# Patient Record
Sex: FEMALE | Race: WHITE | NOT HISPANIC OR LATINO | Employment: OTHER | URBAN - METROPOLITAN AREA
[De-identification: names, ages, dates, MRNs, and addresses within clinical notes are randomized per-mention and may not be internally consistent; named-entity substitution may affect disease eponyms.]

---

## 2017-01-20 ENCOUNTER — OFFICE VISIT (OUTPATIENT)
Dept: BEHAVIORAL HEALTH | Facility: PHYSICIAN GROUP | Age: 57
End: 2017-01-20
Payer: COMMERCIAL

## 2017-01-20 DIAGNOSIS — F33.1 MAJOR DEPRESSIVE DISORDER, RECURRENT EPISODE, MODERATE (HCC): ICD-10-CM

## 2017-01-20 PROCEDURE — 90834 PSYTX W PT 45 MINUTES: CPT | Performed by: MARRIAGE & FAMILY THERAPIST

## 2017-01-21 NOTE — BH THERAPY
[facility]  Therapy Progress Note    Patient Name: Debby Desai  Patient MRN: [id number]  Today's Date: 1/20/2017     Type of session:Individual psychotherapy  Length of session: 45  Persons in attendance:Patient    Subjective/New Info: stood up to her mother and is proud of herself; went shopping w/ her sisters; went out to dinner w/ friends and they plan to make it a monthly thing; some chest pains but not as often as prior    Objective/Observations:   Participation: Active verbal participation   Grooming: Neat   Cognition: Alert   Eye contact: Good   Mood: Anxious   Affect: Anxious   Thought process: Logical   Speech: Rate within normal limits   Other:     Diagnoses:   1. Major depressive disorder, recurrent episode, moderate (CMS-HCC)         Current risk:   SUICIDE: Not applicable   Homicide: Not applicable   Self-harm: Not applicable   Relapse: Not applicable   Other:    Safety Plan reviewed? Not Indicated   If evidence of imminent risk is present, intervention/plan:     Therapeutic Intervention(s): Conflict clarification, Interpersonal effectiveness skills, Parenting/familial roles addressed, Relaxation exercise, Self-care skills and Supportive psychotherapy    Treatment Goal(s)/Objective(s) addressed: disc relaxation and did short relaxation exercise; will bring relaxation handout next session; explained guided imagery and will do next time; pt has [facility], [facility] in [place] and will work on imagery when anxiety begins using that, while slowing breathing     Progress toward Treatment Goals: Moderate improvement    Plan:  - Next appointment scheduled:  2/3/2017    KATT Hernandez  1/20/2017

## 2017-02-03 ENCOUNTER — OFFICE VISIT (OUTPATIENT)
Dept: BEHAVIORAL HEALTH | Facility: PHYSICIAN GROUP | Age: 57
End: 2017-02-03
Payer: COMMERCIAL

## 2017-02-03 DIAGNOSIS — F33.1 MAJOR DEPRESSIVE DISORDER, RECURRENT EPISODE, MODERATE (HCC): ICD-10-CM

## 2017-02-03 DIAGNOSIS — F41.8 OTHER SPECIFIED ANXIETY DISORDERS: ICD-10-CM

## 2017-02-03 PROCEDURE — 90834 PSYTX W PT 45 MINUTES: CPT | Performed by: MARRIAGE & FAMILY THERAPIST

## 2017-02-04 NOTE — BH THERAPY
" [facility]  Therapy Progress Note    Patient Name: Debby Desai  Patient MRN: [id number]  Today's Date: 2/3/2017     Type of session:Individual psychotherapy  Length of session: 45  Persons in attendance:Patient    Subjective/New Info: mom  last wk unexpectedly and she had to go back to work before she was ready; feeling overwhelmed and isn't sleeping; asked for time off and couldn't get it    Objective/Observations:   Participation: Active verbal participation   Grooming: Casual   Cognition: Alert   Eye contact: Good   Mood: Depressed and Anxious   Affect: Sad, Anxious and Tearful   Thought process: Logical   Speech: Rate within normal limits   Other:     Diagnoses:   1. Major depressive disorder, recurrent episode, moderate (CMS-HCC)    2. Other specified anxiety disorders         Current risk:   SUICIDE: Low denies   Homicide: Not applicable   Self-harm: Not applicable   Relapse: Not applicable   Other:    Safety Plan reviewed? Not Indicated   If evidence of imminent risk is present, intervention/plan:     Therapeutic Intervention(s): Distress tolerance skills, Parenting/familial roles addressed, Psychoeducation RE: grief and Supportive psychotherapy    Treatment Goal(s)/Objective(s) addressed: pt has never grieved fa's death and now has mom's; we had discussed being off work and now we discussed program; talked w/ Dr. Correa and she will bring her in next wk and have her talk w/ Ju beltre beginning program, also get her off work     Progress toward Treatment Goals: Moderate decline    Plan:  - \"Homework\" recommendation: refer to KATT Koo  2/3/2017                                     "

## 2017-02-06 ENCOUNTER — OFFICE VISIT (OUTPATIENT)
Dept: BEHAVIORAL HEALTH | Facility: PHYSICIAN GROUP | Age: 57
End: 2017-02-06
Payer: COMMERCIAL

## 2017-02-06 VITALS
WEIGHT: 181 LBS | SYSTOLIC BLOOD PRESSURE: 120 MMHG | DIASTOLIC BLOOD PRESSURE: 75 MMHG | BODY MASS INDEX: 33.31 KG/M2 | HEIGHT: 62 IN | HEART RATE: 88 BPM

## 2017-02-06 DIAGNOSIS — F33.1 MAJOR DEPRESSIVE DISORDER, RECURRENT EPISODE, MODERATE (HCC): ICD-10-CM

## 2017-02-06 DIAGNOSIS — Z63.4 BEREAVEMENT: Primary | ICD-10-CM

## 2017-02-06 DIAGNOSIS — F41.9 ANXIETY DISORDER, UNSPECIFIED TYPE: ICD-10-CM

## 2017-02-06 PROCEDURE — 99215 OFFICE O/P EST HI 40 MIN: CPT | Performed by: PSYCHIATRY & NEUROLOGY

## 2017-02-06 RX ORDER — ZOLPIDEM TARTRATE 5 MG/1
5 TABLET ORAL NIGHTLY PRN
Qty: 30 TAB | Refills: 0 | Status: SHIPPED
Start: 2017-02-06 | End: 2017-03-07 | Stop reason: SDUPTHER

## 2017-02-06 RX ORDER — CLONAZEPAM 1 MG/1
1 TABLET ORAL 2 TIMES DAILY
Qty: 60 TAB | Refills: 0 | Status: SHIPPED
Start: 2017-02-25 | End: 2017-04-12 | Stop reason: SDUPTHER

## 2017-02-06 RX ORDER — DULOXETIN HYDROCHLORIDE 60 MG/1
120 CAPSULE, DELAYED RELEASE ORAL DAILY
Qty: 60 CAP | Refills: 0 | Status: SHIPPED | OUTPATIENT
Start: 2017-02-06 | End: 2017-03-07 | Stop reason: SDUPTHER

## 2017-02-06 SDOH — SOCIAL STABILITY - SOCIAL INSECURITY: DISSAPEARANCE AND DEATH OF FAMILY MEMBER: Z63.4

## 2017-02-06 NOTE — PROGRESS NOTES
PSYCHIATRY FOLLOW-UP NOTE      Chief Complaint   Patient presents with   • Follow-Up     Anxiety, depression   • Other     Bereavement - mother  2 weeks ago         History Of Present Illness:  Debby Desai is a 56 y.o. old female with major depressive disorder, anxiety disorder, hypothyroidism, HTN, migraines comes in today for follow up, was last seen 6 weeks ago. Last visit, her mother unexpectedly  on 17. Her mother had been sick for a while but her passing away was unexpected. Since her death she has been really struggling with her anxiety, depression and sleep. She is the oldest of all the siblings and has been trying to take care of all of them. She took 2 or 3 days off work and had to come back to work as her medication request was not approved. She has been feeling really stressed at work and feels that she has not been able to work like she used to. She has been taking more time to complete her work which is really distressing her as well. She has not been able to sleep properly since her mother , has been struggling with both falling and staying asleep. She has been in touch with her siblings and extended family and is trying to take care of them. Her family is also being it being an eye on her and is making sure that she is eating healthy. She has been having some unresolved feelings about her mother death and she has not been able to process her feelings yet. Continues to be compliant with Cymbalta and Klonopin and denies any side effects. Continues to have her headaches as well and some on and off chest pain which is likely because of her anxiety. Denies having thoughts of wanting to hurt herself or others.    Social History:  Lives alone in [place]. Single currently, never , no kids. Employed, has been working for [employer] for 19+ years.    Substance Use:  Alcohol - Denies   Nicotine - Denies  Illicit drugs - Denies     Past Medication Trials:  Zoloft, Paxil, Celexa,  "Effexor, Wellbutrin, Elavil (effective)     Medications:  Current Outpatient Prescriptions   Medication Sig Dispense Refill   • duloxetine (CYMBALTA) 60 MG Cap DR Particles delayed-release capsule Take 2 Caps by mouth every day. 60 Cap 1   • clonazepam (KLONOPIN) 1 MG Tab Take 1 Tab by mouth 2 times a day. 60 Tab 1   • Tetrahydrozoline HCl (VISINE OP) by Ophthalmic route.     • topiramate (TOPAMAX) 50 MG tablet Take 2 Tabs by mouth every day. 60 Tab 6   • lisinopril-hydrochlorothiazide (PRINZIDE, ZESTORETIC) 20-12.5 MG per tablet TAKE ONE (1) TABLET BY MOUTH DAILY  30 Tab 11   • levothyroxine (SYNTHROID) 50 MCG Tab TAKE ONE (1) TABLET BY MOUTH DAILY  30 Tab 11   • asa/apap/caffeine (EXCEDRIN) 250-250-65 MG Tab Take 2 Tabs by mouth every 6 hours as needed for Headache.     • ibuprofen (MOTRIN) 200 MG Tab Take 600 mg by mouth every 6 hours as needed for Mild Pain.       No current facility-administered medications for this visit.       Review Of Systems:    Constitutional - Positive for fatigue  Respiratory - Negative for shortness of breath, cough  CVS - Positive for chest pain. Negative for palpitations.  GI - Negative for nausea, vomiting, abdominal pain, diarrhea, constipation  Musculoskeletal - Negative for back pain  Neurological - Positive for headaches  Psychiatric - Positive for anxiety, depression, poor sleep    Physical Examination:  Vital signs: /75 mmHg  Pulse 88  Ht 1.575 m (5' 2\")  Wt 82.101 kg (181 lb)  BMI 33.10 kg/m2  LMP 02/26/2012    Musculoskeletal: Normal gait. No abnormal movements.     Mental Status Evaluation:   General: Middle aged white female, teary eyed at times, dressed in casual attire, good grooming and hygiene, in no apparent distress, calm and cooperative, fair eye contact, no psychomotor agitation or retardation  Orientation: Alert and oriented to person, place and time  Recent and remote memory: Grossly intact  Attention span and concentration: Grossly intact  Speech: " "Spontaneous, normal rate, rhythm and tone  Thought Process: Linear, logical and goal directed  Thought Content: Denies suicidal or homicidal ideations, intent or plan  Perception: Denies auditory or visual hallucinations. No delusions noted  Associations: Intact  Language: Appropriate  Fund of knowledge and vocabulary: Grossly adequate  Mood: \"fine\"  Affect: Dysphoric at times, mood congruent  Insight: Good  Judgment: Good      Impression:  1. Bereavement (mother  2017)  2. Major depressive disorder, recurrent, moderate  3. Unspecified anxiety disorder    Medical Records/Labs/Diagnostic Tests Reviewed:  [place] [facility] records - appropriate refills, no abuse suspected    Plan:  1. Start Ambien 5 mg at bedtime as needed for sleep. #30 tabs with no refill faxed to the pharmacy. Side effects including sedation, related activities etc.  2. Continue Cymbalta 120 mg daily for depression and anxiety.  3. Continue Klonopin 1 mg twice daily for anxiety/sleep. # 60 tablets with one refill faxed to the pharmacy.   4. Continue individual psychotherapy with ERNIE Hernandez  5. Vitamin D level ordered on last visit, still pending.   6. Discussed FMLA paperwork. Patient will fax over the appropriate forms to our office. Will complete once forms once received.     Total face-to-face time: 50 minutes    More than 50% of face-to-face time was spent in counseling and coordinating care. Discussed feelings of grief surrounding the death of her mother and FMLA paperwork.     Return to clinic in 4 weeks or sooner if symptoms worsen    The proposed treatment plan was discussed with the patient who was provided the opportunity to ask questions and make suggestions regarding alternative treatment. Patient verbalized understanding and expressed agreement with the plan.     Norma Correa M.D.  2017    This note was created using voice recognition software (Dragon). The accuracy of the dictation is limited by the abilities " of the software. I have reviewed the note prior to signing, however some errors in grammar and context are still possible. If you have any questions related to this note please do not hesitate to contact our office.

## 2017-02-17 ENCOUNTER — OFFICE VISIT (OUTPATIENT)
Dept: BEHAVIORAL HEALTH | Facility: PHYSICIAN GROUP | Age: 57
End: 2017-02-17
Payer: COMMERCIAL

## 2017-02-17 DIAGNOSIS — F33.1 MAJOR DEPRESSIVE DISORDER, RECURRENT EPISODE, MODERATE (HCC): ICD-10-CM

## 2017-02-17 PROCEDURE — 90834 PSYTX W PT 45 MINUTES: CPT | Performed by: MARRIAGE & FAMILY THERAPIST

## 2017-02-17 NOTE — BH THERAPY
" [facility]  Therapy Progress Note    Patient Name: Debby Desai  Patient MRN: [id number]  Today's Date: 2/17/2017     Type of session:Individual psychotherapy  Length of session: 45  Persons in attendance:Patient    Subjective/New Info: has been off work and has one more week; is sleeping a lot whenever she gets tired; writing letters to her uncle abt his \"creepy\" treatment of her sister and mother; letter to father and mother; this works for her better than journaling; is reading and going out w/ friends    Objective/Observations:   Participation: Active verbal participation   Grooming: Casual and Neat   Cognition: Alert   Eye contact: Good   Mood: Depressed   Affect: Sad   Thought process: Logical   Speech: Rate within normal limits   Other:     Diagnoses:   1. Major depressive disorder, recurrent episode, moderate (CMS-HCC)         Current risk:   SUICIDE: Not applicable   Homicide: Not applicable   Self-harm: Not applicable   Relapse: Not applicable   Other:    Safety Plan reviewed? Not Indicated   If evidence of imminent risk is present, intervention/plan:     Therapeutic Intervention(s): Distress tolerance skills, Positive behavior reinforced, Self-care skills and Supportive psychotherapy    Treatment Goal(s)/Objective(s) addressed: disc relaxation and gave handout; reinforced positive behaviors; looking for grief group; moving through grieving     Progress toward Treatment Goals: Mild improvement    Plan:  - Next appointment scheduled:  3/7/2017    KATT Hernandez  2/17/2017                                     "

## 2017-03-07 ENCOUNTER — OFFICE VISIT (OUTPATIENT)
Dept: BEHAVIORAL HEALTH | Facility: PHYSICIAN GROUP | Age: 57
End: 2017-03-07
Payer: COMMERCIAL

## 2017-03-07 VITALS
SYSTOLIC BLOOD PRESSURE: 137 MMHG | HEART RATE: 62 BPM | WEIGHT: 184 LBS | BODY MASS INDEX: 33.86 KG/M2 | DIASTOLIC BLOOD PRESSURE: 85 MMHG | HEIGHT: 62 IN

## 2017-03-07 DIAGNOSIS — Z63.4 BEREAVEMENT: ICD-10-CM

## 2017-03-07 DIAGNOSIS — F33.41 MDD (MAJOR DEPRESSIVE DISORDER), RECURRENT, IN PARTIAL REMISSION (HCC): ICD-10-CM

## 2017-03-07 DIAGNOSIS — F41.9 ANXIETY DISORDER, UNSPECIFIED TYPE: ICD-10-CM

## 2017-03-07 PROCEDURE — 99213 OFFICE O/P EST LOW 20 MIN: CPT | Performed by: PSYCHIATRY & NEUROLOGY

## 2017-03-07 RX ORDER — ZOLPIDEM TARTRATE 5 MG/1
5 TABLET ORAL NIGHTLY PRN
Qty: 30 TAB | Refills: 1 | Status: SHIPPED | OUTPATIENT
Start: 2017-03-07 | End: 2017-05-08

## 2017-03-07 RX ORDER — DULOXETIN HYDROCHLORIDE 60 MG/1
120 CAPSULE, DELAYED RELEASE ORAL DAILY
Qty: 60 CAP | Refills: 1 | Status: SHIPPED | OUTPATIENT
Start: 2017-03-07 | End: 2017-05-08 | Stop reason: SDUPTHER

## 2017-03-07 SDOH — SOCIAL STABILITY - SOCIAL INSECURITY: DISSAPEARANCE AND DEATH OF FAMILY MEMBER: Z63.4

## 2017-03-07 NOTE — PROGRESS NOTES
PSYCHIATRY FOLLOW-UP NOTE      Chief Complaint   Patient presents with   • Follow-Up     anxiety, depression         History Of Present Illness:  Debby Desai is a 56 y.o. old female with major depressive disorder, anxiety disorder, hypothyroidism, HTN, migraines comes in today for follow up, was last seen 4 weeks ago. She reports doing better since her last visit here. She was able to take FMLA for 2 weeks and she was able to spend some time by herself. She feels that taking some time off work really helped her and she is feeling more relaxed. She also started Ambien for sleep and has noticed an improvement in her sleep duration and quality. Denies any side effects from Ambien. She has been spending time with her family and has been talking about her mom. She feels that she is doing better in terms of losing her mother. She has a lot of family and she has been trying to reach out to all of them. Denies anhedonia. Appetite good. She went back to work last week and things have been okay. She feels it she is handling the stress at work a lot better. Continues to be compliant with Cymbalta and Klonopin and endorses benefit. She is not having frequent chest pains like she was having before. Headaches have improved as well. Denies any new stressors.    Social History:  Lives alone in [place]. Single currently, never , no kids. Employed, has been working for [employer] for 19+ years.    Substance Use:  Alcohol - Denies   Nicotine - Denies  Illicit drugs - Denies     Past Medication Trials:  Zoloft, Paxil, Celexa, Effexor, Wellbutrin, Elavil (effective)     Medications:  Current Outpatient Prescriptions   Medication Sig Dispense Refill   • zolpidem (AMBIEN) 5 MG Tab Take 1 Tab by mouth at bedtime as needed for Sleep. 30 Tab 0   • duloxetine (CYMBALTA) 60 MG Cap DR Particles delayed-release capsule Take 2 Caps by mouth every day. 60 Cap 0   • clonazepam (KLONOPIN) 1 MG Tab Take 1 Tab by mouth 2 times a day.  "60 Tab 0   • Tetrahydrozoline HCl (VISINE OP) by Ophthalmic route.     • topiramate (TOPAMAX) 50 MG tablet Take 2 Tabs by mouth every day. 60 Tab 6   • lisinopril-hydrochlorothiazide (PRINZIDE, ZESTORETIC) 20-12.5 MG per tablet TAKE ONE (1) TABLET BY MOUTH DAILY  30 Tab 11   • levothyroxine (SYNTHROID) 50 MCG Tab TAKE ONE (1) TABLET BY MOUTH DAILY  30 Tab 11   • asa/apap/caffeine (EXCEDRIN) 250-250-65 MG Tab Take 2 Tabs by mouth every 6 hours as needed for Headache.     • ibuprofen (MOTRIN) 200 MG Tab Take 600 mg by mouth every 6 hours as needed for Mild Pain.       No current facility-administered medications for this visit.       Review Of Systems:    Constitutional - Positive for fatigue  Respiratory - Negative for shortness of breath, cough  CVS - Positive for infrequent chest pain. Negative for palpitations.  GI - Negative for nausea, vomiting, abdominal pain, diarrhea, constipation  Musculoskeletal - Negative for back pain  Neurological - Positive for headaches  Psychiatric - Positive for anxiety, depression (improved)    Physical Examination:  Vital signs: /85 mmHg  Pulse 62  Ht 1.575 m (5' 2\")  Wt 83.462 kg (184 lb)  BMI 33.65 kg/m2  LMP 02/26/2012    Musculoskeletal: Normal gait. No abnormal movements.     Mental Status Evaluation:   General: Middle aged white female, dressed in casual attire, good grooming and hygiene, in no apparent distress, calm and cooperative, fair eye contact, no psychomotor agitation or retardation  Orientation: Alert and oriented to person, place and time  Recent and remote memory: Grossly intact  Attention span and concentration: Grossly intact  Speech: Spontaneous, normal rate, rhythm and tone  Thought Process: Linear, logical and goal directed  Thought Content: Denies suicidal or homicidal ideations, intent or plan  Perception: Denies auditory or visual hallucinations. No delusions noted  Associations: Intact  Language: Appropriate  Fund of knowledge and vocabulary: " "Grossly adequate  Mood: \"I think am better\"  Affect: Euthymic, mood congruent  Insight: Good  Judgment: Good      Impression:  1. Bereavement (mother  2017)  2. Major depressive disorder, recurrent, in partial remission   3. Unspecified anxiety disorder    Medical Records/Labs/Diagnostic Tests Reviewed:  [place] - appropriate refills, no abuse suspected    Plan:  1. Continue Cymbalta 120 mg daily for depression and anxiety.  2. Continue Klonopin 1 mg twice daily for anxiety/sleep. Not refilled today.  3. Continue Ambien 5 mg at bedtime as needed for sleep. #30 tabs with one refill faxed to the pharmacy  4. Continue individual psychotherapy with ERNIE Hernandez    Return to clinic in 2 months or sooner if symptoms worsen    The proposed treatment plan was discussed with the patient who was provided the opportunity to ask questions and make suggestions regarding alternative treatment. Patient verbalized understanding and expressed agreement with the plan.     Norma Correa M.D.  2017    This note was created using voice recognition software (Dragon). The accuracy of the dictation is limited by the abilities of the software. I have reviewed the note prior to signing, however some errors in grammar and context are still possible. If you have any questions related to this note please do not hesitate to contact our office.       "

## 2017-03-22 ENCOUNTER — OFFICE VISIT (OUTPATIENT)
Dept: URGENT CARE | Facility: CLINIC | Age: 57
End: 2017-03-22
Payer: COMMERCIAL

## 2017-03-22 VITALS
HEART RATE: 66 BPM | WEIGHT: 179 LBS | OXYGEN SATURATION: 96 % | BODY MASS INDEX: 28.09 KG/M2 | DIASTOLIC BLOOD PRESSURE: 78 MMHG | SYSTOLIC BLOOD PRESSURE: 124 MMHG | HEIGHT: 67 IN | RESPIRATION RATE: 16 BRPM | TEMPERATURE: 98.2 F

## 2017-03-22 DIAGNOSIS — M54.2 NECK PAIN, BILATERAL: ICD-10-CM

## 2017-03-22 DIAGNOSIS — R51.9 HEADACHE DISORDER: ICD-10-CM

## 2017-03-22 PROCEDURE — 99214 OFFICE O/P EST MOD 30 MIN: CPT | Performed by: NURSE PRACTITIONER

## 2017-03-22 PROCEDURE — 99999 PR NO CHARGE: CPT | Performed by: NURSE PRACTITIONER

## 2017-03-22 RX ORDER — PREDNISONE 20 MG/1
TABLET ORAL
Qty: 10 TAB | Refills: 0 | Status: SHIPPED | OUTPATIENT
Start: 2017-03-22 | End: 2017-07-11

## 2017-03-22 RX ORDER — CYCLOBENZAPRINE HCL 10 MG
10 TABLET ORAL
Qty: 20 TAB | Refills: 0 | Status: SHIPPED | OUTPATIENT
Start: 2017-03-22 | End: 2017-07-11

## 2017-03-22 RX ORDER — KETOROLAC TROMETHAMINE 30 MG/ML
60 INJECTION, SOLUTION INTRAMUSCULAR; INTRAVENOUS ONCE
Status: COMPLETED | OUTPATIENT
Start: 2017-03-22 | End: 2017-03-22

## 2017-03-22 RX ORDER — TRAMADOL HYDROCHLORIDE 50 MG/1
50 TABLET ORAL EVERY 4 HOURS PRN
COMMUNITY
End: 2017-07-11

## 2017-03-22 RX ADMIN — KETOROLAC TROMETHAMINE 60 MG: 30 INJECTION, SOLUTION INTRAMUSCULAR; INTRAVENOUS at 18:39

## 2017-03-22 ASSESSMENT — ENCOUNTER SYMPTOMS
TINGLING: 0
NECK PAIN: 1
VISUAL CHANGE: 0
HEADACHES: 1
CHILLS: 0
FEVER: 0
NUMBNESS: 0

## 2017-03-22 NOTE — MR AVS SNAPSHOT
"        Debby Desai   3/22/2017 5:30 PM   Office Visit   MRN: [id number]    Department:  [facility]   Dept Phone:  [phone]    Description:  Female : 1960   Provider:  JAVED Pierson           Reason for Visit     Neck Pain x 1 week. pt states her pain goes from shoulders up her neck and over her head to forehead      Allergies as of 3/22/2017     Allergen Noted Reactions    Bactrim Ds 2011   Rash, Swelling    Pt states \"My hand swells up and rash all over body\".    Sulfamethoxazole-Trimethoprim 2016   Rash, Swelling    Pt states \"My hand swells up and rash all over body\".      You were diagnosed with     Neck pain, bilateral   [994079]       Headache disorder   [537324]         Vital Signs     Blood Pressure Pulse Temperature Respirations Height Weight    124/78 mmHg 66 36.8 °C (98.2 °F) 16 1.702 m (5' 7\") 81.194 kg (179 lb)    Body Mass Index Oxygen Saturation Last Menstrual Period Smoking Status          28.03 kg/m2 96% 2012 Never Smoker         Basic Information     Date Of Birth Sex Race Ethnicity Preferred Language    1960 Female White Non- English      Your appointments     Mar 31, 2017  4:00 PM   Individual Therapy with KATT Hernandez   [facility] [address] [place] [address])    [address] [place] [id number]   [phone]            2017 10:00 AM   Individual Therapy with KATT Hernandez   [facility] [address] [place] [address])    [address] [place] [address]   [phone]            May 05, 2017  9:00 AM   Individual Therapy with KATT Hernandez   [facility] [address] [place] [address])    [address] [place] [address]   [phone]            May 08, 2017  4:00 PM   Follow Up Med Management with Norma Correa M.D.   [facility] [address] [place] [address])    [address] [place] [address]   [phone]              Problem List          "    ICD-10-CM Priority Class Noted - Resolved    HTN (hypertension), benign (Chronic) I10   3/19/2012 - Present    Acquired hypothyroidism E03.9   3/19/2012 - Present    Back pain (Chronic) M54.9   3/19/2012 - Present    Neck pain M54.2   3/19/2012 - Present    Dyslipidemia E78.5   12/2/2014 - Present    Osteoarthrosis involving lower leg M17.9   4/21/2015 - Present    Atypical chest pain R07.89   7/18/2016 - Present    Anxiety disorder F41.9   10/24/2016 - Present    Bereavement Z63.4   2/6/2017 - Present    MDD (major depressive disorder), recurrent, in partial remission (CMS-[facility]) F33.41   3/7/2017 - Present      Health Maintenance        Date Due Completion Dates    COLONOSCOPY 4/5/2008 ---    PAP SMEAR 3/30/2015 3/30/2012    MAMMOGRAM 1/16/2016 1/16/2015, 12/2/2009, 12/2/2009, 10/30/2008, 10/30/2008, 8/24/2007, 8/24/2007, 2/3/2006, 8/10/2005, 1/11/2005, 12/22/2004    IMM DTaP/Tdap/Td Vaccine (2 - Td) 4/25/2023 4/25/2013            Current Immunizations     Influenza TIV (IM) 10/22/2013, 2/21/2013, 11/19/2011    Influenza Vaccine Quad Inj (Pf) 10/21/2016 12:46 PM, 10/21/2014    Influenza Vaccine Quad Inj (Preserved) 11/17/2015    Pneumococcal polysaccharide vaccine (PPSV-23) 3/19/2008    Tdap Vaccine 4/25/2013      Below and/or attached are the medications your provider expects you to take. Review all of your home medications and newly ordered medications with your provider and/or pharmacist. Follow medication instructions as directed by your provider and/or pharmacist. Please keep your medication list with you and share with your provider. Update the information when medications are discontinued, doses are changed, or new medications (including over-the-counter products) are added; and carry medication information at all times in the event of emergency situations     Allergies:  BACTRIM DS - Rash,Swelling     SULFAMETHOXAZOLE-TRIMETHOPRIM - Rash,Swelling               Medications  Valid as of: March 22, 2017 -   6:13 PM    Generic Name Brand Name Tablet Size Instructions for use    Aspirin-Acetaminophen-Caffeine (Tab) EXCEDRIN 250-250-65 MG Take 2 Tabs by mouth every 6 hours as needed for Headache.        ClonazePAM (Tab) KLONOPIN 1 MG Take 1 Tab by mouth 2 times a day.        Cyclobenzaprine HCl (Tab) FLEXERIL 10 MG Take 1 Tab by mouth at bedtime as needed.        DULoxetine HCl (Cap DR Particles) CYMBALTA 60 MG Take 2 Caps by mouth every day.        Ibuprofen (Tab) MOTRIN 200 MG Take 600 mg by mouth every 6 hours as needed for Mild Pain.        Levothyroxine Sodium (Tab) SYNTHROID 50 MCG TAKE ONE (1) TABLET BY MOUTH DAILY         Lisinopril-Hydrochlorothiazide (Tab) PRINZIDE, ZESTORETIC 20-12.5 MG TAKE ONE (1) TABLET BY MOUTH DAILY         PredniSONE (Tab) DELTASONE 20 MG Take 2 tabs daily for 5 days        Tetrahydrozoline HCl   by Ophthalmic route.        Topiramate (Tab) TOPAMAX 50 MG Take 2 Tabs by mouth every day.        TraMADol HCl (Tab) ULTRAM 50 MG Take 50 mg by mouth every four hours as needed.        Zolpidem Tartrate (Tab) AMBIEN 5 MG Take 1 Tab by mouth at bedtime as needed for Sleep.        .                 Medicines prescribed today were sent to:     [id number] - [place], [place] - [address] [facility]    [address] [place] [address]    Phone: [phone] Fax: [phone]    Open 24 Hours?: No      Medication refill instructions:       If your prescription bottle indicates you have medication refills left, it is not necessary to call your provider’s office. Please contact your pharmacy and they will refill your medication.    If your prescription bottle indicates you do not have any refills left, you may request refills at any time through one of the following ways: The online [employer] system (except Urgent Care), by calling your provider’s office, or by asking your pharmacy to contact your provider’s office with a refill request. Medication refills are processed only during regular business hours and may not be  available until the next business day. Your provider may request additional information or to have a follow-up visit with you prior to refilling your medication.   *Please Note: Medication refills are assigned a new Rx number when refilled electronically. Your pharmacy may indicate that no refills were authorized even though a new prescription for the same medication is available at the pharmacy. Please request the medicine by name with the pharmacy before contacting your provider for a refill.           [facility] Access Code: Activation code not generated  Current [employer] Status: Active

## 2017-03-23 NOTE — PROGRESS NOTES
"Subjective:      Debby Desai is a 56 y.o. female who presents with Neck Pain            Neck Pain   This is a new problem. The current episode started in the past 7 days. The problem occurs constantly. The problem has been gradually worsening. The pain is associated with an unknown factor. The pain is present in the left side, right side and midline. Quality: throbbing and tight. radiates to front of head. The pain is at a severity of 8/10. The pain is severe. The symptoms are aggravated by bending. Associated symptoms include headaches. Pertinent negatives include no fever, numbness, tingling or visual change. She has tried NSAIDs and ice for the symptoms. The treatment provided no relief.   Allergies, medications and history reviewed by me today      Review of Systems   Constitutional: Negative for fever and chills.   Musculoskeletal: Positive for neck pain.   Neurological: Positive for headaches. Negative for tingling and numbness.          Objective:     /78 mmHg  Pulse 66  Temp(Src) 36.8 °C (98.2 °F)  Resp 16  Ht 1.702 m (5' 7\")  Wt 81.194 kg (179 lb)  BMI 28.03 kg/m2  SpO2 96%  LMP 02/26/2012     Physical Exam   Constitutional: She is oriented to person, place, and time. She appears well-developed and well-nourished. No distress.   Neck: Normal range of motion and full passive range of motion without pain. Neck supple. Muscular tenderness present.       Cardiovascular: Normal rate, regular rhythm and normal heart sounds.    No murmur heard.  Pulmonary/Chest: Effort normal and breath sounds normal.   Musculoskeletal: Normal range of motion.   Neurological: She is alert and oriented to person, place, and time.   Skin: Skin is warm and dry.   Nursing note and vitals reviewed.              Assessment/Plan:     1. Neck pain, bilateral  ketorolac (TORADOL) injection 60 mg    predniSONE (DELTASONE) 20 MG Tab    cyclobenzaprine (FLEXERIL) 10 MG Tab   2. Headache disorder       Differential " diagnosis, natural history, supportive care, and indications for immediate follow-up discussed at length.   Gentle stretching.  Continue ice/heat.

## 2017-03-31 ENCOUNTER — OFFICE VISIT (OUTPATIENT)
Dept: BEHAVIORAL HEALTH | Facility: PHYSICIAN GROUP | Age: 57
End: 2017-03-31
Payer: COMMERCIAL

## 2017-03-31 DIAGNOSIS — F33.41 RECURRENT MAJOR DEPRESSION IN PARTIAL REMISSION (HCC): ICD-10-CM

## 2017-03-31 PROCEDURE — 90834 PSYTX W PT 45 MINUTES: CPT | Performed by: MARRIAGE & FAMILY THERAPIST

## 2017-03-31 NOTE — BH THERAPY
[facility]  Therapy Progress Note    Patient Name: Debby Desai  Patient MRN: [id number]  Today's Date: 3/31/2017     Type of session:Individual psychotherapy  Length of session: 45  Persons in attendance:Patient    Subjective/New Info: doing better; pacing herself at work and is caught up from her two weeks off; getting positive feedback but not from her own dept and would like that; happy w/ the work she's doing on work groups    Objective/Observations:   Participation: Active verbal participation   Grooming: Neat   Cognition: Alert   Eye contact: Good   Mood: Euthymic   Affect: Happy   Thought process: Logical   Speech: Rate within normal limits   Other:     Diagnoses:   1. Recurrent major depression in partial remission (CMS-HCC)         Current risk:   SUICIDE: Not applicable   Homicide: Not applicable   Self-harm: Not applicable   Relapse: Not applicable   Other:    Safety Plan reviewed? Not Indicated   If evidence of imminent risk is present, intervention/plan:     Therapeutic Intervention(s): Conflict resolution skills, Limit-setting, Self-care skills and Supportive psychotherapy    Treatment Goal(s)/Objective(s) addressed: family stuff going well; pt overall happy at this point     Progress toward Treatment Goals: Significant improvement    Plan:  - Next appointment scheduled:  4/14/2017    KATT Hernandez  3/31/2017

## 2017-04-14 ENCOUNTER — OFFICE VISIT (OUTPATIENT)
Dept: BEHAVIORAL HEALTH | Facility: PHYSICIAN GROUP | Age: 57
End: 2017-04-14
Payer: COMMERCIAL

## 2017-04-14 DIAGNOSIS — F33.41 RECURRENT MAJOR DEPRESSION IN PARTIAL REMISSION (HCC): ICD-10-CM

## 2017-04-14 PROCEDURE — 90834 PSYTX W PT 45 MINUTES: CPT | Performed by: MARRIAGE & FAMILY THERAPIST

## 2017-04-14 NOTE — BH THERAPY
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 Recurrent major depression in partial remission (CMS-HCC)         Current risk:   SUICIDE: Low   Homicide: Not applicable   Self-harm: Not applicable   Relapse: Not applicable   Other:    Safety Plan reviewed? Not Indicated   If evidence of imminent risk is present, intervention/plan:     Therapeutic Intervention(s): Conflict clarification, Conflict resolution skills, Parenting/familial roles addressed, Stressors assessed and Supportive psychotherapy    Treatment Goal(s)/Objective(s) addressed: disc pt's role in her family as a child and now and the need for her to put herself first; she's aware of this but finds it hard as she's been the caretaker; this has created resentments toward Deborah (for being lazy) and toward her mother for not being emotionally present     Progress toward Treatment Goals: No change    Plan:as above  - Next appointment scheduled:  5/5/2017    Dana Howard" KATT  4/14/2017

## 2017-05-05 ENCOUNTER — OFFICE VISIT (OUTPATIENT)
Dept: BEHAVIORAL HEALTH | Facility: PHYSICIAN GROUP | Age: 57
End: 2017-05-05
Payer: COMMERCIAL

## 2017-05-05 DIAGNOSIS — F33.41 MAJOR DEPRESSIVE DISORDER, RECURRENT EPISODE, IN PARTIAL REMISSION (HCC): ICD-10-CM

## 2017-05-05 PROCEDURE — 90834 PSYTX W PT 45 MINUTES: CPT | Performed by: MARRIAGE & FAMILY THERAPIST

## 2017-05-05 NOTE — BH THERAPY
" [facility]  Therapy Progress Note    Patient Name: Debby Desai  Patient MRN: [id number]  Today's Date: 5/5/2017     Type of session:Individual psychotherapy  Length of session: 45  Persons in attendance:Patient    Subjective/New Info: has been \"blah\" this week and unsure why; has been having chest pains, tho doing deep breathing and slowing down; office is moving in 3 weeks and she's anxious abt it, not knowing how her desk will work considering her neck pain, also she can't plug in a radio and she needs music to control anxiety; talked more abt family, she heard from Phuc, great nephew who's in rehab in [place]    Objective/Observations:   Participation: Active verbal participation   Grooming: Neat   Cognition: Alert   Eye contact: Good   Mood: Depressed   Affect: Flexible   Thought process: Logical   Speech: Rate within normal limits   Other:     Diagnoses:   1. Major depressive disorder, recurrent episode, in partial remission (CMS-HCC)         Current risk:   SUICIDE: Low   Homicide: Not applicable   Self-harm: Not applicable   Relapse: Not applicable   Other:    Safety Plan reviewed? Not Indicated   If evidence of imminent risk is present, intervention/plan:     Therapeutic Intervention(s): Conflict clarification, Interpersonal effectiveness skills, Pain addressed, Problem-solving and Self-care skills    Treatment Goal(s)/Objective(s) addressed: reminded her of need to take care of herself; she wonders how she became so nurturing since she never got that from her parents; anniv of father's death was last week, says she misses him more than mom; 20 year dinner is coming up and she's taking grand niece and looking forward to it     Progress toward Treatment Goals: No change    Plan:  - Next appointment scheduled:  5/8/2017    KATT Hernandez  5/5/2017                                     "

## 2017-05-08 ENCOUNTER — OFFICE VISIT (OUTPATIENT)
Dept: BEHAVIORAL HEALTH | Facility: PHYSICIAN GROUP | Age: 57
End: 2017-05-08
Payer: COMMERCIAL

## 2017-05-08 VITALS
BODY MASS INDEX: 28.56 KG/M2 | SYSTOLIC BLOOD PRESSURE: 110 MMHG | HEART RATE: 81 BPM | WEIGHT: 182 LBS | HEIGHT: 67 IN | DIASTOLIC BLOOD PRESSURE: 80 MMHG

## 2017-05-08 DIAGNOSIS — F33.41 MAJOR DEPRESSIVE DISORDER, RECURRENT EPISODE, IN PARTIAL REMISSION (HCC): ICD-10-CM

## 2017-05-08 DIAGNOSIS — Z63.4 BEREAVEMENT: ICD-10-CM

## 2017-05-08 DIAGNOSIS — F41.9 ANXIETY DISORDER, UNSPECIFIED TYPE: ICD-10-CM

## 2017-05-08 PROCEDURE — 99214 OFFICE O/P EST MOD 30 MIN: CPT | Performed by: PSYCHIATRY & NEUROLOGY

## 2017-05-08 RX ORDER — CLONAZEPAM 1 MG/1
1 TABLET ORAL 2 TIMES DAILY
Qty: 60 TAB | Refills: 2 | Status: SHIPPED
Start: 2017-05-08 | End: 2018-02-01

## 2017-05-08 RX ORDER — DULOXETIN HYDROCHLORIDE 60 MG/1
120 CAPSULE, DELAYED RELEASE ORAL DAILY
Qty: 60 CAP | Refills: 2 | Status: SHIPPED | OUTPATIENT
Start: 2017-05-08 | End: 2017-09-29 | Stop reason: SDUPTHER

## 2017-05-08 SDOH — SOCIAL STABILITY - SOCIAL INSECURITY: DISSAPEARANCE AND DEATH OF FAMILY MEMBER: Z63.4

## 2017-05-08 NOTE — PROGRESS NOTES
PSYCHIATRY FOLLOW-UP NOTE      Chief Complaint   Patient presents with   • Follow-Up     depression, anxiety          History Of Present Illness:  Debby Desai is a 56 y.o. old female with major depressive disorder, anxiety disorder, hypothyroidism, HTN, migraines, chronic neck pain comes in today for follow up, was last seen 2 months ago.  She reports doing fine since her last visit here. Has been having some situational anxiety due to some psychosocial stresses. 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 Denies any other stressors. She has been connecting a lot with her extended family since her mother passed away earlier this year. She is happy that she gets to spend a lot of time with them. She has been sleeping better and has been off Ambien for about a month or so. She is sleeping about 5 hours and would like to see her sleep without Ambien at this time. Continues to be compliant with both Cymbalta and Klonopin and denies any side effects. Endorses some infrequent chest pains which are likely due to her anxiety. She has been having a lot of neck pain as well for which he recently took some Prednisone and Flexeril. Denies anhedonia. Denies crying spells or feeling angry.    Social History:  Lives alone in [place]. Single currently, never , no kids. Employed, has been working for [employer] for 19+ years.    Substance Use:  Alcohol - Denies   Nicotine - Denies  Illicit drugs - Denies     Past Medication Trials:  Zoloft, Paxil, Celexa, Effexor, Wellbutrin, Elavil (effective)     Medications:  Current Outpatient Prescriptions   Medication Sig Dispense Refill   • clonazepam (KLONOPIN) 1 MG Tab TAKE ONE (1) TABLET BY MOUTH TWICE DAILY  60 Tab 0   • tramadol (ULTRAM) 50 MG Tab Take 50 mg by mouth every four hours as needed.     • predniSONE (DELTASONE) 20 MG Tab Take 2 tabs daily for 5 days 10 Tab 0   • cyclobenzaprine (FLEXERIL) 10 MG Tab Take 1  "Tab by mouth at bedtime as needed. 20 Tab 0   • duloxetine (CYMBALTA) 60 MG Cap DR Particles delayed-release capsule Take 2 Caps by mouth every day. 60 Cap 1   • Tetrahydrozoline HCl (VISINE OP) by Ophthalmic route.     • topiramate (TOPAMAX) 50 MG tablet Take 2 Tabs by mouth every day. 60 Tab 6   • lisinopril-hydrochlorothiazide (PRINZIDE, ZESTORETIC) 20-12.5 MG per tablet TAKE ONE (1) TABLET BY MOUTH DAILY  30 Tab 11   • levothyroxine (SYNTHROID) 50 MCG Tab TAKE ONE (1) TABLET BY MOUTH DAILY  30 Tab 11   • asa/apap/caffeine (EXCEDRIN) 250-250-65 MG Tab Take 2 Tabs by mouth every 6 hours as needed for Headache.     • ibuprofen (MOTRIN) 200 MG Tab Take 600 mg by mouth every 6 hours as needed for Mild Pain.       No current facility-administered medications for this visit.       Review Of Systems:    Constitutional - Positive for fatigue  Respiratory - Negative for shortness of breath, cough  CVS - Positive for infrequent chest pain (situational secondary to anxiety). Negative for palpitations.  GI - Negative for nausea, vomiting, abdominal pain, diarrhea, constipation  Musculoskeletal - Positive for neck pain  Neurological - Positive for headaches  Psychiatric - Positive for anxiety, depression (improved)    Physical Examination:  Vital signs: /80 mmHg  Pulse 81  Ht 1.702 m (5' 7.01\")  Wt 82.555 kg (182 lb)  BMI 28.50 kg/m2  LMP 02/26/2012    Musculoskeletal: Normal gait. No abnormal movements.     Mental Status Evaluation:   General: Middle aged white female, dressed in casual attire, good grooming and hygiene, in no apparent distress, calm and cooperative, fair eye contact, no psychomotor agitation or retardation  Orientation: Alert and oriented to person, place and time  Recent and remote memory: Grossly intact  Attention span and concentration: Grossly intact  Speech: Spontaneous, normal rate, rhythm and tone  Thought Process: Linear, logical and goal directed  Thought Content: Denies suicidal or " "homicidal ideations, intent or plan  Perception: Denies auditory or visual hallucinations. No delusions noted  Associations: Intact  Language: Appropriate  Fund of knowledge and vocabulary: Grossly adequate  Mood: \"fine\"  Affect: Euthymic, mood congruent  Insight: Good  Judgment: Good      Impression:  1. Bereavement (mother  2017)  2. Major depressive disorder, recurrent, in partial remission   3. Unspecified anxiety disorder    Medical Records/Labs/Diagnostic Tests Reviewed:  [place]  records - appropriate refills, no abuse suspected    Plan:  1. Continue Cymbalta 120 mg daily for depression and anxiety.  2. Continue Klonopin 1 mg twice daily for anxiety/sleep. #60 tabs with 2 refills faxed to pharmacy.  3. Discontinue Ambien as sleep has improved  4. Continue individual psychotherapy with ERNIE Hernandez    Return to clinic in 3 months or sooner if symptoms worsen    The proposed treatment plan was discussed with the patient who was provided the opportunity to ask questions and make suggestions regarding alternative treatment. Patient verbalized understanding and expressed agreement with the plan.     Norma Correa M.D.  2017    This note was created using voice recognition software (Dragon). The accuracy of the dictation is limited by the abilities of the software. I have reviewed the note prior to signing, however some errors in grammar and context are still possible. If you have any questions related to this note please do not hesitate to contact our office.       "

## 2017-05-26 ENCOUNTER — OFFICE VISIT (OUTPATIENT)
Dept: URGENT CARE | Facility: CLINIC | Age: 57
End: 2017-05-26
Payer: COMMERCIAL

## 2017-05-26 ENCOUNTER — APPOINTMENT (OUTPATIENT)
Dept: RADIOLOGY | Facility: IMAGING CENTER | Age: 57
End: 2017-05-26
Attending: NURSE PRACTITIONER
Payer: COMMERCIAL

## 2017-05-26 VITALS
OXYGEN SATURATION: 97 % | HEART RATE: 82 BPM | RESPIRATION RATE: 16 BRPM | TEMPERATURE: 98.5 F | DIASTOLIC BLOOD PRESSURE: 78 MMHG | WEIGHT: 182 LBS | SYSTOLIC BLOOD PRESSURE: 126 MMHG | BODY MASS INDEX: 28.5 KG/M2

## 2017-05-26 DIAGNOSIS — S83.8X1A SPRAIN OF OTHER LIGAMENT OF RIGHT KNEE, INITIAL ENCOUNTER: ICD-10-CM

## 2017-05-26 PROCEDURE — 99213 OFFICE O/P EST LOW 20 MIN: CPT | Performed by: NURSE PRACTITIONER

## 2017-05-26 PROCEDURE — A6448 LT COMPRES BAND <3"/YD: HCPCS | Performed by: NURSE PRACTITIONER

## 2017-05-26 PROCEDURE — 73562 X-RAY EXAM OF KNEE 3: CPT | Mod: TC,RT | Performed by: NURSE PRACTITIONER

## 2017-05-26 PROCEDURE — E0114 CRUTCH UNDERARM PAIR NO WOOD: HCPCS | Mod: NU | Performed by: NURSE PRACTITIONER

## 2017-05-26 RX ORDER — HYDROCODONE BITARTRATE AND ACETAMINOPHEN 5; 325 MG/1; MG/1
1-2 TABLET ORAL EVERY 6 HOURS PRN
Qty: 15 TAB | Refills: 0 | Status: SHIPPED | OUTPATIENT
Start: 2017-05-26 | End: 2017-09-29

## 2017-05-26 ASSESSMENT — ENCOUNTER SYMPTOMS
CHILLS: 0
FEVER: 0

## 2017-05-26 NOTE — PROGRESS NOTES
Subjective:      Debby Desai is a 57 y.o. female who presents with Knee Pain    Past Medical History   Diagnosis Date   • Hypertension    • Unspecified disorder of thyroid    • Psychiatric problem      depression   • HTN (hypertension), benign 3/19/2012   • Hypothyroid 3/19/2012   • Major depression (CMS-HCC) 3/19/2012   • Other specified symptom associated with female genital organs      post menopausal bleeding   • Arthritis    • Dental disorder 5/24/12     partial upper denture   • Pain      thoracic spine   • Pain      recently fell and has bruise left forehead   • Orbital floor (blow-out) closed fracture (CMS-HCC)      left     Social History     Social History   • Marital Status: Single     Spouse Name: N/A   • Number of Children: N/A   • Years of Education: N/A     Occupational History   • Not on file.     Social History Main Topics   • Smoking status: Never Smoker    • Smokeless tobacco: Never Used   • Alcohol Use: No   • Drug Use: No   • Sexual Activity: No     Other Topics Concern   • Not on file     Social History Narrative     Family History   Problem Relation Age of Onset   • Anesthesia Sister      slow to come out (as a child)   • Diabetes     • Heart Disease     • Cancer     • Hypertension     • Stroke     • Lung Disease     • Hypertension Father    • Diabetes Father    • Heart Disease Father    • Alcohol abuse Father       Allergies: Bactrim ds and Sulfamethoxazole-trimethoprim      This patient is a 57-year-old female who presents today with complaint of pain and weakness in her right knee. States 2 weeks ago she tripped over her nephew and twisted her knee and almost fell. She tripped again later in the week and twisted her knee again. Since then, she has been having pain and swelling of the right knee with generalized pain in the area and painful walking and weightbearing. She denies other injuries. No prior history of knee injury. States that pain is severe, 8/10 and she is unable to sleep  at night secondary to pain.         Knee Pain  This is a new problem. The current episode started 1 to 4 weeks ago. The problem occurs constantly. The problem has been unchanged. Pertinent negatives include no chills or fever. Nothing aggravates the symptoms. She has tried NSAIDs and rest for the symptoms. The treatment provided no relief.       Review of Systems   Constitutional: Negative for fever and chills.   Musculoskeletal:        Right knee pain and swelling        All other systems reviewed and are negative      Objective:     /78 mmHg  Pulse 82  Temp(Src) 36.9 °C (98.5 °F)  Resp 16  Wt 82.555 kg (182 lb)  SpO2 97%  LMP 02/26/2012     Physical Exam   Constitutional: She is oriented to person, place, and time. She appears well-developed and well-nourished. No distress.   Musculoskeletal:        Right knee: She exhibits decreased range of motion and swelling. Tenderness found. Medial joint line and lateral joint line tenderness noted.        Legs:  Neurological: She is alert and oriented to person, place, and time.   Skin: Skin is warm and dry. She is not diaphoretic.   Psychiatric: She has a normal mood and affect. Her behavior is normal.   Vitals reviewed.    XR knee:       HISTORY/REASON FOR EXAM:  Pain/Deformity Following Trauma  Right knee pain after injury    TECHNIQUE/EXAM DESCRIPTION AND NUMBER OF VIEWS:  4 views of the right knee, 5/26/2017 2:31 PM.    COMPARISON: None    FINDINGS:    The alignment of the knee is within normal limits.  There is probable small joint effusion.  There is no evidence of displaced fracture or dislocation.  There is no focal swelling.    There is osteophyte formation with posterior patellar spurring.           Impression        Small joint effusion without evidence of fracture.               Assessment/Plan:   Right knee sprain   -patient is unable to have MRI due to a metal stimulator in her back   -Referral to ortho given   -Norco PRN at night only for pain;  clearly stated no driving or ETOH with this medication. Checked patient's . She has an RX for klonopin and states she takes this only occasionally for anxiety. I advised patient not to take this with norco and she verbalized understanding and agreement.  There are no diagnoses linked to this encounter.

## 2017-06-23 ENCOUNTER — OFFICE VISIT (OUTPATIENT)
Dept: BEHAVIORAL HEALTH | Facility: PHYSICIAN GROUP | Age: 57
End: 2017-06-23
Payer: COMMERCIAL

## 2017-06-23 DIAGNOSIS — F33.41 MAJOR DEPRESSIVE DISORDER, RECURRENT EPISODE, IN PARTIAL REMISSION (HCC): ICD-10-CM

## 2017-06-23 DIAGNOSIS — F41.9 ANXIETY DISORDER, UNSPECIFIED TYPE: ICD-10-CM

## 2017-06-23 PROCEDURE — 90834 PSYTX W PT 45 MINUTES: CPT | Performed by: MARRIAGE & FAMILY THERAPIST

## 2017-06-23 NOTE — BH THERAPY
" [facility]  Therapy Progress Note    Patient Name: Debby Desai  Patient MRN: [id number]  Today's Date: 6/23/2017     Type of session:Individual psychotherapy  Length of session: 45 minutes  Persons in attendance:Patient    Subjective/New Info: fell and hurt her knee, will need surgery which she can't have till July; is in a lot of pain and doesn't have a lot of pain meds so she's not taking enough; feels like \"I can't get a break\"; told her bro that she wishes someone would take care of her; w/ the pain she's been having increased chest pains again    Objective/Observations:   Participation: Active verbal participation   Grooming: Neat   Cognition: Alert   Eye contact: Good   Mood: Depressed   Affect: Sad   Thought process: Logical   Speech: Rate within normal limits   Other:     Diagnoses:   1. Major depressive disorder, recurrent episode, in partial remission (CMS-HCC)    2. Anxiety disorder, unspecified type         Current risk:   SUICIDE: Low   Homicide: Not applicable   Self-harm: Low   Relapse: Not applicable   Other:    Safety Plan reviewed? Not Indicated   If evidence of imminent risk is present, intervention/plan:     Therapeutic Intervention(s): Distress tolerance skills, Pain addressed, Stressors assessed and Supportive psychotherapy    Treatment Goal(s)/Objective(s) addressed: pt will call doctor's office and speak to the need for more meds before surgery     Progress toward Treatment Goals: Moderate decline    Plan:  - Next appointment scheduled:  6/30/2017    KATT Hernandez  6/23/2017                                     "

## 2017-06-27 ENCOUNTER — HOSPITAL ENCOUNTER (EMERGENCY)
Facility: MEDICAL CENTER | Age: 57
End: 2017-06-27
Attending: EMERGENCY MEDICINE
Payer: COMMERCIAL

## 2017-06-27 VITALS
DIASTOLIC BLOOD PRESSURE: 67 MMHG | TEMPERATURE: 98.1 F | RESPIRATION RATE: 17 BRPM | HEART RATE: 64 BPM | SYSTOLIC BLOOD PRESSURE: 111 MMHG

## 2017-06-27 DIAGNOSIS — F43.21 SITUATIONAL DEPRESSION: ICD-10-CM

## 2017-06-27 PROCEDURE — 99284 EMERGENCY DEPT VISIT MOD MDM: CPT

## 2017-06-27 PROCEDURE — 90791 PSYCH DIAGNOSTIC EVALUATION: CPT

## 2017-06-27 RX ORDER — HYDROXYZINE 50 MG/1
50 TABLET, FILM COATED ORAL NIGHTLY PRN
Qty: 6 TAB | Refills: 0 | Status: SHIPPED | OUTPATIENT
Start: 2017-06-27 | End: 2017-07-11

## 2017-06-27 RX ORDER — HYDROCODONE BITARTRATE AND ACETAMINOPHEN 5; 325 MG/1; MG/1
1 TABLET ORAL EVERY 12 HOURS PRN
Qty: 4 TAB | Refills: 0 | Status: SHIPPED | OUTPATIENT
Start: 2017-06-27 | End: 2017-06-29

## 2017-06-27 NOTE — ED AVS SNAPSHOT
Augur Access Code: Activation code not generated  Current Augur Status: Active    [employer]  A secure, online tool to manage your health information     [employer]’s Augur® is a secure, online tool that connects you to your personalized health information from the privacy of your home -- day or night - making it very easy for you to manage your healthcare. Once the activation process is completed, you can even access your medical information using the Augur gertrude, which is available for free in the Apple Gertrude store or Google Play store.     Augur provides the following levels of access (as shown below):   My Chart Features   [facility] Primary Care Doctor [facility]  Specialists [facility]  Urgent  Care Non-[facility]  Primary Care  Doctor   Email your healthcare team securely and privately 24/7 X X X X   Manage appointments: schedule your next appointment; view details of past/upcoming appointments X      Request prescription refills. X      View recent personal medical records, including lab and immunizations X X X X   View health record, including health history, allergies, medications X X X X   Read reports about your outpatient visits, procedures, consult and ER notes X X X X   See your discharge summary, which is a recap of your hospital and/or ER visit that includes your diagnosis, lab results, and care plan. X X       How to register for Augur:  1. Go to  [email or web address].  2. Click on the Sign Up Now box, which takes you to the New Member Sign Up page. You will need to provide the following information:  a. Enter your Augur Access Code exactly as it appears at the top of this page. (You will not need to use this code after you’ve completed the sign-up process. If you do not sign up before the expiration date, you must request a new code.)   b. Enter your date of birth.   c. Enter your home email address.   d. Click Submit, and follow the next screen’s instructions.  3. Create a Augur ID. This will  be your [facility] login ID and cannot be changed, so think of one that is secure and easy to remember.  4. Create a [facility] password. You can change your password at any time.  5. Enter your Password Reset Question and Answer. This can be used at a later time if you forget your password.   6. Enter your e-mail address. This allows you to receive e-mail notifications when new information is available in [facility].  7. Click Sign Up. You can now view your health information.    For assistance activating your [facility] account, call [phone]

## 2017-06-27 NOTE — ED NOTES
Chief Complaint   Patient presents with   • Depressed   • Suicidal Ideation     While at work patient mentioned she has felt depressed and had thoughts of self harm. Was sent over for further evaluation. Patient states she does not have a plan, but does suffer from severe depression and anxiety. Patient currently with [facility] for evaluation. VSS.

## 2017-06-27 NOTE — ED AVS SNAPSHOT
Home Care Instructions                                                                                                                Debby Desai   MRN: [id number]    Department:  [facility], Emergency Dept   Date of Visit:  6/27/2017            [facility], Emergency Dept    [address]    [facility] [address]    Phone:  [phone]      You were seen by     Michael Dominguez M.D.      Your Diagnosis Was     Situational depression     F43.21       Follow-up Information     1. Follow up with Loy Miles M.D..    Specialty:  Internal Medicine    Contact information    [address]  [address]  [facility] [address] [phone]        Medication Information     Review all of your home medications and newly ordered medications with your primary doctor and/or pharmacist as soon as possible. Follow medication instructions as directed by your doctor and/or pharmacist.     Please keep your complete medication list with you and share with your physician. Update the information when medications are discontinued, doses are changed, or new medications (including over-the-counter products) are added; and carry medication information at all times in the event of emergency situations.               Medication List      START taking these medications        Instructions    Morning Afternoon Evening Bedtime    hydrOXYzine 50 MG Tabs   Commonly known as:  ATARAX        Take 1 Tab by mouth at bedtime as needed (sleeping difficulty).   Dose:  50 mg                          CHANGE how you take these medications        Instructions    Morning Afternoon Evening Bedtime    * hydrocodone-acetaminophen 5-325 MG Tabs per tablet   What changed:  Another medication with the same name was added. Make sure you understand how and when to take each.   Commonly known as:  NORCO        Take 1-2 Tabs by mouth every 6 hours as needed.   Dose:  1-2 Tab                        * hydrocodone-acetaminophen  5-325 MG Tabs per tablet   What changed:  You were already taking a medication with the same name, and this prescription was added. Make sure you understand how and when to take each.   Commonly known as:  NORCO        Take 1 Tab by mouth every 12 hours as needed for up to 2 days.   Dose:  1 Tab                        * Notice:  This list has 2 medication(s) that are the same as other medications prescribed for you. Read the directions carefully, and ask your doctor or other care provider to review them with you.      ASK your doctor about these medications        Instructions    Morning Afternoon Evening Bedtime    asa/apap/caffeine 250-250-65 MG Tabs   Commonly known as:  EXCEDRIN        Take 2 Tabs by mouth every 6 hours as needed for Headache.   Dose:  2 Tab                        clonazepam 1 MG Tabs   Commonly known as:  KLONOPIN        Take 1 Tab by mouth 2 times a day.   Dose:  1 mg                        cyclobenzaprine 10 MG Tabs   Commonly known as:  FLEXERIL        Take 1 Tab by mouth at bedtime as needed.   Dose:  10 mg                        duloxetine 60 MG Cpep delayed-release capsule   Commonly known as:  CYMBALTA        Take 2 Caps by mouth every day.   Dose:  120 mg                        ibuprofen 200 MG Tabs   Commonly known as:  MOTRIN        Take 600 mg by mouth every 6 hours as needed for Mild Pain.   Dose:  600 mg                        levothyroxine 50 MCG Tabs   Commonly known as:  SYNTHROID        TAKE ONE (1) TABLET BY MOUTH DAILY                        lisinopril-hydrochlorothiazide 20-12.5 MG per tablet   Commonly known as:  PRINZIDE, ZESTORETIC        TAKE ONE (1) TABLET BY MOUTH DAILY                        predniSONE 20 MG Tabs   Commonly known as:  DELTASONE        Take 2 tabs daily for 5 days                        topiramate 50 MG tablet   Commonly known as:  TOPAMAX        Take 2 Tabs by mouth every day.   Dose:  100 mg                        tramadol 50 MG Tabs   Commonly known  as:  ULTRAM        Take 50 mg by mouth every four hours as needed.   Dose:  50 mg                        VISINE OP        by Ophthalmic route.                             Where to Get Your Medications      These medications were sent to [id number] - [place] - [address]  [address] [place] [address]     Phone:  [phone]    - hydrOXYzine 50 MG Tabs      You can get these medications from any pharmacy     Bring a paper prescription for each of these medications    - hydrocodone-acetaminophen 5-325 MG Tabs per tablet              Discharge Instructions       Depression, Adult  Depression refers to feeling sad, low, down in the dumps, blue, gloomy, or empty. In general, there are two kinds of depression:  1. Normal sadness or normal grief. This kind of depression is one that we all feel from time to time after upsetting life experiences, such as the loss of a job or the ending of a relationship. This kind of depression is considered normal, is short lived, and resolves within a few days to 2 weeks. Depression experienced after the loss of a loved one (bereavement) often lasts longer than 2 weeks but normally gets better with time.  2. Clinical depression. This kind of depression lasts longer than normal sadness or normal grief or interferes with your ability to function at home, at work, and in school. It also interferes with your personal relationships. It affects almost every aspect of your life. Clinical depression is an illness.  Symptoms of depression can also be caused by conditions other than those mentioned above, such as:  · Physical illness. Some physical illnesses, including underactive thyroid gland (hypothyroidism), severe anemia, specific types of cancer, diabetes, uncontrolled seizures, heart and lung problems, strokes, and chronic pain are commonly associated with symptoms of depression.  · Side effects of some prescription medicine. In some people, certain types of medicine can cause  symptoms of depression.  · Substance abuse. Abuse of alcohol and illicit drugs can cause symptoms of depression.  SYMPTOMS  Symptoms of normal sadness and normal grief include the following:  · Feeling sad or crying for short periods of time.  · Not caring about anything (apathy).  · Difficulty sleeping or sleeping too much.  · No longer able to enjoy the things you used to enjoy.  · Desire to be by oneself all the time (social isolation).  · Lack of energy or motivation.  · Difficulty concentrating or remembering.  · Change in appetite or weight.  · Restlessness or agitation.  Symptoms of clinical depression include the same symptoms of normal sadness or normal grief and also the following symptoms:  · Feeling sad or crying all the time.  · Feelings of guilt or worthlessness.  · Feelings of hopelessness or helplessness.  · Thoughts of suicide or the desire to harm yourself (suicidal ideation).  · Loss of touch with reality (psychotic symptoms). Seeing or hearing things that are not real (hallucinations) or having false beliefs about your life or the people around you (delusions and paranoia).  DIAGNOSIS   The diagnosis of clinical depression is usually based on how bad the symptoms are and how long they have lasted. Your health care provider will also ask you questions about your medical history and substance use to find out if physical illness, use of prescription medicine, or substance abuse is causing your depression. Your health care provider may also order blood tests.  TREATMENT   Often, normal sadness and normal grief do not require treatment. However, sometimes antidepressant medicine is given for bereavement to ease the depressive symptoms until they resolve.  The treatment for clinical depression depends on how bad the symptoms are but often includes antidepressant medicine, counseling with a mental health professional, or both. Your health care provider will help to determine what treatment is best for  you.  Depression caused by physical illness usually goes away with appropriate medical treatment of the illness. If prescription medicine is causing depression, talk with your health care provider about stopping the medicine, decreasing the dose, or changing to another medicine.  Depression caused by the abuse of alcohol or illicit drugs goes away when you stop using these substances. Some adults need professional help in order to stop drinking or using drugs.  SEEK IMMEDIATE MEDICAL CARE IF:  · You have thoughts about hurting yourself or others.  · You lose touch with reality (have psychotic symptoms).  · You are taking medicine for depression and have a serious side effect.  FOR MORE INFORMATION  · National [place] on Mental Illness: www.naz.org   · National [place] of Mental Health: www.nimh.nih.gov      This information is not intended to replace advice given to you by your health care provider. Make sure you discuss any questions you have with your health care provider.     Document Released: 12/15/2001 Document Revised: 01/08/2016 Document Reviewed: 03/18/2013  [employer] Interactive Patient Education ©2016 [employer]            Patient Information     Patient Information    Following emergency treatment: all patient requiring follow-up care must return either to a private physician or a clinic if your condition worsens before you are able to obtain further medical attention, please return to the emergency room.     Billing Information    At [facility], we work to make the billing process streamlined for our patients.  Our Representatives are here to answer any questions you may have regarding your hospital bill.  If you have insurance coverage and have supplied your insurance information to us, we will submit a claim to your insurer on your behalf.  Should you have any questions regarding your bill, we can be reached online or by phone as follows:  Online: You are able pay your bills online or live  chat with our representatives about any billing questions you may have. We are here to help Monday - Friday from 8:00am to 7:30pm and 9:00am - 12:00pm on Saturdays.  Please visit [email or web address]  for more information.   Phone:  [phone] or [phone]    Please note that your emergency physician, surgeon, pathologist, radiologist, anesthesiologist, and other specialists are not employed by [facility] and will therefore bill separately for their services.  Please contact them directly for any questions concerning their bills at the numbers below:     Emergency Physician Services:  [phone]  [facility]:  [phone]  [facility]:  [phone]  [facility]:  [phone]    1. Your final bill may vary from the amount quoted upon discharge if all procedures are not complete at that time, or if your doctor has additional procedures of which we are not aware. You will receive an additional bill if you return to the Emergency Department at [facility] for suture removal regardless of the facility of which the sutures were placed.     2. Please arrange for settlement of this account at the emergency registration.    3. All self-pay accounts are due in full at the time of treatment.  If you are unable to meet this obligation then payment is expected within 4-5 days.     4. If you have had radiology studies (CT, X-ray, Ultrasound, MRI), you have received a preliminary result during your emergency department visit. Please contact the radiology department [phone] to receive a copy of your final result. Please discuss the Final result with your primary physician or with the follow up physician provided.     Crisis Hotline:  [facility] Crisis Hotline:  9-706-VTAAUYR or 1-564.241.8532  [place] Crisis Hotline:    1-100.560.4674 or 699-727-2417         ED Discharge Follow Up Questions    1. In order to provide you with very good  care, we would like to follow up with a phone call in the next few days.  May we have your permission to contact you?     YES /  NO    2. What is the best phone number to call you? (       )_____-__________    3. What is the best time to call you?      Morning  /  Afternoon  /  Evening                   Patient Signature:  ____________________________________________________________    Date:  ____________________________________________________________      Your appointments     Jun 30, 2017 11:00 AM   Individual Therapy with KATT Hernandez   BEHAVIORAL HEALTH [address] [place] [address])    [address] [place] [address]   [phone]            Aug 08, 2017  4:00 PM   Follow Up Med Management with Norma Correa M.D.   BEHAVIORAL HEALTH [address] [place] [address])    [address] [place] [address]  [address] [place] [address]   [phone]            Sep 12, 2017  8:00 AM   Individual Therapy with KATT Hernandez   BEHAVIORAL HEALTH [facility]    [address]  [address]  [facility] [address]   [phone]

## 2017-06-27 NOTE — DISCHARGE PLANNING
[facility]  Crisis/Safety Plan    Name:  Debby Desai  MRN:  [id number]  Date:  2017    Warning signs that a crisis may be developing for me or I may be at risk:  1) chest pain  2) increase anxiety  3) thoughts of self harm    Coping strategies I can use on my own (relaxation, physical activity, etc):  1) Deep breathing  2) walk away from situation  3) read a book    Ways I can make my environment safe:  1) keep scripts to 30 days  2) otherwise enviorment wsafe  3)    Things I want to tell myself when I feel a crisis developin) Take deep breath  2) I am worthwhile  3) this will pass in a short time    People I can contact for support or distraction (and their phone numbers):  1) Francesco  2) Niece  3) sister Deborah    If I’m not able to reach my support people, or the above strategies don’t help, I can contact the following professionals, agencies, or hotlines:  1) Crisis Call Center ():  4-332-389-2271 -OR- [phone]  2) Crisis Text Line ():  Text START to 757401  3)   4)     Enzo Peace R.N.
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
## 2017-06-27 NOTE — ED AVS SNAPSHOT
6/27/2017    Debby Desai  [address]  [facility] [address]    Dear Debby:    [facility] wants to ensure your discharge home is safe and you or your loved ones have had all of your questions answered regarding your care after you leave the hospital.    Below is a list of resources and contact information should you have any questions regarding your hospital stay, follow-up instructions, or active medical symptoms.    Questions or Concerns Regarding… Contact   Medical Questions Related to Your Discharge  (7 days a week, 8am-5pm) Contact a Nurse Care Coordinator   [phone]   Medical Questions Not Related to Your Discharge  (24 hours a day / 7 days a week)  Contact the Nurse Health Line   [phone]    Medications or Discharge Instructions Refer to your discharge packet   or contact your [facility] Primary Care Provider   [phone]   Follow-up Appointment(s) Schedule your appointment via oNoise   or contact Scheduling [phone]   Billing Review your statement via oNoise  or contact Billing [phone]   Medical Records Review your records via oNoise   or contact Medical Records [phone]     You may receive a telephone call within two days of discharge. This call is to make certain you understand your discharge instructions and have the opportunity to have any questions answered. You can also easily access your medical information, test results and upcoming appointments via the oNoise free online health management tool. You can learn more and sign up at [employer]oNoise. For assistance setting up your oNoise account, please call [phone].    Once again, we want to ensure your discharge home is safe and that you have a clear understanding of any next steps in your care. If you have any questions or concerns, please do not hesitate to contact us, we are here for you. Thank you for choosing [facility] for your healthcare needs.    Sincerely,    Your [facility] Healthcare Team

## 2017-06-27 NOTE — LETTER
Emergency Services     June 27, 2017    Patient: Debby Desai   YOB: 1960   Date of Visit: 6/27/2017       To Whom It May Concern:    Debby Desai was seen and treated in our emergency department on   6/27/2017. She may return to work on 6/28/2017.    Sincerely,     JACINTA DREW M.D.  [facility], EMERGENCY DEPT  Dept: [phone]

## 2017-06-28 NOTE — ED PROVIDER NOTES
ED Provider Note    CHIEF COMPLAINT  Chief Complaint   Patient presents with   • Depressed   • Suicidal Ideation       HPI  Debby Desai is a 57 y.o. female who presents for psychiatric evaluation, she has a long history of depression, she has felt some hopelessness over the past week or so related to pain she is experiencing in her joints especially her right knee with a pending surgery in a couple weeks. The patient says that she currently has no suicidal thoughts but over the past week or so was contemplating overdosing on medication but tells me that she has multiple reasons why she would not kill herself. She and her increasing depression to some coworkers to insist that she come to the hospital for evaluation. She has no other physical complaints.     REVIEW OF SYSTEMS  Negative for fever, abdominal pain, chest pain, vomiting. All other systems are negative.     PAST MEDICAL HISTORY  Past Medical History   Diagnosis Date   • Hypertension    • Unspecified disorder of thyroid    • Psychiatric problem      depression   • HTN (hypertension), benign 3/19/2012   • Hypothyroid 3/19/2012   • Major depression (CMS-HCC) 3/19/2012   • Other specified symptom associated with female genital organs      post menopausal bleeding   • Arthritis    • Dental disorder 5/24/12     partial upper denture   • Pain      thoracic spine   • Pain      recently fell and has bruise left forehead   • Orbital floor (blow-out) closed fracture (CMS-HCC)      left       FAMILY HISTORY  Family History   Problem Relation Age of Onset   • Anesthesia Sister      slow to come out (as a child)   • Diabetes     • Heart Disease     • Cancer     • Hypertension     • Stroke     • Lung Disease     • Hypertension Father    • Diabetes Father    • Heart Disease Father    • Alcohol abuse Father        SOCIAL HISTORY  Social History   Substance Use Topics   • Smoking status: Never Smoker    • Smokeless tobacco: Never Used   • Alcohol Use: No       SURGICAL  "HISTORY  Past Surgical History   Procedure Laterality Date   • Breast biopsy  1985/2005     left x 2   • Lymph node excision  2007     left cervicle   • Other  2001     thoracic discectomy  right   • Arthroscopy, knee  1999     left   • Other  1980     right rib resection   • Block epidural steroid injection  2008     x 3-4   • Spinal cord stimulator  7/11/2011     Performed by ALLISON GUERRA at SURGERY [facility]   • Biopsy general  5/1/12     endometrial   • Spinal cord stimulator  5/30/2012     Performed by ALLISON GUERRA at SURGERY [facility]   • Pump revision  12/10/2012     Performed by Allison Guerra M.D. at [facility]   • Knee arthroscopy  4/21/2015     Performed by Rufus Saba M.D. at SURGERY [facility]   • Medial meniscectomy  4/21/2015     Performed by Rufus Saba M.D. at SURGERY [facility]       CURRENT MEDICATIONS  I personally reviewed the medication list in the charting documentation.     ALLERGIES  Allergies   Allergen Reactions   • Bactrim Ds Rash and Swelling     Pt states \"My hand swells up and rash all over body\".   • Sulfamethoxazole-Trimethoprim Rash and Swelling     Pt states \"My hand swells up and rash all over body\".       MEDICAL RECORD  I have reviewed patient's medical record and pertinent results are listed above.      PHYSICAL EXAM  VITAL SIGNS: /70 mmHg  Pulse 71  Temp(Src) 36.7 °C (98.1 °F)  Resp 16  LMP 02/26/2012  SpO2 97%  Constitutional: Well appearing patient in no acute distress.  Not toxic, nor ill in appearance.  HENT: Mucus membranes moist.    Eyes: No scleral icterus. Normal conjunctiva   Neck: Supple, comfortable, nonpainful range of motion.   Cardiovascular: Regular heart rate and rhythm.   Thorax & Lungs: Chest is nontender.  Lungs are clear to auscultation with good air movement bilaterally.  No wheeze, rhonchi, nor rales.   Abdomen: Soft, with no tenderness, rebound nor guarding.  No mass or pulsatile mass " appreciated.  Skin: Warm, dry. No rash appreciated  Extremities/Musculoskeletal: No sign of trauma. No asymmetric calf tenderness, erythema or edema. Normal range of motion   Neurologic: Alert & oriented. No focal deficits observed.   Psychiatric: Normal happy affect    COURSE & MEDICAL DECISION MAKING  I have reviewed any medical record information, laboratory studies and radiographic results as noted above.    Encounter Summary: This is a 57 y.o. female with chronic depression, has had some thoughts of hopelessness over the past week but has no suicidal thoughts at this time. Evaluated by the psychiatric evaluator and deemed stable for discharge, he does recommend that I prescribe her a few hydroxyzine for help with sleep, additionally she has a prescription for pain medicine that will run out one or 2 days prior to her evaluation by orthopedic surgeon next week so I will write her prescription for for Norco tablets after I confirmed appropriate prescribing habits on the narcotic database. The patient is accompanied by her brother, and was agreeable with the plan at this time the patient is stable and appropriate for discharge.      DISPOSITION: Discharge home in stable condition      FINAL IMPRESSION  1. Situational depression           This dictation was created using voice recognition software. The accuracy of the dictation is limited to the abilities of the software. I expect there may be some errors of grammar and possibly content. The nursing notes were reviewed and certain aspects of this information were incorporated into this note.    Electronically signed by: Mcihael Dominguez, 6/27/2017 5:26 PM

## 2017-06-28 NOTE — DISCHARGE INSTRUCTIONS
Depression, Adult  Depression refers to feeling sad, low, down in the dumps, blue, gloomy, or empty. In general, there are two kinds of depression:  1. Normal sadness or normal grief. This kind of depression is one that we all feel from time to time after upsetting life experiences, such as the loss of a job or the ending of a relationship. This kind of depression is considered normal, is short lived, and resolves within a few days to 2 weeks. Depression experienced after the loss of a loved one (bereavement) often lasts longer than 2 weeks but normally gets better with time.  2. Clinical depression. This kind of depression lasts longer than normal sadness or normal grief or interferes with your ability to function at home, at work, and in school. It also interferes with your personal relationships. It affects almost every aspect of your life. Clinical depression is an illness.  Symptoms of depression can also be caused by conditions other than those mentioned above, such as:  · Physical illness. Some physical illnesses, including underactive thyroid gland (hypothyroidism), severe anemia, specific types of cancer, diabetes, uncontrolled seizures, heart and lung problems, strokes, and chronic pain are commonly associated with symptoms of depression.  · Side effects of some prescription medicine. In some people, certain types of medicine can cause symptoms of depression.  · Substance abuse. Abuse of alcohol and illicit drugs can cause symptoms of depression.  SYMPTOMS  Symptoms of normal sadness and normal grief include the following:  · Feeling sad or crying for short periods of time.  · Not caring about anything (apathy).  · Difficulty sleeping or sleeping too much.  · No longer able to enjoy the things you used to enjoy.  · Desire to be by oneself all the time (social isolation).  · Lack of energy or motivation.  · Difficulty concentrating or remembering.  · Change in appetite or weight.  · Restlessness or  agitation.  Symptoms of clinical depression include the same symptoms of normal sadness or normal grief and also the following symptoms:  · Feeling sad or crying all the time.  · Feelings of guilt or worthlessness.  · Feelings of hopelessness or helplessness.  · Thoughts of suicide or the desire to harm yourself (suicidal ideation).  · Loss of touch with reality (psychotic symptoms). Seeing or hearing things that are not real (hallucinations) or having false beliefs about your life or the people around you (delusions and paranoia).  DIAGNOSIS   The diagnosis of clinical depression is usually based on how bad the symptoms are and how long they have lasted. Your health care provider will also ask you questions about your medical history and substance use to find out if physical illness, use of prescription medicine, or substance abuse is causing your depression. Your health care provider may also order blood tests.  TREATMENT   Often, normal sadness and normal grief do not require treatment. However, sometimes antidepressant medicine is given for bereavement to ease the depressive symptoms until they resolve.  The treatment for clinical depression depends on how bad the symptoms are but often includes antidepressant medicine, counseling with a mental health professional, or both. Your health care provider will help to determine what treatment is best for you.  Depression caused by physical illness usually goes away with appropriate medical treatment of the illness. If prescription medicine is causing depression, talk with your health care provider about stopping the medicine, decreasing the dose, or changing to another medicine.  Depression caused by the abuse of alcohol or illicit drugs goes away when you stop using these substances. Some adults need professional help in order to stop drinking or using drugs.  SEEK IMMEDIATE MEDICAL CARE IF:  · You have thoughts about hurting yourself or others.  · You lose touch  with reality (have psychotic symptoms).  · You are taking medicine for depression and have a serious side effect.  FOR MORE INFORMATION  · National [place] on Mental Illness: www.naz.org   · National [place] of Mental Health: www.nimh.nih.gov      This information is not intended to replace advice given to you by your health care provider. Make sure you discuss any questions you have with your health care provider.     Document Released: 12/15/2001 Document Revised: 01/08/2016 Document Reviewed: 03/18/2013  Elsevier Interactive Patient Education ©2016 Elsevier Inc.

## 2017-06-28 NOTE — ED NOTES
Discharged home with s/o. Pt is able to contract for safety. Has follow up with therapist. Pt given prescription x 2 with indication. Understands to return to ed for thoughts of si.

## 2017-06-30 ENCOUNTER — APPOINTMENT (OUTPATIENT)
Dept: BEHAVIORAL HEALTH | Facility: PHYSICIAN GROUP | Age: 57
End: 2017-06-30
Payer: COMMERCIAL

## 2017-07-07 ENCOUNTER — HOSPITAL ENCOUNTER (OUTPATIENT)
Facility: MEDICAL CENTER | Age: 57
End: 2017-07-07
Attending: ORTHOPAEDIC SURGERY | Admitting: ORTHOPAEDIC SURGERY
Payer: COMMERCIAL

## 2017-07-11 DIAGNOSIS — Z01.812 PRE-OPERATIVE LABORATORY EXAMINATION: ICD-10-CM

## 2017-07-11 DIAGNOSIS — Z01.810 PRE-OPERATIVE CARDIOVASCULAR EXAMINATION: ICD-10-CM

## 2017-07-11 LAB
ANION GAP SERPL CALC-SCNC: 8 MMOL/L (ref 0–11.9)
BUN SERPL-MCNC: 12 MG/DL (ref 8–22)
CALCIUM SERPL-MCNC: 9.4 MG/DL (ref 8.5–10.5)
CHLORIDE SERPL-SCNC: 108 MMOL/L (ref 96–112)
CO2 SERPL-SCNC: 27 MMOL/L (ref 20–33)
CREAT SERPL-MCNC: 0.7 MG/DL (ref 0.5–1.4)
EKG IMPRESSION: NORMAL
ERYTHROCYTE [DISTWIDTH] IN BLOOD BY AUTOMATED COUNT: 46.6 FL (ref 35.9–50)
GFR SERPL CREATININE-BSD FRML MDRD: >60 ML/MIN/1.73 M 2
GLUCOSE SERPL-MCNC: 78 MG/DL (ref 65–99)
HCT VFR BLD AUTO: 34.6 % (ref 37–47)
HGB BLD-MCNC: 11.3 G/DL (ref 12–16)
MCH RBC QN AUTO: 30.8 PG (ref 27–33)
MCHC RBC AUTO-ENTMCNC: 32.7 G/DL (ref 33.6–35)
MCV RBC AUTO: 94.3 FL (ref 81.4–97.8)
PLATELET # BLD AUTO: 320 K/UL (ref 164–446)
PMV BLD AUTO: 11.8 FL (ref 9–12.9)
POTASSIUM SERPL-SCNC: 3.3 MMOL/L (ref 3.6–5.5)
RBC # BLD AUTO: 3.67 M/UL (ref 4.2–5.4)
SODIUM SERPL-SCNC: 143 MMOL/L (ref 135–145)
WBC # BLD AUTO: 6.3 K/UL (ref 4.8–10.8)

## 2017-07-11 PROCEDURE — 93005 ELECTROCARDIOGRAM TRACING: CPT

## 2017-07-11 PROCEDURE — 36415 COLL VENOUS BLD VENIPUNCTURE: CPT

## 2017-07-11 PROCEDURE — 80048 BASIC METABOLIC PNL TOTAL CA: CPT

## 2017-07-11 PROCEDURE — 85027 COMPLETE CBC AUTOMATED: CPT

## 2017-07-11 PROCEDURE — 93010 ELECTROCARDIOGRAM REPORT: CPT | Performed by: INTERNAL MEDICINE

## 2017-07-12 ENCOUNTER — HOSPITAL ENCOUNTER (OUTPATIENT)
Facility: MEDICAL CENTER | Age: 57
End: 2017-07-12
Attending: ORTHOPAEDIC SURGERY | Admitting: ORTHOPAEDIC SURGERY
Payer: COMMERCIAL

## 2017-07-12 VITALS
TEMPERATURE: 97 F | BODY MASS INDEX: 29.48 KG/M2 | HEIGHT: 66 IN | WEIGHT: 183.42 LBS | OXYGEN SATURATION: 93 % | HEART RATE: 72 BPM | RESPIRATION RATE: 16 BRPM

## 2017-07-12 DIAGNOSIS — E03.9 ACQUIRED HYPOTHYROIDISM: ICD-10-CM

## 2017-07-12 DIAGNOSIS — I10 HTN (HYPERTENSION), BENIGN: Chronic | ICD-10-CM

## 2017-07-12 PROBLEM — M17.11 OSTEOARTHRITIS OF RIGHT KNEE: Status: ACTIVE | Noted: 2017-07-12

## 2017-07-12 PROCEDURE — 700101 HCHG RX REV CODE 250

## 2017-07-12 PROCEDURE — A9270 NON-COVERED ITEM OR SERVICE: HCPCS

## 2017-07-12 PROCEDURE — 160046 HCHG PACU - 1ST 60 MINS PHASE II: Performed by: ORTHOPAEDIC SURGERY

## 2017-07-12 PROCEDURE — 160035 HCHG PACU - 1ST 60 MINS PHASE I: Performed by: ORTHOPAEDIC SURGERY

## 2017-07-12 PROCEDURE — 501838 HCHG SUTURE GENERAL: Performed by: ORTHOPAEDIC SURGERY

## 2017-07-12 PROCEDURE — 501654 HCHG TUBING, ARTHREX PATIENT REDEUCE: Performed by: ORTHOPAEDIC SURGERY

## 2017-07-12 PROCEDURE — 501655 HCHG TUBING, ARTHREX PUMP REDEUCE: Performed by: ORTHOPAEDIC SURGERY

## 2017-07-12 PROCEDURE — 160025 RECOVERY II MINUTES (STATS): Performed by: ORTHOPAEDIC SURGERY

## 2017-07-12 PROCEDURE — 700102 HCHG RX REV CODE 250 W/ 637 OVERRIDE(OP)

## 2017-07-12 PROCEDURE — 160009 HCHG ANES TIME/MIN: Performed by: ORTHOPAEDIC SURGERY

## 2017-07-12 PROCEDURE — 502580 HCHG PACK, KNEE ARTHROSCOPY: Performed by: ORTHOPAEDIC SURGERY

## 2017-07-12 PROCEDURE — 700111 HCHG RX REV CODE 636 W/ 250 OVERRIDE (IP)

## 2017-07-12 PROCEDURE — 160002 HCHG RECOVERY MINUTES (STAT): Performed by: ORTHOPAEDIC SURGERY

## 2017-07-12 PROCEDURE — 700105 HCHG RX REV CODE 258: Performed by: ORTHOPAEDIC SURGERY

## 2017-07-12 PROCEDURE — 302135 SEQUENTIAL COMPRESSION MACHINE: Performed by: ORTHOPAEDIC SURGERY

## 2017-07-12 PROCEDURE — 160029 HCHG SURGERY MINUTES - 1ST 30 MINS LEVEL 4: Performed by: ORTHOPAEDIC SURGERY

## 2017-07-12 PROCEDURE — 160048 HCHG OR STATISTICAL LEVEL 1-5: Performed by: ORTHOPAEDIC SURGERY

## 2017-07-12 PROCEDURE — 160041 HCHG SURGERY MINUTES - EA ADDL 1 MIN LEVEL 4: Performed by: ORTHOPAEDIC SURGERY

## 2017-07-12 PROCEDURE — 500028 HCHG ARTHROWAND TURBOVAC 3.5/90 SUCT.: Performed by: ORTHOPAEDIC SURGERY

## 2017-07-12 RX ORDER — LEVOTHYROXINE SODIUM 0.05 MG/1
TABLET ORAL
Qty: 90 TAB | Refills: 0 | Status: SHIPPED | OUTPATIENT
Start: 2017-07-12 | End: 2017-07-14 | Stop reason: SDUPTHER

## 2017-07-12 RX ORDER — OXYCODONE HCL 5 MG/5 ML
SOLUTION, ORAL ORAL
Status: COMPLETED
Start: 2017-07-12 | End: 2017-07-12

## 2017-07-12 RX ORDER — SODIUM CHLORIDE, SODIUM LACTATE, POTASSIUM CHLORIDE, CALCIUM CHLORIDE 600; 310; 30; 20 MG/100ML; MG/100ML; MG/100ML; MG/100ML
1000 INJECTION, SOLUTION INTRAVENOUS
Status: DISCONTINUED | OUTPATIENT
Start: 2017-07-12 | End: 2017-07-12 | Stop reason: HOSPADM

## 2017-07-12 RX ORDER — MIDAZOLAM HYDROCHLORIDE 1 MG/ML
INJECTION INTRAMUSCULAR; INTRAVENOUS
Status: DISPENSED
Start: 2017-07-12 | End: 2017-07-12

## 2017-07-12 RX ORDER — ROPIVACAINE HYDROCHLORIDE 5 MG/ML
INJECTION, SOLUTION EPIDURAL; INFILTRATION; PERINEURAL
Status: DISCONTINUED | OUTPATIENT
Start: 2017-07-12 | End: 2017-07-12 | Stop reason: HOSPADM

## 2017-07-12 RX ORDER — LIDOCAINE HYDROCHLORIDE 10 MG/ML
INJECTION, SOLUTION INFILTRATION; PERINEURAL
Status: COMPLETED
Start: 2017-07-12 | End: 2017-07-12

## 2017-07-12 RX ORDER — KETOROLAC TROMETHAMINE 30 MG/ML
INJECTION, SOLUTION INTRAMUSCULAR; INTRAVENOUS
Status: COMPLETED
Start: 2017-07-12 | End: 2017-07-12

## 2017-07-12 RX ORDER — LISINOPRIL AND HYDROCHLOROTHIAZIDE 20; 12.5 MG/1; MG/1
1 TABLET ORAL DAILY
Qty: 90 TAB | Refills: 0 | Status: SHIPPED | OUTPATIENT
Start: 2017-07-12 | End: 2017-07-14 | Stop reason: SDUPTHER

## 2017-07-12 RX ADMIN — KETOROLAC TROMETHAMINE 15 MG: 30 INJECTION, SOLUTION INTRAMUSCULAR at 08:00

## 2017-07-12 RX ADMIN — OXYCODONE HYDROCHLORIDE 10 MG: 5 SOLUTION ORAL at 08:00

## 2017-07-12 RX ADMIN — SODIUM CHLORIDE, POTASSIUM CHLORIDE, SODIUM LACTATE AND CALCIUM CHLORIDE 1000 ML: 600; 310; 30; 20 INJECTION, SOLUTION INTRAVENOUS at 05:55

## 2017-07-12 RX ADMIN — LIDOCAINE HYDROCHLORIDE 0.1 ML: 10 INJECTION, SOLUTION INFILTRATION; PERINEURAL at 05:55

## 2017-07-12 ASSESSMENT — PAIN SCALES - GENERAL
PAINLEVEL_OUTOF10: 5

## 2017-07-12 NOTE — IP AVS SNAPSHOT
" Home Care Instructions                                                                                                                Name:Debby Desai  Medical Record Number:[id number]  CSN: [id number]    YOB: 1960   Age: 57 y.o.  Sex: female  HT:1.676 m (5' 5.98\") WT: 83.2 kg (183 lb 6.8 oz)          Admit Date: 7/12/2017     Discharge Date:   Today's Date: 7/12/2017  Attending Doctor:  FRANTZ Nguyen*                  Allergies:  Bactrim ds and Sulfamethoxazole-trimethoprim                Discharge Instructions         ACTIVITY: Rest and take it easy for the first 24 hours.  A responsible adult is recommended to remain with you during that time.  It is normal to feel sleepy.  We encourage you to not do anything that requires balance, judgment or coordination.    MILD FLU-LIKE SYMPTOMS ARE NORMAL. YOU MAY EXPERIENCE GENERALIZED MUSCLE ACHES, THROAT IRRITATION, HEADACHE AND/OR SOME NAUSEA.    FOR 24 HOURS DO NOT:  Drive, operate machinery or run household appliances.  Drink beer or alcoholic beverages.   Make important decisions or sign legal documents.    SPECIAL INSTRUCTIONS: Follow Dr Baltazar's printed instructions    DIET: To avoid nausea, slowly advance diet as tolerated, avoiding spicy or greasy foods for the first day.  Add more substantial food to your diet according to your physician's instructions.  INCREASE FLUIDS AND FIBER TO AVOID CONSTIPATION.    FOLLOW-UP APPOINTMENT:  A follow-up appointment should be arranged with your doctor; call to schedule.    You should CALL YOUR PHYSICIAN if you develop:  Fever greater than 101 degrees F.  Pain not relieved by medication, or persistent nausea or vomiting.  Excessive bleeding (blood soaking through dressing) or unexpected drainage from the wound.  Extreme redness or swelling around the incision site, drainage of pus or foul smelling drainage.  Inability to urinate or empty your bladder within 8 hours.  Problems with breathing or chest " pain.    You should call 911 if you develop problems with breathing or chest pain.  If you are unable to contact your doctor or surgical center, you should go to the nearest emergency room or urgent care center.  Physician's telephone #: [phone]    If any questions arise, call your doctor.  If your doctor is not available, please feel free to call the Surgical Center at [phone].  The Center is open Monday through Friday from 7AM to 7PM.  You can also call the HEALTH HOTLINE open 24 hours/day, 7 days/week and speak to a nurse at [phone], or toll free at [phone].    A registered nurse may call you a few days after your surgery to see how you are doing after your procedure.    MEDICATIONS: Resume taking daily medication.  Take prescribed pain medication with food.  If no medication is prescribed, you may take non-aspirin pain medication if needed.  PAIN MEDICATION CAN BE VERY CONSTIPATING.  Take a stool softener or laxative such as senokot, pericolace, or milk of magnesia if needed.    Prescription given for Norco, Zofran.  Last pain medication given at 8:00 a.m.    If your physician has prescribed pain medication that includes Acetaminophen (Tylenol), do not take additional Acetaminophen (Tylenol) while taking the prescribed medication.    Depression / Suicide Risk    As you are discharged from this [facility] facility, it is important to learn how to keep safe from harming yourself.    Recognize the warning signs:  · Abrupt changes in personality, positive or negative- including increase in energy   · Giving away possessions  · Change in eating patterns- significant weight changes-  positive or negative  · Change in sleeping patterns- unable to sleep or sleeping all the time   · Unwillingness or inability to communicate  · Depression  · Unusual sadness, discouragement and loneliness  · Talk of wanting to die  · Neglect of personal appearance   · Rebelliousness- reckless behavior  · Withdrawal  from people/activities they love  · Confusion- inability to concentrate     If you or a loved one observes any of these behaviors or has concerns about self-harm, here's what you can do:  · Talk about it- your feelings and reasons for harming yourself  · Remove any means that you might use to hurt yourself (examples: pills, rope, extension cords, firearm)  · Get professional help from the community (Mental Health, Substance Abuse, psychological counseling)  · Do not be alone:Call your Safe Contact- someone whom you trust who will be there for you.  · Call your local CRISIS HOTLINE 814-1119 or 622-176-5106  · Call your local Children's Mobile Crisis Response Team [place] (067) 193-2102 or www.Glow  · Call the toll free National Suicide Prevention Hotlines   · National Suicide Prevention Lifeline 967-088-UHNO (3558)  · [employer] Line Network 800-SUICIDE (731-5251)       Medication List      CONTINUE taking these medications        Instructions    Morning Afternoon Evening Bedtime    asa/apap/caffeine 250-250-65 MG Tabs   Commonly known as:  EXCEDRIN        Take 2 Tabs by mouth every 6 hours as needed for Headache.   Dose:  2 Tab                        clonazepam 1 MG Tabs   Commonly known as:  KLONOPIN        Take 1 Tab by mouth 2 times a day.   Dose:  1 mg                        duloxetine 60 MG Cpep delayed-release capsule   Commonly known as:  CYMBALTA        Take 2 Caps by mouth every day.   Dose:  120 mg                        hydrocodone-acetaminophen 5-325 MG Tabs per tablet   Commonly known as:  NORCO        Take 1-2 Tabs by mouth every 6 hours as needed.   Dose:  1-2 Tab                        ibuprofen 200 MG Tabs   Commonly known as:  MOTRIN        Take 600 mg by mouth every 6 hours as needed for Mild Pain.   Dose:  600 mg                        levothyroxine 50 MCG Tabs   Commonly known as:  SYNTHROID        TAKE ONE (1) TABLET BY MOUTH DAILY                         lisinopril-hydrochlorothiazide 20-12.5 MG per tablet   Commonly known as:  PRINZIDE, ZESTORETIC        TAKE ONE (1) TABLET BY MOUTH DAILY                        topiramate 50 MG tablet   Commonly known as:  TOPAMAX        Take 2 Tabs by mouth every day.   Dose:  100 mg                                Medication Information     Above and/or attached are the medications your physician expects you to take upon discharge. Review all of your home medications and newly ordered medications with your doctor and/or pharmacist. Follow medication instructions as directed by your doctor and/or pharmacist. Please keep your medication list with you and share with your physician. Update the information when medications are discontinued, doses are changed, or new medications (including over-the-counter products) are added; and carry medication information at all times in the event of emergency situations.        Resources     Quit Smoking / Tobacco Use:    I understand the use of any tobacco products increases my chance of suffering from future heart disease or stroke and could cause other illnesses which may shorten my life. Quitting the use of tobacco products is the single most important thing I can do to improve my health. For further information on smoking / tobacco cessation call a Toll Free Quit Line at 1-605.648.6994 (*National Cancer [place]) or 1-903.224.8222 (American Lung Association) or you can access the web based program at www.lungusa.org.    [place] Tobacco Users Help Line:  (164) 222-8041       Toll Free: 1-669.376.9236  Quit Tobacco Program [facility] (089)228-1531    Crisis Hotline:    [place] Crisis Hotline:  7-504-ZHQTPPT or 1-350.217.3713    [place] Crisis Hotline:    1-965.642.7384 or 643-515-8148    Discharge Survey:   Thank you for choosing [facility]. We hope we did everything we could to make your hospital stay a pleasant one. You may be receiving a survey and we would  appreciate your time and participation in answering the questions. Your input is very valuable to us in our efforts to improve our service to our patients and their families.            Signatures     My signature on this form indicates that:    1. I acknowledge receipt and understanding of these Home Care Instruction.  2. My questions regarding this information have been answered to my satisfaction.  3. I have formulated a plan with my discharge nurse to obtain my prescribed medications for home.    __________________________________      __________________________________                   Patient Signature                                 Guardian/Responsible Adult Signature      __________________________________                 __________       ________                       Nurse Signature                                               Date                 Time

## 2017-07-12 NOTE — OR NURSING
0744: To PACU from OR via gurney, sedate but rouses, c/o some pain, will defer analgesia until pt less sedate, respirations spontaneous and non-labored. Icepack applied over c/d/i R knee surgical dressings.  0755: more awake, plan analgesia, O2 d/dinesh   0810: Meets criteria to transfer to Stage 2

## 2017-07-12 NOTE — OR NURSING
0837: D/Aleks to care of family post uneventful stay in PACU 2. Pt verifies she has own walker at home for use post-op.

## 2017-07-12 NOTE — IP AVS SNAPSHOT
7/12/2017    Debby Desai  [address]  [place] [address]    Dear Debby:    [facility] wants to ensure your discharge home is safe and you or your loved ones have had all of your questions answered regarding your care after you leave the hospital.    Below is a list of resources and contact information should you have any questions regarding your hospital stay, follow-up instructions, or active medical symptoms.    Questions or Concerns Regarding… Contact   Medical Questions Related to Your Discharge  (7 days a week, 8am-5pm) Contact a Nurse Care Coordinator   [phone]   Medical Questions Not Related to Your Discharge  (24 hours a day / 7 days a week)  Contact the Nurse Health Line   [phone]    Medications or Discharge Instructions Refer to your discharge packet   or contact your [facility] Primary Care Provider   [phone]   Follow-up Appointment(s) Schedule your appointment via [employer]   or contact Scheduling [phone]   Billing Review your statement via [employer]  or contact Billing [phone]   Medical Records Review your records via [employer]   or contact Medical Records [phone]     You may receive a telephone call within two days of discharge. This call is to make certain you understand your discharge instructions and have the opportunity to have any questions answered. You can also easily access your medical information, test results and upcoming appointments via the [employer] free online health management tool. You can learn more and sign up at [employer]. For assistance setting up your [employer] account, please call [phone].    Once again, we want to ensure your discharge home is safe and that you have a clear understanding of any next steps in your care. If you have any questions or concerns, please do not hesitate to contact us, we are here for you. Thank you for choosing [facility] for your healthcare needs.    Sincerely,    Your [facility] Healthcare Team

## 2017-07-12 NOTE — DISCHARGE INSTRUCTIONS
ACTIVITY: Rest and take it easy for the first 24 hours.  A responsible adult is recommended to remain with you during that time.  It is normal to feel sleepy.  We encourage you to not do anything that requires balance, judgment or coordination.    MILD FLU-LIKE SYMPTOMS ARE NORMAL. YOU MAY EXPERIENCE GENERALIZED MUSCLE ACHES, THROAT IRRITATION, HEADACHE AND/OR SOME NAUSEA.    FOR 24 HOURS DO NOT:  Drive, operate machinery or run household appliances.  Drink beer or alcoholic beverages.   Make important decisions or sign legal documents.    SPECIAL INSTRUCTIONS: Follow Dr Baltazar's printed instructions    DIET: To avoid nausea, slowly advance diet as tolerated, avoiding spicy or greasy foods for the first day.  Add more substantial food to your diet according to your physician's instructions.  INCREASE FLUIDS AND FIBER TO AVOID CONSTIPATION.    FOLLOW-UP APPOINTMENT:  A follow-up appointment should be arranged with your doctor; call to schedule.    You should CALL YOUR PHYSICIAN if you develop:  Fever greater than 101 degrees F.  Pain not relieved by medication, or persistent nausea or vomiting.  Excessive bleeding (blood soaking through dressing) or unexpected drainage from the wound.  Extreme redness or swelling around the incision site, drainage of pus or foul smelling drainage.  Inability to urinate or empty your bladder within 8 hours.  Problems with breathing or chest pain.    You should call 911 if you develop problems with breathing or chest pain.  If you are unable to contact your doctor or surgical center, you should go to the nearest emergency room or urgent care center.  Physician's telephone #: [phone]    If any questions arise, call your doctor.  If your doctor is not available, please feel free to call the Surgical Center at [phone].  The Center is open Monday through Friday from 7AM to 7PM.  You can also call the HEALTH HOTLINE open 24 hours/day, 7 days/week and speak to a nurse at [phone], or toll free at [phone].    A registered nurse may call you a few days after your surgery to see how you are doing after your procedure.    MEDICATIONS: Resume taking daily medication.  Take prescribed pain medication with food.  If no medication is prescribed, you may take non-aspirin pain medication if needed.  PAIN MEDICATION CAN BE VERY CONSTIPATING.  Take a stool softener or laxative such as senokot, pericolace, or milk of magnesia if needed.    Prescription given for Norco, Zofran.  Last pain medication given at 8:00 a.m.    If your physician has prescribed pain medication that includes Acetaminophen (Tylenol), do not take additional Acetaminophen (Tylenol) while taking the prescribed medication.    Depression / Suicide Risk    As you are discharged from this [facility] facility, it is important to learn how to keep safe from harming yourself.    Recognize the warning signs:  · Abrupt changes in personality, positive or negative- including increase in energy   · Giving away possessions  · Change in eating patterns- significant weight changes-  positive or negative  · Change in sleeping patterns- unable to sleep or sleeping all the time   · Unwillingness or inability to communicate  · Depression  · Unusual sadness, discouragement and loneliness  · Talk of wanting to die  · Neglect of personal appearance   · Rebelliousness- reckless behavior  · Withdrawal from people/activities they love  · Confusion- inability to concentrate     If you or a loved one observes any of these behaviors or has concerns about self-harm, here's what you can do:  · Talk about it- your feelings and reasons for harming yourself  · Remove any means that you might use to hurt yourself (examples: pills, rope, extension cords, firearm)  · Get professional help from the community (Mental Health, Substance Abuse, psychological counseling)  · Do not be alone:Call your Safe Contact- someone whom you trust who will be there  for you.  · Call your local CRISIS HOTLINE 669-5108 or 009-533-5334  · Call your local Children's Mobile Crisis Response Team [place] (463) 923-5300 or www.[employer]  · Call the toll free National Suicide Prevention Hotlines   · National Suicide Prevention Lifeline 315-157-WJVM (0364)  · National [employer] Line Network 800-SUICIDE (606-0098)

## 2017-07-14 DIAGNOSIS — E03.9 ACQUIRED HYPOTHYROIDISM: ICD-10-CM

## 2017-07-14 DIAGNOSIS — I10 HTN (HYPERTENSION), BENIGN: Chronic | ICD-10-CM

## 2017-07-14 RX ORDER — LEVOTHYROXINE SODIUM 0.05 MG/1
TABLET ORAL
Qty: 90 TAB | Refills: 0 | Status: SHIPPED | OUTPATIENT
Start: 2017-07-14 | End: 2017-09-29 | Stop reason: SDUPTHER

## 2017-07-14 RX ORDER — LISINOPRIL AND HYDROCHLOROTHIAZIDE 20; 12.5 MG/1; MG/1
1 TABLET ORAL DAILY
Qty: 90 TAB | Refills: 0 | Status: SHIPPED | OUTPATIENT
Start: 2017-07-14 | End: 2018-04-26 | Stop reason: SDUPTHER

## 2017-09-14 ENCOUNTER — EH NON-PROVIDER (OUTPATIENT)
Dept: OCCUPATIONAL MEDICINE | Facility: CLINIC | Age: 57
End: 2017-09-14

## 2017-09-14 DIAGNOSIS — Z23 FLU VACCINE NEED: ICD-10-CM

## 2017-09-14 PROCEDURE — 90686 IIV4 VACC NO PRSV 0.5 ML IM: CPT | Performed by: PREVENTIVE MEDICINE

## 2017-09-18 ENCOUNTER — HOSPITAL ENCOUNTER (OUTPATIENT)
Dept: RADIOLOGY | Facility: MEDICAL CENTER | Age: 57
End: 2017-09-18
Attending: PHYSICIAN ASSISTANT
Payer: COMMERCIAL

## 2017-09-18 DIAGNOSIS — S83.241A ACUTE MEDIAL MENISCUS TEAR OF RIGHT KNEE, INITIAL ENCOUNTER: ICD-10-CM

## 2017-09-18 DIAGNOSIS — M25.561 ACUTE PAIN OF RIGHT KNEE: ICD-10-CM

## 2017-09-18 DIAGNOSIS — M17.11 OSTEOARTHRITIS OF RIGHT KNEE, UNSPECIFIED OSTEOARTHRITIS TYPE: ICD-10-CM

## 2017-09-18 PROCEDURE — 27370 DX-INJECTION PROCEDURE-KNEE: CPT | Mod: RT

## 2017-09-18 PROCEDURE — 73701 CT LOWER EXTREMITY W/DYE: CPT | Mod: RT

## 2017-09-18 PROCEDURE — 700117 HCHG RX CONTRAST REV CODE 255: Performed by: PHYSICIAN ASSISTANT

## 2017-09-18 RX ADMIN — IOHEXOL 50 ML: 300 INJECTION, SOLUTION INTRAVENOUS at 10:29

## 2017-09-29 ENCOUNTER — OFFICE VISIT (OUTPATIENT)
Dept: MEDICAL GROUP | Facility: MEDICAL CENTER | Age: 57
End: 2017-09-29
Payer: COMMERCIAL

## 2017-09-29 VITALS
WEIGHT: 184 LBS | DIASTOLIC BLOOD PRESSURE: 82 MMHG | SYSTOLIC BLOOD PRESSURE: 126 MMHG | HEART RATE: 76 BPM | RESPIRATION RATE: 16 BRPM | BODY MASS INDEX: 29.71 KG/M2 | OXYGEN SATURATION: 96 % | TEMPERATURE: 97.5 F

## 2017-09-29 DIAGNOSIS — Z12.31 VISIT FOR SCREENING MAMMOGRAM: ICD-10-CM

## 2017-09-29 DIAGNOSIS — S83.8X1A SPRAIN OF OTHER LIGAMENT OF RIGHT KNEE, INITIAL ENCOUNTER: ICD-10-CM

## 2017-09-29 DIAGNOSIS — R73.02 IGT (IMPAIRED GLUCOSE TOLERANCE): ICD-10-CM

## 2017-09-29 DIAGNOSIS — E87.6 HYPOKALEMIA: ICD-10-CM

## 2017-09-29 DIAGNOSIS — Z12.11 SCREEN FOR COLON CANCER: ICD-10-CM

## 2017-09-29 DIAGNOSIS — E03.9 ACQUIRED HYPOTHYROIDISM: ICD-10-CM

## 2017-09-29 DIAGNOSIS — I10 HTN (HYPERTENSION), BENIGN: Chronic | ICD-10-CM

## 2017-09-29 DIAGNOSIS — E78.5 DYSLIPIDEMIA: ICD-10-CM

## 2017-09-29 PROCEDURE — 99214 OFFICE O/P EST MOD 30 MIN: CPT | Performed by: INTERNAL MEDICINE

## 2017-09-29 RX ORDER — HYDROCODONE BITARTRATE AND ACETAMINOPHEN 5; 325 MG/1; MG/1
1-2 TABLET ORAL EVERY 6 HOURS PRN
Qty: 15 TAB | Refills: 0
Start: 2017-09-29 | End: 2018-02-01

## 2017-09-29 RX ORDER — TOPIRAMATE 50 MG/1
100 TABLET, FILM COATED ORAL DAILY
Qty: 180 TAB | Refills: 3 | Status: SHIPPED | OUTPATIENT
Start: 2017-09-29 | End: 2018-07-09

## 2017-09-29 RX ORDER — DULOXETIN HYDROCHLORIDE 60 MG/1
120 CAPSULE, DELAYED RELEASE ORAL 2 TIMES DAILY
Qty: 180 CAP | Refills: 3 | Status: SHIPPED | OUTPATIENT
Start: 2017-09-29 | End: 2017-10-02 | Stop reason: SDUPTHER

## 2017-09-29 RX ORDER — LEVOTHYROXINE SODIUM 0.05 MG/1
TABLET ORAL
Qty: 90 TAB | Refills: 3 | Status: SHIPPED | OUTPATIENT
Start: 2017-09-29 | End: 2018-10-24 | Stop reason: SDUPTHER

## 2017-10-02 ENCOUNTER — TELEPHONE (OUTPATIENT)
Dept: MEDICAL GROUP | Facility: MEDICAL CENTER | Age: 57
End: 2017-10-02

## 2017-10-02 RX ORDER — DULOXETIN HYDROCHLORIDE 60 MG/1
120 CAPSULE, DELAYED RELEASE ORAL DAILY
Qty: 180 CAP | Refills: 3 | Status: SHIPPED | OUTPATIENT
Start: 2017-10-02 | End: 2018-07-09

## 2017-10-02 NOTE — TELEPHONE ENCOUNTER
VOICEMAIL  1. Caller Name: Patricia                      Call Back Number: [phone]    2. Message: Pharmacy called and wanted to clarify cymbalta rx. Last rx was for 2 cap QD. The one sent Friday was for 2 cap BID. If this is correct, they need the qty updated. Please advise.    3. Patient approves office to leave a detailed voicemail/MyChart message: N\A

## 2017-10-02 NOTE — PROGRESS NOTES
CC: follow up multiple issues   HPI:   Debby presents today with the following.    1. HTN (hypertension), benign  Compliant with medication.  Good control home readings.  NO chest pain , SOB, Edema.    2. Hypokalemia  Last labs K+ 3.3  Denies cramps.    3. Dyslipidemia  Not on statin due for recheck cholesterol.     4. IGT (impaired glucose tolerance)  No hIstory DM trying to watch diet.    5. Acquired hypothyroidism  Compliant with meds no hair skin changes.         Patient Active Problem List    Diagnosis Date Noted   • IGT (impaired glucose tolerance) 09/29/2017   • Osteoarthritis of right knee 07/12/2017   • Major depressive disorder, recurrent episode, in partial remission (CMS-[facility]) 05/05/2017   • Bereavement 02/06/2017   • Anxiety disorder 10/24/2016   • Atypical chest pain 07/18/2016   • Osteoarthrosis involving lower leg 04/21/2015   • Dyslipidemia 12/02/2014   • HTN (hypertension), benign 03/19/2012   • Acquired hypothyroidism 03/19/2012   • Back pain 03/19/2012   • Neck pain 03/19/2012       Current Outpatient Prescriptions   Medication Sig Dispense Refill   • hydrocodone-acetaminophen (NORCO) 5-325 MG Tab per tablet Take 1-2 Tabs by mouth every 6 hours as needed. 15 Tab 0   • topiramate (TOPAMAX) 50 MG tablet Take 2 Tabs by mouth every day. 180 Tab 3   • levothyroxine (SYNTHROID) 50 MCG Tab TAKE ONE (1) TABLET BY MOUTH DAILY 90 Tab 3   • duloxetine (CYMBALTA) 60 MG Cap DR Particles delayed-release capsule Take 2 Caps by mouth 2 times a day. 180 Cap 3   • lisinopril-hydrochlorothiazide (PRINZIDE, ZESTORETIC) 20-12.5 MG per tablet Take 1 Tab by mouth every day. 90 Tab 0   • asa/apap/caffeine (EXCEDRIN) 250-250-65 MG Tab Take 2 Tabs by mouth every 6 hours as needed for Headache.     • clonazepam (KLONOPIN) 1 MG Tab Take 1 Tab by mouth 2 times a day. (Patient not taking: Reported on 9/29/2017) 60 Tab 2     No current facility-administered medications for this visit.          Allergies as of 09/29/2017 -  Reviewed 09/29/2017   Allergen Reaction Noted   • Bactrim ds Rash and Swelling 05/14/2011   • Sulfamethoxazole-trimethoprim Rash and Swelling 05/23/2016        ROS: As per HPI.    /82   Pulse 76   Temp 36.4 °C (97.5 °F)   Resp 16   Wt 83.5 kg (184 lb)   LMP 02/26/2012   SpO2 96%   BMI 29.71 kg/m²     Physical Exam:  Gen:         Alert and oriented, No apparent distress.  Neck:        No Lymphadenopathy or Bruits.  Lungs:     Clear to auscultation bilaterally  CV:          Regular rate and rhythm. No murmurs, rubs or gallops.               Ext:          No clubbing, cyanosis, edema.      Assessment and Plan.   57 y.o. female with the following issues.    1. HTN (hypertension), benign  Well controlled no change meds.     2. Hypokalemia  Recheck labs    3. Dyslipidemia  Recheck labs.  - COMP METABOLIC PANEL; Future  - LIPID PROFILE; Future    4. IGT (impaired glucose tolerance)  Discussed diet exercise.  Recheck labs.   - HEMOGLOBIN A1C; Future    5. Acquired hypothyroidism  Recheck con current dose.   - TSH; Future  - levothyroxine (SYNTHROID) 50 MCG Tab; TAKE ONE (1) TABLET BY MOUTH DAILY  Dispense: 90 Tab; Refill: 3    6. Screen for colon cancer    - REFERRAL TO GASTROENTEROLOGY    7. Visit for screening mammogram    - MA-SCREEN MAMMO W/CAD-BILAT; Future

## 2018-02-01 DIAGNOSIS — Z01.812 PRE-OPERATIVE LABORATORY EXAMINATION: ICD-10-CM

## 2018-02-01 DIAGNOSIS — Z01.810 PRE-OPERATIVE CARDIOVASCULAR EXAMINATION: ICD-10-CM

## 2018-02-01 LAB
ANION GAP SERPL CALC-SCNC: 6 MMOL/L (ref 0–11.9)
BUN SERPL-MCNC: 14 MG/DL (ref 8–22)
CALCIUM SERPL-MCNC: 8.8 MG/DL (ref 8.5–10.5)
CHLORIDE SERPL-SCNC: 108 MMOL/L (ref 96–112)
CO2 SERPL-SCNC: 27 MMOL/L (ref 20–33)
CREAT SERPL-MCNC: 0.77 MG/DL (ref 0.5–1.4)
EKG IMPRESSION: NORMAL
ERYTHROCYTE [DISTWIDTH] IN BLOOD BY AUTOMATED COUNT: 51 FL (ref 35.9–50)
GLUCOSE SERPL-MCNC: 84 MG/DL (ref 65–99)
HCT VFR BLD AUTO: 36.5 % (ref 37–47)
HGB BLD-MCNC: 11.7 G/DL (ref 12–16)
MCH RBC QN AUTO: 31.5 PG (ref 27–33)
MCHC RBC AUTO-ENTMCNC: 32.1 G/DL (ref 33.6–35)
MCV RBC AUTO: 98.1 FL (ref 81.4–97.8)
PLATELET # BLD AUTO: 362 K/UL (ref 164–446)
PMV BLD AUTO: 11.3 FL (ref 9–12.9)
POTASSIUM SERPL-SCNC: 3.4 MMOL/L (ref 3.6–5.5)
RBC # BLD AUTO: 3.72 M/UL (ref 4.2–5.4)
SODIUM SERPL-SCNC: 141 MMOL/L (ref 135–145)
WBC # BLD AUTO: 6 K/UL (ref 4.8–10.8)

## 2018-02-01 PROCEDURE — 85027 COMPLETE CBC AUTOMATED: CPT

## 2018-02-01 PROCEDURE — 93005 ELECTROCARDIOGRAM TRACING: CPT

## 2018-02-01 PROCEDURE — 93010 ELECTROCARDIOGRAM REPORT: CPT | Performed by: INTERNAL MEDICINE

## 2018-02-01 PROCEDURE — 36415 COLL VENOUS BLD VENIPUNCTURE: CPT

## 2018-02-01 PROCEDURE — 80048 BASIC METABOLIC PNL TOTAL CA: CPT

## 2018-02-01 RX ORDER — METHOCARBAMOL 500 MG/1
500 TABLET, FILM COATED ORAL 2 TIMES DAILY
Status: ON HOLD | COMMUNITY
End: 2019-09-16

## 2018-02-01 RX ORDER — HYDROCODONE BITARTRATE AND ACETAMINOPHEN 5; 325 MG/1; MG/1
1 TABLET ORAL 2 TIMES DAILY
Status: ON HOLD | COMMUNITY
End: 2019-09-16

## 2018-02-01 NOTE — OR NURSING
Pre admit apt: Pt. Instructed to continue regularly prescribed medications through day before surgery.  Instructed to take the following medications, the day of surgery, with a sip of water per anesthesia protocol:norco, robaxin, topamax

## 2018-02-09 ENCOUNTER — HOSPITAL ENCOUNTER (OUTPATIENT)
Facility: MEDICAL CENTER | Age: 58
End: 2018-02-09
Attending: ORTHOPAEDIC SURGERY | Admitting: ORTHOPAEDIC SURGERY
Payer: COMMERCIAL

## 2018-02-09 VITALS
WEIGHT: 176.59 LBS | DIASTOLIC BLOOD PRESSURE: 67 MMHG | SYSTOLIC BLOOD PRESSURE: 119 MMHG | RESPIRATION RATE: 16 BRPM | TEMPERATURE: 97 F | HEART RATE: 72 BPM | OXYGEN SATURATION: 93 % | HEIGHT: 67 IN | BODY MASS INDEX: 27.72 KG/M2

## 2018-02-09 PROCEDURE — 700105 HCHG RX REV CODE 258: Performed by: ORTHOPAEDIC SURGERY

## 2018-02-09 PROCEDURE — 160025 RECOVERY II MINUTES (STATS): Performed by: ORTHOPAEDIC SURGERY

## 2018-02-09 PROCEDURE — 160041 HCHG SURGERY MINUTES - EA ADDL 1 MIN LEVEL 4: Performed by: ORTHOPAEDIC SURGERY

## 2018-02-09 PROCEDURE — 700101 HCHG RX REV CODE 250

## 2018-02-09 PROCEDURE — 501838 HCHG SUTURE GENERAL: Performed by: ORTHOPAEDIC SURGERY

## 2018-02-09 PROCEDURE — 700111 HCHG RX REV CODE 636 W/ 250 OVERRIDE (IP)

## 2018-02-09 PROCEDURE — 160036 HCHG PACU - EA ADDL 30 MINS PHASE I: Performed by: ORTHOPAEDIC SURGERY

## 2018-02-09 PROCEDURE — 160046 HCHG PACU - 1ST 60 MINS PHASE II: Performed by: ORTHOPAEDIC SURGERY

## 2018-02-09 PROCEDURE — 160048 HCHG OR STATISTICAL LEVEL 1-5: Performed by: ORTHOPAEDIC SURGERY

## 2018-02-09 PROCEDURE — 160002 HCHG RECOVERY MINUTES (STAT): Performed by: ORTHOPAEDIC SURGERY

## 2018-02-09 PROCEDURE — A9270 NON-COVERED ITEM OR SERVICE: HCPCS

## 2018-02-09 PROCEDURE — 160035 HCHG PACU - 1ST 60 MINS PHASE I: Performed by: ORTHOPAEDIC SURGERY

## 2018-02-09 PROCEDURE — 160009 HCHG ANES TIME/MIN: Performed by: ORTHOPAEDIC SURGERY

## 2018-02-09 PROCEDURE — A6222 GAUZE <=16 IN NO W/SAL W/O B: HCPCS | Performed by: ORTHOPAEDIC SURGERY

## 2018-02-09 PROCEDURE — 160029 HCHG SURGERY MINUTES - 1ST 30 MINS LEVEL 4: Performed by: ORTHOPAEDIC SURGERY

## 2018-02-09 PROCEDURE — 502580 HCHG PACK, KNEE ARTHROSCOPY: Performed by: ORTHOPAEDIC SURGERY

## 2018-02-09 PROCEDURE — 700102 HCHG RX REV CODE 250 W/ 637 OVERRIDE(OP)

## 2018-02-09 RX ORDER — OXYCODONE HCL 5 MG/5 ML
SOLUTION, ORAL ORAL
Status: COMPLETED
Start: 2018-02-09 | End: 2018-02-09

## 2018-02-09 RX ORDER — ROPIVACAINE HYDROCHLORIDE 5 MG/ML
INJECTION, SOLUTION EPIDURAL; INFILTRATION; PERINEURAL
Status: DISCONTINUED | OUTPATIENT
Start: 2018-02-09 | End: 2018-02-09 | Stop reason: HOSPADM

## 2018-02-09 RX ORDER — SODIUM CHLORIDE, SODIUM LACTATE, POTASSIUM CHLORIDE, CALCIUM CHLORIDE 600; 310; 30; 20 MG/100ML; MG/100ML; MG/100ML; MG/100ML
INJECTION, SOLUTION INTRAVENOUS CONTINUOUS
Status: DISCONTINUED | OUTPATIENT
Start: 2018-02-09 | End: 2018-02-09 | Stop reason: HOSPADM

## 2018-02-09 RX ORDER — LIDOCAINE HYDROCHLORIDE 10 MG/ML
INJECTION, SOLUTION INFILTRATION; PERINEURAL
Status: COMPLETED
Start: 2018-02-09 | End: 2018-02-09

## 2018-02-09 RX ORDER — MEPERIDINE HYDROCHLORIDE 25 MG/ML
INJECTION INTRAMUSCULAR; INTRAVENOUS; SUBCUTANEOUS
Status: COMPLETED
Start: 2018-02-09 | End: 2018-02-09

## 2018-02-09 RX ORDER — HYDROMORPHONE HYDROCHLORIDE 2 MG/ML
INJECTION, SOLUTION INTRAMUSCULAR; INTRAVENOUS; SUBCUTANEOUS
Status: COMPLETED
Start: 2018-02-09 | End: 2018-02-09

## 2018-02-09 RX ADMIN — FENTANYL CITRATE 25 MCG: 50 INJECTION, SOLUTION INTRAMUSCULAR; INTRAVENOUS at 16:15

## 2018-02-09 RX ADMIN — FENTANYL CITRATE 25 MCG: 50 INJECTION, SOLUTION INTRAMUSCULAR; INTRAVENOUS at 16:30

## 2018-02-09 RX ADMIN — FENTANYL CITRATE 25 MCG: 50 INJECTION, SOLUTION INTRAMUSCULAR; INTRAVENOUS at 16:25

## 2018-02-09 RX ADMIN — FENTANYL CITRATE 25 MCG: 50 INJECTION, SOLUTION INTRAMUSCULAR; INTRAVENOUS at 16:45

## 2018-02-09 RX ADMIN — HYDROMORPHONE HYDROCHLORIDE 0.2 MG: 2 INJECTION, SOLUTION INTRAMUSCULAR; INTRAVENOUS; SUBCUTANEOUS at 17:15

## 2018-02-09 RX ADMIN — OXYCODONE HYDROCHLORIDE 10 MG: 5 SOLUTION ORAL at 16:15

## 2018-02-09 RX ADMIN — LIDOCAINE HYDROCHLORIDE 0.2 ML: 10 INJECTION, SOLUTION INFILTRATION; PERINEURAL at 13:29

## 2018-02-09 RX ADMIN — FENTANYL CITRATE 25 MCG: 50 INJECTION, SOLUTION INTRAMUSCULAR; INTRAVENOUS at 17:01

## 2018-02-09 RX ADMIN — FENTANYL CITRATE 25 MCG: 50 INJECTION, SOLUTION INTRAMUSCULAR; INTRAVENOUS at 16:20

## 2018-02-09 RX ADMIN — HYDROMORPHONE HYDROCHLORIDE 0.2 MG: 2 INJECTION, SOLUTION INTRAMUSCULAR; INTRAVENOUS; SUBCUTANEOUS at 17:08

## 2018-02-09 RX ADMIN — FENTANYL CITRATE 25 MCG: 50 INJECTION, SOLUTION INTRAMUSCULAR; INTRAVENOUS at 16:55

## 2018-02-09 RX ADMIN — SODIUM CHLORIDE, POTASSIUM CHLORIDE, SODIUM LACTATE AND CALCIUM CHLORIDE 1000 ML: 600; 310; 30; 20 INJECTION, SOLUTION INTRAVENOUS at 13:30

## 2018-02-09 RX ADMIN — MEPERIDINE HYDROCHLORIDE 25 MG: 25 INJECTION INTRAMUSCULAR; INTRAVENOUS; SUBCUTANEOUS at 16:34

## 2018-02-09 RX ADMIN — FENTANYL CITRATE 25 MCG: 50 INJECTION, SOLUTION INTRAMUSCULAR; INTRAVENOUS at 16:50

## 2018-02-09 RX ADMIN — HYDROMORPHONE HYDROCHLORIDE 0.2 MG: 2 INJECTION, SOLUTION INTRAMUSCULAR; INTRAVENOUS; SUBCUTANEOUS at 17:35

## 2018-02-09 ASSESSMENT — PAIN SCALES - GENERAL
PAINLEVEL_OUTOF10: 6
PAINLEVEL_OUTOF10: 8
PAINLEVEL_OUTOF10: ASSUMED PAIN PRESENT
PAINLEVEL_OUTOF10: 7
PAINLEVEL_OUTOF10: 6
PAINLEVEL_OUTOF10: 7
PAINLEVEL_OUTOF10: 6
PAINLEVEL_OUTOF10: 6
PAINLEVEL_OUTOF10: 9
PAINLEVEL_OUTOF10: 6

## 2018-02-10 NOTE — OP REPORT
DATE OF SERVICE:  02/09/2018    SURGEON:  Harsh Baltazar MD    ASSISTANT:  Hillary Owens PA-C    ANESTHESIA:  General anesthesia.    PREOPERATIVE DIAGNOSES:  1.  Right knee possible recurrent meniscus tear.  2.  Right knee osteoarthritis.  3.  Right knee synovitis.    POSTOPERATIVE DIAGNOSES:  1.  Right knee medial meniscus tear.  2.  Right knee lateral meniscus tear.  3.  Right knee osteoarthritis.  4.  Right knee synovitis.    PROCEDURES PERFORMED:  1.  Right knee arthroscopy.  2.  Right knee partial medial meniscectomy.  3.  Right knee partial lateral meniscectomy.  4.  Right knee medial tibial plateau and lateral femoral condyle   chondroplasty.  5.  Right knee limited synovectomy.    IMPLANTS:  None.    INDICATIONS:  Treat right knee injury, improve pain, improve function.    HISTORY OF PRESENT ILLNESS:  The patient is a very pleasant 57-year-old female   who underwent a right knee arthroscopy and meniscus debridement by myself in   July 2017.  Following surgery, she really noted no significant improvement in   her symptoms.  She was treated with conservative management including rest,   activity modification, anti-inflammatory medicines and multiple corticosteroid   injections.  Given her persistent pain and mechanical symptoms, we did elect   to proceed with a repeat knee arthroscopy.  The patient has been n.p.o. since   midnight.  She is medically cleared for surgery by the anesthesia team.    INFORMED CONSENT:  The patient was informed of the risks, benefits,   alternatives of planned operation.  The risks include but not limited to   bleeding, infection, neurovascular damage, recurrent meniscus tear,   osteoarthritis/cartilage damage, pain, stiffness, DVT, PE, MI, stroke, and   death.  Advanced directives were reviewed.  After answer questions, the   patient elected to proceed with planned operation and informed consent form   was signed.    DESCRIPTION OF PROCEDURE:  The patient was identified  in the preoperative   holding area.  The correct procedural side and site were identified and   marked.  The patient was then brought to the operating room and transferred to   the operating room table where all bony prominences were well padded.  She   underwent general anesthesia by the anesthesia team.    Examination of the right knee demonstrated well-healed arthroscopy incisions.    There was an effusion about 20 mL.  Range of motion is 0-135.  Negative   Lachman, negative posterior drawer.  No instability to varus and valgus   stress.    The right knee was then cleaned with several alcohol soaked gauzes and the   joint injected with 30 mL of 1% lidocaine with epinephrine via lateral   parapatellar injection site.  The right lower extremity was then prepped and   draped in normal standard sterile fashion.    A procedural pause was then performed by the operating room team.  The   procedure, patient's identity, operative side, surgical site were all   verified.  The patient was given IV antibiotics prior to incision.    I then began with a diagnostic arthroscopy via standard anteromedial and   anterolateral portals.  There was some diffuse grade III change on the   undersurface of the patella as well as within the trochlear groove.  No   obvious loose bodies or soft tissue debris within the medial and lateral   gutters.  Examination of the notch demonstrated some small amount of synovitis   of the infrapatellar fat pad.  The ACL and PCL appeared to be intact.    Examination of the medial compartment did demonstrate some small amount of   free edge tearing of the body and posterior horn of the medial meniscus.    There was a small horizontal cleavage component as well.  There was some   intermittent grade II change over the medial femoral condyle, but evidence of   a small area of full-thickness grade IV cartilage damage over the posterior   aspect of the tibial plateau with some surrounding unstable cartilage    fragments.  Examination of the lateral compartment demonstrated some small   amount of free edge tearing of the body and posterior horn of the lateral   meniscus as well as small horizontal cleavage component.  There was some   diffuse grade II changes over the lateral femoral condyle and lateral tibial   plateau as well as some areas of what appeared to be a grade III changes over   the lateral aspect of the femoral condyle.    I first turned my attention to the infrapatellar fat pad.  A limited   synovectomy was performed in this region to remove the inflammatory and   potentially painful tissue as well as to improve visualization for the   remainder of the case.  I then turned my attention to the medial compartment.    I did perform a partial medial meniscectomy using the biters and the   oscillating suction shaver device.  I debrided and removed some of the   unstable and torn fragments of the medial meniscus.  The remaining rim width   following debridement was about 3-4 mm.  I also performed a chondroplasty of   the medial tibial plateau.  I once again noted a small area of grade IV   full-thickness cartilage loss.  I then turned my attention laterally.  Given   some of the tearing of the lateral meniscus, I proceeded with a partial   lateral meniscectomy.  Using the arthroscopic biters and the oscillating   suction shaver device, I debrided about another 2-3 mm of the inner most   aspect of the body and posterior horn.  The remaining rim width in this area   was about 3-4 mm.  I then performed a chondroplasty of the lateral femoral   condyle.  I then scanned the entire knee joint again including modified   Gillquist maneuvers to confirm that there were no remaining loose bodies or   soft tissue debris.  Meticulous hemostasis obtained.    All wounds were then copiously irrigated.  Meticulous hemostasis obtained.    The portal incisions were closed with 3-0 Prolene suture followed by   Steri-Strips.  Xeroform  was placed over all incisions.  I then injected the   joint with 30 mL of 0.5% ropivacaine via the same lateral parapatellar   injection site.  A sterile compressive dressing was then applied followed by a   LENORA hose stocking.    Needle and sponge counts were correct at the end of the procedure as reported   by the circulating nurse.  The patient tolerated the procedure well with no   obvious intraoperative complications.  The patient was then transferred off   the operating room table onto the regular hospital bed.  She was extubated by   the anesthesia team.    ESTIMATED BLOOD LOSS:  5 mL.    COMPLICATIONS:  None.    TOURNIQUET TIME:  None.    SPECIMENS:  None.    WOUND TYPE:  Type 1, clean.    POSTOPERATIVE PLAN:  The patient will be transferred back to the postoperative   unit.  I expect she will be discharged from the hospital later this afternoon   once mobilizing safely and tolerating oral medications.  She will remain   touchdown weightbearing on the right lower extremity with the use of crutches.    We will begin physical therapy in about 5-7 days.  She will take aspirin for   pharmacological DVT prophylaxis.  We will see how the patient does.  It is a   little bit concerning that she has some worsening arthritis since the last   arthroscopy.       ____________________________________     MD KIRK Henderson / GATITO    DD:  02/09/2018 15:48:29  DT:  02/09/2018 16:34:14    D#:  [id number]  Job#:  [id number]

## 2018-02-10 NOTE — OR NURSING
1759: Patient to stage II from PACU.  Dressings to right knee clean, dry, and intact, right pedal pulse 1+, toes pink and warm.  Patient assisted with changing by RN and is now sitting in recliner chair.  No reported nausea, reports tolerable pain 6/10.  8155: Discharge instructions and education provided to patient and sister.  Discharge medications discussed, patient has no questions about discharge or discharge medication at this time.  IV removed, tip intact.

## 2018-02-10 NOTE — DISCHARGE INSTRUCTIONS
ACTIVITY: Rest and take it easy for the first 24 hours.  A responsible adult is recommended to remain with you during that time.  It is normal to feel sleepy.  We encourage you to not do anything that requires balance, judgment or coordination.    MILD FLU-LIKE SYMPTOMS ARE NORMAL. YOU MAY EXPERIENCE GENERALIZED MUSCLE ACHES, THROAT IRRITATION, HEADACHE AND/OR SOME NAUSEA.    FOR 24 HOURS DO NOT:  Drive, operate machinery or run household appliances.  Drink beer or alcoholic beverages.   Make important decisions or sign legal documents.    SPECIAL INSTRUCTIONS: *Discharge and care instructions per Dr Baltazar**    DIET: To avoid nausea, slowly advance diet as tolerated, avoiding spicy or greasy foods for the first day.  Add more substantial food to your diet according to your physician's instructions.  Babies can be fed formula or breast milk as soon as they are hungry.  INCREASE FLUIDS AND FIBER TO AVOID CONSTIPATION.    SURGICAL DRESSING/BATHING: *Keep clean, dry and intact**    FOLLOW-UP APPOINTMENT:  A follow-up appointment should be arranged with your doctor; call to schedule.    You should CALL YOUR PHYSICIAN if you develop:  Fever greater than 101 degrees F.  Pain not relieved by medication, or persistent nausea or vomiting.  Excessive bleeding (blood soaking through dressing) or unexpected drainage from the wound.  Extreme redness or swelling around the incision site, drainage of pus or foul smelling drainage.  Inability to urinate or empty your bladder within 8 hours.  Problems with breathing or chest pain.    You should call 911 if you develop problems with breathing or chest pain.  If you are unable to contact your doctor or surgical center, you should go to the nearest emergency room or urgent care center.  Physician's telephone #: *[phone]  **    If any questions arise, call your doctor.  If your doctor is not available, please feel free to call the Surgical Center at {Surgical Dept  1. Meets criteria for severe protein calorie malnutrition  2. Probable metabolic encephalopathy Numbers:06165}.  The Center is open Monday through Friday from 7AM to 7PM.  You can also call the HEALTH HOTLINE open 24 hours/day, 7 days/week and speak to a nurse at [phone], or toll free at [phone].    A registered nurse may call you a few days after your surgery to see how you are doing after your procedure.    MEDICATIONS: Resume taking daily medication.  Take prescribed pain medication with food.  If no medication is prescribed, you may take non-aspirin pain medication if needed.  PAIN MEDICATION CAN BE VERY CONSTIPATING.  Take a stool softener or laxative such as senokot, pericolace, or milk of magnesia if needed.    Prescription given and filled at home.  Last pain medication given at *Oxycodone 4:15**.    If your physician has prescribed pain medication that includes Acetaminophen (Tylenol), do not take additional Acetaminophen (Tylenol) while taking the prescribed medication.    Depression / Suicide Risk    As you are discharged from this [facility] facility, it is important to learn how to keep safe from harming yourself.    Recognize the warning signs:  · Abrupt changes in personality, positive or negative- including increase in energy   · Giving away possessions  · Change in eating patterns- significant weight changes-  positive or negative  · Change in sleeping patterns- unable to sleep or sleeping all the time   · Unwillingness or inability to communicate  · Depression  · Unusual sadness, discouragement and loneliness  · Talk of wanting to die  · Neglect of personal appearance   · Rebelliousness- reckless behavior  · Withdrawal from people/activities they love  · Confusion- inability to concentrate     If you or a loved one observes any of these behaviors or has concerns about self-harm, here's what you can do:  · Talk about it- your feelings and reasons for harming yourself  · Remove any means that you might use to hurt yourself (examples: pills, rope, extension cords,  firearm)  · Get professional help from the community (Mental Health, Substance Abuse, psychological counseling)  · Do not be alone:Call your Safe Contact- someone whom you trust who will be there for you.  · Call your local CRISIS HOTLINE 946-0101 or 144-533-6598  · Call your local Children's Mobile Crisis Response Team [place] (729) 747-9038 or www.[employer]  · Call the toll free National Suicide Prevention Hotlines   · National Suicide Prevention Lifeline 293-024-NPLO (9089)  · National Hope Line Network 800-SUICIDE (079-0460)

## 2018-02-10 NOTE — OR NURSING
1548 Pt sedated to PACU with LMA in place. Lungs clear, respirations and unlabored. Good spontaneous respiratory effort. Ice placed to knee. 1600 Pt reoriented to surroundings. BISHOP's c/o pain, rx given as ordered. 1620 Pain persists, pt encouraged to perform ankle circles. 1645 No changes with pain, remains 8-9/10. 1715 Pain slowly improving, pt sitting up on gurney drinking ginger ale. 1720 Pain tolerable at 6/10. 1735 Dr Rodriges at beside to assess and sign patient out. 1745 NO assessment changes. Pt sitting up on gurney conversing with staff. Pt meets criteria for discharge and transport to stage 2. 1759 Pt transported to stage 2.

## 2018-04-18 ENCOUNTER — PHYSICAL THERAPY (OUTPATIENT)
Dept: PHYSICAL THERAPY | Facility: MEDICAL CENTER | Age: 58
End: 2018-04-18
Attending: ORTHOPAEDIC SURGERY
Payer: COMMERCIAL

## 2018-04-18 DIAGNOSIS — S83.241A OTHER TEAR OF MEDIAL MENISCUS, CURRENT INJURY, RIGHT KNEE, INITIAL ENCOUNTER: ICD-10-CM

## 2018-04-18 DIAGNOSIS — M25.561 RIGHT KNEE PAIN, UNSPECIFIED CHRONICITY: ICD-10-CM

## 2018-04-18 PROCEDURE — 97162 PT EVAL MOD COMPLEX 30 MIN: CPT

## 2018-04-18 PROCEDURE — 97110 THERAPEUTIC EXERCISES: CPT

## 2018-04-18 PROCEDURE — 97014 ELECTRIC STIMULATION THERAPY: CPT

## 2018-04-18 ASSESSMENT — ACTIVITIES OF DAILY LIVING (ADL): POOR_BALANCE: 1

## 2018-04-18 NOTE — OP THERAPY EVALUATION
Outpatient Physical Therapy  INITIAL EVALUATION    [facility] Out[facility] Physical Therapy  [address]  [place] [address]  Phone:  [phone]  Fax:  [phone]    Date of Evaluation: 04/18/2018    Patient: Debby Desai  YOB: 1960  MRN: [id number]     Referring Provider: Harsh Baltazar M.D.  [address] [place] [address]   Referring Diagnosis Other tear of medial meniscus, current injury, right knee, initial encounter [S83.241A];Pain in right knee [M25.561]     Time Calculation  Start time: 1254  Stop time: 1406 Time Calculation (min): 72 minutes         Chief Complaint: Knee Problem and Knee Injury    Visit Diagnoses     ICD-10-CM   1. Other tear of medial meniscus, current injury, right knee, initial encounter S83.241A   2. Right knee pain, unspecified chronicity M25.561         Subjective   Debby is a 59 yo female with a year long history of severe right knee pain following tripping and falling May 2016 and 2 surgeries to correct right knee meniscal tears.  The most recent surgery was Feb 9, 2018 where she reports having continued pain and swelling.  Her previous surgery occurred in July and she despite following post op instructions and PT, she had continued problems.  These continued problems prompted her surgery in February.  She attended PT at another facility, but has not attended PT in a month as she could not get PT to work with her work schedule due to the time and distance from her work site.  Debby is changing her location for PT in the hopes she can be more regular with PT and reduce her pain.  Debby's pain is constant, 7/10, she continues to have daily swelling, and will have an intermittent locking feeling, but reports no numbness or tingling.  Her pain is aggravated with walking any distance, being on her feet for any chores or adls, moving from sitting to/from standing, getting in and out of bed, driving and kneeling.  She does take  the elevator at work due to her pain, but must walk a few steps into the building first.  She reports that her step climbing is not consecutive and feels unstable.  She also has a few steps into her home and would like to be more confident and not have pain as well as just climb and descend stairs normally.  Debby reports she has been unable to perform any housework since last May and relies on her family to help with this.  Her pain is eased with ice and elevation.  She regularly takes pain medication and recently was started on gabapentin from her pain management doctor and it is unknown if that will help with pain reduction.  She wakes often to pain at night.  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  She reports HTN controlled by medication, anxiety, depression as well as a history of T/S pain and has a spinal cord stimulator, but it is no longer working.    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[facility])     had work up and feet r/t anxiety   • Anxiety    • Arthritis     OA knees   • Dental disorder 5/24/12    partial upper denture   • High cholesterol    • HTN (hypertension), benign 3/19/2012   • Hypothyroid 3/19/2012   • Major depression 3/19/2012   • Orbital floor (blow-out) closed fracture (CMS-HCC)     left   • Other specified symptom associated with female genital organs     post menopausal bleeding   • Pain     thoracic spine, Right knee, myofacial pain, and Right shoulder   • Pain     recently fell and has bruise left forehead   • Pain 02/2018    chronic back pain, right shoulder   • Psychiatric problem     depression, anxiety   • Unspecified disorder of thyroid    • Urinary incontinence     Occasional     Past Surgical History:   Procedure Laterality Date   • KNEE ARTHROSCOPY Right 2/9/2018    Procedure: KNEE ARTHROSCOPY;  Surgeon: Harsh Baltazar M.D.;  Location: SURGERY [facility];  Service: Orthopedics   • MEDIAL MENISCECTOMY Right 2/9/2018    Procedure: MEDIAL AND LATERAL MENISCECTOMY- PARTIAL;  Surgeon: Harsh Baltazar M.D.;  Location: [facility];  Service: Orthopedics   • KNEE ARTHROSCOPY Right 7/12/2017    Procedure: KNEE ARTHROSCOPY- CESPEDES;  Surgeon: Harsh Baltazar M.D.;  Location: [facility];  Service:    • MEDIAL MENISCECTOMY Right 7/12/2017    Procedure: PARTIAL MEDIAL AND LATERAL MENISCECTOMY;  Surgeon: Harsh Baltazar M.D.;  Location: [facility];  Service:    • SYNOVECTOMY Right 7/12/2017    Procedure: SYNOVECTOMY;  Surgeon: Harsh Baltazar M.D.;  Location: [facility];  Service:    • KNEE ARTHROSCOPY  4/21/2015    Performed by Rufus Saba M.D. at SURGERY [facility]   • MEDIAL MENISCECTOMY  4/21/2015    Performed by Rufus Saba M.D. at SURGERY [facility]   • PUMP REVISION  12/10/2012    Performed by Allison Guerra M.D. at [facility]   • SPINAL CORD STIMULATOR  5/30/2012    Performed by ALLISON GUERRA at [facility]   • BIOPSY GENERAL  5/1/12    endometrial   • SPINAL CORD STIMULATOR  7/11/2011    Performed by ALLISON GUERRA at [facility]   • BLOCK EPIDURAL STEROID INJECTION  2008    x 3-4   • LYMPH NODE EXCISION Left 2007    cervicle   • OTHER Right 2001    thoracic discectomy     • ARTHROSCOPY, KNEE Left 1999   • RIB RESECTION Right 1980   • BREAST BIOPSY Left 1985/2005    x 2     Social History   Substance Use Topics   • Smoking status: Never Smoker   • Smokeless tobacco: Never Used   • Alcohol use 0.0 oz/week      Comment: 1 glass wine per month     Family and Occupational History     Social History   • Marital status: Single     Spouse name: N/A   • Number of children: N/A   • Years of education: N/A       Objective     Observations     Additional Observation Details  Some swelling at right knee    Neurological Testing      Sensation     Knee   Left Knee   Intact: light touch    Right Knee   Intact: light touch     Palpation     Right   Tenderness of the distal biceps femoris, distal semimembranosus, distal semitendinosus and medial gastrocnemius.     Tenderness     Right Knee   Tenderness in the fibular head, inferior patella, MCL (proximal), medial joint line and medial patella.     Active Range of Motion   Left Hip   Normal active range of motion  Left Knee   Flexion: 141 degrees   Extension: 0 degrees   Extensor lag: WFL    Right Knee   Flexion: 110 degrees with pain  Extension: 7 degrees with pain  Extensor la degrees with pain  Left Ankle/Foot   Normal active range of motion    Right Ankle/Foot   Normal active range of motion    Additional Active Range of Motion Details  Right knee limits right hip ROM testing    Passive Range of Motion   Left Knee   Normal passive range of motion    Right Knee   Flexion: 112 degrees with pain  Extension: 5 degrees with pain    Patellar Mobility     Right Knee Hypomobile in the medial, lateral, superior and inferior patellar tendon(s).     Strength:      Left Knee   Normal strength    Right Knee   Flexion: 3+ (pain)  Extension: 3+ (pain)  Quadriceps contraction: fair    Left Ankle/Foot   Normal strength    Right Ankle/Foot   Normal strength    Swelling     Left Knee Girth Measurement (cm)   Joint line: 44.5 cm  5 cm above joint line: 47 cm  5 cm below joint line: 39.5 cm    Right Knee Girth Measurement (cm)   Joint line: 44.2 cm  5 cm above joint line: 47.2 cm  5 cm below joint line: 42.2 cm  Ambulation     Quality of Movement During Gait     Additional Quality of Movement During Gait Details  Pt with decreased stance and swing phases of gait with right leg.        Therapeutic Exercises (CPT 44268):     1. Education and formulation in HEP    2. Quad sets    3. SLR    4. Standing knee ext at wall with ball    5. Alt tap fwd and retro with sls at counter    6. Terminal knee ext with  control with L1 band    Therapeutic Treatments and Modalities:     1. E Stim Unattended (CPT 02668), 15 min, Russian stim 10:10 for quad work with premod for pain control    Time-based treatments/modalities:  Therapeutic exercise minutes (CPT 62656): 25 minutes       Assessment, Response and Plan:   Impairments: abnormal ADL function, abnormal gait, abnormal muscle tone, abnormal or restricted ROM, activity intolerance, difficulty performing job, impaired balance, impaired physical strength, limited ADL's, pain with function and swelling    Assessment details:  Pt presents with limitations in AROM, PROM, passive joint mobility and strength as well as limitations in balance and gait symmetry and sequencing after right knee injury last May resulting in 2 meniscal surgeries.  Prognosis: good    Goals:   Short Term Goals:   1.  Pt to perform HEP 5-6 days per week without increased pain.  2.  Pt able to have 0 degrees extension and 120 flex.  3.  Strength 4-/5 right leg.  4.  Pt able to walk on level surfaces with good control and symmetry.  Short term goal time span:  2-4 weeks      Long Term Goals:    1.  Pt with 0-130 ROM  2.  Strength 4/5 in right leg.  3.  Pt to be able to walk on all surfaces with good control and symmetry.  4.  Pt able to climb and descend stairs with consecutive stepping and rail if needed for balance.  5.  Womac score 35 or less.  6.  Pt able to sleep without waking to pain in her right knee at night.  7.  Pt able to perform job duties without right knee interfering.  8.  Pain rating of 3/10 or less.  Long term goal time span:  6-8 weeks    Plan:   Therapy options:  Physical therapy treatment to continue  Planned therapy interventions:  E Stim Unattended (CPT 48408), Hot or Cold Pack Therapy (CPT 62822), Gait Training (CPT 31896), Manual Therapy (CPT 32879), Neuromuscular Re-education (CPT 78370) and Therapeutic Exercise (CPT 33112)  Frequency:  2x week  Duration in weeks:  8  Discussed with:   Patient  Plan details:  Pt to be seen in PT 2x per week for 8 weeks to progress strength, ROM, balance, gait symmetry and sequencing.      Functional Limitation G-Codes and Severity Modifiers  WOMAC Grand Total: 56.25      Referring provider co-signature:  I have reviewed this plan of care and my co-signature certifies the need for services.  Certification Dates:   From 4/18/18     To 7/18/18    Physician Signature: ________________________________ Date: ______________

## 2018-04-24 ENCOUNTER — PHYSICAL THERAPY (OUTPATIENT)
Dept: PHYSICAL THERAPY | Facility: MEDICAL CENTER | Age: 58
End: 2018-04-24
Attending: ORTHOPAEDIC SURGERY
Payer: COMMERCIAL

## 2018-04-24 DIAGNOSIS — S83.241A OTHER TEAR OF MEDIAL MENISCUS, CURRENT INJURY, RIGHT KNEE, INITIAL ENCOUNTER: ICD-10-CM

## 2018-04-24 DIAGNOSIS — M25.561 RIGHT KNEE PAIN, UNSPECIFIED CHRONICITY: ICD-10-CM

## 2018-04-24 PROCEDURE — 97014 ELECTRIC STIMULATION THERAPY: CPT

## 2018-04-24 PROCEDURE — 97140 MANUAL THERAPY 1/> REGIONS: CPT

## 2018-04-24 PROCEDURE — 97110 THERAPEUTIC EXERCISES: CPT

## 2018-04-24 NOTE — OP THERAPY DAILY TREATMENT
Outpatient Physical Therapy  DAILY TREATMENT     [facility] Out[facility] Therapy  [address]  [place] [address]  Phone:  [phone]  Fax:  [phone]    Date: 04/24/2018    Patient: Debby Desai  YOB: 1960  MRN: [id number]     Time Calculation  Start time: 1048  Stop time: 1137 Time Calculation (min): 49 minutes     Chief Complaint: Knee Problem and Knee Injury    Visit #: 2    SUBJECTIVE:  I saw my doctor, he said there was no structural problems, MD stated he'd like me to continue with PT, but that he did not want to follow up with her in the future for her knee.    OBJECTIVE:  Current objective measures: extensor lag 5 degrees with TKE          Therapeutic Exercises (CPT 21860):     1. Nu step , 4 min 30 sec, S10 A10 R3    2. TG leg press L7, 10    3. TG heel raises L7, 10    4. Calf stretch, 10    5. Side step over step, 10, 4 inch step    6. Eccentric lowering , 10, 4 inch step    7. SLR, 5/2    Therapeutic Treatments and Modalities:     1. Manual Therapy (CPT 90184), 8 min, patella mobs, knee mobs for ext with MR, Lr    2. E Stim Unattended (CPT 45046), 15 min, russian stim 10:10 with pre-mod right knee    Time-based treatments/modalities:  Manual therapy minutes (CPT 21504): 8 minutes  Therapeutic exercise minutes (CPT 90332): 20 minutes       Pain rating before treatment: 5  Pain rating after treatment: 5    ASSESSMENT:   Response to treatment: flex 127  Ext 0    PLAN/RECOMMENDATIONS:   Plan for treatment: therapy treatment to continue next visit.  Planned interventions for next visit: therapeutic exercise (CPT 68158).

## 2018-04-26 ENCOUNTER — PHYSICAL THERAPY (OUTPATIENT)
Dept: PHYSICAL THERAPY | Facility: MEDICAL CENTER | Age: 58
End: 2018-04-26
Attending: ORTHOPAEDIC SURGERY
Payer: COMMERCIAL

## 2018-04-26 DIAGNOSIS — S83.241A OTHER TEAR OF MEDIAL MENISCUS, CURRENT INJURY, RIGHT KNEE, INITIAL ENCOUNTER: ICD-10-CM

## 2018-04-26 DIAGNOSIS — I10 HTN (HYPERTENSION), BENIGN: Chronic | ICD-10-CM

## 2018-04-26 DIAGNOSIS — M25.561 RIGHT KNEE PAIN, UNSPECIFIED CHRONICITY: ICD-10-CM

## 2018-04-26 PROCEDURE — 97110 THERAPEUTIC EXERCISES: CPT

## 2018-04-26 PROCEDURE — 97014 ELECTRIC STIMULATION THERAPY: CPT

## 2018-04-26 RX ORDER — LISINOPRIL AND HYDROCHLOROTHIAZIDE 20; 12.5 MG/1; MG/1
1 TABLET ORAL DAILY
Qty: 90 TAB | Refills: 1 | Status: SHIPPED | OUTPATIENT
Start: 2018-04-26 | End: 2018-10-24 | Stop reason: SDUPTHER

## 2018-04-26 NOTE — OP THERAPY DAILY TREATMENT
Outpatient Physical Therapy  DAILY TREATMENT     [facility] Out[facility] Therapy  [address]  [place] [address]  Phone:  [phone]  Fax:  [phone]    Date: 04/26/2018    Patient: Debby Desai  YOB: 1960  MRN: [id number]     Time Calculation  Start time: 1246  Stop time: 1334 Time Calculation (min): 48 minutes     Chief Complaint: Knee Injury and Knee Problem    Visit #: 3    SUBJECTIVE:  A little more sore today.  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      OBJECTIVE:  Current objective measures: knee pain with eccentric loading with step down off 4 inch step with UE help          Therapeutic Exercises (CPT 52353):     1. Nu step , 4 min 30 sec, S10 A10 R3    2. TG leg press L7, 10/2    3. TG heel raises L7, 10/2    4. Calf stretch, 10, on wedge, then 10 sec hold    5. Side step over step, 10, 4 inch step    6. Eccentric lowering , 10, 4 inch step    7. SLR, 5/2    8. Stool scooting , 15 ft/2    9. Step ups , 10, 4 inch step    Therapeutic Treatments and Modalities:     1. Manual Therapy (CPT 74265), 5 min, patella mobs, knee mobs for ext with MR, LR    2. E Stim Unattended (CPT 34704), 15 min, russian stim 10:10 with pre-mod right knee    Time-based treatments/modalities:  Manual therapy minutes (CPT 68409): 5 minutes  Therapeutic exercise minutes (CPT 96027): 25 minutes       Pain rating before treatment: 6  Pain rating after treatment: 6    ASSESSMENT:   Response to treatment:building endurance and strength slowly. Overall gait symmetry improving.  Pt with many movement patterns that are habit after 1 year of pain.    PLAN/RECOMMENDATIONS:   Plan for treatment: therapy treatment to continue next visit.  Planned interventions for next visit: therapeutic exercise (CPT 37944).

## 2018-04-29 ENCOUNTER — HOSPITAL ENCOUNTER (EMERGENCY)
Facility: MEDICAL CENTER | Age: 58
End: 2018-04-29
Attending: EMERGENCY MEDICINE
Payer: COMMERCIAL

## 2018-04-29 ENCOUNTER — OFFICE VISIT (OUTPATIENT)
Dept: URGENT CARE | Facility: CLINIC | Age: 58
End: 2018-04-29
Payer: COMMERCIAL

## 2018-04-29 VITALS
WEIGHT: 180 LBS | HEART RATE: 90 BPM | SYSTOLIC BLOOD PRESSURE: 136 MMHG | HEIGHT: 67 IN | BODY MASS INDEX: 28.25 KG/M2 | TEMPERATURE: 97.4 F | DIASTOLIC BLOOD PRESSURE: 80 MMHG | OXYGEN SATURATION: 94 % | RESPIRATION RATE: 16 BRPM

## 2018-04-29 VITALS
BODY MASS INDEX: 29.8 KG/M2 | SYSTOLIC BLOOD PRESSURE: 152 MMHG | OXYGEN SATURATION: 93 % | HEART RATE: 69 BPM | TEMPERATURE: 98.5 F | RESPIRATION RATE: 14 BRPM | DIASTOLIC BLOOD PRESSURE: 85 MMHG | WEIGHT: 185.41 LBS | HEIGHT: 66 IN

## 2018-04-29 DIAGNOSIS — R51.9 ACUTE INTRACTABLE HEADACHE, UNSPECIFIED HEADACHE TYPE: ICD-10-CM

## 2018-04-29 DIAGNOSIS — R51.9 ACUTE NONINTRACTABLE HEADACHE, UNSPECIFIED HEADACHE TYPE: ICD-10-CM

## 2018-04-29 DIAGNOSIS — H53.9 VISION CHANGES: ICD-10-CM

## 2018-04-29 PROCEDURE — 700111 HCHG RX REV CODE 636 W/ 250 OVERRIDE (IP): Performed by: EMERGENCY MEDICINE

## 2018-04-29 PROCEDURE — 99213 OFFICE O/P EST LOW 20 MIN: CPT | Performed by: NURSE PRACTITIONER

## 2018-04-29 PROCEDURE — 96372 THER/PROPH/DIAG INJ SC/IM: CPT

## 2018-04-29 PROCEDURE — 99283 EMERGENCY DEPT VISIT LOW MDM: CPT

## 2018-04-29 RX ORDER — KETOROLAC TROMETHAMINE 30 MG/ML
30 INJECTION, SOLUTION INTRAMUSCULAR; INTRAVENOUS ONCE
Status: COMPLETED | OUTPATIENT
Start: 2018-04-29 | End: 2018-04-29

## 2018-04-29 RX ORDER — ACETAMINOPHEN 325 MG/1
650 TABLET ORAL EVERY 4 HOURS PRN
COMMUNITY
End: 2018-07-09

## 2018-04-29 RX ORDER — DIPHENHYDRAMINE HYDROCHLORIDE 50 MG/ML
25 INJECTION INTRAMUSCULAR; INTRAVENOUS ONCE
Status: COMPLETED | OUTPATIENT
Start: 2018-04-29 | End: 2018-04-29

## 2018-04-29 RX ORDER — GABAPENTIN 300 MG/1
CAPSULE ORAL
COMMUNITY
Start: 2018-04-12 | End: 2018-09-27 | Stop reason: CLARIF

## 2018-04-29 RX ORDER — HALOPERIDOL 5 MG/ML
5 INJECTION INTRAMUSCULAR ONCE
Status: COMPLETED | OUTPATIENT
Start: 2018-04-29 | End: 2018-04-29

## 2018-04-29 RX ADMIN — KETOROLAC TROMETHAMINE 30 MG: 30 INJECTION, SOLUTION INTRAMUSCULAR; INTRAVENOUS at 15:36

## 2018-04-29 RX ADMIN — HALOPERIDOL LACTATE 5 MG: 5 INJECTION, SOLUTION INTRAMUSCULAR at 15:36

## 2018-04-29 RX ADMIN — DIPHENHYDRAMINE HYDROCHLORIDE 25 MG: 50 INJECTION, SOLUTION INTRAMUSCULAR; INTRAVENOUS at 15:35

## 2018-04-29 ASSESSMENT — VISUAL ACUITY
OS_CC: 20/40
OD_CC: 20/30

## 2018-04-29 ASSESSMENT — PAIN SCALES - GENERAL: PAINLEVEL_OUTOF10: 9

## 2018-04-29 NOTE — PROGRESS NOTES
"Chief Complaint   Patient presents with   • Head Ache     x 1 day with nausea and causing her vision to go blurry at times     HISTORY OF PRESENT ILLNESS: Patient is a 58 y.o. female with a history of hypertension, hypothyroid and hypercholesterolemia presents to urgent care today with complaints of a headache. States she has had a headache two days ago. The headache has been constant, described as pressure and dull, \"feels like a band\", and noted as the \"worst headache of my life\".  Admits to associated nausea, dizziness, posterior neck pain, and changes in her vision. She has experienced intermittent double vision and blurriness. She denies unilateral weakness, numbness, tingling, syncope, chest pain, or shortness of breath. She has had headaches before but this is significantly different. Denies any recent injury or trauma. She has taken Zofran, excedrin and hydrocodone without improvement.    Patient Active Problem List    Diagnosis Date Noted   • IGT (impaired glucose tolerance) 09/29/2017   • Osteoarthritis of right knee 07/12/2017   • Major depressive disorder, recurrent episode, in partial remission (HCC) 05/05/2017   • Bereavement 02/06/2017   • Anxiety disorder 10/24/2016   • Atypical chest pain 07/18/2016   • Osteoarthrosis involving lower leg 04/21/2015   • Dyslipidemia 12/02/2014   • HTN (hypertension), benign 03/19/2012   • Acquired hypothyroidism 03/19/2012   • Back pain 03/19/2012   • Neck pain 03/19/2012       Allergies:Bactrim ds and Sulfamethoxazole-trimethoprim    Current Outpatient Prescriptions Ordered in [facility]   Medication Sig Dispense Refill   • acetaminophen (TYLENOL) 325 MG Tab Take 650 mg by mouth every four hours as needed.     • HYDROcodone-acetaminophen (NORCO) 5-325 MG Tab per tablet Take 1 Tab by mouth 2 Times a Day.     • gabapentin (NEURONTIN) 300 MG Cap      • lisinopril-hydrochlorothiazide (PRINZIDE, ZESTORETIC) 20-12.5 MG per tablet Take 1 Tab by mouth every day. 90 Tab 1   • " methocarbamol (ROBAXIN) 500 MG Tab Take 1,000 mg by mouth every day.     • duloxetine (CYMBALTA) 60 MG Cap DR Particles delayed-release capsule Take 2 Caps by mouth every day. 180 Cap 3   • topiramate (TOPAMAX) 50 MG tablet Take 2 Tabs by mouth every day. 180 Tab 3   • levothyroxine (SYNTHROID) 50 MCG Tab TAKE ONE (1) TABLET BY MOUTH DAILY 90 Tab 3   • asa/apap/caffeine (EXCEDRIN) 250-250-65 MG Tab Take 2 Tabs by mouth every 6 hours as needed for Headache.       No current Epic-ordered facility-administered medications on file.        Past Medical History:   Diagnosis Date   • Anemia     in past   • Anginal syndrome (HCC)     had work up and feet r/t anxiety   • Anxiety    • Arthritis     OA knees   • Dental disorder 10    partial upper denture   • High cholesterol    • HTN (hypertension), benign 3/19/2012   • Hypothyroid 3/19/2012   • Major depression 3/19/2012   • Orbital floor (blow-out) closed fracture (HCC)     left   • Other specified symptom associated with female genital organs     post menopausal bleeding   • Pain     thoracic spine, Right knee, myofacial pain, and Right shoulder   • Pain     recently fell and has bruise left forehead   • Pain 2018    chronic back pain, right shoulder   • Psychiatric problem     depression, anxiety   • Unspecified disorder of thyroid    • Urinary incontinence     Occasional       Social History   Substance Use Topics   • Smoking status: Never Smoker   • Smokeless tobacco: Never Used   • Alcohol use 0.0 oz/week      Comment: 1 glass wine per month       Family Status   Relation Status   • Father    • Mother  at age 77 years    Multi organ failure   • Sister    •       Family History   Problem Relation Age of Onset   • Hypertension Father    • Diabetes Father    • Heart Disease Father    • Alcohol abuse Father    • Anesthesia Sister      slow to come out (as a child)   • Diabetes     • Heart Disease     • Cancer     • Hypertension     • Stroke     •  "Lung Disease         ROS:  Review of Systems   Constitutional: Negative for fever, chills, weight loss, malaise, and fatigue.   HENT: Negative for ear pain, nosebleeds, congestion, sore throat and neck pain.    Eyes: Positive for vision changes including double vision and blurriness.   Neuro: Positive for headache. Negative for sensory changes, weakness, seizure, LOC.   Cardiovascular: Negative for chest pain, palpitations, orthopnea and leg swelling.   Respiratory: Negative for cough, sputum production, shortness of breath and wheezing.   Gastrointestinal: Positive for nausea. Negative for abdominal pain, vomiting or diarrhea.   Genitourinary: Negative for dysuria, urgency and frequency.  Musculoskeletal: Positive for neck pain. Negative for falls, back pain, joint pain, myalgias.   Skin: Negative for rash, diaphoresis.     Exam:  Blood pressure 136/80, pulse 90, temperature 36.3 °C (97.4 °F), resp. rate 16, height 1.689 m (5' 6.5\"), weight 81.6 kg (180 lb), last menstrual period 02/26/2012, SpO2 94 %.  General: well-nourished, well-developed female in NAD  Head: normocephalic, atraumatic  Eyes: PERRLA, no conjunctival injection, acuity grossly intact, lids normal.  Ears: normal shape and symmetry, no tenderness, no discharge. External canals are without any significant edema or erythema. Tympanic membranes are without any inflammation, no effusion. Gross auditory acuity is intact.  Nose: symmetrical without tenderness, no discharge.  Mouth/Throat: reasonable hygiene, no erythema, exudates or tonsillar enlargement.  Neck: no masses, range of motion within normal limits, no tracheal deviation. No obvious thyroid enlargement.   Lymph: no cervical adenopathy. No supraclavicular adenopathy.   Neuro: alert and oriented. Cranial nerves 1-12 grossly intact. No sensory deficit.   Cardiovascular: regular rate and rhythm. No edema.  Pulmonary: no distress. Chest is symmetrical with respiration, no wheezes, crackles, or " rhonchi.   Musculoskeletal: no clubbing, appropriate muscle tone, gait is stable.  Skin: warm, dry, intact, no clubbing, no cyanosis, no rashes.   Psych: appropriate mood, affect, judgement.         Assessment/Plan:  1. Acute intractable headache, unspecified headache type     2. Vision changes           At this time, I feel the patient requires a higher level of care including closer monitoring, stat lab work and/or imaging for further evaluation. This has been discussed with the patient and she states agreement and understanding. The patient would like to go to [facility] ED, report has been given and the ER will be anticipating patient's arrival. I offered the patient ambulance ride, she is politely declining at this time. The patient is stable to leave [facility] at this time and will go directly to ED without delay. She is in no acute distress upon departure, no neurological changes.        Please note that this dictation was created using voice recognition software. I have made every reasonable attempt to correct obvious errors, but I expect that there are errors of grammar and possibly content that I did not discover before finalizing the note.      JOSHUA Vargas.

## 2018-04-29 NOTE — ED TRIAGE NOTES
Chief Complaint   Patient presents with   • Headache     36 hrs, OTC meds ineffective, norco ineffective   • Nausea     Hx chronic h/a but this is different

## 2018-04-29 NOTE — ED NOTES
Patient given d/c instructions, verbalized understanding. AAOx4, VSS, states headache pain decreased, steady on feet. Dc'd home.

## 2018-04-29 NOTE — ED PROVIDER NOTES
ED Provider Note    CHIEF COMPLAINT  Chief Complaint   Patient presents with   • Headache     36 hrs, OTC meds ineffective, norco ineffective   • Nausea       HPI  Debby Desai is a 58 y.o. female who presents with a headache. Patient states she's had this headache for 3 days. She states it started in the occipital region and has now migrated around to the temporal region. She describes it as sharp. She states she does have some photo and phonophobia. She states she's also had blurred vision at times. She does have associated nausea when the pain is at its maximum. She states that her pain medication has not been effective. She says she does get daily headaches after she suffered from a closed head injury. She does not have any paresthesias or functional loss of her extremities. She does not have any extension into the neck. She has not had any recent fevers.    REVIEW OF SYSTEMS  See HPI for further details. All other systems are negative.     PAST MEDICAL HISTORY  Past Medical History:   Diagnosis Date   • Anemia     in past   • Anginal syndrome (HCC)     had work up and feet r/t anxiety   • Anxiety    • Arthritis     OA knees   • Dental disorder 5/24/12    partial upper denture   • High cholesterol    • HTN (hypertension), benign 3/19/2012   • Hypothyroid 3/19/2012   • Major depression 3/19/2012   • Orbital floor (blow-out) closed fracture (HCC)     left   • Other specified symptom associated with female genital organs     post menopausal bleeding   • Pain     thoracic spine, Right knee, myofacial pain, and Right shoulder   • Pain     recently fell and has bruise left forehead   • Pain 02/2018    chronic back pain, right shoulder   • Psychiatric problem     depression, anxiety   • Unspecified disorder of thyroid    • Urinary incontinence     Occasional       SOCIAL HISTORY  Social History     Social History   • Marital status: Single     Spouse name: N/A   • Number of children: N/A   • Years of education: N/A  "    Social History Main Topics   • Smoking status: Never Smoker   • Smokeless tobacco: Never Used   • Alcohol use 0.0 oz/week      Comment: 1 glass wine per month   • Drug use: No   • Sexual activity: No     Other Topics Concern   • Not on file     Social History Narrative   • No narrative on file           PHYSICAL EXAM  VITAL SIGNS: /83   Pulse 92   Temp 36.9 °C (98.5 °F)   Resp 16   Ht 1.676 m (5' 6\")   Wt 84.1 kg (185 lb 6.5 oz)   LMP 02/26/2012   SpO2 94%   BMI 29.93 kg/m²   Constitutional: Mild acute distress, Non-toxic appearance.   HENT: Reproducible occipital and temporal tenderness, tympanic membranes are intact and nonerythematous bilaterally, Oropharynx moist without exudates or erythema, Nose normal.   Eyes: PERRLA, EOMI, Conjunctiva normal.  Neck: Supple without meningismus  Lymphatic: No lymphadenopathy noted.   Cardiovascular: Normal heart rate, Normal rhythm, No murmurs, No rubs, No gallops.   Thorax & Lungs: Normal breath sounds, No respiratory distress, No wheezing, No chest tenderness.   Abdomen: Bowel sounds normal, Soft, No tenderness, no rebound, no guarding, no distention, No masses, No pulsatile masses.   Skin: Warm, Dry, No erythema, No rash.   Back: No tenderness, No CVA tenderness.   Extremities: Atraumatic with symmetric distal pulses, No edema, No tenderness, No cyanosis, No clubbing.   Neurologic: Alert & oriented x 3, cranial nerves II through XII are intact, Normal motor function, Normal sensory function, No focal deficits noted.   Psychiatric: Affect normal, Judgment normal, Mood normal.     COURSE & MEDICAL DECISION MAKING  Pertinent Labs & Imaging studies reviewed. (See chart for details)  This a 58-year-old female who presents to the emergency department with a headache. She does not have any meningeal findings and she is a history of recurrent headaches. I suspect this is more of a tension headache as I can reproduce the pain. The patient will receive an " intramuscular injection of Haldol, Toradol, and Benadryl. She'll be discharged with clear instructions to continue her current medication and return if she has continued headaches tomorrow. Otherwise she'll follow-up with her primary care doctor in 48-72 hours.    FINAL IMPRESSION  1. Headache      Electronically signed by: Johan Owens, 4/29/2018 2:52 PM

## 2018-05-01 ENCOUNTER — PHYSICAL THERAPY (OUTPATIENT)
Dept: PHYSICAL THERAPY | Facility: MEDICAL CENTER | Age: 58
End: 2018-05-01
Attending: ORTHOPAEDIC SURGERY
Payer: COMMERCIAL

## 2018-05-01 DIAGNOSIS — S83.241A OTHER TEAR OF MEDIAL MENISCUS, CURRENT INJURY, RIGHT KNEE, INITIAL ENCOUNTER: ICD-10-CM

## 2018-05-01 DIAGNOSIS — M25.561 RIGHT KNEE PAIN, UNSPECIFIED CHRONICITY: ICD-10-CM

## 2018-05-01 PROCEDURE — 97014 ELECTRIC STIMULATION THERAPY: CPT

## 2018-05-01 PROCEDURE — 97110 THERAPEUTIC EXERCISES: CPT

## 2018-05-01 NOTE — OP THERAPY DAILY TREATMENT
Outpatient Physical Therapy  DAILY TREATMENT     [facility] Outpatient Physical Therapy  [address]  [place] [address]  Phone:  [phone]  Fax:  [phone]    Date: 05/01/2018    Patient: Debby Desai  YOB: 1960  MRN: [id number]     Time Calculation  Start time: 1117  Stop time: 1203 Time Calculation (min): 46 minutes     Chief Complaint: Knee Problem and Knee Injury    Visit #: 4    SUBJECTIVE:  I had a rough weekend, I had a terrible headache for 37 hours and went to ER.  ER thought tension headache and gave benedril. No imaging.  BP was high, but not cause of headache.  I can tell my knee is better.  More time at 5/10, less than previous.  I really had a hard time with stepping off the [employer] curb.    OBJECTIVE:  Current objective measures: 0-136  Ext lag 4 degrees.          Therapeutic Exercises (CPT 02185):     1. Nu step , 6 min, S10 A10 R3    2. TG leg press L8, 10/2    3. TG heel raises L8, 10/2    4. Calf stretch    5. Side step over step, 10, 2 inch step    6. Eccentric lowering , 10, 2 inch step    7. Bridge on ball, 10/2    8. Stool scooting , 30 ft    9. Step ups , 10, 2 inch step    10. Knee flex on ball, 10/2    Therapeutic Treatments and Modalities:     1. E Stim Unattended (CPT 06897), 15 min, russian stim 10:10 with pre-mod right knee    Time-based treatments/modalities:  Therapeutic exercise minutes (CPT 85808): 28 minutes       Pain rating before treatment: 5  Pain rating after treatment: 5    ASSESSMENT:   Response to treatment: better gait symmetry with heel toe progression.    PLAN/RECOMMENDATIONS:   Plan for treatment: therapy treatment to continue next visit.  Planned interventions for next visit: E-stim unattended (CPT 17423), gait training (CPT 04518), hot/cold pack therapy (CPT 75325), manual therapy (CPT 93448), neuromuscular re-education (CPT 21655) and therapeutic exercise (CPT 86883).

## 2018-05-03 ENCOUNTER — PHYSICAL THERAPY (OUTPATIENT)
Dept: PHYSICAL THERAPY | Facility: MEDICAL CENTER | Age: 58
End: 2018-05-03
Attending: ORTHOPAEDIC SURGERY
Payer: COMMERCIAL

## 2018-05-03 DIAGNOSIS — S83.241A OTHER TEAR OF MEDIAL MENISCUS, CURRENT INJURY, RIGHT KNEE, INITIAL ENCOUNTER: ICD-10-CM

## 2018-05-03 DIAGNOSIS — M25.561 RIGHT KNEE PAIN, UNSPECIFIED CHRONICITY: ICD-10-CM

## 2018-05-03 PROCEDURE — 97110 THERAPEUTIC EXERCISES: CPT

## 2018-05-03 PROCEDURE — 97014 ELECTRIC STIMULATION THERAPY: CPT

## 2018-05-03 NOTE — OP THERAPY DAILY TREATMENT
Outpatient Physical Therapy  DAILY TREATMENT     [facility] Outpatient Physical Therapy  [address]  [place] [address]  Phone:  [phone]  Fax:  [phone]    Date: 05/03/2018    Patient: Debby Desai  YOB: 1960  MRN: [id number]     Time Calculation  Start time: 1320  Stop time: 1404 Time Calculation (min): 44 minutes     Chief Complaint: Knee Problem and Knee Injury    Visit #: 5    SUBJECTIVE:  I was sore after walking quite a distance in [facility].      OBJECTIVE:  Current objective measures: able to heel toe walk   With stool scooting, knee flex >120 without pain          Therapeutic Exercises (CPT 35420):     1. Nu step , 7 min, S10 A10 R3    2. TG leg press L9, 10/2    3. TG heel raises L9, 10/2    4. Calf stretch    5. Side step over step, 10, 2 inch step    6. Eccentric lowering , 10, 2 inch step    7. Step ups, 10, 2 inch step, 4 inch step    8. Stool scooting , 60 ft    Therapeutic Treatments and Modalities:     1. E Stim Unattended (CPT 30725), 15 min, russian stim 10:10 with pre-mod right knee    Time-based treatments/modalities:  Therapeutic exercise minutes (CPT 02171): 29 minutes       Pain rating before treatment: 5  Pain rating after treatment: 5    ASSESSMENT:   Response to treatment: improving control with step downs and step ups.    PLAN/RECOMMENDATIONS:   Plan for treatment: therapy treatment to continue next visit.  Planned interventions for next visit: E-stim unattended (CPT 47225), hot/cold pack therapy (CPT 41356), manual therapy (CPT 10990), neuromuscular re-education (CPT 84989) and therapeutic exercise (CPT 33817).

## 2018-05-08 ENCOUNTER — PHYSICAL THERAPY (OUTPATIENT)
Dept: PHYSICAL THERAPY | Facility: MEDICAL CENTER | Age: 58
End: 2018-05-08
Attending: ORTHOPAEDIC SURGERY
Payer: COMMERCIAL

## 2018-05-08 DIAGNOSIS — S83.241A OTHER TEAR OF MEDIAL MENISCUS, CURRENT INJURY, RIGHT KNEE, INITIAL ENCOUNTER: ICD-10-CM

## 2018-05-08 DIAGNOSIS — M25.561 RIGHT KNEE PAIN, UNSPECIFIED CHRONICITY: ICD-10-CM

## 2018-05-08 PROCEDURE — 97014 ELECTRIC STIMULATION THERAPY: CPT

## 2018-05-08 PROCEDURE — 97110 THERAPEUTIC EXERCISES: CPT

## 2018-05-08 NOTE — OP THERAPY DAILY TREATMENT
Outpatient Physical Therapy  DAILY TREATMENT     [facility] Outpatient Physical Therapy  [address]  [place] [address]  Phone:  [phone]  Fax:  [phone]    Date: 05/08/2018    Patient: Debby Desai  YOB: 1960  MRN: [id number]     Time Calculation  Start time: 1143  Stop time: 1234 Time Calculation (min): 51 minutes     Chief Complaint: Knee Problem and Knee Injury    Visit #: 6    SUBJECTIVE:  Only had to take 1 pain pill in last 2 days.  Pain 4/10.  Able to walk from parking garage at [facility] to ER and back without pain interfering.    OBJECTIVE:  Current objective measures: able to sit to stand from mat (height of bed) without UE assist x5  No pain          Therapeutic Exercises (CPT 63423):     1. Nu step , 5 min, S10 A10 R4    2. TG leg press L9, 10/2    3. TG heel raises L9, 10/2    4. Calf stretch    5. Side step over step, 10, 4 inch step    6. Eccentric lowering , 10, 4 inch step    7. Step ups, 10, 4 inch step, 6  inch step    8. Stool scooting , 90 ft    9. Sit to stand from mat (height of bed), 5, without UE    10. Clam, 5 reps marcus    11. Sidestep with resistance L and R, 7 ft x3    12. Hip ext, 10 marcus    13. Hip abd, 10 marcus    Therapeutic Treatments and Modalities:     1. E Stim Unattended (CPT 72159), 15 min, russian stim 10:10 with pre-mod right knee    Time-based treatments/modalities:  Therapeutic exercise minutes (CPT 27353): 33 minutes       Pain rating before treatment: 4  Pain rating after treatment: 4.5    ASSESSMENT:   Response to treatment: knee flexion 135    PLAN/RECOMMENDATIONS:   Plan for treatment: therapy treatment to continue next visit.  Planned interventions for next visit: E-stim unattended (CPT 51004), hot/cold pack therapy (CPT 75332), manual therapy (CPT 12509), neuromuscular re-education (CPT 78538) and therapeutic exercise (CPT 80910).

## 2018-05-10 ENCOUNTER — APPOINTMENT (OUTPATIENT)
Dept: PHYSICAL THERAPY | Facility: MEDICAL CENTER | Age: 58
End: 2018-05-10
Attending: ORTHOPAEDIC SURGERY
Payer: COMMERCIAL

## 2018-05-15 ENCOUNTER — PHYSICAL THERAPY (OUTPATIENT)
Dept: PHYSICAL THERAPY | Facility: MEDICAL CENTER | Age: 58
End: 2018-05-15
Attending: ORTHOPAEDIC SURGERY
Payer: COMMERCIAL

## 2018-05-15 DIAGNOSIS — M25.561 RIGHT KNEE PAIN, UNSPECIFIED CHRONICITY: ICD-10-CM

## 2018-05-15 DIAGNOSIS — S83.241A OTHER TEAR OF MEDIAL MENISCUS, CURRENT INJURY, RIGHT KNEE, INITIAL ENCOUNTER: ICD-10-CM

## 2018-05-15 PROCEDURE — 97110 THERAPEUTIC EXERCISES: CPT

## 2018-05-15 PROCEDURE — 97014 ELECTRIC STIMULATION THERAPY: CPT

## 2018-05-15 NOTE — OP THERAPY DAILY TREATMENT
Outpatient Physical Therapy  DAILY TREATMENT     [facility] Outpatient Physical Therapy  [address]  [place] [address]  Phone:  [phone]  Fax:  [phone]    Date: 05/15/2018    Patient: Debby Desai  YOB: 1960  MRN: [id number]     Time Calculation  Start time: 1152  Stop time: 1241 Time Calculation (min): 49 minutes     Chief Complaint: Knee Problem and Knee Injury    Visit #: 7    SUBJECTIVE:  By the end of my work week, my knee does hurt.  I do rest my knee more over the weekend, by monday I can do more again.    OBJECTIVE:  Current objective measures:  Knee flex 135  Ext 0  Ext lag 5          Therapeutic Exercises (CPT 56534):     1. Nu step , 7 min, S10 A10 R4    2. TG leg press L10, 5/3    3. TG heel raises L10, 5/3    4. Calf stretch    5. Side step over step, 10/2, 6 inch step    6. Eccentric lowering , 10/2, 4 inch step    7. Step ups, 10, 4 inch step, 6  inch step    8. Stool scooting , 90 ft x2    9. Bridges on ball, 10/2    10. Retro step up, 10, 2 in step    11. Star matrix stepping, 10    Therapeutic Treatments and Modalities:     1. E Stim Unattended (CPT 20861), 15 min, russian stim 10:10 with pre-mod right knee    Time-based treatments/modalities:  Therapeutic exercise minutes (CPT 28807): 33 minutes       Pain rating before treatment: 4  Pain rating after treatment: 5    ASSESSMENT:   Response to treatment: some increase in pain.  ROM increasing. Strength and control better.    PLAN/RECOMMENDATIONS:   Plan for treatment: therapy treatment to continue next visit.  Planned interventions for next visit: E-stim unattended (CPT 68885), hot/cold pack therapy (CPT 80722), neuromuscular re-education (CPT 12577) and therapeutic exercise (CPT 62190).

## 2018-05-17 ENCOUNTER — PHYSICAL THERAPY (OUTPATIENT)
Dept: PHYSICAL THERAPY | Facility: MEDICAL CENTER | Age: 58
End: 2018-05-17
Attending: ORTHOPAEDIC SURGERY
Payer: COMMERCIAL

## 2018-05-17 DIAGNOSIS — M25.561 RIGHT KNEE PAIN, UNSPECIFIED CHRONICITY: ICD-10-CM

## 2018-05-17 DIAGNOSIS — S83.241A OTHER TEAR OF MEDIAL MENISCUS, CURRENT INJURY, RIGHT KNEE, INITIAL ENCOUNTER: ICD-10-CM

## 2018-05-17 PROCEDURE — 97110 THERAPEUTIC EXERCISES: CPT

## 2018-05-17 PROCEDURE — 97140 MANUAL THERAPY 1/> REGIONS: CPT

## 2018-05-17 PROCEDURE — 97014 ELECTRIC STIMULATION THERAPY: CPT

## 2018-05-17 NOTE — OP THERAPY DAILY TREATMENT
Outpatient Physical Therapy  DAILY TREATMENT     [facility] Outpatient Physical Therapy  [address]  [place] [address]  Phone:  [phone]  Fax:  [phone]    Date: 05/17/2018    Patient: Debby Desai  YOB: 1960  MRN: [id number]     Time Calculation  Start time: 1143  Stop time: 1233 Time Calculation (min): 50 minutes     Chief Complaint: Knee Problem and Knee Injury    Visit #: 8    SUBJECTIVE:  Frustrated with my knee, its been sore since last Tuesday night.  I have pain 6/10, the night pain gets to me.      OBJECTIVE:  Current objective measures: 1-117          Therapeutic Exercises (CPT 33336):     1. Nu step , 5 min, S10 A10 R2    2. Rocker board, in II    3. Balance ex in II bars    4. Calf stretch    5. Side step over step, 10, 2 inch step    6. Eccentric lowering , 10, 2 inch step    7. Step ups, 10, 2 inch step    Therapeutic Treatments and Modalities:     1. E Stim Unattended (CPT 38085), 15 min, IFC and ice right knee    2. Manual Therapy (CPT 98645), 10 min, patella mobs, gr II in knee AP and PA    Time-based treatments/modalities:  Manual therapy minutes (CPT 69747): 10 minutes  Therapeutic exercise minutes (CPT 57831): 20 minutes       Pain rating before treatment: 6  Pain rating after treatment: 6    ASSESSMENT:   Response to treatment: some     PLAN/RECOMMENDATIONS:   Plan for treatment: therapy treatment to continue next visit.  Planned interventions for next visit: E-stim unattended (CPT 80856), hot/cold pack therapy (CPT 00723), manual therapy (CPT 55722), neuromuscular re-education (CPT 15830) and therapeutic exercise (CPT 07285).

## 2018-05-22 ENCOUNTER — PHYSICAL THERAPY (OUTPATIENT)
Dept: PHYSICAL THERAPY | Facility: MEDICAL CENTER | Age: 58
End: 2018-05-22
Attending: ORTHOPAEDIC SURGERY
Payer: COMMERCIAL

## 2018-05-22 DIAGNOSIS — M25.561 RIGHT KNEE PAIN, UNSPECIFIED CHRONICITY: ICD-10-CM

## 2018-05-22 DIAGNOSIS — S83.241A OTHER TEAR OF MEDIAL MENISCUS, CURRENT INJURY, RIGHT KNEE, INITIAL ENCOUNTER: ICD-10-CM

## 2018-05-22 PROCEDURE — 97014 ELECTRIC STIMULATION THERAPY: CPT

## 2018-05-22 PROCEDURE — 97110 THERAPEUTIC EXERCISES: CPT

## 2018-05-22 NOTE — OP THERAPY DAILY TREATMENT
Outpatient Physical Therapy  DAILY TREATMENT     [facility] Outpatient Physical Therapy  [address]  [place] [address]  Phone:  [phone]  Fax:  [phone]    Date: 05/22/2018    Patient: Debby Desai  YOB: 1960  MRN: [id number]     Time Calculation  Start time: 1149  Stop time: 1238 Time Calculation (min): 49 minutes     Chief Complaint: Knee Problem and Knee Injury    Visit #: 9    SUBJECTIVE:  My knee does feel a little better than last time.  I try to rest my knee over the weekend and then I can make it through the work week.    OBJECTIVE:  Current objective measures: ROM 0-130          Therapeutic Exercises (CPT 07979):     1. Nu step , 5 min, S10 A10 R4    2. TG L8 leg press, 10/3    3. TG L8 heel raises, 10/3    4. Calf stretch, 30 sec/2    5. Side step over step, 10, 4 inch step    6. Eccentric lowering , 10, 2 inch step    7. Step ups, 10, 2 inch step    8. Bridge on ball, 10/3    9. On ball with heel slides, 10/3    10. Star stepping, 10 each, front, side, back    11. Stool scooting, 80 ft    Therapeutic Treatments and Modalities:     1. E Stim Unattended (CPT 35781), 15 min, IFC and ice right knee    Time-based treatments/modalities:  Therapeutic exercise minutes (CPT 03681): 29 minutes       Pain rating before treatment: 4-5  Pain rating after treatment: 4-5    ASSESSMENT:   Response to treatment: better control with eccentric lowering off 4 inch step    PLAN/RECOMMENDATIONS:   Plan for treatment: therapy treatment to continue next visit.  Planned interventions for next visit: E-stim unattended (CPT 92884), hot/cold pack therapy (CPT 80846), manual therapy (CPT 51923), neuromuscular re-education (CPT 30906) and therapeutic exercise (CPT 15040).

## 2018-05-24 ENCOUNTER — PHYSICAL THERAPY (OUTPATIENT)
Dept: PHYSICAL THERAPY | Facility: MEDICAL CENTER | Age: 58
End: 2018-05-24
Attending: ORTHOPAEDIC SURGERY
Payer: COMMERCIAL

## 2018-05-24 DIAGNOSIS — S83.241A OTHER TEAR OF MEDIAL MENISCUS, CURRENT INJURY, RIGHT KNEE, INITIAL ENCOUNTER: ICD-10-CM

## 2018-05-24 DIAGNOSIS — M25.561 RIGHT KNEE PAIN, UNSPECIFIED CHRONICITY: ICD-10-CM

## 2018-05-24 PROCEDURE — 97014 ELECTRIC STIMULATION THERAPY: CPT

## 2018-05-24 PROCEDURE — 97110 THERAPEUTIC EXERCISES: CPT

## 2018-05-24 NOTE — OP THERAPY DAILY TREATMENT
Outpatient Physical Therapy  DAILY TREATMENT     [facility] Outpatient Physical Therapy  [address]  [place] [address]  Phone:  [phone]  Fax:  [phone]    Date: 05/24/2018    Patient: Debby Desai  YOB: 1960  MRN: [id number]     Time Calculation  Start time: 1147  Stop time: 1233 Time Calculation (min): 46 minutes     Chief Complaint: Knee Problem and Knee Injury    Visit #: 10    SUBJECTIVE:  Pain is about 3.5/10.  I am walking better and more quickly.  I am doing the stairs into my work, I still don't go down stairs as easily.  I try to lead with my left.  I am unable to consecutive step yet down.  Sleep is still difficult, but able to stretch out pain pill time frame where I don't take medication til midnight on some nights.  I have not had to prop knee up or ice regularly at work, just 1 episode of ice today.  I also have not had to call into work or leave early from work for my knee in the last 6 weeks.    OBJECTIVE:  Current objective measures: 0-128  Girth at patella 45 cm  Below 41.5cm  Above 47 cm  Ext lag 5 degrees  Strength 4- to 4/5  WOMAC Grand Total: 46.88       Therapeutic Exercises (CPT 30410):     1. Nu step , 5 min, S10 A10 R4    2. TG L8 leg press, 10/3    3. Stool scooting, 80 ft    4. Calf stretch, 30 sec/2    5. Side step over step, 10, 6 inch step    6. Eccentric lowering , 10, 8, 6 inch step    7. Step ups, 10, 8,6 inch step    8. Bridge on ball, 10/3    9. On ball with heel slides, 10/3    Therapeutic Treatments and Modalities:     1. E Stim Unattended (CPT 70489), 15 min, IFC and ice right knee    Time-based treatments/modalities:  Therapeutic exercise minutes (CPT 15558): 30 minutes       Pain rating before treatment: 3.5  Pain rating after treatment: 3.5    ASSESSMENT:   Response to treatment: improving with ROM and strength.  Better able to control eccentric lowering     PLAN/RECOMMENDATIONS:   Plan for treatment: therapy  treatment to continue next visit.  Planned interventions for next visit: continue with current treatment.

## 2018-05-24 NOTE — OP THERAPY PROGRESS SUMMARY
Outpatient Physical Therapy  PROGRESS SUMMARY NOTE      [facility]  [address]  [place] [address]  Phone:  [phone]  Fax:  [phone]    Date of Visit: 05/24/2018    Patient: Debby Desai  YOB: 1960  MRN: [id number]     Referring Provider: Harsh Baltazar M.D.  [address] [place] [address]   Referring Diagnosis Other tear of medial meniscus, current injury, right knee, initial encounter [S83.241A];Pain in right knee [M25.561]     Visit Diagnoses     ICD-10-CM   1. Other tear of medial meniscus, current injury, right knee, initial encounter S83.241A   2. Right knee pain, unspecified chronicity M25.561       Rehab Potential: good      Cert Period: 5/24/18 to 6/24/18  Progress Report Period: 4/18- 5/24/18    Functional Limitation G-Codes and Severity Modifiers  WOMAC Grand Total: 46.88   Current status:     Goal status:       WOMAC score at initial evaluation 4/18/19--56.25  Progress Summary Details     Debby reports her pain continues to improve, today it is 3.5/10.  She reports she is walking better and able to more quickly.  She is pleased as her coworkers have noticed her ability to keep up with them and not limping as much.  She is still having difficulty with stairs at her work, she will do them daily, but she reports they are not easy and she is unable to consecutive step down.   reports she is still having trouble with sleep, but she is able to stretch out pain pill time frame where she does not take medication til midnight on some nights.  She reports she has not had to prop knee up or ice regularly at work, just 1 episode of ice this week.  Also she has not had to call into work or leave early from work for her knee in the last 6 weeks.    OBJECTIVE:  Current objective measures: 0-128  Girth at patella 45 cm  Below 41.5cm  Above 47 cm  Ext lag 5 degrees  Strength 4- to 4/5  Gait with near symmetrical stride on  level ground      Goals:   Short Term Goals:   1.  Pt to perform HEP 5-6 days per week without increased pain.(goal met)  2.  Pt able to have 0 degrees extension and 120 flex.(goal met)  3.  Strength 4-/5 right leg.(goal met)  4.  Pt able to walk on level surfaces with good control and symmetry.(goal met)  Short term goal time span:  2-4 weeks      Long Term Goals:    1.  Pt with 0-130 ROM (goal in progress)  2.  Strength 4/5 in right leg. (goal in progress)  3.  Pt to be able to walk on all surfaces with good control and symmetry. (goal in progress)  4.  Pt able to climb and descend stairs with consecutive stepping and rail if needed for balance.(goal in progress)  5.  Womac score 35 or less. (goal in progress, score 56.25 at evaluation and now 46.88)  6.  Pt able to sleep without waking to pain in her right knee at night. (goal in progress)  7.  Pt able to perform job duties without right knee interfering.(goal in progress)  8.  Pain rating of 3/10 or less.(goal in progress)  Long term goal time span:  6-8 weeks    Recommend pt to continue with PT 2 x per week for 3 weeks to continue to address strength, ROM and gait and overall function.    Referring provider co-signature:  I have reviewed this plan of care and my co-signature certifies the need for services.    Physician Signature: ________________________________ Date: ______________

## 2018-05-29 ENCOUNTER — PHYSICAL THERAPY (OUTPATIENT)
Dept: PHYSICAL THERAPY | Facility: MEDICAL CENTER | Age: 58
End: 2018-05-29
Attending: ORTHOPAEDIC SURGERY
Payer: COMMERCIAL

## 2018-05-29 DIAGNOSIS — S83.241A OTHER TEAR OF MEDIAL MENISCUS, CURRENT INJURY, RIGHT KNEE, INITIAL ENCOUNTER: ICD-10-CM

## 2018-05-29 DIAGNOSIS — M25.561 RIGHT KNEE PAIN, UNSPECIFIED CHRONICITY: ICD-10-CM

## 2018-05-29 PROCEDURE — 97014 ELECTRIC STIMULATION THERAPY: CPT

## 2018-05-29 PROCEDURE — 97110 THERAPEUTIC EXERCISES: CPT

## 2018-05-29 NOTE — OP THERAPY DAILY TREATMENT
Outpatient Physical Therapy  DAILY TREATMENT     [facility] Outpatient Physical Therapy  [address]  [place] [address]  Phone:  [phone]  Fax:  [phone]    Date: 05/29/2018    Patient: Debby Desai  YOB: 1960  MRN: [id number]     Time Calculation  Start time: 1145  Stop time: 1232 Time Calculation (min): 47 minutes     Chief Complaint: Knee Injury and Knee Problem    Visit #: 11    SUBJECTIVE:  I am ok.  Pain at 5/10.  Stairs into work getting better.    OBJECTIVE:  Current objective measures:  Knee flex 142  Ext lag 5  Ext 0 to -1          Therapeutic Exercises (CPT 57332):     1. Upright bike, 4 min    2. TG L8 leg press, 10/4    3. Stool scooting, 80 ft    4. Calf stretch, 30 sec/2    5. Side step over step, 10, 6 inch step    6. Eccentric lowering , 10, 8, 6 inch step    7. Step ups, 10, 8,6 inch step    8. Bridge on ball, 10/3    9. On ball with heel slides, 10/3    10. Mini lunges, 10, II bars    11. Star tapping, 10    12. Rocker board AP and lat, 10    Therapeutic Treatments and Modalities:     1. E Stim Unattended (CPT 32185), 15 min, IFC and ice right knee    Time-based treatments/modalities:  Therapeutic exercise minutes (CPT 03042): 30 minutes       Pain rating before treatment: 5  Pain rating after treatment: 5    ASSESSMENT:   Response to treatment: improving ROM and quality of gait/ movement.    PLAN/RECOMMENDATIONS:   Plan for treatment: therapy treatment to continue next visit.  Planned interventions for next visit: continue with current treatment.

## 2018-05-31 ENCOUNTER — PHYSICAL THERAPY (OUTPATIENT)
Dept: PHYSICAL THERAPY | Facility: MEDICAL CENTER | Age: 58
End: 2018-05-31
Attending: ORTHOPAEDIC SURGERY
Payer: COMMERCIAL

## 2018-05-31 DIAGNOSIS — M25.561 RIGHT KNEE PAIN, UNSPECIFIED CHRONICITY: ICD-10-CM

## 2018-05-31 DIAGNOSIS — S83.241A OTHER TEAR OF MEDIAL MENISCUS, CURRENT INJURY, RIGHT KNEE, INITIAL ENCOUNTER: ICD-10-CM

## 2018-05-31 PROCEDURE — 97110 THERAPEUTIC EXERCISES: CPT

## 2018-05-31 PROCEDURE — 97014 ELECTRIC STIMULATION THERAPY: CPT

## 2018-05-31 NOTE — OP THERAPY DAILY TREATMENT
Outpatient Physical Therapy  DAILY TREATMENT     [facility] Outpatient Physical Therapy  [address]  [place] [address]  Phone:  [phone]  Fax:  [phone]    Date: 05/31/2018    Patient: Debby Desai  YOB: 1960  MRN: [id number]     Time Calculation  Start time: 1145  Stop time: 1232 Time Calculation (min): 47 minutes     Chief Complaint: Knee Problem and Knee Injury    Visit #: 12    SUBJECTIVE:  I am pretty good.  My knee is sore, but I can tell its better.  I did a flight of stairs at my work with some pain with some consecutive stepping (7 steps).    OBJECTIVE:  Current objective measures: -1/0- 144          Therapeutic Exercises (CPT 05495):     1. Nu step, 5 min, S10 A12 R5    2. TG L8 leg press, 15/3    3. Stool scooting, 50 ft x2    4. Calf stretch, 5 reps, 30 sec stretch    5. Side step over step, 10, 6 inch step    6. Eccentric lowering , 10, 8, 6 inch step    7. Step ups, 10, 8,6 inch step    8. Bridge on ball, 10/3    9. On ball with heel slides, 10/3    10. Mini lunges, 10, II bars    11. Star tapping, 10, all sites marcus    12. Rocker board AP and lat, 10    Therapeutic Treatments and Modalities:     1. E Stim Unattended (CPT 85026), 15 min, IFC and ice right knee    Time-based treatments/modalities:  Therapeutic exercise minutes (CPT 68538): 30 minutes       Pain rating before treatment: 4  Pain rating after treatment: 4    ASSESSMENT:   Response to treatment: improving in strength and endurance.    PLAN/RECOMMENDATIONS:   Plan for treatment: therapy treatment to continue next visit.  Planned interventions for next visit: continue with current treatment.

## 2018-06-05 ENCOUNTER — APPOINTMENT (OUTPATIENT)
Dept: PHYSICAL THERAPY | Facility: MEDICAL CENTER | Age: 58
End: 2018-06-05
Payer: COMMERCIAL

## 2018-06-07 ENCOUNTER — PHYSICAL THERAPY (OUTPATIENT)
Dept: PHYSICAL THERAPY | Facility: MEDICAL CENTER | Age: 58
End: 2018-06-07
Attending: ORTHOPAEDIC SURGERY
Payer: COMMERCIAL

## 2018-06-07 DIAGNOSIS — S83.241A OTHER TEAR OF MEDIAL MENISCUS, CURRENT INJURY, RIGHT KNEE, INITIAL ENCOUNTER: ICD-10-CM

## 2018-06-07 DIAGNOSIS — M25.561 RIGHT KNEE PAIN, UNSPECIFIED CHRONICITY: ICD-10-CM

## 2018-06-07 PROCEDURE — 97140 MANUAL THERAPY 1/> REGIONS: CPT

## 2018-06-07 PROCEDURE — 97110 THERAPEUTIC EXERCISES: CPT

## 2018-06-07 PROCEDURE — 97014 ELECTRIC STIMULATION THERAPY: CPT

## 2018-06-07 NOTE — OP THERAPY DAILY TREATMENT
Outpatient Physical Therapy  DAILY TREATMENT     [facility] Outpatient Physical Therapy  [address]  [place] [address]  Phone:  [phone]  Fax:  [phone]    Date: 06/07/2018    Patient: Debby Desai  YOB: 1960  MRN: [id number]     Time Calculation  Start time: 1244  Stop time: 1335 Time Calculation (min): 51 minutes     Chief Complaint: Knee Problem and Knee Injury    Visit #: 13    SUBJECTIVE:  I stepped funny out of my house last saturday and my knee hurts on the inside portion.  I did increase the jolanta pentin with my doctors permission.  I think I short stepped a stair.  Pain is now a 6/10 and I have been icing it and taking tylenol and motrin.    OBJECTIVE:  Current objective measures: 0-136 some swelling at knee jt region, no increased heat.          Therapeutic Exercises (CPT 33840):     1. Nu step, 5 min, S10 A12 R4    2. TG L7 leg press, 10/3, small ROM    3. Stool scooting, 50 ft     4. Calf stretch, 5 reps, 30 sec stretch    5. Side step over step, 10, 2, 4 inch step    6. Eccentric lowering , held today due to increased pain    7. Step ups, 10, 2, 4  inch step    Therapeutic Treatments and Modalities:     1. E Stim Unattended (CPT 80349), 15 min, IFC and ice right knee    2. Manual Therapy (CPT 43352), 10 min, patella mobs, gr I and II AP mobs at right knee    Time-based treatments/modalities:  Manual therapy minutes (CPT 93884): 10 minutes  Therapeutic exercise minutes (CPT 21842): 20 minutes       Pain rating before treatment: 6  Pain rating after treatment: 6.5    ASSESSMENT:   Response to treatment: slight increase in pain, pt able to walk with near symmetrical stride, some shift toward left side to reduce stress to right knee.  Pt to continue to ice and perform light exercise and ROM with HEP.    PLAN/RECOMMENDATIONS:   Plan for treatment: therapy treatment to continue next visit.  Planned interventions for next visit: continue with current  treatment.

## 2018-06-12 ENCOUNTER — PHYSICAL THERAPY (OUTPATIENT)
Dept: PHYSICAL THERAPY | Facility: MEDICAL CENTER | Age: 58
End: 2018-06-12
Attending: ORTHOPAEDIC SURGERY
Payer: COMMERCIAL

## 2018-06-12 DIAGNOSIS — M25.561 RIGHT KNEE PAIN, UNSPECIFIED CHRONICITY: ICD-10-CM

## 2018-06-12 DIAGNOSIS — S83.241A OTHER TEAR OF MEDIAL MENISCUS, CURRENT INJURY, RIGHT KNEE, INITIAL ENCOUNTER: ICD-10-CM

## 2018-06-12 PROCEDURE — 97014 ELECTRIC STIMULATION THERAPY: CPT

## 2018-06-12 PROCEDURE — 97140 MANUAL THERAPY 1/> REGIONS: CPT

## 2018-06-12 PROCEDURE — 97110 THERAPEUTIC EXERCISES: CPT

## 2018-06-12 NOTE — OP THERAPY DAILY TREATMENT
Outpatient Physical Therapy  DAILY TREATMENT     [facility] Outpatient Physical Therapy  [address]  [place] [address]  Phone:  [phone]  Fax:  [phone]    Date: 06/12/2018    Patient: Debby Desai  YOB: 1960  MRN: [id number]     Time Calculation  Start time: 1243  Stop time: 1329 Time Calculation (min): 46 minutes     Chief Complaint: Knee Injury and Knee Problem    Visit #: 14    SUBJECTIVE:  My pain is 5.5, it is about the same.  I am planning to order a TENS unit to help with pain.      OBJECTIVE:  Current objective measures: able to bend and straighten knee, but pain medial knee throughout the ROM          Therapeutic Exercises (CPT 11543):     1. Nu step, 5 min, S10 A12 R4    2. TG L7 leg press, 12/2, small ROM    3. Calf stretch on slant board, 5 reps, 30 sec stretch    4. Hamstring stretching supine and sitting    Therapeutic Treatments and Modalities:     1. E Stim Unattended (CPT 72173), 15 min, IFC and ice right knee    2. Manual Therapy (CPT 70535), 20 min, patella mobs, gr I and II AP mobs at right knee, soft tissue work on hamstring, with each bout of STM at hamstring work, gait and pain both decreased.    Time-based treatments/modalities:  Manual therapy minutes (CPT 82926): 20 minutes  Therapeutic exercise minutes (CPT 42510): 10 minutes       Pain rating before treatment: 5.5  Pain rating after treatment: 4    ASSESSMENT:   Response to treatment: some relief of pain in right knee after STM at hamstring, likely some pulling on meniscus causing pain/pinching medial knee    PLAN/RECOMMENDATIONS:   Plan for treatment: therapy treatment to continue next visit.  Planned interventions for next visit: continue with current treatment.

## 2018-06-14 ENCOUNTER — PHYSICAL THERAPY (OUTPATIENT)
Dept: PHYSICAL THERAPY | Facility: MEDICAL CENTER | Age: 58
End: 2018-06-14
Attending: ORTHOPAEDIC SURGERY
Payer: COMMERCIAL

## 2018-06-14 DIAGNOSIS — S83.241A OTHER TEAR OF MEDIAL MENISCUS, CURRENT INJURY, RIGHT KNEE, INITIAL ENCOUNTER: ICD-10-CM

## 2018-06-14 DIAGNOSIS — M25.561 RIGHT KNEE PAIN, UNSPECIFIED CHRONICITY: ICD-10-CM

## 2018-06-14 PROCEDURE — 97014 ELECTRIC STIMULATION THERAPY: CPT

## 2018-06-14 PROCEDURE — 97110 THERAPEUTIC EXERCISES: CPT

## 2018-06-14 PROCEDURE — 97140 MANUAL THERAPY 1/> REGIONS: CPT

## 2018-06-14 NOTE — OP THERAPY DAILY TREATMENT
"  Outpatient Physical Therapy  DAILY TREATMENT     [facility] Outpatient Physical Therapy  [address]  [place] [address]  Phone:  [phone]  Fax:  [phone]    Date: 06/14/2018    Patient: Debby Desai  YOB: 1960  MRN: [id number]     Time Calculation  Start time: 1245  Stop time: 1327 Time Calculation (min): 42 minutes     Chief Complaint: Knee Problem and Knee Injury    Visit #: 15    SUBJECTIVE:  Yesterday I was walking and my knee caught and I felt pain.  The pain was better after Tuesday PT, but then it caught and I felt the pain.  But it was definitely better than tuesday prior to PT.    OBJECTIVE:  Current objective measures: improved heel toe gait from Tuesday.          Therapeutic Exercises (CPT 48760):     1. Nu step, 5 min, S10 A12 R3    2. TG L7 leg press, 10/3, small ROM    3. Calf stretch on slant board, 5 reps, 30 sec stretch    4. Hamstring stretching supine and sitting    5. Step ups , 10 , 4 \" step    6. Side step up/down, 10, 4 \" step    Therapeutic Treatments and Modalities:     1. E Stim Unattended (CPT 71526), 15 min, IFC and ice right knee    2. Manual Therapy (CPT 68014), 10 min, patella mobs, gr I and II AP mobs at right knee, soft tissue work on hamstring, with each bout of STM at hamstring work, gait and pain both decreased.    Time-based treatments/modalities:  Manual therapy minutes (CPT 18800): 10 minutes  Therapeutic exercise minutes (CPT 03841): 16 minutes       Pain rating before treatment: 4.5  Pain rating after treatment: 4.5-5    ASSESSMENT:   Response to treatment: ROM 0-135    PLAN/RECOMMENDATIONS:   Plan for treatment: therapy treatment to continue next visit.  Planned interventions for next visit: continue with current treatment.      "

## 2018-06-18 ENCOUNTER — PHYSICAL THERAPY (OUTPATIENT)
Dept: PHYSICAL THERAPY | Facility: MEDICAL CENTER | Age: 58
End: 2018-06-18
Attending: ORTHOPAEDIC SURGERY
Payer: COMMERCIAL

## 2018-06-18 DIAGNOSIS — S83.241A OTHER TEAR OF MEDIAL MENISCUS, CURRENT INJURY, RIGHT KNEE, INITIAL ENCOUNTER: ICD-10-CM

## 2018-06-18 DIAGNOSIS — M25.561 RIGHT KNEE PAIN, UNSPECIFIED CHRONICITY: ICD-10-CM

## 2018-06-18 PROCEDURE — 97014 ELECTRIC STIMULATION THERAPY: CPT

## 2018-06-18 PROCEDURE — 97110 THERAPEUTIC EXERCISES: CPT

## 2018-06-18 PROCEDURE — 97140 MANUAL THERAPY 1/> REGIONS: CPT

## 2018-06-18 NOTE — OP THERAPY DAILY TREATMENT
"  Outpatient Physical Therapy  DAILY TREATMENT     [facility] Outpatient Physical Therapy  [address]  [place] [address]  Phone:  [phone]  Fax:  [phone]    Date: 06/18/2018    Patient: Debby Desai  YOB: 1960  MRN: [id number]     Time Calculation  Start time: 1115  Stop time: 1201 Time Calculation (min): 46 minutes     Chief Complaint: Knee Problem and Knee Injury    Visit #: 16    SUBJECTIVE:  My pain is a little less.  Overall it feels better.  Pain is 4.5/10    OBJECTIVE:  Current objective measures: some difficulty with gait due to knee pain, less heel contact and toe off.          Therapeutic Exercises (CPT 03524):     1. Nu step, 5 min, S10 A12 R3    2. TG L7 leg press, 10/3, small ROM    3. Calf stretch on slant board, 5 reps, 30 sec stretch    4. Hamstring stretching supine and sitting    5. Step ups , 10 , 4 \" step    6. Side step up/down, 10, 4 \" step    7. Stool scooting, 70 ft    Therapeutic Treatments and Modalities:     1. E Stim Unattended (CPT 94745), 15 min, IFC and ice right knee    2. Manual Therapy (CPT 34546), 10 min, patella mobs, gr I and II AP mobs at right knee, soft tissue work on hamstring, quad STM    Time-based treatments/modalities:  Manual therapy minutes (CPT 65718): 10 minutes  Therapeutic exercise minutes (CPT 31810): 20 minutes       Pain rating before treatment: 4.5  Pain rating after treatment: 4.5    ASSESSMENT:   Response to treatment: knee flex sitting  130  Knee ext lag 4  Knee ext 0-1  Knee flex supine 134  Recommended pt to follow up with MD if pain persists.  Overall her gait is improving from recent step wrong injury, but she still has some gait deficits that persist.    PLAN/RECOMMENDATIONS:   Plan for treatment: therapy treatment to continue next visit.  Planned interventions for next visit: continue with current treatment.      "

## 2018-06-27 ENCOUNTER — PHYSICAL THERAPY (OUTPATIENT)
Dept: PHYSICAL THERAPY | Facility: MEDICAL CENTER | Age: 58
End: 2018-06-27
Attending: ORTHOPAEDIC SURGERY
Payer: COMMERCIAL

## 2018-06-27 DIAGNOSIS — M25.561 RIGHT KNEE PAIN, UNSPECIFIED CHRONICITY: ICD-10-CM

## 2018-06-27 DIAGNOSIS — S83.241A OTHER TEAR OF MEDIAL MENISCUS, CURRENT INJURY, RIGHT KNEE, INITIAL ENCOUNTER: ICD-10-CM

## 2018-06-27 PROCEDURE — 97014 ELECTRIC STIMULATION THERAPY: CPT

## 2018-06-27 PROCEDURE — 97110 THERAPEUTIC EXERCISES: CPT

## 2018-06-27 PROCEDURE — 97140 MANUAL THERAPY 1/> REGIONS: CPT

## 2018-06-27 NOTE — OP THERAPY DISCHARGE SUMMARY
"  Outpatient Physical Therapy  DISCHARGE SUMMARY NOTE      [facility] Outpatient Physical Therapy  [address]  [place] [address]  Phone:  [phone]  Fax:  [phone]    Date of Visit: 06/27/2018    Patient: Debby Desai  YOB: 1960  MRN: [id number]     Referring Provider: Harsh Baltazar M.D.  [address] [place] [address]   Referring Diagnosis Other tear of medial meniscus, current injury, right knee, initial encounter [S83.241A];Pain in right knee [M25.561]         Functional Limitation G-Codes and Severity Modifiers  WOMAC Grand Total: 57.29   Goal:     Discharge:         Your patient is being discharged from Physical Therapy with the following comments:   · Goals partially met    Comments:  Although Debby has more pain in her right knee than 2-3 weeks ago when she reported tweaking her knee on the stairs at work.  She did not see the MD at that time.  She reported increased pain and PT modified exercises to reduce strain on knee to see if pain would reduce.  She does have less pain than when she originally started PT, but her pain can range from 3-7/10 and it is day to day without any significant challenges she can understand.  She takes 1-2 pain pills daily.  She reports she has gone back to some stair exercise at work, but cannot consecutive step on the stairs at this time.  She reports her knee can still bother her during her work day, but that on many days it will not hurt as bad.  She reports missing on 1 1/2 days of work in the last 6 weeks due to her knee and she thinks that is better than before.        OBJECTIVE:  Current objective measures: flex 133  Ext 0-1  Girth at knee jt 44 cm  2 \" above knee 46.5  2\" below knee 41.2 cm  Knee ext lag 6 degrees  Strength is 4-/5 in right leg  WOMAC Grand Total: 57.29      Short Term Goals:   1.  Pt to perform HEP 5-6 days per week without increased pain. (goal met)  2.  Pt able to have 0 degrees " extension and 120 flex.(goal met)  3.  Strength 4-/5 right leg.(goal met)  4.  Pt able to walk on level surfaces with good control and symmetry.(goal in progress, on some days her gait symmetry is good and other days she will shorten her stride and limp some)  Short term goal time span:  2-4 weeks      Long Term Goals:    1.  Pt with 0-130 ROM (goal met)  2.  Strength 4/5 in right leg. (goal met)  3.  Pt to be able to walk on all surfaces with good control and symmetry. (goal in progress)  4.  Pt able to climb and descend stairs with consecutive stepping and rail if needed for balance.(goal not met)  5.  Womac score 35 or less. (goal not met)  6.  Pt able to sleep without waking to pain in her right knee at night.(goal not met)  7.  Pt able to perform job duties without right knee interfering.(goal in progress)  8.  Pain rating of 3/10 or less.(goal not met, pts pain will vary from 3-7/10)  Long term goal time span:  6-8 weeks    Limitations Remaining:  Pt was making great progress in PT, with pain at 3-4/10 most days.  She was able to walk the stairs at work some of the time and going on 1-2 errands after work did not flare up her pain.  She reports a few weeks ago of tweaking her knee funny and since then has had more pain.  The pain is more severe 6-7/10 much of the time, but she still has days of 3-4/10.  Pt does have swelling in her right knee at time.      Recommendations:  Pt was recommended at the time of tweaking her knee to see the MD and that continues to be the recommendation at this time.  Pt is being discharged from PT with a HEP to continue to address ROM and strength without increasing pain.      Kelli Mi, PT, MSPT    Date: 6/27/2018

## 2018-06-27 NOTE — LETTER
June 27, 2018    Harsh Baltazar M.D. at [facility]      Re:  Debby Desai          Dear Dr. Harsh Baltazar,    It was a pleasure seeing your patient, Debby Desai, on 6/27/2018 for her final therapy visit.     Please find enclosed a copy of the patient's discharge summary from outpatient physical therapy services.          On behalf of the staff at [facility], we would like to thank you for your referrals.  We appreciate your confidence in our clinic and will continue to work hard to provide the best outcomes for your patients.    We sincerely enjoy treating your patients and hope that you have been happy with their care.  Thank you again, and please call with any questions, concerns or ways that we can best meet your needs.        Sincerely,          Kelli Mi, PT, MSPT    No Recipients              Outpatient Physical Therapy  DISCHARGE SUMMARY NOTE      [facility] Outpatient Physical Therapy  [address]  [place] [address]  Phone:  [phone]  Fax:  [phone]    Date of Visit: 06/27/2018    Patient: Debby Desai  YOB: 1960  MRN: [id number]     Referring Provider: Harsh Baltazar M.D.  [address] [place] [address]   Referring Diagnosis Other tear of medial meniscus, current injury, right knee, initial encounter [S83.241A];Pain in right knee [M25.561]         Functional Limitation G-Codes and Severity Modifiers  WOMAC Grand Total: 57.29   Goal:     Discharge:         Your patient is being discharged from Physical Therapy with the following comments:   · Goals partially met    Comments:  Although Debby has more pain in her right knee than 2-3 weeks ago when she reported tweaking her knee on the stairs at work.  She did not see the MD at that time.  She reported increased pain and PT modified exercises to reduce strain on knee to see if pain would reduce.  She does have less pain than when she  "originally started PT, but her pain can range from 3-7/10 and it is day to day without any significant challenges she can understand.  She takes 1-2 pain pills daily.  She reports she has gone back to some stair exercise at work, but cannot consecutive step on the stairs at this time.  She reports her knee can still bother her during her work day, but that on many days it will not hurt as bad.  She reports missing on 1 1/2 days of work in the last 6 weeks due to her knee and she thinks that is better than before.        OBJECTIVE:  Current objective measures: flex 133  Ext 0-1  Girth at knee jt 44 cm  2 \" above knee 46.5  2\" below knee 41.2 cm  Knee ext lag 6 degrees  Strength is 4-/5 in right leg  WOMAC Grand Total: 57.29        Limitations Remaining:  Pt was making great progress in PT, with pain at 3-4/10 most days.  She was able to walk the stairs at work some of the time and going on 1-2 errands after work did not flare up her pain.  She reports a few weeks ago of tweaking her knee funny and since then has had more pain.  The pain is more severe 6-7/10 much of the time, but she still has days of 3-4/10.  Pt does have swelling in her right knee at time.      Recommendations:  Pt was recommended at the time of tweaking her knee to see the MD and that continues to be the recommendation at this time.  Pt is being discharged from PT with a HEP to continue to address ROM and strength without increasing pain.      Kelli Mi, PT, MSPT    Date: 6/27/2018  "

## 2018-06-27 NOTE — OP THERAPY DAILY TREATMENT
"  Outpatient Physical Therapy  DAILY TREATMENT     [facility] Outpatient Physical Therapy  [address]  [place] [address]  Phone:  [phone]  Fax:  [phone]    Date: 06/27/2018    Patient: Debby Desai  YOB: 1960  MRN: [id number]     Time Calculation  Start time: 1022  Stop time: 1110 Time Calculation (min): 48 minutes     Chief Complaint: Knee Injury and Knee Problem    Visit #: 17    SUBJECTIVE:  Yesterday I was really not painful, 3-4/10 and no limp.  Yesterday I only needed to take 1 pain pill when I often need 2.  Today almost a 7/10 and I am limping without a reason.  The pain wakes me at night (every night).  Have been able to go back to 10 stairs a day.  Unable to consecutive climb/descend stairs at this time, but improved from a few weeks ago.  Knee can still distract and take away from a work day at times.  Only missed 1 1/2 days of work due to knee pain in last 6 weeks.    OBJECTIVE:  Current objective measures: flex 133  Ext 0-1  Girth at knee jt 44 cm  2 \" above knee 46.5  2\" below knee 41.2 cm  Knee ext lag 6 degrees  Strength 4-/5 right leg  WOMAC Grand Total: 57.29       Therapeutic Exercises (CPT 81445):     1. Nu step, 6 min, S10 A12 R3    2. TG L7 leg press, 10/3, small ROM    3. Calf stretch on slant board, 5 reps, 30 sec stretch    4. Step ups, 10,  4 inch step    5. Side step up/down, 10 , 4 inch step    Therapeutic Treatments and Modalities:     1. E Stim Unattended (CPT 45455), 15 min, IFC and ice right knee    2. Manual Therapy (CPT 95377), 10 min, patella mobs, gr I and II AP mobs at right knee, soft tissue work on hamstring, quad STM    Time-based treatments/modalities:  Manual therapy minutes (CPT 10045): 10 minutes  Therapeutic exercise minutes (CPT 39979): 15 minutes       Pain rating before treatment: 6  Pain rating after treatment: 6    ASSESSMENT:   Response to treatment: pt continues to have intermittent moderate to severe pain " with light exercise and MT, but pain does seem to be more intermittent at this stage vs few weeks ago after she tweaked her knee on the stairs.      PLAN/RECOMMENDATIONS:   Plan for treatment: pt to be discharged from PT as pt's pain and limitations although becoming more intermittent in right knee are starting to plateau with progress.  Recommend pt to continue with HEP and icing and follow up with MD if needed.    Planned interventions for next visit: discharge pt with HEP and pt to follow up with MD if pain persists..

## 2018-07-09 ENCOUNTER — OFFICE VISIT (OUTPATIENT)
Dept: MEDICAL GROUP | Facility: MEDICAL CENTER | Age: 58
End: 2018-07-09
Payer: COMMERCIAL

## 2018-07-09 VITALS
SYSTOLIC BLOOD PRESSURE: 118 MMHG | RESPIRATION RATE: 16 BRPM | TEMPERATURE: 97.6 F | HEIGHT: 66 IN | BODY MASS INDEX: 30.05 KG/M2 | OXYGEN SATURATION: 97 % | DIASTOLIC BLOOD PRESSURE: 72 MMHG | HEART RATE: 80 BPM | WEIGHT: 187 LBS

## 2018-07-09 DIAGNOSIS — E78.5 DYSLIPIDEMIA: ICD-10-CM

## 2018-07-09 DIAGNOSIS — M25.561 CHRONIC PAIN OF RIGHT KNEE: ICD-10-CM

## 2018-07-09 DIAGNOSIS — Z12.39 SCREENING FOR BREAST CANCER: ICD-10-CM

## 2018-07-09 DIAGNOSIS — I10 HTN (HYPERTENSION), BENIGN: Chronic | ICD-10-CM

## 2018-07-09 DIAGNOSIS — E66.9 OBESITY (BMI 30-39.9): ICD-10-CM

## 2018-07-09 DIAGNOSIS — G89.29 CHRONIC PAIN OF RIGHT KNEE: ICD-10-CM

## 2018-07-09 DIAGNOSIS — E03.9 ACQUIRED HYPOTHYROIDISM: ICD-10-CM

## 2018-07-09 DIAGNOSIS — Z12.11 SCREEN FOR COLON CANCER: ICD-10-CM

## 2018-07-09 DIAGNOSIS — R73.02 IGT (IMPAIRED GLUCOSE TOLERANCE): ICD-10-CM

## 2018-07-09 PROCEDURE — 99214 OFFICE O/P EST MOD 30 MIN: CPT | Performed by: NURSE PRACTITIONER

## 2018-07-09 ASSESSMENT — PATIENT HEALTH QUESTIONNAIRE - PHQ9: CLINICAL INTERPRETATION OF PHQ2 SCORE: 0

## 2018-07-09 NOTE — PROGRESS NOTES
Subjective:      Debby Desai is a 58 y.o. female who presents with Knee Pain        CC: Patient of Dr. Miles here same day visit to discuss chronic bilateral knee pain as well as needing lab work and health maintenance.    HPI Debby Desai      1. Chronic pain of right knee  Patient has had problems with her knees for many years from review of her chart notes. Most recently she was seen in orthopedics in February and had knee surgery but she states despite this she has persistent pain in the right knee and to a lesser degree pain of the left knee. She is going to pain management for this as well as her back pain but does not feel it is adequately controlled despite twice a day Norco, gabapentin and Robaxin. She would like to get back to her orthopedic office to see if there is anything more that can be done. She states she was told at one time she might need a full knee replacement.    2. HTN (hypertension), benign  Patient continues on lisinopril with HCTZ and blood pressure appears well controlled but she is due for yearly blood work.    3. Dyslipidemia  Patient not currently on medication and cholesterol levels have only been mildly elevated.    4. IGT (impaired glucose tolerance)  Last hemoglobin A1c was at 5.9, 2 years ago and she is due for lab work. She denies diabetic symptoms.    5. Screening for breast cancer  Patient due for yearly mammogram.    6. Screen for colon cancer  Patient still has not made her appointment for colonoscopy.    7. Acquired hypothyroidism  Patient due for TSH due to being on levothyroxin 50 µg daily. Last TSH from 2 years ago was 1.5.    8. Obesity (BMI 30-39.9)  Weight has been fairly steady with a BMI of 30.  Current Outpatient Prescriptions   Medication Sig Dispense Refill   • gabapentin (NEURONTIN) 300 MG Cap      • lisinopril-hydrochlorothiazide (PRINZIDE, ZESTORETIC) 20-12.5 MG per tablet Take 1 Tab by mouth every day. 90 Tab 1   • HYDROcodone-acetaminophen (NORCO)  "5-325 MG Tab per tablet Take 1 Tab by mouth 2 Times a Day.     • methocarbamol (ROBAXIN) 500 MG Tab Take 1,000 mg by mouth every day.     • levothyroxine (SYNTHROID) 50 MCG Tab TAKE ONE (1) TABLET BY MOUTH DAILY 90 Tab 3     No current facility-administered medications for this visit.      Social History   Substance Use Topics   • Smoking status: Never Smoker   • Smokeless tobacco: Never Used   • Alcohol use 0.0 oz/week      Comment: 1 glass wine per month     Family History   Problem Relation Age of Onset   • Hypertension Father    • Diabetes Father    • Heart Disease Father    • Alcohol abuse Father    • Anesthesia Sister      slow to come out (as a child)   • Diabetes     • Heart Disease     • Cancer     • Hypertension     • Stroke     • Lung Disease       Past Medical History:   Diagnosis Date   • Anemia     in past   • Anginal syndrome (HCC)     had work up and feet r/t anxiety   • Anxiety    • Arthritis     OA knees   • Dental disorder 5/24/12    partial upper denture   • High cholesterol    • HTN (hypertension), benign 3/19/2012   • Hypothyroid 3/19/2012   • Major depression 3/19/2012   • Orbital floor (blow-out) closed fracture (HCC)     left   • Other specified symptom associated with female genital organs     post menopausal bleeding   • Pain     thoracic spine, Right knee, myofacial pain, and Right shoulder   • Pain     recently fell and has bruise left forehead   • Pain 02/2018    chronic back pain, right shoulder   • Psychiatric problem     depression, anxiety   • Unspecified disorder of thyroid    • Urinary incontinence     Occasional       Review of Systems   Musculoskeletal: Positive for joint pain.   All other systems reviewed and are negative.         Objective:     /72   Pulse 80   Temp 36.4 °C (97.6 °F)   Resp 16   Ht 1.676 m (5' 6\")   Wt 84.8 kg (187 lb)   LMP 02/26/2012   SpO2 97%   BMI 30.18 kg/m²      Physical Exam   Constitutional: She is oriented to person, place, and time. " She appears well-developed and well-nourished. No distress.   HENT:   Head: Normocephalic and atraumatic.   Right Ear: External ear normal.   Left Ear: External ear normal.   Nose: Nose normal.   Mouth/Throat: Abnormal dentition.   Eyes: Right eye exhibits no discharge. Left eye exhibits no discharge.   Neck: Normal range of motion. Neck supple. No thyromegaly present.   Cardiovascular: Normal rate, regular rhythm and normal heart sounds.  Exam reveals no gallop and no friction rub.    No murmur heard.  Pulmonary/Chest: Effort normal and breath sounds normal. She has no wheezes. She has no rales.   Musculoskeletal: She exhibits no edema.        Right knee: She exhibits decreased range of motion. Tenderness found.   No erythema or joint laxity noted.   Neurological: She is alert and oriented to person, place, and time. She displays normal reflexes.   Skin: Skin is warm and dry. No rash noted. She is not diaphoretic.   Psychiatric: She has a normal mood and affect. Her behavior is normal. Judgment and thought content normal.   Nursing note and vitals reviewed.              Assessment/Plan:     1. Chronic pain of right knee  Patient states she needs an appointment to go back to her orthopedic office to discuss options for her right knee for which she has been seen in the past but is continuing to bother her. She is going to pain management but does not feel it is adequately controlling her pain.  - REFERRAL TO ORTHOPEDICS    2. HTN (hypertension), benign  Blood pressure currently well controlled and no change in medicine needed but she is overdue for lab work.  - COMP METABOLIC PANEL; Future    3. Dyslipidemia  Patient due for yearly blood work.  - LIPID PROFILE; Future    4. IGT (impaired glucose tolerance)  Patient advised she needs at least yearly testing for diabetes.  - HEMOGLOBIN A1C; Future    5. Screening for breast cancer    - MA-MAMMO SCREENING BILAT W/GRACY W/CAD; Future    6. Screen for colon cancer    -  REFERRAL TO GI FOR COLONOSCOPY    7. Acquired hypothyroidism  Patient currently on levothyroxine 50 µg.  - TSH; Future    8. Obesity (BMI 30-39.9)    - Patient identified as having weight management issue.  Appropriate orders and counseling given.

## 2018-09-17 ENCOUNTER — IMMUNIZATION (OUTPATIENT)
Dept: SOCIAL WORK | Facility: CLINIC | Age: 58
End: 2018-09-17

## 2018-09-17 DIAGNOSIS — Z23 NEED FOR VACCINATION: ICD-10-CM

## 2018-09-17 PROCEDURE — 90686 IIV4 VACC NO PRSV 0.5 ML IM: CPT | Performed by: REGISTERED NURSE

## 2018-09-27 ENCOUNTER — APPOINTMENT (OUTPATIENT)
Dept: RADIOLOGY | Facility: MEDICAL CENTER | Age: 58
End: 2018-09-27
Attending: INTERNAL MEDICINE
Payer: COMMERCIAL

## 2018-09-27 ENCOUNTER — HOSPITAL ENCOUNTER (OUTPATIENT)
Facility: MEDICAL CENTER | Age: 58
End: 2018-09-28
Attending: EMERGENCY MEDICINE | Admitting: INTERNAL MEDICINE
Payer: COMMERCIAL

## 2018-09-27 ENCOUNTER — APPOINTMENT (OUTPATIENT)
Dept: RADIOLOGY | Facility: MEDICAL CENTER | Age: 58
End: 2018-09-27
Attending: EMERGENCY MEDICINE
Payer: COMMERCIAL

## 2018-09-27 DIAGNOSIS — R07.9 CHEST PAIN, UNSPECIFIED TYPE: ICD-10-CM

## 2018-09-27 PROBLEM — K59.00 CONSTIPATED: Status: ACTIVE | Noted: 2018-09-27

## 2018-09-27 PROBLEM — F11.20 OPIOID DEPENDENCE IN CONTROLLED ENVIRONMENT (HCC): Status: ACTIVE | Noted: 2018-09-27

## 2018-09-27 LAB
ALBUMIN SERPL BCP-MCNC: 4.1 G/DL (ref 3.2–4.9)
ALBUMIN/GLOB SERPL: 1.5 G/DL
ALP SERPL-CCNC: 85 U/L (ref 30–99)
ALT SERPL-CCNC: 17 U/L (ref 2–50)
ANION GAP SERPL CALC-SCNC: 7 MMOL/L (ref 0–11.9)
APTT PPP: 29.7 SEC (ref 24.7–36)
AST SERPL-CCNC: 20 U/L (ref 12–45)
BASOPHILS # BLD AUTO: 0.3 % (ref 0–1.8)
BASOPHILS # BLD: 0.02 K/UL (ref 0–0.12)
BILIRUB SERPL-MCNC: 0.5 MG/DL (ref 0.1–1.5)
BNP SERPL-MCNC: 68 PG/ML (ref 0–100)
BUN SERPL-MCNC: 17 MG/DL (ref 8–22)
CALCIUM SERPL-MCNC: 8.8 MG/DL (ref 8.4–10.2)
CHLORIDE SERPL-SCNC: 104 MMOL/L (ref 96–112)
CO2 SERPL-SCNC: 26 MMOL/L (ref 20–33)
CREAT SERPL-MCNC: 0.79 MG/DL (ref 0.5–1.4)
D DIMER PPP IA.FEU-MCNC: 0.41 UG/ML (FEU) (ref 0–0.5)
EKG IMPRESSION: NORMAL
EOSINOPHIL # BLD AUTO: 0.31 K/UL (ref 0–0.51)
EOSINOPHIL NFR BLD: 4.6 % (ref 0–6.9)
ERYTHROCYTE [DISTWIDTH] IN BLOOD BY AUTOMATED COUNT: 46.5 FL (ref 35.9–50)
GLOBULIN SER CALC-MCNC: 2.8 G/DL (ref 1.9–3.5)
GLUCOSE SERPL-MCNC: 92 MG/DL (ref 65–99)
HCT VFR BLD AUTO: 33.9 % (ref 37–47)
HGB BLD-MCNC: 11 G/DL (ref 12–16)
IMM GRANULOCYTES # BLD AUTO: 0.03 K/UL (ref 0–0.11)
IMM GRANULOCYTES NFR BLD AUTO: 0.4 % (ref 0–0.9)
INR PPP: 0.94 (ref 0.87–1.13)
LIPASE SERPL-CCNC: 25 U/L (ref 7–58)
LYMPHOCYTES # BLD AUTO: 2.59 K/UL (ref 1–4.8)
LYMPHOCYTES NFR BLD: 38.5 % (ref 22–41)
MCH RBC QN AUTO: 30 PG (ref 27–33)
MCHC RBC AUTO-ENTMCNC: 32.4 G/DL (ref 33.6–35)
MCV RBC AUTO: 92.4 FL (ref 81.4–97.8)
MONOCYTES # BLD AUTO: 0.52 K/UL (ref 0–0.85)
MONOCYTES NFR BLD AUTO: 7.7 % (ref 0–13.4)
NEUTROPHILS # BLD AUTO: 3.26 K/UL (ref 2–7.15)
NEUTROPHILS NFR BLD: 48.5 % (ref 44–72)
NRBC # BLD AUTO: 0 K/UL
NRBC BLD-RTO: 0 /100 WBC
PLATELET # BLD AUTO: 425 K/UL (ref 164–446)
PMV BLD AUTO: 10.4 FL (ref 9–12.9)
POTASSIUM SERPL-SCNC: 3.9 MMOL/L (ref 3.6–5.5)
PROT SERPL-MCNC: 6.9 G/DL (ref 6–8.2)
PROTHROMBIN TIME: 12.5 SEC (ref 12–14.6)
RBC # BLD AUTO: 3.67 M/UL (ref 4.2–5.4)
SODIUM SERPL-SCNC: 137 MMOL/L (ref 135–145)
TROPONIN I SERPL-MCNC: <0.02 NG/ML (ref 0–0.04)
WBC # BLD AUTO: 6.7 K/UL (ref 4.8–10.8)

## 2018-09-27 PROCEDURE — G0378 HOSPITAL OBSERVATION PER HR: HCPCS

## 2018-09-27 PROCEDURE — 700111 HCHG RX REV CODE 636 W/ 250 OVERRIDE (IP): Performed by: EMERGENCY MEDICINE

## 2018-09-27 PROCEDURE — A9270 NON-COVERED ITEM OR SERVICE: HCPCS | Performed by: INTERNAL MEDICINE

## 2018-09-27 PROCEDURE — 93005 ELECTROCARDIOGRAM TRACING: CPT | Performed by: EMERGENCY MEDICINE

## 2018-09-27 PROCEDURE — 96375 TX/PRO/DX INJ NEW DRUG ADDON: CPT

## 2018-09-27 PROCEDURE — 74018 RADEX ABDOMEN 1 VIEW: CPT

## 2018-09-27 PROCEDURE — 36415 COLL VENOUS BLD VENIPUNCTURE: CPT

## 2018-09-27 PROCEDURE — 700102 HCHG RX REV CODE 250 W/ 637 OVERRIDE(OP): Performed by: INTERNAL MEDICINE

## 2018-09-27 PROCEDURE — 83880 ASSAY OF NATRIURETIC PEPTIDE: CPT

## 2018-09-27 PROCEDURE — 84484 ASSAY OF TROPONIN QUANT: CPT | Mod: 91

## 2018-09-27 PROCEDURE — 85025 COMPLETE CBC W/AUTO DIFF WBC: CPT

## 2018-09-27 PROCEDURE — 83690 ASSAY OF LIPASE: CPT

## 2018-09-27 PROCEDURE — 80053 COMPREHEN METABOLIC PANEL: CPT

## 2018-09-27 PROCEDURE — 99285 EMERGENCY DEPT VISIT HI MDM: CPT

## 2018-09-27 PROCEDURE — A9270 NON-COVERED ITEM OR SERVICE: HCPCS | Performed by: EMERGENCY MEDICINE

## 2018-09-27 PROCEDURE — 700111 HCHG RX REV CODE 636 W/ 250 OVERRIDE (IP): Performed by: INTERNAL MEDICINE

## 2018-09-27 PROCEDURE — 96374 THER/PROPH/DIAG INJ IV PUSH: CPT

## 2018-09-27 PROCEDURE — 85610 PROTHROMBIN TIME: CPT

## 2018-09-27 PROCEDURE — 83036 HEMOGLOBIN GLYCOSYLATED A1C: CPT

## 2018-09-27 PROCEDURE — 99220 PR INITIAL OBSERVATION CARE,LEVL III: CPT | Performed by: INTERNAL MEDICINE

## 2018-09-27 PROCEDURE — 700102 HCHG RX REV CODE 250 W/ 637 OVERRIDE(OP): Performed by: EMERGENCY MEDICINE

## 2018-09-27 PROCEDURE — 85730 THROMBOPLASTIN TIME PARTIAL: CPT

## 2018-09-27 PROCEDURE — 96372 THER/PROPH/DIAG INJ SC/IM: CPT

## 2018-09-27 PROCEDURE — 85379 FIBRIN DEGRADATION QUANT: CPT

## 2018-09-27 PROCEDURE — 93005 ELECTROCARDIOGRAM TRACING: CPT

## 2018-09-27 PROCEDURE — 71045 X-RAY EXAM CHEST 1 VIEW: CPT

## 2018-09-27 RX ORDER — GABAPENTIN 400 MG/1
400 CAPSULE ORAL 3 TIMES DAILY
COMMUNITY
End: 2020-01-08

## 2018-09-27 RX ORDER — AMOXICILLIN 250 MG
2 CAPSULE ORAL 2 TIMES DAILY
Status: DISCONTINUED | OUTPATIENT
Start: 2018-09-27 | End: 2018-09-28 | Stop reason: HOSPADM

## 2018-09-27 RX ORDER — HYDROCODONE BITARTRATE AND ACETAMINOPHEN 5; 325 MG/1; MG/1
1 TABLET ORAL 2 TIMES DAILY
Status: DISCONTINUED | OUTPATIENT
Start: 2018-09-27 | End: 2018-09-28 | Stop reason: HOSPADM

## 2018-09-27 RX ORDER — POLYETHYLENE GLYCOL 3350 17 G/17G
1 POWDER, FOR SOLUTION ORAL EVERY 8 HOURS
Status: DISCONTINUED | OUTPATIENT
Start: 2018-09-27 | End: 2018-09-28 | Stop reason: HOSPADM

## 2018-09-27 RX ORDER — ONDANSETRON 2 MG/ML
4 INJECTION INTRAMUSCULAR; INTRAVENOUS EVERY 4 HOURS PRN
Status: DISCONTINUED | OUTPATIENT
Start: 2018-09-27 | End: 2018-09-28 | Stop reason: HOSPADM

## 2018-09-27 RX ORDER — LISINOPRIL AND HYDROCHLOROTHIAZIDE 20; 12.5 MG/1; MG/1
1 TABLET ORAL DAILY
Status: DISCONTINUED | OUTPATIENT
Start: 2018-09-27 | End: 2018-09-27

## 2018-09-27 RX ORDER — METOCLOPRAMIDE HYDROCHLORIDE 5 MG/ML
10 INJECTION INTRAMUSCULAR; INTRAVENOUS EVERY 6 HOURS
Status: DISCONTINUED | OUTPATIENT
Start: 2018-09-27 | End: 2018-09-28

## 2018-09-27 RX ORDER — METHOCARBAMOL 500 MG/1
500 TABLET, FILM COATED ORAL 2 TIMES DAILY
Status: DISCONTINUED | OUTPATIENT
Start: 2018-09-27 | End: 2018-09-28 | Stop reason: HOSPADM

## 2018-09-27 RX ORDER — HYDROCHLOROTHIAZIDE 12.5 MG/1
12.5 CAPSULE, GELATIN COATED ORAL
Status: DISCONTINUED | OUTPATIENT
Start: 2018-09-28 | End: 2018-09-28 | Stop reason: HOSPADM

## 2018-09-27 RX ORDER — ACETAMINOPHEN 325 MG/1
650 TABLET ORAL ONCE
Status: COMPLETED | OUTPATIENT
Start: 2018-09-27 | End: 2018-09-27

## 2018-09-27 RX ORDER — BISACODYL 10 MG
10 SUPPOSITORY, RECTAL RECTAL
Status: DISCONTINUED | OUTPATIENT
Start: 2018-09-27 | End: 2018-09-28 | Stop reason: HOSPADM

## 2018-09-27 RX ORDER — LISINOPRIL 20 MG/1
20 TABLET ORAL
Status: DISCONTINUED | OUTPATIENT
Start: 2018-09-28 | End: 2018-09-28 | Stop reason: HOSPADM

## 2018-09-27 RX ORDER — PROMETHAZINE HYDROCHLORIDE 25 MG/1
12.5-25 SUPPOSITORY RECTAL EVERY 4 HOURS PRN
Status: DISCONTINUED | OUTPATIENT
Start: 2018-09-27 | End: 2018-09-28 | Stop reason: HOSPADM

## 2018-09-27 RX ORDER — KETOROLAC TROMETHAMINE 30 MG/ML
15 INJECTION, SOLUTION INTRAMUSCULAR; INTRAVENOUS ONCE
Status: COMPLETED | OUTPATIENT
Start: 2018-09-27 | End: 2018-09-27

## 2018-09-27 RX ORDER — LEVOTHYROXINE SODIUM 0.05 MG/1
50 TABLET ORAL
Status: DISCONTINUED | OUTPATIENT
Start: 2018-09-28 | End: 2018-09-28 | Stop reason: HOSPADM

## 2018-09-27 RX ORDER — ONDANSETRON 4 MG/1
4 TABLET, ORALLY DISINTEGRATING ORAL EVERY 4 HOURS PRN
Status: DISCONTINUED | OUTPATIENT
Start: 2018-09-27 | End: 2018-09-28 | Stop reason: HOSPADM

## 2018-09-27 RX ORDER — PROMETHAZINE HYDROCHLORIDE 25 MG/1
12.5-25 TABLET ORAL EVERY 4 HOURS PRN
Status: DISCONTINUED | OUTPATIENT
Start: 2018-09-27 | End: 2018-09-28 | Stop reason: HOSPADM

## 2018-09-27 RX ORDER — POLYETHYLENE GLYCOL 3350 17 G/17G
1 POWDER, FOR SOLUTION ORAL
Status: DISCONTINUED | OUTPATIENT
Start: 2018-09-27 | End: 2018-09-27

## 2018-09-27 RX ORDER — GABAPENTIN 400 MG/1
400 CAPSULE ORAL 3 TIMES DAILY
Status: DISCONTINUED | OUTPATIENT
Start: 2018-09-27 | End: 2018-09-28 | Stop reason: HOSPADM

## 2018-09-27 RX ORDER — ACETAMINOPHEN 325 MG/1
650 TABLET ORAL EVERY 6 HOURS PRN
Status: DISCONTINUED | OUTPATIENT
Start: 2018-09-27 | End: 2018-09-28 | Stop reason: HOSPADM

## 2018-09-27 RX ORDER — ASPIRIN 325 MG
325 TABLET ORAL ONCE
Status: COMPLETED | OUTPATIENT
Start: 2018-09-27 | End: 2018-09-27

## 2018-09-27 RX ADMIN — ENOXAPARIN SODIUM 40 MG: 100 INJECTION SUBCUTANEOUS at 20:39

## 2018-09-27 RX ADMIN — POLYETHYLENE GLYCOL 3350 1 PACKET: 17 POWDER, FOR SOLUTION ORAL at 20:40

## 2018-09-27 RX ADMIN — ASPIRIN 325 MG ORAL TABLET 325 MG: 325 PILL ORAL at 14:33

## 2018-09-27 RX ADMIN — HYDROCODONE BITARTRATE AND ACETAMINOPHEN 1 TABLET: 5; 325 TABLET ORAL at 20:40

## 2018-09-27 RX ADMIN — ACETAMINOPHEN 650 MG: 325 TABLET, FILM COATED ORAL at 13:45

## 2018-09-27 RX ADMIN — KETOROLAC TROMETHAMINE 15 MG: 30 INJECTION, SOLUTION INTRAMUSCULAR at 14:33

## 2018-09-27 RX ADMIN — METOCLOPRAMIDE 10 MG: 5 INJECTION, SOLUTION INTRAMUSCULAR; INTRAVENOUS at 20:40

## 2018-09-27 RX ADMIN — GABAPENTIN 400 MG: 400 CAPSULE ORAL at 20:40

## 2018-09-27 RX ADMIN — METHOCARBAMOL 500 MG: 500 TABLET ORAL at 20:40

## 2018-09-27 RX ADMIN — Medication 2 TABLET: at 20:40

## 2018-09-27 ASSESSMENT — ENCOUNTER SYMPTOMS
BACK PAIN: 1
NAUSEA: 0
HEADACHES: 1
CONSTIPATION: 0
SINUS PAIN: 0
COUGH: 0
DEPRESSION: 1
EYE REDNESS: 0
ABDOMINAL PAIN: 0
VOMITING: 0
EYE DISCHARGE: 0
DIAPHORESIS: 0
SORE THROAT: 0
NERVOUS/ANXIOUS: 0
CHILLS: 1
FEVER: 0
SPUTUM PRODUCTION: 0
PALPITATIONS: 0
SHORTNESS OF BREATH: 0
WEIGHT LOSS: 0
DIZZINESS: 1
DIARRHEA: 0

## 2018-09-27 ASSESSMENT — PATIENT HEALTH QUESTIONNAIRE - PHQ9
3. TROUBLE FALLING OR STAYING ASLEEP OR SLEEPING TOO MUCH: SEVERAL DAYS
5. POOR APPETITE OR OVEREATING: NOT AT ALL
9. THOUGHTS THAT YOU WOULD BE BETTER OFF DEAD, OR OF HURTING YOURSELF: NOT AT ALL
2. FEELING DOWN, DEPRESSED, IRRITABLE, OR HOPELESS: NEARLY EVERY DAY
4. FEELING TIRED OR HAVING LITTLE ENERGY: SEVERAL DAYS
1. LITTLE INTEREST OR PLEASURE IN DOING THINGS: NOT AT ALL
8. MOVING OR SPEAKING SO SLOWLY THAT OTHER PEOPLE COULD HAVE NOTICED. OR THE OPPOSITE, BEING SO FIGETY OR RESTLESS THAT YOU HAVE BEEN MOVING AROUND A LOT MORE THAN USUAL: NOT AT ALL
7. TROUBLE CONCENTRATING ON THINGS, SUCH AS READING THE NEWSPAPER OR WATCHING TELEVISION: SEVERAL DAYS
SUM OF ALL RESPONSES TO PHQ QUESTIONS 1-9: 7
SUM OF ALL RESPONSES TO PHQ9 QUESTIONS 1 AND 2: 3
6. FEELING BAD ABOUT YOURSELF - OR THAT YOU ARE A FAILURE OR HAVE LET YOURSELF OR YOUR FAMILY DOWN: SEVERAL DAYS

## 2018-09-27 ASSESSMENT — LIFESTYLE VARIABLES
TOTAL SCORE: 0
EVER FELT BAD OR GUILTY ABOUT YOUR DRINKING: NO
TOTAL SCORE: 0
CONSUMPTION TOTAL: INCOMPLETE
TOTAL SCORE: 0
TOTAL SCORE: 0
ALCOHOL_USE: YES
HAVE PEOPLE ANNOYED YOU BY CRITICIZING YOUR DRINKING: NO
AVERAGE NUMBER OF DAYS PER WEEK YOU HAVE A DRINK CONTAINING ALCOHOL: 0
TOTAL SCORE: 0
EVER FELT BAD OR GUILTY ABOUT YOUR DRINKING: NO
CONSUMPTION TOTAL: NEGATIVE
EVER HAD A DRINK FIRST THING IN THE MORNING TO STEADY YOUR NERVES TO GET RID OF A HANGOVER: NO
EVER_SMOKED: NEVER
HAVE YOU EVER FELT YOU SHOULD CUT DOWN ON YOUR DRINKING: NO
TOTAL SCORE: 0
HAVE PEOPLE ANNOYED YOU BY CRITICIZING YOUR DRINKING: NO
ON A TYPICAL DAY WHEN YOU DRINK ALCOHOL HOW MANY DRINKS DO YOU HAVE: 1
ALCOHOL_USE: YES
HOW MANY TIMES IN THE PAST YEAR HAVE YOU HAD 5 OR MORE DRINKS IN A DAY: 0
EVER HAD A DRINK FIRST THING IN THE MORNING TO STEADY YOUR NERVES TO GET RID OF A HANGOVER: NO
HAVE YOU EVER FELT YOU SHOULD CUT DOWN ON YOUR DRINKING: NO

## 2018-09-27 ASSESSMENT — COGNITIVE AND FUNCTIONAL STATUS - GENERAL
MOBILITY SCORE: 21
CLIMB 3 TO 5 STEPS WITH RAILING: A LITTLE
DAILY ACTIVITIY SCORE: 24
WALKING IN HOSPITAL ROOM: A LITTLE
SUGGESTED CMS G CODE MODIFIER MOBILITY: CJ
SUGGESTED CMS G CODE MODIFIER DAILY ACTIVITY: CH
MOVING TO AND FROM BED TO CHAIR: A LITTLE

## 2018-09-27 ASSESSMENT — PAIN SCALES - GENERAL
PAINLEVEL_OUTOF10: 2
PAINLEVEL_OUTOF10: 5
PAINLEVEL_OUTOF10: 5
PAINLEVEL_OUTOF10: 8
PAINLEVEL_OUTOF10: 2

## 2018-09-27 ASSESSMENT — COPD QUESTIONNAIRES
IN THE PAST 12 MONTHS DO YOU DO LESS THAN YOU USED TO BECAUSE OF YOUR BREATHING PROBLEMS: DISAGREE/UNSURE
DURING THE PAST 4 WEEKS HOW MUCH DID YOU FEEL SHORT OF BREATH: NONE/LITTLE OF THE TIME
HAVE YOU SMOKED AT LEAST 100 CIGARETTES IN YOUR ENTIRE LIFE: NO/DON'T KNOW
COPD SCREENING SCORE: 2
DO YOU EVER COUGH UP ANY MUCUS OR PHLEGM?: NO/ONLY WITH OCCASIONAL COLDS OR INFECTIONS

## 2018-09-27 ASSESSMENT — PAIN DESCRIPTION - DESCRIPTORS: DESCRIPTORS: SHARP

## 2018-09-27 NOTE — ED PROVIDER NOTES
ED Provider Note  Addendum:    I did reevaluate the patient after Dr. Castellano sign her out to me.  She does continue to have chest pressure therefore a d-dimer was obtained.  D-dimer is negative and she is not hypoxic or tachypneic I do not believe she is experiencing a pulmonary embolism with a low pretest probability.  The patient was admitted for further evaluation and management of chest pain.  Dr. Santos graciously accepts the admission.

## 2018-09-27 NOTE — ED NOTES
D-dimer results back, chart up for MD for re evaluation. poc update given to pt. No further questions at this time. Further orders and dispo pending

## 2018-09-27 NOTE — ED TRIAGE NOTES
"Chief Complaint   Patient presents with   • Chest Pain     started this morning, described as \"sharp\"     /95   Pulse 65   Temp 36.6 °C (97.9 °F)   Resp 16   Ht 1.676 m (5' 6\")   Wt 92.2 kg (203 lb 4.2 oz)   LMP 02/26/2012   SpO2 95%   BMI 32.81 kg/m²     "

## 2018-09-27 NOTE — ED NOTES
Pt reports CP started around 0700 this morning, states pain has been constant, denies any abd pain pain or N/V, denies feeling short of breath.

## 2018-09-27 NOTE — ED PROVIDER NOTES
"ED Provider Note    CHIEF COMPLAINT  Chief Complaint   Patient presents with   • Chest Pain     started this morning, described as \"sharp\"       HPI  Debby Desai is a 58 y.o. female who presents to the Emergency Department with chest pain.  It is located in her anterior chest and feels like tightness, sharp, constant, that started when she was working and has an intensity of 8 with some radiation to the neck.   There is associate shortness of breath, no weakness, no nausea, no abdominal pain, no back pain, no jaw pain, no diaphoresis, no.C     CAD Risk factor review:  no CAD, slighly elevated dislipidemia, positive family history-father in 50, no diabetes, nosmoking, no obesity, positive hypertension, no cocaine or methamphetamines.    Pulmonary Embolist risk factor review:  February wtih recent surgery, no history of DVT or PE, no hemoptysis, no unilateral leg swelling, no malignancy, no BCP or hormone therapy.    REVIEW OF SYSTEMS   As above all other systems are negative.    PAST MEDICAL HISTORY   has a past medical history of Anemia; Anginal syndrome (HCC); Anxiety; Arthritis; Dental disorder (5/24/12); High cholesterol; HTN (hypertension), benign (3/19/2012); Hypothyroid (3/19/2012); Major depression (3/19/2012); Orbital floor (blow-out) closed fracture (HCC); Other specified symptom associated with female genital organs; Pain; Pain; Pain (02/2018); Psychiatric problem; Unspecified disorder of thyroid; and Urinary incontinence. She also has no past medical history of Breast cancer (HCC).    FAMILY HISTORY  Family History   Problem Relation Age of Onset   • Hypertension Father    • Diabetes Father    • Heart Disease Father    • Alcohol abuse Father    • Anesthesia Sister         slow to come out (as a child)   • Diabetes Unknown    • Heart Disease Unknown    • Cancer Unknown    • Hypertension Unknown    • Stroke Unknown    • Lung Disease Unknown         SOCIAL HISTORY  Social History     Social History Main " "Topics   • Smoking status: Never Smoker   • Smokeless tobacco: Never Used   • Alcohol use 0.0 oz/week      Comment: 1 glass wine per month   • Drug use: No   • Sexual activity: No       SURGICAL HISTORY   has a past surgical history that includes breast biopsy (Left, 1985/2005); lymph node excision (Left, 2007); other (Right, 2001); arthroscopy, knee (Left, 1999); rib resection (Right, 1980); block epidural steroid injection (2008); spinal cord stimulator (7/11/2011); biopsy general (5/1/12); spinal cord stimulator (5/30/2012); pump revision (12/10/2012); knee arthroscopy (4/21/2015); medial meniscectomy (4/21/2015); knee arthroscopy (Right, 7/12/2017); medial meniscectomy (Right, 7/12/2017); synovectomy (Right, 7/12/2017); knee arthroscopy (Right, 2/9/2018); and medial meniscectomy (Right, 2/9/2018).    CURRENT MEDICATIONS  Reviewed.  See Encounter Summary.  Include   Current Facility-Administered Medications:   •  acetaminophen (TYLENOL) tablet 650 mg, 650 mg, Oral, Once, Patricia Castellano M.D.    Current Outpatient Prescriptions:   •  gabapentin (NEURONTIN) 400 MG Cap, Take 400 mg by mouth 3 times a day., Disp: , Rfl:   •  Naproxen-Esomeprazole (VIMOVO) 375-20 MG Tablet Delayed Response, Take 1 Tab by mouth 2 Times a Day., Disp: , Rfl:   •  asa/apap/caffeine (EXCEDRIN) 250-250-65 MG Tab, Take 2 Tabs by mouth 1 time daily as needed for Headache., Disp: , Rfl:   •  lisinopril-hydrochlorothiazide (PRINZIDE, ZESTORETIC) 20-12.5 MG per tablet, Take 1 Tab by mouth every day., Disp: 90 Tab, Rfl: 1  •  HYDROcodone-acetaminophen (NORCO) 5-325 MG Tab per tablet, Take 1 Tab by mouth 2 Times a Day., Disp: , Rfl:   •  methocarbamol (ROBAXIN) 500 MG Tab, Take 500 mg by mouth 2 Times a Day., Disp: , Rfl:   •  levothyroxine (SYNTHROID) 50 MCG Tab, TAKE ONE (1) TABLET BY MOUTH DAILY, Disp: 90 Tab, Rfl: 3    ALLERGIES  Allergies   Allergen Reactions   • Bactrim Ds Rash and Swelling     Pt states \"My hand swells up and rash all " "over body\".   • Sulfamethoxazole-Trimethoprim Rash and Swelling     Pt states \"My hand swells up and rash all over body\".       PHYSICAL EXAM  VITAL SIGNS: /95   Pulse 65   Temp 36.6 °C (97.9 °F)   Resp 16   Ht 1.676 m (5' 6\")   Wt 92.2 kg (203 lb 4.2 oz)   LMP 02/26/2012   SpO2 95%   BMI 32.81 kg/m²   Constitutional:  Alert , able to answer questions  HENT: Nose is normal in appearance, external ears are normal,  moist mucous membranes  Eyes: Anicteric,  pupils are equal round and reactive, there is no conjunctival drainage or pallor   Neck: The trachea is midline, there is no obvious mass or meningeal signs  Cardiovascular: Good perfusion,  regular rate and rhythm without murmurs gallops or rubs  Thorax & Lungs: Respiratory rate and effort are normal. There is normal chest excursion with respiration.  No wheezes rhonchi or rales noted.  Abdomen: Abdomen is normal in appearance, no gross peritoneal signs  normal bowel sounds, no pain with cough  :   No CVA tenderness to palpation  Musculoskeletal: No deformities noted in all 4 extremities.   Skin: Visualized skin is warm without rash.  Neurologic:  Cranial nerves II through XII are intact there is no focal abnormality noted.  Psychiatric: Normal mood and mentation    RADIOLOGY/PROCEDURES  Imaging Studies:    DX-CHEST-LIMITED (1 VIEW)   Final Result         No acute cardiopulmonary abnormalities are identified.            Pertinent Labs   Results for orders placed or performed during the hospital encounter of 09/27/18   Troponin   Result Value Ref Range    Troponin I <0.02 0.00 - 0.04 ng/mL   Btype Natriuretic Peptide   Result Value Ref Range    B Natriuretic Peptide 68 0 - 100 pg/mL   CBC with Differential   Result Value Ref Range    WBC 6.7 4.8 - 10.8 K/uL    RBC 3.67 (L) 4.20 - 5.40 M/uL    Hemoglobin 11.0 (L) 12.0 - 16.0 g/dL    Hematocrit 33.9 (L) 37.0 - 47.0 %    MCV 92.4 81.4 - 97.8 fL    MCH 30.0 27.0 - 33.0 pg    MCHC 32.4 (L) 33.6 - 35.0 " g/dL    RDW 46.5 35.9 - 50.0 fL    Platelet Count 425 164 - 446 K/uL    MPV 10.4 9.0 - 12.9 fL    Neutrophils-Polys 48.50 44.00 - 72.00 %    Lymphocytes 38.50 22.00 - 41.00 %    Monocytes 7.70 0.00 - 13.40 %    Eosinophils 4.60 0.00 - 6.90 %    Basophils 0.30 0.00 - 1.80 %    Immature Granulocytes 0.40 0.00 - 0.90 %    Nucleated RBC 0.00 /100 WBC    Neutrophils (Absolute) 3.26 2.00 - 7.15 K/uL    Lymphs (Absolute) 2.59 1.00 - 4.80 K/uL    Monos (Absolute) 0.52 0.00 - 0.85 K/uL    Eos (Absolute) 0.31 0.00 - 0.51 K/uL    Baso (Absolute) 0.02 0.00 - 0.12 K/uL    Immature Granulocytes (abs) 0.03 0.00 - 0.11 K/uL    NRBC (Absolute) 0.00 K/uL   Complete Metabolic Panel (CMP)   Result Value Ref Range    Sodium 137 135 - 145 mmol/L    Potassium 3.9 3.6 - 5.5 mmol/L    Chloride 104 96 - 112 mmol/L    Co2 26 20 - 33 mmol/L    Anion Gap 7.0 0.0 - 11.9    Glucose 92 65 - 99 mg/dL    Bun 17 8 - 22 mg/dL    Creatinine 0.79 0.50 - 1.40 mg/dL    Calcium 8.8 8.4 - 10.2 mg/dL    AST(SGOT) 20 12 - 45 U/L    ALT(SGPT) 17 2 - 50 U/L    Alkaline Phosphatase 85 30 - 99 U/L    Total Bilirubin 0.5 0.1 - 1.5 mg/dL    Albumin 4.1 3.2 - 4.9 g/dL    Total Protein 6.9 6.0 - 8.2 g/dL    Globulin 2.8 1.9 - 3.5 g/dL    A-G Ratio 1.5 g/dL   Prothrombin Time   Result Value Ref Range    PT 12.5 12.0 - 14.6 sec    INR 0.94 0.87 - 1.13   APTT   Result Value Ref Range    APTT 29.7 24.7 - 36.0 sec   Lipase   Result Value Ref Range    Lipase 25 7 - 58 U/L   ESTIMATED GFR   Result Value Ref Range    GFR If African American >60 >60 mL/min/1.73 m 2    GFR If Non African American >60 >60 mL/min/1.73 m 2   EKG   Result Value Ref Range    Report       [facility] Emergency Dept.    Test Date:  2018  Pt Name:    CONNOR PAULA                  Department: EDSM  MRN:        [id number]                      Room:  Gender:     Female                       Technician: [id number]  :        1960                   Requested By:ER TRIAGE  PROTOCOL  Order #:    [id number]                    Reading MD:    Measurements  Intervals                                Axis  Rate:       71                           P:          43  OR:         161                          QRS:        2  QRSD:       101                          T:          19  QT:         394  QTc:        429    Interpretive Statements  Sinus rhythm  Abnormal R-wave progression, early transition  Compared to ECG 02/01/2018 15:32:50  Left ventricular hypertrophy no longer present           I interpreted this EKG myself.  This is a 12-lead study.  The rhythm is sinus with a rate of 71.  There are no ST segment nor T wave abnormalities.  Interpretation: No ST segment elevation myocardial infarction. Abnormal R wave progression.    COURSE & MEDICAL DECISION MAKING  Nursing notes and vital signs were reviewed. (See chart for details)  The patients nursing records were reviewed, history was obtained from the patient and ;   Nuc Med [id number]. Rest EKG shows normal sinus rhythm.  2. Stress EKG shows no significant ST segment changes nor arrhythmias.  3. Un-quantitated chest pain experienced during this test.  4. Lexiscan 0.4 mg was given.     Donny Diaz MD,[facility]    Transthoracic  Echo Report      Echocardiography Laboratory    CONCLUSIONS  Normal transthoracic echocardiogram.   No prior study is available for comparison.     CONNOR PAULA  Exam Date:         05/25/2016     The patient presents with chest pain, and the differential diagnosis includes but is not limited to  acute coronary syndrome, anxiety disorder, aortic dissection, esophageal rupture or spasm, gastroesophageal reflux disease, musculoskeletal chest pain, acute coronary syndrome there is no sign of ST elevation MI at this time    Initial orders in the Emergency Department included  CBC, CMP, troponin BNP, PCXR, and initial treatment in the Emergency Department included Tylenol    ED testing reveals negative first troponin.  Her EKG does  not show a STEMI.  I discussed with her as she has had recent stress testing having a second troponin to do a rapid rule out in the ER versus admission for observation and she prefers to have a second troponin.  This has been ordered for 2-hour timeframe my partner Dr. Stephen and will follow up on the results and determine final disposition    FINAL IMPRESSION  1. Chest Pain  2.        DISPOSITION  Pending    Electronically signed by: Patricia Castellano, 9/27/2018 1:21 PM

## 2018-09-28 ENCOUNTER — PATIENT OUTREACH (OUTPATIENT)
Dept: HEALTH INFORMATION MANAGEMENT | Facility: OTHER | Age: 58
End: 2018-09-28

## 2018-09-28 ENCOUNTER — APPOINTMENT (OUTPATIENT)
Dept: RADIOLOGY | Facility: MEDICAL CENTER | Age: 58
End: 2018-09-28
Attending: INTERNAL MEDICINE
Payer: COMMERCIAL

## 2018-09-28 VITALS
DIASTOLIC BLOOD PRESSURE: 52 MMHG | RESPIRATION RATE: 12 BRPM | HEIGHT: 66 IN | SYSTOLIC BLOOD PRESSURE: 127 MMHG | WEIGHT: 203.26 LBS | OXYGEN SATURATION: 94 % | BODY MASS INDEX: 32.67 KG/M2 | HEART RATE: 55 BPM | TEMPERATURE: 97.8 F

## 2018-09-28 PROBLEM — K59.00 CONSTIPATED: Status: RESOLVED | Noted: 2018-09-27 | Resolved: 2018-09-28

## 2018-09-28 PROBLEM — R09.89 BIBASILAR CRACKLES: Status: RESOLVED | Noted: 2018-09-28 | Resolved: 2018-09-28

## 2018-09-28 PROBLEM — R09.89 BIBASILAR CRACKLES: Status: ACTIVE | Noted: 2018-09-28

## 2018-09-28 LAB
CHOLEST SERPL-MCNC: 211 MG/DL (ref 100–199)
EST. AVERAGE GLUCOSE BLD GHB EST-MCNC: 128 MG/DL
HBA1C MFR BLD: 6.1 % (ref 0–5.6)
HDLC SERPL-MCNC: 52 MG/DL
LDLC SERPL CALC-MCNC: 119 MG/DL
TRIGL SERPL-MCNC: 198 MG/DL (ref 0–149)

## 2018-09-28 PROCEDURE — 96376 TX/PRO/DX INJ SAME DRUG ADON: CPT

## 2018-09-28 PROCEDURE — A9270 NON-COVERED ITEM OR SERVICE: HCPCS | Performed by: INTERNAL MEDICINE

## 2018-09-28 PROCEDURE — 700111 HCHG RX REV CODE 636 W/ 250 OVERRIDE (IP): Performed by: INTERNAL MEDICINE

## 2018-09-28 PROCEDURE — G0378 HOSPITAL OBSERVATION PER HR: HCPCS

## 2018-09-28 PROCEDURE — A9502 TC99M TETROFOSMIN: HCPCS

## 2018-09-28 PROCEDURE — 80061 LIPID PANEL: CPT

## 2018-09-28 PROCEDURE — 700111 HCHG RX REV CODE 636 W/ 250 OVERRIDE (IP)

## 2018-09-28 PROCEDURE — 700102 HCHG RX REV CODE 250 W/ 637 OVERRIDE(OP): Performed by: INTERNAL MEDICINE

## 2018-09-28 PROCEDURE — 99217 PR OBSERVATION CARE DISCHARGE: CPT | Performed by: HOSPITALIST

## 2018-09-28 RX ORDER — FUROSEMIDE 10 MG/ML
40 INJECTION INTRAMUSCULAR; INTRAVENOUS ONCE
Status: DISCONTINUED | OUTPATIENT
Start: 2018-09-28 | End: 2018-09-28

## 2018-09-28 RX ORDER — POLYETHYLENE GLYCOL 3350 17 G/17G
17 POWDER, FOR SOLUTION ORAL DAILY
Qty: 3 EACH | Refills: 0 | Status: SHIPPED | OUTPATIENT
Start: 2018-09-28 | End: 2018-10-01

## 2018-09-28 RX ORDER — REGADENOSON 0.08 MG/ML
INJECTION, SOLUTION INTRAVENOUS
Status: COMPLETED
Start: 2018-09-28 | End: 2018-09-28

## 2018-09-28 RX ORDER — METOCLOPRAMIDE 10 MG/1
10 TABLET ORAL
Status: DISCONTINUED | OUTPATIENT
Start: 2018-09-28 | End: 2018-09-28 | Stop reason: HOSPADM

## 2018-09-28 RX ORDER — METOCLOPRAMIDE 10 MG/1
10 TABLET ORAL
Qty: 40 TAB | Refills: 0 | Status: SHIPPED | OUTPATIENT
Start: 2018-09-28 | End: 2018-10-08

## 2018-09-28 RX ADMIN — REGADENOSON 0.4 MG: 0.08 INJECTION, SOLUTION INTRAVENOUS at 08:59

## 2018-09-28 RX ADMIN — HYDROCODONE BITARTRATE AND ACETAMINOPHEN 1 TABLET: 5; 325 TABLET ORAL at 05:43

## 2018-09-28 RX ADMIN — HYDROCHLOROTHIAZIDE 12.5 MG: 12.5 CAPSULE ORAL at 05:44

## 2018-09-28 RX ADMIN — GABAPENTIN 400 MG: 400 CAPSULE ORAL at 05:44

## 2018-09-28 RX ADMIN — METOCLOPRAMIDE 10 MG: 5 INJECTION, SOLUTION INTRAMUSCULAR; INTRAVENOUS at 00:20

## 2018-09-28 RX ADMIN — METOCLOPRAMIDE 10 MG: 5 INJECTION, SOLUTION INTRAMUSCULAR; INTRAVENOUS at 05:44

## 2018-09-28 RX ADMIN — LISINOPRIL 20 MG: 20 TABLET ORAL at 05:43

## 2018-09-28 RX ADMIN — LEVOTHYROXINE SODIUM 50 MCG: 50 TABLET ORAL at 05:44

## 2018-09-28 RX ADMIN — Medication 2 TABLET: at 05:43

## 2018-09-28 RX ADMIN — METHOCARBAMOL 500 MG: 500 TABLET ORAL at 05:44

## 2018-09-28 NOTE — PROGRESS NOTES
Patient presents to NM suite for cardiac stress test with MPI. Nursing goals identified: knowledge deficit, potential for anxiety r/t stress test, potential for compromised cardiac output. Care plan includes educating patient, reassurance and access to ACLS cart/team. Labs and ECG reviewed. No caffeine and NPO confirmed. Resting images attained and patient prepped for pharmacological stress study. Patient admits to 2/10 chest pain mid-sternal, non-radiating. Less intensity than earlier this morning. Lexiscan given while patient ambulated on TM x 2 mins. Patient reported these symptoms: SOB, increased chest pain 4/10. Caffeinated beverage provided. Symptoms resolved. Patient tolerated procedure well.

## 2018-09-28 NOTE — PROGRESS NOTES
"Checked on patient, she was turned to her side I was working on getting her discharge in order.  She came out of her room and aggressively addressed me.  Stating, \"I am going home and you haven't even come in to talk with me, and a doctor hasn't seen me yet\".  I assured her that her stress test was normal and told her I would page the MD to come talk to her.  Patient was upset, and rude.  MD paged to talk with patient at this time.    "

## 2018-09-28 NOTE — PROGRESS NOTES
Pt A&Ox4. Up to bathroom with stand by assist. C/o sharp upper mid sternal pain rating anywhere from 4-6/10, worse upon taking a deep breath. Analgesia given. No nausea or SOB. Admission profile completed. Updated pt on plan of care. Pt's family member at bedside.

## 2018-09-28 NOTE — ASSESSMENT & PLAN NOTE
Likely secondary to chronic opiate use  Will initiate 3 times daily MiraLAX, Reglan  This is likely responsible for the loop of bowel seen between her stomach and her left hemidiaphragm and may actually be the cause of her chest pain  However given her cardiac risk factors and age we will proceed to rule out cardiac disease.

## 2018-09-28 NOTE — ASSESSMENT & PLAN NOTE
Patient is on chronic antidepressant therapy but she is still somewhat tearful regarding the recent loss of her niece

## 2018-09-28 NOTE — ASSESSMENT & PLAN NOTE
Patient's chest pain is very atypical and she has tenderness on palpation  She has loop of bowel noted under her left hemidiaphragm  She had cardiac evaluation approximately 2-1/2 years ago  However she does have risk factors so we will proceed with serial troponins and stress test

## 2018-09-28 NOTE — H&P
Hospital Medicine History & Physical Note    Date of Service  9/27/2018    Primary Care Physician  Loy Miles M.D.    Consultants  none    Code Status  Full    Chief Complaint  Chest Pain, Malaise    History of Presenting Illness  58 y.o. female with past medical history of hypertension who presented 9/27/2018 complaining of feeling of malaise and having chest pain onset 7 AM today.  She describes it as a left parasternal pressure and aching sensation but also describes it as tight, it does get worse when she takes a deep breath but she does not say that it is stabbing .  It does not make her short of breath she has not been nauseated or diaphoretic, it does radiate toward her left jaw, she feels cold, disoriented and somewhat foggy, she had some transient dizziness.  She has had no cough or congestion no sputum, no upper respiratory complaint.  She has chronic daily headaches for the last 2 years and these are unchanged.  She had similar chest pain 2 years ago but it was more intermittent in nature, at that time her stress test and echocardiogram were unremarkable.  The chest pain occurred at rest and it does not have an exertional component, the patient initially did not want to stay in the emergency room or be admitted for testing however due to her continued sense of malaise she decided to stay.    Review of Systems  Review of Systems   Constitutional: Positive for chills (Describes cold sensation). Negative for diaphoresis, fever, malaise/fatigue and weight loss.   HENT: Negative for congestion, sinus pain and sore throat.    Eyes: Negative for discharge and redness.   Respiratory: Negative for cough, sputum production and shortness of breath.    Cardiovascular: Positive for chest pain. Negative for palpitations.   Gastrointestinal: Negative for abdominal pain, constipation, diarrhea, nausea and vomiting.   Genitourinary: Negative for dysuria.   Musculoskeletal: Positive for back pain and joint pain  (Chronic knee pain).   Neurological: Positive for dizziness and headaches (Chronic).        Feels mentally fuzzy   Psychiatric/Behavioral: Positive for depression (Overall controlled she is having issues grieving over her recent death of her niece). The patient is not nervous/anxious.        Past Medical History   has a past medical history of Anemia; Anginal syndrome (HCC); Anxiety; Arthritis; Dental disorder (5/24/12); High cholesterol; HTN (hypertension), benign (3/19/2012); Hypothyroid (3/19/2012); Major depression (3/19/2012); Orbital floor (blow-out) closed fracture (HCC); Other specified symptom associated with female genital organs; Pain; Pain; Pain (02/2018); Psychiatric problem; Unspecified disorder of thyroid; and Urinary incontinence. She also has no past medical history of Breast cancer (HCC).  Chronic knee pain    Surgical History   has a past surgical history that includes breast biopsy (Left, 1985/2005); lymph node excision (Left, 2007); other (Right, 2001); arthroscopy, knee (Left, 1999); rib resection (Right, 1980); block epidural steroid injection (2008); spinal cord stimulator (7/11/2011); biopsy general (5/1/12); spinal cord stimulator (5/30/2012); pump revision (12/10/2012); knee arthroscopy (4/21/2015); medial meniscectomy (4/21/2015); knee arthroscopy (Right, 7/12/2017); medial meniscectomy (Right, 7/12/2017); synovectomy (Right, 7/12/2017); knee arthroscopy (Right, 2/9/2018); and medial meniscectomy (Right, 2/9/2018).  Thoracic discectomy, 2 breast biopsies    Family History  family history includes Alcohol abuse in her father; Anesthesia in her sister; Cancer in her unknown relative; Diabetes in her father and unknown relative; Heart Disease in her father and unknown relative; Hypertension in her father and unknown relative; Lung Disease in her unknown relative; Stroke in her unknown relative.  Breast cancer, brain cancer, chronic migraines, depression    Social History   reports that she  "has never smoked. She has never used smokeless tobacco. She reports that she drinks alcohol. She reports that she does not use drugs.      Allergies  Allergies   Allergen Reactions   • Bactrim Ds Rash and Swelling     Pt states \"My hand swells up and rash all over body\".   • Sulfamethoxazole-Trimethoprim Rash and Swelling     Pt states \"My hand swells up and rash all over body\".       Medications  Prior to Admission Medications   Prescriptions Last Dose Informant Patient Reported? Taking?   HYDROcodone-acetaminophen (NORCO) 5-325 MG Tab per tablet 9/27/2018 at 0600 Patient Yes No   Sig: Take 1 Tab by mouth 2 Times a Day.   Naproxen-Esomeprazole (VIMOVO) 375-20 MG Tablet Delayed Response 9/27/2018 at 0600 Patient Yes Yes   Sig: Take 1 Tab by mouth 2 Times a Day.   asa/apap/caffeine (EXCEDRIN) 250-250-65 MG Tab 9/25/2018 at PRN Patient Yes Yes   Sig: Take 2 Tabs by mouth 1 time daily as needed for Headache.   gabapentin (NEURONTIN) 400 MG Cap 9/27/2018 at 0600 Patient Yes Yes   Sig: Take 400 mg by mouth 3 times a day.   levothyroxine (SYNTHROID) 50 MCG Tab 9/27/2018 at 0600 Patient No No   Sig: TAKE ONE (1) TABLET BY MOUTH DAILY   lisinopril-hydrochlorothiazide (PRINZIDE, ZESTORETIC) 20-12.5 MG per tablet 9/27/2018 at 0600 Patient No No   Sig: Take 1 Tab by mouth every day.   methocarbamol (ROBAXIN) 500 MG Tab 9/27/2018 at 0600 Patient Yes No   Sig: Take 500 mg by mouth 2 Times a Day.      Facility-Administered Medications: None       Physical Exam  Temp:  [36.6 °C (97.9 °F)-36.9 °C (98.5 °F)] 36.9 °C (98.5 °F)  Pulse:  [52-65] 56  Resp:  [11-16] 16  BP: (139-158)/(74-95) 148/80    Physical Exam   Constitutional: She is oriented to person, place, and time. She appears well-developed and well-nourished. No distress.   Anxious, obese   HENT:   Head: Normocephalic and atraumatic.   Right Ear: External ear normal.   Left Ear: External ear normal.   Nose: Nose normal.   Mouth/Throat: Oropharynx is clear and moist. No " oropharyngeal exudate.   Eyes: Pupils are equal, round, and reactive to light. Conjunctivae and EOM are normal. Right eye exhibits no discharge. Left eye exhibits no discharge. No scleral icterus.   Neck: Neck supple.   Cardiovascular: Normal rate and regular rhythm.    Murmur (AT BASE) heard.  Pulmonary/Chest: Effort normal and breath sounds normal. She exhibits tenderness (Left parasternal).   Abdominal: Soft. She exhibits no distension (Fullness without overt distention). There is tenderness (Epigastric).   Decreased bowel sounds   Musculoskeletal: She exhibits no edema or tenderness.   Neurological: She is alert and oriented to person, place, and time.   Skin: Skin is warm and dry. She is not diaphoretic.   Psychiatric:   Anxious, borderline tearful when discussing niece   Nursing note and vitals reviewed.      Laboratory:  Recent Labs      09/27/18   1245   WBC  6.7   RBC  3.67*   HEMOGLOBIN  11.0*   HEMATOCRIT  33.9*   MCV  92.4   MCH  30.0   MCHC  32.4*   RDW  46.5   PLATELETCT  425   MPV  10.4     Recent Labs      09/27/18   1245   SODIUM  137   POTASSIUM  3.9   CHLORIDE  104   CO2  26   GLUCOSE  92   BUN  17   CREATININE  0.79   CALCIUM  8.8     Recent Labs      09/27/18   1245   ALTSGPT  17   ASTSGOT  20   ALKPHOSPHAT  85   TBILIRUBIN  0.5   LIPASE  25   GLUCOSE  92     Recent Labs      09/27/18   1245   APTT  29.7   INR  0.94     Recent Labs      09/27/18   1245   BNPBTYPENAT  68         Recent Labs      09/27/18   1245  09/27/18   1439  09/27/18   1838   TROPONINI  <0.02  <0.02  <0.02       Urinalysis:    No results found     Imaging:  [id number] [id number]   Final Result      1. Nonobstructive bowel gas pattern. Moderate amount of stool throughout the colon.   2. Spinal cord stimulator leads as described.      DX-CHEST-LIMITED (1 VIEW)   Final Result         No acute cardiopulmonary abnormalities are identified.      NM-CARDIAC STRESS TEST    (Results Pending)         Assessment/Plan:  I anticipate this  patient will require at least two midnights for appropriate medical management, necessitating inpatient admission.    Constipated   Assessment & Plan    Likely secondary to chronic opiate use  Will initiate 3 times daily MiraLAX, Reglan  This is likely responsible for the loop of bowel seen between her stomach and her left hemidiaphragm and may actually be the cause of her chest pain  However given her cardiac risk factors and age we will proceed to rule out cardiac disease.        Opioid dependence in controlled environment (HCC)   Assessment & Plan    She is on chronic Norco for knee pain        Major depressive disorder, recurrent episode, in partial remission ([facility])- (present on admission)   Assessment & Plan    Patient is on chronic antidepressant therapy but she is still somewhat tearful regarding the recent loss of her niece        Atypical chest pain- (present on admission)   Assessment & Plan    Patient's chest pain is very atypical and she has tenderness on palpation  She has loop of bowel noted under her left hemidiaphragm  She had cardiac evaluation approximately 2-1/2 years ago  However she does have risk factors so we will proceed with serial troponins and stress test        Back pain- (present on admission)   Assessment & Plan    Chronic, in remission, has spinal stimulator        Acquired hypothyroidism- (present on admission)   Assessment & Plan    Continue levothyroxine            VTE prophylaxis: Lovenox

## 2018-09-28 NOTE — DISCHARGE INSTRUCTIONS
Discharge Instructions    Discharged to home by car with self. Discharged via walking, hospital escort: Yes.  Special equipment needed: Not Applicable    Be sure to schedule a follow-up appointment with your primary care doctor or any specialists as instructed.     Discharge Plan:   Diet Plan: Discussed  Activity Level: Discussed  Confirmed Follow up Appointment: Patient to Call and Schedule Appointment  Confirmed Symptoms Management: Discussed  Medication Reconciliation Updated: Yes  Influenza Vaccine Indication: Not indicated: Previously immunized this influenza season and > 8 years of age    I understand that a diet low in cholesterol, fat, and sodium is recommended for good health. Unless I have been given specific instructions below for another diet, I accept this instruction as my diet prescription.   Other diet: Low fat low sodium     Special Instructions: None    · Is patient discharged on Warfarin / Coumadin?   No     Depression / Suicide Risk    As you are discharged from this [facility] facility, it is important to learn how to keep safe from harming yourself.    Recognize the warning signs:  · Abrupt changes in personality, positive or negative- including increase in energy   · Giving away possessions  · Change in eating patterns- significant weight changes-  positive or negative  · Change in sleeping patterns- unable to sleep or sleeping all the time   · Unwillingness or inability to communicate  · Depression  · Unusual sadness, discouragement and loneliness  · Talk of wanting to die  · Neglect of personal appearance   · Rebelliousness- reckless behavior  · Withdrawal from people/activities they love  · Confusion- inability to concentrate     If you or a loved one observes any of these behaviors or has concerns about self-harm, here's what you can do:  · Talk about it- your feelings and reasons for harming yourself  · Remove any means that you might use to hurt yourself (examples: pills, rope,  extension cords, firearm)  · Get professional help from the community (Mental Health, Substance Abuse, psychological counseling)  · Do not be alone:Call your Safe Contact- someone whom you trust who will be there for you.  · Call your local CRISIS HOTLINE 114-1656 or 172-020-4346  · Call your local Children's Mobile Crisis Response Team [place] (501) 947-1665 or www.[employer]  · Call the toll free National Suicide Prevention Hotlines   · National Suicide Prevention Lifeline 846-081-XQMY (9748)  · [employer] Hope Line Network 800-SUICIDE (726-5573)    Chest Pain, Nonspecific  It is often hard to give a specific diagnosis for the cause of chest pain. There is always a chance that your pain could be related to something serious, like a heart attack or a blood clot in the lungs. You need to follow up with your caregiver for further evaluation. More lab tests or other studies such as X-rays, electrocardiography, stress testing, or cardiac imaging may be needed to find the cause of your pain.  Most of the time, nonspecific chest pain improves within 2 to 3 days with rest and mild pain medicine. For the next few days, avoid physical exertion or activities that bring on pain. Do not smoke. Avoid drinking alcohol. Call your caregiver for routine follow-up as advised.   SEEK IMMEDIATE MEDICAL CARE IF:  · You develop increased chest pain or pain that radiates to the arm, neck, jaw, back, or abdomen.   · You develop shortness of breath, increased coughing, or you start coughing up blood.   · You have severe back or abdominal pain, nausea, or vomiting.   · You develop severe weakness, fainting, fever, or chills.   Document Released: 12/18/2006 Document Revised: 03/11/2013 Document Reviewed: 06/06/2008  [employer]® Patient Information ©2013 [employer].

## 2018-09-29 NOTE — DISCHARGE SUMMARY
"Discharge Summary    CHIEF COMPLAINT ON ADMISSION  Chief Complaint   Patient presents with   • Chest Pain     started this morning, described as \"sharp\"       Reason for Admission  Chest Pains     Admission Date  9/27/2018    CODE STATUS  Prior    HPI & HOSPITAL COURSE  58 y.o. female with hx of hypertension who presented 9/27/2018 complaining of feeling of malaise and having chest painThe chest pain occurred at rest and it does not have an exertional component, the patient initially did not want to stay in the emergency room or be admitted for testing however due to her continued sense of malaise she decided to stay.she was admitted for ACS rull out. VSS remained stable, labs were checked, no white count, hgb stable at 11.7, CMP was checked, electrolytes were replaced if needed, Lipids were obtained, troponins were checked and neg x 4, BNP was normal. She had a cardiac stress test which was negative   No evidence of significant jeopardized viable myocardium or prior myocardial    infarction.   Normal left ventricular size, ejection fraction, and wall motion.   ECG INTERPRETATION   Negative stress ECG for ischemia.    At this time patient denied any further chest pain, she was medical stable for dc and fu outpatient with PCP. Patient understood and agreed with the above plan       Therefore, she is discharged in good and stable condition to home with close outpatient follow-up.    The patient recovered much more quickly than anticipated on admission.    Discharge Date  9/28/2018    FOLLOW UP ITEMS POST DISCHARGE  pcp    DISCHARGE DIAGNOSES  Active Problems:    Acquired hypothyroidism POA: Yes    Back pain (Chronic) POA: Yes      Overview: [facility] spinal stimulator    Major depressive disorder, recurrent episode, in partial remission (HCC) POA: Yes    Opioid dependence in controlled environment (HCC) POA: Unknown  Resolved Problems:    Atypical chest pain POA: Yes    Constipated POA: Unknown    Bibasilar " "crackles POA: Unknown      FOLLOW UP  Future Appointments  Date Time Provider Department Center   10/10/2018 8:20 AM Loy Miles M.D. [facility] Miles M.D.  [address]  [place] [id number]  [phone]    In 1 week  Return to ED , If symptoms worsen      MEDICATIONS ON DISCHARGE     Medication List      START taking these medications      Instructions   metoclopramide 10 MG Tabs  Commonly known as:  REGLAN   Take 1 Tab by mouth 4 Times a Day,Before Meals and at Bedtime for 10 days.  Dose:  10 mg     polyethylene glycol/lytes Pack  Commonly known as:  MIRALAX   Take 1 Packet by mouth every day for 3 days.  Dose:  17 g        CONTINUE taking these medications      Instructions   asa/apap/caffeine 250-250-65 MG Tabs  Commonly known as:  EXCEDRIN   Take 2 Tabs by mouth 1 time daily as needed for Headache.  Dose:  2 Tab     gabapentin 400 MG Caps  Commonly known as:  NEURONTIN   Take 400 mg by mouth 3 times a day.  Dose:  400 mg     HYDROcodone-acetaminophen 5-325 MG Tabs per tablet  Commonly known as:  NORCO   Take 1 Tab by mouth 2 Times a Day.  Dose:  1 Tab     levothyroxine 50 MCG Tabs  Commonly known as:  SYNTHROID   TAKE ONE (1) TABLET BY MOUTH DAILY     lisinopril-hydrochlorothiazide 20-12.5 MG per tablet  Commonly known as:  PRINZIDE, ZESTORETIC   Take 1 Tab by mouth every day.  Dose:  1 Tab     methocarbamol 500 MG Tabs  Commonly known as:  ROBAXIN   Take 500 mg by mouth 2 Times a Day.  Dose:  500 mg     VIMOVO 375-20 MG Tbec  Generic drug:  Naproxen-Esomeprazole   Take 1 Tab by mouth 2 Times a Day.  Dose:  1 Tab            Allergies  Allergies   Allergen Reactions   • Bactrim Ds Rash and Swelling     Pt states \"My hand swells up and rash all over body\".   • Sulfamethoxazole-Trimethoprim Rash and Swelling     Pt states \"My hand swells up and rash all over body\".       DIET  No orders of the defined types were placed in this encounter.      ACTIVITY  As tolerated.  Weight " bearing as tolerated    CONSULTATIONS  none    PROCEDURES  none    LABORATORY  Lab Results   Component Value Date    SODIUM 137 09/27/2018    POTASSIUM 3.9 09/27/2018    CHLORIDE 104 09/27/2018    CO2 26 09/27/2018    GLUCOSE 92 09/27/2018    BUN 17 09/27/2018    CREATININE 0.79 09/27/2018        Lab Results   Component Value Date    WBC 6.7 09/27/2018    HEMOGLOBIN 11.0 (L) 09/27/2018    HEMATOCRIT 33.9 (L) 09/27/2018    PLATELETCT 425 09/27/2018        Total time of the discharge process exceeds 45 minutes.

## 2018-10-06 ENCOUNTER — HOSPITAL ENCOUNTER (OUTPATIENT)
Dept: LAB | Facility: MEDICAL CENTER | Age: 58
End: 2018-10-06
Attending: NURSE PRACTITIONER
Payer: COMMERCIAL

## 2018-10-06 DIAGNOSIS — E78.5 DYSLIPIDEMIA: ICD-10-CM

## 2018-10-06 DIAGNOSIS — E03.9 ACQUIRED HYPOTHYROIDISM: ICD-10-CM

## 2018-10-06 DIAGNOSIS — R73.02 IGT (IMPAIRED GLUCOSE TOLERANCE): ICD-10-CM

## 2018-10-06 DIAGNOSIS — I10 HTN (HYPERTENSION), BENIGN: Chronic | ICD-10-CM

## 2018-10-06 LAB
ALBUMIN SERPL BCP-MCNC: 3.8 G/DL (ref 3.2–4.9)
ALBUMIN/GLOB SERPL: 1.3 G/DL
ALP SERPL-CCNC: 94 U/L (ref 30–99)
ALT SERPL-CCNC: 9 U/L (ref 2–50)
ANION GAP SERPL CALC-SCNC: 9 MMOL/L (ref 0–11.9)
AST SERPL-CCNC: 14 U/L (ref 12–45)
BILIRUB SERPL-MCNC: 0.3 MG/DL (ref 0.1–1.5)
BUN SERPL-MCNC: 23 MG/DL (ref 8–22)
CALCIUM SERPL-MCNC: 9.6 MG/DL (ref 8.5–10.5)
CHLORIDE SERPL-SCNC: 104 MMOL/L (ref 96–112)
CHOLEST SERPL-MCNC: 210 MG/DL (ref 100–199)
CO2 SERPL-SCNC: 26 MMOL/L (ref 20–33)
CREAT SERPL-MCNC: 0.73 MG/DL (ref 0.5–1.4)
EST. AVERAGE GLUCOSE BLD GHB EST-MCNC: 137 MG/DL
FASTING STATUS PATIENT QL REPORTED: NORMAL
GLOBULIN SER CALC-MCNC: 3 G/DL (ref 1.9–3.5)
GLUCOSE SERPL-MCNC: 110 MG/DL (ref 65–99)
HBA1C MFR BLD: 6.4 % (ref 0–5.6)
HDLC SERPL-MCNC: 63 MG/DL
LDLC SERPL CALC-MCNC: 119 MG/DL
POTASSIUM SERPL-SCNC: 3.9 MMOL/L (ref 3.6–5.5)
PROT SERPL-MCNC: 6.8 G/DL (ref 6–8.2)
SODIUM SERPL-SCNC: 139 MMOL/L (ref 135–145)
TRIGL SERPL-MCNC: 138 MG/DL (ref 0–149)
TSH SERPL DL<=0.005 MIU/L-ACNC: 1.12 UIU/ML (ref 0.38–5.33)

## 2018-10-06 PROCEDURE — 80061 LIPID PANEL: CPT

## 2018-10-06 PROCEDURE — 80053 COMPREHEN METABOLIC PANEL: CPT

## 2018-10-06 PROCEDURE — 83036 HEMOGLOBIN GLYCOSYLATED A1C: CPT

## 2018-10-06 PROCEDURE — 36415 COLL VENOUS BLD VENIPUNCTURE: CPT

## 2018-10-06 PROCEDURE — 84443 ASSAY THYROID STIM HORMONE: CPT

## 2018-10-10 ENCOUNTER — OFFICE VISIT (OUTPATIENT)
Dept: MEDICAL GROUP | Facility: MEDICAL CENTER | Age: 58
End: 2018-10-10
Payer: COMMERCIAL

## 2018-10-10 VITALS
RESPIRATION RATE: 16 BRPM | HEART RATE: 81 BPM | WEIGHT: 202 LBS | OXYGEN SATURATION: 95 % | BODY MASS INDEX: 32.47 KG/M2 | SYSTOLIC BLOOD PRESSURE: 124 MMHG | DIASTOLIC BLOOD PRESSURE: 78 MMHG | TEMPERATURE: 97.6 F | HEIGHT: 66 IN

## 2018-10-10 DIAGNOSIS — E03.9 ACQUIRED HYPOTHYROIDISM: ICD-10-CM

## 2018-10-10 DIAGNOSIS — F41.9 ANXIETY: ICD-10-CM

## 2018-10-10 DIAGNOSIS — I10 HTN (HYPERTENSION), BENIGN: Chronic | ICD-10-CM

## 2018-10-10 DIAGNOSIS — D64.9 ANEMIA, UNSPECIFIED TYPE: ICD-10-CM

## 2018-10-10 DIAGNOSIS — E78.5 DYSLIPIDEMIA: ICD-10-CM

## 2018-10-10 DIAGNOSIS — R73.02 IGT (IMPAIRED GLUCOSE TOLERANCE): ICD-10-CM

## 2018-10-10 PROBLEM — Z63.4 BEREAVEMENT: Status: RESOLVED | Noted: 2017-02-06 | Resolved: 2018-10-10

## 2018-10-10 PROCEDURE — 99215 OFFICE O/P EST HI 40 MIN: CPT | Performed by: INTERNAL MEDICINE

## 2018-10-10 RX ORDER — ATORVASTATIN CALCIUM 20 MG/1
20 TABLET, FILM COATED ORAL DAILY
Qty: 90 TAB | Refills: 3 | Status: SHIPPED | OUTPATIENT
Start: 2018-10-10 | End: 2019-12-05 | Stop reason: SDUPTHER

## 2018-10-10 RX ORDER — HYDROXYZINE HYDROCHLORIDE 25 MG/1
25 TABLET, FILM COATED ORAL 3 TIMES DAILY PRN
Qty: 30 TAB | Refills: 0 | Status: SHIPPED | OUTPATIENT
Start: 2018-10-10 | End: 2019-09-04

## 2018-10-10 RX ORDER — METFORMIN HYDROCHLORIDE 500 MG/1
500 TABLET, EXTENDED RELEASE ORAL 2 TIMES DAILY
Qty: 180 TAB | Refills: 3 | Status: SHIPPED
Start: 2018-10-10 | End: 2020-01-08

## 2018-10-10 NOTE — PROGRESS NOTES
CC: Follow-up hospitalization anxiety as well as a outpatient blood work.    HPI:   Debby presents today with the following.    1. Anxiety  Resents after being hospitalized for chest pain.  Full cardiac  workup including stress test did not reveal any cardiac sources.  She has had previous events of this in times of stress and currently the working diagnosis.  She does have some mild persistent chest pains but nothing with activity.  She was placed on benzodiazepines but is now on narcotics and cannot be on both together.  She is failed other medications cannot recall what psychiatry put her on.    2. IGT (impaired glucose tolerance)  Blood sugars persistently elevated they have gone up to 6.4.  Her weight is significantly elevated since she was last seen a year ago.    3. Dyslipidemia  Cholesterol not in a terrible range but she is collecting risk factors.  She does have a family history of heart disease and LDL is 120.    4. HTN (hypertension), benign  Well-controlled on current regimen.  Denying any side effects to medication    5. Acquired hypothyroidism  Thyroid adequately replaced with a TSH of 1.5 denying any chest pain or palpitations no hair or skin changes.    6. Anemia, unspecified type  Chronic anemia she reports she has had since she is a child has not significantly changed MCV is normal      Patient Active Problem List    Diagnosis Date Noted   • Opioid dependence in controlled environment ([facility] located [facility]) 09/27/2018   • Obesity (BMI 30-39.9) 07/09/2018   • IGT (impaired glucose tolerance) 09/29/2017   • Osteoarthritis of right knee 07/12/2017   • Major depressive disorder, recurrent episode, in partial remission ([facility]) 05/05/2017   • Anxiety disorder 10/24/2016   • Osteoarthrosis involving lower leg 04/21/2015   • Dyslipidemia 12/02/2014   • HTN (hypertension), benign 03/19/2012   • Acquired hypothyroidism 03/19/2012   • Back pain 03/19/2012   • Neck pain 03/19/2012       Current Outpatient Prescriptions  "  Medication Sig Dispense Refill   • metFORMIN ER (GLUCOPHAGE XR) 500 MG TABLET SR 24 HR Take 1 Tab by mouth 2 times a day. 180 Tab 3   • atorvastatin (LIPITOR) 20 MG Tab Take 1 Tab by mouth every day. 90 Tab 3   • hydrOXYzine HCl (ATARAX) 25 MG Tab Take 1 Tab by mouth 3 times a day as needed for Anxiety. 30 Tab 0   • gabapentin (NEURONTIN) 400 MG Cap Take 400 mg by mouth 3 times a day.     • lisinopril-hydrochlorothiazide (PRINZIDE, ZESTORETIC) 20-12.5 MG per tablet Take 1 Tab by mouth every day. 90 Tab 1   • HYDROcodone-acetaminophen (NORCO) 5-325 MG Tab per tablet Take 1 Tab by mouth 2 Times a Day.     • methocarbamol (ROBAXIN) 500 MG Tab Take 500 mg by mouth 2 Times a Day.     • levothyroxine (SYNTHROID) 50 MCG Tab TAKE ONE (1) TABLET BY MOUTH DAILY 90 Tab 3   • asa/apap/caffeine (EXCEDRIN) 250-250-65 MG Tab Take 2 Tabs by mouth 1 time daily as needed for Headache.       No current facility-administered medications for this visit.          Allergies as of 10/10/2018 - Reviewed 10/10/2018   Allergen Reaction Noted   • Bactrim ds Rash and Swelling 05/14/2011   • Sulfamethoxazole-trimethoprim Rash and Swelling 05/23/2016        ROS: Denies changes to bowel or bladder, SOB, LE edema.    /78   Pulse 81   Temp 36.4 °C (97.6 °F)   Resp 16   Ht 1.67 m (5' 5.75\")   Wt 91.6 kg (202 lb)   LMP 02/26/2012   SpO2 95%   BMI 32.85 kg/m²     Physical Exam:  Gen:         Alert and oriented, No apparent distress.  Neck:        No Lymphadenopathy or Bruits.  Lungs:     Clear to auscultation bilaterally  CV:          Regular rate and rhythm. No murmurs, rubs or gallops.               Ext:          No clubbing, cyanosis, edema.      Assessment and Plan.   58 y.o. female with the following issues.    1. Anxiety  Chest pain thought likely related to anxiety have given hydroxyzine.  She will get the names of the other medication she is tried.  She does have components of possible fibromyalgia may consider Cymbalta if she " is not tried in the past  - hydrOXYzine HCl (ATARAX) 25 MG Tab; Take 1 Tab by mouth 3 times a day as needed for Anxiety.  Dispense: 30 Tab; Refill: 0    2. IGT (impaired glucose tolerance)  Given her A1c going up to 6.4 starting on metformin.  Recheck blood in 3 months  - metFORMIN ER (GLUCOPHAGE XR) 500 MG TABLET SR 24 HR; Take 1 Tab by mouth 2 times a day.  Dispense: 180 Tab; Refill: 3  - HEMOGLOBIN A1C; Future    3. Dyslipidemia  Given multiple risk factors starting on atorvastatin caution about side effects will see back in 3 months.  - atorvastatin (LIPITOR) 20 MG Tab; Take 1 Tab by mouth every day.  Dispense: 90 Tab; Refill: 3  - COMP METABOLIC PANEL; Future  - LIPID PROFILE; Future    4. HTN (hypertension), benign  Currently well controlled, Discuss diet, exercise and salt restriction.  No change to medication therapy.    5. Acquired hypothyroidism  Currently adequately replaced, recheck 6 months to one year.    6. Anemia, unspecified type  Expanded blood workup.  - CBC WITH DIFFERENTIAL; Future  - FOLATE; Future  - VITAMIN B12; Future  - FERRITIN; Future  - IRON/TOTAL IRON BIND; Future    Over 40 minutes was spent with the patient of which over 50% of the time was spent with face-to-face patient education and care coordination.

## 2018-10-12 ENCOUNTER — APPOINTMENT (OUTPATIENT)
Dept: SOCIAL WORK | Facility: CLINIC | Age: 58
End: 2018-10-12

## 2018-10-24 DIAGNOSIS — I10 HTN (HYPERTENSION), BENIGN: Chronic | ICD-10-CM

## 2018-10-24 DIAGNOSIS — E03.9 ACQUIRED HYPOTHYROIDISM: ICD-10-CM

## 2018-10-24 RX ORDER — LEVOTHYROXINE SODIUM 0.05 MG/1
TABLET ORAL
Qty: 90 TAB | Refills: 3 | Status: SHIPPED | OUTPATIENT
Start: 2018-10-24 | End: 2019-12-06 | Stop reason: SDUPTHER

## 2018-10-24 RX ORDER — LISINOPRIL AND HYDROCHLOROTHIAZIDE 20; 12.5 MG/1; MG/1
1 TABLET ORAL DAILY
Qty: 90 TAB | Refills: 1 | Status: SHIPPED | OUTPATIENT
Start: 2018-10-24 | End: 2019-04-25 | Stop reason: SDUPTHER

## 2018-11-15 ENCOUNTER — OFFICE VISIT (OUTPATIENT)
Dept: URGENT CARE | Facility: MEDICAL CENTER | Age: 58
End: 2018-11-15
Payer: COMMERCIAL

## 2018-11-15 VITALS
TEMPERATURE: 96.8 F | HEART RATE: 66 BPM | OXYGEN SATURATION: 96 % | BODY MASS INDEX: 32.47 KG/M2 | HEIGHT: 66 IN | DIASTOLIC BLOOD PRESSURE: 78 MMHG | WEIGHT: 202 LBS | RESPIRATION RATE: 16 BRPM | SYSTOLIC BLOOD PRESSURE: 120 MMHG

## 2018-11-15 DIAGNOSIS — H66.002 ACUTE SUPPURATIVE OTITIS MEDIA OF LEFT EAR WITHOUT SPONTANEOUS RUPTURE OF TYMPANIC MEMBRANE, RECURRENCE NOT SPECIFIED: ICD-10-CM

## 2018-11-15 PROCEDURE — 99214 OFFICE O/P EST MOD 30 MIN: CPT | Performed by: PHYSICIAN ASSISTANT

## 2018-11-15 RX ORDER — AMOXICILLIN 500 MG/1
1000 CAPSULE ORAL 2 TIMES DAILY
Qty: 28 CAP | Refills: 0 | Status: SHIPPED | OUTPATIENT
Start: 2018-11-15 | End: 2018-11-21

## 2018-11-15 ASSESSMENT — ENCOUNTER SYMPTOMS
MUSCULOSKELETAL NEGATIVE: 1
SHORTNESS OF BREATH: 0
ABDOMINAL PAIN: 0
DIARRHEA: 0
CHILLS: 0
FEVER: 0
DIZZINESS: 0
COUGH: 0
SORE THROAT: 0
SPUTUM PRODUCTION: 0
NAUSEA: 0
VOMITING: 0

## 2018-11-16 NOTE — PROGRESS NOTES
"Subjective:      Debby Desai is a 58 y.o. female who presents with Otalgia            Otalgia    There is pain in both ears. This is a new problem. The current episode started in the past 7 days (4 days). The problem occurs constantly. The problem has been unchanged. There has been no fever. The pain is at a severity of 2/10. The pain is mild. Associated symptoms include hearing loss. Pertinent negatives include no abdominal pain, coughing, diarrhea, ear discharge, rash, sore throat or vomiting. She has tried acetaminophen for the symptoms. The treatment provided mild relief. There is no history of a chronic ear infection, hearing loss or a tympanostomy tube.       Review of Systems   Constitutional: Negative for chills and fever.   HENT: Positive for ear pain and hearing loss. Negative for congestion, ear discharge and sore throat.    Respiratory: Negative for cough, sputum production and shortness of breath.    Cardiovascular: Negative for chest pain.   Gastrointestinal: Negative for abdominal pain, diarrhea, nausea and vomiting.   Genitourinary: Negative.    Musculoskeletal: Negative.    Skin: Negative for rash.   Neurological: Negative for dizziness.          Objective:     /78 (BP Location: Left arm, Patient Position: Sitting, BP Cuff Size: Large adult)   Pulse 66   Temp 36 °C (96.8 °F) (Temporal)   Resp 16   Ht 1.67 m (5' 5.75\")   Wt 91.6 kg (202 lb)   LMP 02/26/2012   SpO2 96%   BMI 32.85 kg/m²      Physical Exam   Constitutional: She is oriented to person, place, and time. She appears well-developed and well-nourished. No distress.   HENT:   Head: Normocephalic and atraumatic.   Right Ear: Hearing, tympanic membrane, external ear and ear canal normal.   Left Ear: Hearing, external ear and ear canal normal. Tympanic membrane is erythematous and bulging.   Nose: Nose normal. Right sinus exhibits no maxillary sinus tenderness and no frontal sinus tenderness. Left sinus exhibits no maxillary " sinus tenderness and no frontal sinus tenderness.   Mouth/Throat: Oropharynx is clear and moist. No oropharyngeal exudate, posterior oropharyngeal edema or posterior oropharyngeal erythema.   Eyes: Pupils are equal, round, and reactive to light. Conjunctivae are normal. Right eye exhibits no discharge. Left eye exhibits no discharge.   Neck: Normal range of motion.   Cardiovascular: Normal rate, regular rhythm and normal heart sounds.    No murmur heard.  Pulmonary/Chest: Effort normal and breath sounds normal. No respiratory distress. She has no wheezes.   Musculoskeletal: Normal range of motion.   Lymphadenopathy:     She has no cervical adenopathy.   Neurological: She is alert and oriented to person, place, and time.   Skin: Skin is warm and dry. She is not diaphoretic.   Psychiatric: She has a normal mood and affect. Her behavior is normal.   Nursing note and vitals reviewed.              PMH:  has a past medical history of Anemia; Anginal syndrome (HCC); Anxiety; Arthritis; Dental disorder (5/24/12); High cholesterol; HTN (hypertension), benign (3/19/2012); Hypothyroid (3/19/2012); Major depression (3/19/2012); Orbital floor (blow-out) closed fracture (HCC); Other specified symptom associated with female genital organs; Pain; Pain; Pain (02/2018); Psychiatric problem; Unspecified disorder of thyroid; and Urinary incontinence. She also has no past medical history of Breast cancer ([facility]).  MEDS:   Current Outpatient Prescriptions:   •  amoxicillin (AMOXIL) 500 MG Cap, Take 2 Caps by mouth 2 times a day for 7 days., Disp: 28 Cap, Rfl: 0  •  lisinopril-hydrochlorothiazide (PRINZIDE, ZESTORETIC) 20-12.5 MG per tablet, Take 1 Tab by mouth every day., Disp: 90 Tab, Rfl: 1  •  levothyroxine (SYNTHROID) 50 MCG Tab, TAKE ONE (1) TABLET BY MOUTH DAILY, Disp: 90 Tab, Rfl: 3  •  metFORMIN ER (GLUCOPHAGE XR) 500 MG TABLET SR 24 HR, Take 1 Tab by mouth 2 times a day., Disp: 180 Tab, Rfl: 3  •  atorvastatin (LIPITOR) 20 MG Tab,  "Take 1 Tab by mouth every day., Disp: 90 Tab, Rfl: 3  •  hydrOXYzine HCl (ATARAX) 25 MG Tab, Take 1 Tab by mouth 3 times a day as needed for Anxiety., Disp: 30 Tab, Rfl: 0  •  gabapentin (NEURONTIN) 400 MG Cap, Take 400 mg by mouth 3 times a day., Disp: , Rfl:   •  asa/apap/caffeine (EXCEDRIN) 250-250-65 MG Tab, Take 2 Tabs by mouth 1 time daily as needed for Headache., Disp: , Rfl:   •  HYDROcodone-acetaminophen (NORCO) 5-325 MG Tab per tablet, Take 1 Tab by mouth 2 Times a Day., Disp: , Rfl:   •  methocarbamol (ROBAXIN) 500 MG Tab, Take 500 mg by mouth 2 Times a Day., Disp: , Rfl:   ALLERGIES:   Allergies   Allergen Reactions   • Bactrim Ds Rash and Swelling     Pt states \"My hand swells up and rash all over body\".   • Sulfamethoxazole-Trimethoprim Rash and Swelling     Pt states \"My hand swells up and rash all over body\".     SURGHX:   Past Surgical History:   Procedure Laterality Date   • KNEE ARTHROSCOPY Right 2/9/2018    Procedure: KNEE ARTHROSCOPY;  Surgeon: Harsh Baltazar M.D.;  Location: [facility];  Service: Orthopedics   • MEDIAL MENISCECTOMY Right 2/9/2018    Procedure: MEDIAL AND LATERAL MENISCECTOMY- PARTIAL;  Surgeon: Harsh Baltazar M.D.;  Location: [facility];  Service: Orthopedics   • KNEE ARTHROSCOPY Right 7/12/2017    Procedure: KNEE ARTHROSCOPY- CESPEDES;  Surgeon: Harsh Baltazar M.D.;  Location: [facility];  Service:    • MEDIAL MENISCECTOMY Right 7/12/2017    Procedure: PARTIAL MEDIAL AND LATERAL MENISCECTOMY;  Surgeon: Harsh Baltazar M.D.;  Location: [facility];  Service:    • SYNOVECTOMY Right 7/12/2017    Procedure: SYNOVECTOMY;  Surgeon: Harsh Baltazar M.D.;  Location: [facility];  Service:    • KNEE ARTHROSCOPY  4/21/2015    Performed by Rufus Saba M.D. at [facility]   • MEDIAL MENISCECTOMY  4/21/2015    [facility]   • " PUMP REVISION  12/10/2012    Performed by Allison Guerra M.D. at SUR[facility]ERY [facility]   • SPINAL CORD STIMULATOR  5/30/2012    Performed by ALLISON GUERRA at SURGERY [facility]   • BIOPSY GENERAL  5/1/12    endometrial   • SPINAL CORD STIMULATOR  7/11/2011    Performed by ALLISON GUERRA at SURGERY [facility]   • BLOCK EPIDURAL STEROID INJECTION  2008    x 3-4   • LYMPH NODE EXCISION Left 2007    cervicle   • OTHER Right 2001    thoracic discectomy     • ARTHROSCOPY, KNEE Left 1999   • RIB RESECTION Right 1980   • BREAST BIOPSY Left 1985/2005    x 2     SOCHX:  reports that she has never smoked. She has never used smokeless tobacco. She reports that she drinks alcohol. She reports that she does not use drugs.  FH: family history includes Alcohol abuse in her father; Anesthesia in her sister; Cancer in her unknown relative; Diabetes in her father and unknown relative; Heart Disease in her father and unknown relative; Hypertension in her father and unknown relative; Lung Disease in her unknown relative; Stroke in her unknown relative.    Assessment/Plan:     1. Acute suppurative otitis media of left ear without spontaneous rupture of tympanic membrane, recurrence not specified  - amoxicillin (AMOXIL) 500 MG Cap; Take 2 Caps by mouth 2 times a day for 7 days.  Dispense: 28 Cap; Refill: 0   - Complete full course of antibiotics as prescribed     Call or return to office if symptoms persist or worsen. The patient demonstrated a good understanding and agreed with the treatment plan.

## 2018-11-21 ENCOUNTER — OFFICE VISIT (OUTPATIENT)
Dept: URGENT CARE | Facility: MEDICAL CENTER | Age: 58
End: 2018-11-21
Payer: COMMERCIAL

## 2018-11-21 VITALS
OXYGEN SATURATION: 96 % | DIASTOLIC BLOOD PRESSURE: 78 MMHG | SYSTOLIC BLOOD PRESSURE: 124 MMHG | WEIGHT: 202 LBS | BODY MASS INDEX: 32.47 KG/M2 | HEART RATE: 78 BPM | TEMPERATURE: 97.2 F | HEIGHT: 66 IN

## 2018-11-21 DIAGNOSIS — H66.002 ACUTE SUPPURATIVE OTITIS MEDIA OF LEFT EAR WITHOUT SPONTANEOUS RUPTURE OF TYMPANIC MEMBRANE, RECURRENCE NOT SPECIFIED: Primary | ICD-10-CM

## 2018-11-21 PROCEDURE — 99214 OFFICE O/P EST MOD 30 MIN: CPT | Performed by: PHYSICIAN ASSISTANT

## 2018-11-21 RX ORDER — OFLOXACIN 3 MG/ML
5 SOLUTION AURICULAR (OTIC) DAILY
Qty: 10 ML | Refills: 0 | Status: SHIPPED | OUTPATIENT
Start: 2018-11-21 | End: 2018-11-28

## 2018-11-21 RX ORDER — AMOXICILLIN AND CLAVULANATE POTASSIUM 875; 125 MG/1; MG/1
1 TABLET, FILM COATED ORAL 2 TIMES DAILY
Qty: 14 TAB | Refills: 0 | Status: SHIPPED | OUTPATIENT
Start: 2018-11-21 | End: 2018-11-28

## 2018-11-21 ASSESSMENT — ENCOUNTER SYMPTOMS
CONSTIPATION: 0
ABDOMINAL PAIN: 0
FEVER: 0
SHORTNESS OF BREATH: 0
COUGH: 0
DIARRHEA: 0
SINUS PAIN: 1
CHILLS: 0
VOMITING: 0
SORE THROAT: 0
NAUSEA: 0

## 2018-11-22 NOTE — PROGRESS NOTES
Subjective:   Debby Desai is a 58 y.o. female who presents for Otalgia (Left ear ache/sinus pressure/headaches-prev seen not feeling better )        Patient presents to clinic with 9-day history of otalgia.  She was seen in the urgent care and treated for a left-sided ear infection on November 15.  She was treated with a course of amoxicillin.  She is currently on day 6 with no improvement.  She is having decreased hearing on the left side.  She does have a history of recurrent ear infections.  She has had increased sinus pressure on the left side.  She is worried she does have a history of a maxillary fracture causing a displacement of some of the orbital soft tissue.  She has noticed some redness and tenderness to the left maxillary sinuses.  No change in vision or blurred vision.  No fever, chills.  No nausea, vomiting or diarrhea.  She is currently taking ibuprofen for pain.      Review of Systems   Constitutional: Negative for chills, fever and malaise/fatigue.   HENT: Positive for congestion, ear pain and sinus pain. Negative for ear discharge and sore throat.    Respiratory: Negative for cough and shortness of breath.    Gastrointestinal: Negative for abdominal pain, constipation, diarrhea, nausea and vomiting.   All other systems reviewed and are negative.      PMH:  has a past medical history of Anemia; Anginal syndrome (HCC); Anxiety; Arthritis; Dental disorder (5/24/12); High cholesterol; HTN (hypertension), benign (3/19/2012); Hypothyroid (3/19/2012); Major depression (3/19/2012); Orbital floor (blow-out) closed fracture (HCC); Other specified symptom associated with female genital organs; Pain; Pain; Pain (02/2018); Psychiatric problem; Unspecified disorder of thyroid; and Urinary incontinence. She also has no past medical history of Breast cancer (HCC).  MEDS:   Current Outpatient Prescriptions:   •  Naproxen-Esomeprazole (VIMOVO PO), Take  by mouth., Disp: , Rfl:   •  amoxicillin-clavulanate  "(AUGMENTIN) 875-125 MG Tab, Take 1 Tab by mouth 2 times a day for 7 days., Disp: 14 Tab, Rfl: 0  •  ofloxacin otic sol (FLOXIN OTIC) 0.3 % Solution, Place 5 Drops in ear every day for 7 days., Disp: 10 mL, Rfl: 0  •  lisinopril-hydrochlorothiazide (PRINZIDE, ZESTORETIC) 20-12.5 MG per tablet, Take 1 Tab by mouth every day., Disp: 90 Tab, Rfl: 1  •  levothyroxine (SYNTHROID) 50 MCG Tab, TAKE ONE (1) TABLET BY MOUTH DAILY, Disp: 90 Tab, Rfl: 3  •  metFORMIN ER (GLUCOPHAGE XR) 500 MG TABLET SR 24 HR, Take 1 Tab by mouth 2 times a day., Disp: 180 Tab, Rfl: 3  •  atorvastatin (LIPITOR) 20 MG Tab, Take 1 Tab by mouth every day., Disp: 90 Tab, Rfl: 3  •  hydrOXYzine HCl (ATARAX) 25 MG Tab, Take 1 Tab by mouth 3 times a day as needed for Anxiety., Disp: 30 Tab, Rfl: 0  •  gabapentin (NEURONTIN) 400 MG Cap, Take 400 mg by mouth 3 times a day., Disp: , Rfl:   •  asa/apap/caffeine (EXCEDRIN) 250-250-65 MG Tab, Take 2 Tabs by mouth 1 time daily as needed for Headache., Disp: , Rfl:   •  HYDROcodone-acetaminophen (NORCO) 5-325 MG Tab per tablet, Take 1 Tab by mouth 2 Times a Day., Disp: , Rfl:   •  methocarbamol (ROBAXIN) 500 MG Tab, Take 500 mg by mouth 2 Times a Day., Disp: , Rfl:   ALLERGIES:   Allergies   Allergen Reactions   • Bactrim Ds Rash and Swelling     Pt states \"My hand swells up and rash all over body\".   • Sulfamethoxazole-Trimethoprim Rash and Swelling     Pt states \"My hand swells up and rash all over body\".     SURGHX:   Past Surgical History:   Procedure Laterality Date   • KNEE ARTHROSCOPY Right 2/9/2018    Procedure: KNEE ARTHROSCOPY;  Surgeon: Harsh Baltazar M.D.;  Location: SURGERY [facility];  Service: Orthopedics   • MEDIAL MENISCECTOMY Right 2/9/2018    Procedure: MEDIAL AND LATERAL MENISCECTOMY- PARTIAL;  Surgeon: Harsh Baltazar M.D.;  Location: SURGERY [facility];  Service: Orthopedics   • KNEE ARTHROSCOPY Right 7/12/2017    Procedure: KNEE ARTHROSCOPY- CESPEDES;  Surgeon: " "Harsh Baltazar M.D.;  Location: [facility];  Service:    • MEDIAL MENISCECTOMY Right 7/12/2017    Procedure: PARTIAL MEDIAL AND LATERAL MENISCECTOMY;  Surgeon: Harsh Baltazar M.D.;  Location: [facility];  Service:    • SYNOVECTOMY Right 7/12/2017    Procedure: SYNOVECTOMY;  Surgeon: Harsh Baltazar M.D.;  Location: [facility];  Service:    • KNEE ARTHROSCOPY  4/21/2015    Performed by Rufus Saba M.D. at SURGERY [facility]   • MEDIAL MENISCECTOMY  4/21/2015    Performed by Rufus Saba M.D. at SURGERY [facility]   • PUMP REVISION  12/10/2012    Performed by Allison Guerra M.D. at [facility]   • SPINAL CORD STIMULATOR  5/30/2012    Performed by ALLISON GUERRA at [facility]   • BIOPSY GENERAL  5/1/12    endometrial   • SPINAL CORD STIMULATOR  7/11/2011    Performed by ALLISON GUERRA at [facility]   • BLOCK EPIDURAL STEROID INJECTION  2008    x 3-4   • LYMPH NODE EXCISION Left 2007    cervicle   • OTHER Right 2001    thoracic discectomy     • ARTHROSCOPY, KNEE Left 1999   • RIB RESECTION Right 1980   • BREAST BIOPSY Left 1985/2005    x 2     SOCHX:  reports that she has never smoked. She has never used smokeless tobacco. She reports that she drinks alcohol. She reports that she does not use drugs.  Family History   Problem Relation Age of Onset   • Hypertension Father    • Diabetes Father    • Heart Disease Father    • Alcohol abuse Father    • Anesthesia Sister         slow to come out (as a child)   • Diabetes Unknown    • Heart Disease Unknown    • Cancer Unknown    • Hypertension Unknown    • Stroke Unknown    • Lung Disease Unknown         Objective:   /78   Pulse 78   Temp 36.2 °C (97.2 °F)   Ht 1.67 m (5' 5.75\")   Wt 91.6 kg (202 lb)   LMP 02/26/2012   SpO2 96%   BMI 32.85 kg/m²     Physical Exam   Constitutional: She is oriented to person, place, and time. She appears " well-developed and well-nourished. No distress.   HENT:   Head: Normocephalic and atraumatic.   Right Ear: Hearing, tympanic membrane and ear canal normal.   Left Ear: There is swelling and tenderness. Tympanic membrane is injected, erythematous and bulging. A middle ear effusion is present.   Nose: Mucosal edema and rhinorrhea present. Right sinus exhibits no maxillary sinus tenderness and no frontal sinus tenderness. Left sinus exhibits maxillary sinus tenderness. Left sinus exhibits no frontal sinus tenderness.   Mouth/Throat: Posterior oropharyngeal edema and posterior oropharyngeal erythema present. No oropharyngeal exudate.   Eyes: Pupils are equal, round, and reactive to light. Conjunctivae are normal.   Neck: Normal range of motion. Neck supple.   Cardiovascular: Normal rate and regular rhythm.    Pulmonary/Chest: Effort normal and breath sounds normal.   Lymphadenopathy:     She has cervical adenopathy.   Neurological: She is alert and oriented to person, place, and time.   Skin: Skin is warm and dry.   Psychiatric: She has a normal mood and affect. Her behavior is normal.   Vitals reviewed.        Assessment/Plan:     1. Acute suppurative otitis media of left ear without spontaneous rupture of tympanic membrane, recurrence not specified  amoxicillin-clavulanate (AUGMENTIN) 875-125 MG Tab    ofloxacin otic sol (FLOXIN OTIC) 0.3 % Solution    REFERRAL TO ENT     Patient directed to take full course of abx. Supportive care reviewed including: Start Flonase, OTC decongestant, probiotic/yogurt and increase fluids.  Sinusitis educational handout given to patient.  Patient requesting a referral for ENT, referral was placed.  If sx worsen or persist patient directed to contact me or return to clinic for reevaluation.    Follow-up with primary care provider within 7-10 days. Red flags and emergency room precautions discussed.  Patient appears understanding of information.

## 2018-11-29 ENCOUNTER — OFFICE VISIT (OUTPATIENT)
Dept: MEDICAL GROUP | Facility: MEDICAL CENTER | Age: 58
End: 2018-11-29
Payer: COMMERCIAL

## 2018-11-29 VITALS
BODY MASS INDEX: 32.17 KG/M2 | HEART RATE: 83 BPM | HEIGHT: 66 IN | RESPIRATION RATE: 16 BRPM | TEMPERATURE: 97 F | OXYGEN SATURATION: 97 % | WEIGHT: 200.2 LBS | SYSTOLIC BLOOD PRESSURE: 116 MMHG | DIASTOLIC BLOOD PRESSURE: 78 MMHG

## 2018-11-29 DIAGNOSIS — H83.02 LABYRINTHITIS OF LEFT EAR: ICD-10-CM

## 2018-11-29 PROCEDURE — 99214 OFFICE O/P EST MOD 30 MIN: CPT | Performed by: INTERNAL MEDICINE

## 2018-11-29 RX ORDER — METHYLPREDNISOLONE 4 MG/1
TABLET ORAL
Qty: 21 TAB | Refills: 0 | Status: SHIPPED | OUTPATIENT
Start: 2018-11-29 | End: 2019-01-10

## 2018-11-29 NOTE — PROGRESS NOTES
CC: Ear pain, hearing loss and ringing.    HPI:   Debby presents today with the following.    1.  Ear pain, hearing loss  Presents complaining of 3 weeks of what started with upper respiratory symptoms turning into left ear pain.  She was seen on 2 occasions in urgent care and serially placed on amoxicillin and then Augmentin neither of which were helpful.  She has developed ringing and hearing loss in the ear and is complaining of spinning sensation consistent with vertigo.  No further fevers or chills.  She reports she cannot use the phone on that ear.      Patient Active Problem List    Diagnosis Date Noted   • Opioid dependence in controlled environment ([facility]) 09/27/2018   • Obesity (BMI 30-39.9) 07/09/2018   • IGT (impaired glucose tolerance) 09/29/2017   • Osteoarthritis of right knee 07/12/2017   • Major depressive disorder, recurrent episode, in partial remission ([facility]) 05/05/2017   • Anxiety disorder 10/24/2016   • Osteoarthrosis involving lower leg 04/21/2015   • Dyslipidemia 12/02/2014   • HTN (hypertension), benign 03/19/2012   • Acquired hypothyroidism 03/19/2012   • Back pain 03/19/2012   • Neck pain 03/19/2012       Current Outpatient Prescriptions   Medication Sig Dispense Refill   • MethylPREDNISolone (MEDROL DOSEPAK) 4 MG Tablet Therapy Pack Per package insert. 21 Tab 0   • Naproxen-Esomeprazole (VIMOVO PO) Take  by mouth.     • lisinopril-hydrochlorothiazide (PRINZIDE, ZESTORETIC) 20-12.5 MG per tablet Take 1 Tab by mouth every day. 90 Tab 1   • levothyroxine (SYNTHROID) 50 MCG Tab TAKE ONE (1) TABLET BY MOUTH DAILY 90 Tab 3   • metFORMIN ER (GLUCOPHAGE XR) 500 MG TABLET SR 24 HR Take 1 Tab by mouth 2 times a day. 180 Tab 3   • atorvastatin (LIPITOR) 20 MG Tab Take 1 Tab by mouth every day. 90 Tab 3   • hydrOXYzine HCl (ATARAX) 25 MG Tab Take 1 Tab by mouth 3 times a day as needed for Anxiety. 30 Tab 0   • gabapentin (NEURONTIN) 400 MG Cap Take 400 mg by mouth 3 times a day.     • asa/apap/caffeine  "(EXCEDRIN) 250-250-65 MG Tab Take 2 Tabs by mouth 1 time daily as needed for Headache.     • HYDROcodone-acetaminophen (NORCO) 5-325 MG Tab per tablet Take 1 Tab by mouth 2 Times a Day.     • methocarbamol (ROBAXIN) 500 MG Tab Take 500 mg by mouth 2 Times a Day.       No current facility-administered medications for this visit.          Allergies as of 11/29/2018 - Reviewed 11/29/2018   Allergen Reaction Noted   • Bactrim ds Rash and Swelling 05/14/2011   • Sulfamethoxazole-trimethoprim Rash and Swelling 05/23/2016        ROS: Denies Chest pain, SOB, LE edema.    /78 (BP Location: Right arm, Patient Position: Sitting)   Pulse 83   Temp 36.1 °C (97 °F)   Resp 16   Ht 1.67 m (5' 5.75\")   Wt 90.8 kg (200 lb 3.2 oz)   LMP 02/26/2012   SpO2 97%   BMI 32.56 kg/m²     Physical Exam:  Gen:         Alert and oriented, No apparent distress.  TM            normal on the right left is bulging no obvious effusion.  Neck:        No Lymphadenopathy or Bruits.  Lungs:     Clear to auscultation bilaterally  CV:          Regular rate and rhythm. No murmurs, rubs or gallops.               Ext:          No clubbing, cyanosis, edema.      Assessment and Plan.   58 y.o. female with the following issues.    1. Labyrinthitis of left ear  Symptoms consistent with infection however believe more likely to be viral in etiology given failure of 2 appropriate antibiotics.  Given the hearing loss have placed on a Medrol Dosepak she already has a referral to ear nose and throat and will call to get scheduled.      "

## 2018-11-29 NOTE — LETTER
November 29, 2018        Debby Desai      Please excuse work 11/27/18-11/30/18 may return as able.                         Loy Miles MD

## 2019-01-05 ENCOUNTER — HOSPITAL ENCOUNTER (OUTPATIENT)
Dept: LAB | Facility: MEDICAL CENTER | Age: 59
End: 2019-01-05
Attending: INTERNAL MEDICINE
Payer: COMMERCIAL

## 2019-01-05 DIAGNOSIS — R73.02 IGT (IMPAIRED GLUCOSE TOLERANCE): ICD-10-CM

## 2019-01-05 DIAGNOSIS — E78.5 DYSLIPIDEMIA: ICD-10-CM

## 2019-01-05 DIAGNOSIS — D64.9 ANEMIA, UNSPECIFIED TYPE: ICD-10-CM

## 2019-01-05 LAB
ALBUMIN SERPL BCP-MCNC: 4 G/DL (ref 3.2–4.9)
ALBUMIN/GLOB SERPL: 1.4 G/DL
ALP SERPL-CCNC: 125 U/L (ref 30–99)
ALT SERPL-CCNC: 11 U/L (ref 2–50)
ANION GAP SERPL CALC-SCNC: 9 MMOL/L (ref 0–11.9)
AST SERPL-CCNC: 14 U/L (ref 12–45)
BASOPHILS # BLD AUTO: 0.5 % (ref 0–1.8)
BASOPHILS # BLD: 0.04 K/UL (ref 0–0.12)
BILIRUB SERPL-MCNC: 0.3 MG/DL (ref 0.1–1.5)
BUN SERPL-MCNC: 16 MG/DL (ref 8–22)
CALCIUM SERPL-MCNC: 9.4 MG/DL (ref 8.5–10.5)
CHLORIDE SERPL-SCNC: 108 MMOL/L (ref 96–112)
CHOLEST SERPL-MCNC: 119 MG/DL (ref 100–199)
CO2 SERPL-SCNC: 24 MMOL/L (ref 20–33)
CREAT SERPL-MCNC: 0.63 MG/DL (ref 0.5–1.4)
EOSINOPHIL # BLD AUTO: 0.35 K/UL (ref 0–0.51)
EOSINOPHIL NFR BLD: 4.7 % (ref 0–6.9)
ERYTHROCYTE [DISTWIDTH] IN BLOOD BY AUTOMATED COUNT: 47.2 FL (ref 35.9–50)
EST. AVERAGE GLUCOSE BLD GHB EST-MCNC: 128 MG/DL
FASTING STATUS PATIENT QL REPORTED: NORMAL
GLOBULIN SER CALC-MCNC: 2.8 G/DL (ref 1.9–3.5)
GLUCOSE SERPL-MCNC: 98 MG/DL (ref 65–99)
HBA1C MFR BLD: 6.1 % (ref 0–5.6)
HCT VFR BLD AUTO: 35.5 % (ref 37–47)
HDLC SERPL-MCNC: 56 MG/DL
HGB BLD-MCNC: 11 G/DL (ref 12–16)
IMM GRANULOCYTES # BLD AUTO: 0.03 K/UL (ref 0–0.11)
IMM GRANULOCYTES NFR BLD AUTO: 0.4 % (ref 0–0.9)
IRON SATN MFR SERPL: 11 % (ref 15–55)
IRON SERPL-MCNC: 49 UG/DL (ref 40–170)
LDLC SERPL CALC-MCNC: 42 MG/DL
LYMPHOCYTES # BLD AUTO: 2.03 K/UL (ref 1–4.8)
LYMPHOCYTES NFR BLD: 27.1 % (ref 22–41)
MCH RBC QN AUTO: 28.5 PG (ref 27–33)
MCHC RBC AUTO-ENTMCNC: 31 G/DL (ref 33.6–35)
MCV RBC AUTO: 92 FL (ref 81.4–97.8)
MONOCYTES # BLD AUTO: 0.43 K/UL (ref 0–0.85)
MONOCYTES NFR BLD AUTO: 5.7 % (ref 0–13.4)
NEUTROPHILS # BLD AUTO: 4.6 K/UL (ref 2–7.15)
NEUTROPHILS NFR BLD: 61.6 % (ref 44–72)
NRBC # BLD AUTO: 0 K/UL
NRBC BLD-RTO: 0 /100 WBC
PLATELET # BLD AUTO: 419 K/UL (ref 164–446)
PMV BLD AUTO: 11.7 FL (ref 9–12.9)
POTASSIUM SERPL-SCNC: 4.3 MMOL/L (ref 3.6–5.5)
PROT SERPL-MCNC: 6.8 G/DL (ref 6–8.2)
RBC # BLD AUTO: 3.86 M/UL (ref 4.2–5.4)
SODIUM SERPL-SCNC: 141 MMOL/L (ref 135–145)
TIBC SERPL-MCNC: 463 UG/DL (ref 250–450)
TRIGL SERPL-MCNC: 105 MG/DL (ref 0–149)
WBC # BLD AUTO: 7.5 K/UL (ref 4.8–10.8)

## 2019-01-05 PROCEDURE — 83550 IRON BINDING TEST: CPT

## 2019-01-05 PROCEDURE — 83036 HEMOGLOBIN GLYCOSYLATED A1C: CPT

## 2019-01-05 PROCEDURE — 82607 VITAMIN B-12: CPT

## 2019-01-05 PROCEDURE — 36415 COLL VENOUS BLD VENIPUNCTURE: CPT

## 2019-01-05 PROCEDURE — 82728 ASSAY OF FERRITIN: CPT

## 2019-01-05 PROCEDURE — 82746 ASSAY OF FOLIC ACID SERUM: CPT

## 2019-01-05 PROCEDURE — 83540 ASSAY OF IRON: CPT

## 2019-01-05 PROCEDURE — 80061 LIPID PANEL: CPT

## 2019-01-05 PROCEDURE — 85025 COMPLETE CBC W/AUTO DIFF WBC: CPT

## 2019-01-05 PROCEDURE — 80053 COMPREHEN METABOLIC PANEL: CPT

## 2019-01-06 LAB
FERRITIN SERPL-MCNC: 6.9 NG/ML (ref 10–291)
FOLATE SERPL-MCNC: 7.3 NG/ML
VIT B12 SERPL-MCNC: 332 PG/ML (ref 211–911)

## 2019-01-10 ENCOUNTER — OFFICE VISIT (OUTPATIENT)
Dept: MEDICAL GROUP | Facility: MEDICAL CENTER | Age: 59
End: 2019-01-10
Payer: COMMERCIAL

## 2019-01-10 VITALS
HEART RATE: 64 BPM | HEIGHT: 66 IN | DIASTOLIC BLOOD PRESSURE: 74 MMHG | BODY MASS INDEX: 32.95 KG/M2 | OXYGEN SATURATION: 95 % | WEIGHT: 205 LBS | TEMPERATURE: 97.8 F | SYSTOLIC BLOOD PRESSURE: 138 MMHG

## 2019-01-10 DIAGNOSIS — R73.02 IGT (IMPAIRED GLUCOSE TOLERANCE): ICD-10-CM

## 2019-01-10 DIAGNOSIS — H92.02 LEFT EAR PAIN: ICD-10-CM

## 2019-01-10 DIAGNOSIS — D50.9 IRON DEFICIENCY ANEMIA, UNSPECIFIED IRON DEFICIENCY ANEMIA TYPE: ICD-10-CM

## 2019-01-10 PROCEDURE — 99214 OFFICE O/P EST MOD 30 MIN: CPT | Performed by: INTERNAL MEDICINE

## 2019-01-10 RX ORDER — ACETAMINOPHEN 500 MG
500-1000 TABLET ORAL 2 TIMES DAILY PRN
COMMUNITY
End: 2022-08-18

## 2019-01-10 RX ORDER — LIDOCAINE 50 MG/G
PATCH TOPICAL
COMMUNITY
Start: 2019-01-09 | End: 2019-09-04

## 2019-01-10 RX ORDER — FERROUS SULFATE 325(65) MG
325 TABLET ORAL 2 TIMES DAILY
Qty: 60 TAB | Refills: 3 | Status: SHIPPED
Start: 2019-01-10 | End: 2020-01-08

## 2019-01-10 NOTE — PROGRESS NOTES
CC: Follow-up blood sugars, anemia complaining of persistent ear pain.      HPI:   Debby presents today with the following.    1. IGT (impaired glucose tolerance)  Presents after having repeat blood work showing slight improvement in A1c going from 6.4-6.1 after starting metformin which she is tolerating well without diarrhea.  Weight has gone up slightly in this timeframe.    2. Iron deficiency anemia, unspecified iron deficiency anemia type  She was found to be slightly iron deficient with a low ferritin.  She denies any obvious sources of blood loss she is working to get into the gastroenterologist.  Denies any dizziness or other symptomology associated    3. Left ear pain  Persistent left ear pain she is getting recurrent sinus congestion consistent with upper respiratory infection.  She was referred to ENT several months ago never got in.      Patient Active Problem List    Diagnosis Date Noted   • Opioid dependence in controlled environment ([facility]) 09/27/2018   • Obesity (BMI 30-39.9) 07/09/2018   • IGT (impaired glucose tolerance) 09/29/2017   • Osteoarthritis of right knee 07/12/2017   • Major depressive disorder, recurrent episode, in partial remission ([facility]) 05/05/2017   • Anxiety disorder 10/24/2016   • Osteoarthrosis involving lower leg 04/21/2015   • Dyslipidemia 12/02/2014   • HTN (hypertension), benign 03/19/2012   • Acquired hypothyroidism 03/19/2012   • Back pain 03/19/2012   • Neck pain 03/19/2012       Current Outpatient Prescriptions   Medication Sig Dispense Refill   • lidocaine (LIDODERM) 5 % Patch      • acetaminophen (TYLENOL) 500 MG Tab Take 500-1,000 mg by mouth every 6 hours as needed.     • ferrous sulfate 325 (65 Fe) MG tablet Take 1 Tab by mouth 2 Times a Day. 60 Tab 3   • Naproxen-Esomeprazole (VIMOVO PO) Take  by mouth.     • lisinopril-hydrochlorothiazide (PRINZIDE, ZESTORETIC) 20-12.5 MG per tablet Take 1 Tab by mouth every day. 90 Tab 1   • levothyroxine (SYNTHROID) 50 MCG Tab TAKE  "ONE (1) TABLET BY MOUTH DAILY 90 Tab 3   • metFORMIN ER (GLUCOPHAGE XR) 500 MG TABLET SR 24 HR Take 1 Tab by mouth 2 times a day. 180 Tab 3   • atorvastatin (LIPITOR) 20 MG Tab Take 1 Tab by mouth every day. 90 Tab 3   • hydrOXYzine HCl (ATARAX) 25 MG Tab Take 1 Tab by mouth 3 times a day as needed for Anxiety. 30 Tab 0   • gabapentin (NEURONTIN) 400 MG Cap Take 400 mg by mouth 3 times a day.     • HYDROcodone-acetaminophen (NORCO) 5-325 MG Tab per tablet Take 1 Tab by mouth 2 Times a Day.     • methocarbamol (ROBAXIN) 500 MG Tab Take 500 mg by mouth 2 Times a Day.     • asa/apap/caffeine (EXCEDRIN) 250-250-65 MG Tab Take 2 Tabs by mouth 1 time daily as needed for Headache.       No current facility-administered medications for this visit.          Allergies as of 01/10/2019 - Reviewed 01/10/2019   Allergen Reaction Noted   • Bactrim ds Rash and Swelling 05/14/2011   • Sulfamethoxazole-trimethoprim Rash and Swelling 05/23/2016        ROS: Denies Chest pain, SOB, LE edema.  /74 (BP Location: Right arm, Patient Position: Sitting)   Pulse 64   Temp 36.6 °C (97.8 °F)   Ht 1.67 m (5' 5.75\")   Wt 93 kg (205 lb)   LMP 02/26/2012   SpO2 95%   BMI 33.34 kg/m²     Physical Exam:  Gen:         Alert and oriented, No apparent distress.  Heent:       TMs clear bilaterally, oropharynx without erythema or exudates  Neck:        No Lymphadenopathy or Bruits.  Lungs:     Clear to auscultation bilaterally  CV:          Regular rate and rhythm. No murmurs, rubs or gallops.               Ext:          No clubbing, cyanosis, edema.      Assessment and Plan.   58 y.o. female with the following issues.    1. IGT (impaired glucose tolerance)  Blood sugars improve recheck in 6 months continue metformin    2. Iron deficiency anemia, unspecified iron deficiency anemia type  Starting on iron cautioned about side effects she will get into gastroenterology.    3. Left ear pain  Feel likely ear pain persistent with new upper " respiratory infection per her request placed referral to ENT if not resolving.  - REFERRAL TO ENT

## 2019-03-28 ENCOUNTER — APPOINTMENT (OUTPATIENT)
Dept: RADIOLOGY | Facility: MEDICAL CENTER | Age: 59
End: 2019-03-28
Attending: EMERGENCY MEDICINE
Payer: COMMERCIAL

## 2019-03-28 ENCOUNTER — HOSPITAL ENCOUNTER (EMERGENCY)
Facility: MEDICAL CENTER | Age: 59
End: 2019-03-28
Attending: EMERGENCY MEDICINE
Payer: COMMERCIAL

## 2019-03-28 VITALS
HEART RATE: 80 BPM | SYSTOLIC BLOOD PRESSURE: 141 MMHG | TEMPERATURE: 98.3 F | WEIGHT: 205.25 LBS | BODY MASS INDEX: 32.99 KG/M2 | HEIGHT: 66 IN | RESPIRATION RATE: 18 BRPM | OXYGEN SATURATION: 95 % | DIASTOLIC BLOOD PRESSURE: 78 MMHG

## 2019-03-28 DIAGNOSIS — M25.561 ACUTE PAIN OF RIGHT KNEE: ICD-10-CM

## 2019-03-28 PROCEDURE — 99284 EMERGENCY DEPT VISIT MOD MDM: CPT

## 2019-03-28 PROCEDURE — A9270 NON-COVERED ITEM OR SERVICE: HCPCS | Performed by: EMERGENCY MEDICINE

## 2019-03-28 PROCEDURE — 700102 HCHG RX REV CODE 250 W/ 637 OVERRIDE(OP): Performed by: EMERGENCY MEDICINE

## 2019-03-28 PROCEDURE — 73562 X-RAY EXAM OF KNEE 3: CPT | Mod: RT

## 2019-03-28 RX ORDER — NAPROXEN 500 MG/1
500 TABLET ORAL 2 TIMES DAILY WITH MEALS
Qty: 20 TAB | Refills: 0 | Status: SHIPPED | OUTPATIENT
Start: 2019-03-28 | End: 2019-09-04

## 2019-03-28 RX ORDER — OXYCODONE AND ACETAMINOPHEN 10; 325 MG/1; MG/1
1 TABLET ORAL ONCE
Status: COMPLETED | OUTPATIENT
Start: 2019-03-28 | End: 2019-03-28

## 2019-03-28 RX ADMIN — OXYCODONE HYDROCHLORIDE AND ACETAMINOPHEN 1 TABLET: 10; 325 TABLET ORAL at 18:15

## 2019-03-29 NOTE — ED NOTES
Discharge information provided. Pt verbalized understanding of discharge instructions to follow up with PCP and to return to ER if condition worsens. Pt expressed the awareness of not driving or operating heavy machinery, has ride home with Family. Pt ambulated out of ER in a steady gait, no additional questions or concerns. Educated on new medication and RICE

## 2019-03-29 NOTE — ED PROVIDER NOTES
ED Provider Note    CHIEF COMPLAINT  Chief Complaint   Patient presents with   • Knee Pain       HPI  Debby Desai is a 58 y.o. female who presents for acutely exacerbated chronic right knee pain, she has had arthroscopic twice on this knee, she states that she has had pain for the past few years but really worse over the past couple of days.  She takes chronic Norco and states is not helping.  No acute injuries, no weakness or numbness, she did not call her orthopedic surgeon.  She reports his pain is associated with swelling.  No other complaints    REVIEW OF SYSTEMS  Negative for fever, weakness, numbness    PAST MEDICAL HISTORY   has a past medical history of Anemia; Anginal syndrome (HCC); Anxiety; Arthritis; Dental disorder (5/24/12); High cholesterol; HTN (hypertension), benign (3/19/2012); Hypothyroid (3/19/2012); Major depression (3/19/2012); Orbital floor (blow-out) closed fracture (HCC); Other specified symptom associated with female genital organs; Pain; Pain; Pain (02/2018); Psychiatric problem; Unspecified disorder of thyroid; and Urinary incontinence.    SOCIAL HISTORY  Social History     Social History Main Topics   • Smoking status: Never Smoker   • Smokeless tobacco: Never Used   • Alcohol use 0.0 oz/week      Comment: 1 glass wine per month   • Drug use: No   • Sexual activity: No       SURGICAL HISTORY   has a past surgical history that includes breast biopsy (Left, 1985/2005); lymph node excision (Left, 2007); other (Right, 2001); arthroscopy, knee (Left, 1999); rib resection (Right, 1980); block epidural steroid injection (2008); spinal cord stimulator (7/11/2011); biopsy general (5/1/12); spinal cord stimulator (5/30/2012); pump revision (12/10/2012); knee arthroscopy (4/21/2015); medial meniscectomy (4/21/2015); knee arthroscopy (Right, 7/12/2017); medial meniscectomy (Right, 7/12/2017); synovectomy (Right, 7/12/2017); knee arthroscopy (Right, 2/9/2018); and medial meniscectomy (Right,  "2/9/2018).    CURRENT MEDICATIONS  I personally reviewed the medication list in the charting documentation.     ALLERGIES  Allergies   Allergen Reactions   • Bactrim Ds Rash and Swelling     Pt states \"My hand swells up and rash all over body\".   • Sulfamethoxazole-Trimethoprim Rash and Swelling     Pt states \"My hand swells up and rash all over body\".       PHYSICAL EXAM  VITAL SIGNS: /78   Pulse 80   Temp 36.8 °C (98.3 °F) (Temporal)   Resp 18   Ht 1.676 m (5' 6\")   Wt 93.1 kg (205 lb 4 oz)   LMP 02/26/2012   SpO2 95%   BMI 33.13 kg/m²   Constitutional: Alert in no apparent distress.  HENT: No signs of trauma.   Eyes: Conjunctiva normal, Non-icteric.   Chest: Normal nonlabored respirations  Skin: No erythema, No rash.   Musculoskeletal: Unremarkable inspection of the right knee, no appreciable edema or effusion.  No deformity.  No point tenderness, neurovascularly intact distally.  Neurologic: Alert, No focal deficits noted.   Psychiatric: Affect normal, Judgment normal.    DIAGNOSTIC STUDIES / PROCEDURES    RADIOLOGY  DX-KNEE 3 VIEWS RIGHT   Final Result      No evidence of acute fracture or dislocation.      Degenerative changes as above described.                  COURSE & MEDICAL DECISION MAKING  Pertinent Labs & Imaging studies reviewed. (See chart for details)    Encounter Summary: This is a 58 y.o. female with acute on chronic right knee pain, history of multiple arthroscopic is for meniscal tears, no trauma but worsening pain that is not improved with her chronic Norco.  We will treat her with a Percocet and obtain an x-ray.  She will have to follow-up with orthopedic surgeon I expressed the importance of this.  In the absence of any acute abnormalities on the x-ray she will follow-up with the orthopedic surgeon I will go over strict return instructions.      DISPOSITION: Discharge Home      FINAL IMPRESSION  1. Acute pain of right knee        This dictation was created using voice " recognition software. The accuracy of the dictation is limited to the abilities of the software. I expect there may be some errors of grammar and possibly content. The nursing notes were reviewed and certain aspects of this information were incorporated into this note.    Electronically signed by: Michael Dominguez, 3/28/2019 6:04 PM

## 2019-03-29 NOTE — ED TRIAGE NOTES
Patient c/o non traumatic R knee pain with swelling for 1 week. Pain is worse when she bares weight and isnt helped by norco

## 2019-04-25 DIAGNOSIS — I10 HTN (HYPERTENSION), BENIGN: Chronic | ICD-10-CM

## 2019-04-25 RX ORDER — LISINOPRIL AND HYDROCHLOROTHIAZIDE 20; 12.5 MG/1; MG/1
1 TABLET ORAL DAILY
Qty: 90 TAB | Refills: 1 | Status: SHIPPED | OUTPATIENT
Start: 2019-04-25 | End: 2019-12-06 | Stop reason: SDUPTHER

## 2019-05-13 ENCOUNTER — PHYSICAL THERAPY (OUTPATIENT)
Dept: PHYSICAL THERAPY | Facility: MEDICAL CENTER | Age: 59
End: 2019-05-13
Attending: ORTHOPAEDIC SURGERY
Payer: COMMERCIAL

## 2019-05-13 DIAGNOSIS — M17.11 UNILATERAL PRIMARY OSTEOARTHRITIS, RIGHT KNEE: ICD-10-CM

## 2019-05-13 DIAGNOSIS — M25.561 RIGHT KNEE PAIN, UNSPECIFIED CHRONICITY: ICD-10-CM

## 2019-05-13 PROCEDURE — 97110 THERAPEUTIC EXERCISES: CPT

## 2019-05-13 PROCEDURE — 97162 PT EVAL MOD COMPLEX 30 MIN: CPT

## 2019-05-13 ASSESSMENT — ENCOUNTER SYMPTOMS
PAIN TIMING: CONSTANT
QUALITY: SHARP
PAIN SCALE: 6
QUALITY: TIGHT

## 2019-05-13 NOTE — OP THERAPY EVALUATION
Outpatient Physical Therapy  INITIAL EVALUATION    [facility] Outpatient Physical Therapy  [address]  [place] [address]  Phone:  [phone]  Fax:  [phone]    Date of Evaluation: 2019    Patient: Debby Desai  YOB: 1960  MRN: [id number]     Referring Provider: Rufus Saba M.D.  [address] [place] [address]   Referring Diagnosis Unilateral primary osteoarthritis, right knee [M17.11];Pain in right knee [M25.561]     Time Calculation    [phone] 60     Physical Therapy Occurrence Codes    Date of onset of impairment:  19   Date physical therapy care plan established or reviewed:  19   Date physical therapy treatment started:  19          Chief Complaint: Difficulty Walking and Knee Problem    Visit Diagnoses     ICD-10-CM   1. Unilateral primary osteoarthritis, right knee M17.11   2. Right knee pain, unspecified chronicity M25.561         Subjective   History of Present Illness:     History of chief complaint:  Debby presents today for evaluation for R knee pain. Debby is a 57 yo female with a year long history of severe right knee pain following tripping and falling May 2016 and 2 surgeries to correct right knee meniscal tears.  The most recent surgery was 2018 where she reports having continued pain and swelling.  2 months ago her knee swelled up no mechanism of injury. Went to ER and no fracture. Saw Jayant and  per patient report from x rays in 2018 to 2019 she reports that she was told the osteoarthritis was worse. She is looking to avoid surgery if she can.    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   Grocery shopping (45mins)    Relieving factors:  Wears brace (just to protect from  buckling)    Activity Tolerance:     Current activity tolerance / Recreational activities:  Sit and desk job     Does some dilan    Used to garden but has not in a while.     Social family events and some crafts.         Social Support:     Lives in:  One-story house        Past Medical History:   Diagnosis Date   • Anemia     in past   • Anginal syndrome (HCC)     had work up and feet r/t anxiety   • Anxiety    • Arthritis     OA knees   • Dental disorder 5/24/12    partial upper denture   • High cholesterol    • HTN (hypertension), benign 3/19/2012   • Hypothyroid 3/19/2012   • Major depression 3/19/2012   • Orbital floor (blow-out) closed fracture (HCC)     left   • Other specified symptom associated with female genital organs     post menopausal bleeding   • Pain     thoracic spine, Right knee, myofacial pain, and Right shoulder   • Pain     recently fell and has bruise left forehead   • Pain 02/2018    chronic back pain, right shoulder   • Psychiatric problem     depression, anxiety   • Unspecified disorder of thyroid    • Urinary incontinence     Occasional     Past Surgical History:   Procedure Laterality Date   • KNEE ARTHROSCOPY Right 2/9/2018    Procedure: KNEE ARTHROSCOPY;  Surgeon: Harsh Baltazar M.D.;  Location: [facility];  Service: Orthopedics   • MEDIAL MENISCECTOMY Right 2/9/2018    Procedure: MEDIAL AND LATERAL MENISCECTOMY- PARTIAL;  Surgeon: Harsh Baltazar M.D.;  Location: [facility];  Service: Orthopedics   • KNEE ARTHROSCOPY Right 7/12/2017    Procedure: KNEE ARTHROSCOPY- CESPEDES;  Surgeon: Harsh Baltazar M.D.;  Location: [facility];  Service:    • MEDIAL MENISCECTOMY Right 7/12/2017    Procedure: PARTIAL MEDIAL AND LATERAL MENISCECTOMY;  Surgeon: Harsh Baltazar M.D.;  Location: [facility];  Service:    • SYNOVECTOMY Right 7/12/2017    Procedure: SYNOVECTOMY;  Surgeon: Harsh Baltazar M.D.;  Location:  SURGERY [facility];  Service:    • KNEE ARTHROSCOPY  4/21/2015    Performed by Rufus Saba M.D. at SURGERY [facility]   • MEDIAL MENISCECTOMY  4/21/2015    Performed by Rufus Saba M.D. at SURGERY [facility]   • PUMP REVISION  12/10/2012    Performed by Allison Guerra M.D. at SURGERY [facility]   • SPINAL CORD STIMULATOR  5/30/2012    Performed by ALLISON GUERRA at [facility]   • BIOPSY GENERAL  5/1/12    endometrial   • SPINAL CORD STIMULATOR  7/11/2011    Performed by ALLISON GUERRA at SURGERY [facility]   • BLOCK EPIDURAL STEROID INJECTION  2008    x 3-4   • LYMPH NODE EXCISION Left 2007    cervicle   • OTHER Right 2001    thoracic discectomy     • ARTHROSCOPY, KNEE Left 1999   • RIB RESECTION Right 1980   • BREAST BIOPSY Left 1985/2005    x 2       Precautions:       Objective   Observation and functional movement:  Walks with mild distress, uses hand to rise from a chair.      R leg doesn't like to lower during sit to stand.                         Range of motion and strength:       MMT 5/5 poor functional sit to stand. Valgus angle of the ankle.   Cannot sit without collape  Stoop to  with shift weight to L and have sever valgus collapse and brace R knee on L leg  Cannot single leg bridge on R leg.     Tandem fair 5-7 sec  Single poor 1-3 sec     L hip moves slight better than R. R poor IR         Sensation and reflexes:     Sensation is intact.      Reflexes are normal and symmetrical.    Palpation and joint mobility:     Tender at joint line peripatellar more medial and superior    Special tests:      Valgus stress test: neg  ACL: neg but slightly lax  Meniscus grind in standing neg     Balance:     No balance deficits noted.    Some disuse, self limiting.     Gait:      Slightly antalgic. Poor shift to R leg, longer stance on L with hip slightly to R     Coordination and tone:     Coordination is intact.    Cognition and visual perception:      No cognition deficits noted.    Additional objective details:      Through Jeans    Left Knee Girth Measurement (cm)   Joint line: 47 cm  5 cm above joint line: 50 cm  5 cm below joint line:  44 cm     Right Knee Girth Measurement (cm)   Joint line: 47 cm  5 cm above joint line: 51 cm  5 cm below joint line: 46 cm        Therapeutic Exercises (CPT 18487):     1. Develop HEP       Therapeutic Exercise Summary: Access Code: [email or web address]   URL: [email or web address]   Date: 05/13/2019   Prepared by: Nicho Lucero     Exercises  · Beginner Bridge - 10 reps - 1 sets - 2-3 hold - 1x daily - 5x weekly  · Hooklying Isometric Hip Flexion - 5 reps - 5 hold - 1x daily - 5x weekly  · Sidelying Clamshell in Neutral - 10 reps - 1 sets - 1x daily - 5x weekly  · Seated Quad Set - 10 reps - 1 sets - 2x daily - 5x weekly  · Seated Isometric Knee Extension - 10 reps - 1 sets - 1x daily - 5x weekly  · Wall Quarter Squat - 3 sets - 10-15 hold - 1x daily - 5x weekly  · Standing Hip Extension with Counter Support - 10 reps - 2 sets - 1x daily - 5x weekly  · Standing Hip Abduction with Counter Support - 10 reps - 2 sets - 1x daily - 5x weekly        Time-based treatments/modalities:  Therapeutic exercise minutes (CPT 43770): 15 minutes       Assessment, Response and Plan:   Impairments: abnormal or restricted ROM, difficulty performing job, hypersensitivity, pain with function and weight-bearing intolerance    Assessment details:  Debby is a 59 year old with chronic R knee pain. She has She has some swelling/edema on the R and weaker on the R with poor Hip range compared to L. She has some valgus collapse and some poor patterns via hip hinge and trunk bracing. She would benefit from physical therapy services for R LE to help with mechanics and proper muscular control and to help with return to workout/recreation activities without pain.  Barriers to therapy:  Comorbidities  Other barriers to therapy:  Surgical history and repeated  "issues.   Goals:   Short Term Goals:   1. Establish HEP   2. Decrease R knee pain by 25% via subjective report/objective pain scale  3. Able to participate exercise or walking 2 x week without increase in symptoms   4. Patient to show improved standing 10+ mins without increase in symptoms   5. Patient to show ability to walk 20+ min without increase in symptoms   6. Patient to show eccentric control to sit to standard chair without hands   7. Patient to show eccentric control down standard 4-6\" step   8. Establish WOMAC score  Short term goal time span:  2-4 weeks      Long Term Goals:    1. Ind in HEP  2. Decrease pain symptoms by 50% via subjective report/Objective pain scale  3. Able to participate in exercise walking 3+ week without increase in symptoms   4. Mod I in squat and DL patterns with good hip hinge   5. Reduction of WOMAC score by 5 points  Long term goal time span:  6-8 weeks    Plan:   Therapy options:  Physical therapy treatment to continue  Planned therapy interventions:  E Stim Unattended (CPT 69585), Manual Therapy (CPT 48110), Neuromuscular Re-education (CPT 31844) and Therapeutic Exercise (CPT 85534)  Frequency:  2x week  Duration in weeks:  6  Duration in visits:  12  Plan details:  12 visits per script      Functional Limitations and Severity Modifiers      Current:     Goal:       Referring provider co-signature:  I have reviewed this plan of care and my co-signature certifies the need for services.  Certification Dates:   From 5/13/19    To 6/28/19    Physician Signature: ________________________________ Date: ______________     "

## 2019-05-16 ENCOUNTER — PHYSICAL THERAPY (OUTPATIENT)
Dept: PHYSICAL THERAPY | Facility: MEDICAL CENTER | Age: 59
End: 2019-05-16
Attending: ORTHOPAEDIC SURGERY
Payer: COMMERCIAL

## 2019-05-16 DIAGNOSIS — M17.11 UNILATERAL PRIMARY OSTEOARTHRITIS, RIGHT KNEE: ICD-10-CM

## 2019-05-16 DIAGNOSIS — M25.561 RIGHT KNEE PAIN, UNSPECIFIED CHRONICITY: ICD-10-CM

## 2019-05-16 DIAGNOSIS — S83.241A OTHER TEAR OF MEDIAL MENISCUS, CURRENT INJURY, RIGHT KNEE, INITIAL ENCOUNTER: ICD-10-CM

## 2019-05-16 PROCEDURE — 97140 MANUAL THERAPY 1/> REGIONS: CPT

## 2019-05-16 PROCEDURE — 97014 ELECTRIC STIMULATION THERAPY: CPT

## 2019-05-16 PROCEDURE — 97110 THERAPEUTIC EXERCISES: CPT

## 2019-05-16 NOTE — OP THERAPY DAILY TREATMENT
Outpatient Physical Therapy  DAILY TREATMENT     [facility] Outpatient Physical Therapy  [address]  [place] [address]  Phone:  [phone]  Fax:  [phone]    Date: 05/16/2019    Patient: Debby Desai  YOB: 1960  MRN: [id number]     Time Calculation  Start time: 1100  Stop time: 1145 Time Calculation (min): 45 minutes     Chief Complaint: Knee Problem    Visit #: 2    SUBJECTIVE:  Patient seen for follow up on R knee pain. Focus a lot on squat and knee mechanics today to build for coming visits.     OBJECTIVE:  Current objective measures:           Therapeutic Exercises (CPT 43864):     1. Nu step , Level 5 x 5 mins    2. Spiritism floss and door way eccentric lowers , x 10    3. Inline ramp and mod Korean squat, orange band for cues to fight valgus motion.     Therapeutic Treatments and Modalities:     1. Manual Therapy (CPT 64061), R knee, IASTM to R knee, Joint mobs flexion and ext.     2. E Stim Unattended (CPT 07959), R knee, ICE and IFC to R knee    Time-based treatments/modalities:  Manual therapy minutes (CPT 53316): 10 minutes  Therapeutic exercise minutes (CPT 05224): 15 minutes    ASSESSMENT:   Response to treatment: Continue to work mechanics and hip control as tolerated.     PLAN/RECOMMENDATIONS:   Plan for treatment: therapy treatment to continue next visit.  Planned interventions for next visit: continue with current treatment.

## 2019-05-20 ENCOUNTER — APPOINTMENT (OUTPATIENT)
Dept: PHYSICAL THERAPY | Facility: MEDICAL CENTER | Age: 59
End: 2019-05-20
Attending: ORTHOPAEDIC SURGERY
Payer: COMMERCIAL

## 2019-05-23 ENCOUNTER — PHYSICAL THERAPY (OUTPATIENT)
Dept: PHYSICAL THERAPY | Facility: MEDICAL CENTER | Age: 59
End: 2019-05-23
Attending: ORTHOPAEDIC SURGERY
Payer: COMMERCIAL

## 2019-05-23 DIAGNOSIS — S83.241A OTHER TEAR OF MEDIAL MENISCUS, CURRENT INJURY, RIGHT KNEE, INITIAL ENCOUNTER: ICD-10-CM

## 2019-05-23 DIAGNOSIS — M17.11 UNILATERAL PRIMARY OSTEOARTHRITIS, RIGHT KNEE: ICD-10-CM

## 2019-05-23 DIAGNOSIS — M25.561 RIGHT KNEE PAIN, UNSPECIFIED CHRONICITY: ICD-10-CM

## 2019-05-23 PROCEDURE — 97110 THERAPEUTIC EXERCISES: CPT

## 2019-05-23 PROCEDURE — 97140 MANUAL THERAPY 1/> REGIONS: CPT

## 2019-05-23 PROCEDURE — 97014 ELECTRIC STIMULATION THERAPY: CPT

## 2019-05-23 NOTE — OP THERAPY DAILY TREATMENT
Outpatient Physical Therapy  DAILY TREATMENT     [facility] Outpatient Physical Therapy  [address]  [place] [address]  Phone:  [phone]  Fax:  [phone]    Date: 05/23/2019    Patient: Debby Desai  YOB: 1960  MRN: [id number]     Time Calculation  Start time: 0730  Stop time: 0815 Time Calculation (min): 45 minutes     Chief Complaint: Difficulty Walking and Knee Problem    Visit #: 3    SUBJECTIVE:  Patient seen for follow up on R knee pain. Focus a lot on squat and knee mechanics today to build for coming visits.     OBJECTIVE:  Current objective measures:           Therapeutic Exercises (CPT 20635):     1. Nu step , Level 5 x 5 mins    2. Orthodoxy floss and door way eccentric lowers , x 10    3. Banded walks/sidestep+ psoas march, red band    Therapeutic Treatments and Modalities:     1. Manual Therapy (CPT 18418), R knee, IASTM to R knee, Joint mobs flexion and ext.     2. E Stim Unattended (CPT 68735), R knee, ICE and IFC to R knee    Time-based treatments/modalities:  Manual therapy minutes (CPT 08781): 10 minutes  Therapeutic exercise minutes (CPT 56825): 20 minutes    ASSESSMENT:   Response to treatment: Continue to work mechanics and hip control as tolerated. Her knees are in a severe valgus formation she has trouble controlling femurs via hip torque. Work on low and mid LE stimulus. Lateral steps, balance beam and sports cord.     PLAN/RECOMMENDATIONS:   Plan for treatment: therapy treatment to continue next visit.  Planned interventions for next visit: continue with current treatment.

## 2019-05-28 ENCOUNTER — PHYSICAL THERAPY (OUTPATIENT)
Dept: PHYSICAL THERAPY | Facility: MEDICAL CENTER | Age: 59
End: 2019-05-28
Attending: ORTHOPAEDIC SURGERY
Payer: COMMERCIAL

## 2019-05-28 DIAGNOSIS — M17.11 UNILATERAL PRIMARY OSTEOARTHRITIS, RIGHT KNEE: ICD-10-CM

## 2019-05-28 DIAGNOSIS — M25.561 RIGHT KNEE PAIN, UNSPECIFIED CHRONICITY: ICD-10-CM

## 2019-05-28 DIAGNOSIS — S83.241A OTHER TEAR OF MEDIAL MENISCUS, CURRENT INJURY, RIGHT KNEE, INITIAL ENCOUNTER: ICD-10-CM

## 2019-05-28 PROCEDURE — 97014 ELECTRIC STIMULATION THERAPY: CPT

## 2019-05-28 PROCEDURE — 97110 THERAPEUTIC EXERCISES: CPT

## 2019-05-28 PROCEDURE — 97140 MANUAL THERAPY 1/> REGIONS: CPT

## 2019-05-28 NOTE — OP THERAPY DAILY TREATMENT
"  Outpatient Physical Therapy  DAILY TREATMENT     [facility] Outpatient Physical Therapy  [address]  [place] [address]  Phone:  [phone]  Fax:  [phone]    Date: 05/28/2019    Patient: Debby Desai  YOB: 1960  MRN: [id number]     Time Calculation  Start time: 0730  Stop time: 0815 Time Calculation (min): 45 minutes     Chief Complaint: Difficulty Walking and Knee Problem    Visit #: 4    SUBJECTIVE:  Patient seen for follow up on R knee pain. Focus a lot on squat and knee mechanics today to build for coming visits.     OBJECTIVE:  Current objective measures:           Therapeutic Exercises (CPT 41360):     1. Nu step, level 5 x 5 mins    2. Lateral steps, 6\" box    3. Sport Cord    Therapeutic Treatments and Modalities:     1. Manual Therapy (CPT 74563), R knee, IASTM to peripatellar region    Time-based treatments/modalities:  Manual therapy minutes (CPT 90138): 15 minutes  Therapeutic exercise minutes (CPT 66246): 15 minutes       ASSESSMENT:  Response to treatment:  Continue to work mechanics and hip control as tolerated. Her knees are in a severe valgus formation she has trouble controlling femurs via hip torque. Work on low and mid LE stimulus. Mod box lunge and some box push/pull.     PLAN/RECOMMENDATIONS:   Plan for treatment: therapy treatment to continue next visit.  Planned interventions for next visit: continue with current treatment.      "

## 2019-05-30 ENCOUNTER — PHYSICAL THERAPY (OUTPATIENT)
Dept: PHYSICAL THERAPY | Facility: MEDICAL CENTER | Age: 59
End: 2019-05-30
Attending: ORTHOPAEDIC SURGERY
Payer: COMMERCIAL

## 2019-05-30 DIAGNOSIS — M25.561 RIGHT KNEE PAIN, UNSPECIFIED CHRONICITY: ICD-10-CM

## 2019-05-30 DIAGNOSIS — S83.241A OTHER TEAR OF MEDIAL MENISCUS, CURRENT INJURY, RIGHT KNEE, INITIAL ENCOUNTER: ICD-10-CM

## 2019-05-30 DIAGNOSIS — M17.11 UNILATERAL PRIMARY OSTEOARTHRITIS, RIGHT KNEE: ICD-10-CM

## 2019-05-30 PROCEDURE — 97140 MANUAL THERAPY 1/> REGIONS: CPT

## 2019-05-30 PROCEDURE — 97014 ELECTRIC STIMULATION THERAPY: CPT

## 2019-05-30 PROCEDURE — 97110 THERAPEUTIC EXERCISES: CPT

## 2019-05-30 NOTE — OP THERAPY DAILY TREATMENT
"  Outpatient Physical Therapy  DAILY TREATMENT     [facility] Outpatient Physical Therapy  [address]  [place] [address]  Phone:  [phone]  Fax:  [phone]    Date: 05/30/2019    Patient: Debby Desai  YOB: 1960  MRN: [id number]     Time Calculation  Start time: 0730  Stop time: 0815 Time Calculation (min): 45 minutes     Chief Complaint: Difficulty Walking and Knee Problem    Visit #: 5    SUBJECTIVE:  Patient seen for follow up on R knee pain. Focus a lot on squat and knee mechanics today to build for coming visits.     OBJECTIVE:  Current objective measures:           Therapeutic Exercises (CPT 94968):     1. Upright bike, Level 1 x 3 min, Level 3 x 2 min    2. Mod box lunge , box, wall 4\" step +3\" pad     3. Box push, Slow working on knee flexion.     Therapeutic Treatments and Modalities:     1. Manual Therapy (CPT 02279), R knee, IASTM to peripatellar region    Time-based treatments/modalities:  Manual therapy minutes (CPT 27298): 15 minutes  Therapeutic exercise minutes (CPT 29771): 15 minutes       ASSESSMENT:  Response to treatment:  Continue to work mechanics and hip control as tolerated. Her knees are in a severe valgus formation she has trouble controlling femurs via hip torque. She needs better endurance and tolerance for strength, single leg work     PLAN/RECOMMENDATIONS:   Plan for treatment: therapy treatment to continue next visit.  Planned interventions for next visit: continue with current treatment.      "

## 2019-06-11 ENCOUNTER — APPOINTMENT (OUTPATIENT)
Dept: PHYSICAL THERAPY | Facility: MEDICAL CENTER | Age: 59
End: 2019-06-11
Attending: ORTHOPAEDIC SURGERY
Payer: COMMERCIAL

## 2019-06-13 ENCOUNTER — PHYSICAL THERAPY (OUTPATIENT)
Dept: PHYSICAL THERAPY | Facility: MEDICAL CENTER | Age: 59
End: 2019-06-13
Attending: ORTHOPAEDIC SURGERY
Payer: COMMERCIAL

## 2019-06-13 DIAGNOSIS — M17.11 UNILATERAL PRIMARY OSTEOARTHRITIS, RIGHT KNEE: ICD-10-CM

## 2019-06-13 DIAGNOSIS — M25.561 RIGHT KNEE PAIN, UNSPECIFIED CHRONICITY: ICD-10-CM

## 2019-06-13 PROCEDURE — 97140 MANUAL THERAPY 1/> REGIONS: CPT

## 2019-06-13 PROCEDURE — 97014 ELECTRIC STIMULATION THERAPY: CPT

## 2019-06-13 PROCEDURE — 97110 THERAPEUTIC EXERCISES: CPT

## 2019-06-13 NOTE — OP THERAPY DAILY TREATMENT
Outpatient Physical Therapy  DAILY TREATMENT     [facility] Outpatient Physical Therapy  [address]  [place] [address]  Phone:  [phone]  Fax:  [phone]    Date: 06/13/2019    Patient: Debby Desai  YOB: 1960  MRN: [id number]     Time Calculation  Start time: 0730  Stop time: 0815 Time Calculation (min): 45 minutes     Chief Complaint: Difficulty Walking and Knee Problem    Visit #: 6    SUBJECTIVE:  Patient seen for follow up on R knee pain. Focus a lot on squat and knee mechanics today to build for coming visits. She is disappointed that her knee continues to bother.     OBJECTIVE:  Current objective measures:           Therapeutic Exercises (CPT 95828):     1. Upright bike, Level 1 x 3 min, Level 3 x 2 min    2. Mod single leg sit to stand from high table, with ball under foot    3. Banded feedback for knee adduction.     Therapeutic Treatments and Modalities:     1. Manual Therapy (CPT 25711), R knee, IASTM to peripatellar region    2. E Stim Unattended (CPT 92247), R knee, ICE and ifc to R knee    Time-based treatments/modalities:  Manual therapy minutes (CPT 34257): 15 minutes  Therapeutic exercise minutes (CPT 93652): 15 minutes       ASSESSMENT:  Response to treatment:  She seems to have some patella tendon loading issues as well. Keep working mechanics     PLAN/RECOMMENDATIONS:   Plan for treatment: therapy treatment to continue next visit.  Planned interventions for next visit: continue with current treatment.

## 2019-06-18 ENCOUNTER — PHYSICAL THERAPY (OUTPATIENT)
Dept: PHYSICAL THERAPY | Facility: MEDICAL CENTER | Age: 59
End: 2019-06-18
Attending: ORTHOPAEDIC SURGERY
Payer: COMMERCIAL

## 2019-06-18 DIAGNOSIS — M17.11 UNILATERAL PRIMARY OSTEOARTHRITIS, RIGHT KNEE: ICD-10-CM

## 2019-06-18 DIAGNOSIS — S83.241A OTHER TEAR OF MEDIAL MENISCUS, CURRENT INJURY, RIGHT KNEE, INITIAL ENCOUNTER: ICD-10-CM

## 2019-06-18 DIAGNOSIS — M25.561 RIGHT KNEE PAIN, UNSPECIFIED CHRONICITY: ICD-10-CM

## 2019-06-18 PROCEDURE — 97140 MANUAL THERAPY 1/> REGIONS: CPT

## 2019-06-18 PROCEDURE — 97110 THERAPEUTIC EXERCISES: CPT

## 2019-06-18 NOTE — OP THERAPY DAILY TREATMENT
Outpatient Physical Therapy  DAILY TREATMENT     [facility] Outpatient Physical Therapy  [address]  [place] [address]  Phone:  [phone]  Fax:  [phone]    Date: 06/18/2019    Patient: Debby Desai  YOB: 1960  MRN: [id number]     Time Calculation  Start time: 1000  Stop time: 1030 Time Calculation (min): 30 minutes     Chief Complaint: Knee Problem    Visit #: 7    SUBJECTIVE:  Patient seen for follow up on R knee pain. Focus a lot on squat and knee mechanics today to build for coming visits. She is disappointed that her knee continues to bother.     OBJECTIVE:  Current objective measures:           Therapeutic Exercises (CPT 69868):     1. Upright bike, Level 1 x 3 min, Level 3 x 2 min    2. Tandem progressions, Needs flat ground min support    3. 30# ball push, forward backwards     Therapeutic Treatments and Modalities:     1. Manual Therapy (CPT 55773), R knee, IASTM to peripatellar region    Time-based treatments/modalities:  Manual therapy minutes (CPT 64207): 10 minutes  Therapeutic exercise minutes (CPT 79234): 20 minutes       ASSESSMENT:  Response to treatment:  She seems to have some patella tendon loading issues as well. Keep working mechanics She will receive an injection to see if this helps. We will have a few more visit then assess if we will continue therapy or dc to HEP.     PLAN/RECOMMENDATIONS:   Plan for treatment: therapy treatment to continue next visit.  Planned interventions for next visit: continue with current treatment.

## 2019-06-25 ENCOUNTER — PHYSICAL THERAPY (OUTPATIENT)
Dept: PHYSICAL THERAPY | Facility: MEDICAL CENTER | Age: 59
End: 2019-06-25
Attending: ORTHOPAEDIC SURGERY
Payer: COMMERCIAL

## 2019-06-25 DIAGNOSIS — M25.561 RIGHT KNEE PAIN, UNSPECIFIED CHRONICITY: ICD-10-CM

## 2019-06-25 DIAGNOSIS — M17.11 UNILATERAL PRIMARY OSTEOARTHRITIS, RIGHT KNEE: ICD-10-CM

## 2019-06-25 DIAGNOSIS — S83.241A OTHER TEAR OF MEDIAL MENISCUS, CURRENT INJURY, RIGHT KNEE, INITIAL ENCOUNTER: ICD-10-CM

## 2019-06-25 PROCEDURE — 97014 ELECTRIC STIMULATION THERAPY: CPT

## 2019-06-25 PROCEDURE — 97110 THERAPEUTIC EXERCISES: CPT

## 2019-06-25 PROCEDURE — 97140 MANUAL THERAPY 1/> REGIONS: CPT

## 2019-06-25 NOTE — OP THERAPY DAILY TREATMENT
Outpatient Physical Therapy  DAILY TREATMENT     [facility] Outpatient Physical Therapy  [address]  [place] [address]  Phone:  [phone]  Fax:  [phone]    Date: 06/25/2019    Patient: Debby Desai  YOB: 1960  MRN: [id number]     Time Calculation  Start time: 1100  Stop time: 1145 Time Calculation (min): 45 minutes     Chief Complaint: Difficulty Walking and Knee Problem    Visit #: 8    SUBJECTIVE:  Patient seen for follow up on R knee pain. She had her injections done last week with a lot of increased pain post injection with difficulty weightbearing. Per her physicians orders she will finish this week then resume home exercises     OBJECTIVE:  Current objective measures:           Therapeutic Exercises (CPT 80366):     1. Upright bike, Level 1 x 3 min, Level 3 x 2 min    2. Tandem progressions, Needs flat ground min support    3. Banded march progression, red band     Therapeutic Treatments and Modalities:     1. Manual Therapy (CPT 46235), R knee, IASTM to peripatellar region    2. E Stim Unattended (CPT 49730), R knee , IFC and ice to R knee.     Time-based treatments/modalities:  Manual therapy minutes (CPT 03599): 15 minutes  Therapeutic exercise minutes (CPT 66195): 15 minutes       ASSESSMENT:  Response to treatment:  Will see her for 1 more visit then dc to HEP.   PLAN/RECOMMENDATIONS:   Plan for treatment: therapy treatment to continue next visit.  Planned interventions for next visit: continue with current treatment.

## 2019-06-27 ENCOUNTER — PHYSICAL THERAPY (OUTPATIENT)
Dept: PHYSICAL THERAPY | Facility: MEDICAL CENTER | Age: 59
End: 2019-06-27
Attending: ORTHOPAEDIC SURGERY
Payer: COMMERCIAL

## 2019-06-27 DIAGNOSIS — M25.561 RIGHT KNEE PAIN, UNSPECIFIED CHRONICITY: ICD-10-CM

## 2019-06-27 DIAGNOSIS — M17.11 UNILATERAL PRIMARY OSTEOARTHRITIS, RIGHT KNEE: ICD-10-CM

## 2019-06-27 PROCEDURE — 97110 THERAPEUTIC EXERCISES: CPT

## 2019-06-27 PROCEDURE — 97140 MANUAL THERAPY 1/> REGIONS: CPT

## 2019-06-27 NOTE — OP THERAPY DAILY TREATMENT
Outpatient Physical Therapy  DAILY TREATMENT     [facility] Outpatient Physical Therapy  [address]  [place] [address]  Phone:  [phone]  Fax:  [phone]    Date: 06/27/2019    Patient: Debby Desai  YOB: 1960  MRN: [id number]     Time Calculation  Start time: 1100  Stop time: 1130 Time Calculation (min): 30 minutes     Chief Complaint: Knee Problem and Difficulty Walking    Visit #: 9    SUBJECTIVE:  Patient seen for follow up on R knee pain. Little better with injections since last visit. This is better but cannot quantify it via level or percentage. It just hurts different.     OBJECTIVE:  Current objective measures:           Therapeutic Exercises (CPT 67805):     1. Upright bike, Level 1 x 3 min, Level 3 x 2 min    2. Update HEP , See below       Therapeutic Exercise Summary: Sit to stand  Heel-toe raise  Tandem stance   Counter support stork  Counter supported monster walk    Therapeutic Treatments and Modalities:     1. Manual Therapy (CPT 87471), R knee, IASTM to peripatellar region    Time-based treatments/modalities:  Manual therapy minutes (CPT 93586): 15 minutes  Therapeutic exercise minutes (CPT 25909): 15 minutes       ASSESSMENT:  Response to treatment:  She has a good template of things to work on. Will dc from PT at this time  PLAN/RECOMMENDATIONS:   Plan for treatment: DC to HEP. Formal report to follow

## 2019-06-27 NOTE — OP THERAPY DISCHARGE SUMMARY
Outpatient Physical Therapy  DISCHARGE SUMMARY NOTE      [facility] Outpatient Physical Therapy  [address]  [place] [address]  Phone:  [phone]  Fax:  [phone]    Date of Visit: 06/27/2019    Patient: Debby Desai  YOB: 1960  MRN: [id number]     Referring Provider: Rufus Saba M.D.  [address] [place] [address]   Referring Diagnosis Unilateral primary osteoarthritis, right knee [M17.11];Pain in right knee [M25.561]     Physical Therapy Occurrence Codes    Date of onset of impairment:  4/22/19   Date physical therapy care plan established or reviewed:  5/13/19   Date physical therapy treatment started:  5/13/19          Functional Limitations and Severity Modifiers      Goal:     Discharge:         Your patient is being discharged from Physical Therapy with the following comments:   · Goals met  · Goals not met    Comments:  Patient was seen consistently for 12 therapy visits. She has worked hard but has trouble with mechanics and good hip recruitment for better knee position. She has some improvement via injections. She has a good template of things to work on right now and will be discharged from therapy at this time.      Limitations Remaining:  Pain. Difficulty walking    Recommendations:  HEP as able, stay active and follow up with primary care as needed.     Nicho Lucero, PT, DPT    Date: 6/27/2019

## 2019-09-04 DIAGNOSIS — Z01.810 PRE-OPERATIVE CARDIOVASCULAR EXAMINATION: ICD-10-CM

## 2019-09-04 DIAGNOSIS — Z01.812 PRE-OPERATIVE LABORATORY EXAMINATION: ICD-10-CM

## 2019-09-04 LAB
ANION GAP SERPL CALC-SCNC: 10 MMOL/L (ref 0–11.9)
APPEARANCE UR: CLEAR
BASOPHILS # BLD AUTO: 0.4 % (ref 0–1.8)
BASOPHILS # BLD: 0.03 K/UL (ref 0–0.12)
BILIRUB UR QL STRIP.AUTO: NEGATIVE
BUN SERPL-MCNC: 16 MG/DL (ref 8–22)
CALCIUM SERPL-MCNC: 9.6 MG/DL (ref 8.5–10.5)
CHLORIDE SERPL-SCNC: 98 MMOL/L (ref 96–112)
CO2 SERPL-SCNC: 27 MMOL/L (ref 20–33)
COLOR UR: YELLOW
CREAT SERPL-MCNC: 0.69 MG/DL (ref 0.5–1.4)
EKG IMPRESSION: NORMAL
EOSINOPHIL # BLD AUTO: 0.14 K/UL (ref 0–0.51)
EOSINOPHIL NFR BLD: 2 % (ref 0–6.9)
ERYTHROCYTE [DISTWIDTH] IN BLOOD BY AUTOMATED COUNT: 44.2 FL (ref 35.9–50)
EST. AVERAGE GLUCOSE BLD GHB EST-MCNC: 128 MG/DL
GLUCOSE SERPL-MCNC: 106 MG/DL (ref 65–99)
GLUCOSE UR STRIP.AUTO-MCNC: NEGATIVE MG/DL
HBA1C MFR BLD: 6.1 % (ref 0–5.6)
HCT VFR BLD AUTO: 39.8 % (ref 37–47)
HGB BLD-MCNC: 13.1 G/DL (ref 12–16)
IMM GRANULOCYTES # BLD AUTO: 0.02 K/UL (ref 0–0.11)
IMM GRANULOCYTES NFR BLD AUTO: 0.3 % (ref 0–0.9)
KETONES UR STRIP.AUTO-MCNC: ABNORMAL MG/DL
LEUKOCYTE ESTERASE UR QL STRIP.AUTO: NEGATIVE
LYMPHOCYTES # BLD AUTO: 2.32 K/UL (ref 1–4.8)
LYMPHOCYTES NFR BLD: 33.2 % (ref 22–41)
MCH RBC QN AUTO: 31.1 PG (ref 27–33)
MCHC RBC AUTO-ENTMCNC: 32.9 G/DL (ref 33.6–35)
MCV RBC AUTO: 94.5 FL (ref 81.4–97.8)
MICRO URNS: ABNORMAL
MONOCYTES # BLD AUTO: 0.57 K/UL (ref 0–0.85)
MONOCYTES NFR BLD AUTO: 8.2 % (ref 0–13.4)
NEUTROPHILS # BLD AUTO: 3.91 K/UL (ref 2–7.15)
NEUTROPHILS NFR BLD: 55.9 % (ref 44–72)
NITRITE UR QL STRIP.AUTO: NEGATIVE
NRBC # BLD AUTO: 0 K/UL
NRBC BLD-RTO: 0 /100 WBC
PH UR STRIP.AUTO: 6 [PH] (ref 5–8)
PLATELET # BLD AUTO: 321 K/UL (ref 164–446)
PMV BLD AUTO: 11.3 FL (ref 9–12.9)
POTASSIUM SERPL-SCNC: 3.5 MMOL/L (ref 3.6–5.5)
PROT UR QL STRIP: NEGATIVE MG/DL
RBC # BLD AUTO: 4.21 M/UL (ref 4.2–5.4)
RBC UR QL AUTO: NEGATIVE
SCCMEC + MECA PNL NOSE NAA+PROBE: NEGATIVE
SCCMEC + MECA PNL NOSE NAA+PROBE: POSITIVE
SODIUM SERPL-SCNC: 135 MMOL/L (ref 135–145)
SP GR UR STRIP.AUTO: 1.01
UROBILINOGEN UR STRIP.AUTO-MCNC: 0.2 MG/DL
WBC # BLD AUTO: 7 K/UL (ref 4.8–10.8)

## 2019-09-04 PROCEDURE — 83036 HEMOGLOBIN GLYCOSYLATED A1C: CPT

## 2019-09-04 PROCEDURE — 93005 ELECTROCARDIOGRAM TRACING: CPT | Performed by: ORTHOPAEDIC SURGERY

## 2019-09-04 PROCEDURE — 80048 BASIC METABOLIC PNL TOTAL CA: CPT

## 2019-09-04 PROCEDURE — 87640 STAPH A DNA AMP PROBE: CPT

## 2019-09-04 PROCEDURE — 81003 URINALYSIS AUTO W/O SCOPE: CPT

## 2019-09-04 PROCEDURE — 93010 ELECTROCARDIOGRAM REPORT: CPT | Performed by: INTERNAL MEDICINE

## 2019-09-04 PROCEDURE — 85025 COMPLETE CBC W/AUTO DIFF WBC: CPT

## 2019-09-04 PROCEDURE — 87641 MR-STAPH DNA AMP PROBE: CPT

## 2019-09-04 PROCEDURE — 36415 COLL VENOUS BLD VENIPUNCTURE: CPT

## 2019-09-04 NOTE — DISCHARGE PLANNING
DISCHARGE PLANNING NOTE - TOTAL JOINT     Procedure: Procedure(s):  ARTHROPLASTY, KNEE, TOTAL  Procedure Date: 9/16/2019  Insurance:  Payor: [facility] / Plan: [facility] [employer]  Equipment currently available at home? None  Steps into the home? 2-3  Steps within the home? 0  Toilet height? Standard  Type of shower? tub-shower  Who will be with you during your recovery? sister  Is Outpatient Physical Therapy set up after surgery? Yes  Did you take the Total Joint Class and where? No, provided [facility] Patient Education Booklet     Plan: Reviewed Equipment Resource list and provided a copy to the patient. Recommended a raised toilet seat and tub transfer bench or 3:1 commode frame. Debby will need a FWW and signed the choice form for [employer]; for scanned into media tab. There is an order for a walker on the pre-op orders in Media tab. Provided Home Safety Checklist. Anticipate discharge home.

## 2019-09-05 NOTE — OR NURSING
+Staph Aureus results from 9-4-19 sent & called into Lorena CRAWFORD @ Dr. Saba's office @ 9216 on 9-5-19.

## 2019-09-16 ENCOUNTER — APPOINTMENT (OUTPATIENT)
Dept: RADIOLOGY | Facility: MEDICAL CENTER | Age: 59
End: 2019-09-16
Attending: PHYSICIAN ASSISTANT
Payer: COMMERCIAL

## 2019-09-16 ENCOUNTER — HOSPITAL ENCOUNTER (OUTPATIENT)
Facility: MEDICAL CENTER | Age: 59
End: 2019-09-17
Attending: ORTHOPAEDIC SURGERY | Admitting: ORTHOPAEDIC SURGERY
Payer: COMMERCIAL

## 2019-09-16 ENCOUNTER — ANESTHESIA (OUTPATIENT)
Dept: SURGERY | Facility: MEDICAL CENTER | Age: 59
End: 2019-09-16
Payer: COMMERCIAL

## 2019-09-16 ENCOUNTER — ANESTHESIA EVENT (OUTPATIENT)
Dept: SURGERY | Facility: MEDICAL CENTER | Age: 59
End: 2019-09-16
Payer: COMMERCIAL

## 2019-09-16 DIAGNOSIS — G89.18 ACUTE POST-OPERATIVE PAIN: ICD-10-CM

## 2019-09-16 DIAGNOSIS — Z96.651 STATUS POST RIGHT KNEE REPLACEMENT: ICD-10-CM

## 2019-09-16 LAB — GLUCOSE BLD-MCNC: 99 MG/DL (ref 65–99)

## 2019-09-16 PROCEDURE — 160022 HCHG BLOCK: Performed by: ORTHOPAEDIC SURGERY

## 2019-09-16 PROCEDURE — 700101 HCHG RX REV CODE 250: Performed by: ANESTHESIOLOGY

## 2019-09-16 PROCEDURE — 700101 HCHG RX REV CODE 250: Performed by: ORTHOPAEDIC SURGERY

## 2019-09-16 PROCEDURE — 700102 HCHG RX REV CODE 250 W/ 637 OVERRIDE(OP): Performed by: ANESTHESIOLOGY

## 2019-09-16 PROCEDURE — 73560 X-RAY EXAM OF KNEE 1 OR 2: CPT | Mod: RT

## 2019-09-16 PROCEDURE — 160002 HCHG RECOVERY MINUTES (STAT): Performed by: ORTHOPAEDIC SURGERY

## 2019-09-16 PROCEDURE — 700102 HCHG RX REV CODE 250 W/ 637 OVERRIDE(OP): Performed by: PHYSICIAN ASSISTANT

## 2019-09-16 PROCEDURE — 700111 HCHG RX REV CODE 636 W/ 250 OVERRIDE (IP): Performed by: ANESTHESIOLOGY

## 2019-09-16 PROCEDURE — 502579 HCHG PACK, TOTAL KNEE: Performed by: ORTHOPAEDIC SURGERY

## 2019-09-16 PROCEDURE — 160036 HCHG PACU - EA ADDL 30 MINS PHASE I: Performed by: ORTHOPAEDIC SURGERY

## 2019-09-16 PROCEDURE — 96372 THER/PROPH/DIAG INJ SC/IM: CPT

## 2019-09-16 PROCEDURE — 700101 HCHG RX REV CODE 250: Performed by: PHYSICIAN ASSISTANT

## 2019-09-16 PROCEDURE — 90686 IIV4 VACC NO PRSV 0.5 ML IM: CPT | Performed by: ORTHOPAEDIC SURGERY

## 2019-09-16 PROCEDURE — 502000 HCHG MISC OR IMPLANTS RC 0278: Performed by: ORTHOPAEDIC SURGERY

## 2019-09-16 PROCEDURE — 90471 IMMUNIZATION ADMIN: CPT

## 2019-09-16 PROCEDURE — 82962 GLUCOSE BLOOD TEST: CPT

## 2019-09-16 PROCEDURE — 160009 HCHG ANES TIME/MIN: Performed by: ORTHOPAEDIC SURGERY

## 2019-09-16 PROCEDURE — A9270 NON-COVERED ITEM OR SERVICE: HCPCS | Performed by: PHYSICIAN ASSISTANT

## 2019-09-16 PROCEDURE — 700111 HCHG RX REV CODE 636 W/ 250 OVERRIDE (IP): Performed by: ORTHOPAEDIC SURGERY

## 2019-09-16 PROCEDURE — 96365 THER/PROPH/DIAG IV INF INIT: CPT

## 2019-09-16 PROCEDURE — 96376 TX/PRO/DX INJ SAME DRUG ADON: CPT

## 2019-09-16 PROCEDURE — 302135 SEQUENTIAL COMPRESSION MACHINE: Performed by: ORTHOPAEDIC SURGERY

## 2019-09-16 PROCEDURE — 160041 HCHG SURGERY MINUTES - EA ADDL 1 MIN LEVEL 4: Performed by: ORTHOPAEDIC SURGERY

## 2019-09-16 PROCEDURE — 700105 HCHG RX REV CODE 258: Performed by: ORTHOPAEDIC SURGERY

## 2019-09-16 PROCEDURE — 160048 HCHG OR STATISTICAL LEVEL 1-5: Performed by: ORTHOPAEDIC SURGERY

## 2019-09-16 PROCEDURE — 700105 HCHG RX REV CODE 258: Performed by: PHYSICIAN ASSISTANT

## 2019-09-16 PROCEDURE — L8699 PROSTHETIC IMPLANT NOS: HCPCS | Performed by: ORTHOPAEDIC SURGERY

## 2019-09-16 PROCEDURE — 700112 HCHG RX REV CODE 229: Performed by: PHYSICIAN ASSISTANT

## 2019-09-16 PROCEDURE — 96375 TX/PRO/DX INJ NEW DRUG ADDON: CPT

## 2019-09-16 PROCEDURE — 700111 HCHG RX REV CODE 636 W/ 250 OVERRIDE (IP)

## 2019-09-16 PROCEDURE — 160035 HCHG PACU - 1ST 60 MINS PHASE I: Performed by: ORTHOPAEDIC SURGERY

## 2019-09-16 PROCEDURE — A9270 NON-COVERED ITEM OR SERVICE: HCPCS | Performed by: ANESTHESIOLOGY

## 2019-09-16 PROCEDURE — G0378 HOSPITAL OBSERVATION PER HR: HCPCS

## 2019-09-16 PROCEDURE — 700111 HCHG RX REV CODE 636 W/ 250 OVERRIDE (IP): Performed by: PHYSICIAN ASSISTANT

## 2019-09-16 PROCEDURE — 501838 HCHG SUTURE GENERAL: Performed by: ORTHOPAEDIC SURGERY

## 2019-09-16 PROCEDURE — 160029 HCHG SURGERY MINUTES - 1ST 30 MINS LEVEL 4: Performed by: ORTHOPAEDIC SURGERY

## 2019-09-16 DEVICE — IMPLANTABLE DEVICE: Type: IMPLANTABLE DEVICE | Site: KNEE | Status: FUNCTIONAL

## 2019-09-16 DEVICE — PATELLA GNS II RESURF  32MM: Type: IMPLANTABLE DEVICE | Site: KNEE | Status: FUNCTIONAL

## 2019-09-16 DEVICE — IMPLANT LGN CR HIGH FLEX XLPE SZ 3-4 11MM: Type: IMPLANTABLE DEVICE | Site: KNEE | Status: FUNCTIONAL

## 2019-09-16 DEVICE — BONE CEMENT SIMPLEX FULL DOSE - (10EA/PK): Type: IMPLANTABLE DEVICE | Site: KNEE | Status: FUNCTIONAL

## 2019-09-16 DEVICE — IMPLANT GII CM TIB SIZE 3 RIGHT (1EA): Type: IMPLANTABLE DEVICE | Site: KNEE | Status: FUNCTIONAL

## 2019-09-16 RX ORDER — ONDANSETRON 2 MG/ML
4 INJECTION INTRAMUSCULAR; INTRAVENOUS
Status: DISCONTINUED | OUTPATIENT
Start: 2019-09-16 | End: 2019-09-16 | Stop reason: HOSPADM

## 2019-09-16 RX ORDER — LISINOPRIL 20 MG/1
20 TABLET ORAL
Status: DISCONTINUED | OUTPATIENT
Start: 2019-09-16 | End: 2019-09-17 | Stop reason: HOSPADM

## 2019-09-16 RX ORDER — HYDROMORPHONE HYDROCHLORIDE 1 MG/ML
0.2 INJECTION, SOLUTION INTRAMUSCULAR; INTRAVENOUS; SUBCUTANEOUS
Status: DISCONTINUED | OUTPATIENT
Start: 2019-09-16 | End: 2019-09-16 | Stop reason: HOSPADM

## 2019-09-16 RX ORDER — DEXAMETHASONE SODIUM PHOSPHATE 4 MG/ML
6 INJECTION, SOLUTION INTRA-ARTICULAR; INTRALESIONAL; INTRAMUSCULAR; INTRAVENOUS; SOFT TISSUE ONCE
Status: COMPLETED | OUTPATIENT
Start: 2019-09-17 | End: 2019-09-17

## 2019-09-16 RX ORDER — HALOPERIDOL 5 MG/ML
1 INJECTION INTRAMUSCULAR EVERY 6 HOURS PRN
Status: DISCONTINUED | OUTPATIENT
Start: 2019-09-16 | End: 2019-09-17 | Stop reason: HOSPADM

## 2019-09-16 RX ORDER — DIPHENHYDRAMINE HCL 25 MG
25 TABLET ORAL EVERY 6 HOURS PRN
Status: DISCONTINUED | OUTPATIENT
Start: 2019-09-16 | End: 2019-09-17 | Stop reason: HOSPADM

## 2019-09-16 RX ORDER — OXYCODONE HYDROCHLORIDE 5 MG/1
5 TABLET ORAL EVERY 4 HOURS PRN
Status: DISCONTINUED | OUTPATIENT
Start: 2019-09-16 | End: 2019-09-17 | Stop reason: HOSPADM

## 2019-09-16 RX ORDER — HYDROMORPHONE HYDROCHLORIDE 1 MG/ML
0.5 INJECTION, SOLUTION INTRAMUSCULAR; INTRAVENOUS; SUBCUTANEOUS
Status: DISCONTINUED | OUTPATIENT
Start: 2019-09-16 | End: 2019-09-17 | Stop reason: HOSPADM

## 2019-09-16 RX ORDER — SODIUM CHLORIDE, SODIUM LACTATE, POTASSIUM CHLORIDE, CALCIUM CHLORIDE 600; 310; 30; 20 MG/100ML; MG/100ML; MG/100ML; MG/100ML
INJECTION, SOLUTION INTRAVENOUS CONTINUOUS
Status: DISCONTINUED | OUTPATIENT
Start: 2019-09-16 | End: 2019-09-16 | Stop reason: HOSPADM

## 2019-09-16 RX ORDER — OXYCODONE HYDROCHLORIDE 10 MG/1
10 TABLET ORAL EVERY 4 HOURS PRN
Status: DISCONTINUED | OUTPATIENT
Start: 2019-09-16 | End: 2019-09-17 | Stop reason: HOSPADM

## 2019-09-16 RX ORDER — HYDROCHLOROTHIAZIDE 12.5 MG/1
12.5 TABLET ORAL
Status: DISCONTINUED | OUTPATIENT
Start: 2019-09-16 | End: 2019-09-17 | Stop reason: HOSPADM

## 2019-09-16 RX ORDER — OXYCODONE HCL 5 MG/5 ML
10 SOLUTION, ORAL ORAL
Status: COMPLETED | OUTPATIENT
Start: 2019-09-16 | End: 2019-09-16

## 2019-09-16 RX ORDER — GABAPENTIN 400 MG/1
400 CAPSULE ORAL 3 TIMES DAILY
Status: DISCONTINUED | OUTPATIENT
Start: 2019-09-16 | End: 2019-09-17 | Stop reason: HOSPADM

## 2019-09-16 RX ORDER — POLYETHYLENE GLYCOL 3350 17 G/17G
1 POWDER, FOR SOLUTION ORAL 2 TIMES DAILY PRN
Status: DISCONTINUED | OUTPATIENT
Start: 2019-09-16 | End: 2019-09-17 | Stop reason: HOSPADM

## 2019-09-16 RX ORDER — DOCUSATE SODIUM 100 MG/1
100 CAPSULE, LIQUID FILLED ORAL 2 TIMES DAILY
Status: DISCONTINUED | OUTPATIENT
Start: 2019-09-16 | End: 2019-09-17 | Stop reason: HOSPADM

## 2019-09-16 RX ORDER — SODIUM CHLORIDE 9 MG/ML
INJECTION, SOLUTION INTRAVENOUS CONTINUOUS
Status: DISPENSED | OUTPATIENT
Start: 2019-09-16 | End: 2019-09-16

## 2019-09-16 RX ORDER — DIAZEPAM 5 MG/1
5 TABLET ORAL EVERY 6 HOURS PRN
Status: DISCONTINUED | OUTPATIENT
Start: 2019-09-16 | End: 2019-09-17 | Stop reason: HOSPADM

## 2019-09-16 RX ORDER — LABETALOL HYDROCHLORIDE 5 MG/ML
5 INJECTION, SOLUTION INTRAVENOUS
Status: DISCONTINUED | OUTPATIENT
Start: 2019-09-16 | End: 2019-09-16 | Stop reason: HOSPADM

## 2019-09-16 RX ORDER — CEFAZOLIN SODIUM 2 G/100ML
2 INJECTION, SOLUTION INTRAVENOUS ONCE
Status: DISCONTINUED | OUTPATIENT
Start: 2019-09-16 | End: 2019-09-16 | Stop reason: HOSPADM

## 2019-09-16 RX ORDER — LIDOCAINE HYDROCHLORIDE 10 MG/ML
INJECTION, SOLUTION EPIDURAL; INFILTRATION; INTRACAUDAL; PERINEURAL
Status: COMPLETED
Start: 2019-09-16 | End: 2019-09-16

## 2019-09-16 RX ORDER — HYDROMORPHONE HYDROCHLORIDE 1 MG/ML
0.4 INJECTION, SOLUTION INTRAMUSCULAR; INTRAVENOUS; SUBCUTANEOUS
Status: DISCONTINUED | OUTPATIENT
Start: 2019-09-16 | End: 2019-09-16 | Stop reason: HOSPADM

## 2019-09-16 RX ORDER — PROMETHAZINE HYDROCHLORIDE 25 MG/1
25 SUPPOSITORY RECTAL EVERY 6 HOURS PRN
Status: DISCONTINUED | OUTPATIENT
Start: 2019-09-16 | End: 2019-09-17 | Stop reason: HOSPADM

## 2019-09-16 RX ORDER — LEVOTHYROXINE SODIUM 0.05 MG/1
50 TABLET ORAL
Status: DISCONTINUED | OUTPATIENT
Start: 2019-09-17 | End: 2019-09-17 | Stop reason: HOSPADM

## 2019-09-16 RX ORDER — MEPERIDINE HYDROCHLORIDE 25 MG/ML
12.5 INJECTION INTRAMUSCULAR; INTRAVENOUS; SUBCUTANEOUS
Status: DISCONTINUED | OUTPATIENT
Start: 2019-09-16 | End: 2019-09-16 | Stop reason: HOSPADM

## 2019-09-16 RX ORDER — ONDANSETRON 4 MG/1
4 TABLET, ORALLY DISINTEGRATING ORAL EVERY 4 HOURS PRN
Status: DISCONTINUED | OUTPATIENT
Start: 2019-09-16 | End: 2019-09-17 | Stop reason: HOSPADM

## 2019-09-16 RX ORDER — DEXAMETHASONE SODIUM PHOSPHATE 4 MG/ML
INJECTION, SOLUTION INTRA-ARTICULAR; INTRALESIONAL; INTRAMUSCULAR; INTRAVENOUS; SOFT TISSUE PRN
Status: DISCONTINUED | OUTPATIENT
Start: 2019-09-16 | End: 2019-09-16 | Stop reason: SURG

## 2019-09-16 RX ORDER — KETOROLAC TROMETHAMINE 30 MG/ML
15 INJECTION, SOLUTION INTRAMUSCULAR; INTRAVENOUS EVERY 6 HOURS
Status: DISCONTINUED | OUTPATIENT
Start: 2019-09-16 | End: 2019-09-17 | Stop reason: HOSPADM

## 2019-09-16 RX ORDER — AMOXICILLIN 250 MG
1 CAPSULE ORAL
Status: DISCONTINUED | OUTPATIENT
Start: 2019-09-16 | End: 2019-09-17 | Stop reason: HOSPADM

## 2019-09-16 RX ORDER — KETOROLAC TROMETHAMINE 30 MG/ML
INJECTION, SOLUTION INTRAMUSCULAR; INTRAVENOUS
Status: DISCONTINUED | OUTPATIENT
Start: 2019-09-16 | End: 2019-09-16 | Stop reason: HOSPADM

## 2019-09-16 RX ORDER — DEXAMETHASONE SODIUM PHOSPHATE 10 MG/ML
INJECTION, SOLUTION INTRAMUSCULAR; INTRAVENOUS
Status: COMPLETED | OUTPATIENT
Start: 2019-09-16 | End: 2019-09-16

## 2019-09-16 RX ORDER — METFORMIN HYDROCHLORIDE 500 MG/1
500 TABLET, EXTENDED RELEASE ORAL 2 TIMES DAILY WITH MEALS
Status: DISCONTINUED | OUTPATIENT
Start: 2019-09-16 | End: 2019-09-17 | Stop reason: HOSPADM

## 2019-09-16 RX ORDER — ENEMA 19; 7 G/133ML; G/133ML
1 ENEMA RECTAL
Status: DISCONTINUED | OUTPATIENT
Start: 2019-09-16 | End: 2019-09-17 | Stop reason: HOSPADM

## 2019-09-16 RX ORDER — TRAMADOL HYDROCHLORIDE 50 MG/1
50 TABLET ORAL EVERY 4 HOURS PRN
Status: DISCONTINUED | OUTPATIENT
Start: 2019-09-16 | End: 2019-09-17 | Stop reason: HOSPADM

## 2019-09-16 RX ORDER — ONDANSETRON 2 MG/ML
INJECTION INTRAMUSCULAR; INTRAVENOUS PRN
Status: DISCONTINUED | OUTPATIENT
Start: 2019-09-16 | End: 2019-09-16 | Stop reason: SURG

## 2019-09-16 RX ORDER — ATORVASTATIN CALCIUM 20 MG/1
20 TABLET, FILM COATED ORAL DAILY
Status: DISCONTINUED | OUTPATIENT
Start: 2019-09-17 | End: 2019-09-17 | Stop reason: HOSPADM

## 2019-09-16 RX ORDER — HYDROMORPHONE HYDROCHLORIDE 1 MG/ML
0.1 INJECTION, SOLUTION INTRAMUSCULAR; INTRAVENOUS; SUBCUTANEOUS
Status: DISCONTINUED | OUTPATIENT
Start: 2019-09-16 | End: 2019-09-16 | Stop reason: HOSPADM

## 2019-09-16 RX ORDER — CEFAZOLIN SODIUM 2 G/100ML
2 INJECTION, SOLUTION INTRAVENOUS EVERY 8 HOURS
Status: COMPLETED | OUTPATIENT
Start: 2019-09-16 | End: 2019-09-16

## 2019-09-16 RX ORDER — SODIUM CHLORIDE, SODIUM LACTATE, POTASSIUM CHLORIDE, CALCIUM CHLORIDE 600; 310; 30; 20 MG/100ML; MG/100ML; MG/100ML; MG/100ML
INJECTION, SOLUTION INTRAVENOUS CONTINUOUS
Status: ACTIVE | OUTPATIENT
Start: 2019-09-16 | End: 2019-09-16

## 2019-09-16 RX ORDER — ROPIVACAINE HYDROCHLORIDE 5 MG/ML
INJECTION, SOLUTION EPIDURAL; INFILTRATION; PERINEURAL
Status: COMPLETED | OUTPATIENT
Start: 2019-09-16 | End: 2019-09-16

## 2019-09-16 RX ORDER — ONDANSETRON 2 MG/ML
4 INJECTION INTRAMUSCULAR; INTRAVENOUS EVERY 4 HOURS PRN
Status: DISCONTINUED | OUTPATIENT
Start: 2019-09-16 | End: 2019-09-17 | Stop reason: HOSPADM

## 2019-09-16 RX ORDER — SCOLOPAMINE TRANSDERMAL SYSTEM 1 MG/1
1 PATCH, EXTENDED RELEASE TRANSDERMAL
Status: DISCONTINUED | OUTPATIENT
Start: 2019-09-16 | End: 2019-09-17 | Stop reason: HOSPADM

## 2019-09-16 RX ORDER — DEXAMETHASONE SODIUM PHOSPHATE 4 MG/ML
4 INJECTION, SOLUTION INTRA-ARTICULAR; INTRALESIONAL; INTRAMUSCULAR; INTRAVENOUS; SOFT TISSUE
Status: DISCONTINUED | OUTPATIENT
Start: 2019-09-16 | End: 2019-09-17 | Stop reason: HOSPADM

## 2019-09-16 RX ORDER — FERROUS SULFATE 325(65) MG
325 TABLET ORAL 2 TIMES DAILY
Status: DISCONTINUED | OUTPATIENT
Start: 2019-09-16 | End: 2019-09-17 | Stop reason: HOSPADM

## 2019-09-16 RX ORDER — PROCHLORPERAZINE MALEATE 10 MG
10 TABLET ORAL EVERY 6 HOURS PRN
Status: DISCONTINUED | OUTPATIENT
Start: 2019-09-16 | End: 2019-09-17 | Stop reason: HOSPADM

## 2019-09-16 RX ORDER — BISACODYL 10 MG
10 SUPPOSITORY, RECTAL RECTAL
Status: DISCONTINUED | OUTPATIENT
Start: 2019-09-16 | End: 2019-09-17 | Stop reason: HOSPADM

## 2019-09-16 RX ORDER — LIDOCAINE HYDROCHLORIDE 20 MG/ML
INJECTION, SOLUTION EPIDURAL; INFILTRATION; INTRACAUDAL; PERINEURAL PRN
Status: DISCONTINUED | OUTPATIENT
Start: 2019-09-16 | End: 2019-09-16 | Stop reason: SURG

## 2019-09-16 RX ORDER — DIPHENHYDRAMINE HYDROCHLORIDE 50 MG/ML
25 INJECTION INTRAMUSCULAR; INTRAVENOUS EVERY 6 HOURS PRN
Status: DISCONTINUED | OUTPATIENT
Start: 2019-09-16 | End: 2019-09-17 | Stop reason: HOSPADM

## 2019-09-16 RX ORDER — OXYCODONE HCL 5 MG/5 ML
5 SOLUTION, ORAL ORAL
Status: COMPLETED | OUTPATIENT
Start: 2019-09-16 | End: 2019-09-16

## 2019-09-16 RX ORDER — CHLORPROMAZINE HYDROCHLORIDE 25 MG/ML
25 INJECTION INTRAMUSCULAR EVERY 6 HOURS PRN
Status: DISCONTINUED | OUTPATIENT
Start: 2019-09-16 | End: 2019-09-17 | Stop reason: HOSPADM

## 2019-09-16 RX ORDER — CHLORPROMAZINE HYDROCHLORIDE 10 MG/1
25 TABLET, FILM COATED ORAL EVERY 6 HOURS PRN
Status: DISCONTINUED | OUTPATIENT
Start: 2019-09-16 | End: 2019-09-17 | Stop reason: HOSPADM

## 2019-09-16 RX ORDER — MIDAZOLAM HYDROCHLORIDE 1 MG/ML
INJECTION INTRAMUSCULAR; INTRAVENOUS PRN
Status: DISCONTINUED | OUTPATIENT
Start: 2019-09-16 | End: 2019-09-16 | Stop reason: SURG

## 2019-09-16 RX ORDER — ACETAMINOPHEN 500 MG
1000 TABLET ORAL EVERY 6 HOURS
Status: DISCONTINUED | OUTPATIENT
Start: 2019-09-16 | End: 2019-09-17 | Stop reason: HOSPADM

## 2019-09-16 RX ORDER — ZOLPIDEM TARTRATE 5 MG/1
5 TABLET ORAL NIGHTLY PRN
Status: DISCONTINUED | OUTPATIENT
Start: 2019-09-17 | End: 2019-09-17 | Stop reason: HOSPADM

## 2019-09-16 RX ORDER — DIPHENHYDRAMINE HYDROCHLORIDE 50 MG/ML
12.5 INJECTION INTRAMUSCULAR; INTRAVENOUS
Status: DISCONTINUED | OUTPATIENT
Start: 2019-09-16 | End: 2019-09-16 | Stop reason: HOSPADM

## 2019-09-16 RX ORDER — AMOXICILLIN 250 MG
1 CAPSULE ORAL NIGHTLY
Status: DISCONTINUED | OUTPATIENT
Start: 2019-09-16 | End: 2019-09-17 | Stop reason: HOSPADM

## 2019-09-16 RX ORDER — ROPIVACAINE HYDROCHLORIDE 5 MG/ML
INJECTION, SOLUTION EPIDURAL; INFILTRATION; PERINEURAL PRN
Status: DISCONTINUED | OUTPATIENT
Start: 2019-09-16 | End: 2019-09-16 | Stop reason: SURG

## 2019-09-16 RX ORDER — BUPIVACAINE HYDROCHLORIDE AND EPINEPHRINE 2.5; 5 MG/ML; UG/ML
INJECTION, SOLUTION EPIDURAL; INFILTRATION; INTRACAUDAL; PERINEURAL
Status: DISCONTINUED | OUTPATIENT
Start: 2019-09-16 | End: 2019-09-16 | Stop reason: HOSPADM

## 2019-09-16 RX ORDER — CEFAZOLIN SODIUM 1 G/3ML
INJECTION, POWDER, FOR SOLUTION INTRAMUSCULAR; INTRAVENOUS PRN
Status: DISCONTINUED | OUTPATIENT
Start: 2019-09-16 | End: 2019-09-16 | Stop reason: SURG

## 2019-09-16 RX ORDER — LISINOPRIL AND HYDROCHLOROTHIAZIDE 20; 12.5 MG/1; MG/1
1 TABLET ORAL DAILY
Status: DISCONTINUED | OUTPATIENT
Start: 2019-09-16 | End: 2019-09-16

## 2019-09-16 RX ADMIN — FENTANYL CITRATE 50 MCG: 50 INJECTION, SOLUTION INTRAMUSCULAR; INTRAVENOUS at 11:38

## 2019-09-16 RX ADMIN — FENTANYL CITRATE 50 MCG: 50 INJECTION, SOLUTION INTRAMUSCULAR; INTRAVENOUS at 09:39

## 2019-09-16 RX ADMIN — DOCUSATE SODIUM 100 MG: 100 CAPSULE, LIQUID FILLED ORAL at 17:35

## 2019-09-16 RX ADMIN — ONDANSETRON 4 MG: 2 INJECTION INTRAMUSCULAR; INTRAVENOUS at 10:33

## 2019-09-16 RX ADMIN — FENTANYL CITRATE 50 MCG: 50 INJECTION, SOLUTION INTRAMUSCULAR; INTRAVENOUS at 09:26

## 2019-09-16 RX ADMIN — ROPIVACAINE HYDROCHLORIDE 15 ML: 5 INJECTION, SOLUTION EPIDURAL; INFILTRATION; PERINEURAL at 09:20

## 2019-09-16 RX ADMIN — GABAPENTIN 400 MG: 400 CAPSULE ORAL at 17:35

## 2019-09-16 RX ADMIN — TRANEXAMIC ACID 1000 MG: 100 INJECTION, SOLUTION INTRAVENOUS at 11:59

## 2019-09-16 RX ADMIN — Medication 0.5 ML: at 08:11

## 2019-09-16 RX ADMIN — CEFAZOLIN SODIUM 2 G: 2 INJECTION, SOLUTION INTRAVENOUS at 22:18

## 2019-09-16 RX ADMIN — KETOROLAC TROMETHAMINE 15 MG: 30 INJECTION, SOLUTION INTRAMUSCULAR at 17:35

## 2019-09-16 RX ADMIN — MIDAZOLAM 2 MG: 1 INJECTION INTRAMUSCULAR; INTRAVENOUS at 09:15

## 2019-09-16 RX ADMIN — FENTANYL CITRATE 50 MCG: 50 INJECTION, SOLUTION INTRAMUSCULAR; INTRAVENOUS at 11:14

## 2019-09-16 RX ADMIN — FERROUS SULFATE TAB 325 MG (65 MG ELEMENTAL FE) 325 MG: 325 (65 FE) TAB at 17:35

## 2019-09-16 RX ADMIN — HYDROMORPHONE HYDROCHLORIDE 0.4 MG: 1 INJECTION, SOLUTION INTRAMUSCULAR; INTRAVENOUS; SUBCUTANEOUS at 11:17

## 2019-09-16 RX ADMIN — FENTANYL CITRATE 25 MCG: 50 INJECTION, SOLUTION INTRAMUSCULAR; INTRAVENOUS at 14:03

## 2019-09-16 RX ADMIN — INFLUENZA A VIRUS A/BRISBANE/02/2018 IVR-190 (H1N1) ANTIGEN (FORMALDEHYDE INACTIVATED), INFLUENZA A VIRUS A/KANSAS/14/2017 X-327 (H3N2) ANTIGEN (FORMALDEHYDE INACTIVATED), INFLUENZA B VIRUS B/PHUKET/3073/2013 ANTIGEN (FORMALDEHYDE INACTIVATED), AND INFLUENZA B VIRUS B/MARYLAND/15/2016 BX-69A ANTIGEN (FORMALDEHYDE INACTIVATED) 0.5 ML: 15; 15; 15; 15 INJECTION, SUSPENSION INTRAMUSCULAR at 16:02

## 2019-09-16 RX ADMIN — LIDOCAINE HYDROCHLORIDE 0.5 ML: 10 INJECTION, SOLUTION EPIDURAL; INFILTRATION; INTRACAUDAL at 08:11

## 2019-09-16 RX ADMIN — SODIUM CHLORIDE, POTASSIUM CHLORIDE, SODIUM LACTATE AND CALCIUM CHLORIDE: 600; 310; 30; 20 INJECTION, SOLUTION INTRAVENOUS at 10:16

## 2019-09-16 RX ADMIN — HYDROMORPHONE HYDROCHLORIDE 0.2 MG: 1 INJECTION, SOLUTION INTRAMUSCULAR; INTRAVENOUS; SUBCUTANEOUS at 11:30

## 2019-09-16 RX ADMIN — HYDROMORPHONE HYDROCHLORIDE 0.4 MG: 1 INJECTION, SOLUTION INTRAMUSCULAR; INTRAVENOUS; SUBCUTANEOUS at 12:05

## 2019-09-16 RX ADMIN — OXYCODONE HYDROCHLORIDE 10 MG: 10 TABLET ORAL at 21:40

## 2019-09-16 RX ADMIN — ASPIRIN 81 MG: 81 TABLET, COATED ORAL at 22:19

## 2019-09-16 RX ADMIN — METFORMIN HYDROCHLORIDE 500 MG: 500 TABLET, EXTENDED RELEASE ORAL at 17:35

## 2019-09-16 RX ADMIN — OXYCODONE HYDROCHLORIDE 10 MG: 5 SOLUTION ORAL at 11:35

## 2019-09-16 RX ADMIN — HYDROMORPHONE HYDROCHLORIDE 0.4 MG: 1 INJECTION, SOLUTION INTRAMUSCULAR; INTRAVENOUS; SUBCUTANEOUS at 11:24

## 2019-09-16 RX ADMIN — FENTANYL CITRATE 50 MCG: 50 INJECTION, SOLUTION INTRAMUSCULAR; INTRAVENOUS at 10:17

## 2019-09-16 RX ADMIN — CEFAZOLIN 2 G: 330 INJECTION, POWDER, FOR SOLUTION INTRAMUSCULAR; INTRAVENOUS at 09:30

## 2019-09-16 RX ADMIN — FENTANYL CITRATE 50 MCG: 50 INJECTION, SOLUTION INTRAMUSCULAR; INTRAVENOUS at 10:03

## 2019-09-16 RX ADMIN — OXYCODONE HYDROCHLORIDE 10 MG: 10 TABLET ORAL at 16:13

## 2019-09-16 RX ADMIN — LIDOCAINE HYDROCHLORIDE 40 MG: 20 INJECTION, SOLUTION EPIDURAL; INFILTRATION; INTRACAUDAL at 09:26

## 2019-09-16 RX ADMIN — HYDROMORPHONE HYDROCHLORIDE 0.4 MG: 1 INJECTION, SOLUTION INTRAMUSCULAR; INTRAVENOUS; SUBCUTANEOUS at 11:55

## 2019-09-16 RX ADMIN — SODIUM CHLORIDE: 9 INJECTION, SOLUTION INTRAVENOUS at 16:02

## 2019-09-16 RX ADMIN — HYDROCHLOROTHIAZIDE 12.5 MG: 12.5 TABLET ORAL at 16:02

## 2019-09-16 RX ADMIN — ACETAMINOPHEN 1000 MG: 500 TABLET ORAL at 17:35

## 2019-09-16 RX ADMIN — ACETAMINOPHEN 1000 MG: 500 TABLET ORAL at 23:10

## 2019-09-16 RX ADMIN — ROPIVACAINE HYDROCHLORIDE 15 ML: 5 INJECTION, SOLUTION EPIDURAL; INFILTRATION; PERINEURAL at 09:22

## 2019-09-16 RX ADMIN — CEFAZOLIN SODIUM 2 G: 2 INJECTION, SOLUTION INTRAVENOUS at 16:01

## 2019-09-16 RX ADMIN — HYDROMORPHONE HYDROCHLORIDE 0.2 MG: 1 INJECTION, SOLUTION INTRAMUSCULAR; INTRAVENOUS; SUBCUTANEOUS at 12:22

## 2019-09-16 RX ADMIN — SODIUM CHLORIDE, POTASSIUM CHLORIDE, SODIUM LACTATE AND CALCIUM CHLORIDE: 600; 310; 30; 20 INJECTION, SOLUTION INTRAVENOUS at 08:11

## 2019-09-16 RX ADMIN — LISINOPRIL 20 MG: 20 TABLET ORAL at 16:02

## 2019-09-16 RX ADMIN — DEXAMETHASONE SODIUM PHOSPHATE 4 MG: 4 INJECTION, SOLUTION INTRA-ARTICULAR; INTRALESIONAL; INTRAMUSCULAR; INTRAVENOUS; SOFT TISSUE at 09:35

## 2019-09-16 RX ADMIN — DEXAMETHASONE SODIUM PHOSPHATE 2 MG: 10 INJECTION INTRAMUSCULAR; INTRAVENOUS at 09:20

## 2019-09-16 RX ADMIN — PROPOFOL 130 MG: 10 INJECTION, EMULSION INTRAVENOUS at 09:26

## 2019-09-16 RX ADMIN — KETOROLAC TROMETHAMINE 15 MG: 30 INJECTION, SOLUTION INTRAMUSCULAR at 23:10

## 2019-09-16 ASSESSMENT — LIFESTYLE VARIABLES
ALCOHOL_USE: NO
TOTAL SCORE: 0
HAVE YOU EVER FELT YOU SHOULD CUT DOWN ON YOUR DRINKING: NO
ON A TYPICAL DAY WHEN YOU DRINK ALCOHOL HOW MANY DRINKS DO YOU HAVE: 0
TOTAL SCORE: 0
TOTAL SCORE: 0
AVERAGE NUMBER OF DAYS PER WEEK YOU HAVE A DRINK CONTAINING ALCOHOL: 0
HOW MANY TIMES IN THE PAST YEAR HAVE YOU HAD 5 OR MORE DRINKS IN A DAY: 0
EVER HAD A DRINK FIRST THING IN THE MORNING TO STEADY YOUR NERVES TO GET RID OF A HANGOVER: NO
CONSUMPTION TOTAL: NEGATIVE
HAVE PEOPLE ANNOYED YOU BY CRITICIZING YOUR DRINKING: NO
EVER_SMOKED: NEVER
EVER FELT BAD OR GUILTY ABOUT YOUR DRINKING: NO

## 2019-09-16 ASSESSMENT — COGNITIVE AND FUNCTIONAL STATUS - GENERAL
WALKING IN HOSPITAL ROOM: A LITTLE
EATING MEALS: A LITTLE
DRESSING REGULAR UPPER BODY CLOTHING: A LITTLE
SUGGESTED CMS G CODE MODIFIER MOBILITY: CK
DAILY ACTIVITIY SCORE: 18
MOVING TO AND FROM BED TO CHAIR: A LITTLE
PERSONAL GROOMING: A LITTLE
MOVING FROM LYING ON BACK TO SITTING ON SIDE OF FLAT BED: A LITTLE
CLIMB 3 TO 5 STEPS WITH RAILING: A LITTLE
TOILETING: A LITTLE
STANDING UP FROM CHAIR USING ARMS: A LITTLE
DRESSING REGULAR LOWER BODY CLOTHING: A LITTLE
HELP NEEDED FOR BATHING: A LITTLE
TURNING FROM BACK TO SIDE WHILE IN FLAT BAD: A LITTLE
SUGGESTED CMS G CODE MODIFIER DAILY ACTIVITY: CK
MOBILITY SCORE: 18

## 2019-09-16 ASSESSMENT — PATIENT HEALTH QUESTIONNAIRE - PHQ9
4. FEELING TIRED OR HAVING LITTLE ENERGY: NEARLY EVERY DAY
3. TROUBLE FALLING OR STAYING ASLEEP OR SLEEPING TOO MUCH: NEARLY EVERY DAY
2. FEELING DOWN, DEPRESSED, IRRITABLE, OR HOPELESS: SEVERAL DAYS
5. POOR APPETITE OR OVEREATING: NOT AT ALL
6. FEELING BAD ABOUT YOURSELF - OR THAT YOU ARE A FAILURE OR HAVE LET YOURSELF OR YOUR FAMILY DOWN: SEVERAL DAYS
1. LITTLE INTEREST OR PLEASURE IN DOING THINGS: NOT AT ALL
8. MOVING OR SPEAKING SO SLOWLY THAT OTHER PEOPLE COULD HAVE NOTICED. OR THE OPPOSITE, BEING SO FIGETY OR RESTLESS THAT YOU HAVE BEEN MOVING AROUND A LOT MORE THAN USUAL: NOT AT ALL
SUM OF ALL RESPONSES TO PHQ QUESTIONS 1-9: 9
7. TROUBLE CONCENTRATING ON THINGS, SUCH AS READING THE NEWSPAPER OR WATCHING TELEVISION: SEVERAL DAYS
SUM OF ALL RESPONSES TO PHQ9 QUESTIONS 1 AND 2: 1
9. THOUGHTS THAT YOU WOULD BE BETTER OFF DEAD, OR OF HURTING YOURSELF: NOT AT ALL

## 2019-09-16 ASSESSMENT — PAIN SCALES - GENERAL: PAIN_LEVEL: 1

## 2019-09-16 NOTE — OR SURGEON
Immediate Post OP Note    PreOp Diagnosis: DJD R knee    PostOp Diagnosis: same    Procedure(s):  ARTHROPLASTY, Right KNEE, TOTAL - Wound Class: Clean    Surgeon(s):  Rufus Saba M.D.    Anesthesiologist/Type of Anesthesia:  Anesthesiologist: Sesar Rivera M.D./General    Surgical Staff:  Circulator: Leonor Saldaña RCHINO; Tierra Granger R.N.  Limb Villa: Dylan Vidal  First Assist: ZENA Borrego    Specimens removed if any:  * No specimens in log *    Estimated Blood Loss: 50    Findings: severe lateral, moderate PF    Complications: none        9/16/2019 10:58 AM Rufus Saba M.D.

## 2019-09-16 NOTE — ANESTHESIA POSTPROCEDURE EVALUATION
Patient: Debby Desai    Procedure Summary     Date:  09/16/19 Room / Location:  [place] [address] / SURGERY [facility]    Anesthesia Start:  0915 Anesthesia Stop:  1059    Procedure:  ARTHROPLASTY, KNEE, TOTAL (Right Knee) Diagnosis:  (OSTEOARTHRITIS KNEE RIGHT)    Surgeon:  Rufus Saba M.D. Responsible Provider:  Sesar Rivera M.D.    Anesthesia Type:  general ASA Status:  2          Final Anesthesia Type: general  Last vitals  BP   Blood Pressure: 104/52    Temp   36.9 °C (98.5 °F)    Pulse   Pulse: 83   Resp   13    SpO2   99 %      Anesthesia Post Evaluation    Patient location during evaluation: PACU  Patient participation: complete - patient participated  Level of consciousness: awake and alert  Pain score: 1    Airway patency: patent  Anesthetic complications: no  Cardiovascular status: hemodynamically stable  Respiratory status: acceptable  Hydration status: euvolemic    PONV: none           Nurse Pain Score: 1 (NPRS)

## 2019-09-16 NOTE — ANESTHESIA PROCEDURE NOTES
Airway  Date/Time: 9/16/2019 9:28 AM  Performed by: Sesar Rivera M.D.  Authorized by: Sesar Rivera M.D.     Location:  OR  Urgency:  Elective  Difficult Airway: No    Indications for Airway Management:  Anesthesia  Spontaneous Ventilation: absent    Sedation Level:  Deep  Preoxygenated: Yes    Mask Difficulty Assessment:  0 - not attempted  Final Airway Type:  Supraglottic airway  Final Supraglottic Airway:  Standard LMA  SGA Size:  4  Number of Attempts at Approach:  1

## 2019-09-16 NOTE — OR NURSING
Patient's brother (philippe) updated on patient condition and plan of care in recovery area.  I let philippe know that we are waiting on a room assignment and that I would call him back when we have a room assigned.

## 2019-09-16 NOTE — OR NURSING
Patient's brother Francesco updated with patient's room number.  Room ready.  Patient ready for transport.  Transport ordered.

## 2019-09-16 NOTE — PROGRESS NOTES
Pt arrived to the unit with the transporter via hospital bed.   Pt is AO x 4  On 10L of O2 via Oxy mask   on  Polar ice on     2 RN skin check done with Hillary CONWAY. Pt has surgical dressing to right knee. Clean, dry and intact. No other signs of skin breakdown noted.     Safety precautions in place. Call light within reach. Bed in low and locked position. Hourly rounding in place. Updated on plan of care. Questions answered. Pt  at the bedside.

## 2019-09-16 NOTE — OP REPORT
DATE OF SERVICE:  09/16/2019    PREOPERATIVE DIAGNOSIS:  Degenerative arthritis, right knee.    POSTOPERATIVE DIAGNOSIS:  Degenerative arthritis, right knee.    PROCEDURE PERFORMED:  Right total knee arthroplasty.    SURGEON:  Rufus Saba MD    ANESTHESIA:  General with adductor canal block.    ANESTHESIOLOGIST:  Dr. Rivera    ASSISTANT:  Berto Lujan PA-C    ESTIMATED BLOOD LOSS:  50 mL    COMPONENTS PLACED:  Cemented [employer] size 5 narrow cruciate   retaining femur, size 3 tibia with 11 mm insert, and a 32 mm patellar button.    FINDINGS:  Severe lateral and moderate patellofemoral disease.    COMPLICATIONS:  None.    SUMMARY:  The patient was brought to the operating room and anesthesia was   administered.  Antibiotics and tranexamic acid were given IV.  Right leg   prepped and draped in usual sterile manner.  Leg was exsanguinated, tourniquet   inflated, time-out was called.    We made an anterior incision centered over the medial patella.  Standard   medial parapatellar arthrotomy was made.  We just did a medial release to the   mid-coronal plane of tibia, did not over release medially in the face of the   valgus knee.  The patella was everted.  It measured 23 mm.  We cut it down to   a universal 13 mm.  The 32 button had good coverage.  We prepared it and   resected the excess bone.    The knee was flexed up.  Tourniquet released at this point.  The ACL menisci   excised.  The 5-degree jig was utilized distally, looked appropriate.  We took   just a standard amount, which only ended up being about 7-8 mm, measured to a   size 5.  We set rotation appropriately, made all the cuts for a size 5   cruciate retaining implant.  Peripheral osteophytes were removed.    Tibia was then exposed.  The extramedullary jig utilized, aligned off the   medial third tubercle, centered the ankle with just slight slope, and we took   the standard amount off the less involved lateral side.  Care was taken  to   protect the medial collateral and posterior cruciate.  Posterior osteophytes   were removed from the femur.  Posterior capsule release was done.  PCL was   intact.  The size 3 tibia had the best coverage.  We pinned it and trialed it.    We had good flexion stability with 11 mm.  PCL was functioning nicely, but   it was a little tight in extension, so we went back and took 2 more   millimeters off distally, recut the chamfers, and that corrected our balance   nicely.  We at this point were comfortable with our sizing, tracking, and   balancing, made the appropriate punches.  The real components were cemented   into place.  Cement debris was removed.  The wound was soaked with dilute   Betadine for a few minutes and then flushed with saline.  We injected the   posterior capsule with a solution of analgesic and anesthetic.  The final 11   was then locked securely into position.    The knee was placed in flexion and the tendon closed with running #2 Quill.    We injected the remainder of the local intraarticularly.  The skin was closed   with multiple layers of Vicryl and running 3-0 Monocryl.  Silver impregnated   dressing was placed followed by compression wrap and a PolarCare unit.  The   patient was stable during the procedure and went to recovery room in good   condition.       ____________________________________     MD KEITH CHINCHILLA / GATITO    DD:  09/16/2019 11:03:04  DT:  09/16/2019 11:14:22    D#:  [id number]  Job#:  [id number]

## 2019-09-16 NOTE — ANESTHESIA QCDR
2019 [facility] Clinical Data Registry (for Quality Improvement)     Postoperative nausea/vomiting risk protocol (Adult = 18 yrs and Pediatric 3-17 yrs)- (430 and 463)  General inhalation anesthetic (NOT TIVA) with PONV risk factors: Yes  Provision of anti-emetic therapy with at least 2 different classes of agents: Yes   Patient DID NOT receive anti-emetic therapy and reason is documented in Medical Record:  N/A    Multimodal Pain Management- (AQI59)  Patient undergoing Elective Surgery (i.e. Outpatient, or ASC, or Prescheduled Surgery prior to Hospital Admission): Yes  Use of Multimodal Pain Management, two or more drugs and/or interventions, NOT including systemic opioids: Yes   Exception: Documented allergy to multiple classes of analgesics:  N/A    PACU assessment of acute postoperative pain prior to Anesthesia Care End- Applies to Patients Age = 18- (ABG7)  Initial PACU pain score is which of the following: < 7/10  Patient unable to report pain score: N/A    Post-anesthetic transfer of care checklist/protocol to PACU/ICU- (426 and 427)  Upon conclusion of case, patient transferred to which of the following locations: PACU/Non-ICU  Use of transfer checklist/protocol: Yes  Exclusion: Service Performed in Patient Hospital Room (and thus did not require transfer): N/A    PACU Reintubation- (AQI31)  General anesthesia requiring endotracheal intubation (ETT) along with subsequent extubation in OR or PACU: No  Required reintubation in the PACU: N/A  Extubation was a planned trial documented in the medical record prior to removal of the original airway device: N/A    Unplanned admission to ICU related to anesthesia service up through end of PACU care- (MD51)  Unplanned admission to ICU (not initially anticipated at anesthesia start time): No

## 2019-09-16 NOTE — ANESTHESIA PREPROCEDURE EVALUATION
Relevant Problems   CARDIAC   (+) HTN (hypertension), benign      ENDO   (+) Acquired hypothyroidism       Physical Exam    Airway   Mallampati: III  TM distance: >3 FB  Neck ROM: full       Cardiovascular - normal exam  Rhythm: regular  Rate: normal  (-) murmur     Dental - normal exam           Pulmonary - normal exam  Breath sounds clear to auscultation     Abdominal    Neurological - normal exam                 Anesthesia Plan    ASA 2       Plan - general       Airway plan will be LMA        Induction: intravenous    Postoperative Plan: Postoperative administration of opioids is intended.    Pertinent diagnostic labs and testing reviewed    Informed Consent:    Anesthetic plan and risks discussed with patient.    Use of blood products discussed with: patient whom consented to blood products.

## 2019-09-16 NOTE — ANESTHESIA TIME REPORT
Anesthesia Start and Stop Event Times     Date Time Event    9/16/2019 0910 Ready for Procedure     0915 Anesthesia Start     1059 Anesthesia Stop        Responsible Staff  09/16/19    Name Role Begin End    Sesar Rivera M.D. Anesth 0915 1059        Preop Diagnosis (Free Text):  Pre-op Diagnosis     OSTEOARTHRITIS KNEE RIGHT        Preop Diagnosis (Codes):    Post op Diagnosis  Osteoarthritis of right knee      Premium Reason  Non-Premium    Comments:

## 2019-09-16 NOTE — ANESTHESIA PROCEDURE NOTES
Peripheral Block  Date/Time: 9/16/2019 9:20 AM  Performed by: Sesar Rivera M.D.  Authorized by: Sesar Rivera M.D.     Patient Location:  OR  Start Time:  9/16/2019 9:20 AM  End Time:  9/16/2019 9:25 AM  Reason for Block: at surgeon's request and post-op pain management    patient identified, IV checked, site marked, risks and benefits discussed, surgical consent, monitors and equipment checked, pre-op evaluation and timeout performed    Patient Position:  Supine  Prep: ChloraPrep    Monitoring:  Heart rate, continuous pulse ox and cardiac monitor  Block Region:  Lower Extremity  Lower Extremity - Block Type:  Selective FEMORAL nerve block at the Adductor Canal    Laterality:  Right  Procedures: ultrasound guided  Image captured, interpreted and electronically stored.  Local Infiltration:  Lidocaine  Strength:  1 %  Dose:  3 ml  Block Type:  Single-shot  Needle Length:  100mm  Needle Gauge:  21 G  Needle Localization:  Ultrasound guidance  Injection Assessment:  Negative aspiration for heme, no paresthesia on injection, incremental injection and local visualized surrounding nerve on ultrasound  Evidence of intravascular injection: No     US Guided Selective Femoral Nerve Block at Adductor Canal:   US probe placed at mid-thigh level on externally rotated leg and femur identified.  Probe directed medially until Sartorius Muscle (SM), Femoral Artery (FA) and Saphenous Nerve (SN) identified in Adductor Canal (AC).  Needle inserted anterolateral to probe in an in plane approach into a subsartorial perivascular perineural position.  After negative aspiration LA injected with ease and visualized spreading within the AC.    Lot [id number] Exp 04/23

## 2019-09-17 VITALS
TEMPERATURE: 97.9 F | BODY MASS INDEX: 32.01 KG/M2 | DIASTOLIC BLOOD PRESSURE: 63 MMHG | OXYGEN SATURATION: 97 % | HEART RATE: 58 BPM | SYSTOLIC BLOOD PRESSURE: 107 MMHG | HEIGHT: 67 IN | RESPIRATION RATE: 15 BRPM | WEIGHT: 203.93 LBS

## 2019-09-17 LAB
HCT VFR BLD AUTO: 33 % (ref 37–47)
HGB BLD-MCNC: 10.2 G/DL (ref 12–16)

## 2019-09-17 PROCEDURE — 85014 HEMATOCRIT: CPT

## 2019-09-17 PROCEDURE — G0378 HOSPITAL OBSERVATION PER HR: HCPCS

## 2019-09-17 PROCEDURE — 700112 HCHG RX REV CODE 229: Performed by: PHYSICIAN ASSISTANT

## 2019-09-17 PROCEDURE — 97161 PT EVAL LOW COMPLEX 20 MIN: CPT

## 2019-09-17 PROCEDURE — 700102 HCHG RX REV CODE 250 W/ 637 OVERRIDE(OP): Performed by: PHYSICIAN ASSISTANT

## 2019-09-17 PROCEDURE — 96375 TX/PRO/DX INJ NEW DRUG ADDON: CPT

## 2019-09-17 PROCEDURE — 700111 HCHG RX REV CODE 636 W/ 250 OVERRIDE (IP): Performed by: PHYSICIAN ASSISTANT

## 2019-09-17 PROCEDURE — 97165 OT EVAL LOW COMPLEX 30 MIN: CPT

## 2019-09-17 PROCEDURE — A9270 NON-COVERED ITEM OR SERVICE: HCPCS | Performed by: PHYSICIAN ASSISTANT

## 2019-09-17 PROCEDURE — 85018 HEMOGLOBIN: CPT

## 2019-09-17 PROCEDURE — 96376 TX/PRO/DX INJ SAME DRUG ADON: CPT

## 2019-09-17 PROCEDURE — 36415 COLL VENOUS BLD VENIPUNCTURE: CPT

## 2019-09-17 RX ORDER — ASPIRIN 81 MG/1
81 TABLET ORAL 2 TIMES DAILY
Qty: 90 TAB | COMMUNITY
Start: 2019-09-17 | End: 2020-01-08

## 2019-09-17 RX ORDER — OXYCODONE HYDROCHLORIDE 5 MG/1
5-10 TABLET ORAL EVERY 4 HOURS PRN
Qty: 40 TAB | Refills: 0 | Status: SHIPPED | OUTPATIENT
Start: 2019-09-17 | End: 2019-09-24

## 2019-09-17 RX ORDER — ACETAMINOPHEN 500 MG
1000 TABLET ORAL EVERY 8 HOURS
COMMUNITY
Start: 2019-09-17 | End: 2020-01-08

## 2019-09-17 RX ADMIN — ACETAMINOPHEN 1000 MG: 500 TABLET ORAL at 05:18

## 2019-09-17 RX ADMIN — FERROUS SULFATE TAB 325 MG (65 MG ELEMENTAL FE) 325 MG: 325 (65 FE) TAB at 05:18

## 2019-09-17 RX ADMIN — DOCUSATE SODIUM 100 MG: 100 CAPSULE, LIQUID FILLED ORAL at 05:18

## 2019-09-17 RX ADMIN — DEXAMETHASONE SODIUM PHOSPHATE 6 MG: 4 INJECTION, SOLUTION INTRA-ARTICULAR; INTRALESIONAL; INTRAMUSCULAR; INTRAVENOUS; SOFT TISSUE at 05:22

## 2019-09-17 RX ADMIN — ATORVASTATIN CALCIUM 20 MG: 20 TABLET, FILM COATED ORAL at 05:18

## 2019-09-17 RX ADMIN — OXYCODONE HYDROCHLORIDE 10 MG: 10 TABLET ORAL at 07:36

## 2019-09-17 RX ADMIN — METFORMIN HYDROCHLORIDE 500 MG: 500 TABLET, EXTENDED RELEASE ORAL at 07:36

## 2019-09-17 RX ADMIN — GABAPENTIN 400 MG: 400 CAPSULE ORAL at 05:18

## 2019-09-17 RX ADMIN — LEVOTHYROXINE SODIUM 50 MCG: 50 TABLET ORAL at 05:18

## 2019-09-17 RX ADMIN — KETOROLAC TROMETHAMINE 15 MG: 30 INJECTION, SOLUTION INTRAMUSCULAR at 05:18

## 2019-09-17 ASSESSMENT — COGNITIVE AND FUNCTIONAL STATUS - GENERAL
MOVING FROM LYING ON BACK TO SITTING ON SIDE OF FLAT BED: A LITTLE
STANDING UP FROM CHAIR USING ARMS: A LITTLE
CLIMB 3 TO 5 STEPS WITH RAILING: A LITTLE
DAILY ACTIVITIY SCORE: 23
WALKING IN HOSPITAL ROOM: A LITTLE
HELP NEEDED FOR BATHING: A LITTLE
SUGGESTED CMS G CODE MODIFIER DAILY ACTIVITY: CI
MOVING TO AND FROM BED TO CHAIR: A LITTLE
SUGGESTED CMS G CODE MODIFIER MOBILITY: CK
TURNING FROM BACK TO SIDE WHILE IN FLAT BAD: A LITTLE
MOBILITY SCORE: 18

## 2019-09-17 ASSESSMENT — GAIT ASSESSMENTS
ASSISTIVE DEVICE: FRONT WHEEL WALKER
GAIT LEVEL OF ASSIST: SUPERVISED
DEVIATION: ANTALGIC;BRADYKINETIC
DISTANCE (FEET): 250

## 2019-09-17 ASSESSMENT — COPD QUESTIONNAIRES
HAVE YOU SMOKED AT LEAST 100 CIGARETTES IN YOUR ENTIRE LIFE: NO/DON'T KNOW
DO YOU EVER COUGH UP ANY MUCUS OR PHLEGM?: NO/ONLY WITH OCCASIONAL COLDS OR INFECTIONS
COPD SCREENING SCORE: 2
DURING THE PAST 4 WEEKS HOW MUCH DID YOU FEEL SHORT OF BREATH: NONE/LITTLE OF THE TIME

## 2019-09-17 ASSESSMENT — ACTIVITIES OF DAILY LIVING (ADL): TOILETING: INDEPENDENT

## 2019-09-17 ASSESSMENT — LIFESTYLE VARIABLES: EVER_SMOKED: NEVER

## 2019-09-17 NOTE — CARE PLAN
Non-skid socks on and belongings within reach. Call light within reach. Hourly rounding in place.    BM yesterday.    SCDs to BLE. Aspirin for DVT prophylaxis per MAR. Mobilization at least three times a day with staff and PT/OT.     Pain adequately controlled per MAR PRN.     Patient received Ancef as an ATB for infection prevention.

## 2019-09-17 NOTE — PROGRESS NOTES
MDs have signed off on patient to discharge today. Patient will be leaving today with brother to go home. Discharge instructions given and patient has no questions or concerns at this time. Prescriptions given to patient at bedside. DME FWW delivered.

## 2019-09-17 NOTE — THERAPY
"Physical Therapy Evaluation completed.   Bed Mobility:  Supine to Sit: (NT, up in chair)  Transfers: Sit to Stand: Supervised  Gait: Level Of Assist: Supervised with Front-Wheel Walker       Plan of Care: Patient with no further skilled PT needs in the acute care setting and demonstrates safety with mobility in this environment at this time.   Discharge Recommendations: Equipment: Front-Wheel Walker. Recommend outpatient physical therapy services to address higher level deficits.    See \"Rehab Therapy-Acute\" Patient Summary Report for complete documentation.     Pt is a 58yo female s/p R TKA, WBAT. Educated and provided handout regarding LE therapeutic exercises. Pt with good return demo and understanding. Pt performed all mobility at SPV level and ambulated 250ft with FWW, no LOB. Pt negotiated 4 steps with step-to pattern. Pt reports no concerns regarding return home. Recommend outpatient PT when medically cleared. Patient will not be actively followed for physical therapy services at this time, however may be seen if requested by physician for 1 more visit within 30 days to address any discharge or equipment needs.  "

## 2019-09-17 NOTE — THERAPY
"Occupational Therapy Evaluation completed.   Functional Status:  Up in chair, spv w/sit>stand spv w/LB dressing, spv w/walking in room w/fww, no lob, no overt c/o pain or fatigue. Educated on fall prevention and home safety, remained up w/PT for next session   Plan of Care: Patient with no further skilled OT needs in the acute care setting at this time  Discharge Recommendations:  Equipment: Front-Wheel Walker. Post-acute therapy Anticipate that the patient will have no further occupational therapy needs after discharge from the hospital.       See \"Rehab Therapy-Acute\" Patient Summary Report for complete documentation.    "

## 2019-09-17 NOTE — DISCHARGE INSTRUCTIONS
Discharge Instructions    Discharged to home by car with relative. Discharged via wheelchair, hospital escort: Yes.  Special equipment needed: Not Applicable    Be sure to schedule a follow-up appointment with your primary care doctor or any specialists as instructed.     Per Dr Saba's team:  Patient is instructed to ambulate and weight bear as tolerated with the use of an assistive device, and to continue physical therapy exercises, and range of motion, given during this hospital stay.   Patient is to ice and elevate the surgical leg regularly, with pillows under the ankle, nothing is to be placed under the knee.   Patient was given detailed wound care instructions, and will leave the silver dressing on until first post-op visit, ok to remove ace wrap.   Ok to shower with waterproof dressing in place. No soaking of the incision; no baths, hot tubs, or swimming until cleared by doctor.   Aspirin twice daily for DVT prophylaxis. Take medications as prescribed by doctor.  Patient is to follow up with Dr. Saba's office in 1-2 weeks.  Call doctor’s office with any questions or concerns     Discharge Plan:   Influenza Vaccine Indication: Indicated: 9 to 64 years of age  Influenza Vaccine Given - only chart on this line when given: Influenza Vaccine Given (See MAR)    I understand that a diet low in cholesterol, fat, and sodium is recommended for good health. Unless I have been given specific instructions below for another diet, I accept this instruction as my diet prescription.   Other diet: Regular    Special Instructions: Discharge instructions for the Orthopedic Patient    Follow up with Primary Care Physician within 2 weeks of discharge to home, regarding:  Review of medications and diagnostic testing.  Surveillance for medical complications.  Workup and treatment of osteoporosis, if appropriate.     -Is this a Joint Replacement patient? Yes Total Joint Knee Replacement Discharge Instructions    Pain  - The goal  is to slowly wean off the prescription pain medicine.  - Ice can be used for pain control.  20 minutes at a time is recommended, and never directly against your skin or incision.  - Most patients are off the pain pills by 3 weeks; others may require a low level of pain medications for many months.  If your pain continues to be severe, follow up with your physician.  Infection    Knee joint infections; occur in fewer than 2% of patients. The most common causes of infection following total knee replacement surgery are from bacteria that enter the bloodstream during dental procedures, urinary tract infections, or skin infections. These bacteria can lodge around your knee replacement and cause an infection.  - Keep the incision as clean and dry as possible.  - Always wash your hands before touching your incision.  - Skin infections tend to develop around 7-10 days after surgery; most can be treated with oral antibiotics.  - Dental Care should be delayed for 3 months after surgery, your surgeon recommends taking a dose of antibiotics 1 hour prior to any dental procedure. After 2 years, most surgeons recommend antibiotics only before an extensive procedure.  Ask your surgeon what he recommends.  - Signs and symptoms of infection can include:  low grade fever, redness, pain, swelling and drainage from your incision.  Notify your surgeon immediately if you develop any of these symptoms.  Other instructions  - Bowel habits - constipation is extremely common and is caused by a combination of anesthesia, lack of mobility and pain medicine.  Use stool softeners or laxatives if necessary. It is important not to ignore this problem, as bowel obstructions can be a serious complication after joint replacement surgery.  - Mood/Energy Level - Many patients experience a lack of energy and endurance for up to 2-3 months after surgery.  Some may also feel down and can even become depressed.  This is likely due to the postoperative  anemia, change in activity level, lack of sleep, pain medicine and just the emotional reaction to the surgery itself that is a big disruption in a person’s life.  This usually passes.  If symptoms persist, follow up with your primary physician.  - Returning to work - Your surgeon will give you more specific instructions. Depending on the type of activities you perform, it may be 6 to 8 weeks before you return to work.   Generally, if you work a sedentary job requiring little standing or walking, most patients may return within 2-6 weeks.  Manual labor jobs involving walking, lifting and standing may take longer. Your surgeon’s office can provide a release to part-time or light duty work early on in your recovery and progress you to full duty as able.    - Driving - If your left knee was replaced and you have an automatic transmission, you may be able to begin driving in a week or so, provided you are no longer taking narcotic pain medication. If your right knee was replaced, avoid driving for 6 to 8 weeks. Remember that your reflexes may not be as sharp as before your surgery. Ask your surgeon for specific instructions.   - Avoiding falls - A fall during the first few weeks after surgery can damage your new knee and may result in a need for further surgery.   throw rugs and tack down loose carpeting.  Be aware of floor hazards such as pets, small objects or uneven surfaces.    - [employer] Metal Detectors - The sensitivity of metal detectors varies and it is likely that your prosthesis will cause an alarm.  Inform the  of your artificial joint.  Diet  - Resume your normal diet as tolerated.  - It is important to achieve a healthy nutritional status by eating a well balanced diet on a regular basis.  - Your physician may recommend that you take iron and vitamin supplements.   - Continue to drink plenty of fluids.  Shower/Bathing  - You may shower as soon as you get home from the hospital unless  otherwise instructed.  - Keep your incision out of water.  To keep the incision dry when showering, cover it with a plastic bag or plastic wrap.  - Pat incision dry if it gets wet.  Don’t rub.  - Do not submerge in a bath until staples are out and the incision is completely healed. (Approximately 6-8 weeks)  Dressing Change:  Procedure (if recommended by your physician)  - Wash hands.  - Open all new dressing change materials.  - Remove old dressing and discard.  - Inspect incision for redness, increase in clear drainage, yellow/green drainage, odor and surrounding skin hot to touch.  -  ABD (large gauze) pad or “island dressing” by one corner and lay over the incision.  Be careful not to touch the inside of the dressing that will lay over the incision.  - Secure in place as instructed (Ace wrap or tape).    Swelling/Bruising    - Swelling can last from 3-6 months.  - Elevate your leg higher than your heart while reclining.   The first week you are home you should elevate your leg an equal amount of time, as you are active.    - Anti-inflammatory pills can be taken once you have stopped the blood thinners.  - The swelling is usually worse after you go home since you are upright for longer periods of time.  - Bruising is common and can involve the entire leg including the thigh, calf and even foot. Bruising often does not appear until after you arrive home and it can be quite dramatic- purple, black, and green.  The bruising you can see is not usually concerning and will subside without any treatment.      Blood Clot Prevention  Blood clots in the legs and the less common, but frightening, clots that travel to the lungs are a real focus of our preventative. Most patients are at standard risk for them, but those patients who are at higher risk include people who have had previous clots, a family history of clotting, smoking, diabetes, obesity, advanced age, use of estrogen and a sedentary lifestyle.    - Signs  of blood clots in legs - Swelling in thigh, calf or ankle that does not go down with elevation.  Pain, heat and tenderness in calf, back of calf or groin area.  NOTE: blood clots can occur in either leg.  - You have been receiving anticoagulant therapy (blood thinners) in the hospital and you may be instructed to continue at home depending on your risk factors.  - Your risk for developing a clot continues for up to 2-3 months after surgery.  You should avoid prolonged sitting and dehydration during that time (long air trips and car trips).  If you do take a trip during this time, please get up and move around every 1- 1.5 hours.  - If you are prescribed blood-thinning medication for home, follow instructions as directed. (Handouts provided if applicable).      Activity  Once home, you should continue to stay active. The key is to remember not to overdo it! While you can expect some good days and some bad days, you should notice a gradual improvement and a gradual increase in your endurance over the next 6 to 12 months. Exercise is a critical component of home care, particularly during the first few weeks after surgery.     - Normal activities of daily living You should be able to resume most within 3 to 6 weeks following surgery. Some pain with activity and at night is common for several weeks after surgery  -  Physical Therapy Exercises - Continue to do the exercises prescribed for at least two months after surgery. Riding a stationary bicycle can help maintain muscle tone and keep your knee flexible. Try to achieve the maximum degree of bending and extension possible. (handout provided by Therapist).  - Sexual Activity -. Your surgeon can tell you when it safe to resume sexual activity.    - Sleeping Positions - You can safely sleep on your back, on either side, or on your stomach.   - Other Activities - Walk as much as you like, but remember that walking is no substitute for the exercises your doctor and physical  therapist will prescribe. Lower impact activities are preferred.  If you have specific questions, consult your Surgeon.    When to Call the Doctor   Call the physician if:   - Fever over 100.5? F  - Increased pain, drainage, redness, odor or heat around the incision area  - Shaking chills  - Increased knee pain with activity and rest  - Increased pain in calf, tenderness or redness above or below the knee  - Increased swelling of calf, ankle, foot  - Sudden increased shortness of breath, sudden onset of chest pain, localized chest pain with coughing  - Incision opening  Or, if there are any questions or concerns about medications or care.       -Is this patient being discharged with medication to prevent blood clots?  Yes, Aspirin Aspirin, ASA oral tablets  What is this medicine?  ASPIRIN (AS pir in) is a pain reliever. It is used to treat mild pain and fever. This medicine is also used as directed by a doctor to prevent and to treat heart attacks, to prevent strokes, and to treat arthritis or inflammation.  This medicine may be used for other purposes; ask your health care provider or pharmacist if you have questions.  COMMON BRAND NAME(S): Aspir-Low, Aspir-Adela, Aspirtab, [employer] Advanced Aspirin, Tabatha Aspirin, [employer] Aspirin Extra Strength, Tabatha Aspirin Plus, Tabatha Extra Strength, Tabatha Extra Strength Plus, Tabatha Genuine Aspirin, Tabatha Womens Aspirin, Bufferin, Bufferin Extra Strength, Bufferin Low Dose  What should I tell my health care provider before I take this medicine?  They need to know if you have any of these conditions:  -anemia  -asthma  -bleeding problems  -child with chickenpox, the flu, or other viral infection  -diabetes  -gout  -if you frequently drink alcohol containing drinks  -kidney disease  -liver disease  -low level of vitamin K  -lupus  -smoke tobacco  -stomach ulcers or other problems  -an unusual or allergic reaction to aspirin, tartrazine dye, other medicines, dyes, or  preservatives  -pregnant or trying to get pregnant  -breast-feeding  How should I use this medicine?  Take this medicine by mouth with a glass of water. Follow the directions on the package or prescription label. You can take this medicine with or without food. If it upsets your stomach, take it with food. Do not take your medicine more often than directed.  Talk to your pediatrician regarding the use of this medicine in children. While this drug may be prescribed for children as young as 12 years of age for selected conditions, precautions do apply. Children and teenagers should not use this medicine to treat chicken pox or flu symptoms unless directed by a doctor.  Patients over 65 years old may have a stronger reaction and need a smaller dose.  Overdosage: If you think you have taken too much of this medicine contact a poison control center or emergency room at once.  NOTE: This medicine is only for you. Do not share this medicine with others.  What if I miss a dose?  If you are taking this medicine on a regular schedule and miss a dose, take it as soon as you can. If it is almost time for your next dose, take only that dose. Do not take double or extra doses.  What may interact with this medicine?  Do not take this medicine with any of the following medications:  -cidofovir  -ketorolac  -probenecid  This medicine may also interact with the following medications:  -alcohol  -alendronate  -bismuth subsalicylate  -flavocoxid  -herbal supplements like feverfew, garlic, gertrudis, ginkgo biloba, horse chestnut  -medicines for diabetes or glaucoma like acetazolamide, methazolamide  -medicines for gout  -medicines that treat or prevent blood clots like enoxaparin, heparin, ticlopidine, warfarin  -other aspirin and aspirin-like medicines  -NSAIDs, medicines for pain and inflammation, like ibuprofen or naproxen  -pemetrexed  -sulfinpyrazone  -varicella live vaccine  This list may not describe all possible interactions. Give  your health care provider a list of all the medicines, herbs, non-prescription drugs, or dietary supplements you use. Also tell them if you smoke, drink alcohol, or use illegal drugs. Some items may interact with your medicine.  What should I watch for while using this medicine?  If you are treating yourself for pain, tell your doctor or health care professional if the pain lasts more than 10 days, if it gets worse, or if there is a new or different kind of pain. Tell your doctor if you see redness or swelling. Also, check with your doctor if you have a fever that lasts for more than 3 days. Only take this medicine to prevent heart attacks or blood clotting if prescribed by your doctor or health care professional.  Do not take aspirin or aspirin-like medicines with this medicine. Too much aspirin can be dangerous. Always read the labels carefully.  This medicine can irritate your stomach or cause bleeding problems. Do not smoke cigarettes or drink alcohol while taking this medicine. Do not lie down for 30 minutes after taking this medicine to prevent irritation to your throat.  If you are scheduled for any medical or dental procedure, tell your healthcare provider that you are taking this medicine. You may need to stop taking this medicine before the procedure.  This medicine may be used to treat migraines. If you take migraine medicines for 10 or more days a month, your migraines may get worse. Keep a diary of headache days and medicine use. Contact your healthcare professional if your migraine attacks occur more frequently.  What side effects may I notice from receiving this medicine?  Side effects that you should report to your doctor or health care professional as soon as possible:  -allergic reactions like skin rash, itching or hives, swelling of the face, lips, or tongue  -breathing problems  -changes in hearing, ringing in the ears  -confusion  -general ill feeling or flu-like symptoms  -pain on  swallowing  -redness, blistering, peeling or loosening of the skin, including inside the mouth or nose  -signs and symptoms of bleeding such as bloody or black, tarry stools; red or dark-brown urine; spitting up blood or brown material that looks like coffee grounds; red spots on the skin; unusual bruising or bleeding from the eye, gums, or nose  -trouble passing urine or change in the amount of urine  -unusually weak or tired  -yellowing of the eyes or skin  Side effects that usually do not require medical attention (report to your doctor or health care professional if they continue or are bothersome):  -diarrhea or constipation  -headache  -nausea, vomiting  -stomach gas, heartburn  This list may not describe all possible side effects. Call your doctor for medical advice about side effects. You may report side effects to FDA at 0-717-FDA-5092.  Where should I keep my medicine?  Keep out of the reach of children.  Store at room temperature between 15 and 30 degrees C (59 and 86 degrees F). Protect from heat and moisture. Do not use this medicine if it has a strong vinegar smell. Throw away any unused medicine after the expiration date.  NOTE: This sheet is a summary. It may not cover all possible information. If you have questions about this medicine, talk to your doctor, pharmacist, or health care provider.  © 2018 [employer]Gold Standard (2014-08-19 11:30:31)      · Is patient discharged on Warfarin / Coumadin?   No     Depression / Suicide Risk    As you are discharged from this [facility] facility, it is important to learn how to keep safe from harming yourself.    Recognize the warning signs:  · Abrupt changes in personality, positive or negative- including increase in energy   · Giving away possessions  · Change in eating patterns- significant weight changes-  positive or negative  · Change in sleeping patterns- unable to sleep or sleeping all the time   · Unwillingness or inability to  communicate  · Depression  · Unusual sadness, discouragement and loneliness  · Talk of wanting to die  · Neglect of personal appearance   · Rebelliousness- reckless behavior  · Withdrawal from people/activities they love  · Confusion- inability to concentrate     If you or a loved one observes any of these behaviors or has concerns about self-harm, here's what you can do:  · Talk about it- your feelings and reasons for harming yourself  · Remove any means that you might use to hurt yourself (examples: pills, rope, extension cords, firearm)  · Get professional help from the community (Mental Health, Substance Abuse, psychological counseling)  · Do not be alone:Call your Safe Contact- someone whom you trust who will be there for you.  · Call your local CRISIS HOTLINE 446-0654 or 023-852-2285  · Call your local Children's Mobile Crisis Response Team [place] (987) 605-7431 or www.[employer]  · Call the toll free National Suicide Prevention Hotlines   · National Suicide Prevention Lifeline 088-462-URCF (1207)  · National Hope Line Network 800-SUICIDE (196-8307)

## 2019-09-17 NOTE — CARE PLAN
Problem: Safety  Goal: Will remain free from falls  Outcome: PROGRESSING AS EXPECTED  Intervention: Implement fall precautions  Flowsheets (Taken 9/16/2019 1600)  Environmental Precautions: Treaded Slipper Socks on Patient;Personal Belongings, Wastebasket, Call Bell etc. in Easy Reach;Transferred to Stronger Side;Report Given to Other Health Care Providers Regarding Fall Risk;Bed in Low Position;Mobility Assessed & Appropriate Sign Placed  Note:   Safety precautions in place. Call light within reach. Bed is low and in locked position. Hourly rounding in place.       Problem: Pain Management  Goal: Pain level will decrease to patient's comfort goal  Outcome: PROGRESSING AS EXPECTED  Intervention: Follow pain managment plan developed in collaboration with patient and Interdisciplinary Team  Note:   Oxycodone given per MAR. Polar ice in use. Pt rates her pain level 7/10. Will mobilize the pt.

## 2019-09-17 NOTE — DISCHARGE SUMMARY
Debby Desai was admitted on 9/16/2019 for OA KNEE RIGHT MILD, KNEE PIAN RIGHT  Osteoarthritis of right knee  Osteoarthritis of right knee  Patient was diagnosed with severe degenerative arthritis in the right knee and underwent a right total knee arthroplasty by Dr. Rufus Saba on the date of admission.    Hospital course:     The patient has done well, with no complications.  Patient denies chest pain, calf pain or shortness of breath.   Pain is well-controlled at present.  Patient is ambulating well with the use of an assistive device, and progressing in physical therapy.   Patient is neurologically and vascularly intact with palpable pedal pulses bilaterally.      Discharge date: 9/17/19    Patient is being discharged to home.     Allergies:  Bactrim ds and Sulfamethoxazole-trimethoprim       Medication List      START taking these medications      Instructions   oxyCODONE immediate-release 5 MG Tabs  Commonly known as:  ROXICODONE   Take 1-2 Tabs by mouth every four hours as needed for Severe Pain for up to 7 days.  Dose:  5-10 mg        CONTINUE taking these medications      Instructions   acetaminophen 500 MG Tabs  Commonly known as:  TYLENOL   Take 1,000-2,000 mg by mouth 1 time daily as needed.  Dose:  1,000-2,000 mg     atorvastatin 20 MG Tabs  Commonly known as:  LIPITOR   Take 1 Tab by mouth every day.  Dose:  20 mg     ferrous sulfate 325 (65 Fe) MG tablet   Take 1 Tab by mouth 2 Times a Day.  Dose:  325 mg     gabapentin 400 MG Caps  Commonly known as:  NEURONTIN   Take 400 mg by mouth 3 times a day.  Dose:  400 mg     levothyroxine 50 MCG Tabs  Commonly known as:  SYNTHROID   TAKE ONE (1) TABLET BY MOUTH DAILY     lisinopril-hydrochlorothiazide 20-12.5 MG per tablet  Commonly known as:  PRINZIDE, ZESTORETIC   Take 1 Tab by mouth every day.  Dose:  1 Tab     metFORMIN  MG Tb24  Commonly known as:  GLUCOPHAGE XR   Take 1 Tab by mouth 2 times a day.  Dose:  500 mg            Discharge  Instructions:     Patient is instructed to ambulate and weight bear as tolerated with the use of an assistive device, and to continue physical therapy exercises given during this hospital stay.   Patient is to ice and elevate the surgical leg regularly, with pillows under the ankle, nothing is to be placed under the knee.   Patient was given detailed wound care instructions, and will leave the silver dressing on until first post-op visit.   Aspirin twice daily for DVT prophylaxis.  Patient is to follow up with Dr. Saba's office in 1-2 weeks.

## 2019-09-17 NOTE — PROGRESS NOTES
Patient up in chair with moderate pain, rating 7/10. NOC RN was going to give PRN pain med but pt's BP was too low so it was returned/wasted to med select. Per CNA BP has come up and this RN did give pain medication per MAR for upcoming therapies and current pain. No other needs for pt at this time, call light remains available to pt.

## 2019-09-17 NOTE — PROGRESS NOTES
"S: Feeling fine, pain controlled, has walked in room    O: Dressing dry, can do SLR, bends knee OK    Blood pressure (!) 96/63, pulse 61, temperature 36.6 °C (97.8 °F), temperature source Temporal, resp. rate 17, height 1.702 m (5' 7\"), weight 92.5 kg (203 lb 14.8 oz), last menstrual period 02/26/2012, SpO2 98 %, not currently breastfeeding.            Intake/Output Summary (Last 24 hours) at 9/17/2019 0633  Last data filed at 9/16/2019 1831  Gross per 24 hour   Intake 2450 ml   Output 50 ml   Net 2400 ml         A:  Patient Active Problem List    Diagnosis Date Noted   • Opioid dependence in controlled environment ([facility]) 09/27/2018   • Obesity (BMI 30-39.9) 07/09/2018   • IGT (impaired glucose tolerance) 09/29/2017   • Osteoarthritis of right knee 07/12/2017   • Major depressive disorder, recurrent episode, in partial remission ([facility]) 05/05/2017   • Anxiety disorder 10/24/2016   • Osteoarthrosis involving lower leg 04/21/2015   • Dyslipidemia 12/02/2014   • HTN (hypertension), benign 03/19/2012   • Acquired hypothyroidism 03/19/2012   • Back pain 03/19/2012   • Neck pain 03/19/2012       Stable after TKA    PLAN: home later today  "

## 2019-12-05 DIAGNOSIS — E78.5 DYSLIPIDEMIA: ICD-10-CM

## 2019-12-06 DIAGNOSIS — E03.9 ACQUIRED HYPOTHYROIDISM: ICD-10-CM

## 2019-12-06 DIAGNOSIS — I10 HTN (HYPERTENSION), BENIGN: Chronic | ICD-10-CM

## 2019-12-06 RX ORDER — ATORVASTATIN CALCIUM 20 MG/1
TABLET, FILM COATED ORAL
Qty: 90 TAB | Refills: 3 | Status: SHIPPED
Start: 2019-12-06 | End: 2020-09-22

## 2019-12-06 RX ORDER — LISINOPRIL AND HYDROCHLOROTHIAZIDE 20; 12.5 MG/1; MG/1
1 TABLET ORAL DAILY
Qty: 90 TAB | Refills: 1 | Status: SHIPPED | OUTPATIENT
Start: 2019-12-06 | End: 2020-07-03

## 2019-12-06 RX ORDER — LEVOTHYROXINE SODIUM 0.05 MG/1
TABLET ORAL
Qty: 90 TAB | Refills: 3 | Status: SHIPPED
Start: 2019-12-06 | End: 2020-09-22

## 2020-01-08 ENCOUNTER — OFFICE VISIT (OUTPATIENT)
Dept: MEDICAL GROUP | Facility: MEDICAL CENTER | Age: 60
End: 2020-01-08
Payer: COMMERCIAL

## 2020-01-08 VITALS
BODY MASS INDEX: 28.77 KG/M2 | HEART RATE: 85 BPM | HEIGHT: 66 IN | OXYGEN SATURATION: 95 % | TEMPERATURE: 97 F | WEIGHT: 179 LBS | SYSTOLIC BLOOD PRESSURE: 126 MMHG | DIASTOLIC BLOOD PRESSURE: 86 MMHG

## 2020-01-08 DIAGNOSIS — F33.41 MAJOR DEPRESSIVE DISORDER, RECURRENT EPISODE, IN PARTIAL REMISSION (HCC): ICD-10-CM

## 2020-01-08 DIAGNOSIS — I10 HTN (HYPERTENSION), BENIGN: Chronic | ICD-10-CM

## 2020-01-08 DIAGNOSIS — Z11.59 NEED FOR HEPATITIS C SCREENING TEST: ICD-10-CM

## 2020-01-08 DIAGNOSIS — R73.02 IGT (IMPAIRED GLUCOSE TOLERANCE): ICD-10-CM

## 2020-01-08 DIAGNOSIS — E78.5 DYSLIPIDEMIA: ICD-10-CM

## 2020-01-08 DIAGNOSIS — G89.29 CHRONIC KNEE PAIN, UNSPECIFIED LATERALITY: ICD-10-CM

## 2020-01-08 DIAGNOSIS — R11.0 NAUSEA: ICD-10-CM

## 2020-01-08 DIAGNOSIS — Z12.11 SCREEN FOR COLON CANCER: ICD-10-CM

## 2020-01-08 DIAGNOSIS — R10.13 EPIGASTRIC PAIN: ICD-10-CM

## 2020-01-08 DIAGNOSIS — E61.1 IRON DEFICIENCY: ICD-10-CM

## 2020-01-08 DIAGNOSIS — M25.569 CHRONIC KNEE PAIN, UNSPECIFIED LATERALITY: ICD-10-CM

## 2020-01-08 DIAGNOSIS — E03.9 ACQUIRED HYPOTHYROIDISM: ICD-10-CM

## 2020-01-08 DIAGNOSIS — Z12.31 VISIT FOR SCREENING MAMMOGRAM: ICD-10-CM

## 2020-01-08 PROCEDURE — 99215 OFFICE O/P EST HI 40 MIN: CPT | Performed by: INTERNAL MEDICINE

## 2020-01-08 RX ORDER — IBUPROFEN 200 MG
200 TABLET ORAL EVERY 6 HOURS PRN
COMMUNITY
End: 2020-02-25

## 2020-01-08 RX ORDER — TRAMADOL HYDROCHLORIDE 50 MG/1
50 TABLET ORAL EVERY 8 HOURS PRN
Qty: 90 TAB | Refills: 0 | Status: SHIPPED | OUTPATIENT
Start: 2020-01-08 | End: 2020-01-27 | Stop reason: SDUPTHER

## 2020-01-08 NOTE — PROGRESS NOTES
CC: Nausea, abdominal pain, knee pain, chronic medical conditions.  Complaining of depressive symptoms as well                                                                                                                             HPI:   Debby presents today with the following.    1. Nausea/Epigastric pain  Main complaint today is nausea and epigastric pain with associated weight loss.  She is lost approximately 25 pounds over the past few months after her knee surgery.  She reports persistent nausea and lack of appetite.'s mild constipation at baseline.  She denies any fevers or chills has vomited once or twice.  No significant pain although occasional epigastric discomfort.  She denies any fevers chills or night sweats.  She is trying to drink protein supplements she is taking ibuprofen daily.    2. HTN (hypertension), benign  Well-controlled on current regimen despite taking ibuprofen.    3. Chronic knee pain, unspecified laterality  Main complaint is knee pain status post surgery she is not progressing to the degree that she had expected.  Pain is still 8 out of 10 in intensity Dr. Maher recently stopped writing for her tramadol she would like refills until she can get the pain specialist.    4. IGT (impaired glucose tolerance)  Blood sugars been elevated in the past maintain on metformin again 30 pounds of weight loss.    5. Dyslipidemia  Maintain on statin without myalgia.    6. Major depressive disorder, recurrent episode, in partial remission (HCC)  Her mood is suffering as a consequence of her chronic incapacity.  She is interested in medications but does report her physical is weighing heavily on her mental.    7. Acquired hypothyroidism  19 on medication again 30 pounds weight loss    8. Iron deficiency  Last CBC was normal she is persistently on iron no sources of blood loss of black stools.          Patient Active Problem List    Diagnosis Date Noted   • Opioid dependence in controlled  "environment ([facility]) 09/27/2018   • Obesity (BMI 30-39.9) 07/09/2018   • IGT (impaired glucose tolerance) 09/29/2017   • Osteoarthritis of right knee 07/12/2017   • Major depressive disorder, recurrent episode, in partial remission ([facility]) 05/05/2017   • Anxiety disorder 10/24/2016   • Osteoarthrosis involving lower leg 04/21/2015   • Dyslipidemia 12/02/2014   • HTN (hypertension), benign 03/19/2012   • Acquired hypothyroidism 03/19/2012   • Back pain 03/19/2012   • Neck pain 03/19/2012       Current Outpatient Medications   Medication Sig Dispense Refill   • ibuprofen (MOTRIN) 200 MG Tab Take 200 mg by mouth every 6 hours as needed.     • tramadol (ULTRAM) 50 MG Tab Take 1 Tab by mouth every 8 hours as needed for up to 30 days. 90 Tab 0   • atorvastatin (LIPITOR) 20 MG Tab TAKE ONE TABLET BY MOUTH EVERY DAY 90 Tab 3   • levothyroxine (SYNTHROID) 50 MCG Tab TAKE ONE (1) TABLET BY MOUTH DAILY 90 Tab 3   • lisinopril-hydrochlorothiazide (PRINZIDE) 20-12.5 MG per tablet Take 1 Tab by mouth every day. 90 Tab 1   • acetaminophen (TYLENOL) 500 MG Tab Take 1,000-2,000 mg by mouth 1 time daily as needed.       No current facility-administered medications for this visit.          Allergies as of 01/08/2020 - Reviewed 01/08/2020   Allergen Reaction Noted   • Bactrim ds Rash and Swelling 05/14/2011   • Sulfamethoxazole-trimethoprim Rash and Swelling 05/23/2016        ROS: Denies Chest pain, SOB, LE edema.    /86 (BP Location: Right arm, Patient Position: Sitting)   Pulse 85   Temp 36.1 °C (97 °F)   Ht 1.67 m (5' 5.75\")   Wt 81.2 kg (179 lb)   LMP 02/26/2012   SpO2 95%   BMI 29.11 kg/m²     Physical Exam:  Gen:         Alert and oriented, No apparent distress.  Neck:        No Lymphadenopathy or Bruits.  Lungs:     Clear to auscultation bilaterally  CV:          Regular rate and rhythm. No murmurs, rubs or gallops.        ABD:      Soft mild epigastric tenderness.  No rebound or guarding., non distended. Normal active " bowel sounds.  No  Hepatosplenomegaly, No pulsatile masses.             Ext:          No clubbing, cyanosis, edema.      Assessment and Plan.   59 y.o. female with the following issues.    1. Epigastric pain/nausea/weight loss  Some concerned about the dramatic weight loss have written for a CT of the abdomen to evaluate.  Stopping both metformin and iron placing on over-the-counter Prilosec for the next 2 weeks to see if symptoms improve.    2. HTN (hypertension), benign  Currently well controlled, Discuss diet, exercise and salt restriction.  No change to medication therapy.    3. Chronic knee pain, unspecified laterality  Have placed referral to pain clinic stopping ibuprofen given the stomach effects potentially have given an 30-day prescription of Ultram.  She did sign a pain contract today to be taken over by pain specialist.  She will continue follow with orthopedics.  - REFERRAL TO PAIN CLINIC  - tramadol (ULTRAM) 50 MG Tab; Take 1 Tab by mouth every 8 hours as needed for up to 30 days.  Dispense: 90 Tab; Refill: 0    4. IGT (impaired glucose tolerance)  Likely improved we will get blood work stopping metformin for now  - HEMOGLOBIN A1C; Future    5. Dyslipidemia  Recheck cholesterol continue statin  - Comp Metabolic Panel; Future  - Lipid Profile; Future    6. Major depressive disorder, recurrent episode, in partial remission (HCC)  We will see back in 2 weeks if mood still suffering despite potential improvement will consider medications.    7. Acquired hypothyroidism  Recheck thyroid  - TSH; Future    8. Iron deficiency  Stopping iron rechecking lab  - CBC WITH DIFFERENTIAL; Future  - FERRITIN; Future    9. Need for hepatitis C screening test    - HEP C VIRUS ANTIBODY; Future    10. Visit for screening mammogram    - MA-SCREEN MAMMO W/CAD-BILAT; Future    11. Screen for colon cancer    - COLOGUARD COLON CANCER SCREENING  ITH; Future     40 minutes was spent with the patient of which over 50% of the time  was spent with face-to-face patient counseling and coordination.

## 2020-01-18 ENCOUNTER — HOSPITAL ENCOUNTER (OUTPATIENT)
Dept: LAB | Facility: MEDICAL CENTER | Age: 60
End: 2020-01-18
Attending: INTERNAL MEDICINE
Payer: COMMERCIAL

## 2020-01-18 DIAGNOSIS — E03.9 ACQUIRED HYPOTHYROIDISM: ICD-10-CM

## 2020-01-18 DIAGNOSIS — E78.5 DYSLIPIDEMIA: ICD-10-CM

## 2020-01-18 DIAGNOSIS — R73.02 IGT (IMPAIRED GLUCOSE TOLERANCE): ICD-10-CM

## 2020-01-18 DIAGNOSIS — Z11.59 NEED FOR HEPATITIS C SCREENING TEST: ICD-10-CM

## 2020-01-18 DIAGNOSIS — E61.1 IRON DEFICIENCY: ICD-10-CM

## 2020-01-18 LAB
ALBUMIN SERPL BCP-MCNC: 4.1 G/DL (ref 3.2–4.9)
ALBUMIN/GLOB SERPL: 1.5 G/DL
ALP SERPL-CCNC: 105 U/L (ref 30–99)
ALT SERPL-CCNC: 11 U/L (ref 2–50)
ANION GAP SERPL CALC-SCNC: 8 MMOL/L (ref 0–11.9)
AST SERPL-CCNC: 13 U/L (ref 12–45)
BASOPHILS # BLD AUTO: 0.5 % (ref 0–1.8)
BASOPHILS # BLD: 0.03 K/UL (ref 0–0.12)
BILIRUB SERPL-MCNC: 0.5 MG/DL (ref 0.1–1.5)
BUN SERPL-MCNC: 13 MG/DL (ref 8–22)
CALCIUM SERPL-MCNC: 9.5 MG/DL (ref 8.5–10.5)
CHLORIDE SERPL-SCNC: 103 MMOL/L (ref 96–112)
CHOLEST SERPL-MCNC: 136 MG/DL (ref 100–199)
CO2 SERPL-SCNC: 31 MMOL/L (ref 20–33)
CREAT SERPL-MCNC: 0.71 MG/DL (ref 0.5–1.4)
EOSINOPHIL # BLD AUTO: 0.1 K/UL (ref 0–0.51)
EOSINOPHIL NFR BLD: 1.7 % (ref 0–6.9)
ERYTHROCYTE [DISTWIDTH] IN BLOOD BY AUTOMATED COUNT: 48.8 FL (ref 35.9–50)
EST. AVERAGE GLUCOSE BLD GHB EST-MCNC: 128 MG/DL
FASTING STATUS PATIENT QL REPORTED: NORMAL
FERRITIN SERPL-MCNC: 48.4 NG/ML (ref 10–291)
GLOBULIN SER CALC-MCNC: 2.7 G/DL (ref 1.9–3.5)
GLUCOSE SERPL-MCNC: 103 MG/DL (ref 65–99)
HBA1C MFR BLD: 6.1 % (ref 0–5.6)
HCT VFR BLD AUTO: 40 % (ref 37–47)
HCV AB SER QL: NEGATIVE
HDLC SERPL-MCNC: 48 MG/DL
HGB BLD-MCNC: 12.6 G/DL (ref 12–16)
IMM GRANULOCYTES # BLD AUTO: 0.01 K/UL (ref 0–0.11)
IMM GRANULOCYTES NFR BLD AUTO: 0.2 % (ref 0–0.9)
LDLC SERPL CALC-MCNC: 70 MG/DL
LYMPHOCYTES # BLD AUTO: 2.48 K/UL (ref 1–4.8)
LYMPHOCYTES NFR BLD: 42.5 % (ref 22–41)
MCH RBC QN AUTO: 29.4 PG (ref 27–33)
MCHC RBC AUTO-ENTMCNC: 31.5 G/DL (ref 33.6–35)
MCV RBC AUTO: 93.2 FL (ref 81.4–97.8)
MONOCYTES # BLD AUTO: 0.44 K/UL (ref 0–0.85)
MONOCYTES NFR BLD AUTO: 7.5 % (ref 0–13.4)
NEUTROPHILS # BLD AUTO: 2.78 K/UL (ref 2–7.15)
NEUTROPHILS NFR BLD: 47.6 % (ref 44–72)
NRBC # BLD AUTO: 0 K/UL
NRBC BLD-RTO: 0 /100 WBC
PLATELET # BLD AUTO: 366 K/UL (ref 164–446)
PMV BLD AUTO: 12.1 FL (ref 9–12.9)
POTASSIUM SERPL-SCNC: 3.7 MMOL/L (ref 3.6–5.5)
PROT SERPL-MCNC: 6.8 G/DL (ref 6–8.2)
RBC # BLD AUTO: 4.29 M/UL (ref 4.2–5.4)
SODIUM SERPL-SCNC: 142 MMOL/L (ref 135–145)
TRIGL SERPL-MCNC: 90 MG/DL (ref 0–149)
TSH SERPL DL<=0.005 MIU/L-ACNC: 0.96 UIU/ML (ref 0.38–5.33)
WBC # BLD AUTO: 5.8 K/UL (ref 4.8–10.8)

## 2020-01-18 PROCEDURE — 84443 ASSAY THYROID STIM HORMONE: CPT

## 2020-01-18 PROCEDURE — 85025 COMPLETE CBC W/AUTO DIFF WBC: CPT

## 2020-01-18 PROCEDURE — 80061 LIPID PANEL: CPT

## 2020-01-18 PROCEDURE — 36415 COLL VENOUS BLD VENIPUNCTURE: CPT

## 2020-01-18 PROCEDURE — 82728 ASSAY OF FERRITIN: CPT

## 2020-01-18 PROCEDURE — 83036 HEMOGLOBIN GLYCOSYLATED A1C: CPT

## 2020-01-18 PROCEDURE — 80053 COMPREHEN METABOLIC PANEL: CPT

## 2020-01-18 PROCEDURE — 86803 HEPATITIS C AB TEST: CPT

## 2020-01-23 ENCOUNTER — HOSPITAL ENCOUNTER (OUTPATIENT)
Dept: RADIOLOGY | Facility: MEDICAL CENTER | Age: 60
End: 2020-01-23
Attending: INTERNAL MEDICINE
Payer: COMMERCIAL

## 2020-01-23 DIAGNOSIS — R10.13 EPIGASTRIC PAIN: ICD-10-CM

## 2020-01-23 PROCEDURE — 74177 CT ABD & PELVIS W/CONTRAST: CPT

## 2020-01-23 PROCEDURE — 700117 HCHG RX CONTRAST REV CODE 255: Performed by: INTERNAL MEDICINE

## 2020-01-23 RX ADMIN — IOHEXOL 25 ML: 240 INJECTION, SOLUTION INTRATHECAL; INTRAVASCULAR; INTRAVENOUS; ORAL at 08:07

## 2020-01-23 RX ADMIN — IOHEXOL 100 ML: 350 INJECTION, SOLUTION INTRAVENOUS at 08:08

## 2020-01-27 ENCOUNTER — OFFICE VISIT (OUTPATIENT)
Dept: MEDICAL GROUP | Facility: MEDICAL CENTER | Age: 60
End: 2020-01-27
Payer: COMMERCIAL

## 2020-01-27 VITALS
OXYGEN SATURATION: 94 % | SYSTOLIC BLOOD PRESSURE: 130 MMHG | RESPIRATION RATE: 12 BRPM | HEIGHT: 65 IN | WEIGHT: 181 LBS | DIASTOLIC BLOOD PRESSURE: 70 MMHG | BODY MASS INDEX: 30.16 KG/M2 | HEART RATE: 95 BPM

## 2020-01-27 DIAGNOSIS — R11.0 NAUSEA: ICD-10-CM

## 2020-01-27 DIAGNOSIS — F33.41 MAJOR DEPRESSIVE DISORDER, RECURRENT EPISODE, IN PARTIAL REMISSION (HCC): ICD-10-CM

## 2020-01-27 DIAGNOSIS — M25.569 CHRONIC KNEE PAIN, UNSPECIFIED LATERALITY: ICD-10-CM

## 2020-01-27 DIAGNOSIS — E66.9 OBESITY (BMI 30-39.9): ICD-10-CM

## 2020-01-27 DIAGNOSIS — G89.29 CHRONIC KNEE PAIN, UNSPECIFIED LATERALITY: ICD-10-CM

## 2020-01-27 PROCEDURE — 99214 OFFICE O/P EST MOD 30 MIN: CPT | Performed by: INTERNAL MEDICINE

## 2020-01-27 RX ORDER — ONDANSETRON 4 MG/1
4 TABLET, FILM COATED ORAL EVERY 4 HOURS PRN
Qty: 20 TAB | Refills: 6 | Status: SHIPPED | OUTPATIENT
Start: 2020-01-27 | End: 2021-07-19

## 2020-01-27 RX ORDER — DULOXETIN HYDROCHLORIDE 30 MG/1
30 CAPSULE, DELAYED RELEASE ORAL DAILY
Qty: 30 CAP | Refills: 1 | Status: SHIPPED
Start: 2020-01-27 | End: 2020-03-06

## 2020-01-27 RX ORDER — TRAMADOL HYDROCHLORIDE 50 MG/1
50 TABLET ORAL EVERY 8 HOURS PRN
Qty: 90 TAB | Refills: 0 | Status: SHIPPED | OUTPATIENT
Start: 2020-02-06 | End: 2020-03-07

## 2020-01-27 NOTE — PROGRESS NOTES
CC: Nausea, depression, knee pain.                                                                                                                                      HPI:   Debby presents today with the following.    1. Nausea  Presents after being seen 2 weeks ago for nausea.  Her metformin was stopped.  She also was taken off ibuprofen.  Blood work revealed no major abnormalities A1c was 6.1.  CT of the abdomen showed hiatal hernia but no other pathology.  She reports her nausea is 50% improved but still present    2. Major depressive disorder, recurrent episode, in partial remission (HCC)  Mood still problematic she has been on Cymbalta in the past helping with low back pain as well as mood she reports she tolerated well.    3. Obesity (BMI 30-39.9)  Weight has gone back up slightly which given that she was previously nauseated somewhat encouraging but would like to remove nausea and return to weight loss.    4. Chronic knee pain, unspecified laterality  She is seeing pain specialist in approximately 1 month she will be out of medications prior to this visit.      Patient Active Problem List    Diagnosis Date Noted   • Chronic knee pain 01/27/2020   • Opioid dependence in controlled environment ([facility]) 09/27/2018   • Obesity (BMI 30-39.9) 07/09/2018   • IGT (impaired glucose tolerance) 09/29/2017   • Osteoarthritis of right knee 07/12/2017   • Major depressive disorder, recurrent episode, in partial remission ([facility]) 05/05/2017   • Anxiety disorder 10/24/2016   • Osteoarthrosis involving lower leg 04/21/2015   • Dyslipidemia 12/02/2014   • HTN (hypertension), benign 03/19/2012   • Acquired hypothyroidism 03/19/2012   • Back pain 03/19/2012   • Neck pain 03/19/2012       Current Outpatient Medications   Medication Sig Dispense Refill   • ondansetron (ZOFRAN) 4 MG Tab tablet Take 1 Tab by mouth every four hours as needed for Nausea/Vomiting. 20 Tab 6   • DULoxetine (CYMBALTA) 30 MG Cap DR Particles Take 1 Cap  "by mouth every day. 30 Cap 1   • [START ON 2/6/2020] tramadol (ULTRAM) 50 MG Tab Take 1 Tab by mouth every 8 hours as needed for up to 30 days. 90 Tab 0   • ibuprofen (MOTRIN) 200 MG Tab Take 200 mg by mouth every 6 hours as needed.     • atorvastatin (LIPITOR) 20 MG Tab TAKE ONE TABLET BY MOUTH EVERY DAY 90 Tab 3   • levothyroxine (SYNTHROID) 50 MCG Tab TAKE ONE (1) TABLET BY MOUTH DAILY 90 Tab 3   • lisinopril-hydrochlorothiazide (PRINZIDE) 20-12.5 MG per tablet Take 1 Tab by mouth every day. 90 Tab 1   • acetaminophen (TYLENOL) 500 MG Tab Take 1,000-2,000 mg by mouth 1 time daily as needed.       No current facility-administered medications for this visit.          Allergies as of 01/27/2020 - Reviewed 01/08/2020   Allergen Reaction Noted   • Bactrim ds Rash and Swelling 05/14/2011   • Sulfamethoxazole-trimethoprim Rash and Swelling 05/23/2016        ROS: Denies Chest pain, SOB, LE edema.    /70   Pulse 95   Resp 12   Ht 1.651 m (5' 5\")   Wt 82.1 kg (181 lb)   LMP 02/26/2012   SpO2 94%   BMI 30.12 kg/m²     Physical Exam:  Gen:         Alert and oriented, No apparent distress.  Neck:        No Lymphadenopathy or Bruits.  Lungs:     Clear to auscultation bilaterally  CV:          Regular rate and rhythm. No murmurs, rubs or gallops.               Ext:          No clubbing, cyanosis, edema.      Assessment and Plan.   59 y.o. female with the following issues.    1. Nausea  Improved referring to gastroenterology have given nausea medications to have on hand.  Recheck A1c in 6 months.  - REFERRAL TO GASTROENTEROLOGY    2. Major depressive disorder, recurrent episode, in partial remission (HCC)  Tolerated Cymbalta well in the past starting on 30 mg will titrate up after 6 weeks to 60 if she would like to do so by phone call.      3. Obesity (BMI 30-39.9)  Discussion about weight loss  - Patient identified as having weight management issue.  Appropriate orders and counseling given.    4. Chronic knee pain, " unspecified laterality  Refilled tramadol.  Reviewing state website she does not have another prescriber other than me from the last refill.  She will get in the pain specialist.  - tramadol (ULTRAM) 50 MG Tab; Take 1 Tab by mouth every 8 hours as needed for up to 30 days.  Dispense: 90 Tab; Refill: 0

## 2020-02-25 ENCOUNTER — OFFICE VISIT (OUTPATIENT)
Dept: PHYSICAL MEDICINE AND REHAB | Facility: MEDICAL CENTER | Age: 60
End: 2020-02-25
Payer: COMMERCIAL

## 2020-02-25 VITALS
WEIGHT: 177.69 LBS | SYSTOLIC BLOOD PRESSURE: 130 MMHG | HEART RATE: 91 BPM | TEMPERATURE: 97.8 F | BODY MASS INDEX: 28.56 KG/M2 | HEIGHT: 66 IN | DIASTOLIC BLOOD PRESSURE: 70 MMHG | OXYGEN SATURATION: 95 %

## 2020-02-25 DIAGNOSIS — M79.604 PAIN IN RIGHT LEG: ICD-10-CM

## 2020-02-25 DIAGNOSIS — M54.50 LOW BACK PAIN WITH RADIATION: ICD-10-CM

## 2020-02-25 DIAGNOSIS — M25.561 RIGHT KNEE PAIN, UNSPECIFIED CHRONICITY: ICD-10-CM

## 2020-02-25 DIAGNOSIS — Z96.651 S/P TKR (TOTAL KNEE REPLACEMENT), RIGHT: ICD-10-CM

## 2020-02-25 DIAGNOSIS — F33.41 MAJOR DEPRESSIVE DISORDER, RECURRENT EPISODE, IN PARTIAL REMISSION (HCC): ICD-10-CM

## 2020-02-25 DIAGNOSIS — M54.50 LUMBOSACRAL PAIN: ICD-10-CM

## 2020-02-25 DIAGNOSIS — R10.31 RIGHT INGUINAL PAIN: ICD-10-CM

## 2020-02-25 PROCEDURE — 99205 OFFICE O/P NEW HI 60 MIN: CPT | Performed by: PHYSICAL MEDICINE & REHABILITATION

## 2020-02-25 RX ORDER — GABAPENTIN 300 MG/1
300 CAPSULE ORAL 3 TIMES DAILY
Qty: 90 CAP | Refills: 1 | Status: SHIPPED
Start: 2020-02-25 | End: 2020-03-26

## 2020-02-25 ASSESSMENT — PAIN SCALES - GENERAL: PAINLEVEL: 7=MODERATE-SEVERE PAIN

## 2020-02-25 ASSESSMENT — PATIENT HEALTH QUESTIONNAIRE - PHQ9
CLINICAL INTERPRETATION OF PHQ2 SCORE: 6
SUM OF ALL RESPONSES TO PHQ QUESTIONS 1-9: 19
5. POOR APPETITE OR OVEREATING: 2 - MORE THAN HALF THE DAYS

## 2020-02-25 NOTE — PROGRESS NOTES
"New patient note    Physiatry (physical medicine and  Rehabilitation), interventional spine and sports medicine    Date of Service: 2/25/2020    Chief complaint:   Chief Complaint   Patient presents with   • New Patient     Chronic knee pain Laterality       HISTORY    HPI: Debby Desai 59 y.o. female who presents today for evaluation of right leg pain.  She reports that she had surgery on 09/16/2019.  This was a right total knee arthroplasty for osteoarthritis with Dr. Saba.    From what she reports, \"she is just not getting better.\"  For the last several months, she has been having pain in the right lateral hip region and medial groin pain.  She has been going to PT, but has some continued pain.  She resumes physical therapy in 4 weeks.  She has been doing her home program.  At this time, she is using a single point cane.    Her pain seems like it is aching, deep, no numbness in the right leg.  Some of the pain goes into her right foot.  Recently, over the last few months, she has had low back pain.  This was not present prior to the surgery.  Pain is 6/10 on the VAS.    No changes with coughing or sneezing.  Standing and walking worsen her pain.    In 2001, she had discectomy.  She reports that she has had a spinal cord stimulator placed, but she has not been using this as much.    For pain, she takes tramadol up to three times a day.  She also takes duloxetine.    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  She gets up about once an hour.  She has a sit to stand desk, but it is difficult to stand much.    She had an injury to her knee that started 3 years ago.  She tripped and stumbled over a nephew.  She had two scopes within 6 months, but continued to have pain.    Medical records review:  I reviewed the note from the referring provider Loy Miles M.D. dated 01/08/2020: Visits included nausea/epigastric pain, hypertension, chronic knee pain, IGT, " dyslipidemia, MDD, hypothyroidism, iron deficiency    Previous treatments:    Physical Therapy: Yes    Medications the patient is tried: tramadol, tylenol (usually for a headache); in the past she took gabapentin 400mg po tid, she was taking vimovo and norco in the past; lyrica caused weight gain    Previous interventions: She had radiofrequency ablation in the past, prior to surgery    Previous surgeries to relieve the above pain:  None other than surgery 09/16/2019      ROS:   Gen: weight loss  Eyes: vision problems, glasses and contacts  CV: chest pain/anxiety  GI: nausea, ulcers  : urgency  Psych: depression  Endo: thyroid problems  Heme: anemia    Red Flags ROS:   Fever, Chills, Sweats: Denies  Involuntary Weight Loss: Denies  Bladder Incontinence: Denies  Bowel Incontinence: Denies  Saddle Anesthesia: Denies    All other systems reviewed and negative.       PMHx:   Past Medical History:   Diagnosis Date   • Anemia     in past   • Anginal syndrome (HCC)     had work up and feet r/t anxiety   • Anxiety    • Arthritis     OA knees   • Dental disorder 5/24/12    partial upper denture   • High cholesterol    • HTN (hypertension), benign 3/19/2012   • Hypothyroid 3/19/2012   • Major depression 3/19/2012   • Orbital floor (blow-out) closed fracture (HCC)     left   • Other specified symptom associated with female genital organs     post menopausal bleeding   • Pain     thoracic spine, Right knee, myofacial pain, and Right shoulder   • Pain     recently fell and has bruise left forehead   • Pain 02/2018    chronic back pain, right shoulder   • Psychiatric problem     depression, anxiety   • Unspecified disorder of thyroid    • Urinary bladder disorder     stress incont.   • Urinary incontinence     Occasional       PSHx:   Past Surgical History:   Procedure Laterality Date   • PB TOTAL KNEE ARTHROPLASTY Right 9/16/2019    Procedure: RIGHT ARTHROPLASTY, KNEE, TOTAL;  Surgeon: Rufus Saba M.D.;  Location: SURGERY  [facility];  Service: Orthopedics   • KNEE ARTHROSCOPY Right 2/9/2018    Procedure: KNEE ARTHROSCOPY;  Surgeon: Harsh Baltazar M.D.;  Location: [facility];  Service: Orthopedics   • MEDIAL MENISCECTOMY Right 2/9/2018    Procedure: MEDIAL AND LATERAL MENISCECTOMY- PARTIAL;  Surgeon: Harsh Baltazar M.D.;  Location: [facility];  Service: Orthopedics   • KNEE ARTHROSCOPY Right 7/12/2017    Procedure: KNEE ARTHROSCOPY- CESPEDES;  Surgeon: Harsh Baltazar M.D.;  Location: [facility];  Service:    • MEDIAL MENISCECTOMY Right 7/12/2017    Procedure: PARTIAL MEDIAL AND LATERAL MENISCECTOMY;  Surgeon: Harsh Baltazar M.D.;  Location: [facility];  Service:    • SYNOVECTOMY Right 7/12/2017    Procedure: SYNOVECTOMY;  Surgeon: Harsh Baltazar M.D.;  Location: [facility];  Service:    • KNEE ARTHROSCOPY  4/21/2015    Performed by Rufus Saba M.D. at SURGERY [facility]   • MEDIAL MENISCECTOMY  4/21/2015    Performed by Rufus Saba M.D. at [facility]   • PUMP REVISION  12/10/2012    Performed by Allison Guerra M.D. at [facility]   • SPINAL CORD STIMULATOR  5/30/2012    Performed by ALLISON GUERRA at [facility]   • BIOPSY GENERAL  5/1/12    endometrial   • SPINAL CORD STIMULATOR  7/11/2011    Performed by ALLISON GUERRA at [facility]   • BLOCK EPIDURAL STEROID INJECTION  2008    x 3-4   • LYMPH NODE EXCISION Left 2007    cervicle   • OTHER Right 2001    thoracic discectomy     • ARTHROSCOPY, KNEE Left 1999   • RIB RESECTION Right 1980   • LUMPECTOMY         Family history   Family History   Problem Relation Age of Onset   • Hypertension Father    • Diabetes Father    • Heart Disease Father    • Alcohol abuse Father    • Anesthesia Sister         slow to come out (as a child)   • Diabetes Other    • Heart Disease Other    • Cancer Other    • Hypertension  "Other    • Stroke Other    • Lung Disease Other          Medications:   Current Outpatient Medications   Medication   • gabapentin (NEURONTIN) 300 MG Cap   • ondansetron (ZOFRAN) 4 MG Tab tablet   • DULoxetine (CYMBALTA) 30 MG Cap DR Particles   • tramadol (ULTRAM) 50 MG Tab   • atorvastatin (LIPITOR) 20 MG Tab   • levothyroxine (SYNTHROID) 50 MCG Tab   • lisinopril-hydrochlorothiazide (PRINZIDE) 20-12.5 MG per tablet   • acetaminophen (TYLENOL) 500 MG Tab     No current facility-administered medications for this visit.        Allergies:   Allergies   Allergen Reactions   • Bactrim Ds Rash and Swelling     Pt states \"My hand swells up and rash all over body\".   • Sulfamethoxazole-Trimethoprim Rash and Swelling     Pt states \"My hand swells up and rash all over body\".       Social Hx:   Social History     Socioeconomic History   • Marital status: Single     Spouse name: Not on file   • Number of children: Not on file   • Years of education: Not on file   • Highest education level: Not on file   Occupational History   • Not on file   Social Needs   • Financial resource strain: Not on file   • Food insecurity     Worry: Not on file     Inability: Not on file   • Transportation needs     Medical: Not on file     Non-medical: Not on file   Tobacco Use   • Smoking status: Never Smoker   • Smokeless tobacco: Never Used   Substance and Sexual Activity   • Alcohol use: Not Currently     Alcohol/week: 0.0 oz     Comment: 1 glass wine per month   • Drug use: No   • Sexual activity: Never     Partners: Male   Lifestyle   • Physical activity     Days per week: Not on file     Minutes per session: Not on file   • Stress: Not on file   Relationships   • Social connections     Talks on phone: Not on file     Gets together: Not on file     Attends Buddhist service: Not on file     Active member of club or organization: Not on file     Attends meetings of clubs or organizations: Not on file     Relationship status: Not on file   • " "Intimate partner violence     Fear of current or ex partner: Not on file     Emotionally abused: Not on file     Physically abused: Not on file     Forced sexual activity: Not on file   Other Topics Concern   •  Service No   • Blood Transfusions No   • Caffeine Concern No   • Occupational Exposure No   • Hobby Hazards No   • Sleep Concern Yes   • Stress Concern Yes   • Weight Concern Yes   • Special Diet No   • Back Care No   • Exercise No   • Bike Helmet No   • Seat Belt Yes   • Self-Exams No   Social History Narrative   • Not on file         EXAMINATION     Physical Exam:   Vitals: /70 (BP Location: Right arm, Patient Position: Sitting, BP Cuff Size: Large adult long)   Pulse 91   Temp 36.6 °C (97.8 °F) (Temporal)   Ht 1.676 m (5' 6\")   Wt 80.6 kg (177 lb 11.1 oz)   SpO2 95%     Constitutional:   Body Habitus: Body mass index is 28.68 kg/m².  Cooperation: Fully cooperates with exam  Appearance: Well-groomed, well-nourished, not disheveled no acute distress    Eyes: No scleral icterus, no proptosis     ENT -no obvious auditory deficits, no facial droop     Skin -midline thoracic surgical scar, right knee anterior midline surgical scar, well-healed.  There is some note of livedo reticularis medial posterior right knee    Respiratory-  breathing comfortable on room air, no audible wheezing    Cardiovascular- capillary refills less than 2 seconds. No lower extremity edema is noted.     Gastrointestinal - no obvious abdominal masses, No tenderness to palpation in the abdomen    Psychiatric- alert and oriented ×3. Normal affect.     Gait - normal gait, no use of ambulatory device, antalgic.  Single point cane for longer distances.  There is knee flexion throughout the gait cycle on the right    Musculoskeletal -   Cervical spine   Inspection: No deformities of the skin over the cervical spine. No rashes or lesions.    full  A/P ROM in all directions, without  pain      Spurling’s sign: negative " bilaterally, causes some neck pain    No signs of muscular atrophy in bilateral upper extremities    Thoracic/Lumbar Spine/Sacral Spine/Hips   Inspection: No evidence of atrophy in bilateral lower extremities throughout, right knee edema    ROM: full  AROM with flexion, extension, lateral flexion side bending bilaterally     Palpation:   No tenderness to palpation in midline at T1-T12 levels. No tenderness to palpation in the left and right of the midline T1-L5  palpation over SI joint: negative bilaterally    palpation over buttock: negative bilaterally    palpation in hip or over the greater trochanters: negative bilaterally      Lumbar spine Special tests  Neuro tension  Straight leg test negative bilaterally      HIP  Range of motion in the hips is within normal limits in internal and external rotation    SI joint tests  Observation patient sits on one buttocks: Negative  CHERI test positive anterior right, negative left       Neuro       Key points for the international standards for neurological classification of spinal cord injury (ISNCSCI) to light touch.     Dermatome R L   C4 2 2   C5 2 2   C6 2 2   C7 2 2   C8 2 2   T1 2 2   T2 2 2   L2 2 2   L3 2 2   L4 2 2   L5 2 2   S1 2 2   S2 2 2           Motor Exam Upper Extremities   ? Myotome R L   Shoulder flexion C5 5 5   Elbow flexion C5 5 5   Wrist extension C6 5 5   Elbow extension C7 5 5   Finger flexion C8 5 5   Finger abduction T1 5 5         Motor Exam Lower Extremities    ? Myotome R L   Hip flexion L2 4 5   Knee extension L3 5 5   Ankle dorsiflexion L4 5 5   Toe extension L5 4 5   Ankle plantarflexion S1 5 5         Britton’s sign negative bilaterally   Babinski sign negative bilaterally   Clonus of the ankle negative bilaterally     Reflexes  ?  R L   Biceps  2+ 2+   Brachioradialis  2+ 2+   Patella  NT s/p TKR 2+   Achilles   2+ 2+       MEDICAL DECISION MAKING    Medical records review: see under HPI section.     DATA    Labs:   Lab Results    Component Value Date/Time    SODIUM 142 01/18/2020 07:41 AM    POTASSIUM 3.7 01/18/2020 07:41 AM    CHLORIDE 103 01/18/2020 07:41 AM    CO2 31 01/18/2020 07:41 AM    ANION 8.0 01/18/2020 07:41 AM    GLUCOSE 103 (H) 01/18/2020 07:41 AM    BUN 13 01/18/2020 07:41 AM    CREATININE 0.71 01/18/2020 07:41 AM    CALCIUM 9.5 01/18/2020 07:41 AM    ASTSGOT 13 01/18/2020 07:41 AM    ALTSGPT 11 01/18/2020 07:41 AM    TBILIRUBIN 0.5 01/18/2020 07:41 AM    ALBUMIN 4.1 01/18/2020 07:41 AM    TOTPROTEIN 6.8 01/18/2020 07:41 AM    GLOBULIN 2.7 01/18/2020 07:41 AM    AGRATIO 1.5 01/18/2020 07:41 AM       Lab Results   Component Value Date/Time    PROTHROMBTM 12.5 09/27/2018 12:45 PM    INR 0.94 09/27/2018 12:45 PM        Lab Results   Component Value Date/Time    WBC 5.8 01/18/2020 07:41 AM    RBC 4.29 01/18/2020 07:41 AM    HEMOGLOBIN 12.6 01/18/2020 07:41 AM    HEMATOCRIT 40.0 01/18/2020 07:41 AM    MCV 93.2 01/18/2020 07:41 AM    MCH 29.4 01/18/2020 07:41 AM    MCHC 31.5 (L) 01/18/2020 07:41 AM    MPV 12.1 01/18/2020 07:41 AM    NEUTSPOLYS 47.60 01/18/2020 07:41 AM    LYMPHOCYTES 42.50 (H) 01/18/2020 07:41 AM    MONOCYTES 7.50 01/18/2020 07:41 AM    EOSINOPHILS 1.70 01/18/2020 07:41 AM    BASOPHILS 0.50 01/18/2020 07:41 AM        Lab Results   Component Value Date/Time    HBA1C 6.1 (H) 01/18/2020 07:41 AM        Imaging: I personally reviewed following images, these are my reads  Xray right knee 09/16/2019  There is note of right knee arthroplasty    IMAGING radiology reads. I reviewed the following radiology reads                                                                                 Results for orders placed during the hospital encounter of 09/16/19   DX-KNEE 2- RIGHT    Impression Right knee arthroplasty.      Results for orders placed during the hospital encounter of 03/28/19   DX-KNEE 3 VIEWS RIGHT    Impression No evidence of acute fracture or dislocation.    Degenerative changes as above described.                               Diagnosis   Visit Diagnoses     ICD-10-CM   1. Lumbosacral pain M54.5   2. Major depressive disorder, recurrent episode, in partial remission (HCC) F33.41   3. Right knee pain, unspecified chronicity M25.561   4. S/P TKR (total knee replacement), right Z96.651   5. Low back pain with radiation M54.40   6. Pain in right leg M79.604   7. Right inguinal pain R10.31           ASSESSMENT:  Debby Desai 59 y.o. female seen for above.     Debby was seen today for new patient.    Diagnoses and all orders for this visit:    Lumbosacral pain  -     DX-LUMBAR SPINE-4+ VIEWS; Future  -     REFERRAL TO EMG - PHYSIATRY (PMR)  -     Pain Management Screen  -     gabapentin (NEURONTIN) 300 MG Cap; Take 1 Cap by mouth 3 times a day for 30 days.    Major depressive disorder, recurrent episode, in partial remission (HCC)  -     Patient has been identified as having a positive depression screening. Appropriate orders and counseling have been given.  -     REFERRAL TO BEHAVIORAL HEALTH    Right knee pain, unspecified chronicity  -     REFERRAL TO BEHAVIORAL HEALTH  -     CRP QUANTITIVE (NON-CARDIAC); Future  -     Sed Rate; Future    S/P TKR (total knee replacement), right  -     REFERRAL TO BEHAVIORAL HEALTH  -     Pain Management Screen  -     CRP QUANTITIVE (NON-CARDIAC); Future  -     Sed Rate; Future    Low back pain with radiation  -     REFERRAL TO EMG - PHYSIATRY (PMR)  -     gabapentin (NEURONTIN) 300 MG Cap; Take 1 Cap by mouth 3 times a day for 30 days.    Pain in right leg  -     REFERRAL TO EMG - PHYSIATRY (PMR)  -     DX-HIP-COMPLETE - UNILATERAL 2+ RIGHT; Future    Right inguinal pain  -     DX-HIP-COMPLETE - UNILATERAL 2+ RIGHT; Future      1. Discussed that she will resume physical therapy as planned for next week.  2. Xray of the lumbar spine and right hip ordered.  It is possible that she has been having more pain due to her antaglic gait.  3. Discussed referral to Behavioral health  4. UDS ordered  today.  Discussed that we might need to consider other medications, given possible risk of serotonin syndrome with tramadol and duloxetine.  Would like to increase duloxetine.    5. Labs ordered CRP and sed rate  6. EMG of the right lower extremity ordered for further diagnostic evaluation of right lower extremity symptoms, which involve the entire limb looking for peripheral nerve injury vs. Radiculopathy  7. Advised that she get a heating pad with an automatic shut off.    8. Trial gabapentin.  Titration instructions given.    Outside records requested:  The patient signed outside records request form for her outside records including outside images.  Request records from Dr. Raymond Ward       Follow-up: Return in about 1 week (around 3/3/2020). to review labs, xrays and consider taking over chronic opioid therapy    Thank you very much for asking me to participate in Debby Desai's care.  Please contact me with any questions or concerns.      Please note that this dictation was created using voice recognition software. I have made every reasonable attempt to correct obvious errors but there may be errors of grammar and content that I may have overlooked prior to finalization of this note.      Volodymyr Herring MD  Physical Medicine and Rehabilitation  Interventional Spine and Sports Physiatry  [facility]

## 2020-02-25 NOTE — PATIENT INSTRUCTIONS
Gabapentin:  Start taking 300mg at bedtime for 3-5days    Then, start taking 300mg at dinner and 300mg at bedtime for 3-5 days.    Then, increase to taking 300mg three times a day.    Please call the office with any questions.

## 2020-03-06 ENCOUNTER — OFFICE VISIT (OUTPATIENT)
Dept: MEDICAL GROUP | Facility: MEDICAL CENTER | Age: 60
End: 2020-03-06
Payer: COMMERCIAL

## 2020-03-06 VITALS
DIASTOLIC BLOOD PRESSURE: 68 MMHG | WEIGHT: 180 LBS | SYSTOLIC BLOOD PRESSURE: 120 MMHG | OXYGEN SATURATION: 95 % | HEART RATE: 63 BPM | BODY MASS INDEX: 28.93 KG/M2 | TEMPERATURE: 97.2 F | HEIGHT: 66 IN

## 2020-03-06 DIAGNOSIS — R73.02 IGT (IMPAIRED GLUCOSE TOLERANCE): ICD-10-CM

## 2020-03-06 DIAGNOSIS — G89.29 CHRONIC KNEE PAIN, UNSPECIFIED LATERALITY: ICD-10-CM

## 2020-03-06 DIAGNOSIS — M25.569 CHRONIC KNEE PAIN, UNSPECIFIED LATERALITY: ICD-10-CM

## 2020-03-06 PROCEDURE — 99214 OFFICE O/P EST MOD 30 MIN: CPT | Performed by: INTERNAL MEDICINE

## 2020-03-06 RX ORDER — DULOXETIN HYDROCHLORIDE 60 MG/1
60 CAPSULE, DELAYED RELEASE ORAL DAILY
Qty: 90 CAP | Refills: 3 | Status: SHIPPED | OUTPATIENT
Start: 2020-03-06 | End: 2021-02-02 | Stop reason: SDUPTHER

## 2020-03-06 RX ORDER — TRAMADOL HYDROCHLORIDE 50 MG/1
50 TABLET ORAL EVERY 8 HOURS PRN
Qty: 90 TAB | Refills: 0 | Status: SHIPPED | OUTPATIENT
Start: 2020-03-06 | End: 2020-03-26 | Stop reason: SDUPTHER

## 2020-03-06 RX ORDER — METFORMIN HYDROCHLORIDE 500 MG/1
500 TABLET, EXTENDED RELEASE ORAL 2 TIMES DAILY
Qty: 180 TAB | Refills: 3 | Status: SHIPPED | OUTPATIENT
Start: 2020-03-06 | End: 2021-05-23 | Stop reason: SDUPTHER

## 2020-03-06 ASSESSMENT — FIBROSIS 4 INDEX: FIB4 SCORE: 0.65

## 2020-03-06 NOTE — PROGRESS NOTES
CC: Follow-up chronic pain.                                                                                                                                      HPI:   Debby presents today with the following.    1. Chronic knee pain, unspecified laterality  Resents having established with pain specialist urine drug screen is pending once that is back they will take over on medication she is requesting 1 more refill until that time.  She has had regular prescribers in the past.        Patient Active Problem List    Diagnosis Date Noted   • Chronic knee pain 01/27/2020   • Opioid dependence in controlled environment ([facility]) 09/27/2018   • Obesity (BMI 30-39.9) 07/09/2018   • IGT (impaired glucose tolerance) 09/29/2017   • Osteoarthritis of right knee 07/12/2017   • Major depressive disorder, recurrent episode, in partial remission ([facility]) 05/05/2017   • Anxiety disorder 10/24/2016   • Osteoarthrosis involving lower leg 04/21/2015   • Dyslipidemia 12/02/2014   • HTN (hypertension), benign 03/19/2012   • Acquired hypothyroidism 03/19/2012   • Back pain 03/19/2012   • Neck pain 03/19/2012       Current Outpatient Medications   Medication Sig Dispense Refill   • tramadol (ULTRAM) 50 MG Tab Take 1 Tab by mouth every 8 hours as needed for up to 30 days. 90 Tab 0   • metFORMIN ER (GLUCOPHAGE XR) 500 MG TABLET SR 24 HR Take 1 Tab by mouth 2 times a day. 180 Tab 3   • DULoxetine (CYMBALTA) 60 MG Cap DR Particles delayed-release capsule Take 1 Cap by mouth every day. 90 Cap 3   • gabapentin (NEURONTIN) 300 MG Cap Take 1 Cap by mouth 3 times a day for 30 days. 90 Cap 1   • ondansetron (ZOFRAN) 4 MG Tab tablet Take 1 Tab by mouth every four hours as needed for Nausea/Vomiting. 20 Tab 6   • tramadol (ULTRAM) 50 MG Tab Take 1 Tab by mouth every 8 hours as needed for up to 30 days. 90 Tab 0   • atorvastatin (LIPITOR) 20 MG Tab TAKE ONE TABLET BY MOUTH EVERY DAY 90 Tab 3   • levothyroxine (SYNTHROID) 50 MCG Tab TAKE ONE (1) TABLET  "BY MOUTH DAILY 90 Tab 3   • lisinopril-hydrochlorothiazide (PRINZIDE) 20-12.5 MG per tablet Take 1 Tab by mouth every day. 90 Tab 1   • acetaminophen (TYLENOL) 500 MG Tab Take 1,000-2,000 mg by mouth 1 time daily as needed.       No current facility-administered medications for this visit.          Allergies as of 03/06/2020 - Reviewed 03/06/2020   Allergen Reaction Noted   • Bactrim ds Rash and Swelling 05/14/2011   • Sulfamethoxazole-trimethoprim Rash and Swelling 05/23/2016        ROS: Denies Chest pain, SOB, LE edema.    /68 (BP Location: Right arm, Patient Position: Sitting)   Pulse 63   Temp 36.2 °C (97.2 °F)   Ht 1.676 m (5' 6\")   Wt 81.6 kg (180 lb)   LMP 02/26/2012   SpO2 95%   BMI 29.05 kg/m²     Physical Exam:  Gen:         Alert and oriented, No apparent distress.  Neck:        No Lymphadenopathy or Bruits.  Lungs:     Clear to auscultation bilaterally  CV:          Regular rate and rhythm. No murmurs, rubs or gallops.               Ext:          No clubbing, cyanosis, edema.      Assessment and Plan.   59 y.o. female with the following issues.    1. Chronic knee pain, unspecified laterality  Reviewing state website she does not have multiple prescribers we will give her 1 more month to get into the pain specialist.  - tramadol (ULTRAM) 50 MG Tab; Take 1 Tab by mouth every 8 hours as needed for up to 30 days.  Dispense: 90 Tab; Refill: 0    2. IGT (impaired glucose tolerance)  We will go back on metformin if stomach is bothersome she will go off medication and follow-up in 3 months for A1c.  If she is tolerating metformin will see back in 6 months.  - metFORMIN ER (GLUCOPHAGE XR) 500 MG TABLET SR 24 HR; Take 1 Tab by mouth 2 times a day.  Dispense: 180 Tab; Refill: 3      "

## 2020-03-14 ENCOUNTER — HOSPITAL ENCOUNTER (OUTPATIENT)
Dept: RADIOLOGY | Facility: MEDICAL CENTER | Age: 60
End: 2020-03-14
Attending: PHYSICAL MEDICINE & REHABILITATION
Payer: COMMERCIAL

## 2020-03-14 ENCOUNTER — HOSPITAL ENCOUNTER (OUTPATIENT)
Dept: LAB | Facility: MEDICAL CENTER | Age: 60
End: 2020-03-14
Attending: PHYSICAL MEDICINE & REHABILITATION
Payer: COMMERCIAL

## 2020-03-14 DIAGNOSIS — Z96.651 S/P TKR (TOTAL KNEE REPLACEMENT), RIGHT: ICD-10-CM

## 2020-03-14 DIAGNOSIS — R10.31 RIGHT INGUINAL PAIN: ICD-10-CM

## 2020-03-14 DIAGNOSIS — M79.604 PAIN IN RIGHT LEG: ICD-10-CM

## 2020-03-14 DIAGNOSIS — M54.50 LUMBOSACRAL PAIN: ICD-10-CM

## 2020-03-14 DIAGNOSIS — M25.561 RIGHT KNEE PAIN, UNSPECIFIED CHRONICITY: ICD-10-CM

## 2020-03-14 LAB
CRP SERPL HS-MCNC: 0.19 MG/DL (ref 0–0.75)
ERYTHROCYTE [SEDIMENTATION RATE] IN BLOOD BY WESTERGREN METHOD: 20 MM/HOUR (ref 0–30)

## 2020-03-14 PROCEDURE — 36415 COLL VENOUS BLD VENIPUNCTURE: CPT

## 2020-03-14 PROCEDURE — 72110 X-RAY EXAM L-2 SPINE 4/>VWS: CPT

## 2020-03-14 PROCEDURE — 85652 RBC SED RATE AUTOMATED: CPT

## 2020-03-14 PROCEDURE — 86140 C-REACTIVE PROTEIN: CPT

## 2020-03-14 PROCEDURE — 73502 X-RAY EXAM HIP UNI 2-3 VIEWS: CPT | Mod: RT

## 2020-03-26 ENCOUNTER — OFFICE VISIT (OUTPATIENT)
Dept: PHYSICAL MEDICINE AND REHAB | Facility: MEDICAL CENTER | Age: 60
End: 2020-03-26
Payer: COMMERCIAL

## 2020-03-26 VITALS
TEMPERATURE: 97.3 F | BODY MASS INDEX: 28.98 KG/M2 | HEIGHT: 66 IN | SYSTOLIC BLOOD PRESSURE: 130 MMHG | OXYGEN SATURATION: 96 % | WEIGHT: 180.34 LBS | DIASTOLIC BLOOD PRESSURE: 70 MMHG | HEART RATE: 74 BPM

## 2020-03-26 DIAGNOSIS — M54.50 LOW BACK PAIN WITH RADIATION: ICD-10-CM

## 2020-03-26 DIAGNOSIS — F33.41 MAJOR DEPRESSIVE DISORDER, RECURRENT EPISODE, IN PARTIAL REMISSION (HCC): ICD-10-CM

## 2020-03-26 DIAGNOSIS — L59.0 ERYTHEMA AB IGNE: ICD-10-CM

## 2020-03-26 DIAGNOSIS — M54.50 LUMBOSACRAL PAIN: ICD-10-CM

## 2020-03-26 DIAGNOSIS — G89.29 CHRONIC KNEE PAIN, UNSPECIFIED LATERALITY: ICD-10-CM

## 2020-03-26 DIAGNOSIS — M54.16 LUMBAR RADICULOPATHY: ICD-10-CM

## 2020-03-26 DIAGNOSIS — R20.2 PARESTHESIA OF RIGHT LOWER EXTREMITY: ICD-10-CM

## 2020-03-26 DIAGNOSIS — M25.569 CHRONIC KNEE PAIN, UNSPECIFIED LATERALITY: ICD-10-CM

## 2020-03-26 PROCEDURE — 95909 NRV CNDJ TST 5-6 STUDIES: CPT | Performed by: PHYSICAL MEDICINE & REHABILITATION

## 2020-03-26 PROCEDURE — 99214 OFFICE O/P EST MOD 30 MIN: CPT | Mod: 25 | Performed by: PHYSICAL MEDICINE & REHABILITATION

## 2020-03-26 PROCEDURE — 95886 MUSC TEST DONE W/N TEST COMP: CPT | Performed by: PHYSICAL MEDICINE & REHABILITATION

## 2020-03-26 RX ORDER — TRAMADOL HYDROCHLORIDE 50 MG/1
50 TABLET ORAL EVERY 8 HOURS PRN
Qty: 90 TAB | Refills: 0 | Status: SHIPPED | OUTPATIENT
Start: 2020-05-05 | End: 2020-05-07 | Stop reason: SDUPTHER

## 2020-03-26 RX ORDER — GABAPENTIN 600 MG/1
600 TABLET ORAL 3 TIMES DAILY
Qty: 90 TAB | Refills: 1 | Status: SHIPPED | OUTPATIENT
Start: 2020-03-26 | End: 2020-04-25

## 2020-03-26 RX ORDER — TRAMADOL HYDROCHLORIDE 50 MG/1
50 TABLET ORAL EVERY 8 HOURS PRN
Qty: 90 TAB | Refills: 0 | Status: SHIPPED | OUTPATIENT
Start: 2020-04-05 | End: 2024-01-05 | Stop reason: SDUPTHER

## 2020-03-26 ASSESSMENT — PAIN SCALES - GENERAL: PAINLEVEL: 5=MODERATE PAIN

## 2020-03-26 ASSESSMENT — FIBROSIS 4 INDEX: FIB4 SCORE: 0.65

## 2020-03-26 NOTE — PROGRESS NOTES
Follow-up patient note    Physiatry (physical medicine and  Rehabilitation), interventional spine and sports medicine    Date of Service: 03/26/2020    Chief complaint:   Chief Complaint   Patient presents with   • Follow-Up     Electromyography and nerve conduction studies of the right lower extremity.        HISTORY    HPI: Debby Desai 59 y.o. female who presents today for evaluation of right leg pain.  She reports that she had surgery on 09/16/2019.  This was a right total knee arthroplasty for osteoarthritis with Dr. Saba.    She is here today for electrodiagnostic evaluation of the right lower extremity.  She has had pain in the right posterior/lateral gluteal region with pain that radiates into the leg.  No numbness.  She has had some low back pain.      The right knee pain continues to be painful.  Her ROM is limited and she reports that she has hit a plateau with therapy.  They are going to pause it.  Using a single point cane helps with ambulation.  Tramadol has helped with her knee pain, but pain continues to be 4-6/10 on the VAS.    She is taking duloxetine 60mg.  This has helped with her mood, but she is not sure if it has been helpful for her pain complaints.    In 2001, she had discectomy.  She reports that she has had a spinal cord stimulator placed, but she has not been using this as much.  She reports that the unit is not currently working.    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  She gets up about once an hour.  She has a sit to stand desk, but it is difficult to stand much.  Currently, she is working from home due to the COVID-19 pandemic.    Medical records review:  I reviewed the note from the referring provider Loy Miles M.D. dated 01/08/2020: Visits included nausea/epigastric pain, hypertension, chronic knee pain, IGT, dyslipidemia, MDD, hypothyroidism, iron deficiency    Previous treatments:    Physical Therapy:  Yes    Medications the patient is tried: tramadol, tylenol (usually for a headache); in the past she took gabapentin 400mg po tid, she was taking vimovo and norco in the past; lyrica caused weight gain    Previous interventions: She had radiofrequency ablation in the past, prior to surgery    Previous surgeries to relieve the above pain:  None other than surgery 09/16/2019      ROS: Unchanged from 02/25/2020  Gen: weight loss  Eyes: vision problems, glasses and contacts  CV: chest pain/anxiety  GI: nausea, ulcers  : urgency  Psych: depression  Endo: thyroid problems  Heme: anemia    Red Flags ROS:   Fever, Chills, Sweats: Denies  Involuntary Weight Loss: Denies  Bladder Incontinence: Denies  Bowel Incontinence: Denies  Saddle Anesthesia: Denies    All other systems reviewed and negative.       PMHx:   Past Medical History:   Diagnosis Date   • Anemia     in past   • Anginal syndrome (HCC)     had work up and feet r/t anxiety   • Anxiety    • Arthritis     OA knees   • Dental disorder 5/24/12    partial upper denture   • High cholesterol    • HTN (hypertension), benign 3/19/2012   • Hypothyroid 3/19/2012   • Major depression 3/19/2012   • Orbital floor (blow-out) closed fracture (HCC)     left   • Other specified symptom associated with female genital organs     post menopausal bleeding   • Pain     thoracic spine, Right knee, myofacial pain, and Right shoulder   • Pain     recently fell and has bruise left forehead   • Pain 02/2018    chronic back pain, right shoulder   • Psychiatric problem     depression, anxiety   • Unspecified disorder of thyroid    • Urinary bladder disorder     stress incont.   • Urinary incontinence     Occasional       PSHx:   Past Surgical History:   Procedure Laterality Date   • PB TOTAL KNEE ARTHROPLASTY Right 9/16/2019    Procedure: RIGHT ARTHROPLASTY, KNEE, TOTAL;  Surgeon: Rufus Saba M.D.;  Location: SURGERY [facility];  Service: Orthopedics   • KNEE ARTHROSCOPY Right  2/9/2018    Procedure: KNEE ARTHROSCOPY;  Surgeon: Harsh Baltazar M.D.;  Location: [facility];  Service: Orthopedics   • MEDIAL MENISCECTOMY Right 2/9/2018    Procedure: MEDIAL AND LATERAL MENISCECTOMY- PARTIAL;  Surgeon: Harsh Baltazar M.D.;  Location: [facility];  Service: Orthopedics   • KNEE ARTHROSCOPY Right 7/12/2017    Procedure: KNEE ARTHROSCOPY- CESPEDES;  Surgeon: Harsh Baltazar M.D.;  Location: [facility];  Service:    • MEDIAL MENISCECTOMY Right 7/12/2017    Procedure: PARTIAL MEDIAL AND LATERAL MENISCECTOMY;  Surgeon: Harsh Baltazar M.D.;  Location: [facility];  Service:    • SYNOVECTOMY Right 7/12/2017    Procedure: SYNOVECTOMY;  Surgeon: Harsh Baltazar M.D.;  Location: [facility];  Service:    • KNEE ARTHROSCOPY  4/21/2015    Performed by Rufus Saba M.D. at SURGERY [facility]   • MEDIAL MENISCECTOMY  4/21/2015    Performed by Rufus Saba M.D. at [facility]   • PUMP REVISION  12/10/2012    Performed by Allison Guerra M.D. at [facility]   • SPINAL CORD STIMULATOR  5/30/2012    Performed by ALLISON GUERRA at [facility]   • BIOPSY GENERAL  5/1/12    endometrial   • SPINAL CORD STIMULATOR  7/11/2011    Performed by ALLISON GUERRA at [facility]   • BLOCK EPIDURAL STEROID INJECTION  2008    x 3-4   • LYMPH NODE EXCISION Left 2007    cervicle   • OTHER Right 2001    thoracic discectomy     • ARTHROSCOPY, KNEE Left 1999   • RIB RESECTION Right 1980   • LUMPECTOMY         Family history   Family History   Problem Relation Age of Onset   • Hypertension Father    • Diabetes Father    • Heart Disease Father    • Alcohol abuse Father    • Anesthesia Sister         slow to come out (as a child)   • Diabetes Other    • Heart Disease Other    • Cancer Other    • Hypertension Other    • Stroke Other    • Lung Disease Other   "        Medications:   Current Outpatient Medications   Medication   • gabapentin (NEURONTIN) 600 MG tablet   • [START ON 4/5/2020] tramadol (ULTRAM) 50 MG Tab   • [START ON 5/5/2020] tramadol (ULTRAM) 50 MG Tab   • metFORMIN ER (GLUCOPHAGE XR) 500 MG TABLET SR 24 HR   • DULoxetine (CYMBALTA) 60 MG Cap DR Particles delayed-release capsule   • ondansetron (ZOFRAN) 4 MG Tab tablet   • atorvastatin (LIPITOR) 20 MG Tab   • levothyroxine (SYNTHROID) 50 MCG Tab   • lisinopril-hydrochlorothiazide (PRINZIDE) 20-12.5 MG per tablet   • acetaminophen (TYLENOL) 500 MG Tab     No current facility-administered medications for this visit.        Allergies:   Allergies   Allergen Reactions   • Bactrim Ds Rash and Swelling     Pt states \"My hand swells up and rash all over body\".   • Sulfamethoxazole-Trimethoprim Rash and Swelling     Pt states \"My hand swells up and rash all over body\".       Social Hx:   Social History     Socioeconomic History   • Marital status: Single     Spouse name: Not on file   • Number of children: Not on file   • Years of education: Not on file   • Highest education level: Not on file   Occupational History   • Not on file   Social Needs   • Financial resource strain: Not on file   • Food insecurity     Worry: Not on file     Inability: Not on file   • Transportation needs     Medical: Not on file     Non-medical: Not on file   Tobacco Use   • Smoking status: Never Smoker   • Smokeless tobacco: Never Used   Substance and Sexual Activity   • Alcohol use: Not Currently     Alcohol/week: 0.0 oz     Comment: 1 glass wine per month   • Drug use: No   • Sexual activity: Never     Partners: Male   Lifestyle   • Physical activity     Days per week: Not on file     Minutes per session: Not on file   • Stress: Not on file   Relationships   • Social connections     Talks on phone: Not on file     Gets together: Not on file     Attends Caodaism service: Not on file     Active member of club or organization: Not on " "file     Attends meetings of clubs or organizations: Not on file     Relationship status: Not on file   • Intimate partner violence     Fear of current or ex partner: Not on file     Emotionally abused: Not on file     Physically abused: Not on file     Forced sexual activity: Not on file   Other Topics Concern   •  Service No   • Blood Transfusions No   • Caffeine Concern No   • Occupational Exposure No   • Hobby Hazards No   • Sleep Concern Yes   • Stress Concern Yes   • Weight Concern Yes   • Special Diet No   • Back Care No   • Exercise No   • Bike Helmet No   • Seat Belt Yes   • Self-Exams No   Social History Narrative   • Not on file         EXAMINATION     Physical Exam:   Vitals: /70 (BP Location: Left arm, Patient Position: Sitting, BP Cuff Size: Small adult)   Pulse 74   Temp 36.3 °C (97.3 °F) (Temporal)   Ht 1.676 m (5' 6\")   Wt 81.8 kg (180 lb 5.4 oz)   SpO2 96%     Constitutional:   Body Habitus: Body mass index is 29.11 kg/m².  Cooperation: Fully cooperates with exam  Appearance: Well-groomed, well-nourished, not disheveled no acute distress    Eyes: No scleral icterus, no proptosis     ENT -no obvious auditory deficits, no facial droop     Skin -midline thoracic surgical scar, right knee anterior midline surgical scar, well-healed. 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    Respiratory-  breathing comfortable on room air, no audible wheezing    Cardiovascular- No lower extremity edema is noted.     Psychiatric- alert and oriented ×3. Normal affect.     Gait -  abnormal gait, use of SPC for longer distances, antalgic.  There is knee flexion throughout the gait cycle on the right    Neuro       Key points for the international standards for neurological classification of spinal cord injury (ISNCSCI) to light touch.     Dermatome R L   L2 2 2   L3 2 2   L4 2 2   L5 2 2   S1 2 2   S2 2 2       Motor Exam Lower Extremities    ? Myotome R L   Hip flexion L2 4 5   Knee " extension L3 5 5   Ankle dorsiflexion L4 5 5   Toe extension L5 4 5   Ankle plantarflexion S1 5 5       Clonus of the ankle negative bilaterally     Reflexes  ?  R L   Patella  NT s/p TKR 2+   Achilles   2+ 2+       MEDICAL DECISION MAKING    Medical records review: see under HPI section.     DATA    Labs:     03/14/2020 CRP 0.19  03/14/2020 Sed rate 20  02/25/2020: Tramadol and metabolites      Lab Results   Component Value Date/Time    SODIUM 142 01/18/2020 07:41 AM    POTASSIUM 3.7 01/18/2020 07:41 AM    CHLORIDE 103 01/18/2020 07:41 AM    CO2 31 01/18/2020 07:41 AM    ANION 8.0 01/18/2020 07:41 AM    GLUCOSE 103 (H) 01/18/2020 07:41 AM    BUN 13 01/18/2020 07:41 AM    CREATININE 0.71 01/18/2020 07:41 AM    CALCIUM 9.5 01/18/2020 07:41 AM    ASTSGOT 13 01/18/2020 07:41 AM    ALTSGPT 11 01/18/2020 07:41 AM    TBILIRUBIN 0.5 01/18/2020 07:41 AM    ALBUMIN 4.1 01/18/2020 07:41 AM    TOTPROTEIN 6.8 01/18/2020 07:41 AM    GLOBULIN 2.7 01/18/2020 07:41 AM    AGRATIO 1.5 01/18/2020 07:41 AM       Lab Results   Component Value Date/Time    PROTHROMBTM 12.5 09/27/2018 12:45 PM    INR 0.94 09/27/2018 12:45 PM        Lab Results   Component Value Date/Time    WBC 5.8 01/18/2020 07:41 AM    RBC 4.29 01/18/2020 07:41 AM    HEMOGLOBIN 12.6 01/18/2020 07:41 AM    HEMATOCRIT 40.0 01/18/2020 07:41 AM    MCV 93.2 01/18/2020 07:41 AM    MCH 29.4 01/18/2020 07:41 AM    MCHC 31.5 (L) 01/18/2020 07:41 AM    MPV 12.1 01/18/2020 07:41 AM    NEUTSPOLYS 47.60 01/18/2020 07:41 AM    LYMPHOCYTES 42.50 (H) 01/18/2020 07:41 AM    MONOCYTES 7.50 01/18/2020 07:41 AM    EOSINOPHILS 1.70 01/18/2020 07:41 AM    BASOPHILS 0.50 01/18/2020 07:41 AM        Lab Results   Component Value Date/Time    HBA1C 6.1 (H) 01/18/2020 07:41 AM        Imaging: I personally reviewed following images, these are my reads  Xray right hip 03/14/2020  There is mild osteoarthritis of the right hip.      Xray lumbar 03/14/2020  There is mild anterolisthesis at L5-S1.  No  abnormal motion on flexion and extension  There is DDD and facet arthropathy that is most noted at L5-S1 and less at L4-5    Xray right knee 09/16/2019  There is note of right knee arthroplasty    IMAGING radiology reads. I reviewed the following radiology reads    Xray lumbar 03/14/2020  IMPRESSION:     1.  No compression deformity or acute fracture is identified.     2.  Mild anterolisthesis at L5-S1.     3.  Degenerative disc disease and facet arthropathy.     4.  No focal instability noted on flexion extension views.                                                Xray right hip 03/14/2020  IMPRESSION:     1.  No radiographic evidence of acute traumatic injury.     2.  Findings are consistent with mild osteoarthritis.     3.  Probable constipation.                                   Results for orders placed during the hospital encounter of 09/16/19   DX-KNEE 2- RIGHT    Impression Right knee arthroplasty.      Results for orders placed during the hospital encounter of 03/28/19   DX-KNEE 3 VIEWS RIGHT    Impression No evidence of acute fracture or dislocation.    Degenerative changes as above described.                              Diagnosis   Visit Diagnoses     ICD-10-CM   1. Lumbar radiculopathy M54.16   2. Lumbosacral pain M54.5   3. Low back pain with radiation M54.40   4. Chronic knee pain, unspecified laterality M25.569    G89.29   5. 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 Major depressive disorder, recurrent episode, in partial remission ([facility]) F33.41           ASSESSMENT:  Debby Desai 59 y.o. female seen for above.     Debby was seen today for follow-up.    Diagnoses and all orders for this visit:    Lumbar radiculopathy  -     CT-LSPINE W/O PLUS RECONS; Future  -     gabapentin (NEURONTIN) 600 MG tablet; Take 1 Tab by mouth 3 times a day for 30 days.    Lumbosacral pain    Low back pain with radiation    Chronic knee pain, unspecified laterality  -     tramadol (ULTRAM) 50 MG Tab; Take 1 Tab by mouth every 8  hours as needed for up to 30 days.  -     tramadol (ULTRAM) 50 MG Tab; Take 1 Tab by mouth every 8 hours as needed for Severe Pain for up to 30 days.  -     Consent for Opiate Prescription  -     Controlled Substance Treatment Agreement  -     REFERRAL TO PSYCHOLOGY    Izabella mcmillan    Major depressive disorder, recurrent episode, in partial remission (HCC)  -     REFERRAL TO PSYCHOLOGY         1. Continue tramadol 50mg po tid for now.  We discussed that we would consider changing this.  Reviewed concerns about risk of serotonin syndrome with patient.  We will reassess this at follow-up.  She will notify if she has any concerns.  None thusfar.  2. Titrate gabapentin up to 600mg po tid.  Instructions given in AVS.  3. CT lumbar spine to further evaluate radiculopathy, her SCS is not MRI compatible.  4. Continue HEP for the knee to work at ROM.  She has discontinued use of the heating pad without shutoff.  5. See EMG report for the same date.  6. Discussed referral to Behavioral Health.  She denies suicidality and would like to work with Behavioral health.  Appointment is in September 2020.        Follow-up: Return in about 6 weeks (around 5/7/2020).     Thank you very much for asking me to participate in Debby Desai's care.  Please contact me with any questions or concerns.      Please note that this dictation was created using voice recognition software. I have made every reasonable attempt to correct obvious errors but there may be errors of grammar and content that I may have overlooked prior to finalization of this note.      Volodymyr Herring MD  Physical Medicine and Rehabilitation  Interventional Spine and Sports Physiatry  [facility]

## 2020-03-26 NOTE — PATIENT INSTRUCTIONS
Gabapentin:  Start taking 300mg in the morning, 300mg at midday and 600mg at bedtime for 3-5days    Then, start taking 300mg in the morning, 600mg at dinner and 600mg at bedtime for 3-5 days.    Then, increase to taking 600mg three times a day.    Please call the office with any questions.

## 2020-03-26 NOTE — PROCEDURES
"  [facility]  Sports and Spine, Physiatry, EMG  [facility]  [address]  [place] [address]    Test Date:  3/26/2020    Patient: Debby Desai : 1960 Physician: Volodymyr Herring MD   Sex: Female Height: 5' 6\" Ref Phys: Volodymyr Herring MD   MRN#: [id number] Weight: 81.8 kg Technician: N/A     Patient Complaints:  Right-sided low back and leg pain    Debby presents for electrodiagnostic evaluation of low back and right leg symptoms.  See E&M of the same date for further details.    PMH/PSH/Meds/SH reviewed.    FH is negative for known history of neuromuscular disease.    Exam:  See E&M of the same date       NCV & EMG Findings:  Evaluation of the right fibular motor nerve showed reduced amplitude (1.6 mV).  All remaining nerves (as indicated in the following tables) were within normal limits.  All H Reflex left vs. right side latency differences were within normal limits.      Needle evaluation of the right biceps femoris (short head) muscle showed slightly increased spontaneous activity and increased motor unit amplitude.  The right tensor fascia low and the right low lumbosacral paraspinal muscles showed slightly increased spontaneous activity.  All remaining muscles (as indicated in the following table) showed no evidence of electrical instability.      Impression/Recommendations:  Abnormal study  1. Today's electrodiagnostic findings are consistent with acute-subacute right lumbar radiculopathy primarily involving L5  2. Today's electrodiagnostic findings demonstrate a reduction in the amplitude of the right fibular motor nerve without evidence of demyelination.  Cannot absolutely rule out right fibular neuropathy, although this may be seen as a result of the presence of the right lumbar radiculopathy.  3. Today's electrodiagnostic findings point against:      A. Right tibial neuropathy      B. Generalized large-fiber peripheral polyneuropathy  4. Clinically, plan to order CT " of the lumbar spine and follow-up in clinic.    ___________________________  Volodymyr Herring MD  Physical Medicine and Rehabilitation  Interventional Spine and Sports Physiatry  [employer]          Nerve Conduction Studies  Anti Sensory Summary Table     Stim Site NR Peak (ms) Norm Peak (ms) P-T Amp (µV) Norm P-T Amp Site1 Site2 Delta-P (ms) Dist (cm) Senthil (m/s) Norm Senthil (m/s)   Right Sup Fibular Anti Sensory (Ant Lat Mall)   14 cm    3.6 <4.4 8.8 >5.0 14 cm Ant Lat Mall 3.6 14.0 39 >32   Site 2    3.7  7.3          Right Sural Anti Sensory (Lat Mall)   Calf    3.8 <4.0 29.4 >5.0 Calf Lat Mall 3.8 14.0 37 >35     Motor Summary Table     Stim Site NR Onset (ms) Norm Onset (ms) O-P Amp (mV) Norm O-P Amp Site1 Site2 Delta-0 (ms) Dist (cm) Senthil (m/s) Norm Senthil (m/s)   Right Fibular Motor (Ext Dig Brev)   Ankle    4.8 <6.1 1.6 >2.5 B Fib Ankle 6.7 31.0 46 >38   B Fib    11.5  1.4  Poplt B Fib 1.5 10.0 67 >40   Poplt    13.0  1.7          Right Tibial Motor (Abd Patiño Brev)   Ankle    4.1 <6.1 13.9 >3.0 Knee Ankle 10.0 44.0 44 >35   Knee    14.1  9.3            H Reflex Studies     NR H-Lat (ms) L-R H-Lat (ms) L-R Lat Norm   Left Tibial (Gastroc)      32.15 1.31 <2.0   Right Tibial (Gastroc)      30.84 1.31 <2.0     EMG+     Side Muscle Nerve Root Ins Act Fibs Psw Amp Dur Poly Recrt Int Pat Other Comment   Right Ext Dig Brev Dp Br Fibular L5, S1 Nml Nml Nml Nml Nml 0 Nml Nml None    Right AntTibialis Dp Br Fibular L4-5 Nml Nml Nml Nml Nml 0 Nml Nml None    Right Gastroc Tibial S1-2 Nml Nml Nml Nml Nml 0 Nml Nml None    Right VastusMed Femoral L2-4 Nml Nml Nml Nml Nml 0 Nml Nml None    Right RectFemoris Femoral L2-4 Nml Nml Nml Nml Nml 0 Nml Nml None    Right BicepsFemS Sciatic L5-S1 Nml 1+ 1+ Incr Nml 0 Nml Nml None    Right TensorFascLat SupGluteal L4-5, S1 Nml 1+ 1+ Nml Nml 0 Nml Nml None    Right Lumbo Parasp Up Rami L1-2 Nml Nml Nml      None    Right Lumbo Parasp Mid Rami L3-4 Nml Nml Nml      None    Right Lumbo  Parasp Low Rami L5-S1 Nml 1+ 1+      None            Waveforms:

## 2020-04-02 ENCOUNTER — HOSPITAL ENCOUNTER (OUTPATIENT)
Dept: RADIOLOGY | Facility: MEDICAL CENTER | Age: 60
End: 2020-04-02
Attending: PHYSICAL MEDICINE & REHABILITATION
Payer: COMMERCIAL

## 2020-04-02 DIAGNOSIS — M54.16 LUMBAR RADICULOPATHY: ICD-10-CM

## 2020-04-02 PROCEDURE — 72131 CT LUMBAR SPINE W/O DYE: CPT

## 2020-05-06 ENCOUNTER — TELEMEDICINE (OUTPATIENT)
Dept: PHYSICAL MEDICINE AND REHAB | Facility: MEDICAL CENTER | Age: 60
End: 2020-05-06
Payer: COMMERCIAL

## 2020-05-06 VITALS — HEIGHT: 66 IN | BODY MASS INDEX: 29.11 KG/M2

## 2020-05-06 DIAGNOSIS — M54.16 LUMBAR RADICULOPATHY: ICD-10-CM

## 2020-05-06 RX ORDER — PNV NO.95/FERROUS FUM/FOLIC AC 28MG-0.8MG
65 TABLET ORAL DAILY
COMMUNITY

## 2020-05-06 ASSESSMENT — PATIENT HEALTH QUESTIONNAIRE - PHQ9: CLINICAL INTERPRETATION OF PHQ2 SCORE: 0

## 2020-05-06 ASSESSMENT — PAIN SCALES - GENERAL: PAINLEVEL: 6=MODERATE PAIN

## 2020-05-06 NOTE — PROGRESS NOTES
"Telemedicine Visit: Established Patient     This encounter was conducted via Zoom .   Verbal consent was obtained. Patient's identity was verified.    Subjective:   CC: ***  Debby Desai is a 60 y.o. female presenting for evaluation and management of:    Ultram  Duloxetine  Gabapentin  Side effects  Psychology  Work    ROS Unchanged from 03/26/2020 ***  Denies any recent fevers or chills. No nausea or vomiting. No chest pains or shortness of breath.     Allergies   Allergen Reactions   • Bactrim Ds Rash and Swelling     Pt states \"My hand swells up and rash all over body\".   • Sulfamethoxazole-Trimethoprim Rash and Swelling     Pt states \"My hand swells up and rash all over body\".       Current medicines (including changes today)  Current Outpatient Medications   Medication Sig Dispense Refill   • Ferrous Sulfate (IRON) 325 (65 Fe) MG Tab Take 65 mg by mouth every day at 6 PM.     • tramadol (ULTRAM) 50 MG Tab Take 1 Tab by mouth every 8 hours as needed for Severe Pain for up to 30 days. 90 Tab 0   • metFORMIN ER (GLUCOPHAGE XR) 500 MG TABLET SR 24 HR Take 1 Tab by mouth 2 times a day. 180 Tab 3   • DULoxetine (CYMBALTA) 60 MG Cap DR Particles delayed-release capsule Take 1 Cap by mouth every day. 90 Cap 3   • ondansetron (ZOFRAN) 4 MG Tab tablet Take 1 Tab by mouth every four hours as needed for Nausea/Vomiting. 20 Tab 6   • atorvastatin (LIPITOR) 20 MG Tab TAKE ONE TABLET BY MOUTH EVERY DAY 90 Tab 3   • levothyroxine (SYNTHROID) 50 MCG Tab TAKE ONE (1) TABLET BY MOUTH DAILY 90 Tab 3   • lisinopril-hydrochlorothiazide (PRINZIDE) 20-12.5 MG per tablet Take 1 Tab by mouth every day. 90 Tab 1   • acetaminophen (TYLENOL) 500 MG Tab Take 1,000-2,000 mg by mouth 1 time daily as needed.       No current facility-administered medications for this visit.        Patient Active Problem List    Diagnosis Date Noted   • Chronic knee pain 01/27/2020   • Opioid dependence in controlled environment (HCC) 09/27/2018   • Obesity " (BMI 30-39.9) 07/09/2018   • IGT (impaired glucose tolerance) 09/29/2017   • Osteoarthritis of right knee 07/12/2017   • Major depressive disorder, recurrent episode, in partial remission (HCC) 05/05/2017   • Anxiety disorder 10/24/2016   • Osteoarthrosis involving lower leg 04/21/2015   • Dyslipidemia 12/02/2014   • HTN (hypertension), benign 03/19/2012   • Acquired hypothyroidism 03/19/2012   • Back pain 03/19/2012   • Neck pain 03/19/2012       Family History   Problem Relation Age of Onset   • Hypertension Father    • Diabetes Father    • Heart Disease Father    • Alcohol abuse Father    • Anesthesia Sister         slow to come out (as a child)   • Diabetes Other    • Heart Disease Other    • Cancer Other    • Hypertension Other    • Stroke Other    • Lung Disease Other        She  has a past medical history of Anemia, Anginal syndrome ([facility]), Anxiety, Arthritis, Dental disorder (5/24/12), High cholesterol, HTN (hypertension), benign (3/19/2012), Hypothyroid (3/19/2012), Major depression (3/19/2012), Orbital floor (blow-out) closed fracture ([facility]), Other specified symptom associated with female genital organs, Pain, Pain, Pain (02/2018), Psychiatric problem, Unspecified disorder of thyroid, Urinary bladder disorder, and Urinary incontinence. She also has no past medical history of Breast cancer ([facility]).  She  has a past surgical history that includes lymph node excision (Left, 2007); other (Right, 2001); arthroscopy, knee (Left, 1999); rib resection (Right, 1980); block epidural steroid injection (2008); spinal cord stimulator (7/11/2011); biopsy general (5/1/12); spinal cord stimulator (5/30/2012); pump revision (12/10/2012); knee arthroscopy (4/21/2015); medial meniscectomy (4/21/2015); knee arthroscopy (Right, 7/12/2017); medial meniscectomy (Right, 7/12/2017); synovectomy (Right, 7/12/2017); knee arthroscopy (Right, 2/9/2018); medial meniscectomy (Right, 2/9/2018); pr total knee arthroplasty (Right, 9/16/2019);  and lumpectomy.       Objective:   Vitals were not obtained due to the telehealth nature of the visit    Physical Exam:  Constitutional: Alert, no distress, well-groomed.  Skin: No rashes in visible areas.  Eye: Round. Conjunctiva clear, lids normal. No icterus.   ENMT: Lips pink without lesions, good dentition, moist mucous membranes. Phonation normal.  Neck: No masses, no thyromegaly. Moves freely without pain.  Respiratory: Unlabored respiratory effort, no cough or audible wheeze  Psych: Alert and oriented x3, normal affect and mood.     Data:  EMG 03/26/2020  Impression/Recommendations:  Abnormal study  1. Today's electrodiagnostic findings are consistent with acute-subacute right lumbar radiculopathy primarily involving L5  2. Today's electrodiagnostic findings demonstrate a reduction in the amplitude of the right fibular motor nerve without evidence of demyelination.  Cannot absolutely rule out right fibular neuropathy, although this may be seen as a result of the presence of the right lumbar radiculopathy.  3. Today's electrodiagnostic findings point against:      A. Right tibial neuropathy      B. Generalized large-fiber peripheral polyneuropathy      I have reviewed the following imaging studies.  These are my reports.  CT lumbar spine 04/02/2020  ***    I have reviewed the following imaging studies.  These are the radiologist's reports.  CT lumbar spine 04/02/2020  IMPRESSION:     1.  NO ACUTE FRACTURE OR DISLOCATION IS PRESENT IN THE LUMBAR SPINE.     2.  Mild anterolisthesis at L5-S1.     3.  There is degenerative disc disease facet arthropathy and ligamentum flavum hypertrophy.     4.  There is mild spinal stenosis at the L3-L4 and L4-L5 levels.        Assessment and Plan:   The following treatment plan was discussed:     There are no diagnoses linked to this encounter.      Follow-up: No follow-ups on file.

## 2020-05-07 ENCOUNTER — TELEMEDICINE (OUTPATIENT)
Dept: PHYSICAL MEDICINE AND REHAB | Facility: MEDICAL CENTER | Age: 60
End: 2020-05-07
Payer: COMMERCIAL

## 2020-05-07 VITALS — BODY MASS INDEX: 29.11 KG/M2 | HEIGHT: 66 IN

## 2020-05-07 DIAGNOSIS — M48.061 SPINAL STENOSIS OF LUMBAR REGION, UNSPECIFIED WHETHER NEUROGENIC CLAUDICATION PRESENT: ICD-10-CM

## 2020-05-07 DIAGNOSIS — M47.816 LUMBAR SPONDYLOSIS: ICD-10-CM

## 2020-05-07 DIAGNOSIS — M54.16 LUMBAR RADICULOPATHY: ICD-10-CM

## 2020-05-07 DIAGNOSIS — M25.569 CHRONIC KNEE PAIN, UNSPECIFIED LATERALITY: ICD-10-CM

## 2020-05-07 DIAGNOSIS — G89.29 CHRONIC KNEE PAIN, UNSPECIFIED LATERALITY: ICD-10-CM

## 2020-05-07 PROCEDURE — 99214 OFFICE O/P EST MOD 30 MIN: CPT | Mod: 95,CR | Performed by: PHYSICAL MEDICINE & REHABILITATION

## 2020-05-07 RX ORDER — TRAMADOL HYDROCHLORIDE 50 MG/1
50 TABLET ORAL EVERY 8 HOURS PRN
Qty: 90 TAB | Refills: 0 | Status: SHIPPED | OUTPATIENT
Start: 2020-06-04 | End: 2020-06-30 | Stop reason: SDUPTHER

## 2020-05-07 RX ORDER — GABAPENTIN 600 MG/1
600 TABLET ORAL 3 TIMES DAILY
Qty: 90 TAB | Refills: 1 | Status: SHIPPED | OUTPATIENT
Start: 2020-05-07 | End: 2020-06-06

## 2020-05-07 ASSESSMENT — PATIENT HEALTH QUESTIONNAIRE - PHQ9
CLINICAL INTERPRETATION OF PHQ2 SCORE: 3
SUM OF ALL RESPONSES TO PHQ QUESTIONS 1-9: 8
5. POOR APPETITE OR OVEREATING: 0 - NOT AT ALL

## 2020-05-07 ASSESSMENT — PAIN SCALES - GENERAL: PAINLEVEL: 7=MODERATE-SEVERE PAIN

## 2020-05-07 NOTE — PROGRESS NOTES
"Telemedicine Visit: Established Patient     This encounter was conducted via Zoom .   Verbal consent was obtained. Patient's identity was verified.    Subjective:   CC: Low back and right leg pain    Debby Desai is a 60 y.o. female presenting for evaluation and management of low back and right leg pain.      She reports that she has less pain with the use of the pain medications.  She only occasionally gets to the 8/10 on the VAS.  When she moves her leg, sometimes she gets severe pain when she is awake or when she turns over in bed.  Her right knee feels tight at times, but the pain is much better than it was.    She is taking gabapentin 600mg po tid.  No side effects.  She titrated gradually and had some mild fatigue, initially.  She is taking duloxetine daily and feels more hopeful and has more energy.  She does not feel like crying.    She is working at home and this has been challenging, but she is reaching out to family members.  She has not had to miss work at all in April.  She has been able to walk without her cane when she has gone to the grocery store.    She continues to take tramadol 2-3 times a day.  Her blood pressure has been stable, she is checking it regularly.    Since the last visit, she has been working with a therapist at [employer] and has met twice.  She is working on journaling and mindfulness activities.    ROS Unchanged from 03/26/2020   Denies any recent fevers or chills. No nausea or vomiting. No chest pains or shortness of breath.     Allergies   Allergen Reactions   • Bactrim Ds Rash and Swelling     Pt states \"My hand swells up and rash all over body\".   • Sulfamethoxazole-Trimethoprim Rash and Swelling     Pt states \"My hand swells up and rash all over body\".       Current medicines (including changes today)  Current Outpatient Medications   Medication Sig Dispense Refill   • Ferrous Sulfate (IRON) 325 (65 Fe) MG Tab Take 65 mg by mouth every day at 6 PM.     • tramadol " (ULTRAM) 50 MG Tab Take 1 Tab by mouth every 8 hours as needed for Severe Pain for up to 30 days. 90 Tab 0   • metFORMIN ER (GLUCOPHAGE XR) 500 MG TABLET SR 24 HR Take 1 Tab by mouth 2 times a day. 180 Tab 3   • DULoxetine (CYMBALTA) 60 MG Cap DR Particles delayed-release capsule Take 1 Cap by mouth every day. 90 Cap 3   • ondansetron (ZOFRAN) 4 MG Tab tablet Take 1 Tab by mouth every four hours as needed for Nausea/Vomiting. 20 Tab 6   • atorvastatin (LIPITOR) 20 MG Tab TAKE ONE TABLET BY MOUTH EVERY DAY 90 Tab 3   • levothyroxine (SYNTHROID) 50 MCG Tab TAKE ONE (1) TABLET BY MOUTH DAILY 90 Tab 3   • lisinopril-hydrochlorothiazide (PRINZIDE) 20-12.5 MG per tablet Take 1 Tab by mouth every day. 90 Tab 1   • acetaminophen (TYLENOL) 500 MG Tab Take 1,000-2,000 mg by mouth 1 time daily as needed.       No current facility-administered medications for this visit.        Patient Active Problem List    Diagnosis Date Noted   • Chronic knee pain 01/27/2020   • Opioid dependence in controlled environment (HCC) 09/27/2018   • Obesity (BMI 30-39.9) 07/09/2018   • IGT (impaired glucose tolerance) 09/29/2017   • Osteoarthritis of right knee 07/12/2017   • Major depressive disorder, recurrent episode, in partial remission ([facility]) 05/05/2017   • Anxiety disorder 10/24/2016   • Osteoarthrosis involving lower leg 04/21/2015   • Dyslipidemia 12/02/2014   • HTN (hypertension), benign 03/19/2012   • Acquired hypothyroidism 03/19/2012   • Back pain 03/19/2012   • Neck pain 03/19/2012       Family History   Problem Relation Age of Onset   • Hypertension Father    • Diabetes Father    • Heart Disease Father    • Alcohol abuse Father    • Anesthesia Sister         slow to come out (as a child)   • Diabetes Other    • Heart Disease Other    • Cancer Other    • Hypertension Other    • Stroke Other    • Lung Disease Other        She  has a past medical history of Anemia, Anginal syndrome ([facility]), Anxiety, Arthritis, Dental disorder (5/24/12),  "High cholesterol, HTN (hypertension), benign (3/19/2012), Hypothyroid (3/19/2012), Major depression (3/19/2012), Orbital floor (blow-out) closed fracture (HCC), Other specified symptom associated with female genital organs, Pain, Pain, Pain (02/2018), Psychiatric problem, Unspecified disorder of thyroid, Urinary bladder disorder, and Urinary incontinence. She also has no past medical history of Breast cancer (HCC).  She  has a past surgical history that includes lymph node excision (Left, 2007); other (Right, 2001); arthroscopy, knee (Left, 1999); rib resection (Right, 1980); block epidural steroid injection (2008); spinal cord stimulator (7/11/2011); biopsy general (5/1/12); spinal cord stimulator (5/30/2012); pump revision (12/10/2012); knee arthroscopy (4/21/2015); medial meniscectomy (4/21/2015); knee arthroscopy (Right, 7/12/2017); medial meniscectomy (Right, 7/12/2017); synovectomy (Right, 7/12/2017); knee arthroscopy (Right, 2/9/2018); medial meniscectomy (Right, 2/9/2018); pr total knee arthroplasty (Right, 9/16/2019); and lumpectomy.       Objective:   Vitals    BP: 120/72  Ht: 5'6\"  R 12    Physical Exam:  Constitutional: Alert, no distress, well-groomed.  Skin: No rashes in visible areas.  Eye: Round. Conjunctiva clear, lids normal. No icterus.   ENMT: Lips pink without lesions, good dentition, moist mucous membranes. Phonation normal.  Neck: No masses, no thyromegaly. Moves freely without pain.  Respiratory: Unlabored respiratory effort, no cough or audible wheeze  Psych: Alert and oriented x3, normal affect and mood.     Data:  EMG 03/26/2020  Impression/Recommendations:  Abnormal study  1. Today's electrodiagnostic findings are consistent with acute-subacute right lumbar radiculopathy primarily involving L5  2. Today's electrodiagnostic findings demonstrate a reduction in the amplitude of the right fibular motor nerve without evidence of demyelination.  Cannot absolutely rule out right fibular " neuropathy, although this may be seen as a result of the presence of the right lumbar radiculopathy.  3. Today's electrodiagnostic findings point against:      A. Right tibial neuropathy      B. Generalized large-fiber peripheral polyneuropathy      I have reviewed the following imaging studies.  These are my reports.    CT lumbar spine 04/02/2020    There is note of mild anterolisthesis at L5-S1 with mild central canal stenosis at L3-4 and L4-5.   There is ligamentum flavum hypertrophy and facet arthropathy.    I have reviewed the following imaging studies.  These are the radiologist's reports.  CT lumbar spine 04/02/2020  IMPRESSION:     1.  NO ACUTE FRACTURE OR DISLOCATION IS PRESENT IN THE LUMBAR SPINE.     2.  Mild anterolisthesis at L5-S1.     3.  There is degenerative disc disease facet arthropathy and ligamentum flavum hypertrophy.     4.  There is mild spinal stenosis at the L3-L4 and L4-L5 levels.        Assessment and Plan:   The following treatment plan was discussed:     1. Lumbar radiculopathy  - tramadol (ULTRAM) 50 MG Tab; Take 1 Tab by mouth every 8 hours as needed for Severe Pain for up to 30 days.  Dispense: 90 Tab; Refill: 0    2. Spinal stenosis of lumbar region, unspecified whether neurogenic claudication present    3. Lumbar spondylosis    4. Chronic knee pain, unspecified laterality  - tramadol (ULTRAM) 50 MG Tab; Take 1 Tab by mouth every 8 hours as needed for Severe Pain for up to 30 days.  Dispense: 90 Tab; Refill: 0      1. Discussed that she is doing better with the addition of increased gabapentin.  She is doing well with gabapentin 600mg po tid.  2. Continue working with psychology and duloxetine.  3. Plan to continue tramadol.  She will decrease as she is able.   reviewed.  Discussed possible drug side effects and interactions.  She will continue to monitor her blood pressure and for side effects.  4. Discussed possible physical therapy, but she would like to wait to assess this at  follow-up.  Discussed injections might be a possibility, but for now, she is very happy with the progress with use of medications.    Follow-up: Return in about 5 weeks (around 6/11/2020).

## 2020-06-11 ENCOUNTER — OFFICE VISIT (OUTPATIENT)
Dept: PHYSICAL MEDICINE AND REHAB | Facility: MEDICAL CENTER | Age: 60
End: 2020-06-11
Payer: COMMERCIAL

## 2020-06-11 VITALS
BODY MASS INDEX: 28.66 KG/M2 | SYSTOLIC BLOOD PRESSURE: 120 MMHG | OXYGEN SATURATION: 93 % | TEMPERATURE: 97.6 F | DIASTOLIC BLOOD PRESSURE: 60 MMHG | WEIGHT: 178.35 LBS | HEART RATE: 97 BPM | HEIGHT: 66 IN

## 2020-06-11 DIAGNOSIS — Z96.651 S/P TKR (TOTAL KNEE REPLACEMENT), RIGHT: ICD-10-CM

## 2020-06-11 DIAGNOSIS — G89.29 CHRONIC KNEE PAIN, UNSPECIFIED LATERALITY: ICD-10-CM

## 2020-06-11 DIAGNOSIS — M48.061 SPINAL STENOSIS OF LUMBAR REGION, UNSPECIFIED WHETHER NEUROGENIC CLAUDICATION PRESENT: ICD-10-CM

## 2020-06-11 DIAGNOSIS — M25.569 CHRONIC KNEE PAIN, UNSPECIFIED LATERALITY: ICD-10-CM

## 2020-06-11 DIAGNOSIS — M54.16 LUMBAR RADICULOPATHY: ICD-10-CM

## 2020-06-11 PROCEDURE — 99214 OFFICE O/P EST MOD 30 MIN: CPT | Performed by: PHYSICAL MEDICINE & REHABILITATION

## 2020-06-11 RX ORDER — GABAPENTIN 600 MG/1
600 TABLET ORAL 3 TIMES DAILY
Qty: 270 TAB | Refills: 1 | Status: SHIPPED | OUTPATIENT
Start: 2020-06-11 | End: 2020-08-26

## 2020-06-11 ASSESSMENT — PATIENT HEALTH QUESTIONNAIRE - PHQ9
SUM OF ALL RESPONSES TO PHQ QUESTIONS 1-9: 7
CLINICAL INTERPRETATION OF PHQ2 SCORE: 2
5. POOR APPETITE OR OVEREATING: 1 - SEVERAL DAYS

## 2020-06-11 ASSESSMENT — PAIN SCALES - GENERAL: PAINLEVEL: 4=SLIGHT-MODERATE PAIN

## 2020-06-11 ASSESSMENT — FIBROSIS 4 INDEX: FIB4 SCORE: 0.66

## 2020-06-11 NOTE — PROGRESS NOTES
Follow-up patient note    Physiatry (physical medicine and  Rehabilitation), interventional spine and sports medicine    Date of Service: 06/11/2020    Chief complaint:   Chief Complaint   Patient presents with   • Follow-Up     Right leg and hip pain       HISTORY    HPI: Debby Desai 60 y.o. female who presents today for evaluation of right leg pain.  She reports that she had surgery on 09/16/2019.  This was a right total knee arthroplasty for osteoarthritis with Dr. Saba.    Since her last visit, she reports that she has been doing well.  She has less pain than she has had and feels like she is walking better.  Pain is a 4/10 on the NRS.      She is feeling more hopeful about projects and making future plans.  Pain continues to be present in the low back and right leg and lateral foot.  She is feeling more confident without using a cane.  Her attitude is that she can start to work on reducing her pain medications.  She is no longer looking at the clock waiting until she can take the next pill.    ROM of the right knee is still limited with regard to extension.  She has had follow-up with Dr. Saba.    Sleep is still somewhat limited, with difficulty sleeping through the night.    She is taking duloxetine 60mg.  Her mood has improved.  She is taking gabapentin 600mg po tid.  The tramadol she is taking three times a day.    By history:     In 2001, she had discectomy.  She reports that she has had a spinal cord stimulator placed, but she has not been using this as much.  She reports that the unit is not currently working.    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  She gets up about once an hour.  She has a sit to stand desk, but it is difficult to stand much.  Currently, she is working from home due to the COVID-19 pandemic.    Medical records review:  I reviewed the note from the referring provider Loy Miles M.D. dated 01/08/2020: Visits  included nausea/epigastric pain, hypertension, chronic knee pain, IGT, dyslipidemia, MDD, hypothyroidism, iron deficiency    Previous treatments:    Physical Therapy: Yes    Medications the patient is tried: tramadol, tylenol (usually for a headache); in the past she took gabapentin 400mg po tid, she was taking vimovo and norco in the past; lyrica caused weight gain    Previous interventions: She had radiofrequency ablation in the past, prior to surgery    Previous surgeries to relieve the above pain:  None other than surgery 09/16/2019      ROS: Unchanged from 05/07/2020  Gen: weight loss  Eyes: vision problems, glasses and contacts  CV: chest pain/anxiety  GI: nausea, ulcers  : urgency  Psych: depression  Endo: thyroid problems  Heme: anemia    Red Flags ROS:   Fever, Chills, Sweats: Denies  Involuntary Weight Loss: Denies  Bladder Incontinence: Denies  Bowel Incontinence: Denies  Saddle Anesthesia: Denies    All other systems reviewed and negative.       PMHx:   Past Medical History:   Diagnosis Date   • Anemia     in past   • Anginal syndrome (HCC)     had work up and feet r/t anxiety   • Anxiety    • Arthritis     OA knees   • Dental disorder 5/24/12    partial upper denture   • High cholesterol    • HTN (hypertension), benign 3/19/2012   • Hypothyroid 3/19/2012   • Major depression 3/19/2012   • Orbital floor (blow-out) closed fracture (HCC)     left   • Other specified symptom associated with female genital organs     post menopausal bleeding   • Pain     thoracic spine, Right knee, myofacial pain, and Right shoulder   • Pain     recently fell and has bruise left forehead   • Pain 02/2018    chronic back pain, right shoulder   • Psychiatric problem     depression, anxiety   • Unspecified disorder of thyroid    • Urinary bladder disorder     stress incont.   • Urinary incontinence     Occasional       PSHx:   Past Surgical History:   Procedure Laterality Date   • PB TOTAL KNEE ARTHROPLASTY Right 9/16/2019     Procedure: RIGHT ARTHROPLASTY, KNEE, TOTAL;  Surgeon: Rufus Saba M.D.;  Location: [facility];  Service: Orthopedics   • KNEE ARTHROSCOPY Right 2/9/2018    Procedure: KNEE ARTHROSCOPY;  Surgeon: Harsh Baltazar M.D.;  Location: [facility];  Service: Orthopedics   • MEDIAL MENISCECTOMY Right 2/9/2018    Procedure: MEDIAL AND LATERAL MENISCECTOMY- PARTIAL;  Surgeon: Harsh Baltazar M.D.;  Location: [facility];  Service: Orthopedics   • KNEE ARTHROSCOPY Right 7/12/2017    Procedure: KNEE ARTHROSCOPY- CESPEDES;  Surgeon: Harsh Baltazar M.D.;  Location: [facility];  Service:    • MEDIAL MENISCECTOMY Right 7/12/2017    Procedure: PARTIAL MEDIAL AND LATERAL MENISCECTOMY;  Surgeon: Harsh Baltazar M.D.;  Location: [facility];  Service:    • SYNOVECTOMY Right 7/12/2017    Procedure: SYNOVECTOMY;  Surgeon: Harsh Baltazar M.D.;  Location: [facility];  Service:    • KNEE ARTHROSCOPY  4/21/2015    Performed by Rufus Saba M.D. at [facility]   • MEDIAL MENISCECTOMY  4/21/2015    Performed by Rufus Saba M.D. at [facility]   • PUMP REVISION  12/10/2012    Performed by Allison Guerra M.D. at [facility]   • SPINAL CORD STIMULATOR  5/30/2012    Performed by ALLISON GUERRA at [facility]   • BIOPSY GENERAL  5/1/12    endometrial   • SPINAL CORD STIMULATOR  7/11/2011    Performed by ALLISON GUERRA at [facility]   • BLOCK EPIDURAL STEROID INJECTION  2008    x 3-4   • LYMPH NODE EXCISION Left 2007    cervicle   • OTHER Right 2001    thoracic discectomy     • ARTHROSCOPY, KNEE Left 1999   • RIB RESECTION Right 1980   • LUMPECTOMY         Family history   Family History   Problem Relation Age of Onset   • Hypertension Father    • Diabetes Father    • Heart Disease Father    • Alcohol abuse Father    • Anesthesia Sister         slow to come out  "(as a child)   • Diabetes Other    • Heart Disease Other    • Cancer Other    • Hypertension Other    • Stroke Other    • Lung Disease Other          Medications:   Current Outpatient Medications   Medication   • gabapentin (NEURONTIN) 600 MG tablet   • tramadol (ULTRAM) 50 MG Tab   • Ferrous Sulfate (IRON) 325 (65 Fe) MG Tab   • metFORMIN ER (GLUCOPHAGE XR) 500 MG TABLET SR 24 HR   • DULoxetine (CYMBALTA) 60 MG Cap DR Particles delayed-release capsule   • ondansetron (ZOFRAN) 4 MG Tab tablet   • atorvastatin (LIPITOR) 20 MG Tab   • levothyroxine (SYNTHROID) 50 MCG Tab   • lisinopril-hydrochlorothiazide (PRINZIDE) 20-12.5 MG per tablet   • acetaminophen (TYLENOL) 500 MG Tab     No current facility-administered medications for this visit.        Allergies:   Allergies   Allergen Reactions   • Bactrim Ds Rash and Swelling     Pt states \"My hand swells up and rash all over body\".   • Sulfamethoxazole-Trimethoprim Rash and Swelling     Pt states \"My hand swells up and rash all over body\".       Social Hx:   Social History     Socioeconomic History   • Marital status: Single     Spouse name: Not on file   • Number of children: Not on file   • Years of education: Not on file   • Highest education level: Not on file   Occupational History   • Not on file   Social Needs   • Financial resource strain: Not on file   • Food insecurity     Worry: Not on file     Inability: Not on file   • Transportation needs     Medical: Not on file     Non-medical: Not on file   Tobacco Use   • Smoking status: Never Smoker   • Smokeless tobacco: Never Used   Substance and Sexual Activity   • Alcohol use: Not Currently     Alcohol/week: 0.0 oz     Comment: 1 glass wine per month   • Drug use: No   • Sexual activity: Never     Partners: Male   Lifestyle   • Physical activity     Days per week: Not on file     Minutes per session: Not on file   • Stress: Not on file   Relationships   • Social connections     Talks on phone: Not on file     Gets " "together: Not on file     Attends Holiness service: Not on file     Active member of club or organization: Not on file     Attends meetings of clubs or organizations: Not on file     Relationship status: Not on file   • Intimate partner violence     Fear of current or ex partner: Not on file     Emotionally abused: Not on file     Physically abused: Not on file     Forced sexual activity: Not on file   Other Topics Concern   •  Service No   • Blood Transfusions No   • Caffeine Concern No   • Occupational Exposure No   • Hobby Hazards No   • Sleep Concern Yes   • Stress Concern Yes   • Weight Concern Yes   • Special Diet No   • Back Care No   • Exercise No   • Bike Helmet No   • Seat Belt Yes   • Self-Exams No   Social History Narrative   • Not on file         EXAMINATION     Physical Exam:   Vitals: /60 (BP Location: Right arm, Patient Position: Sitting, BP Cuff Size: Large adult long)   Pulse 97   Temp 36.4 °C (97.6 °F) (Temporal)   Ht 1.676 m (5' 6\")   Wt 80.9 kg (178 lb 5.6 oz)   SpO2 93%     Constitutional:   Body Habitus: Body mass index is 28.79 kg/m².  Cooperation: Fully cooperates with exam  Appearance: Well-groomed, well-nourished, not disheveled no acute distress    Eyes: No scleral icterus, no proptosis     ENT -no obvious auditory deficits, wearing a face mask    Skin -no visible skin lesions    Respiratory-  breathing comfortable on room air, no audible wheezing    Cardiovascular- No lower extremity edema is noted.     Psychiatric- alert and oriented ×3. Normal affect.     Gait -  Minimally antalgic without use of a single point cane    Knee- Right knee lack about 8-10 of full extension, flexion is greater than 90 degrees    Neuro     Key points for the international standards for neurological classification of spinal cord injury (ISNCSCI) to light touch.     Dermatome R L   L2 2 2   L3 2 2   L4 2 2   L5 2 2   S1 2 2   S2 2 2       Motor Exam Lower Extremities    ? Myotome R L   Hip " flexion L2 4 5   Knee extension L3 5 5   Ankle dorsiflexion L4 5 5   Toe extension L5 4 5   Ankle plantarflexion S1 5 5       Clonus of the ankle negative bilaterally     Reflexes  ?  R L   Patella  NT s/p TKR 2+   Achilles   2+ 2+       MEDICAL DECISION MAKING    Medical records review: see under HPI section.     DATA    Labs:     03/14/2020 CRP 0.19  03/14/2020 Sed rate 20  02/25/2020: Tramadol and metabolites      Lab Results   Component Value Date/Time    SODIUM 142 01/18/2020 07:41 AM    POTASSIUM 3.7 01/18/2020 07:41 AM    CHLORIDE 103 01/18/2020 07:41 AM    CO2 31 01/18/2020 07:41 AM    ANION 8.0 01/18/2020 07:41 AM    GLUCOSE 103 (H) 01/18/2020 07:41 AM    BUN 13 01/18/2020 07:41 AM    CREATININE 0.71 01/18/2020 07:41 AM    CALCIUM 9.5 01/18/2020 07:41 AM    ASTSGOT 13 01/18/2020 07:41 AM    ALTSGPT 11 01/18/2020 07:41 AM    TBILIRUBIN 0.5 01/18/2020 07:41 AM    ALBUMIN 4.1 01/18/2020 07:41 AM    TOTPROTEIN 6.8 01/18/2020 07:41 AM    GLOBULIN 2.7 01/18/2020 07:41 AM    AGRATIO 1.5 01/18/2020 07:41 AM       Lab Results   Component Value Date/Time    PROTHROMBTM 12.5 09/27/2018 12:45 PM    INR 0.94 09/27/2018 12:45 PM        Lab Results   Component Value Date/Time    WBC 5.8 01/18/2020 07:41 AM    RBC 4.29 01/18/2020 07:41 AM    HEMOGLOBIN 12.6 01/18/2020 07:41 AM    HEMATOCRIT 40.0 01/18/2020 07:41 AM    MCV 93.2 01/18/2020 07:41 AM    MCH 29.4 01/18/2020 07:41 AM    MCHC 31.5 (L) 01/18/2020 07:41 AM    MPV 12.1 01/18/2020 07:41 AM    NEUTSPOLYS 47.60 01/18/2020 07:41 AM    LYMPHOCYTES 42.50 (H) 01/18/2020 07:41 AM    MONOCYTES 7.50 01/18/2020 07:41 AM    EOSINOPHILS 1.70 01/18/2020 07:41 AM    BASOPHILS 0.50 01/18/2020 07:41 AM        Lab Results   Component Value Date/Time    HBA1C 6.1 (H) 01/18/2020 07:41 AM        Imaging: I personally reviewed following images, these are my reads  Xray right hip 03/14/2020  There is mild osteoarthritis of the right hip.      Xray lumbar 03/14/2020  There is mild  anterolisthesis at L5-S1.  No abnormal motion on flexion and extension  There is DDD and facet arthropathy that is most noted at L5-S1 and less at L4-5    Xray right knee 09/16/2019  There is note of right knee arthroplasty    IMAGING radiology reads. I reviewed the following radiology reads    Xray lumbar 03/14/2020  IMPRESSION:     1.  No compression deformity or acute fracture is identified.     2.  Mild anterolisthesis at L5-S1.     3.  Degenerative disc disease and facet arthropathy.     4.  No focal instability noted on flexion extension views.                                                Xray right hip 03/14/2020  IMPRESSION:     1.  No radiographic evidence of acute traumatic injury.     2.  Findings are consistent with mild osteoarthritis.     3.  Probable constipation.                                   Results for orders placed during the hospital encounter of 09/16/19   DX-KNEE 2- RIGHT    Impression Right knee arthroplasty.      Results for orders placed during the hospital encounter of 03/28/19   DX-KNEE 3 VIEWS RIGHT    Impression No evidence of acute fracture or dislocation.    Degenerative changes as above described.                              Diagnosis   Visit Diagnoses     ICD-10-CM   1. Lumbar radiculopathy  M54.16   2. Spinal stenosis of lumbar region, unspecified whether neurogenic claudication present  M48.061   3. Chronic knee pain, unspecified laterality  M25.569    G89.29   4. S/P TKR (total knee replacement), right  Z96.651           ASSESSMENT:  Debby Desai 60 y.o. female seen for above.     Debby was seen today for follow-up.    Diagnoses and all orders for this visit:    Lumbar radiculopathy  -     gabapentin (NEURONTIN) 600 MG tablet; Take 1 Tab by mouth 3 times a day for 90 days.    Spinal stenosis of lumbar region, unspecified whether neurogenic claudication present  -     gabapentin (NEURONTIN) 600 MG tablet; Take 1 Tab by mouth 3 times a day for 90 days.    Chronic knee  pain, unspecified laterality    S/P TKR (total knee replacement), right       1. Continue tramadol 50mg po tid.  We are going to start by decreasing by 5 pills a month.  Discussed that she would like to check in next month before continuing with reduction.  Still, she is hopeful that she can start reducing tramadol.  2. Gabapentin 600mg po tid.  3. Continue duloxetine 60mg.  4. Continue activity as tolerated.  She is doing very well and symptoms are well-controlled.  We will continue to wean tramadol gradually.        Follow-up: Return in about 3 weeks (around 7/2/2020).     Thank you very much for asking me to participate in Debby Desai's care.  Please contact me with any questions or concerns.      Please note that this dictation was created using voice recognition software. I have made every reasonable attempt to correct obvious errors but there may be errors of grammar and content that I may have overlooked prior to finalization of this note.      Volodymyr Herring MD  Physical Medicine and Rehabilitation  Interventional Spine and Sports Physiatry  [facility]

## 2020-06-30 ENCOUNTER — OFFICE VISIT (OUTPATIENT)
Dept: PHYSICAL MEDICINE AND REHAB | Facility: MEDICAL CENTER | Age: 60
End: 2020-06-30
Payer: COMMERCIAL

## 2020-06-30 VITALS
OXYGEN SATURATION: 94 % | HEART RATE: 78 BPM | HEIGHT: 66 IN | DIASTOLIC BLOOD PRESSURE: 60 MMHG | WEIGHT: 178.84 LBS | BODY MASS INDEX: 28.74 KG/M2 | TEMPERATURE: 97.4 F | SYSTOLIC BLOOD PRESSURE: 124 MMHG

## 2020-06-30 DIAGNOSIS — M47.816 LUMBAR SPONDYLOSIS: ICD-10-CM

## 2020-06-30 DIAGNOSIS — M54.16 LUMBAR RADICULOPATHY: ICD-10-CM

## 2020-06-30 DIAGNOSIS — M48.061 SPINAL STENOSIS OF LUMBAR REGION, UNSPECIFIED WHETHER NEUROGENIC CLAUDICATION PRESENT: ICD-10-CM

## 2020-06-30 DIAGNOSIS — F33.41 MAJOR DEPRESSIVE DISORDER, RECURRENT EPISODE, IN PARTIAL REMISSION (HCC): ICD-10-CM

## 2020-06-30 DIAGNOSIS — G89.29 CHRONIC KNEE PAIN, UNSPECIFIED LATERALITY: ICD-10-CM

## 2020-06-30 DIAGNOSIS — M25.569 CHRONIC KNEE PAIN, UNSPECIFIED LATERALITY: ICD-10-CM

## 2020-06-30 DIAGNOSIS — Z96.651 S/P TKR (TOTAL KNEE REPLACEMENT), RIGHT: ICD-10-CM

## 2020-06-30 PROCEDURE — 99214 OFFICE O/P EST MOD 30 MIN: CPT | Performed by: PHYSICAL MEDICINE & REHABILITATION

## 2020-06-30 RX ORDER — TRAMADOL HYDROCHLORIDE 50 MG/1
50 TABLET ORAL EVERY 8 HOURS PRN
Qty: 85 TAB | Refills: 0 | Status: SHIPPED | OUTPATIENT
Start: 2020-07-03 | End: 2020-07-28 | Stop reason: SDUPTHER

## 2020-06-30 ASSESSMENT — PAIN SCALES - GENERAL: PAINLEVEL: 6=MODERATE PAIN

## 2020-06-30 ASSESSMENT — FIBROSIS 4 INDEX: FIB4 SCORE: 0.66

## 2020-06-30 ASSESSMENT — PATIENT HEALTH QUESTIONNAIRE - PHQ9
CLINICAL INTERPRETATION OF PHQ2 SCORE: 3
5. POOR APPETITE OR OVEREATING: 0 - NOT AT ALL
SUM OF ALL RESPONSES TO PHQ QUESTIONS 1-9: 9

## 2020-06-30 NOTE — PROGRESS NOTES
Follow-up patient note    Physiatry (physical medicine and  Rehabilitation), interventional spine and sports medicine    Date of Service: 06/30/2020    Chief complaint:   Chief Complaint   Patient presents with   • Follow-Up     Right leg pain       HISTORY    HPI: Debby Desai 60 y.o. female who presents today for follow-up evaluation of right leg pain.      Since the last visit, she reports that she has had some increased pain.  Still, she was able to reduce her tramadol by five pills.  Her pain is a 6/10 on the NRS.    Now, she is working with a new therapist, Linda at [facility].  There are some new ideas that they are working as it relates with her pain control.  They are going to explore guided imagery and she is excited by the new ideas.  She has been using relaxing music to help her to relax.  This is helping her to work on regular breathing.    She has been working on her walking.  She has at least 90 degrees of flexion and is working on extension exercises still.      She would like to try physical therapy now. She has some thigh pain to the knee and posterior calf feels crampy.  Her EMG demonstrates right lumbar radiculopathy involving L5.      Duloxetine dose is stable and Dr. Miles has refilled this for a year.  Gabapentin 600mg po tid. The tramadol she is taking three times a day.    By history:   She reports that she had surgery on 09/16/2019.  This was a right total knee arthroplasty for osteoarthritis with Dr. Saba.    In 2001, she had discectomy.  She reports that she has had a spinal cord stimulator placed, but she has not been using this as much.  She reports that the unit is not currently working.    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  She gets up about once an hour.  She has a sit to stand desk, but it is difficult to stand much.  Currently, she is working from home due to the COVID-19 pandemic.    Medical records  review:  I reviewed the note from the referring provider Loy Miles M.D. dated 01/08/2020: Visits included nausea/epigastric pain, hypertension, chronic knee pain, IGT, dyslipidemia, MDD, hypothyroidism, iron deficiency    Previous treatments:    Physical Therapy: Yes    Medications the patient is tried: tramadol, tylenol (usually for a headache); in the past she took gabapentin 400mg po tid, she was taking vimovo and norco in the past; lyrica caused weight gain    Previous interventions: She had radiofrequency ablation in the past, prior to surgery    Previous surgeries to relieve the above pain:  None other than surgery 09/16/2019      ROS: Unchanged from 06/11/2020  Gen: weight loss  Eyes: vision problems, glasses and contacts  CV: chest pain/anxiety  GI: nausea, ulcers  : urgency  Psych: depression  Endo: thyroid problems  Heme: anemia    Red Flags ROS:   Fever, Chills, Sweats: Denies  Involuntary Weight Loss: Denies  Bladder Incontinence: Denies  Bowel Incontinence: Denies  Saddle Anesthesia: Denies    All other systems reviewed and negative.       PMHx:   Past Medical History:   Diagnosis Date   • Anemia     in past   • Anginal syndrome (HCC)     had work up and feet r/t anxiety   • Anxiety    • Arthritis     OA knees   • Dental disorder 5/24/12    partial upper denture   • High cholesterol    • HTN (hypertension), benign 3/19/2012   • Hypothyroid 3/19/2012   • Major depression 3/19/2012   • Orbital floor (blow-out) closed fracture (HCC)     left   • Other specified symptom associated with female genital organs     post menopausal bleeding   • Pain     thoracic spine, Right knee, myofacial pain, and Right shoulder   • Pain     recently fell and has bruise left forehead   • Pain 02/2018    chronic back pain, right shoulder   • Psychiatric problem     depression, anxiety   • Unspecified disorder of thyroid    • Urinary bladder disorder     stress incont.   • Urinary incontinence     Occasional        PSHx:   Past Surgical History:   Procedure Laterality Date   • PB TOTAL KNEE ARTHROPLASTY Right 9/16/2019    Procedure: RIGHT ARTHROPLASTY, KNEE, TOTAL;  Surgeon: Rufus Saba M.D.;  Location: [facility];  Service: Orthopedics   • KNEE ARTHROSCOPY Right 2/9/2018    Procedure: KNEE ARTHROSCOPY;  Surgeon: Harsh Baltazar M.D.;  Location: [facility];  Service: Orthopedics   • MEDIAL MENISCECTOMY Right 2/9/2018    Procedure: MEDIAL AND LATERAL MENISCECTOMY- PARTIAL;  Surgeon: Harsh Baltazar M.D.;  Location: [facility];  Service: Orthopedics   • KNEE ARTHROSCOPY Right 7/12/2017    Procedure: KNEE ARTHROSCOPY- CESPEDES;  Surgeon: Harsh Baltazar M.D.;  Location: [facility];  Service:    • MEDIAL MENISCECTOMY Right 7/12/2017    Procedure: PARTIAL MEDIAL AND LATERAL MENISCECTOMY;  Surgeon: Harsh Baltazar M.D.;  Location: [facility];  Service:    • SYNOVECTOMY Right 7/12/2017    Procedure: SYNOVECTOMY;  Surgeon: Harsh Baltazar M.D.;  Location: [facility];  Service:    • KNEE ARTHROSCOPY  4/21/2015    Performed by Rufus Saba M.D. at [facility]   • MEDIAL MENISCECTOMY  4/21/2015    Performed by Rufus Saba M.D. at [facility]   • PUMP REVISION  12/10/2012    Performed by Allison Guerra M.D. at [facility]   • SPINAL CORD STIMULATOR  5/30/2012    Performed by ALLISON GUERRA at [facility]   • BIOPSY GENERAL  5/1/12    endometrial   • SPINAL CORD STIMULATOR  7/11/2011    Performed by ALLISON GUERRA at [facility]   • BLOCK EPIDURAL STEROID INJECTION  2008    x 3-4   • LYMPH NODE EXCISION Left 2007    cervicle   • OTHER Right 2001    thoracic discectomy     • ARTHROSCOPY, KNEE Left 1999   • RIB RESECTION Right 1980   • LUMPECTOMY         Family history   Family History   Problem Relation Age of Onset   • Hypertension Father    •  "Diabetes Father    • Heart Disease Father    • Alcohol abuse Father    • Anesthesia Sister         slow to come out (as a child)   • Diabetes Other    • Heart Disease Other    • Cancer Other    • Hypertension Other    • Stroke Other    • Lung Disease Other          Medications:   Current Outpatient Medications   Medication   • [START ON 7/3/2020] tramadol (ULTRAM) 50 MG Tab   • gabapentin (NEURONTIN) 600 MG tablet   • Ferrous Sulfate (IRON) 325 (65 Fe) MG Tab   • metFORMIN ER (GLUCOPHAGE XR) 500 MG TABLET SR 24 HR   • DULoxetine (CYMBALTA) 60 MG Cap DR Particles delayed-release capsule   • ondansetron (ZOFRAN) 4 MG Tab tablet   • atorvastatin (LIPITOR) 20 MG Tab   • levothyroxine (SYNTHROID) 50 MCG Tab   • lisinopril-hydrochlorothiazide (PRINZIDE) 20-12.5 MG per tablet   • acetaminophen (TYLENOL) 500 MG Tab     No current facility-administered medications for this visit.        Allergies:   Allergies   Allergen Reactions   • Bactrim Ds Rash and Swelling     Pt states \"My hand swells up and rash all over body\".   • Sulfamethoxazole-Trimethoprim Rash and Swelling     Pt states \"My hand swells up and rash all over body\".       Social Hx:   Social History     Socioeconomic History   • Marital status: Single     Spouse name: Not on file   • Number of children: Not on file   • Years of education: Not on file   • Highest education level: Not on file   Occupational History   • Not on file   Social Needs   • Financial resource strain: Not on file   • Food insecurity     Worry: Not on file     Inability: Not on file   • Transportation needs     Medical: Not on file     Non-medical: Not on file   Tobacco Use   • Smoking status: Never Smoker   • Smokeless tobacco: Never Used   Substance and Sexual Activity   • Alcohol use: Not Currently     Alcohol/week: 0.0 oz     Comment: 1 glass wine per month   • Drug use: No   • Sexual activity: Never     Partners: Male   Lifestyle   • Physical activity     Days per week: Not on file     " "Minutes per session: Not on file   • Stress: Not on file   Relationships   • Social connections     Talks on phone: Not on file     Gets together: Not on file     Attends [employer] witness service: Not on file     Active member of club or organization: Not on file     Attends meetings of clubs or organizations: Not on file     Relationship status: Not on file   • Intimate partner violence     Fear of current or ex partner: Not on file     Emotionally abused: Not on file     Physically abused: Not on file     Forced sexual activity: Not on file   Other Topics Concern   •  Service No   • Blood Transfusions No   • Caffeine Concern No   • Occupational Exposure No   • Hobby Hazards No   • Sleep Concern Yes   • Stress Concern Yes   • Weight Concern Yes   • Special Diet No   • Back Care No   • Exercise No   • Bike Helmet No   • Seat Belt Yes   • Self-Exams No   Social History Narrative   • Not on file         EXAMINATION     Physical Exam:   Vitals: /60 (BP Location: Right arm, Patient Position: Sitting, BP Cuff Size: Adult long)   Pulse 78   Temp 36.3 °C (97.4 °F) (Temporal)   Ht 1.676 m (5' 6\")   Wt 81.1 kg (178 lb 13.4 oz)   SpO2 94%     Constitutional:   Body Habitus: Body mass index is 28.87 kg/m².  Cooperation: Fully cooperates with exam  Appearance: Well-groomed, well-nourished, not disheveled no acute distress    Eyes: No scleral icterus, no proptosis     ENT -no obvious auditory deficits, wearing a face mask    Skin -no visible skin lesions    Respiratory-  breathing comfortable on room air, no audible wheezing    Cardiovascular- No lower extremity edema is noted.     Psychiatric- alert and oriented ×3. Normal affect.     Gait -  Minimally antalgic without use of a single point cane    Knee- Right knee lack about 8-10 of full extension, flexion is greater than 90 degrees    Seated SLR is negative bilaterally    Neuro     Key points for the international standards for neurological classification of " spinal cord injury (ISNCSCI) to light touch.     Dermatome R L   L2 2 2   L3 2 2   L4 2 2   L5 2 2   S1 2 2   S2 2 2       Motor Exam Lower Extremities    ? Myotome R L   Hip flexion L2 5 5   Knee extension L3 5 5   Ankle dorsiflexion L4 5 5   Toe extension L5 4+ 5   Ankle plantarflexion S1 5 5       Clonus of the ankle negative bilaterally     Reflexes  ?  R L   Patella  NT s/p TKR 2+   Achilles   2+ 2+       MEDICAL DECISION MAKING    Medical records review: see under HPI section.     DATA    Labs:     03/14/2020 CRP 0.19  03/14/2020 Sed rate 20  02/25/2020: Tramadol and metabolites      Lab Results   Component Value Date/Time    SODIUM 142 01/18/2020 07:41 AM    POTASSIUM 3.7 01/18/2020 07:41 AM    CHLORIDE 103 01/18/2020 07:41 AM    CO2 31 01/18/2020 07:41 AM    ANION 8.0 01/18/2020 07:41 AM    GLUCOSE 103 (H) 01/18/2020 07:41 AM    BUN 13 01/18/2020 07:41 AM    CREATININE 0.71 01/18/2020 07:41 AM    CALCIUM 9.5 01/18/2020 07:41 AM    ASTSGOT 13 01/18/2020 07:41 AM    ALTSGPT 11 01/18/2020 07:41 AM    TBILIRUBIN 0.5 01/18/2020 07:41 AM    ALBUMIN 4.1 01/18/2020 07:41 AM    TOTPROTEIN 6.8 01/18/2020 07:41 AM    GLOBULIN 2.7 01/18/2020 07:41 AM    AGRATIO 1.5 01/18/2020 07:41 AM       Lab Results   Component Value Date/Time    PROTHROMBTM 12.5 09/27/2018 12:45 PM    INR 0.94 09/27/2018 12:45 PM        Lab Results   Component Value Date/Time    WBC 5.8 01/18/2020 07:41 AM    RBC 4.29 01/18/2020 07:41 AM    HEMOGLOBIN 12.6 01/18/2020 07:41 AM    HEMATOCRIT 40.0 01/18/2020 07:41 AM    MCV 93.2 01/18/2020 07:41 AM    MCH 29.4 01/18/2020 07:41 AM    MCHC 31.5 (L) 01/18/2020 07:41 AM    MPV 12.1 01/18/2020 07:41 AM    NEUTSPOLYS 47.60 01/18/2020 07:41 AM    LYMPHOCYTES 42.50 (H) 01/18/2020 07:41 AM    MONOCYTES 7.50 01/18/2020 07:41 AM    EOSINOPHILS 1.70 01/18/2020 07:41 AM    BASOPHILS 0.50 01/18/2020 07:41 AM        Lab Results   Component Value Date/Time    HBA1C 6.1 (H) 01/18/2020 07:41 AM        Imaging: I  personally reviewed following images, these are my reads  Xray right hip 03/14/2020  There is mild osteoarthritis of the right hip.      Xray lumbar 03/14/2020  There is mild anterolisthesis at L5-S1.  No abnormal motion on flexion and extension  There is DDD and facet arthropathy that is most noted at L5-S1 and less at L4-5    Xray right knee 09/16/2019  There is note of right knee arthroplasty    IMAGING radiology reads. I reviewed the following radiology reads    Xray lumbar 03/14/2020  IMPRESSION:     1.  No compression deformity or acute fracture is identified.     2.  Mild anterolisthesis at L5-S1.     3.  Degenerative disc disease and facet arthropathy.     4.  No focal instability noted on flexion extension views.                                                Xray right hip 03/14/2020  IMPRESSION:     1.  No radiographic evidence of acute traumatic injury.     2.  Findings are consistent with mild osteoarthritis.     3.  Probable constipation.                                   Results for orders placed during the hospital encounter of 09/16/19   DX-KNEE 2- RIGHT    Impression Right knee arthroplasty.      Results for orders placed during the hospital encounter of 03/28/19   DX-KNEE 3 VIEWS RIGHT    Impression No evidence of acute fracture or dislocation.    Degenerative changes as above described.                              Diagnosis   Visit Diagnoses     ICD-10-CM   1. Lumbar radiculopathy  M54.16   2. Spinal stenosis of lumbar region, unspecified whether neurogenic claudication present  M48.061   3. Chronic knee pain, unspecified laterality  M25.569    G89.29   4. S/P TKR (total knee replacement), right  Z96.651   5. Lumbar spondylosis  M47.816   6. Major depressive disorder, recurrent episode, in partial remission ([facility])  F33.41           ASSESSMENT:  Debby Desai 60 y.o. female seen for above.     Debby was seen today for follow-up.    Diagnoses and all orders for this visit:    Lumbar  radiculopathy  -     tramadol (ULTRAM) 50 MG Tab; Take 1 Tab by mouth every 8 hours as needed for Severe Pain for up to 30 days.  -     Consent for Opiate Prescription  -     Controlled Substance Treatment Agreement  -     REFERRAL TO PHYSICAL THERAPY Reason for Therapy: Eval/Treat/Report    Spinal stenosis of lumbar region, unspecified whether neurogenic claudication present    Chronic knee pain, unspecified laterality  -     tramadol (ULTRAM) 50 MG Tab; Take 1 Tab by mouth every 8 hours as needed for Severe Pain for up to 30 days.  -     Consent for Opiate Prescription  -     Controlled Substance Treatment Agreement    S/P TKR (total knee replacement), right  -     REFERRAL TO PHYSICAL THERAPY Reason for Therapy: Eval/Treat/Report    Lumbar spondylosis    Major depressive disorder, recurrent episode, in partial remission (HCC)           1. Decreased tramadol.  Goal of reducing by 2-5 pills if she is able, #85 given for the month.  We will work on this gradually.  2. Continue gabapentin 600mg po tid.  3. Continue duloxetine 60mg and psychology visits.  4. Trial physical therapy for lumbar radiculopathy.  5. Continue activity as tolerated.         Follow-up: Return in about 4 weeks (around 7/28/2020).     Thank you very much for asking me to participate in Debby Desai's care.  Please contact me with any questions or concerns.      Please note that this dictation was created using voice recognition software. I have made every reasonable attempt to correct obvious errors but there may be errors of grammar and content that I may have overlooked prior to finalization of this note.      Volodymyr Herring MD  Physical Medicine and Rehabilitation  Interventional Spine and Sports Physiatry  [facility]

## 2020-07-02 DIAGNOSIS — I10 HTN (HYPERTENSION), BENIGN: Chronic | ICD-10-CM

## 2020-07-03 RX ORDER — LISINOPRIL AND HYDROCHLOROTHIAZIDE 20; 12.5 MG/1; MG/1
TABLET ORAL
Qty: 90 TAB | Refills: 1 | Status: SHIPPED
Start: 2020-07-03 | End: 2020-09-22

## 2020-07-28 ENCOUNTER — OFFICE VISIT (OUTPATIENT)
Dept: PHYSICAL MEDICINE AND REHAB | Facility: MEDICAL CENTER | Age: 60
End: 2020-07-28
Payer: COMMERCIAL

## 2020-07-28 VITALS
SYSTOLIC BLOOD PRESSURE: 126 MMHG | BODY MASS INDEX: 29.27 KG/M2 | DIASTOLIC BLOOD PRESSURE: 86 MMHG | HEART RATE: 70 BPM | OXYGEN SATURATION: 94 % | WEIGHT: 182.1 LBS | HEIGHT: 66 IN | TEMPERATURE: 96.6 F

## 2020-07-28 DIAGNOSIS — M48.061 SPINAL STENOSIS OF LUMBAR REGION, UNSPECIFIED WHETHER NEUROGENIC CLAUDICATION PRESENT: ICD-10-CM

## 2020-07-28 DIAGNOSIS — G89.29 CHRONIC KNEE PAIN, UNSPECIFIED LATERALITY: ICD-10-CM

## 2020-07-28 DIAGNOSIS — Z96.651 S/P TKR (TOTAL KNEE REPLACEMENT), RIGHT: ICD-10-CM

## 2020-07-28 DIAGNOSIS — M47.816 LUMBAR SPONDYLOSIS: ICD-10-CM

## 2020-07-28 DIAGNOSIS — M54.16 LUMBAR RADICULOPATHY: ICD-10-CM

## 2020-07-28 DIAGNOSIS — M43.17 SPONDYLOLISTHESIS AT L5-S1 LEVEL: ICD-10-CM

## 2020-07-28 DIAGNOSIS — M25.569 CHRONIC KNEE PAIN, UNSPECIFIED LATERALITY: ICD-10-CM

## 2020-07-28 PROCEDURE — 99214 OFFICE O/P EST MOD 30 MIN: CPT | Performed by: PHYSICAL MEDICINE & REHABILITATION

## 2020-07-28 RX ORDER — TRAMADOL HYDROCHLORIDE 50 MG/1
50 TABLET ORAL EVERY 6 HOURS PRN
Qty: 83 TAB | Refills: 0 | Status: SHIPPED | OUTPATIENT
Start: 2020-08-29 | End: 2020-09-22

## 2020-07-28 RX ORDER — TRAMADOL HYDROCHLORIDE 50 MG/1
50 TABLET ORAL EVERY 8 HOURS PRN
Qty: 83 TAB | Refills: 0 | Status: SHIPPED | OUTPATIENT
Start: 2020-07-30 | End: 2020-08-29

## 2020-07-28 ASSESSMENT — PAIN SCALES - GENERAL: PAINLEVEL: 5=MODERATE PAIN

## 2020-07-28 ASSESSMENT — FIBROSIS 4 INDEX: FIB4 SCORE: 0.66

## 2020-07-28 ASSESSMENT — PATIENT HEALTH QUESTIONNAIRE - PHQ9
CLINICAL INTERPRETATION OF PHQ2 SCORE: 4
SUM OF ALL RESPONSES TO PHQ QUESTIONS 1-9: 9
5. POOR APPETITE OR OVEREATING: 0 - NOT AT ALL

## 2020-07-28 NOTE — PROGRESS NOTES
Follow-up patient note    Physiatry (physical medicine and  Rehabilitation), interventional spine and sports medicine    Date of Service: 07/28/2020    Chief complaint:   Chief Complaint   Patient presents with   • Follow-Up     Right leg pain       HISTORY    HPI: Debby Desai 60 y.o. female who presents today for follow-up evaluation of right leg pain.      Debby has started going to PT at [facility].  She has been hopeful and she is encouraged that she is making progress with regard to her knee.  They are working on her low back, primarily.    Pain is 5/10 on the NRS.  She was not able to work last Monday, the pain made it difficult for her to concentrate.  This has not happened in a few months.    We had discussed reducing her pain medications and she was able to reduce by 2 pills.    Now, she is working with a new therapist, Linda at [facility].  They are continuing to work on using guided imagery and working on strategies to help with sleep    Her EMG demonstrates right lumbar radiculopathy involving L5 on 03/26/2020    Duloxetine dose is stable and Dr. Miles has refilled this for a year.  Gabapentin 600mg po tid. She is taking tramadol, #85 for the last month.    By history:   She reports that she had surgery on 09/16/2019.  This was a right total knee arthroplasty for osteoarthritis with Dr. Saba.    In 2001, she had discectomy.  She reports that she has had a spinal cord stimulator placed, but she has not been using this as much.  She reports that the unit is not currently working.    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  She gets up about once an hour.  She has a sit to stand desk, but it is difficult to stand much.  Currently, she is working from home due to the COVID-19 pandemic.    Medical records review:  I reviewed the note from the referring provider Loy Miles M.D. dated 01/08/2020: Visits included  nausea/epigastric pain, hypertension, chronic knee pain, IGT, dyslipidemia, MDD, hypothyroidism, iron deficiency    Previous treatments:    Physical Therapy: Yes    Medications the patient is tried: tramadol, tylenol (usually for a headache); in the past she took gabapentin 400mg po tid, she was taking vimovo and norco in the past; lyrica caused weight gain    Previous interventions: She had radiofrequency ablation in the past, prior to surgery    Previous surgeries to relieve the above pain:  None other than surgery 09/16/2019      ROS: Unchanged from 06/30/2020 except as noted above  Gen: weight loss  Eyes: vision problems, glasses and contacts  CV: chest pain/anxiety  GI: nausea, ulcers  : urgency  Psych: depression  Endo: thyroid problems  Heme: anemia    Red Flags ROS:   Fever, Chills, Sweats: Denies  Involuntary Weight Loss: Denies  Bladder Incontinence: Denies  Bowel Incontinence: Denies  Saddle Anesthesia: Denies    All other systems reviewed and negative.       PMHx:   Past Medical History:   Diagnosis Date   • Anemia     in past   • Anginal syndrome (HCC)     had work up and feet r/t anxiety   • Anxiety    • Arthritis     OA knees   • Dental disorder 5/24/12    partial upper denture   • High cholesterol    • HTN (hypertension), benign 3/19/2012   • Hypothyroid 3/19/2012   • Major depression 3/19/2012   • Orbital floor (blow-out) closed fracture (HCC)     left   • Other specified symptom associated with female genital organs     post menopausal bleeding   • Pain     thoracic spine, Right knee, myofacial pain, and Right shoulder   • Pain     recently fell and has bruise left forehead   • Pain 02/2018    chronic back pain, right shoulder   • Psychiatric problem     depression, anxiety   • Unspecified disorder of thyroid    • Urinary bladder disorder     stress incont.   • Urinary incontinence     Occasional       PSHx:   Past Surgical History:   Procedure Laterality Date   • PB TOTAL KNEE ARTHROPLASTY Right  9/16/2019    Procedure: RIGHT ARTHROPLASTY, KNEE, TOTAL;  Surgeon: Rufus Saba M.D.;  Location: [facility];  Service: Orthopedics   • KNEE ARTHROSCOPY Right 2/9/2018    Procedure: KNEE ARTHROSCOPY;  Surgeon: Harsh Baltazar M.D.;  Location: [facility];  Service: Orthopedics   • MEDIAL MENISCECTOMY Right 2/9/2018    Procedure: MEDIAL AND LATERAL MENISCECTOMY- PARTIAL;  Surgeon: Harsh Baltazar M.D.;  Location: [facility];  Service: Orthopedics   • KNEE ARTHROSCOPY Right 7/12/2017    Procedure: KNEE ARTHROSCOPY- CESPEDES;  Surgeon: Harsh Baltazar M.D.;  Location: [facility];  Service:    • MEDIAL MENISCECTOMY Right 7/12/2017    Procedure: PARTIAL MEDIAL AND LATERAL MENISCECTOMY;  Surgeon: Harsh Baltazar M.D.;  Location: [facility];  Service:    • SYNOVECTOMY Right 7/12/2017    Procedure: SYNOVECTOMY;  Surgeon: Harsh Baltazar M.D.;  Location: [facility];  Service:    • KNEE ARTHROSCOPY  4/21/2015    Performed by Rufus Saba M.D. at [facility]   • MEDIAL MENISCECTOMY  4/21/2015    Performed by Rufus Saba M.D. at [facility]   • PUMP REVISION  12/10/2012    Performed by Allison Guerra M.D. at [facility]   • SPINAL CORD STIMULATOR  5/30/2012    Performed by ALLISON GUERRA at [facility]   • BIOPSY GENERAL  5/1/12    endometrial   • SPINAL CORD STIMULATOR  7/11/2011    Performed by ALLISON GUERRA at [facility]   • BLOCK EPIDURAL STEROID INJECTION  2008    x 3-4   • LYMPH NODE EXCISION Left 2007    cervicle   • OTHER Right 2001    thoracic discectomy     • ARTHROSCOPY, KNEE Left 1999   • RIB RESECTION Right 1980   • LUMPECTOMY         Family history   Family History   Problem Relation Age of Onset   • Hypertension Father    • Diabetes Father    • Heart Disease Father    • Alcohol abuse Father    • Anesthesia Sister          "slow to come out (as a child)   • Diabetes Other    • Heart Disease Other    • Cancer Other    • Hypertension Other    • Stroke Other    • Lung Disease Other          Medications:   Current Outpatient Medications   Medication   • [START ON 7/30/2020] tramadol (ULTRAM) 50 MG Tab   • [START ON 8/29/2020] tramadol (ULTRAM) 50 MG Tab   • lisinopril-hydrochlorothiazide (PRINZIDE) 20-12.5 MG per tablet   • gabapentin (NEURONTIN) 600 MG tablet   • Ferrous Sulfate (IRON) 325 (65 Fe) MG Tab   • metFORMIN ER (GLUCOPHAGE XR) 500 MG TABLET SR 24 HR   • DULoxetine (CYMBALTA) 60 MG Cap DR Particles delayed-release capsule   • ondansetron (ZOFRAN) 4 MG Tab tablet   • atorvastatin (LIPITOR) 20 MG Tab   • levothyroxine (SYNTHROID) 50 MCG Tab   • acetaminophen (TYLENOL) 500 MG Tab     No current facility-administered medications for this visit.        Allergies:   Allergies   Allergen Reactions   • Bactrim Ds Rash and Swelling     Pt states \"My hand swells up and rash all over body\".   • Sulfamethoxazole-Trimethoprim Rash and Swelling     Pt states \"My hand swells up and rash all over body\".       Social Hx:   Social History     Socioeconomic History   • Marital status: Single     Spouse name: Not on file   • Number of children: Not on file   • Years of education: Not on file   • Highest education level: Not on file   Occupational History   • Not on file   Social Needs   • Financial resource strain: Not on file   • Food insecurity     Worry: Not on file     Inability: Not on file   • Transportation needs     Medical: Not on file     Non-medical: Not on file   Tobacco Use   • Smoking status: Never Smoker   • Smokeless tobacco: Never Used   Substance and Sexual Activity   • Alcohol use: Not Currently     Alcohol/week: 0.0 oz     Comment: 1 glass wine per month   • Drug use: No   • Sexual activity: Never     Partners: Male   Lifestyle   • Physical activity     Days per week: Not on file     Minutes per session: Not on file   • Stress: " "Not on file   Relationships   • Social connections     Talks on phone: Not on file     Gets together: Not on file     Attends [employer] service: Not on file     Active member of club or organization: Not on file     Attends meetings of clubs or organizations: Not on file     Relationship status: Not on file   • Intimate partner violence     Fear of current or ex partner: Not on file     Emotionally abused: Not on file     Physically abused: Not on file     Forced sexual activity: Not on file   Other Topics Concern   •  Service No   • Blood Transfusions No   • Caffeine Concern No   • Occupational Exposure No   • Hobby Hazards No   • Sleep Concern Yes   • Stress Concern Yes   • Weight Concern Yes   • Special Diet No   • Back Care No   • Exercise No   • Bike Helmet No   • Seat Belt Yes   • Self-Exams No   Social History Narrative   • Not on file         EXAMINATION     Physical Exam:   Vitals: /86 (BP Location: Right arm, Patient Position: Sitting, BP Cuff Size: Adult long)   Pulse 70   Temp 35.9 °C (96.6 °F) (Temporal)   Ht 1.676 m (5' 6\")   Wt 82.6 kg (182 lb 1.6 oz)   SpO2 94%     Constitutional:   Body Habitus: Body mass index is 29.39 kg/m².  Cooperation: Fully cooperates with exam  Appearance: Well-groomed, well-nourished, not disheveled no acute distress    Eyes: No scleral icterus, no proptosis     ENT -no obvious auditory deficits, wearing a face mask    Skin -no visible skin lesions    Respiratory-  breathing comfortable on room air, no audible wheezing    Cardiovascular- No lower extremity edema is noted.     Psychiatric- alert and oriented ×3. Normal affect.     Gait -  Minimally antalgic without assist device    Knee- Right knee lack about 8-10 of full extension, flexion is greater than 90 degrees     Seated SLR is negative bilaterally    Neuro     Key points for the international standards for neurological classification of spinal cord injury (ISNCSCI) to light touch.     Dermatome R L "   L2 2 2   L3 2 2   L4 2 2   L5 2 2   S1 2 2   S2 2 2       Motor Exam Lower Extremities    ? Myotome R L   Hip flexion L2 5 5   Knee extension L3 5 5   Ankle dorsiflexion L4 5 5   Toe extension L5 4+ 5   Ankle plantarflexion S1 5 5       Clonus of the ankle negative bilaterally     Reflexes  ?  R L   Patella  NT s/p TKR 2+   Achilles   2+ 2+       MEDICAL DECISION MAKING    Medical records review: see under HPI section.     DATA    Labs:     03/14/2020 CRP 0.19  03/14/2020 Sed rate 20  02/25/2020: Tramadol and metabolites      Lab Results   Component Value Date/Time    SODIUM 142 01/18/2020 07:41 AM    POTASSIUM 3.7 01/18/2020 07:41 AM    CHLORIDE 103 01/18/2020 07:41 AM    CO2 31 01/18/2020 07:41 AM    ANION 8.0 01/18/2020 07:41 AM    GLUCOSE 103 (H) 01/18/2020 07:41 AM    BUN 13 01/18/2020 07:41 AM    CREATININE 0.71 01/18/2020 07:41 AM    CALCIUM 9.5 01/18/2020 07:41 AM    ASTSGOT 13 01/18/2020 07:41 AM    ALTSGPT 11 01/18/2020 07:41 AM    TBILIRUBIN 0.5 01/18/2020 07:41 AM    ALBUMIN 4.1 01/18/2020 07:41 AM    TOTPROTEIN 6.8 01/18/2020 07:41 AM    GLOBULIN 2.7 01/18/2020 07:41 AM    AGRATIO 1.5 01/18/2020 07:41 AM       Lab Results   Component Value Date/Time    PROTHROMBTM 12.5 09/27/2018 12:45 PM    INR 0.94 09/27/2018 12:45 PM        Lab Results   Component Value Date/Time    WBC 5.8 01/18/2020 07:41 AM    RBC 4.29 01/18/2020 07:41 AM    HEMOGLOBIN 12.6 01/18/2020 07:41 AM    HEMATOCRIT 40.0 01/18/2020 07:41 AM    MCV 93.2 01/18/2020 07:41 AM    MCH 29.4 01/18/2020 07:41 AM    MCHC 31.5 (L) 01/18/2020 07:41 AM    MPV 12.1 01/18/2020 07:41 AM    NEUTSPOLYS 47.60 01/18/2020 07:41 AM    LYMPHOCYTES 42.50 (H) 01/18/2020 07:41 AM    MONOCYTES 7.50 01/18/2020 07:41 AM    EOSINOPHILS 1.70 01/18/2020 07:41 AM    BASOPHILS 0.50 01/18/2020 07:41 AM        Lab Results   Component Value Date/Time    HBA1C 6.1 (H) 01/18/2020 07:41 AM        Imaging: I personally reviewed following images, these are my reads  Xray right  hip 03/14/2020  There is mild osteoarthritis of the right hip.      Xray lumbar 03/14/2020  There is mild anterolisthesis at L5-S1.  No abnormal motion on flexion and extension  There is DDD and facet arthropathy that is most noted at L5-S1 and less at L4-5    Xray right knee 09/16/2019  There is note of right knee arthroplasty    IMAGING radiology reads. I reviewed the following radiology reads    Xray lumbar 03/14/2020  IMPRESSION:     1.  No compression deformity or acute fracture is identified.     2.  Mild anterolisthesis at L5-S1.     3.  Degenerative disc disease and facet arthropathy.     4.  No focal instability noted on flexion extension views.                                                Xray right hip 03/14/2020  IMPRESSION:     1.  No radiographic evidence of acute traumatic injury.     2.  Findings are consistent with mild osteoarthritis.     3.  Probable constipation.                                   Results for orders placed during the hospital encounter of 09/16/19   DX-KNEE 2- RIGHT    Impression Right knee arthroplasty.      Results for orders placed during the hospital encounter of 03/28/19   DX-KNEE 3 VIEWS RIGHT    Impression No evidence of acute fracture or dislocation.    Degenerative changes as above described.                              Diagnosis   Visit Diagnoses     ICD-10-CM   1. Lumbar radiculopathy  M54.16   2. Spondylolisthesis at L5-S1 level  M43.17   3. Spinal stenosis of lumbar region, unspecified whether neurogenic claudication present  M48.061   4. S/P TKR (total knee replacement), right  Z96.651   5. Chronic knee pain, unspecified laterality  M25.569    G89.29   6. Lumbar spondylosis  M47.816           ASSESSMENT:  Debby Desai 60 y.o. female seen for above.     Debby was seen today for follow-up.    Diagnoses and all orders for this visit:    Lumbar radiculopathy  -     tramadol (ULTRAM) 50 MG Tab; Take 1 Tab by mouth every 8 hours as needed for Severe Pain for up to  30 days.  -     tramadol (ULTRAM) 50 MG Tab; Take 1 Tab by mouth every 6 hours as needed for Severe Pain for up to 30 days.  -     Consent for Opiate Prescription  -     Controlled Substance Treatment Agreement    Spondylolisthesis at L5-S1 level    Spinal stenosis of lumbar region, unspecified whether neurogenic claudication present    S/P TKR (total knee replacement), right    Chronic knee pain, unspecified laterality  -     tramadol (ULTRAM) 50 MG Tab; Take 1 Tab by mouth every 8 hours as needed for Severe Pain for up to 30 days.  -     tramadol (ULTRAM) 50 MG Tab; Take 1 Tab by mouth every 6 hours as needed for Severe Pain for up to 30 days.  -     Consent for Opiate Prescription  -     Controlled Substance Treatment Agreement    Lumbar spondylosis         1. Discussed that she is continuing to decrease tramadol.  Plan to decrease to #83 for the month.  2. Continue to wean tramadol.  Goal of reducing by 2-5 pills if she is able, #85 given for the month.  We will work on this gradually.  3. Continue gabapentin 600mg po tid.  4. Continue duloxetine 60mg and psychology visits.  5. Continue trial physical therapy for lumbar radiculopathy.  6. Continue activity as tolerated.         Follow-up: Return in about 2 months (around 9/28/2020).     Thank you very much for asking me to participate in Debby Desai's care.  Please contact me with any questions or concerns.      Please note that this dictation was created using voice recognition software. I have made every reasonable attempt to correct obvious errors but there may be errors of grammar and content that I may have overlooked prior to finalization of this note.      Volodymyr Herring MD  Physical Medicine and Rehabilitation  Interventional Spine and Sports Physiatry  [facility]

## 2020-07-29 NOTE — PROGRESS NOTES
Follow-up patient note    Physiatry (physical medicine and  Rehabilitation), interventional spine and sports medicine    Date of Service: 07/28/2020    Chief complaint:   Chief Complaint   Patient presents with   • Follow-Up     Right leg pain       HISTORY    HPI: Debby Desai 60 y.o. female who presents today for follow-up evaluation of her pain complaints.     Debby has started going to PT at [facility].  She has been hopeful and she is encouraged that she is making progress with regard to her knee range of motion.  They are working on her low back, primarily.  She does have aching pain in the right leg. Her EMG demonstrates right lumbar radiculopathy involving L5 on 03/26/2020    Pain is 5/10 on the NRS.  She was not able to work last Monday, the pain made it difficult for her to concentrate.  This has not happened in a few months.  Still, she has been able to work otherwise and has been able to participate in family events without cancelling.    We had discussed reducing her pain medications and she was able to reduce by 2 pills over the month.    Now, she is working with a new therapist, Linda at [facility].  They are continuing to work on using guided imagery and working on strategies to help with sleep    Duloxetine dose is stable and Dr. Miles has refilled this for a year.  Gabapentin 600mg po tid. She is taking tramadol, #85 for the last month.      By history:   She reports that she had surgery on 09/16/2019.  This was a right total knee arthroplasty for osteoarthritis with Dr. Saba.    In 2001, she had discectomy.  She reports that she has had a spinal cord stimulator placed, but she has not been using this as much.  She reports that the unit is not currently working.    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  She gets up about once an hour.  She has a sit to stand desk, but it is difficult to stand much.   Currently, she is working from home due to the COVID-19 pandemic.    Medical records review:  I reviewed the note from the referring provider Loy Miles M.D. dated 01/08/2020: Visits included nausea/epigastric pain, hypertension, chronic knee pain, IGT, dyslipidemia, MDD, hypothyroidism, iron deficiency    Previous treatments:    Physical Therapy: Yes    Medications the patient is tried: tramadol, tylenol (usually for a headache); in the past she took gabapentin 400mg po tid, she was taking vimovo and norco in the past; lyrica caused weight gain    Previous interventions: She had radiofrequency ablation in the past, prior to surgery    Previous surgeries to relieve the above pain:  None other than surgery 09/16/2019      ROS: Unchanged from 06/30/2020 except as noted above  Gen: weight loss  Eyes: vision problems, glasses and contacts  CV: chest pain/anxiety  GI: nausea, ulcers  : urgency  Psych: depression  Endo: thyroid problems  Heme: anemia    Red Flags ROS:   Fever, Chills, Sweats: Denies  Involuntary Weight Loss: Denies  Bladder Incontinence: Denies  Bowel Incontinence: Denies  Saddle Anesthesia: Denies    All other systems reviewed and negative.       PMHx:   Past Medical History:   Diagnosis Date   • Anemia     in past   • Anginal syndrome (HCC)     had work up and feet r/t anxiety   • Anxiety    • Arthritis     OA knees   • Dental disorder 5/24/12    partial upper denture   • High cholesterol    • HTN (hypertension), benign 3/19/2012   • Hypothyroid 3/19/2012   • Major depression 3/19/2012   • Orbital floor (blow-out) closed fracture (HCC)     left   • Other specified symptom associated with female genital organs     post menopausal bleeding   • Pain     thoracic spine, Right knee, myofacial pain, and Right shoulder   • Pain     recently fell and has bruise left forehead   • Pain 02/2018    chronic back pain, right shoulder   • Psychiatric problem     depression, anxiety   • Unspecified disorder of  thyroid    • Urinary bladder disorder     stress incont.   • Urinary incontinence     Occasional       PSHx:   Past Surgical History:   Procedure Laterality Date   • PB TOTAL KNEE ARTHROPLASTY Right 9/16/2019    Procedure: RIGHT ARTHROPLASTY, KNEE, TOTAL;  Surgeon: Rufus Saba M.D.;  Location: [facility];  Service: Orthopedics   • KNEE ARTHROSCOPY Right 2/9/2018    Procedure: KNEE ARTHROSCOPY;  Surgeon: Harsh Baltazar M.D.;  Location: [facility];  Service: Orthopedics   • MEDIAL MENISCECTOMY Right 2/9/2018    Procedure: MEDIAL AND LATERAL MENISCECTOMY- PARTIAL;  Surgeon: Harsh Baltazar M.D.;  Location: [facility];  Service: Orthopedics   • KNEE ARTHROSCOPY Right 7/12/2017    Procedure: KNEE ARTHROSCOPY- CESPEDES;  Surgeon: Harsh Baltazar M.D.;  Location: [facility];  Service:    • MEDIAL MENISCECTOMY Right 7/12/2017    Procedure: PARTIAL MEDIAL AND LATERAL MENISCECTOMY;  Surgeon: Harsh Baltazar M.D.;  Location: [facility];  Service:    • SYNOVECTOMY Right 7/12/2017    Procedure: SYNOVECTOMY;  Surgeon: Harsh Baltazar M.D.;  Location: [facility];  Service:    • KNEE ARTHROSCOPY  4/21/2015    Performed by Rufus Saba M.D. at [facility]   • MEDIAL MENISCECTOMY  4/21/2015    Performed by Rufus Saba M.D. at [facility]   • PUMP REVISION  12/10/2012    Performed by Allison Guerra M.D. at [facility]   • SPINAL CORD STIMULATOR  5/30/2012    Performed by ALLISON GUERRA at [facility]   • BIOPSY GENERAL  5/1/12    endometrial   • SPINAL CORD STIMULATOR  7/11/2011    Performed by ALLISON GUERRA at [facility]   • BLOCK EPIDURAL STEROID INJECTION  2008    x 3-4   • LYMPH NODE EXCISION Left 2007    cervicle   • OTHER Right 2001    thoracic discectomy     • ARTHROSCOPY, KNEE Left 1999   • RIB RESECTION Right 1980   • LUMPECTOMY    "      Family history   Family History   Problem Relation Age of Onset   • Hypertension Father    • Diabetes Father    • Heart Disease Father    • Alcohol abuse Father    • Anesthesia Sister         slow to come out (as a child)   • Diabetes Other    • Heart Disease Other    • Cancer Other    • Hypertension Other    • Stroke Other    • Lung Disease Other          Medications:   Current Outpatient Medications   Medication   • [START ON 7/30/2020] tramadol (ULTRAM) 50 MG Tab   • [START ON 8/29/2020] tramadol (ULTRAM) 50 MG Tab   • lisinopril-hydrochlorothiazide (PRINZIDE) 20-12.5 MG per tablet   • gabapentin (NEURONTIN) 600 MG tablet   • Ferrous Sulfate (IRON) 325 (65 Fe) MG Tab   • metFORMIN ER (GLUCOPHAGE XR) 500 MG TABLET SR 24 HR   • DULoxetine (CYMBALTA) 60 MG Cap DR Particles delayed-release capsule   • ondansetron (ZOFRAN) 4 MG Tab tablet   • atorvastatin (LIPITOR) 20 MG Tab   • levothyroxine (SYNTHROID) 50 MCG Tab   • acetaminophen (TYLENOL) 500 MG Tab     No current facility-administered medications for this visit.        Allergies:   Allergies   Allergen Reactions   • Bactrim Ds Rash and Swelling     Pt states \"My hand swells up and rash all over body\".   • Sulfamethoxazole-Trimethoprim Rash and Swelling     Pt states \"My hand swells up and rash all over body\".       Social Hx:   Social History     Socioeconomic History   • Marital status: Single     Spouse name: Not on file   • Number of children: Not on file   • Years of education: Not on file   • Highest education level: Not on file   Occupational History   • Not on file   Social Needs   • Financial resource strain: Not on file   • Food insecurity     Worry: Not on file     Inability: Not on file   • Transportation needs     Medical: Not on file     Non-medical: Not on file   Tobacco Use   • Smoking status: Never Smoker   • Smokeless tobacco: Never Used   Substance and Sexual Activity   • Alcohol use: Not Currently     Alcohol/week: 0.0 oz     Comment: 1 " "glass wine per month   • Drug use: No   • Sexual activity: Never     Partners: Male   Lifestyle   • Physical activity     Days per week: Not on file     Minutes per session: Not on file   • Stress: Not on file   Relationships   • Social connections     Talks on phone: Not on file     Gets together: Not on file     Attends Bahai service: Not on file     Active member of club or organization: Not on file     Attends meetings of clubs or organizations: Not on file     Relationship status: Not on file   • Intimate partner violence     Fear of current or ex partner: Not on file     Emotionally abused: Not on file     Physically abused: Not on file     Forced sexual activity: Not on file   Other Topics Concern   •  Service No   • Blood Transfusions No   • Caffeine Concern No   • Occupational Exposure No   • Hobby Hazards No   • Sleep Concern Yes   • Stress Concern Yes   • Weight Concern Yes   • Special Diet No   • Back Care No   • Exercise No   • Bike Helmet No   • Seat Belt Yes   • Self-Exams No   Social History Narrative   • Not on file         EXAMINATION     Physical Exam:   Vitals: /86 (BP Location: Right arm, Patient Position: Sitting, BP Cuff Size: Adult long)   Pulse 70   Temp 35.9 °C (96.6 °F) (Temporal)   Ht 1.676 m (5' 6\")   Wt 82.6 kg (182 lb 1.6 oz)   SpO2 94%     Constitutional:   Body Habitus: Body mass index is 29.39 kg/m².  Cooperation: Fully cooperates with exam  Appearance: Well-groomed, well-nourished, not disheveled no acute distress    Eyes: No scleral icterus, no proptosis     ENT -no obvious auditory deficits, wearing a face mask    Skin -no visible skin lesions    Respiratory-  breathing comfortable on room air, no audible wheezing    Cardiovascular- No lower extremity edema is noted.     Psychiatric- alert and oriented ×3. Normal affect.     Gait -  Minimally antalgic without assist device    Knee- Right knee lack about 8-10 of full extension, flexion is greater than 90 " degrees     Seated SLR is negative bilaterally    Neuro     Key points for the international standards for neurological classification of spinal cord injury (ISNCSCI) to light touch.     Dermatome R L   L2 2 2   L3 2 2   L4 2 2   L5 2 2   S1 2 2   S2 2 2       Motor Exam Lower Extremities    ? Myotome R L   Hip flexion L2 5 5   Knee extension L3 5 5   Ankle dorsiflexion L4 5 5   Toe extension L5 4+ 5   Ankle plantarflexion S1 5 5       Clonus of the ankle negative bilaterally     Reflexes  ?  R L   Patella  NT s/p TKR 2+   Achilles   2+ 2+       MEDICAL DECISION MAKING    Medical records review: see under HPI section.     DATA    Labs:     03/14/2020 CRP 0.19  03/14/2020 Sed rate 20  02/25/2020: Tramadol and metabolites      Lab Results   Component Value Date/Time    SODIUM 142 01/18/2020 07:41 AM    POTASSIUM 3.7 01/18/2020 07:41 AM    CHLORIDE 103 01/18/2020 07:41 AM    CO2 31 01/18/2020 07:41 AM    ANION 8.0 01/18/2020 07:41 AM    GLUCOSE 103 (H) 01/18/2020 07:41 AM    BUN 13 01/18/2020 07:41 AM    CREATININE 0.71 01/18/2020 07:41 AM    CALCIUM 9.5 01/18/2020 07:41 AM    ASTSGOT 13 01/18/2020 07:41 AM    ALTSGPT 11 01/18/2020 07:41 AM    TBILIRUBIN 0.5 01/18/2020 07:41 AM    ALBUMIN 4.1 01/18/2020 07:41 AM    TOTPROTEIN 6.8 01/18/2020 07:41 AM    GLOBULIN 2.7 01/18/2020 07:41 AM    AGRATIO 1.5 01/18/2020 07:41 AM       Lab Results   Component Value Date/Time    PROTHROMBTM 12.5 09/27/2018 12:45 PM    INR 0.94 09/27/2018 12:45 PM        Lab Results   Component Value Date/Time    WBC 5.8 01/18/2020 07:41 AM    RBC 4.29 01/18/2020 07:41 AM    HEMOGLOBIN 12.6 01/18/2020 07:41 AM    HEMATOCRIT 40.0 01/18/2020 07:41 AM    MCV 93.2 01/18/2020 07:41 AM    MCH 29.4 01/18/2020 07:41 AM    MCHC 31.5 (L) 01/18/2020 07:41 AM    MPV 12.1 01/18/2020 07:41 AM    NEUTSPOLYS 47.60 01/18/2020 07:41 AM    LYMPHOCYTES 42.50 (H) 01/18/2020 07:41 AM    MONOCYTES 7.50 01/18/2020 07:41 AM    EOSINOPHILS 1.70 01/18/2020 07:41 AM     BASOPHILS 0.50 01/18/2020 07:41 AM        Lab Results   Component Value Date/Time    HBA1C 6.1 (H) 01/18/2020 07:41 AM        Imaging: I personally reviewed following images, these are my reads  Xray right hip 03/14/2020  There is mild osteoarthritis of the right hip.      Xray lumbar 03/14/2020  There is mild anterolisthesis at L5-S1.  No abnormal motion on flexion and extension  There is DDD and facet arthropathy that is most noted at L5-S1 and less at L4-5    Xray right knee 09/16/2019  There is note of right knee arthroplasty    IMAGING radiology reads. I reviewed the following radiology reads    Xray lumbar 03/14/2020  IMPRESSION:     1.  No compression deformity or acute fracture is identified.     2.  Mild anterolisthesis at L5-S1.     3.  Degenerative disc disease and facet arthropathy.     4.  No focal instability noted on flexion extension views.                                                Xray right hip 03/14/2020  IMPRESSION:     1.  No radiographic evidence of acute traumatic injury.     2.  Findings are consistent with mild osteoarthritis.     3.  Probable constipation.                                   Results for orders placed during the hospital encounter of 09/16/19   DX-KNEE 2- RIGHT    Impression Right knee arthroplasty.      Results for orders placed during the hospital encounter of 03/28/19   DX-KNEE 3 VIEWS RIGHT    Impression No evidence of acute fracture or dislocation.    Degenerative changes as above described.                              Diagnosis   Visit Diagnoses     ICD-10-CM   1. Lumbar radiculopathy  M54.16   2. Spondylolisthesis at L5-S1 level  M43.17   3. Spinal stenosis of lumbar region, unspecified whether neurogenic claudication present  M48.061   4. S/P TKR (total knee replacement), right  Z96.651   5. Chronic knee pain, unspecified laterality  M25.569    G89.29   6. Lumbar spondylosis  M47.816           ASSESSMENT:  Debby Desai 60 y.o. female seen for above.     Debby  was seen today for follow-up.    Diagnoses and all orders for this visit:    Lumbar radiculopathy  -     tramadol (ULTRAM) 50 MG Tab; Take 1 Tab by mouth every 8 hours as needed for Severe Pain for up to 30 days.  -     tramadol (ULTRAM) 50 MG Tab; Take 1 Tab by mouth every 6 hours as needed for Severe Pain for up to 30 days.  -     Consent for Opiate Prescription  -     Controlled Substance Treatment Agreement    Spondylolisthesis at L5-S1 level    Spinal stenosis of lumbar region, unspecified whether neurogenic claudication present    S/P TKR (total knee replacement), right    Chronic knee pain, unspecified laterality  -     tramadol (ULTRAM) 50 MG Tab; Take 1 Tab by mouth every 8 hours as needed for Severe Pain for up to 30 days.  -     tramadol (ULTRAM) 50 MG Tab; Take 1 Tab by mouth every 6 hours as needed for Severe Pain for up to 30 days.  -     Consent for Opiate Prescription  -     Controlled Substance Treatment Agreement    Lumbar spondylosis         1. Discussed that she is continuing to decrease tramadol.  Plan to decrease to #83 for the month.  2. Plan to continue with goal of gradually reducing tramadol as she tolerates  3. Continue gabapentin 600mg po tid.  4. Continue duloxetine 60mg and psychology visits.  5. Continue activity as tolerated and continue with physical therapy for radiculopathy and continue ROM for the right knee.        Follow-up: Return in about 2 months (around 9/28/2020).     Thank you very much for asking me to participate in Debby Desai's care.  Please contact me with any questions or concerns.      Please note that this dictation was created using voice recognition software. I have made every reasonable attempt to correct obvious errors but there may be errors of grammar and content that I may have overlooked prior to finalization of this note.      Volodymyr Herring MD  Physical Medicine and Rehabilitation  Interventional Spine and Sports Physiatry  [facility]

## 2020-09-22 ENCOUNTER — OFFICE VISIT (OUTPATIENT)
Dept: PHYSICAL MEDICINE AND REHAB | Facility: MEDICAL CENTER | Age: 60
End: 2020-09-22
Payer: COMMERCIAL

## 2020-09-22 ENCOUNTER — APPOINTMENT (OUTPATIENT)
Dept: RADIOLOGY | Facility: MEDICAL CENTER | Age: 60
End: 2020-09-22
Attending: EMERGENCY MEDICINE
Payer: COMMERCIAL

## 2020-09-22 ENCOUNTER — HOSPITAL ENCOUNTER (EMERGENCY)
Facility: MEDICAL CENTER | Age: 60
End: 2020-09-22
Attending: EMERGENCY MEDICINE
Payer: COMMERCIAL

## 2020-09-22 VITALS
HEART RATE: 58 BPM | RESPIRATION RATE: 16 BRPM | WEIGHT: 182.54 LBS | BODY MASS INDEX: 29.34 KG/M2 | SYSTOLIC BLOOD PRESSURE: 128 MMHG | OXYGEN SATURATION: 93 % | HEIGHT: 66 IN | DIASTOLIC BLOOD PRESSURE: 65 MMHG | TEMPERATURE: 97 F

## 2020-09-22 VITALS
DIASTOLIC BLOOD PRESSURE: 80 MMHG | TEMPERATURE: 97.6 F | BODY MASS INDEX: 29.35 KG/M2 | WEIGHT: 182.65 LBS | HEIGHT: 66 IN | OXYGEN SATURATION: 94 % | SYSTOLIC BLOOD PRESSURE: 138 MMHG | HEART RATE: 88 BPM
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
## 2020-09-22 DIAGNOSIS — M25.569 CHRONIC KNEE PAIN, UNSPECIFIED LATERALITY: ICD-10-CM

## 2020-09-22 DIAGNOSIS — S16.1XXA STRAIN OF NECK MUSCLE, INITIAL ENCOUNTER: ICD-10-CM

## 2020-09-22 DIAGNOSIS — G89.29 CHRONIC KNEE PAIN, UNSPECIFIED LATERALITY: ICD-10-CM

## 2020-09-22 DIAGNOSIS — Z96.651 S/P TKR (TOTAL KNEE REPLACEMENT), RIGHT: ICD-10-CM

## 2020-09-22 DIAGNOSIS — S20.229A CONTUSION OF BACK, UNSPECIFIED LATERALITY, INITIAL ENCOUNTER: ICD-10-CM

## 2020-09-22 DIAGNOSIS — S09.90XA CLOSED HEAD INJURY, INITIAL ENCOUNTER: ICD-10-CM

## 2020-09-22 DIAGNOSIS — M54.16 LUMBAR RADICULOPATHY: ICD-10-CM

## 2020-09-22 PROCEDURE — A9270 NON-COVERED ITEM OR SERVICE: HCPCS | Performed by: EMERGENCY MEDICINE

## 2020-09-22 PROCEDURE — 70450 CT HEAD/BRAIN W/O DYE: CPT

## 2020-09-22 PROCEDURE — 99214 OFFICE O/P EST MOD 30 MIN: CPT | Performed by: PHYSICAL MEDICINE & REHABILITATION

## 2020-09-22 PROCEDURE — 99283 EMERGENCY DEPT VISIT LOW MDM: CPT

## 2020-09-22 PROCEDURE — 71045 X-RAY EXAM CHEST 1 VIEW: CPT

## 2020-09-22 PROCEDURE — 72128 CT CHEST SPINE W/O DYE: CPT

## 2020-09-22 PROCEDURE — 700102 HCHG RX REV CODE 250 W/ 637 OVERRIDE(OP): Performed by: EMERGENCY MEDICINE

## 2020-09-22 PROCEDURE — 72125 CT NECK SPINE W/O DYE: CPT

## 2020-09-22 PROCEDURE — 72131 CT LUMBAR SPINE W/O DYE: CPT

## 2020-09-22 RX ORDER — GABAPENTIN 600 MG/1
600 TABLET ORAL 3 TIMES DAILY
Status: SHIPPED | COMMUNITY
End: 2020-10-07

## 2020-09-22 RX ORDER — TRAMADOL HYDROCHLORIDE 50 MG/1
50 TABLET ORAL 3 TIMES DAILY
Status: SHIPPED | COMMUNITY
End: 2020-09-24 | Stop reason: SDUPTHER

## 2020-09-22 RX ORDER — ACETAMINOPHEN 325 MG/1
650 TABLET ORAL ONCE
Status: COMPLETED | OUTPATIENT
Start: 2020-09-22 | End: 2020-09-22

## 2020-09-22 RX ORDER — ATORVASTATIN CALCIUM 20 MG/1
20 TABLET, FILM COATED ORAL DAILY
Status: SHIPPED | COMMUNITY
End: 2021-06-11

## 2020-09-22 RX ORDER — IBUPROFEN 200 MG
600 TABLET ORAL EVERY 6 HOURS PRN
Status: SHIPPED | COMMUNITY
End: 2022-12-19

## 2020-09-22 RX ORDER — LISINOPRIL AND HYDROCHLOROTHIAZIDE 20; 12.5 MG/1; MG/1
1 TABLET ORAL DAILY
Status: SHIPPED | COMMUNITY
End: 2021-06-11

## 2020-09-22 RX ORDER — METHOCARBAMOL 750 MG/1
750 TABLET, FILM COATED ORAL 3 TIMES DAILY
Qty: 9 TAB | Refills: 0 | Status: SHIPPED | OUTPATIENT
Start: 2020-09-22 | End: 2020-09-25

## 2020-09-22 RX ORDER — LEVOTHYROXINE SODIUM 0.05 MG/1
50 TABLET ORAL
Status: SHIPPED | COMMUNITY
End: 2021-02-09 | Stop reason: SDUPTHER

## 2020-09-22 RX ADMIN — ACETAMINOPHEN 650 MG: 325 TABLET, FILM COATED ORAL at 17:31

## 2020-09-22 ASSESSMENT — PATIENT HEALTH QUESTIONNAIRE - PHQ9
SUM OF ALL RESPONSES TO PHQ QUESTIONS 1-9: 9
CLINICAL INTERPRETATION OF PHQ2 SCORE: 2
5. POOR APPETITE OR OVEREATING: 1 - SEVERAL DAYS

## 2020-09-22 ASSESSMENT — FIBROSIS 4 INDEX
FIB4 SCORE: 0.66
FIB4 SCORE: 0.66

## 2020-09-22 ASSESSMENT — PAIN SCALES - GENERAL: PAINLEVEL: 6=MODERATE PAIN

## 2020-09-22 NOTE — PROGRESS NOTES
Follow-up patient note    Physiatry (physical medicine and  Rehabilitation), interventional spine and sports medicine    Date of Service: 09/22/2020    Chief complaint:   Chief Complaint   Patient presents with   • Follow-Up     Right hip       HISTORY    HPI: Debby Desai 60 y.o. female who presents today for follow-up evaluation of her pain complaints.     Debby reports that she tripped in the parking lot on her way into the office.  She thinks that she got caught in a crack in her left foot and fell backward on her back and hit the back of her head.  From what she reports, she has a headache.  This happened around 12:45PM.    Regarding her usual pain, her PT program at [facility] has been completed.  She is doing exercises at home.  Her right leg is improving and she is working on improving ROM of the right knee.  Still, she feels like she leans to the left.  EMG demonstrated right lumbar radiculopathy involving L5 on 03/26/2020.  Overall, her back continues to be aching pain.  Pain today is a 8-9/10 on the NRS in her right leg and knee.    We had discussed reducing her pain medications and she was able to reduce by 2 pills over the month at the last visit.  She is down to taking #81 per month.  Walking seems to result in more pain and she limps more.    Other medications include gabapentin 600mg po tid and duloxetine.     By history:   She reports that she had surgery on 09/16/2019.  This was a right total knee arthroplasty for osteoarthritis with Dr. Saba.    In 2001, she had discectomy.  She reports that she has had a spinal cord stimulator placed, but she has not been using this as much.  She reports that the unit is not currently working.    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  She gets up about once an hour.  She has a sit to stand desk, but it is difficult to stand much.  Currently, she is working from home due to the  Vaccine Information Statement(s) was given today. This has been reviewed, questions answered, and verbal consent given by Patient for injection(s) and administration of Tetanus/Diphtheria/Pertussis (Tdap).     COVID-19 pandemic.    Medical records review:  I reviewed the note from the referring provider Loy Miles M.D. dated 01/08/2020: Visits included nausea/epigastric pain, hypertension, chronic knee pain, IGT, dyslipidemia, MDD, hypothyroidism, iron deficiency    Previous treatments:    Physical Therapy: Yes    Medications the patient is tried: tramadol, tylenol (usually for a headache); in the past she took gabapentin 400mg po tid, she was taking vimovo and norco in the past; lyrica caused weight gain    Previous interventions: She had radiofrequency ablation in the past, prior to surgery    Previous surgeries to relieve the above pain:  None other than surgery 09/16/2019      ROS: Unchanged from 07/28/2020 except as noted above  Gen: weight loss  Eyes: vision problems, glasses and contacts  CV: chest pain/anxiety  GI: nausea, ulcers  : urgency  Psych: depression  Endo: thyroid problems  Heme: anemia    Red Flags ROS:   Fever, Chills, Sweats: Denies  Involuntary Weight Loss: Denies  Bladder Incontinence: Denies  Bowel Incontinence: Denies  Saddle Anesthesia: Denies    All other systems reviewed and negative.       PMHx:   Past Medical History:   Diagnosis Date   • Anemia     in past   • Anginal syndrome (HCC)     had work up and feet r/t anxiety   • Anxiety    • Arthritis     OA knees   • Dental disorder 5/24/12    partial upper denture   • High cholesterol    • HTN (hypertension), benign 3/19/2012   • Hypothyroid 3/19/2012   • Major depression 3/19/2012   • Orbital floor (blow-out) closed fracture (HCC)     left   • Other specified symptom associated with female genital organs     post menopausal bleeding   • Pain     thoracic spine, Right knee, myofacial pain, and Right shoulder   • Pain     recently fell and has bruise left forehead   • Pain 02/2018    chronic back pain, right shoulder   • Psychiatric problem     depression, anxiety   • Unspecified disorder of thyroid    • Urinary bladder disorder      stress incont.   • Urinary incontinence     Occasional       PSHx:   Past Surgical History:   Procedure Laterality Date   • PB TOTAL KNEE ARTHROPLASTY Right 9/16/2019    Procedure: RIGHT ARTHROPLASTY, KNEE, TOTAL;  Surgeon: Rufus Saba M.D.;  Location: [facility];  Service: Orthopedics   • KNEE ARTHROSCOPY Right 2/9/2018    Procedure: KNEE ARTHROSCOPY;  Surgeon: Harsh Baltazar M.D.;  Location: [facility];  Service: Orthopedics   • MEDIAL MENISCECTOMY Right 2/9/2018    Procedure: MEDIAL AND LATERAL MENISCECTOMY- PARTIAL;  Surgeon: Harsh Baltazar M.D.;  Location: [facility];  Service: Orthopedics   • KNEE ARTHROSCOPY Right 7/12/2017    Procedure: KNEE ARTHROSCOPY- CESPEDES;  Surgeon: Harsh Baltazar M.D.;  Location: [facility];  Service:    • MEDIAL MENISCECTOMY Right 7/12/2017    Procedure: PARTIAL MEDIAL AND LATERAL MENISCECTOMY;  Surgeon: Harsh Baltazar M.D.;  Location: [facility];  Service:    • SYNOVECTOMY Right 7/12/2017    Procedure: SYNOVECTOMY;  Surgeon: Harsh Baltazar M.D.;  Location: [facility];  Service:    • KNEE ARTHROSCOPY  4/21/2015    Performed by Rufus Saba M.D. at [facility]   • MEDIAL MENISCECTOMY  4/21/2015    Performed by Rufus Saba M.D. at [facility]   • PUMP REVISION  12/10/2012    Performed by Allison Guerra M.D. at [facility]   • SPINAL CORD STIMULATOR  5/30/2012    Performed by ALLISON GUERRA at [facility]   • BIOPSY GENERAL  5/1/12    endometrial   • SPINAL CORD STIMULATOR  7/11/2011    Performed by ALLISON GUERRA at [facility]   • BLOCK EPIDURAL STEROID INJECTION  2008    x 3-4   • LYMPH NODE EXCISION Left 2007    cervicle   • OTHER Right 2001    thoracic discectomy     • ARTHROSCOPY, KNEE Left 1999   • RIB RESECTION Right 1980   • LUMPECTOMY         Family history   Family History  "  Problem Relation Age of Onset   • Hypertension Father    • Diabetes Father    • Heart Disease Father    • Alcohol abuse Father    • Anesthesia Sister         slow to come out (as a child)   • Diabetes Other    • Heart Disease Other    • Cancer Other    • Hypertension Other    • Stroke Other    • Lung Disease Other          Medications:   Current Outpatient Medications   Medication   • [START ON 9/28/2020] tramadol (ULTRAM) 50 MG Tab   • Ferrous Sulfate (IRON) 325 (65 Fe) MG Tab   • metFORMIN ER (GLUCOPHAGE XR) 500 MG TABLET SR 24 HR   • DULoxetine (CYMBALTA) 60 MG Cap DR Particles delayed-release capsule   • ondansetron (ZOFRAN) 4 MG Tab tablet   • acetaminophen (TYLENOL) 500 MG Tab   • atorvastatin (LIPITOR) 20 MG Tab   • gabapentin (NEURONTIN) 600 MG tablet   • levothyroxine (SYNTHROID) 50 MCG Tab   • lisinopril-hydrochlorothiazide (PRINZIDE) 20-12.5 MG per tablet   • ibuprofen (MOTRIN) 200 MG Tab   • methocarbamol (ROBAXIN) 750 MG Tab     No current facility-administered medications for this visit.        Allergies:   Allergies   Allergen Reactions   • Bactrim Ds Rash and Swelling     Pt states \"My hand swells up and rash all over body\".   • Sulfamethoxazole-Trimethoprim Rash and Swelling     Pt states \"My hand swells up and rash all over body\".       Social Hx:   Social History     Socioeconomic History   • Marital status: Single     Spouse name: Not on file   • Number of children: Not on file   • Years of education: Not on file   • Highest education level: Not on file   Occupational History   • Not on file   Social Needs   • Financial resource strain: Not on file   • Food insecurity     Worry: Not on file     Inability: Not on file   • Transportation needs     Medical: Not on file     Non-medical: Not on file   Tobacco Use   • Smoking status: Never Smoker   • Smokeless tobacco: Never Used   Substance and Sexual Activity   • Alcohol use: Not Currently     Alcohol/week: 0.0 oz     Comment: 1 glass wine per " "month   • Drug use: No   • Sexual activity: Never     Partners: Male   Lifestyle   • Physical activity     Days per week: Not on file     Minutes per session: Not on file   • Stress: Not on file   Relationships   • Social connections     Talks on phone: Not on file     Gets together: Not on file     Attends Confucianism service: Not on file     Active member of club or organization: Not on file     Attends meetings of clubs or organizations: Not on file     Relationship status: Not on file   • Intimate partner violence     Fear of current or ex partner: Not on file     Emotionally abused: Not on file     Physically abused: Not on file     Forced sexual activity: Not on file   Other Topics Concern   •  Service No   • Blood Transfusions No   • Caffeine Concern No   • Occupational Exposure No   • Hobby Hazards No   • Sleep Concern Yes   • Stress Concern Yes   • Weight Concern Yes   • Special Diet No   • Back Care No   • Exercise No   • Bike Helmet No   • Seat Belt Yes   • Self-Exams No   Social History Narrative   • Not on file         EXAMINATION     Physical Exam:   Vitals: /80 (BP Location: Left arm, Patient Position: Sitting, BP Cuff Size: Small adult)   Pulse 88   Temp 36.4 °C (97.6 °F) (Temporal)   Ht 1.676 m (5' 6\")   Wt 82.9 kg (182 lb 10.4 oz)   SpO2 94%     Constitutional:   Body Habitus: Body mass index is 29.48 kg/m².  Cooperation: Fully cooperates with exam  Appearance: Well-groomed, well-nourished, not disheveled no acute distress    Eyes: No scleral icterus, no proptosis     ENT -no obvious auditory deficits, wearing a face mask, CNII-XII grossly intact.  Tenderness to palpation over the occiput    Skin -no visible skin lesions    Respiratory-  breathing comfortable on room air, no audible wheezing    Cardiovascular- No lower extremity edema is noted.     Psychiatric- alert and oriented ×3. Normal affect.     Gait -  Minimally antalgic without assist device    Neuro/Muscloskeletal  Right " knee lacks about 10 degrees of full extension, but she has flexion that is greater than 90 degrees.    MEDICAL DECISION MAKING    Medical records review: see under HPI section.     DATA    Labs:     03/14/2020 CRP 0.19  03/14/2020 Sed rate 20  02/25/2020: Tramadol and metabolites      Lab Results   Component Value Date/Time    SODIUM 142 01/18/2020 07:41 AM    POTASSIUM 3.7 01/18/2020 07:41 AM    CHLORIDE 103 01/18/2020 07:41 AM    CO2 31 01/18/2020 07:41 AM    ANION 8.0 01/18/2020 07:41 AM    GLUCOSE 103 (H) 01/18/2020 07:41 AM    BUN 13 01/18/2020 07:41 AM    CREATININE 0.71 01/18/2020 07:41 AM    CALCIUM 9.5 01/18/2020 07:41 AM    ASTSGOT 13 01/18/2020 07:41 AM    ALTSGPT 11 01/18/2020 07:41 AM    TBILIRUBIN 0.5 01/18/2020 07:41 AM    ALBUMIN 4.1 01/18/2020 07:41 AM    TOTPROTEIN 6.8 01/18/2020 07:41 AM    GLOBULIN 2.7 01/18/2020 07:41 AM    AGRATIO 1.5 01/18/2020 07:41 AM       Lab Results   Component Value Date/Time    PROTHROMBTM 12.5 09/27/2018 12:45 PM    INR 0.94 09/27/2018 12:45 PM        Lab Results   Component Value Date/Time    WBC 5.8 01/18/2020 07:41 AM    RBC 4.29 01/18/2020 07:41 AM    HEMOGLOBIN 12.6 01/18/2020 07:41 AM    HEMATOCRIT 40.0 01/18/2020 07:41 AM    MCV 93.2 01/18/2020 07:41 AM    MCH 29.4 01/18/2020 07:41 AM    MCHC 31.5 (L) 01/18/2020 07:41 AM    MPV 12.1 01/18/2020 07:41 AM    NEUTSPOLYS 47.60 01/18/2020 07:41 AM    LYMPHOCYTES 42.50 (H) 01/18/2020 07:41 AM    MONOCYTES 7.50 01/18/2020 07:41 AM    EOSINOPHILS 1.70 01/18/2020 07:41 AM    BASOPHILS 0.50 01/18/2020 07:41 AM        Lab Results   Component Value Date/Time    HBA1C 6.1 (H) 01/18/2020 07:41 AM        Imaging: I personally reviewed following images, these are my reads  Xray right hip 03/14/2020  There is mild osteoarthritis of the right hip.      Xray lumbar 03/14/2020  There is mild anterolisthesis at L5-S1.  No abnormal motion on flexion and extension  There is DDD and facet arthropathy that is most noted at L5-S1 and less  at L4-5    Xray right knee 09/16/2019  There is note of right knee arthroplasty    IMAGING radiology reads. I reviewed the following radiology reads    Xray lumbar 03/14/2020  IMPRESSION:     1.  No compression deformity or acute fracture is identified.     2.  Mild anterolisthesis at L5-S1.     3.  Degenerative disc disease and facet arthropathy.     4.  No focal instability noted on flexion extension views.                                                Xray right hip 03/14/2020  IMPRESSION:     1.  No radiographic evidence of acute traumatic injury.     2.  Findings are consistent with mild osteoarthritis.     3.  Probable constipation.                                   Results for orders placed during the hospital encounter of 09/16/19   DX-KNEE 2- RIGHT    Impression Right knee arthroplasty.      Results for orders placed during the hospital encounter of 03/28/19   DX-KNEE 3 VIEWS RIGHT    Impression No evidence of acute fracture or dislocation.    Degenerative changes as above described.                              Diagnosis   Visit Diagnoses     ICD-10-CM   1. Lumbar radiculopathy  M54.16   2. Chronic knee pain, unspecified laterality  M25.569    G89.29   3. S/P TKR (total knee replacement), right  Z96.651   4. Fall, initial encounter  W19.XXXA           ASSESSMENT:  Debby Desai 60 y.o. female seen for above.     Debby was seen today for follow-up.    Diagnoses and all orders for this visit:    Lumbar radiculopathy  -     tramadol (ULTRAM) 50 MG Tab; Take 1 Tab by mouth every 8 hours as needed for Severe Pain for up to 30 days.    Chronic knee pain, unspecified laterality  -     tramadol (ULTRAM) 50 MG Tab; Take 1 Tab by mouth every 8 hours as needed for Severe Pain for up to 30 days.    S/P TKR (total knee replacement), right    Fall, initial encounter         1. Discussed care with Dr. Dimitri Denise at the ER at [facility] regarding her fall in the parking lot, landing hard on her low back and then  hitting the back of her head.  She reports headache and some dizziness, elected to transport patient via EMS and she is agreeable.  2. Discussed plan to continue gradual wean of tramadol.  #81 per month.  Continue with goal of reducing.  3. Continue home exercise program.  4. Continue gabapentin 600mg po tid.  5. Continue duloxetine 60mg and psychology visits.  6. Continue activity as tolerated and continue with physical therapy for radiculopathy and continue ROM for the right knee.      Follow-up: Return in about 4 weeks (around 10/20/2020).     Thank you very much for asking me to participate in Debby Desai's care.  Please contact me with any questions or concerns.      Please note that this dictation was created using voice recognition software. I have made every reasonable attempt to correct obvious errors but there may be errors of grammar and content that I may have overlooked prior to finalization of this note.      Volodymyr Herring MD  Physical Medicine and Rehabilitation  Interventional Spine and Sports Physiatry  [facility]

## 2020-09-22 NOTE — ED PROVIDER NOTES
ED Provider Note  CHIEF COMPLAINT  Chief Complaint   Patient presents with   • GLF       HPI  Debby Desai is a 60 y.o. female who presents to the ER with complaints of headache, neck pain and back pain after she tripped in a crack today in the parking lot outside of [facility] outpatient offices as she was trying to get to a doctor's appointment.  She says she got out of her car and stepped in a crack.  She fell backwards, landing on her mid back and striking her head.  No LOC.  She is not on any blood thinners.  She complains of a headache.  No nausea.  She says she has blurry vision.  No radiating pain down arms or legs.  No numbness, tingling or weakness of extremities.  She is able to get herself up and walk.  No new or different hip pain.  She has chronic low back pain for which she takes tramadol 3 times a day.  She says her doctor is trying to wean her off of that.  She complains of new pain in her mid back and upper back as well as her neck.  No change in her chronic low back pain.  No abdominal pain.  No rib pain.  The pain in her mid back is a little worse when she takes a deep breath.  No shortness of breath per se.  Incident happened around noon today.    REVIEW OF SYSTEMS  See HPI for further details.  Positive for headache, neck pain, back pain, blurry vision.  Negative for LOC, nausea, vomiting, diplopia, dizziness, new hip pain, numbness/tingling/weakness of extremities, lacerations.  All other systems are negative.    PAST MEDICAL HISTORY  Past Medical History:   Diagnosis Date   • Anemia     in past   • Anginal syndrome (HCC)     had work up and feet r/t anxiety   • Anxiety    • Arthritis     OA knees   • Dental disorder 5/24/12    partial upper denture   • High cholesterol    • HTN (hypertension), benign 3/19/2012   • Hypothyroid 3/19/2012   • Major depression 3/19/2012   • Orbital floor (blow-out) closed fracture (HCC)     left   • Other specified symptom associated with female  genital organs     post menopausal bleeding   • Pain     thoracic spine, Right knee, myofacial pain, and Right shoulder   • Pain     recently fell and has bruise left forehead   • Pain 02/2018    chronic back pain, right shoulder   • Psychiatric problem     depression, anxiety   • Unspecified disorder of thyroid    • Urinary bladder disorder     stress incont.   • Urinary incontinence     Occasional       FAMILY HISTORY  Family History   Problem Relation Age of Onset   • Hypertension Father    • Diabetes Father    • Heart Disease Father    • Alcohol abuse Father    • Anesthesia Sister         slow to come out (as a child)   • Diabetes Other    • Heart Disease Other    • Cancer Other    • Hypertension Other    • Stroke Other    • Lung Disease Other        SOCIAL HISTORY  Social History     Socioeconomic History   • Marital status: Single     Spouse name: Not on file   • Number of children: Not on file   • Years of education: Not on file   • Highest education level: Not on file   Occupational History   • Not on file   Social Needs   • Financial resource strain: Not on file   • Food insecurity     Worry: Not on file     Inability: Not on file   • Transportation needs     Medical: Not on file     Non-medical: Not on file   Tobacco Use   • Smoking status: Never Smoker   • Smokeless tobacco: Never Used   Substance and Sexual Activity   • Alcohol use: Not Currently     Alcohol/week: 0.0 oz     Comment: 1 glass wine per month   • Drug use: No   • Sexual activity: Never     Partners: Male   Lifestyle   • Physical activity     Days per week: Not on file     Minutes per session: Not on file   • Stress: Not on file   Relationships   • Social connections     Talks on phone: Not on file     Gets together: Not on file     Attends [employer] service: Not on file     Active member of club or organization: Not on file     Attends meetings of clubs or organizations: Not on file     Relationship status: Not on file   • Intimate partner  violence     Fear of current or ex partner: Not on file     Emotionally abused: Not on file     Physically abused: Not on file     Forced sexual activity: Not on file   Other Topics Concern   •  Service No   • Blood Transfusions No   • Caffeine Concern No   • Occupational Exposure No   • Hobby Hazards No   • Sleep Concern Yes   • Stress Concern Yes   • Weight Concern Yes   • Special Diet No   • Back Care No   • Exercise No   • Bike Helmet No   • Seat Belt Yes   • Self-Exams No   Social History Narrative   • Not on file       SURGICAL HISTORY  Past Surgical History:   Procedure Laterality Date   • PB TOTAL KNEE ARTHROPLASTY Right 9/16/2019    Procedure: RIGHT ARTHROPLASTY, KNEE, TOTAL;  Surgeon: Rufus Saba M.D.;  Location: [facility];  Service: Orthopedics   • KNEE ARTHROSCOPY Right 2/9/2018    Procedure: KNEE ARTHROSCOPY;  Surgeon: Harsh Baltazar M.D.;  Location: [facility];  Service: Orthopedics   • MEDIAL MENISCECTOMY Right 2/9/2018    Procedure: MEDIAL AND LATERAL MENISCECTOMY- PARTIAL;  Surgeon: Harsh Baltazar M.D.;  Location: [facility];  Service: Orthopedics   • KNEE ARTHROSCOPY Right 7/12/2017    Procedure: KNEE ARTHROSCOPY- CESPEDES;  Surgeon: Harsh Baltazar M.D.;  Location: [facility];  Service:    • MEDIAL MENISCECTOMY Right 7/12/2017    Procedure: PARTIAL MEDIAL AND LATERAL MENISCECTOMY;  Surgeon: Harsh Baltazar M.D.;  Location: [facility];  Service:    • SYNOVECTOMY Right 7/12/2017    Procedure: SYNOVECTOMY;  Surgeon: Harsh Baltazar M.D.;  Location: [facility];  Service:    • KNEE ARTHROSCOPY  4/21/2015    Performed by Rufus Saba M.D. at [facility]   • MEDIAL MENISCECTOMY  4/21/2015    Performed by Rufus Saba M.D. at [facility]   • PUMP REVISION  12/10/2012    Performed by Mak Guerra M.D. at [facility]   • SPINAL CORD  "STIMULATOR  5/30/2012    Performed by ALLISON CAM at [facility]   • BIOPSY GENERAL  5/1/12    endometrial   • SPINAL CORD STIMULATOR  7/11/2011    Performed by ALLISON CAM at [facility]   • BLOCK EPIDURAL STEROID INJECTION  2008    x 3-4   • LYMPH NODE EXCISION Left 2007    cervicle   • OTHER Right 2001    thoracic discectomy     • ARTHROSCOPY, KNEE Left 1999   • RIB RESECTION Right 1980   • LUMPECTOMY         CURRENT MEDICATIONS  Home Medications    **Home medications have not yet been reviewed for this encounter**         ALLERGIES  Allergies   Allergen Reactions   • Bactrim Ds Rash and Swelling     Pt states \"My hand swells up and rash all over body\".   • Sulfamethoxazole-Trimethoprim Rash and Swelling     Pt states \"My hand swells up and rash all over body\".       PHYSICAL EXAM  VITAL SIGNS: /60   Pulse 72   Temp 36.4 °C (97.5 °F) (Temporal)   Resp 16   Ht 1.676 m (5' 6\")   Wt 82.8 kg (182 lb 8.7 oz)   LMP 02/26/2012   SpO2 98%   BMI 29.46 kg/m²      Constitutional: Well developed, well nourished; No acute distress; Non-toxic appearance.   HENT: Normocephalic, atraumatic; Bilateral external ears normal; no obvious palpable hematoma, step-off or deformity of the scalp.  Eyes: PERRL, EOMI, Conjunctiva normal. No discharge.   Neck:  Supple, tender diffusely the midline C-spine.  No step-off or deformity.; No stridor; No nuchal rigidity.   Lymphatic: No cervical lymphadenopathy noted.   Cardiovascular: Regular rate and rhythm without murmurs, rubs, or gallop.   Thorax & Lungs: No respiratory distress, breath sounds clear to auscultation bilaterally without wheezing, rales or rhonchi.  Mild left posterior lateral mid rib tenderness.  No crepitus or subcutaneous air  Abdomen: Soft, nontender, bowel sounds normal. No obvious masses; No pulsatile masses; no rebound, guarding, or peritoneal signs.   Skin: Good color; warm and dry without rash or petechia.  Back: " Tender diffusely to the lower T-spine and lumbar spine.  Patient states lumbar spine is chronic pain.  No CVA tenderness.   Extremities: Distal radial, dorsalis pedis, posterior tibial pulses are equal bilaterally; No edema; Nontender calves or saphenous, No cyanosis, No clubbing.   Musculoskeletal: Good range of motion in all major joints. No tenderness to palpation or major deformities noted.  No pain with flexion, extension, internal and external rotation of either hip.  No shortening or rotation.    Neurologic: Alert & oriented x 4, clear speech, lower extremity strengths are 5 out of 5 and equal bilaterally with testing of dorsiflexors and plantar flexors.   are 5 out of 5 and equal bilaterally.  Sensation is intact to light touch.      RADIOLOGY/PROCEDURES  CT-LSPINE W/O PLUS RECONS   Final Result      No evidence of lumbar spine fracture.      CT-TSPINE W/O PLUS RECONS   Final Result         No acute fracture or subluxation of the thoracic spine.      CT-CSPINE WITHOUT PLUS RECONS   Final Result      Degenerative change without evidence of cervical spine fracture.      CT-HEAD W/O   Final Result      Head CT without contrast within normal limits. No evidence of acute cerebral infarction, hemorrhage or mass lesion.      DX-CHEST-PORTABLE (1 VIEW)   Final Result      Stable chest with large lung volumes but no consolidation          COURSE & MEDICAL DECISION MAKING  Pertinent Labs & Imaging studies reviewed. (See chart for details)      Patient presents to the ER with complaints of headache, neck pain and back pain after she tripped on a crack in the concrete out in the parking lot as she was going to an outpatient doctor's appointment here at [facility].  Patient fell backward onto her mid back.  She hit her head.  No LOC.  She is not on any blood thinners.  She complains of neck pain and mid back pain.  She has chronic low back pain but nothing new or different.  No complaints of numbness,  tingling or weakness of extremities.  No loss of bowel or bladder control.  No nausea or vomiting.  She says she has little bit of blurry vision.  She requested some Tylenol for headache, which was given.  Patient underwent CT scan of the head, C-spine, T-spine and L-spine.  She had a little bit of tenderness in the left posterior lateral mid rib cage on palpation so I did a chest x-ray.  Chest x-ray is negative.  No evidence for pneumothorax or obvious rib fracture.  She is not hypoxic.  She is not in any respiratory distress.  CT scans of the head, C-spine, T-spine and L-spine are negative.  No evidence for head bleed or skull fracture.  No evidence for cervical spine, thoracic spine or lumbar spine fractures.  Patient is neurologically intact.  At this time I think she has closed head injury/concussion.  She probably also has a cervical strain and a mid back contusion.  She is to follow-up with her primary care physician.  She is to ice the areas of discomfort.  She is to continue her home tramadol and Neurontin.  I will send her home with some Robaxin to help with muscle spasm.  She is to follow-up with her primary care doctor within the next 1 to 2 days.  She has been given strict return precautions and discharge instructions and she understands treatment plan and follow-up.      I verified that the patient was wearing a mask and I was wearing appropriate PPE every time I entered the room.     FINAL IMPRESSION  1. Fall, initial encounter Acute    2. Closed head injury, initial encounter Acute    3. Strain of neck muscle, initial encounter Acute    4. Contusion of back, unspecified laterality, initial encounter Acute         This dictation has been created using voice recognition software. The accuracy of the dictation is limited by the abilities of the software. I expect there may be some errors of grammar and possibly content. I made every attempt to manually correct the errors within my dictation. However,  errors related to voice recognition software may still exist and should be interpreted within the appropriate context.    Electronically signed by: Anne Harvey M.D., 9/22/2020 3:34 PM

## 2020-09-22 NOTE — ED TRIAGE NOTES
Chief Complaint   Patient presents with   • GLF     Pt states she stepped on a crack in the parking lot and fell backwards, pt complains of head injury , neck and back pain, no open wounds noted, denies blood thinners.

## 2020-09-22 NOTE — Clinical Note
Debby Desai was seen and treated in our emergency department on 9/22/2020.  She may return to work on 09/24/2020.       If you have any questions or concerns, please don't hesitate to call.      Anne Harvey M.D.

## 2020-09-23 NOTE — ED NOTES
"Pt cleared for d/c dischg instructions given to pt  Pt expressed concern that it \"took to long for RN to see her\" apologized to pt for this  Pt verbally understands how busy Ed is and \"I'm see how busy this is I'm just concerned that it took a while for me to see a RN even though I was seen by MD right aware\"  Ready for d/c and she verbally understands her instructions   D/c'ed to home in NAD  " Home Care Instructions Pain Management:    1. DIET:   You may resume your normal diet today.   2. BATHING:   You may shower with luke warm water.  3. DRESSING:   You may remove your bandage today.   4. ACTIVITY LEVEL:   You may resume your normal activities 24 hrs after your procedure.  5. MEDICATIONS:   You may resume your normal medications today.   6. SPECIAL INSTRUCTIONS:   No heat to the injection site for 24 hrs including, bath or shower, heating pad, moist heat, or hot tubs.    Use ice pack to injection site for any pain or discomfort.  Apply ice packs for 20 minute intervals as needed.   If you have received any sedatives by mouth today you may not drive for 12 hours.    If you have received any sedation through your IV, you may not drive for 24 hrs.     PLEASE CALL YOUR DOCTOR IF:  1. Redness or swelling around the injection site.  2. Fever of 101 degrees  3. Drainage (pus) from the injection site.  4. For any continuous bleeding (some dried blood over the incision is normal.)    FOR EMERGENCIES:   If any unusual problems or difficulties occur during clinic hours, call [phone] or [phone].     Thank you for allowing us to care for you today. You may receive a survey about the care we provided. Your feedback is valuable and helps us provide excellent care throughout the community.

## 2020-09-23 NOTE — DISCHARGE INSTRUCTIONS
Use ice to help with the pain.    Follow-up with your primary care physician within the next 1 to 2 days.  Please call for appointment.    Return to the ER for any worsening headache, changing headache, dizziness or lightheadedness, feeling of being off balance, visual changes, nausea, vomiting, worsening neck pain, worsening back pain, numbness/tingling/weakness of your arms or legs, loss of bowel or bladder control, numbness around your genital region, or for any concerns.

## 2020-09-24 RX ORDER — TRAMADOL HYDROCHLORIDE 50 MG/1
50 TABLET ORAL EVERY 8 HOURS PRN
Qty: 81 TAB | Refills: 0 | Status: SHIPPED | OUTPATIENT
Start: 2020-09-28 | End: 2020-10-15 | Stop reason: SDUPTHER

## 2020-10-05 ENCOUNTER — TELEPHONE (OUTPATIENT)
Dept: PHYSICAL MEDICINE AND REHAB | Facility: MEDICAL CENTER | Age: 60
End: 2020-10-05

## 2020-10-07 ENCOUNTER — HOSPITAL ENCOUNTER (EMERGENCY)
Facility: MEDICAL CENTER | Age: 60
End: 2020-10-08
Attending: EMERGENCY MEDICINE | Admitting: EMERGENCY MEDICINE
Payer: COMMERCIAL

## 2020-10-07 ENCOUNTER — APPOINTMENT (OUTPATIENT)
Dept: RADIOLOGY | Facility: MEDICAL CENTER | Age: 60
End: 2020-10-07
Attending: EMERGENCY MEDICINE
Payer: COMMERCIAL

## 2020-10-07 ENCOUNTER — OFFICE VISIT (OUTPATIENT)
Dept: URGENT CARE | Facility: PHYSICIAN GROUP | Age: 60
End: 2020-10-07
Payer: COMMERCIAL

## 2020-10-07 VITALS
TEMPERATURE: 98.1 F | SYSTOLIC BLOOD PRESSURE: 124 MMHG | HEIGHT: 66 IN | BODY MASS INDEX: 29.25 KG/M2 | WEIGHT: 182 LBS | DIASTOLIC BLOOD PRESSURE: 78 MMHG | HEART RATE: 82 BPM | OXYGEN SATURATION: 96 % | RESPIRATION RATE: 15 BRPM

## 2020-10-07 DIAGNOSIS — G44.209 TENSION HEADACHE: ICD-10-CM

## 2020-10-07 DIAGNOSIS — G89.29 OTHER CHRONIC PAIN: ICD-10-CM

## 2020-10-07 DIAGNOSIS — S16.1XXA STRAIN OF NECK MUSCLE, INITIAL ENCOUNTER: ICD-10-CM

## 2020-10-07 DIAGNOSIS — M54.2 CERVICAL PAIN: ICD-10-CM

## 2020-10-07 DIAGNOSIS — G44.311 ACUTE POST-TRAUMATIC HEADACHE, INTRACTABLE: ICD-10-CM

## 2020-10-07 DIAGNOSIS — J34.89 MAXILLARY SINUS MASS: ICD-10-CM

## 2020-10-07 LAB
APTT PPP: 28.6 SEC (ref 24.7–36)
BASOPHILS # BLD AUTO: 0.3 % (ref 0–1.8)
BASOPHILS # BLD: 0.03 K/UL (ref 0–0.12)
EOSINOPHIL # BLD AUTO: 0.22 K/UL (ref 0–0.51)
EOSINOPHIL NFR BLD: 2.4 % (ref 0–6.9)
ERYTHROCYTE [DISTWIDTH] IN BLOOD BY AUTOMATED COUNT: 48.1 FL (ref 35.9–50)
HCT VFR BLD AUTO: 41.6 % (ref 37–47)
HGB BLD-MCNC: 13.6 G/DL (ref 12–16)
IMM GRANULOCYTES # BLD AUTO: 0.04 K/UL (ref 0–0.11)
IMM GRANULOCYTES NFR BLD AUTO: 0.4 % (ref 0–0.9)
INR PPP: 0.91 (ref 0.87–1.13)
LYMPHOCYTES # BLD AUTO: 3.11 K/UL (ref 1–4.8)
LYMPHOCYTES NFR BLD: 34 % (ref 22–41)
MCH RBC QN AUTO: 31.6 PG (ref 27–33)
MCHC RBC AUTO-ENTMCNC: 32.7 G/DL (ref 33.6–35)
MCV RBC AUTO: 96.7 FL (ref 81.4–97.8)
MONOCYTES # BLD AUTO: 0.6 K/UL (ref 0–0.85)
MONOCYTES NFR BLD AUTO: 6.6 % (ref 0–13.4)
NEUTROPHILS # BLD AUTO: 5.15 K/UL (ref 2–7.15)
NEUTROPHILS NFR BLD: 56.3 % (ref 44–72)
NRBC # BLD AUTO: 0 K/UL
NRBC BLD-RTO: 0 /100 WBC
PLATELET # BLD AUTO: 384 K/UL (ref 164–446)
PMV BLD AUTO: 11 FL (ref 9–12.9)
PROTHROMBIN TIME: 12.6 SEC (ref 12–14.6)
RBC # BLD AUTO: 4.3 M/UL (ref 4.2–5.4)
WBC # BLD AUTO: 9.2 K/UL (ref 4.8–10.8)

## 2020-10-07 PROCEDURE — 700111 HCHG RX REV CODE 636 W/ 250 OVERRIDE (IP): Performed by: EMERGENCY MEDICINE

## 2020-10-07 PROCEDURE — 85730 THROMBOPLASTIN TIME PARTIAL: CPT

## 2020-10-07 PROCEDURE — 85610 PROTHROMBIN TIME: CPT

## 2020-10-07 PROCEDURE — 700105 HCHG RX REV CODE 258: Performed by: EMERGENCY MEDICINE

## 2020-10-07 PROCEDURE — 96375 TX/PRO/DX INJ NEW DRUG ADDON: CPT

## 2020-10-07 PROCEDURE — 99284 EMERGENCY DEPT VISIT MOD MDM: CPT

## 2020-10-07 PROCEDURE — 70450 CT HEAD/BRAIN W/O DYE: CPT

## 2020-10-07 PROCEDURE — 96374 THER/PROPH/DIAG INJ IV PUSH: CPT

## 2020-10-07 PROCEDURE — 99215 OFFICE O/P EST HI 40 MIN: CPT | Performed by: NURSE PRACTITIONER

## 2020-10-07 PROCEDURE — 80048 BASIC METABOLIC PNL TOTAL CA: CPT

## 2020-10-07 PROCEDURE — 85025 COMPLETE CBC W/AUTO DIFF WBC: CPT

## 2020-10-07 RX ORDER — KETOROLAC TROMETHAMINE 30 MG/ML
30 INJECTION, SOLUTION INTRAMUSCULAR; INTRAVENOUS ONCE
Status: COMPLETED | OUTPATIENT
Start: 2020-10-08 | End: 2020-10-07

## 2020-10-07 RX ORDER — METOCLOPRAMIDE HYDROCHLORIDE 5 MG/ML
20 INJECTION INTRAMUSCULAR; INTRAVENOUS ONCE
Status: COMPLETED | OUTPATIENT
Start: 2020-10-07 | End: 2020-10-07

## 2020-10-07 RX ORDER — SODIUM CHLORIDE 9 MG/ML
1000 INJECTION, SOLUTION INTRAVENOUS ONCE
Status: COMPLETED | OUTPATIENT
Start: 2020-10-07 | End: 2020-10-08

## 2020-10-07 RX ORDER — DIPHENHYDRAMINE HYDROCHLORIDE 50 MG/ML
50 INJECTION INTRAMUSCULAR; INTRAVENOUS ONCE
Status: COMPLETED | OUTPATIENT
Start: 2020-10-07 | End: 2020-10-07

## 2020-10-07 RX ORDER — GABAPENTIN 600 MG/1
TABLET ORAL
Qty: 90 TAB | Refills: 0 | Status: SHIPPED | OUTPATIENT
Start: 2020-10-07 | End: 2020-12-04

## 2020-10-07 RX ADMIN — SODIUM CHLORIDE 1000 ML: 9 INJECTION, SOLUTION INTRAVENOUS at 22:45

## 2020-10-07 RX ADMIN — DIPHENHYDRAMINE HYDROCHLORIDE 50 MG: 50 INJECTION INTRAMUSCULAR; INTRAVENOUS at 22:47

## 2020-10-07 RX ADMIN — METOCLOPRAMIDE 20 MG: 5 INJECTION, SOLUTION INTRAMUSCULAR; INTRAVENOUS at 22:47

## 2020-10-07 RX ADMIN — KETOROLAC TROMETHAMINE 30 MG: 30 INJECTION, SOLUTION INTRAMUSCULAR; INTRAVENOUS at 23:55

## 2020-10-07 ASSESSMENT — ENCOUNTER SYMPTOMS
HEADACHES: 1
ABDOMINAL PAIN: 0
DIZZINESS: 0
VOMITING: 0
FOCAL WEAKNESS: 0
BLURRED VISION: 0
WEAKNESS: 0
DOUBLE VISION: 0
SENSORY CHANGE: 0
FEVER: 0
CHILLS: 0
NAUSEA: 1
TINGLING: 0
COUGH: 0

## 2020-10-07 ASSESSMENT — FIBROSIS 4 INDEX
FIB4 SCORE: 0.66
FIB4 SCORE: 0.66

## 2020-10-07 ASSESSMENT — LIFESTYLE VARIABLES: SUBSTANCE_ABUSE: 0

## 2020-10-08 VITALS
HEIGHT: 66 IN | BODY MASS INDEX: 29.12 KG/M2 | SYSTOLIC BLOOD PRESSURE: 124 MMHG | DIASTOLIC BLOOD PRESSURE: 62 MMHG | WEIGHT: 181.22 LBS | OXYGEN SATURATION: 91 % | TEMPERATURE: 97.7 F | RESPIRATION RATE: 18 BRPM | HEART RATE: 71 BPM

## 2020-10-08 LAB
ANION GAP SERPL CALC-SCNC: 14 MMOL/L (ref 7–16)
BUN SERPL-MCNC: 25 MG/DL (ref 8–22)
CALCIUM SERPL-MCNC: 10.6 MG/DL (ref 8.5–10.5)
CHLORIDE SERPL-SCNC: 98 MMOL/L (ref 96–112)
CO2 SERPL-SCNC: 27 MMOL/L (ref 20–33)
CREAT SERPL-MCNC: 0.86 MG/DL (ref 0.5–1.4)
GLUCOSE SERPL-MCNC: 100 MG/DL (ref 65–99)
POTASSIUM SERPL-SCNC: 3.9 MMOL/L (ref 3.6–5.5)
SODIUM SERPL-SCNC: 139 MMOL/L (ref 135–145)

## 2020-10-08 RX ORDER — DIAZEPAM 5 MG/1
5 TABLET ORAL EVERY 8 HOURS PRN
Qty: 9 TAB | Refills: 0 | Status: SHIPPED | OUTPATIENT
Start: 2020-10-08 | End: 2020-10-11

## 2020-10-08 NOTE — ED TRIAGE NOTES
PT TO TRIAGE .  Chief Complaint   Patient presents with   • Sent from Urgent Care   • Headache   • Fall     PT FELL 2 WKS AGO BUT HAVING INCREASED HEADACHE AND NECK PAIN OVER LAST 5 DAYS    • Neck Pain

## 2020-10-08 NOTE — PROGRESS NOTES
Subjective:      Debby Desai is a 60 y.o. female who presents with Back Pain (back and neck pain post fall 2 weeks ago, got CT and xrays, still having pain)    Reviewed past medical, surgical and family history. Reviewed prescription and OTC medications with patient in electronic health record today  Allergies: Bactrim ds and Sulfamethoxazole-trimethoprim                HPI this is a new problem.  Debby is a 60-year-old female who complains of severe head and neck pain. She was seen in the emergency room on September 22 after falling down in the parking lot outside of [facility] outpatient offices. She fell when she stepped out of her car. She was able to get herself up unassisted with use of the open car door.    History of chronic lumbar pain.  Sees physiatry.  Takes tramadol 3 times a day for pain. She has been taking tylenol, ibuprofen . She has tried sitting in a dark room, increasing her rest. Taking naps in the daytime now. This is the worst headache of her life and none of her treatments or medications are helping to reduce her head and neck pain.  She has reports that she has never experienced pain or a headache like this before. Currently pain is 8-9/10. Denies nausea, vision change.  The pain is constant and wakes her up at night. Sometimes she feels nauseated because of the pain.  No other aggravating or alleviating factors.       Review of Systems   Constitutional: Negative for chills, fever and malaise/fatigue.   Eyes: Negative for blurred vision and double vision.   Respiratory: Negative for cough.    Cardiovascular: Negative for chest pain.   Gastrointestinal: Positive for nausea. Negative for abdominal pain and vomiting.   Neurological: Positive for headaches. Negative for dizziness, tingling, sensory change, focal weakness and weakness.   Psychiatric/Behavioral: Negative for substance abuse.          Objective:     /78   Pulse 82   Temp 36.7 °C (98.1 °F)   Resp 15   Ht  "1.676 m (5' 6\")   Wt 82.6 kg (182 lb)   LMP 02/26/2012   SpO2 96%   BMI 29.38 kg/m²      Physical Exam  Vitals signs and nursing note reviewed.   Constitutional:       Appearance: Normal appearance. She is normal weight.   HENT:      Head:      Comments: Facial expressions are symmetrical.  Neck:      Musculoskeletal: Normal range of motion. Muscular tenderness present. No neck rigidity.   Cardiovascular:      Rate and Rhythm: Normal rate.      Pulses: Normal pulses.   Pulmonary:      Effort: Pulmonary effort is normal.      Breath sounds: Normal breath sounds.   Skin:     Capillary Refill: Capillary refill takes less than 2 seconds.   Neurological:      General: No focal deficit present.      Mental Status: She is alert and oriented to person, place, and time.      Comments: Spontaneous easy movements of all extremities noted on exam  Normal facial expressions  No delay in verbal expression   focuses attention during exam without difficulty  No disorientation   Clear and coherent speech   Normal coordination  Demonstrates normal anticipated and appropriate emotions  No Memory deficit - recalls all events surrounding injury  No loss of consciousness at the time of injury                Reviewed pertinent past medical records and imaging studies.  CT cervical spine negative  CT thoracic spine negative  CT lumbar spine negative  ERP progess note.      Assessment/Plan:         1. Other chronic pain     2. Acute post-traumatic headache, intractable     3. Cervical pain         [facility] called to arrange transfer to higher level of care. Report given to transfer center. Accept pt for further evaluation and mange  Pt is to be transported via POV. Pt advised not to drive herself. Declines ambulance transport.    I have reiterated to patient that although an Urgent Care to ER transfer was made this will not necessarily expedite the ER process        "

## 2020-10-08 NOTE — DISCHARGE INSTRUCTIONS
Your CAT scan demonstrates a mucoid retention cyst within your right maxillary sinus.  Follow-up for further characterization with otolaryngology.

## 2020-10-08 NOTE — ED PROVIDER NOTES
ED Provider Note        Primary care provider: Loy Miles M.D.    I verified that the patient was wearing a mask and I was wearing appropriate PPE every time I entered the room. The patient's mask was on the patient at all times during my encounter except for a brief view of the oropharynx.      CHIEF COMPLAINT  Chief Complaint   Patient presents with   • Sent from Urgent Care   • Headache   • Fall     PT FELL 2 WKS AGO BUT HAVING INCREASED HEADACHE AND NECK PAIN OVER LAST 5 DAYS    • Neck Pain       HPI  Debby Desai is a 60 y.o. female who presents to the Emergency Department with chief complaint of headache and neck pain.  Patient was seen at her sister facility on 9/22/2020 after she had a ground-level fall striking the back of her head.  CT scan of the head and neck were negative at that time.  She reports that since then she has had progressive worsening of her headache.  She reports is now severe it begins in her neck it radiates over the occiput of her head and into the right temporal area she denies any change in vision or hearing she is had no fevers no nausea no vomiting no altered mental status.  She denies any cough congestion chest pain shortness of breath she denies any trauma to her chest abdomen pelvis or extremities during the previous event.  She was seen in urgent treatment center this evening and was sent here for higher level of care.  No other modifying factors no other acute symptoms or concerns at this time.  She has been taking ibuprofen over the last week but denies any other anticoagulants.    REVIEW OF SYSTEMS  10 systems reviewed and otherwise negative, pertinent positives and negatives listed in the history of present illness.    PAST MEDICAL HISTORY   has a past medical history of Anemia, Anginal syndrome (HCC), Anxiety, Arthritis, Dental disorder (5/24/12), High cholesterol, HTN (hypertension), benign (3/19/2012), Hypothyroid (3/19/2012), Major depression (3/19/2012),  Orbital floor (blow-out) closed fracture (HCC), Other specified symptom associated with female genital organs, Pain, Pain, Pain (02/2018), Psychiatric problem, Unspecified disorder of thyroid, Urinary bladder disorder, and Urinary incontinence.    SURGICAL HISTORY   has a past surgical history that includes lymph node excision (Left, 2007); other (Right, 2001); arthroscopy, knee (Left, 1999); rib resection (Right, 1980); block epidural steroid injection (2008); spinal cord stimulator (7/11/2011); biopsy general (5/1/12); spinal cord stimulator (5/30/2012); pump revision (12/10/2012); knee arthroscopy (4/21/2015); medial meniscectomy (4/21/2015); knee arthroscopy (Right, 7/12/2017); medial meniscectomy (Right, 7/12/2017); synovectomy (Right, 7/12/2017); knee arthroscopy (Right, 2/9/2018); medial meniscectomy (Right, 2/9/2018); total knee arthroplasty (Right, 9/16/2019); and lumpectomy.    SOCIAL HISTORY  Social History     Tobacco Use   • Smoking status: Never Smoker   • Smokeless tobacco: Never Used   Substance Use Topics   • Alcohol use: Not Currently     Alcohol/week: 0.0 oz     Comment: 1 glass wine per month   • Drug use: No      Social History     Substance and Sexual Activity   Drug Use No       FAMILY HISTORY  Non-Contributory    CURRENT MEDICATIONS  Home Medications     Reviewed by Lilia Maddox R.N. (Registered Nurse) on 10/07/20 at 1836  Med List Status: Partial   Medication Last Dose Status   acetaminophen (TYLENOL) 500 MG Tab PRN Active   atorvastatin (LIPITOR) 20 MG Tab 10/6/2020 Active   DULoxetine (CYMBALTA) 60 MG Cap DR Particles delayed-release capsule 10/7/2020 Active   Ferrous Sulfate (IRON) 325 (65 Fe) MG Tab RAN OUT Active   gabapentin (NEURONTIN) 600 MG tablet RAN OUT Active   ibuprofen (MOTRIN) 200 MG Tab PRN Active   levothyroxine (SYNTHROID) 50 MCG Tab 10/7/2020 Active   lisinopril-hydrochlorothiazide (PRINZIDE) 20-12.5 MG per tablet 10/7/2020 Active   metFORMIN ER (GLUCOPHAGE XR) 500 MG  "TABLET SR 24 HR 10/7/2020 Active   ondansetron (ZOFRAN) 4 MG Tab tablet PRN Active   tramadol (ULTRAM) 50 MG Tab PRN Active                ALLERGIES  Allergies   Allergen Reactions   • Bactrim Ds Rash and Swelling     Pt states \"My hand swells up and rash all over body\".   • Sulfamethoxazole-Trimethoprim Rash and Swelling     Pt states \"My hand swells up and rash all over body\".       PHYSICAL EXAM  VITAL SIGNS: /66   Pulse 83   Temp 36.5 °C (97.7 °F) (Temporal)   Resp 16   Ht 1.676 m (5' 6\")   Wt 82.2 kg (181 lb 3.5 oz)   LMP 02/26/2012   SpO2 95%   BMI 29.25 kg/m²   Pulse ox interpretation: I interpret this pulse ox as normal.  Constitutional: Alert and oriented x 3, minimal distress  HEENT: Atraumatic normocephalic, pupils are equal round reactive to light extraocular movements are intact. The nares is clear, external ears are normal TMs are clear bilaterally no septal hematoma no hemotympanum, mouth shows moist mucous membranes  Neck: Supple, no JVD no tracheal deviation no midline or paraspinal tenderness no step-off  Cardiovascular: Regular rate and rhythm no murmur rub or gallop 2+ pulses peripherally x4  Thorax & Lungs: No respiratory distress, no wheezes rales or rhonchi, No chest tenderness.   GI: Soft nontender nondistended positive bowel sounds, no peritoneal signs  Skin: Warm dry no acute rash or lesion  Musculoskeletal: Moving all extremities with full range and 5 of 5 strength, no acute  deformity  Neurologic: Cranial nerves III through XII are grossly intact, no sensory deficit, no cerebellar dysfunction   Psychiatric: Appropriate affect for situation at this time      DIAGNOSTIC STUDIES / PROCEDURES  LABS      Results for orders placed or performed during the hospital encounter of 10/07/20   CBC WITH DIFFERENTIAL   Result Value Ref Range    WBC 9.2 4.8 - 10.8 K/uL    RBC 4.30 4.20 - 5.40 M/uL    Hemoglobin 13.6 12.0 - 16.0 g/dL    Hematocrit 41.6 37.0 - 47.0 %    MCV 96.7 81.4 - 97.8 " fL    MCH 31.6 27.0 - 33.0 pg    MCHC 32.7 (L) 33.6 - 35.0 g/dL    RDW 48.1 35.9 - 50.0 fL    Platelet Count 384 164 - 446 K/uL    MPV 11.0 9.0 - 12.9 fL    Neutrophils-Polys 56.30 44.00 - 72.00 %    Lymphocytes 34.00 22.00 - 41.00 %    Monocytes 6.60 0.00 - 13.40 %    Eosinophils 2.40 0.00 - 6.90 %    Basophils 0.30 0.00 - 1.80 %    Immature Granulocytes 0.40 0.00 - 0.90 %    Nucleated RBC 0.00 /100 WBC    Neutrophils (Absolute) 5.15 2.00 - 7.15 K/uL    Lymphs (Absolute) 3.11 1.00 - 4.80 K/uL    Monos (Absolute) 0.60 0.00 - 0.85 K/uL    Eos (Absolute) 0.22 0.00 - 0.51 K/uL    Baso (Absolute) 0.03 0.00 - 0.12 K/uL    Immature Granulocytes (abs) 0.04 0.00 - 0.11 K/uL    NRBC (Absolute) 0.00 K/uL   Basic Metabolic Panel   Result Value Ref Range    Sodium 139 135 - 145 mmol/L    Potassium 3.9 3.6 - 5.5 mmol/L    Chloride 98 96 - 112 mmol/L    Co2 27 20 - 33 mmol/L    Glucose 100 (H) 65 - 99 mg/dL    Bun 25 (H) 8 - 22 mg/dL    Creatinine 0.86 0.50 - 1.40 mg/dL    Calcium 10.6 (H) 8.5 - 10.5 mg/dL    Anion Gap 14.0 7.0 - 16.0   PTT   Result Value Ref Range    APTT 28.6 24.7 - 36.0 sec   PT/INR   Result Value Ref Range    PT 12.6 12.0 - 14.6 sec    INR 0.91 0.87 - 1.13   ESTIMATED GFR   Result Value Ref Range    GFR If African American >60 >60 mL/min/1.73 m 2    GFR If Non African American >60 >60 mL/min/1.73 m 2       All labs reviewed by me.      RADIOLOGY  CT-HEAD W/O   Final Result      No acute intracranial abnormality.               INTERPRETING LOCATION:  [address] [place] [address], [facility]        The radiologist's interpretation of all radiological studies have been reviewed by me.    COURSE & MEDICAL DECISION MAKING  Pertinent Labs & Imaging studies reviewed. (See chart for details)    10:11 PM - Patient seen and examined at bedside.     Coagulation studies were ordered in light of evaluation of headache and possible intracranial hemorrhage    Patient noted to have slightly elevated blood pressure likely circumstantial  secondary to presenting complaint. Referred to primary care physician for further evaluation.     Patient was given IV fluids based on presentation and symptomatic treatment of headache, oral hydration was not attempted due to insufficiency for hydration, after fluids had improvement of vital signs and resolution of symptoms    Medical Decision Making: CT head negative with the exception of mucoid retention cyst in the right maxillary sinus.  I discussed this with patient and discussed follow-up with ENT.  Patient otherwise has improved symptoms after interventions.  Discussed that I did not feel as though repeat CT of the cervical spine was indicated or appropriate patient reports that after interventions her headache is resolved however still having some neck pain.  Make she is likely suffering some tension headache likely exacerbated by muscle spasm/tension after recent trauma.  Patient given instruction to continue ibuprofen and Tylenol as she has been taking.  She also takes tramadol at home.  We discussed muscle relaxation options I do not think that Robaxin or Flexeril are appropriate with her previous tramadol prescription therefore prescription monitoring program was reviewed and she was given a small prescription for Valium for muscle relaxation.  She given instruction to return for any worsening pain numbness tingling weakness in extremities worsening headache nausea vomiting vision changes hearing changes any other acute symptoms or concerns otherwise discharged in stable and improved condition.    Prescription monitoring program queried and unremarkable.  Patient counseled on the risks of controlled substances including potential risks and benefits proper use alternative treatments, cause the symptoms, provisions of treatment plan, risk of dependence addiction and overdose method safely dispose of the medication, the fact that they would be given no refills from the emergency department.    In  "prescribing controlled substances to this patient, I certify that I have obtained and reviewed the medical history of Debby Desai. I have also made a good barrie effort to obtain applicable records from other providers who have treated the patient and records did not demonstrate any increased risk of substance abuse that would prevent me from prescribing controlled substances.     I have conducted a physical exam and documented it. I have reviewed Ms. Desai’s prescription history as maintained by the [place] Prescription Monitoring Program.     I have assessed the patient’s risk for abuse, dependency, and addiction using the validated Opioid Risk Tool available at https://www.mdcalc.com/bplpax-uipx-amxr-ort-narcotic-abuse.     Given the above, I believe the benefits of controlled substance therapy outweigh the risks. The reasons for prescribing controlled substances include non-narcotic, oral analgesic alternatives have been inadequate for pain control. Accordingly, I have discussed the risk and benefits, treatment plan, and alternative therapies with the patient.         /66   Pulse 83   Temp 36.5 °C (97.7 °F) (Temporal)   Resp 16   Ht 1.676 m (5' 6\")   Wt 82.2 kg (181 lb 3.5 oz)   LMP 02/26/2012   SpO2 95%   BMI 29.25 kg/m²     Loy Miles M.D.  [address]  [address]  [facility] [address] [phone]    In 3 days  if symptoms persist    [facility], Emergency Dept  [address] [facility] [address] [phone]    if symptoms persist, If symptoms worsen, or if you develop any other symptoms or concerns    Mak Cao M.D.  [address] [facility] [address]  [phone]            Discharge Medication List as of 10/8/2020  1:29 AM      START taking these medications    Details   diazePAM (VALIUM) 5 MG Tab Take 1 Tab by mouth every 8 hours as needed for Anxiety (muscle spasm) for up to 3 days., Disp-9 Tab,R-0, Print Rx Paper             FINAL IMPRESSION  1. " Tension headache Active   2. Strain of neck muscle, initial encounter Active   3. Maxillary sinus mass Active       This dictation has been created using voice recognition software and/or scribes. The accuracy of the dictation is limited by the abilities of the software and the expertise of the scribes. I expect there may be some errors of grammar and possibly content. I made every attempt to manually correct the errors within my dictation. However, errors related to voice recognition software and/or scribes may still exist and should be interpreted within the appropriate context.

## 2020-10-15 ENCOUNTER — OFFICE VISIT (OUTPATIENT)
Dept: PHYSICAL MEDICINE AND REHAB | Facility: MEDICAL CENTER | Age: 60
End: 2020-10-15
Payer: COMMERCIAL

## 2020-10-15 VITALS
DIASTOLIC BLOOD PRESSURE: 68 MMHG | SYSTOLIC BLOOD PRESSURE: 120 MMHG | TEMPERATURE: 98 F | OXYGEN SATURATION: 93 % | HEART RATE: 73 BPM | BODY MASS INDEX: 30.33 KG/M2 | WEIGHT: 188.71 LBS | HEIGHT: 66 IN

## 2020-10-15 DIAGNOSIS — M54.2 CERVICALGIA: ICD-10-CM

## 2020-10-15 DIAGNOSIS — G89.29 CHRONIC KNEE PAIN, UNSPECIFIED LATERALITY: ICD-10-CM

## 2020-10-15 DIAGNOSIS — M54.16 LUMBAR RADICULOPATHY: ICD-10-CM

## 2020-10-15 DIAGNOSIS — M43.17 SPONDYLOLISTHESIS AT L5-S1 LEVEL: ICD-10-CM

## 2020-10-15 DIAGNOSIS — R51.9 OCCIPITAL HEADACHE: ICD-10-CM

## 2020-10-15 DIAGNOSIS — M25.569 CHRONIC KNEE PAIN, UNSPECIFIED LATERALITY: ICD-10-CM

## 2020-10-15 DIAGNOSIS — M62.838 MUSCLE SPASM: ICD-10-CM

## 2020-10-15 DIAGNOSIS — Z96.651 S/P TKR (TOTAL KNEE REPLACEMENT), RIGHT: ICD-10-CM

## 2020-10-15 PROCEDURE — 99214 OFFICE O/P EST MOD 30 MIN: CPT | Performed by: PHYSICAL MEDICINE & REHABILITATION

## 2020-10-15 RX ORDER — TRAMADOL HYDROCHLORIDE 50 MG/1
50 TABLET ORAL EVERY 8 HOURS PRN
Qty: 80 TAB | Refills: 0 | Status: SHIPPED | OUTPATIENT
Start: 2020-10-25 | End: 2020-11-03 | Stop reason: SDUPTHER

## 2020-10-15 ASSESSMENT — PATIENT HEALTH QUESTIONNAIRE - PHQ9
SUM OF ALL RESPONSES TO PHQ QUESTIONS 1-9: 13
CLINICAL INTERPRETATION OF PHQ2 SCORE: 4
5. POOR APPETITE OR OVEREATING: 1 - SEVERAL DAYS

## 2020-10-15 ASSESSMENT — PAIN SCALES - GENERAL: PAINLEVEL: 8=MODERATE-SEVERE PAIN

## 2020-10-15 ASSESSMENT — FIBROSIS 4 INDEX: FIB4 SCORE: 0.63

## 2020-10-15 NOTE — PROGRESS NOTES
Follow-up patient note    Physiatry (physical medicine and  Rehabilitation), interventional spine and sports medicine    Date of Service: 10/15/2020    Chief complaint:   Chief Complaint   Patient presents with   • Follow-Up     Leg and hip pain       HISTORY    HPI: Debby Desai 60 y.o. female who presents today for follow-up evaluation of her pain complaints.     Debby reports that she has had some improvement in her right leg pain.  She has been able to continue to gradually decrease her pain medication, but she has been having more symptoms related to her fall.  No new falls.    She has been having more neck and head pain.  This constant.  She went to the ED after our visit on 09/22/2020.  Symptoms also led to evaluation on 10/07/2020 at the ED.  No evidence of bleed.  She has had more neck and upper back pain since this fall.        Her concentration is somewhat impaired, she thinks.  And, she is feeling somewhat forgetful.  The headache is intermittent, but occurs daily.  No radicular symptoms.  The pain in her neck is 8/10 on the NRS.  Her leg pain is now a 4/10 on the NRS    She is working from home and has been able to continue with this, but does feel like she has some difficulty with the above.    EMG demonstrated right lumbar radiculopathy involving L5 on 03/26/2020.      We had discussed reducing her pain medications and she was able to reduce by 2 pills over the month at the last visit.  She is down to taking #80 per month.     Other medications include gabapentin 600mg po tid and duloxetine.  No side effects.     Denies suicidality.  PHQ-9: 13.  She continues to see her therapist.    By history:   She reports that she had surgery on 09/16/2019.  This was a right total knee arthroplasty for osteoarthritis with Dr. Saba.    In 2001, she had discectomy.  She reports that she has had a spinal cord stimulator placed, but she has not been using this as much.  She reports that the unit is not currently  working.    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  She gets up about once an hour.  She has a sit to stand desk, but it is difficult to stand much.  Currently, she is working from home due to the COVID-19 pandemic.    Medical records review:  ED records from 10/07/2020 and 09/22/2020 reviewed.  ED visit from [id number].  Negative head CT.  She was given instructions about taking ibuprofen and tylenol.  Noted that she was also given a script for valium.     I reviewed the note from the referring provider Loy Miles M.D. dated 01/08/2020: Visits included nausea/epigastric pain, hypertension, chronic knee pain, IGT, dyslipidemia, MDD, hypothyroidism, iron deficiency    Previous treatments:    Physical Therapy: Yes    Medications the patient is tried: tramadol, tylenol (usually for a headache); in the past she took gabapentin 400mg po tid, she was taking vimovo and norco in the past; lyrica caused weight gain    Previous interventions: She had radiofrequency ablation in the past, prior to surgery    Previous surgeries to relieve the above pain:  None other than surgery 09/16/2019      ROS: Unchanged from 09/22/2020 except as noted above in HPI  Gen: weight loss  Eyes: vision problems, glasses and contacts  CV: chest pain/anxiety  GI: nausea, ulcers  : urgency  Psych: depression  Endo: thyroid problems  Heme: anemia    Red Flags ROS:   Fever, Chills, Sweats: Denies  Involuntary Weight Loss: Denies  Bladder Incontinence: Denies  Bowel Incontinence: Denies  Saddle Anesthesia: Denies    All other systems reviewed and negative.       PMHx:   Past Medical History:   Diagnosis Date   • Anemia     in past   • Anginal syndrome (HCC)     had work up and feet r/t anxiety   • Anxiety    • Arthritis     OA knees   • Dental disorder 5/24/12    partial upper denture   • High cholesterol    • HTN (hypertension), benign 3/19/2012   • Hypothyroid 3/19/2012   • Major  depression 3/19/2012   • Orbital floor (blow-out) closed fracture (HCC)     left   • Other specified symptom associated with female genital organs     post menopausal bleeding   • Pain     thoracic spine, Right knee, myofacial pain, and Right shoulder   • Pain     recently fell and has bruise left forehead   • Pain 02/2018    chronic back pain, right shoulder   • Psychiatric problem     depression, anxiety   • Unspecified disorder of thyroid    • Urinary bladder disorder     stress incont.   • Urinary incontinence     Occasional       PSHx:   Past Surgical History:   Procedure Laterality Date   • PB TOTAL KNEE ARTHROPLASTY Right 9/16/2019    Procedure: RIGHT ARTHROPLASTY, KNEE, TOTAL;  Surgeon: Rufus Saba M.D.;  Location: [facility];  Service: Orthopedics   • KNEE ARTHROSCOPY Right 2/9/2018    Procedure: KNEE ARTHROSCOPY;  Surgeon: Harsh Baltazar M.D.;  Location: [facility];  Service: Orthopedics   • MEDIAL MENISCECTOMY Right 2/9/2018    Procedure: MEDIAL AND LATERAL MENISCECTOMY- PARTIAL;  Surgeon: Harsh Baltazar M.D.;  Location: [facility];  Service: Orthopedics   • KNEE ARTHROSCOPY Right 7/12/2017    Procedure: KNEE ARTHROSCOPY- CESPEDES;  Surgeon: Harsh Baltazar M.D.;  Location: [facility];  Service:    • MEDIAL MENISCECTOMY Right 7/12/2017    Procedure: PARTIAL MEDIAL AND LATERAL MENISCECTOMY;  Surgeon: Harsh Baltazar M.D.;  Location: [facility];  Service:    • SYNOVECTOMY Right 7/12/2017    Procedure: SYNOVECTOMY;  Surgeon: Harsh Baltazar M.D.;  Location: [facility];  Service:    • KNEE ARTHROSCOPY  4/21/2015    Performed by Rufus Saba M.D. at [facility]   • MEDIAL MENISCECTOMY  4/21/2015    Performed by Rufus Saba M.D. at [facility]   • PUMP REVISION  12/10/2012    Performed by Mak Guerra M.D. at [facility]   • SPINAL CORD STIMULATOR   "5/30/2012    Performed by ALLISON CAM at [facility]   • BIOPSY GENERAL  5/1/12    endometrial   • SPINAL CORD STIMULATOR  7/11/2011    Performed by ALLISON CAM at [facility]   • BLOCK EPIDURAL STEROID INJECTION  2008    x 3-4   • LYMPH NODE EXCISION Left 2007    cervicle   • OTHER Right 2001    thoracic discectomy     • ARTHROSCOPY, KNEE Left 1999   • RIB RESECTION Right 1980   • LUMPECTOMY         Family history   Family History   Problem Relation Age of Onset   • Hypertension Father    • Diabetes Father    • Heart Disease Father    • Alcohol abuse Father    • Anesthesia Sister         slow to come out (as a child)   • Diabetes Other    • Heart Disease Other    • Cancer Other    • Hypertension Other    • Stroke Other    • Lung Disease Other          Medications:   Current Outpatient Medications   Medication   • [START ON 10/25/2020] tramadol (ULTRAM) 50 MG Tab   • gabapentin (NEURONTIN) 600 MG tablet   • atorvastatin (LIPITOR) 20 MG Tab   • levothyroxine (SYNTHROID) 50 MCG Tab   • lisinopril-hydrochlorothiazide (PRINZIDE) 20-12.5 MG per tablet   • ibuprofen (MOTRIN) 200 MG Tab   • metFORMIN ER (GLUCOPHAGE XR) 500 MG TABLET SR 24 HR   • DULoxetine (CYMBALTA) 60 MG Cap DR Particles delayed-release capsule   • ondansetron (ZOFRAN) 4 MG Tab tablet   • acetaminophen (TYLENOL) 500 MG Tab   • Ferrous Sulfate (IRON) 325 (65 Fe) MG Tab     No current facility-administered medications for this visit.        Allergies:   Allergies   Allergen Reactions   • Bactrim Ds Rash and Swelling     Pt states \"My hand swells up and rash all over body\".   • Sulfamethoxazole-Trimethoprim Rash and Swelling     Pt states \"My hand swells up and rash all over body\".       Social Hx:   Social History     Socioeconomic History   • Marital status: Single     Spouse name: Not on file   • Number of children: Not on file   • Years of education: Not on file   • Highest education level: Not on file " "  Occupational History   • Not on file   Social Needs   • Financial resource strain: Not on file   • Food insecurity     Worry: Not on file     Inability: Not on file   • Transportation needs     Medical: Not on file     Non-medical: Not on file   Tobacco Use   • Smoking status: Never Smoker   • Smokeless tobacco: Never Used   Substance and Sexual Activity   • Alcohol use: Not Currently     Alcohol/week: 0.0 oz     Comment: 1 glass wine per month   • Drug use: No   • Sexual activity: Never     Partners: Male   Lifestyle   • Physical activity     Days per week: Not on file     Minutes per session: Not on file   • Stress: Not on file   Relationships   • Social connections     Talks on phone: Not on file     Gets together: Not on file     Attends Jewish service: Not on file     Active member of club or organization: Not on file     Attends meetings of clubs or organizations: Not on file     Relationship status: Not on file   • Intimate partner violence     Fear of current or ex partner: Not on file     Emotionally abused: Not on file     Physically abused: Not on file     Forced sexual activity: Not on file   Other Topics Concern   •  Service No   • Blood Transfusions No   • Caffeine Concern No   • Occupational Exposure No   • Hobby Hazards No   • Sleep Concern Yes   • Stress Concern Yes   • Weight Concern Yes   • Special Diet No   • Back Care No   • Exercise No   • Bike Helmet No   • Seat Belt Yes   • Self-Exams No   Social History Narrative   • Not on file         EXAMINATION     Physical Exam:   Vitals: /68 (BP Location: Right arm, Patient Position: Sitting, BP Cuff Size: Adult long)   Pulse 73   Temp 36.7 °C (98 °F) (Temporal)   Ht 1.676 m (5' 6\")   Wt 85.6 kg (188 lb 11.4 oz)   SpO2 93%     Constitutional:   Body Habitus: Body mass index is 30.46 kg/m².  Cooperation: Fully cooperates with exam  Appearance: Well-groomed, well-nourished, not disheveled no acute distress    Eyes: No scleral " icterus, no proptosis     ENT -no obvious auditory deficits, wearing a face mask, CNII-XII grossly intact.  Tenderness to palpation over the occiput    Skin -no visible skin lesions    Respiratory-  breathing comfortable on room air, no audible wheezing    Cardiovascular- No lower extremity edema is noted.     Psychiatric- alert and oriented ×3. Normal affect.     Gait -  Minimally antalgic without assist device    Neuro/Muscloskeletal  Right knee lacks about 5-8 degrees of full extension, but she has flexion that is greater than 90 degrees.     Tenderness to palpation over the left and right occipital ridge, tenderness over cervical pillars on the right greater than left  No focal motor deficits in the upper or lower extremities bilaterally  Decreased light touch right lateral foot, otherwise, sensation is intact in the upper and lower extremities bilaterally  Reflexes 2+ biceps, biceps bilaterally,  2+ patella left and absent on the right,  and 2 + achilles bilaterally  Seated SLR is negative bilaterally    MEDICAL DECISION MAKING    Medical records review: see under HPI section.     DATA    Labs:     03/14/2020 CRP 0.19  03/14/2020 Sed rate 20  02/25/2020: Tramadol and metabolites      Lab Results   Component Value Date/Time    SODIUM 139 10/07/2020 10:45 PM    POTASSIUM 3.9 10/07/2020 10:45 PM    CHLORIDE 98 10/07/2020 10:45 PM    CO2 27 10/07/2020 10:45 PM    ANION 14.0 10/07/2020 10:45 PM    GLUCOSE 100 (H) 10/07/2020 10:45 PM    BUN 25 (H) 10/07/2020 10:45 PM    CREATININE 0.86 10/07/2020 10:45 PM    CALCIUM 10.6 (H) 10/07/2020 10:45 PM    ASTSGOT 13 01/18/2020 07:41 AM    ALTSGPT 11 01/18/2020 07:41 AM    TBILIRUBIN 0.5 01/18/2020 07:41 AM    ALBUMIN 4.1 01/18/2020 07:41 AM    TOTPROTEIN 6.8 01/18/2020 07:41 AM    GLOBULIN 2.7 01/18/2020 07:41 AM    AGRATIO 1.5 01/18/2020 07:41 AM       Lab Results   Component Value Date/Time    PROTHROMBTM 12.6 10/07/2020 10:45 PM    INR 0.91 10/07/2020 10:45 PM        Lab  Results   Component Value Date/Time    WBC 9.2 10/07/2020 10:45 PM    RBC 4.30 10/07/2020 10:45 PM    HEMOGLOBIN 13.6 10/07/2020 10:45 PM    HEMATOCRIT 41.6 10/07/2020 10:45 PM    MCV 96.7 10/07/2020 10:45 PM    MCH 31.6 10/07/2020 10:45 PM    MCHC 32.7 (L) 10/07/2020 10:45 PM    MPV 11.0 10/07/2020 10:45 PM    NEUTSPOLYS 56.30 10/07/2020 10:45 PM    LYMPHOCYTES 34.00 10/07/2020 10:45 PM    MONOCYTES 6.60 10/07/2020 10:45 PM    EOSINOPHILS 2.40 10/07/2020 10:45 PM    BASOPHILS 0.30 10/07/2020 10:45 PM        Lab Results   Component Value Date/Time    HBA1C 6.1 (H) 01/18/2020 07:41 AM        Imaging: I personally reviewed following images, these are my reads  CT cervical spine 09/22/2020  There is note of cervical spondylosis with multilevel uncovertebral arthropathy    Xray right hip 03/14/2020  There is mild osteoarthritis of the right hip.      Xray lumbar 03/14/2020  There is mild anterolisthesis at L5-S1.  No abnormal motion on flexion and extension  There is DDD and facet arthropathy that is most noted at L5-S1 and less at L4-5    Xray right knee 09/16/2019  There is note of right knee arthroplasty    IMAGING radiology reads. I reviewed the following radiology reads    CT cervical spine 09/22/2020  IMPRESSION:  Degenerative change without evidence of cervical spine fracture.    Xray lumbar 03/14/2020  IMPRESSION:  1.  No compression deformity or acute fracture is identified.  2.  Mild anterolisthesis at L5-S1.  3.  Degenerative disc disease and facet arthropathy.  4.  No focal instability noted on flexion extension views.                                                Xray right hip 03/14/2020  IMPRESSION:     1.  No radiographic evidence of acute traumatic injury.     2.  Findings are consistent with mild osteoarthritis.     3.  Probable constipation.                                   Results for orders placed during the hospital encounter of 09/16/19   DX-KNEE 2- RIGHT    Impression Right knee arthroplasty.       Results for orders placed during the hospital encounter of 03/28/19   DX-KNEE 3 VIEWS RIGHT    Impression No evidence of acute fracture or dislocation.    Degenerative changes as above described.                              Diagnosis   Visit Diagnoses     ICD-10-CM   1. Lumbar radiculopathy  M54.16   2. Spondylolisthesis at L5-S1 level  M43.17   3. S/P TKR (total knee replacement), right  Z96.651   4. Occipital headache  R51.9   5. Muscle spasm  M62.838   6. Cervicalgia  M54.2   7. Chronic knee pain, unspecified laterality  M25.569    G89.29           ASSESSMENT:  Debby Desai 60 y.o. female seen for above.     Debby was seen today for follow-up.    Diagnoses and all orders for this visit:    Lumbar radiculopathy  -     tramadol (ULTRAM) 50 MG Tab; Take 1 Tab by mouth every 8 hours as needed for Severe Pain for up to 30 days.  -     Consent for Opiate Prescription  -     Controlled Substance Treatment Agreement    Spondylolisthesis at L5-S1 level    S/P TKR (total knee replacement), right    Occipital headache  -     REFERRAL TO PHYSIATRY (PMR)    Muscle spasm  -     REFERRAL TO PHYSIATRY (PMR)    Cervicalgia  -     REFERRAL TO PHYSICAL THERAPY Reason for Therapy: Eval/Treat/Report    Chronic knee pain, unspecified laterality  -     tramadol (ULTRAM) 50 MG Tab; Take 1 Tab by mouth every 8 hours as needed for Severe Pain for up to 30 days.  -     Consent for Opiate Prescription  -     Controlled Substance Treatment Agreement         1. Left and right greater occipital nerve injections discussed.  The risks benefits and alternatives to this procedure were discussed and the patient wishes to proceed with the procedure. Risks include but are not limited to damage to surrounding structures, infection, bleeding, worsening of pain which can be permanent, weakness which can be permanent. Benefits include pain relief, improved function. Alternatives includes not doing the procedure.    2. Start PT for neck  3.  Continue wean of tramadol. Script given for #80.  reviewed.  4. Continue gabapentin 600mg po tid and duloxetine.  5. Continue psychology visits, she denies suicidality  6. Continue HEP for right knee, improving ROM.  Radicular symptoms are gradually improving.      Follow-up: Return for Office injection.     Thank you very much for asking me to participate in Debby Desai's care.  Please contact me with any questions or concerns.      Please note that this dictation was created using voice recognition software. I have made every reasonable attempt to correct obvious errors but there may be errors of grammar and content that I may have overlooked prior to finalization of this note.      Volodymyr Herring MD  Physical Medicine and Rehabilitation  Interventional Spine and Sports Physiatry  [facility]

## 2020-10-21 ENCOUNTER — OFFICE VISIT (OUTPATIENT)
Dept: MEDICAL GROUP | Facility: MEDICAL CENTER | Age: 60
End: 2020-10-21
Payer: COMMERCIAL

## 2020-10-21 VITALS
HEIGHT: 66 IN | OXYGEN SATURATION: 92 % | HEART RATE: 85 BPM | DIASTOLIC BLOOD PRESSURE: 72 MMHG | TEMPERATURE: 98.8 F | BODY MASS INDEX: 29.67 KG/M2 | SYSTOLIC BLOOD PRESSURE: 118 MMHG | WEIGHT: 184.6 LBS

## 2020-10-21 DIAGNOSIS — Z23 NEED FOR VACCINATION: ICD-10-CM

## 2020-10-21 DIAGNOSIS — F41.9 ANXIETY DISORDER, UNSPECIFIED TYPE: ICD-10-CM

## 2020-10-21 DIAGNOSIS — F07.81 POST CONCUSSION SYNDROME: ICD-10-CM

## 2020-10-21 DIAGNOSIS — F33.41 MAJOR DEPRESSIVE DISORDER, RECURRENT EPISODE, IN PARTIAL REMISSION (HCC): ICD-10-CM

## 2020-10-21 PROCEDURE — 90686 IIV4 VACC NO PRSV 0.5 ML IM: CPT | Performed by: NURSE PRACTITIONER

## 2020-10-21 PROCEDURE — 99214 OFFICE O/P EST MOD 30 MIN: CPT | Mod: 25 | Performed by: NURSE PRACTITIONER

## 2020-10-21 PROCEDURE — 90471 IMMUNIZATION ADMIN: CPT | Performed by: NURSE PRACTITIONER

## 2020-10-21 RX ORDER — BUPROPION HYDROCHLORIDE 150 MG/1
150 TABLET ORAL EVERY MORNING
Qty: 90 TAB | Refills: 3 | Status: SHIPPED | OUTPATIENT
Start: 2020-10-21 | End: 2021-05-13 | Stop reason: SDUPTHER

## 2020-10-21 ASSESSMENT — FIBROSIS 4 INDEX: FIB4 SCORE: 0.63

## 2020-10-21 NOTE — LETTER
October 21, 2020       Patient: Debby Desai   YOB: 1960   Date of Visit: 10/21/2020         To Whom It May Concern:    In my medical opinion, I recommend that Debby Desai remain out of work until Nov 18, 2020. She will need [facility] paperwork sent to clinic.     If you have any questions or concerns, please don't hesitate to call [phone]          Sincerely,          JOSHUA Chris.  Electronically Signed

## 2020-10-21 NOTE — ASSESSMENT & PLAN NOTE
Chronic. New problem to me today. Has been on duloxetine 60 mg. Has worsened since falling. Denies suicidal thoughts, but does feel that if she did not wake up she would be ok with that.     Seeing therapy, [facility]. Does not feel that this is helping.

## 2020-10-22 NOTE — ASSESSMENT & PLAN NOTE
Patient sustained a closed head injury on 9/22/2020 after falling in the parking lot and striking her head. She was seen in the ER for this. CT spine and head were normal.     On 10/7/2020 she was seen in urgent care and was sent back to the ER for worsening headache and neck pain. Repeat CT head was normal. Diagnosed with concussion.     She has had a concussion in the past as well.

## 2020-11-03 ENCOUNTER — PHYSICAL THERAPY (OUTPATIENT)
Dept: PHYSICAL THERAPY | Facility: MEDICAL CENTER | Age: 60
End: 2020-11-03
Attending: PHYSICAL MEDICINE & REHABILITATION
Payer: COMMERCIAL

## 2020-11-03 ENCOUNTER — OFFICE VISIT (OUTPATIENT)
Dept: PHYSICAL MEDICINE AND REHAB | Facility: MEDICAL CENTER | Age: 60
End: 2020-11-03
Payer: COMMERCIAL

## 2020-11-03 VITALS
BODY MASS INDEX: 29.2 KG/M2 | SYSTOLIC BLOOD PRESSURE: 118 MMHG | WEIGHT: 181.66 LBS | DIASTOLIC BLOOD PRESSURE: 64 MMHG | OXYGEN SATURATION: 94 % | TEMPERATURE: 97.3 F | HEART RATE: 77 BPM | HEIGHT: 66 IN

## 2020-11-03 DIAGNOSIS — M54.16 LUMBAR RADICULOPATHY: ICD-10-CM

## 2020-11-03 DIAGNOSIS — M62.838 MUSCLE SPASM: ICD-10-CM

## 2020-11-03 DIAGNOSIS — G89.29 CHRONIC KNEE PAIN, UNSPECIFIED LATERALITY: ICD-10-CM

## 2020-11-03 DIAGNOSIS — R51.9 OCCIPITAL HEADACHE: ICD-10-CM

## 2020-11-03 DIAGNOSIS — M54.2 CERVICALGIA: ICD-10-CM

## 2020-11-03 DIAGNOSIS — M25.569 CHRONIC KNEE PAIN, UNSPECIFIED LATERALITY: ICD-10-CM

## 2020-11-03 DIAGNOSIS — F33.41 MAJOR DEPRESSIVE DISORDER, RECURRENT EPISODE, IN PARTIAL REMISSION (HCC): ICD-10-CM

## 2020-11-03 PROCEDURE — 97162 PT EVAL MOD COMPLEX 30 MIN: CPT

## 2020-11-03 PROCEDURE — 97110 THERAPEUTIC EXERCISES: CPT

## 2020-11-03 PROCEDURE — 76942 ECHO GUIDE FOR BIOPSY: CPT | Performed by: PHYSICAL MEDICINE & REHABILITATION

## 2020-11-03 PROCEDURE — 64405 NJX AA&/STRD GR OCPL NRV: CPT | Mod: 50 | Performed by: PHYSICAL MEDICINE & REHABILITATION

## 2020-11-03 PROCEDURE — 99214 OFFICE O/P EST MOD 30 MIN: CPT | Mod: 25 | Performed by: PHYSICAL MEDICINE & REHABILITATION

## 2020-11-03 RX ORDER — TRAMADOL HYDROCHLORIDE 50 MG/1
50 TABLET ORAL EVERY 8 HOURS PRN
Qty: 75 TAB | Refills: 0 | Status: SHIPPED | OUTPATIENT
Start: 2020-12-23 | End: 2021-01-14 | Stop reason: SDUPTHER

## 2020-11-03 RX ORDER — TRAMADOL HYDROCHLORIDE 50 MG/1
50 TABLET ORAL EVERY 8 HOURS PRN
Qty: 78 TAB | Refills: 0 | Status: SHIPPED | OUTPATIENT
Start: 2020-11-23 | End: 2021-01-14 | Stop reason: SDUPTHER

## 2020-11-03 RX ORDER — DEXAMETHASONE SODIUM PHOSPHATE 4 MG/ML
4 INJECTION, SOLUTION INTRA-ARTICULAR; INTRALESIONAL; INTRAMUSCULAR; INTRAVENOUS; SOFT TISSUE ONCE
Status: COMPLETED | OUTPATIENT
Start: 2020-11-03 | End: 2020-11-03

## 2020-11-03 RX ADMIN — DEXAMETHASONE SODIUM PHOSPHATE 4 MG: 4 INJECTION, SOLUTION INTRA-ARTICULAR; INTRALESIONAL; INTRAMUSCULAR; INTRAVENOUS; SOFT TISSUE at 16:56

## 2020-11-03 ASSESSMENT — PATIENT HEALTH QUESTIONNAIRE - PHQ9
CLINICAL INTERPRETATION OF PHQ2 SCORE: 2
SUM OF ALL RESPONSES TO PHQ QUESTIONS 1-9: 11
5. POOR APPETITE OR OVEREATING: 0 - NOT AT ALL

## 2020-11-03 ASSESSMENT — ENCOUNTER SYMPTOMS
PAIN SCALE: 6
PAIN TIMING: CONSTANT
QUALITY: ACHING
QUALITY: SQUEEZING
QUALITY: PRESSURE

## 2020-11-03 ASSESSMENT — PAIN SCALES - GENERAL: PAINLEVEL: 7=MODERATE-SEVERE PAIN

## 2020-11-03 ASSESSMENT — FIBROSIS 4 INDEX: FIB4 SCORE: 0.63

## 2020-11-03 NOTE — OP THERAPY EVALUATION
"  Outpatient Physical Therapy  INITIAL EVALUATION    [facility]  [address]  [place] [address]  Phone:  [phone]  Fax:  [phone]    Date of Evaluation: 2020    Patient: Debby Desai  YOB: 1960  MRN: [id number]     Referring Provider: Volodymyr Herring M.D.  [address]  [address]  [place]  [place] [address]   Referring Diagnosis Cervicalgia [M54.2]     Time Calculation    Start time: 1435  Stop time: 1530 Time Calculation (min): 55 minutes             Chief Complaint: Neck Pain, Headache, and Fall    Visit Diagnoses     ICD-10-CM   1. Cervicalgia  M54.2         Subjective   History of Present Illness:     History of chief complaint:  Debby Desai 60 y.o. female who presents today for follow-up evaluation of her pain complaints.  She went to the ED  on 2020, as she had a fall on back with some whiplash. Scans are coming back clear but diagnosed with concussion and had increased headache and decreased range of motion. She is 6 weeks post injury and her symptoms have not improved. She wants to se if we can increase her range and decrease her pain. She is on \"brain rest\" and on break from work right (2 weeks) currently.     Pain:     Current pain ratin    Quality:  Aching, squeezing and pressure    Pain timing:  Constant    Aggravating factors:  Moving    Lifting    Headaches    Relieving factors:  She is on Brain rest   HEating pad    Progression:  Stable    Pain comments:  Stable but not improving     Activity Tolerance:     Current activity tolerance / Recreational activities:  No pets    Used to like reading but not allowed    Lives alone     Work:  [employer], work from home     Social Support:     Lives with:  Alone    Diagnostic Tests:     CT scan: abnormal      FINDINGS:  There is again slight anterolisthesis at C2-3. There is no fracture or dislocation. The craniovertebral junction appears " intact.     The prevertebral and paraspinous soft tissues are unremarkable.     There is mild multilevel uncovertebral joint arthropathy. The superior mediastinum and lung apices in the field of view are unremarkable.     IMPRESSION:     Degenerative change without evidence of cervical spine fracture    Past Medical History:   Diagnosis Date   • Anemia     in past   • Anginal syndrome (HCC)     had work up and feet r/t anxiety   • Anxiety    • Arthritis     OA knees   • Dental disorder 5/24/12    partial upper denture   • High cholesterol    • HTN (hypertension), benign 3/19/2012   • Hypothyroid 3/19/2012   • Major depression 3/19/2012   • Orbital floor (blow-out) closed fracture (HCC)     left   • Other specified symptom associated with female genital organs     post menopausal bleeding   • Pain     thoracic spine, Right knee, myofacial pain, and Right shoulder   • Pain     recently fell and has bruise left forehead   • Pain 02/2018    chronic back pain, right shoulder   • Psychiatric problem     depression, anxiety   • Unspecified disorder of thyroid    • Urinary bladder disorder     stress incont.   • Urinary incontinence     Occasional     Past Surgical History:   Procedure Laterality Date   • PB TOTAL KNEE ARTHROPLASTY Right 9/16/2019    Procedure: RIGHT ARTHROPLASTY, KNEE, TOTAL;  Surgeon: Rufus Saba M.D.;  Location: [facility];  Service: Orthopedics   • KNEE ARTHROSCOPY Right 2/9/2018    Procedure: KNEE ARTHROSCOPY;  Surgeon: Harsh Baltazar M.D.;  Location: [facility];  Service: Orthopedics   • MEDIAL MENISCECTOMY Right 2/9/2018    Procedure: MEDIAL AND LATERAL MENISCECTOMY- PARTIAL;  Surgeon: Harsh Baltazar M.D.;  Location: [facility];  Service: Orthopedics   • KNEE ARTHROSCOPY Right 7/12/2017    Procedure: KNEE ARTHROSCOPY- CESPEDES;  Surgeon: Harsh Baltazar M.D.;  Location: [facility];  Service:    • MEDIAL MENISCECTOMY Right  7/12/2017    Procedure: PARTIAL MEDIAL AND LATERAL MENISCECTOMY;  Surgeon: Harsh Baltazar M.D.;  Location: [facility];  Service:    • SYNOVECTOMY Right 7/12/2017    Procedure: SYNOVECTOMY;  Surgeon: Harsh Baltazar M.D.;  Location: [facility];  Service:    • KNEE ARTHROSCOPY  4/21/2015    Performed by Rufus Saba M.D. at SURGERY [facility]   • MEDIAL MENISCECTOMY  4/21/2015    Performed by Rufus Saba M.D. at SURGERY [facility]   • PUMP REVISION  12/10/2012    Performed by Allison Guerra M.D. at [facility]   • SPINAL CORD STIMULATOR  5/30/2012    Performed by ALLISON GUERRA at [facility]   • BIOPSY GENERAL  5/1/12    endometrial   • SPINAL CORD STIMULATOR  7/11/2011    Performed by ALLISON GUERRA at [facility]   • BLOCK EPIDURAL STEROID INJECTION  2008    x 3-4   • LYMPH NODE EXCISION Left 2007    cervicle   • OTHER Right 2001    thoracic discectomy     • ARTHROSCOPY, KNEE Left 1999   • RIB RESECTION Right 1980   • LUMPECTOMY         Precautions:       Objective   Observation and functional movement:  Guarded with range in all directions. Forward sitting posture     Range of motion and strength:    Normal gaze 12.5 cm chin to sternum  Flexion to 4.5 cm chin to sternum    R rotation 22.5 to 14cm  L Rotation 22.5 to 12.cm     Sensation and reflexes:       Reflexes are normal and symmetrical.    Tingling, headache in the post and top scalpe    Palpation and joint mobility:     Tenderness to palpation over the left and right occipital ridge, tenderness over cervical pillars on the right greater than left    Special tests:      Spurling/compression: pos    Traction: relief     Balance:     No balance deficits noted.    Gait:      Normal pattern gait.    Coordination and tone:     Coordination is intact.    Tone is normal.    Basic self care and IADL's:     Independent with all self care.    Independent with all  ADL's.    Cognition and visual perception:     No cognition deficits noted.    No visual perception deficits noted.    Additional objective details:      Good eye tracking, dilation        Therapeutic Exercises (CPT 87208):     1. Establish HEP       Therapeutic Exercise Summary: Access Code: [email or web address]   URL: [email or web address]   Date: 11/03/2020   Prepared by: Nicho Lucero     Exercises  Seated Passive Cervical Retraction - 10 reps - 2-3 hold - 5x daily - 5x weekly  Cervical Extension AROM with Strap - 5 reps - 5 hold - 5x daily - 5x weekly  Seated Isometric Cervical Sidebending - 3 sets - 5 sec hold - 5x daily - 5x weekly  Standing 'L' Stretch at Counter - 10 reps - 5x daily - 5x weekly      Time-based treatments/modalities:    Physical Therapy Timed Treatment Charges  Therapeutic exercise minutes (CPT 99832): 15 minutes      Assessment, Response and Plan:   Impairments: abnormal or restricted ROM, difficulty performing job, hypersensitivity, lacks appropriate home exercise program and pain with function    Assessment details:  Debby shows decreased range in all ranges after a fall approx 6 weeks ago in which she fell backwards and hit her back and head. She was cleared with CT scan but reports concussion. She warrants therapy to increase range and decrease her pain while she is being follow by her primary care and specialty care.   Barriers to therapy:  None  Prognosis: good    Goals:   Short Term Goals:   1. Establish HEP   2. Patient report 25% decrease in pain via subjective report/objective detail  3. Patient with 25% increased cervical rotation compared to baseline  Short term goal time span:  2-4 weeks      Long Term Goals:    1. Establish HEP   2. Patient report 50% decrease in pain via subjective report/objective detail  3. Patient with 50% increased cervical rotation compared to baseline  4. Able to exercise 3+ x a week without increase in symptoms   Long term goal time span:  4-6  weeks    Plan:   Therapy options:  Physical therapy treatment to continue  Planned therapy interventions:  Manual Therapy (CPT 70408), Mechanical Traction (CPT 88501), E Stim Unattended (CPT 42224), Neuromuscular Re-education (CPT 85197), Therapeutic Activities (CPT 77938) and Therapeutic Exercise (CPT 06551)  Frequency:  2x week  Duration in weeks:  6  Duration in visits:  10  Plan details:  10 visits then reassess      Functional Assessment Used        Referring provider co-signature:  I have reviewed this plan of care and my co-signature certifies the need for services.    Certification Period: 11/03/2020 to  12/16/20    Physician Signature: ________________________________ Date: ______________

## 2020-11-04 ENCOUNTER — OFFICE VISIT (OUTPATIENT)
Dept: MEDICAL GROUP | Facility: MEDICAL CENTER | Age: 60
End: 2020-11-04
Payer: COMMERCIAL

## 2020-11-04 VITALS
HEART RATE: 73 BPM | BODY MASS INDEX: 29.41 KG/M2 | TEMPERATURE: 98.5 F | OXYGEN SATURATION: 96 % | HEIGHT: 66 IN | WEIGHT: 183 LBS | DIASTOLIC BLOOD PRESSURE: 80 MMHG | SYSTOLIC BLOOD PRESSURE: 122 MMHG

## 2020-11-04 DIAGNOSIS — F07.81 POST CONCUSSION SYNDROME: ICD-10-CM

## 2020-11-04 PROCEDURE — 99214 OFFICE O/P EST MOD 30 MIN: CPT | Performed by: NURSE PRACTITIONER

## 2020-11-04 ASSESSMENT — FIBROSIS 4 INDEX: FIB4 SCORE: 0.63

## 2020-11-04 NOTE — PROGRESS NOTES
Subjective:   Debby Desai is a 60 y.o. female here today to establish care and discuss the following    Major depressive disorder, recurrent episode, in partial remission (HCC)  Chronic. New problem to me today. Has been on duloxetine 60 mg. Has worsened since falling. Denies suicidal thoughts, but does feel that if she did not wake up she would be ok with that.     Seeing therapy, [facility]. Does not feel that this is helping.     Anxiety disorder  Chronic. Worsening.     Post concussion syndrome  Patient sustained a closed head injury on 9/22/2020 after falling in the parking lot and striking her head. She was seen in the ER for this. CT spine and head were normal.     On 10/7/2020 she was seen in urgent care and was sent back to the ER for worsening headache and neck pain. Repeat CT head was normal. Diagnosed with concussion.     She has had a concussion in the past as well.        Current medicines (including changes today)  Current Outpatient Medications   Medication Sig Dispense Refill   • buPROPion (WELLBUTRIN XL) 150 MG XL tablet Take 1 Tab by mouth every morning. 90 Tab 3   • [START ON 11/23/2020] traMADol (ULTRAM) 50 MG Tab Take 1 Tab by mouth every 8 hours as needed for Severe Pain for up to 30 days. 78 Tab 0   • [START ON 12/23/2020] traMADol (ULTRAM) 50 MG Tab Take 1 Tab by mouth every 8 hours as needed for Moderate Pain or Severe Pain for up to 30 days. 75 Tab 0   • gabapentin (NEURONTIN) 600 MG tablet TAKE ONE TABLET BY MOUTH THREE TIMES A DAY 90 Tab 0   • atorvastatin (LIPITOR) 20 MG Tab Take 20 mg by mouth every day.     • levothyroxine (SYNTHROID) 50 MCG Tab Take 50 mcg by mouth Every morning on an empty stomach.     • lisinopril-hydrochlorothiazide (PRINZIDE) 20-12.5 MG per tablet Take 1 Tab by mouth every day.     • ibuprofen (MOTRIN) 200 MG Tab Take 600 mg by mouth every 6 hours as needed.     • Ferrous Sulfate (IRON) 325 (65 Fe) MG Tab Take 65 mg by mouth every day at 6 PM.     •  "metFORMIN ER (GLUCOPHAGE XR) 500 MG TABLET SR 24 HR Take 1 Tab by mouth 2 times a day. 180 Tab 3   • DULoxetine (CYMBALTA) 60 MG Cap DR Particles delayed-release capsule Take 1 Cap by mouth every day. 90 Cap 3   • ondansetron (ZOFRAN) 4 MG Tab tablet Take 1 Tab by mouth every four hours as needed for Nausea/Vomiting. 20 Tab 6   • acetaminophen (TYLENOL) 500 MG Tab Take 500-1,000 mg by mouth 2 Times a Day. Pt takes 1000MG @ 0700 and then @ 0730 500MG       No current facility-administered medications for this visit.      She  has a past medical history of Anemia, Anginal syndrome (HCC), Anxiety, Arthritis, Dental disorder (5/24/12), High cholesterol, HTN (hypertension), benign (3/19/2012), Hypothyroid (3/19/2012), Major depression (3/19/2012), Orbital floor (blow-out) closed fracture (HCC), Other specified symptom associated with female genital organs, Pain, Pain, Pain (02/2018), Psychiatric problem, Unspecified disorder of thyroid, Urinary bladder disorder, and Urinary incontinence. She also has no past medical history of Breast cancer ([facility]).    ROS   No chest pain, no shortness of breath, no abdominal pain  Positive ROS as per HPI.  All other systems reviewed and are negative.     Objective:     /72 (BP Location: Right arm, Patient Position: Sitting, BP Cuff Size: Adult)   Pulse 85   Temp 37.1 °C (98.8 °F) (Temporal)   Ht 1.676 m (5' 6\")   Wt 83.7 kg (184 lb 9.6 oz)   SpO2 92%  Body mass index is 29.8 kg/m².     Physical Exam:  Constitutional: Alert, no distress.  Skin: Warm, dry, good turgor, no rashes in visible areas.  Eye: Equal, round and reactive, conjunctiva clear, lids normal.  ENMT: Mask in place  Respiratory: Unlabored respiratory effort  Psych: Alert and oriented x3, normal affect and mood.      Assessment and Plan:   The following treatment plan was discussed    1. Major depressive disorder, recurrent episode, in partial remission (HCC)  Unstable  Continue Cymbalta 60 mg daily  Add wellbutrin " 150 mg daily  - buPROPion (WELLBUTRIN XL) 150 MG XL tablet; Take 1 Tab by mouth every morning.  Dispense: 90 Tab; Refill: 3    2. Anxiety disorder, unspecified type  Unstable  Continue Cymbalta 60 my daily  Add wellbutrin    3. Post concussion syndrome  Unstable  Advised mental rest, discussed in depth  She will need FMLA paperwork for time off    4. Need for vaccination  - Influenza Vaccine Quad Injection (PF)      Followup: Return in about 2 weeks (around 11/4/2020) for FMLA, concussion, Depression/Anxiety.    I have placed the below orders and discussed them with an approved delegating provider. The MA is performing the below orders under the direction of Dr. Hay

## 2020-11-04 NOTE — PROCEDURES
Patient: Debby Desai 60 y.o.     Date of Service: 11/3/2020    Physician/s: Volodymyr Herring MD    Pre-operative Diagnosis: right and left occipital headache, muscle spasm    Post-operative Diagnosis: right and left occipital headache, muscle spasm    Procedure: right and left greater occipital nerve blocks with ultrasound guidance    Description of procedure:    The risks, benefits, and alternatives of the procedure were reviewed and discussed with the patient.  Written informed consent was freely obtained. A pre-procedural time-out was conducted by the physician verifying patient’s identity, procedure to be performed, procedure site and side, and allergy verification. Appropriate equipment was determined to be in place for the procedure.     No sedation was used for this procedure.     In the office suite the patient was placed in a prone position, and his/her forehead was rested on the table in flexion. The target nerve/s for injection was marked using palpation to identify the mastoid process and the occipital protuberance.  The DANA, one third of the distance away from the occipital protuberance, respecitvely. The skin was prepped and draped in the usual sterile fashion. A 27g 1.5 inch needle was placed into skin and advanced under ultrasound guidance in-plane approach to the GREATER occipital nerve/s. Following negative aspiration, 2mL's of a solution containing 3ml of 1% lidocaine and 1ml of dexamethasone (4mg/ml) was injected to the above side(s) perineurally about each nerve. The needle was subsequently removed. The patient's skin was wiped with a 4x4 gauze, the area was cleansed with alcohol prep, and a bandaid was applied. There were no complications noted.      The patient noted improvement in her headache after injection and was discharged in good condition.    Volodymyr Herring MD  Physical Medicine and Rehabilitation  Interventional Spine and Sports Physiatry  [facility]

## 2020-11-04 NOTE — ASSESSMENT & PLAN NOTE
Patient sustained a closed head injury on 9/22/2020 after falling in the parking lot and striking her head. She was seen in the ER for this. CT spine and head were normal.     On 10/7/2020 she was seen in urgent care and was sent back to the ER for worsening headache and neck pain. Repeat CT head was normal. Diagnosed with concussion.     She has had a concussion in the past as well.     She was seen two weeks ago by me, advised mental rest until concussion symptoms resolve. She is here today to complete [facility] paperwork.     She is also seeing Dr. Herring for neck pain and headaches, had an occipital nerve block yesterday, has not noticed significant relief. Also seeing PT twice weekly.

## 2020-11-04 NOTE — PROGRESS NOTES
Follow-up patient note    Physiatry (physical medicine and  Rehabilitation), interventional spine and sports medicine    Date of Service: 11/3/2020    Chief complaint:   Chief Complaint   Patient presents with   • Follow-Up     head pain       HISTORY    HPI: Debby Desai 60 y.o. female who presents today for follow-up evaluation of her pain complaints.     Debby reports that since the last visit, her PCP has taken her off work.  This has been somewhat helpful with regard to her headaches.      She has some continued pain into the right leg, but this is more intermittent now.    She has continued to have headaches.  Pain in the neck and head is a 7/10 on the NRS.     Reducing her tramadol has been going pretty well, she would like to continue to reduce this.  No new side effects.    EMG demonstrated right lumbar radiculopathy involving L5 on 03/26/2020.      We had discussed reducing her pain medications and she was able to reduce by 2 pills over the month at the last visit.  She is down to taking #80 per month, she would like to reduce this by another few pills a month.    Other medications include gabapentin 600mg po tid and duloxetine.  No side effects.  Recently, buproprion was added.     Denies suicidality.  PHQ-9: 11.  She continues to see her therapist.    By history:   She reports that she had surgery on 09/16/2019.  This was a right total knee arthroplasty for osteoarthritis with Dr. Saba.    In 2001, she had discectomy.  She reports that she has had a spinal cord stimulator placed, but she has not been using this as much.  She reports that the unit is not currently working.    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  She gets up about once an hour.  She has a sit to stand desk, but it is difficult to stand much.  Currently, she is working from home due to the COVID-19 pandemic.    Medical records review:  ED records from 10/07/2020 and  09/22/2020 reviewed.  ED visit from [id number].  Negative head CT.  She was given instructions about taking ibuprofen and tylenol.  Noted that she was also given a script for valium.     I reviewed the note from the referring provider Loy Miles M.D. dated 01/08/2020: Visits included nausea/epigastric pain, hypertension, chronic knee pain, IGT, dyslipidemia, MDD, hypothyroidism, iron deficiency    Previous treatments:    Physical Therapy: Yes    Medications the patient is tried: tramadol, tylenol (usually for a headache); in the past she took gabapentin 400mg po tid, she was taking vimovo and norco in the past; lyrica caused weight gain    Previous interventions: She had radiofrequency ablation in the past, prior to surgery    Previous surgeries to relieve the above pain:  None other than surgery 09/16/2019      ROS: Unchanged from 10/15/2020 except as noted above in HPI  Gen: weight loss  Eyes: vision problems, glasses and contacts  CV: chest pain/anxiety  GI: nausea, ulcers  : urgency  Psych: depression  Endo: thyroid problems  Heme: anemia    Red Flags ROS:   Fever, Chills, Sweats: Denies  Involuntary Weight Loss: Denies  Bladder Incontinence: Denies  Bowel Incontinence: Denies  Saddle Anesthesia: Denies    All other systems reviewed and negative.       PMHx:   Past Medical History:   Diagnosis Date   • Anemia     in past   • Anginal syndrome (HCC)     had work up and feet r/t anxiety   • Anxiety    • Arthritis     OA knees   • Dental disorder 5/24/12    partial upper denture   • High cholesterol    • HTN (hypertension), benign 3/19/2012   • Hypothyroid 3/19/2012   • Major depression 3/19/2012   • Orbital floor (blow-out) closed fracture (HCC)     left   • Other specified symptom associated with female genital organs     post menopausal bleeding   • Pain     thoracic spine, Right knee, myofacial pain, and Right shoulder   • Pain     recently fell and has bruise left forehead   • Pain 02/2018    chronic  back pain, right shoulder   • Psychiatric problem     depression, anxiety   • Unspecified disorder of thyroid    • Urinary bladder disorder     stress incont.   • Urinary incontinence     Occasional       PSHx:   Past Surgical History:   Procedure Laterality Date   • PB TOTAL KNEE ARTHROPLASTY Right 9/16/2019    Procedure: RIGHT ARTHROPLASTY, KNEE, TOTAL;  Surgeon: Rufus Saba M.D.;  Location: [facility];  Service: Orthopedics   • KNEE ARTHROSCOPY Right 2/9/2018    Procedure: KNEE ARTHROSCOPY;  Surgeon: Harsh Baltazar M.D.;  Location: [facility];  Service: Orthopedics   • MEDIAL MENISCECTOMY Right 2/9/2018    Procedure: MEDIAL AND LATERAL MENISCECTOMY- PARTIAL;  Surgeon: Harsh Baltazar M.D.;  Location: [facility];  Service: Orthopedics   • KNEE ARTHROSCOPY Right 7/12/2017    Procedure: KNEE ARTHROSCOPY- CESPEDES;  Surgeon: Harsh Baltazar M.D.;  Location: [facility];  Service:    • MEDIAL MENISCECTOMY Right 7/12/2017    Procedure: PARTIAL MEDIAL AND LATERAL MENISCECTOMY;  Surgeon: Harsh Baltazar M.D.;  Location: [facility];  Service:    • SYNOVECTOMY Right 7/12/2017    Procedure: SYNOVECTOMY;  Surgeon: Harsh Baltazar M.D.;  Location: [facility];  Service:    • KNEE ARTHROSCOPY  4/21/2015    Performed by Rufus Saba M.D. at [facility]   • MEDIAL MENISCECTOMY  4/21/2015    Performed by Rufus Saba M.D. at [facility]   • PUMP REVISION  12/10/2012    Performed by Allison Guerra M.D. at [facility]   • SPINAL CORD STIMULATOR  5/30/2012    Performed by ALLISON GUERRA at [facility]   • BIOPSY GENERAL  5/1/12    endometrial   • SPINAL CORD STIMULATOR  7/11/2011    Performed by ALLISON GUERRA at [facility]   • BLOCK EPIDURAL STEROID INJECTION  2008    x 3-4   • LYMPH NODE EXCISION Left 2007    cervicle   • OTHER Right 2001     "thoracic discectomy     • ARTHROSCOPY, KNEE Left 1999   • RIB RESECTION Right 1980   • LUMPECTOMY         Family history   Family History   Problem Relation Age of Onset   • Hypertension Father    • Diabetes Father    • Heart Disease Father    • Alcohol abuse Father    • Anesthesia Sister         slow to come out (as a child)   • Diabetes Other    • Heart Disease Other    • Cancer Other    • Hypertension Other    • Stroke Other    • Lung Disease Other          Medications:   Current Outpatient Medications   Medication   • [START ON 11/23/2020] traMADol (ULTRAM) 50 MG Tab   • [START ON 12/23/2020] traMADol (ULTRAM) 50 MG Tab   • buPROPion (WELLBUTRIN XL) 150 MG XL tablet   • gabapentin (NEURONTIN) 600 MG tablet   • atorvastatin (LIPITOR) 20 MG Tab   • levothyroxine (SYNTHROID) 50 MCG Tab   • lisinopril-hydrochlorothiazide (PRINZIDE) 20-12.5 MG per tablet   • ibuprofen (MOTRIN) 200 MG Tab   • Ferrous Sulfate (IRON) 325 (65 Fe) MG Tab   • metFORMIN ER (GLUCOPHAGE XR) 500 MG TABLET SR 24 HR   • DULoxetine (CYMBALTA) 60 MG Cap DR Particles delayed-release capsule   • ondansetron (ZOFRAN) 4 MG Tab tablet   • acetaminophen (TYLENOL) 500 MG Tab     No current facility-administered medications for this visit.        Allergies:   Allergies   Allergen Reactions   • Bactrim Ds Rash and Swelling     Pt states \"My hand swells up and rash all over body\".   • Sulfamethoxazole-Trimethoprim Rash and Swelling     Pt states \"My hand swells up and rash all over body\".       Social Hx:   Social History     Socioeconomic History   • Marital status: Single     Spouse name: Not on file   • Number of children: Not on file   • Years of education: Not on file   • Highest education level: Not on file   Occupational History   • Not on file   Social Needs   • Financial resource strain: Not on file   • Food insecurity     Worry: Not on file     Inability: Not on file   • Transportation needs     Medical: Not on file     Non-medical: Not on file " "  Tobacco Use   • Smoking status: Never Smoker   • Smokeless tobacco: Never Used   Substance and Sexual Activity   • Alcohol use: Not Currently     Alcohol/week: 0.0 oz     Comment: 1 glass wine per month   • Drug use: No   • Sexual activity: Never     Partners: Male   Lifestyle   • Physical activity     Days per week: Not on file     Minutes per session: Not on file   • Stress: Not on file   Relationships   • Social connections     Talks on phone: Not on file     Gets together: Not on file     Attends Jainism service: Not on file     Active member of club or organization: Not on file     Attends meetings of clubs or organizations: Not on file     Relationship status: Not on file   • Intimate partner violence     Fear of current or ex partner: Not on file     Emotionally abused: Not on file     Physically abused: Not on file     Forced sexual activity: Not on file   Other Topics Concern   •  Service No   • Blood Transfusions No   • Caffeine Concern No   • Occupational Exposure No   • Hobby Hazards No   • Sleep Concern Yes   • Stress Concern Yes   • Weight Concern Yes   • Special Diet No   • Back Care No   • Exercise No   • Bike Helmet No   • Seat Belt Yes   • Self-Exams No   Social History Narrative   • Not on file         EXAMINATION     Physical Exam:   Vitals: /64 (BP Location: Right arm, Patient Position: Sitting, BP Cuff Size: Small adult)   Pulse 77   Temp 36.3 °C (97.3 °F) (Temporal)   Ht 1.676 m (5' 6\")   Wt 82.4 kg (181 lb 10.5 oz)   SpO2 94%     Constitutional:   Body Habitus: Body mass index is 29.32 kg/m².  Cooperation: Fully cooperates with exam  Appearance: Well-groomed, well-nourished, not disheveled no acute distress    Eyes: No scleral icterus, no proptosis     ENT -no obvious auditory deficits, wearing a face mask, CNII-XII grossly intact.  Tenderness to palpation over the occiput    Skin -no visible skin lesions    Respiratory-  breathing comfortable on room air, no audible " wheezing    Cardiovascular- No lower extremity edema is noted.     Psychiatric- alert and oriented ×3. Normal affect.     Gait -  Minimally antalgic without assist device    Neuro/Muscloskeletal  Tenderness to palpation over the left and right occipital ridge, tenderness over cervical pillars on the right greater than left      MEDICAL DECISION MAKING    Medical records review: see under HPI section.     DATA    Labs:     03/14/2020 CRP 0.19  03/14/2020 Sed rate 20  02/25/2020: Tramadol and metabolites      Lab Results   Component Value Date/Time    SODIUM 139 10/07/2020 10:45 PM    POTASSIUM 3.9 10/07/2020 10:45 PM    CHLORIDE 98 10/07/2020 10:45 PM    CO2 27 10/07/2020 10:45 PM    ANION 14.0 10/07/2020 10:45 PM    GLUCOSE 100 (H) 10/07/2020 10:45 PM    BUN 25 (H) 10/07/2020 10:45 PM    CREATININE 0.86 10/07/2020 10:45 PM    CALCIUM 10.6 (H) 10/07/2020 10:45 PM    ASTSGOT 13 01/18/2020 07:41 AM    ALTSGPT 11 01/18/2020 07:41 AM    TBILIRUBIN 0.5 01/18/2020 07:41 AM    ALBUMIN 4.1 01/18/2020 07:41 AM    TOTPROTEIN 6.8 01/18/2020 07:41 AM    GLOBULIN 2.7 01/18/2020 07:41 AM    AGRATIO 1.5 01/18/2020 07:41 AM       Lab Results   Component Value Date/Time    PROTHROMBTM 12.6 10/07/2020 10:45 PM    INR 0.91 10/07/2020 10:45 PM        Lab Results   Component Value Date/Time    WBC 9.2 10/07/2020 10:45 PM    RBC 4.30 10/07/2020 10:45 PM    HEMOGLOBIN 13.6 10/07/2020 10:45 PM    HEMATOCRIT 41.6 10/07/2020 10:45 PM    MCV 96.7 10/07/2020 10:45 PM    MCH 31.6 10/07/2020 10:45 PM    MCHC 32.7 (L) 10/07/2020 10:45 PM    MPV 11.0 10/07/2020 10:45 PM    NEUTSPOLYS 56.30 10/07/2020 10:45 PM    LYMPHOCYTES 34.00 10/07/2020 10:45 PM    MONOCYTES 6.60 10/07/2020 10:45 PM    EOSINOPHILS 2.40 10/07/2020 10:45 PM    BASOPHILS 0.30 10/07/2020 10:45 PM        Lab Results   Component Value Date/Time    HBA1C 6.1 (H) 01/18/2020 07:41 AM        Imaging: I personally reviewed following images, these are my reads  CT cervical spine  09/22/2020  There is note of cervical spondylosis with multilevel uncovertebral arthropathy    Xray right hip 03/14/2020  There is mild osteoarthritis of the right hip.      Xray lumbar 03/14/2020  There is mild anterolisthesis at L5-S1.  No abnormal motion on flexion and extension  There is DDD and facet arthropathy that is most noted at L5-S1 and less at L4-5    Xray right knee 09/16/2019  There is note of right knee arthroplasty    IMAGING radiology reads. I reviewed the following radiology reads    CT cervical spine 09/22/2020  IMPRESSION:  Degenerative change without evidence of cervical spine fracture.    Xray lumbar 03/14/2020  IMPRESSION:  1.  No compression deformity or acute fracture is identified.  2.  Mild anterolisthesis at L5-S1.  3.  Degenerative disc disease and facet arthropathy.  4.  No focal instability noted on flexion extension views.                                                Xray right hip 03/14/2020  IMPRESSION:     1.  No radiographic evidence of acute traumatic injury.     2.  Findings are consistent with mild osteoarthritis.     3.  Probable constipation.                                   Results for orders placed during the hospital encounter of 09/16/19   DX-KNEE 2- RIGHT    Impression Right knee arthroplasty.      Results for orders placed during the hospital encounter of 03/28/19   DX-KNEE 3 VIEWS RIGHT    Impression No evidence of acute fracture or dislocation.    Degenerative changes as above described.                              Diagnosis   Visit Diagnoses     ICD-10-CM   1. Lumbar radiculopathy  M54.16   2. Chronic knee pain, unspecified laterality  M25.569    G89.29   3. Occipital headache  R51.9   4. Muscle spasm  M62.838   5. Major depressive disorder, recurrent episode, in partial remission ([facility])  F33.41           ASSESSMENT:  Debby Desai 60 y.o. female seen for above.     Debby was seen today for follow-up.    Diagnoses and all orders for this visit:    Lumbar  radiculopathy  -     traMADol (ULTRAM) 50 MG Tab; Take 1 Tab by mouth every 8 hours as needed for Severe Pain for up to 30 days.  -     traMADol (ULTRAM) 50 MG Tab; Take 1 Tab by mouth every 8 hours as needed for Moderate Pain or Severe Pain for up to 30 days.  -     Consent for Opiate Prescription  -     Controlled Substance Treatment Agreement    Chronic knee pain, unspecified laterality  -     traMADol (ULTRAM) 50 MG Tab; Take 1 Tab by mouth every 8 hours as needed for Severe Pain for up to 30 days.  -     traMADol (ULTRAM) 50 MG Tab; Take 1 Tab by mouth every 8 hours as needed for Moderate Pain or Severe Pain for up to 30 days.  -     Consent for Opiate Prescription  -     Controlled Substance Treatment Agreement    Occipital headache  -     dexamethasone (DECADRON) injection 4 mg    Muscle spasm  -     dexamethasone (DECADRON) injection 4 mg    Major depressive disorder, recurrent episode, in partial remission (HCC)  -     Patient has been identified as having a positive depression screening. Appropriate orders and counseling have been given.    1.  Left and right greater occipital nerve injections discussed.  The risks benefits and alternatives to this procedure were discussed and the patient wishes to proceed with the procedure. Risks include but are not limited to damage to surrounding structures, infection, bleeding, worsening of pain which can be permanent, weakness which can be permanent. Benefits include pain relief, improved function. Alternatives includes not doing the procedure.  See procedure note from the same date.  2. Start PT for neck, first visit 11/3/2020  3. Continue wean of tramadol. Script given for #78 and then #75 for the next month.  reviewed.  4. Continue gabapentin 600mg po tid and duloxetine.  Note of recently added buproprion.  5. Continue psychology visits, she denies suicidality  6. Continue HEP for right knee, Radiculopathy is slowly improving.        Follow-up: Return in about  2 weeks (around 11/17/2020).  Post-procedure recheck    Thank you very much for asking me to participate in Debby Desai's care.  Please contact me with any questions or concerns.      Please note that this dictation was created using voice recognition software. I have made every reasonable attempt to correct obvious errors but there may be errors of grammar and content that I may have overlooked prior to finalization of this note.      Volodymyr Herring MD  Physical Medicine and Rehabilitation  Interventional Spine and Sports Physiatry  [facility]

## 2020-11-04 NOTE — PROGRESS NOTES
Subjective:   Debby Desai is a 60 y.o. female here today for [place] paperwork    Post concussion syndrome  Patient sustained a closed head injury on 9/22/2020 after falling in the parking lot and striking her head. She was seen in the ER for this. CT spine and head were normal.     On 10/7/2020 she was seen in urgent care and was sent back to the ER for worsening headache and neck pain. Repeat CT head was normal. Diagnosed with concussion.     She has had a concussion in the past as well.     She was seen two weeks ago by me, advised mental rest until concussion symptoms resolve. She is here today to complete [facility] paperwork.     She is also seeing Dr. Herring for neck pain and headaches, had an occipital nerve block yesterday, has not noticed significant relief. Also seeing PT twice weekly.        Current medicines (including changes today)  Current Outpatient Medications   Medication Sig Dispense Refill   • [START ON 11/23/2020] traMADol (ULTRAM) 50 MG Tab Take 1 Tab by mouth every 8 hours as needed for Severe Pain for up to 30 days. 78 Tab 0   • [START ON 12/23/2020] traMADol (ULTRAM) 50 MG Tab Take 1 Tab by mouth every 8 hours as needed for Moderate Pain or Severe Pain for up to 30 days. 75 Tab 0   • buPROPion (WELLBUTRIN XL) 150 MG XL tablet Take 1 Tab by mouth every morning. 90 Tab 3   • gabapentin (NEURONTIN) 600 MG tablet TAKE ONE TABLET BY MOUTH THREE TIMES A DAY 90 Tab 0   • atorvastatin (LIPITOR) 20 MG Tab Take 20 mg by mouth every day.     • levothyroxine (SYNTHROID) 50 MCG Tab Take 50 mcg by mouth Every morning on an empty stomach.     • lisinopril-hydrochlorothiazide (PRINZIDE) 20-12.5 MG per tablet Take 1 Tab by mouth every day.     • ibuprofen (MOTRIN) 200 MG Tab Take 600 mg by mouth every 6 hours as needed.     • Ferrous Sulfate (IRON) 325 (65 Fe) MG Tab Take 65 mg by mouth every day at 6 PM.     • metFORMIN ER (GLUCOPHAGE XR) 500 MG TABLET SR 24 HR Take 1 Tab by mouth 2 times a day. 180 Tab 3   •  "DULoxetine (CYMBALTA) 60 MG Cap DR Particles delayed-release capsule Take 1 Cap by mouth every day. 90 Cap 3   • ondansetron (ZOFRAN) 4 MG Tab tablet Take 1 Tab by mouth every four hours as needed for Nausea/Vomiting. 20 Tab 6   • acetaminophen (TYLENOL) 500 MG Tab Take 500-1,000 mg by mouth 2 Times a Day. Pt takes 1000MG @ 0700 and then @ 0730 500MG       No current facility-administered medications for this visit.      She  has a past medical history of Anemia, Anginal syndrome ([facility]), Anxiety, Arthritis, Dental disorder (5/24/12), High cholesterol, HTN (hypertension), benign (3/19/2012), Hypothyroid (3/19/2012), Major depression (3/19/2012), Orbital floor (blow-out) closed fracture (HCC), Other specified symptom associated with female genital organs, Pain, Pain, Pain (02/2018), Psychiatric problem, Unspecified disorder of thyroid, Urinary bladder disorder, and Urinary incontinence. She also has no past medical history of Breast cancer ([facility]).    ROS   No chest pain, no shortness of breath, no abdominal pain  Positive ROS as per HPI.  All other systems reviewed and are negative.     Objective:     /80 (BP Location: Right arm, Patient Position: Sitting, BP Cuff Size: Adult)   Pulse 73   Temp 36.9 °C (98.5 °F) (Temporal)   Ht 1.676 m (5' 6\")   Wt 83 kg (183 lb)   SpO2 96%  Body mass index is 29.54 kg/m².     Physical Exam:  Constitutional: Alert, no distress.  Skin: Warm, dry, good turgor, no rashes in visible areas.  Eye: Equal, round and reactive, conjunctiva clear, lids normal.  ENMT: Mask in place  Respiratory: Unlabored respiratory effort  Psych: Alert and oriented x3, normal affect and mood.        Assessment and Plan:   The following treatment plan was discussed    1. Post concussion syndrome  Unstable  Has had improvement with mental rest in the last 2 weeks, but still having daily headache, nausea, difficulty concentrating  FMLA completed      Followup: Return in about 4 weeks (around " 12/2/2020).    I have placed the below orders and discussed them with an approved delegating provider. The MA is performing the below orders under the direction of Dr. Hay            Consent Type: Consent 1 (Standard)

## 2020-11-05 ENCOUNTER — PHYSICAL THERAPY (OUTPATIENT)
Dept: PHYSICAL THERAPY | Facility: MEDICAL CENTER | Age: 60
End: 2020-11-05
Attending: PHYSICAL MEDICINE & REHABILITATION
Payer: COMMERCIAL

## 2020-11-05 DIAGNOSIS — M54.2 CERVICALGIA: ICD-10-CM

## 2020-11-05 DIAGNOSIS — M17.11 UNILATERAL PRIMARY OSTEOARTHRITIS, RIGHT KNEE: ICD-10-CM

## 2020-11-05 PROCEDURE — 97014 ELECTRIC STIMULATION THERAPY: CPT

## 2020-11-05 PROCEDURE — 97140 MANUAL THERAPY 1/> REGIONS: CPT

## 2020-11-05 NOTE — OP THERAPY DAILY TREATMENT
Outpatient Physical Therapy  DAILY TREATMENT     [facility]  [address]  [place] [address]  Phone:  [phone]  Fax:  [phone]    Date: 11/05/2020    Patient: Debby Desai  YOB: 1960  MRN: [id number]     Time Calculation    Start time: 0830  Stop time: 0915 Time Calculation (min): 45 minutes         Chief Complaint: Neck Problem and Shoulder Problem    Visit #: 2    SUBJECTIVE:  Debby shows decreased range in all ranges after a fall approx 6 weeks ago in which she fell backwards and hit her back and head. She was cleared with CT scan but reports concussion. She warrants therapy to increase range and decrease her pain while she is being follow by her primary care and specialty care    OBJECTIVE:  Current objective measures:           Therapeutic Exercises (CPT 82999):     1. Review HEP      Therapeutic Exercise Summary: Access Code: [email or web address]   URL: [email or web address]   Date: 11/05/2020   Prepared by: Nicho Lucero     Exercises  Cervical retraction - 10 reps - 2x daily - 5x weekly  Cervical Extension AROM with Strap - 10 reps - 1 sets - 2x daily - 5x weekly  Seated Isometric Cervical Sidebending - 10 reps - 1 sets - 2x daily                            - 5x weekly  Standing 'L' Stretch at Counter - 10 reps - 1 sets - 2x daily - 5x weekly    Therapeutic Treatments and Modalities:     1. Manual Therapy (CPT 80844), IASTM to cervical spine , Pin and stretch to upper trap    2. E Stim Unattended (CPT 62691), Upper cervical     Time-based treatments/modalities:        ASSESSMENT:   Response to treatment: She is having trouble focusing on task and lost her exercises sheet. She was sore but doing ok. Keep progressing as tolerated going forward. May look at research to whether low level aerobic  Would be good for her    PLAN/RECOMMENDATIONS:   Plan for treatment: therapy treatment to continue next visit.  Planned interventions for  next visit: continue with current treatment.

## 2020-11-10 ENCOUNTER — APPOINTMENT (OUTPATIENT)
Dept: PHYSICAL THERAPY | Facility: MEDICAL CENTER | Age: 60
End: 2020-11-10
Attending: PHYSICAL MEDICINE & REHABILITATION
Payer: COMMERCIAL

## 2020-11-13 ENCOUNTER — APPOINTMENT (OUTPATIENT)
Dept: PHYSICAL THERAPY | Facility: MEDICAL CENTER | Age: 60
End: 2020-11-13
Attending: PHYSICAL MEDICINE & REHABILITATION
Payer: COMMERCIAL

## 2020-11-17 ENCOUNTER — PHYSICAL THERAPY (OUTPATIENT)
Dept: PHYSICAL THERAPY | Facility: MEDICAL CENTER | Age: 60
End: 2020-11-17
Attending: PHYSICAL MEDICINE & REHABILITATION
Payer: COMMERCIAL

## 2020-11-17 DIAGNOSIS — M54.2 CERVICALGIA: ICD-10-CM

## 2020-11-17 DIAGNOSIS — M17.11 UNILATERAL PRIMARY OSTEOARTHRITIS, RIGHT KNEE: ICD-10-CM

## 2020-11-17 PROCEDURE — 97014 ELECTRIC STIMULATION THERAPY: CPT

## 2020-11-17 PROCEDURE — 97110 THERAPEUTIC EXERCISES: CPT

## 2020-11-17 PROCEDURE — 97140 MANUAL THERAPY 1/> REGIONS: CPT

## 2020-11-17 NOTE — OP THERAPY DAILY TREATMENT
Outpatient Physical Therapy  DAILY TREATMENT     [facility] Outpatient Physical Therapy  [address]  [place] [address]  Phone:  [phone]  Fax:  [phone]    Date: 11/17/2020    Patient: Debby Desai  YOB: 1960  MRN: [id number]     Time Calculation    Start time: 0835  Stop time: 0920 Time Calculation (min): 45 minutes         Chief Complaint: Neck Problem and Fall    Visit #: 3    SUBJECTIVE:  Debby shows decreased range in all ranges after a fall approx 6 weeks ago in which she fell backwards and hit her back and head. She was cleared with CT scan but reports concussion. She warrants therapy to increase range and decrease her pain while she is being follow by her primary care and specialty care    OBJECTIVE:  Current objective measures:           Therapeutic Exercises (CPT 26830):     1. Review HEP    2. UBE , 2 min forward and 2 min backwards    3. Dowel Flexion and Ext       Therapeutic Exercise Summary: Access Code: [email or web address]   URL: [email or web address]   Date: 11/05/2020   Prepared by: Nicho Lucero     Exercises  Cervical retraction - 10 reps - 2x daily - 5x weekly  Cervical Extension AROM with Strap - 10 reps - 1 sets - 2x daily - 5x weekly  Seated Isometric Cervical Sidebending - 10 reps - 1 sets - 2x daily                            - 5x weekly  Standing 'L' Stretch at Counter - 10 reps - 1 sets - 2x daily - 5x weekly    Therapeutic Treatments and Modalities:     1. Manual Therapy (CPT 22450), IASTM to cervical spine , Pin and stretch to upper trap    2. E Stim Unattended (CPT 26548), Upper cervical     Time-based treatments/modalities:        ASSESSMENT:   Response to treatment: She was sore but doing ok. Keep progressing as tolerated going forward. May look at research to whether low level aerobic  Would be good for her. She has poor tolerance for shoulder use and movement. SLowly increase movement and work load    PLAN/RECOMMENDATIONS:    Plan for treatment: therapy treatment to continue next visit.  Planned interventions for next visit: continue with current treatment.

## 2020-11-20 ENCOUNTER — TELEPHONE (OUTPATIENT)
Dept: MEDICAL GROUP | Facility: MEDICAL CENTER | Age: 60
End: 2020-11-20

## 2020-11-20 ENCOUNTER — PHYSICAL THERAPY (OUTPATIENT)
Dept: PHYSICAL THERAPY | Facility: MEDICAL CENTER | Age: 60
End: 2020-11-20
Attending: PHYSICAL MEDICINE & REHABILITATION
Payer: COMMERCIAL

## 2020-11-20 DIAGNOSIS — M54.2 CERVICALGIA: ICD-10-CM

## 2020-11-20 PROCEDURE — 97014 ELECTRIC STIMULATION THERAPY: CPT

## 2020-11-20 PROCEDURE — 97110 THERAPEUTIC EXERCISES: CPT

## 2020-11-20 PROCEDURE — 97140 MANUAL THERAPY 1/> REGIONS: CPT

## 2020-11-20 NOTE — TELEPHONE ENCOUNTER
----- Message from Tamika Roberts, Med Ass't sent at 11/19/2020 11:24 AM PST -----  Regarding: FW: Non-Urgent Medical Question  Contact: [phone]    ----- Message -----  From: Debby Desai  Sent: 11/19/2020  10:13 AM PST  To: Mile Molina Mas  Subject: Non-Urgent Medical Question                      My headaches have decreased in frequency and intensity and I am thinking clearer. Would it be possible to have a release to return to work on 11/23/20, Monday?  That would give me 3 work days and the 4 days off and I will be able to see how work goes. Thank you. Debby

## 2020-11-20 NOTE — OP THERAPY DAILY TREATMENT
Outpatient Physical Therapy  DAILY TREATMENT     [facility] Outpatient Physical Therapy  [address]  [place] [address]  Phone:  [phone]  Fax:  [phone]    Date: 11/20/2020    Patient: Debby Desai  YOB: 1960  MRN: [id number]     Time Calculation    Start time: 1030  Stop time: 1115 Time Calculation (min): 45 minutes         Chief Complaint: Neck Problem and Fall    Visit #: 4    SUBJECTIVE:  Debby shows decreased range in all ranges after a fall approx 6 weeks ago in which she fell backwards and hit her back and head. She was cleared with CT scan but reports concussion. She warrants therapy to increase range and decrease her pain while she is being follow by her primary care and specialty care    OBJECTIVE:  Current objective measures:           Therapeutic Exercises (CPT 35124):     1. Review HEP    2. Nu step, Seat at 9 arms at 9 level 1 resistance x 5 mins     3. T spine rotation with foam roller, Thread the needle       Therapeutic Exercise Summary: Access Code: [email or web address]   URL: [email or web address]   Date: 11/05/2020   Prepared by: Nicho Lucero     Exercises  Cervical retraction - 10 reps - 2x daily - 5x weekly  Cervical Extension AROM with Strap - 10 reps - 1 sets - 2x daily - 5x weekly  Seated Isometric Cervical Sidebending - 10 reps - 1 sets - 2x daily                            - 5x weekly  Standing 'L' Stretch at Counter - 10 reps - 1 sets - 2x daily - 5x weekly    Therapeutic Treatments and Modalities:     1. Manual Therapy (CPT 43471), IASTM to cervical spine , Pin and stretch to upper trap    2. E Stim Unattended (CPT 79386), Upper cervical     Time-based treatments/modalities:        ASSESSMENT:   Response to treatment: She had a headache coming in today. She feels better after treatment. She will try and go to work next week for a 3 day on 4 days off work schedule to see if she can tolerate it.     PLAN/RECOMMENDATIONS:   Plan for  treatment: therapy treatment to continue next visit.  Planned interventions for next visit: continue with current treatment.

## 2020-11-20 NOTE — LETTER
November 20, 2020       Patient: Debby Desai   YOB: 1960   Date of Visit: 11/20/2020         To Whom It May Concern:    In my medical opinion, I recommend that Debby Desai return to full duty, no restrictions starting Monday, 11/23/2020.    If you have any questions or concerns, please don't hesitate to call [phone]          Sincerely,          Lara Garcia A.P.R.N.  Electronically Signed

## 2020-11-24 ENCOUNTER — APPOINTMENT (OUTPATIENT)
Dept: PHYSICAL THERAPY | Facility: MEDICAL CENTER | Age: 60
End: 2020-11-24
Attending: PHYSICAL MEDICINE & REHABILITATION
Payer: COMMERCIAL

## 2020-12-03 ENCOUNTER — PHYSICAL THERAPY (OUTPATIENT)
Dept: PHYSICAL THERAPY | Facility: MEDICAL CENTER | Age: 60
End: 2020-12-03
Attending: PHYSICAL MEDICINE & REHABILITATION
Payer: COMMERCIAL

## 2020-12-03 DIAGNOSIS — M25.561 RIGHT KNEE PAIN, UNSPECIFIED CHRONICITY: ICD-10-CM

## 2020-12-03 DIAGNOSIS — M54.2 CERVICALGIA: ICD-10-CM

## 2020-12-03 DIAGNOSIS — M17.11 UNILATERAL PRIMARY OSTEOARTHRITIS, RIGHT KNEE: ICD-10-CM

## 2020-12-03 PROCEDURE — 97110 THERAPEUTIC EXERCISES: CPT

## 2020-12-03 PROCEDURE — 97014 ELECTRIC STIMULATION THERAPY: CPT

## 2020-12-03 PROCEDURE — 97140 MANUAL THERAPY 1/> REGIONS: CPT

## 2020-12-03 NOTE — OP THERAPY DAILY TREATMENT
Outpatient Physical Therapy  DAILY TREATMENT     [facility] Outpatient Physical Therapy  [address]  [place] [address]  Phone:  [phone]  Fax:  [phone]    Date: 12/03/2020    Patient: Debby Desai  YOB: 1960  MRN: [id number]     Time Calculation    Start time: 0930  Stop time: 1015 Time Calculation (min): 45 minutes         Chief Complaint: Fall and Neck Problem    Visit #: 5    SUBJECTIVE:  Debby shows decreased range in all ranges after a fall approx 6 weeks ago in which she fell backwards and hit her back and head. She was cleared with CT scan but reports concussion. She warrants therapy to increase range and decrease her pain while she is being follow by her primary care and specialty care    OBJECTIVE:  Current objective measures:           Therapeutic Exercises (CPT 23640):     1. Review HEP    2. Nu step, Seat at 9 arms at 9 level 1 resistance x 5 mins , x 5 mins     3. Door way chest and uper trap stretch      Therapeutic Exercise Summary: Access Code: [email or web address]   URL: [email or web address]   Date: 11/05/2020   Prepared by: Nicho Lucero     Exercises  Cervical retraction - 10 reps - 2x daily - 5x weekly  Cervical Extension AROM with Strap - 10 reps - 1 sets - 2x daily - 5x weekly  Seated Isometric Cervical Sidebending - 10 reps - 1 sets - 2x daily                            - 5x weekly  Standing 'L' Stretch at Counter - 10 reps - 1 sets - 2x daily - 5x weekly    Therapeutic Treatments and Modalities:     1. Manual Therapy (CPT 12754), IASTM to cervical spine , Pin and stretch to upper trap    2. E Stim Unattended (CPT 83993), Upper cervical     Time-based treatments/modalities:        ASSESSMENT:   Response to treatment: Struggled with multiple days of work with increase HA, get relief from therapy and doing exercises. Needs to keep hydrated and move a lot as she can manage for work.     PLAN/RECOMMENDATIONS:   Plan for treatment: therapy  treatment to continue next visit.  Planned interventions for next visit: continue with current treatment.

## 2020-12-04 RX ORDER — GABAPENTIN 600 MG/1
TABLET ORAL
Qty: 90 TAB | Refills: 0 | Status: SHIPPED
Start: 2020-12-04 | End: 2021-03-16

## 2020-12-07 ENCOUNTER — TELEMEDICINE (OUTPATIENT)
Dept: MEDICAL GROUP | Facility: MEDICAL CENTER | Age: 60
End: 2020-12-07
Payer: COMMERCIAL

## 2020-12-07 VITALS — HEIGHT: 66 IN | BODY MASS INDEX: 29.57 KG/M2 | WEIGHT: 184 LBS

## 2020-12-07 DIAGNOSIS — F07.81 POST CONCUSSION SYNDROME: ICD-10-CM

## 2020-12-07 DIAGNOSIS — E78.5 DYSLIPIDEMIA: ICD-10-CM

## 2020-12-07 DIAGNOSIS — I10 HTN (HYPERTENSION), BENIGN: Chronic | ICD-10-CM

## 2020-12-07 DIAGNOSIS — Z00.00 ANNUAL PHYSICAL EXAM: ICD-10-CM

## 2020-12-07 DIAGNOSIS — F33.41 MAJOR DEPRESSIVE DISORDER, RECURRENT EPISODE, IN PARTIAL REMISSION (HCC): ICD-10-CM

## 2020-12-07 DIAGNOSIS — E03.9 ACQUIRED HYPOTHYROIDISM: ICD-10-CM

## 2020-12-07 PROCEDURE — 99214 OFFICE O/P EST MOD 30 MIN: CPT | Mod: 95,CR | Performed by: NURSE PRACTITIONER

## 2020-12-07 ASSESSMENT — FIBROSIS 4 INDEX: FIB4 SCORE: 0.63

## 2020-12-08 ENCOUNTER — PHYSICAL THERAPY (OUTPATIENT)
Dept: PHYSICAL THERAPY | Facility: MEDICAL CENTER | Age: 60
End: 2020-12-08
Attending: PHYSICAL MEDICINE & REHABILITATION
Payer: COMMERCIAL

## 2020-12-08 DIAGNOSIS — M54.2 CERVICALGIA: ICD-10-CM

## 2020-12-08 DIAGNOSIS — M17.11 UNILATERAL PRIMARY OSTEOARTHRITIS, RIGHT KNEE: ICD-10-CM

## 2020-12-08 PROCEDURE — 97014 ELECTRIC STIMULATION THERAPY: CPT

## 2020-12-08 PROCEDURE — 97140 MANUAL THERAPY 1/> REGIONS: CPT

## 2020-12-08 PROCEDURE — 97110 THERAPEUTIC EXERCISES: CPT

## 2020-12-08 NOTE — ASSESSMENT & PLAN NOTE
Chronic. Doing better since adding wellbutrin 150 mg daily to cymbalta 60 mg daily. No more SI.     Continues seeing therapy with [facility].

## 2020-12-08 NOTE — PROGRESS NOTES
"Telemedicine Visit: Established Patient     This encounter was conducted via Zoom.   Verbal consent was obtained. Patient's identity was verified.    Subjective:   CC: Concussion and depression follow up   Debby Desai is a 60 y.o. female presenting for evaluation and management of:    Post concussion syndrome  Patient sustained a closed head injury on 9/22/2020 after falling in the parking lot and striking her head. She was seen in the ER for this. CT spine and head were normal.     On 10/7/2020 she was seen in urgent care and was sent back to the ER for worsening headache and neck pain. Repeat CT head was normal. Diagnosed with concussion.     She has had a concussion in the past as well.     She was seen two weeks ago by me, advised mental rest until concussion symptoms resolve. She is here today to complete [employer] paperwork.     She has been back to work Nov 23, 2 weeks ago and is doing well. Will have some increased headaches during the end of the work week, taking breaks when needed as she is working from home. She states she is feeling much better. Seeing Dr. Herring, did not have significant relief from occipital nerve block. Continue seeing PT twice weekly which is significantly helping.     Major depressive disorder, recurrent episode, in partial remission (HCC)  Chronic. Doing better since adding wellbutrin 150 mg daily to cymbalta 60 mg daily. No more SI.     Continues seeing therapy with [facility].          ROS   Denies any recent fevers or chills. No nausea or vomiting. No chest pains or shortness of breath.     Allergies   Allergen Reactions   • Bactrim Ds Rash and Swelling     Pt states \"My hand swells up and rash all over body\".   • Sulfamethoxazole-Trimethoprim Rash and Swelling     Pt states \"My hand swells up and rash all over body\".       Current medicines (including changes today)  Current Outpatient Medications   Medication Sig Dispense Refill   • gabapentin (NEURONTIN) 600 MG tablet TAKE " ONE TABLET BY MOUTH THREE TIMES A DAY 90 Tab 0   • traMADol (ULTRAM) 50 MG Tab Take 1 Tab by mouth every 8 hours as needed for Severe Pain for up to 30 days. 78 Tab 0   • [START ON 12/23/2020] traMADol (ULTRAM) 50 MG Tab Take 1 Tab by mouth every 8 hours as needed for Moderate Pain or Severe Pain for up to 30 days. 75 Tab 0   • buPROPion (WELLBUTRIN XL) 150 MG XL tablet Take 1 Tab by mouth every morning. 90 Tab 3   • atorvastatin (LIPITOR) 20 MG Tab Take 20 mg by mouth every day.     • levothyroxine (SYNTHROID) 50 MCG Tab Take 50 mcg by mouth Every morning on an empty stomach.     • lisinopril-hydrochlorothiazide (PRINZIDE) 20-12.5 MG per tablet Take 1 Tab by mouth every day.     • ibuprofen (MOTRIN) 200 MG Tab Take 600 mg by mouth every 6 hours as needed.     • Ferrous Sulfate (IRON) 325 (65 Fe) MG Tab Take 65 mg by mouth every day at 6 PM.     • metFORMIN ER (GLUCOPHAGE XR) 500 MG TABLET SR 24 HR Take 1 Tab by mouth 2 times a day. 180 Tab 3   • DULoxetine (CYMBALTA) 60 MG Cap DR Particles delayed-release capsule Take 1 Cap by mouth every day. 90 Cap 3   • ondansetron (ZOFRAN) 4 MG Tab tablet Take 1 Tab by mouth every four hours as needed for Nausea/Vomiting. 20 Tab 6   • acetaminophen (TYLENOL) 500 MG Tab Take 500-1,000 mg by mouth 2 Times a Day. Pt takes 1000MG @ 0700 and then @ 0730 500MG       No current facility-administered medications for this visit.        Patient Active Problem List    Diagnosis Date Noted   • Post concussion syndrome 10/21/2020   • Chronic knee pain 01/27/2020   • Opioid dependence in controlled environment (HCC) 09/27/2018   • Obesity (BMI 30-39.9) 07/09/2018   • IGT (impaired glucose tolerance) 09/29/2017   • Osteoarthritis of right knee 07/12/2017   • Major depressive disorder, recurrent episode, in partial remission ([facility]) 05/05/2017   • Anxiety disorder 10/24/2016   • Osteoarthrosis involving lower leg 04/21/2015   • Dyslipidemia 12/02/2014   • HTN (hypertension), benign 03/19/2012  "  • Acquired hypothyroidism 03/19/2012   • Back pain 03/19/2012   • Neck pain 03/19/2012       Family History   Problem Relation Age of Onset   • Hypertension Father    • Diabetes Father    • Heart Disease Father    • Alcohol abuse Father    • Anesthesia Sister         slow to come out (as a child)   • Diabetes Other    • Heart Disease Other    • Cancer Other    • Hypertension Other    • Stroke Other    • Lung Disease Other        She  has a past medical history of Anemia, Anginal syndrome (HCC), Anxiety, Arthritis, Dental disorder (5/24/12), High cholesterol, HTN (hypertension), benign (3/19/2012), Hypothyroid (3/19/2012), Major depression (3/19/2012), Orbital floor (blow-out) closed fracture (HCC), Other specified symptom associated with female genital organs, Pain, Pain, Pain (02/2018), Psychiatric problem, Unspecified disorder of thyroid, Urinary bladder disorder, and Urinary incontinence. She also has no past medical history of Breast cancer (HCC).  She  has a past surgical history that includes lymph node excision (Left, 2007); other (Right, 2001); arthroscopy, knee (Left, 1999); rib resection (Right, 1980); block epidural steroid injection (2008); spinal cord stimulator (7/11/2011); biopsy general (5/1/12); spinal cord stimulator (5/30/2012); pump revision (12/10/2012); knee arthroscopy (4/21/2015); medial meniscectomy (4/21/2015); knee arthroscopy (Right, 7/12/2017); medial meniscectomy (Right, 7/12/2017); synovectomy (Right, 7/12/2017); knee arthroscopy (Right, 2/9/2018); medial meniscectomy (Right, 2/9/2018); pr total knee arthroplasty (Right, 9/16/2019); and lumpectomy.       Objective:   Ht 1.676 m (5' 6\")   Wt 83.5 kg (184 lb)   LMP 02/26/2012   BMI 29.70 kg/m²     Physical Exam:  Constitutional: Alert, no distress, well-groomed.  Skin: No rashes in visible areas.  Eye: Round. Conjunctiva clear, lids normal. No icterus.   ENMT: Lips pink without lesions, good dentition, moist mucous membranes. " Phonation normal.  Neck: No masses, no thyromegaly. Moves freely without pain.  CV: Pulse as reported by patient  Respiratory: Unlabored respiratory effort, no cough or audible wheeze  Psych: Alert and oriented x3, normal affect and mood.       Assessment and Plan:   The following treatment plan was discussed:     1. Post concussion syndrome  Stable  Significantly improved  Continue follow up with PT and Physiatry  Mental rest when needed    2. Major depressive disorder, recurrent episode, in partial remission (HCC)  Stable  Continue duloxetine and wellbutrin daily    3. Annual physical exam  Due for labs  - CBC WITH DIFFERENTIAL; Future  - Comp Metabolic Panel; Future  - HEMOGLOBIN A1C; Future  - MICROALBUMIN CREAT RATIO URINE; Future  - Lipid Profile; Future  - TSH WITH REFLEX TO FT4; Future  - VITAMIN D,25 HYDROXY; Future    4. HTN (hypertension), benign  - Comp Metabolic Panel; Future  - MICROALBUMIN CREAT RATIO URINE; Future    5. Dyslipidemia  - Lipid Profile; Future    6. Acquired hypothyroidism  - TSH; Future  - FREE THYROXINE; Future        Follow-up: Return in about 6 months (around 6/7/2021).

## 2020-12-08 NOTE — OP THERAPY DAILY TREATMENT
Outpatient Physical Therapy  DAILY TREATMENT     [facility] Outpatient Physical Therapy  [address]  [place] [address]  Phone:  [phone]  Fax:  [phone]    Date: 12/08/2020    Patient: Debby Desai  YOB: 1960  MRN: [id number]     Time Calculation    Start time: 1000  Stop time: 1045 Time Calculation (min): 45 minutes         Chief Complaint: Shoulder Problem and Neck Problem    Visit #: 6    SUBJECTIVE:  Debby shows decreased range in all ranges after a fall approx 6 weeks ago in which she fell backwards and hit her back and head. She was cleared with CT scan but reports concussion. She warrants therapy to increase range and decrease her pain while she is being follow by her primary care and specialty care    Some feeling of of balance when standing. Less headache     OBJECTIVE:  Current objective measures:           Therapeutic Exercises (CPT 33767):     1. Review HEP    2. UBE     3. Foam roll, 4 pt stretch     4. Foam roll, Thread the needle stretch       Therapeutic Exercise Summary: Access Code: [email or web address]   URL: [email or web address]   Date: 11/05/2020   Prepared by: Nicho Lucero     Exercises  Cervical retraction - 10 reps - 2x daily - 5x weekly  Cervical Extension AROM with Strap - 10 reps - 1 sets - 2x daily - 5x weekly  Seated Isometric Cervical Sidebending - 10 reps - 1 sets - 2x daily                            - 5x weekly  Standing 'L' Stretch at Counter - 10 reps - 1 sets - 2x daily - 5x weekly    Therapeutic Treatments and Modalities:     1. Manual Therapy (CPT 77846), IASTM to cervical spine , Pin and stretch to upper trap    2. E Stim Unattended (CPT 15098), Upper cervical     Time-based treatments/modalities:        ASSESSMENT:   Response to treatment: She is working hard to manage work and hours and sneak in exercises. May do a balance assessment next visit. She has seemed a bit off at times.   PLAN/RECOMMENDATIONS:   Plan for  treatment: therapy treatment to continue next visit.  Planned interventions for next visit: continue with current treatment.

## 2020-12-08 NOTE — ASSESSMENT & PLAN NOTE
Patient sustained a closed head injury on 9/22/2020 after falling in the parking lot and striking her head. She was seen in the ER for this. CT spine and head were normal.     On 10/7/2020 she was seen in urgent care and was sent back to the ER for worsening headache and neck pain. Repeat CT head was normal. Diagnosed with concussion.     She has had a concussion in the past as well.     She was seen two weeks ago by me, advised mental rest until concussion symptoms resolve. She is here today to complete [place] paperwork.     She has been back to work Nov 23, 2 weeks ago and is doing well. Will have some increased headaches during the end of the work week, taking breaks when needed as she is working from home. She states she is feeling much better. Seeing Dr. Herring, did not have significant relief from occipital nerve block. Continue seeing PT twice weekly which is significantly helping.

## 2020-12-10 ENCOUNTER — APPOINTMENT (OUTPATIENT)
Dept: PHYSICAL THERAPY | Facility: MEDICAL CENTER | Age: 60
End: 2020-12-10
Attending: PHYSICAL MEDICINE & REHABILITATION
Payer: COMMERCIAL

## 2020-12-15 ENCOUNTER — APPOINTMENT (OUTPATIENT)
Dept: PHYSICAL THERAPY | Facility: MEDICAL CENTER | Age: 60
End: 2020-12-15
Attending: PHYSICAL MEDICINE & REHABILITATION
Payer: COMMERCIAL

## 2020-12-17 ENCOUNTER — APPOINTMENT (OUTPATIENT)
Dept: PHYSICAL THERAPY | Facility: MEDICAL CENTER | Age: 60
End: 2020-12-17
Attending: PHYSICAL MEDICINE & REHABILITATION
Payer: COMMERCIAL

## 2020-12-18 ENCOUNTER — HOSPITAL ENCOUNTER (OUTPATIENT)
Dept: RADIOLOGY | Facility: MEDICAL CENTER | Age: 60
End: 2020-12-18
Attending: PHYSICIAN ASSISTANT
Payer: COMMERCIAL

## 2020-12-18 DIAGNOSIS — S20.20XS: ICD-10-CM

## 2020-12-18 PROCEDURE — 71046 X-RAY EXAM CHEST 2 VIEWS: CPT

## 2020-12-20 DIAGNOSIS — Z23 NEED FOR VACCINATION: ICD-10-CM

## 2020-12-31 ENCOUNTER — IMMUNIZATION (OUTPATIENT)
Dept: FAMILY PLANNING/WOMEN'S HEALTH CLINIC | Facility: IMMUNIZATION CENTER | Age: 60
End: 2020-12-31
Attending: FAMILY MEDICINE
Payer: COMMERCIAL

## 2020-12-31 DIAGNOSIS — Z23 ENCOUNTER FOR VACCINATION: Primary | ICD-10-CM

## 2020-12-31 DIAGNOSIS — Z23 NEED FOR VACCINATION: ICD-10-CM

## 2020-12-31 PROCEDURE — 0011A MODERNA SARS-COV-2 VACCINE: CPT | Performed by: FAMILY MEDICINE

## 2020-12-31 PROCEDURE — 91301 MODERNA SARS-COV-2 VACCINE: CPT | Performed by: FAMILY MEDICINE

## 2021-01-14 ENCOUNTER — OFFICE VISIT (OUTPATIENT)
Dept: PHYSICAL MEDICINE AND REHAB | Facility: MEDICAL CENTER | Age: 61
End: 2021-01-14
Payer: COMMERCIAL

## 2021-01-14 VITALS
SYSTOLIC BLOOD PRESSURE: 126 MMHG | RESPIRATION RATE: 14 BRPM | HEART RATE: 72 BPM | DIASTOLIC BLOOD PRESSURE: 70 MMHG | HEIGHT: 66 IN | WEIGHT: 190.26 LBS | TEMPERATURE: 98.6 F | BODY MASS INDEX: 30.58 KG/M2 | OXYGEN SATURATION: 97 %

## 2021-01-14 DIAGNOSIS — M54.16 LUMBAR RADICULOPATHY: ICD-10-CM

## 2021-01-14 DIAGNOSIS — M25.569 CHRONIC KNEE PAIN, UNSPECIFIED LATERALITY: ICD-10-CM

## 2021-01-14 DIAGNOSIS — M43.17 SPONDYLOLISTHESIS AT L5-S1 LEVEL: ICD-10-CM

## 2021-01-14 DIAGNOSIS — F33.41 MAJOR DEPRESSIVE DISORDER, RECURRENT EPISODE, IN PARTIAL REMISSION (HCC): ICD-10-CM

## 2021-01-14 DIAGNOSIS — G89.29 CHRONIC KNEE PAIN, UNSPECIFIED LATERALITY: ICD-10-CM

## 2021-01-14 DIAGNOSIS — Z96.651 S/P TKR (TOTAL KNEE REPLACEMENT), RIGHT: ICD-10-CM

## 2021-01-14 PROCEDURE — 99214 OFFICE O/P EST MOD 30 MIN: CPT | Performed by: PHYSICAL MEDICINE & REHABILITATION

## 2021-01-14 RX ORDER — TRAMADOL HYDROCHLORIDE 50 MG/1
50 TABLET ORAL EVERY 8 HOURS PRN
Qty: 70 TAB | Refills: 0 | Status: SHIPPED | OUTPATIENT
Start: 2021-02-21 | End: 2021-03-16 | Stop reason: SDUPTHER

## 2021-01-14 RX ORDER — TRAMADOL HYDROCHLORIDE 50 MG/1
50 TABLET ORAL EVERY 8 HOURS PRN
Qty: 72 TAB | Refills: 0 | Status: SHIPPED | OUTPATIENT
Start: 2021-01-22 | End: 2021-02-21

## 2021-01-14 RX ORDER — GABAPENTIN 300 MG/1
CAPSULE ORAL
Qty: 150 CAP | Refills: 1 | Status: SHIPPED | OUTPATIENT
Start: 2021-01-14 | End: 2021-03-04

## 2021-01-14 ASSESSMENT — PAIN SCALES - GENERAL: PAINLEVEL: 6=MODERATE PAIN

## 2021-01-14 ASSESSMENT — PATIENT HEALTH QUESTIONNAIRE - PHQ9
CLINICAL INTERPRETATION OF PHQ2 SCORE: 3
SUM OF ALL RESPONSES TO PHQ QUESTIONS 1-9: 14
5. POOR APPETITE OR OVEREATING: 1 - SEVERAL DAYS

## 2021-01-14 ASSESSMENT — FIBROSIS 4 INDEX: FIB4 SCORE: 0.63

## 2021-01-14 NOTE — PROGRESS NOTES
Follow-up patient note    Physiatry (physical medicine and  Rehabilitation), interventional spine and sports medicine    Date of Service: 1/14/2021    Chief complaint:   Chief Complaint   Patient presents with   • Follow-Up     Lumbar radiculopathy        HISTORY    HPI: Debby Deasi 60 y.o. female who presents today for follow-up evaluation of her pain complaints.     Since the last visit, she has been having less headaches.  This is better.  She is feeling like she has had some issues with balance after concussion.  The balance issues seem to be ongoing and while she thinks that this has been since the concussion, she has had another fall about a month ago.    Neck pain is doing better. She did not finish physical therapy, but she did have 6 visits and is doing better.    Pain overall is a 6/10 on the NRS.  Reducing her tramadol has been going pretty well, she is down to #75 per month and feels like this has been going well.  She wants to continue to reduce this.  No new side effects.  She continues to have some low back aching and right leg pain.      She has been working since 11/23/2020.  No issues at work.    EMG demonstrated right lumbar radiculopathy involving L5 on 03/26/2020.      She has some pain in the back of her right knee.    Other medications include gabapentin 600mg po tid and duloxetine.  No side effects.  Taking buproprion for the last year.    Denies suicidality.  PHQ-9: 14.  She continues to see her therapist every other week.    By history:   She reports that she had surgery on 09/16/2019.  This was a right total knee arthroplasty for osteoarthritis with Dr. Saba.    In 2001, she had discectomy.  She reports that she has had a spinal cord stimulator placed, but she has not been using this as much.  She reports that the unit is not currently working.    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  She gets up about once an hour.   She has a sit to stand desk, but it is difficult to stand much.  Currently, she is working from home due to the COVID-19 pandemic.    Medical records review:  ED records from 10/07/2020 and 09/22/2020 reviewed.  ED visit from [id number].  Negative head CT.  She was given instructions about taking ibuprofen and tylenol.  Noted that she was also given a script for valium.     I reviewed the note from the referring provider Loy Miles M.D. dated 01/08/2020: Visits included nausea/epigastric pain, hypertension, chronic knee pain, IGT, dyslipidemia, MDD, hypothyroidism, iron deficiency    Previous treatments:    Physical Therapy: Yes    Medications the patient is tried: tramadol, tylenol (usually for a headache); in the past she took gabapentin 400mg po tid, she was taking vimovo and norco in the past; lyrica caused weight gain    Previous interventions: She had radiofrequency ablation in the past, prior to surgery    Previous surgeries to relieve the above pain:  None other than surgery 09/16/2019      ROS: Unchanged from 11/3/2020 except as noted above in HPI  Gen: weight loss  Eyes: vision problems, glasses and contacts  CV: chest pain/anxiety  GI: nausea, ulcers  : urgency  Psych: depression  Endo: thyroid problems  Heme: anemia    Red Flags ROS:   Fever, Chills, Sweats: Denies  Involuntary Weight Loss: Denies  Bladder Incontinence: Denies  Bowel Incontinence: Denies  Saddle Anesthesia: Denies    All other systems reviewed and negative.       PMHx:   Past Medical History:   Diagnosis Date   • Anemia     in past   • Anginal syndrome (HCC)     had work up and feet r/t anxiety   • Anxiety    • Arthritis     OA knees   • Dental disorder 5/24/12    partial upper denture   • High cholesterol    • HTN (hypertension), benign 3/19/2012   • Hypothyroid 3/19/2012   • Major depression 3/19/2012   • Orbital floor (blow-out) closed fracture (HCC)     left   • Other specified symptom associated with female genital organs      post menopausal bleeding   • Pain     thoracic spine, Right knee, myofacial pain, and Right shoulder   • Pain     recently fell and has bruise left forehead   • Pain 02/2018    chronic back pain, right shoulder   • Psychiatric problem     depression, anxiety   • Unspecified disorder of thyroid    • Urinary bladder disorder     stress incont.   • Urinary incontinence     Occasional       PSHx:   Past Surgical History:   Procedure Laterality Date   • PB TOTAL KNEE ARTHROPLASTY Right 9/16/2019    Procedure: RIGHT ARTHROPLASTY, KNEE, TOTAL;  Surgeon: Rufus Saba M.D.;  Location: [facility];  Service: Orthopedics   • KNEE ARTHROSCOPY Right 2/9/2018    Procedure: KNEE ARTHROSCOPY;  Surgeon: Harsh Baltazar M.D.;  Location: [facility];  Service: Orthopedics   • MEDIAL MENISCECTOMY Right 2/9/2018    Procedure: MEDIAL AND LATERAL MENISCECTOMY- PARTIAL;  Surgeon: Harsh Baltazar M.D.;  Location: [facility];  Service: Orthopedics   • KNEE ARTHROSCOPY Right 7/12/2017    Procedure: KNEE ARTHROSCOPY- CESPEDES;  Surgeon: Harsh Baltazar M.D.;  Location: [facility];  Service:    • MEDIAL MENISCECTOMY Right 7/12/2017    Procedure: PARTIAL MEDIAL AND LATERAL MENISCECTOMY;  Surgeon: Harsh Baltazar M.D.;  Location: [facility];  Service:    • SYNOVECTOMY Right 7/12/2017    Procedure: SYNOVECTOMY;  Surgeon: Harsh Baltazar M.D.;  Location: [facility];  Service:    • KNEE ARTHROSCOPY  4/21/2015    Performed by Rufus Saba M.D. at [facility]   • MEDIAL MENISCECTOMY  4/21/2015    Performed by Rufus Saba M.D. at [facility]   • PUMP REVISION  12/10/2012    Performed by Allison Guerra M.D. at [facility]   • SPINAL CORD STIMULATOR  5/30/2012    Performed by ALLISON GUERRA at [facility]   • BIOPSY GENERAL  5/1/12    endometrial   • SPINAL CORD STIMULATOR  7/11/2011  "   Performed by ALLISON CAM at [facility]   • BLOCK EPIDURAL STEROID INJECTION  2008    x 3-4   • LYMPH NODE EXCISION Left 2007    cervicle   • OTHER Right 2001    thoracic discectomy     • ARTHROSCOPY, KNEE Left 1999   • RIB RESECTION Right 1980   • LUMPECTOMY         Family history   Family History   Problem Relation Age of Onset   • Hypertension Father    • Diabetes Father    • Heart Disease Father    • Alcohol abuse Father    • Anesthesia Sister         slow to come out (as a child)   • Diabetes Other    • Heart Disease Other    • Cancer Other    • Hypertension Other    • Stroke Other    • Lung Disease Other          Medications:   Current Outpatient Medications   Medication   • gabapentin (NEURONTIN) 300 MG Cap   • [START ON 2/21/2021] traMADol (ULTRAM) 50 MG Tab   • [START ON 1/22/2021] traMADol (ULTRAM) 50 MG Tab   • gabapentin (NEURONTIN) 600 MG tablet   • buPROPion (WELLBUTRIN XL) 150 MG XL tablet   • atorvastatin (LIPITOR) 20 MG Tab   • levothyroxine (SYNTHROID) 50 MCG Tab   • lisinopril-hydrochlorothiazide (PRINZIDE) 20-12.5 MG per tablet   • ibuprofen (MOTRIN) 200 MG Tab   • Ferrous Sulfate (IRON) 325 (65 Fe) MG Tab   • metFORMIN ER (GLUCOPHAGE XR) 500 MG TABLET SR 24 HR   • DULoxetine (CYMBALTA) 60 MG Cap DR Particles delayed-release capsule   • ondansetron (ZOFRAN) 4 MG Tab tablet   • acetaminophen (TYLENOL) 500 MG Tab     No current facility-administered medications for this visit.        Allergies:   Allergies   Allergen Reactions   • Bactrim Ds Rash and Swelling     Pt states \"My hand swells up and rash all over body\".   • Sulfamethoxazole-Trimethoprim Rash and Swelling     Pt states \"My hand swells up and rash all over body\".       Social Hx:   Social History     Socioeconomic History   • Marital status: Single     Spouse name: Not on file   • Number of children: Not on file   • Years of education: Not on file   • Highest education level: Not on file   Occupational History   • " "Not on file   Social Needs   • Financial resource strain: Not on file   • Food insecurity     Worry: Not on file     Inability: Not on file   • Transportation needs     Medical: Not on file     Non-medical: Not on file   Tobacco Use   • Smoking status: Never Smoker   • Smokeless tobacco: Never Used   Substance and Sexual Activity   • Alcohol use: Not Currently     Alcohol/week: 0.0 oz     Comment: 1 glass wine per month   • Drug use: No   • Sexual activity: Never     Partners: Male   Lifestyle   • Physical activity     Days per week: Not on file     Minutes per session: Not on file   • Stress: Not on file   Relationships   • Social connections     Talks on phone: Not on file     Gets together: Not on file     Attends [employer] service: Not on file     Active member of club or organization: Not on file     Attends meetings of clubs or organizations: Not on file     Relationship status: Not on file   • Intimate partner violence     Fear of current or ex partner: Not on file     Emotionally abused: Not on file     Physically abused: Not on file     Forced sexual activity: Not on file   Other Topics Concern   •  Service No   • Blood Transfusions No   • Caffeine Concern No   • Occupational Exposure No   • Hobby Hazards No   • Sleep Concern Yes   • Stress Concern Yes   • Weight Concern Yes   • Special Diet No   • Back Care No   • Exercise No   • Bike Helmet No   • Seat Belt Yes   • Self-Exams No   Social History Narrative   • Not on file         EXAMINATION     Physical Exam:   Vitals: /70 (BP Location: Right arm, Patient Position: Sitting, BP Cuff Size: Adult)   Pulse 72   Temp 37 °C (98.6 °F)   Resp 14   Ht 1.676 m (5' 6\")   Wt 86.3 kg (190 lb 4.1 oz)   SpO2 97%     Constitutional:   Body Habitus: Body mass index is 30.71 kg/m².  Cooperation: Fully cooperates with exam  Appearance: Well-groomed, well-nourished, not disheveled no acute distress    Eyes: No scleral icterus, no proptosis     ENT -no " obvious auditory deficits, wearing a face mask, CNII-XII grossly intact. Romberg negative    Skin -no visible skin lesions    Respiratory-  breathing comfortable on room air, no audible wheezing    Cardiovascular- No lower extremity edema is noted.     Psychiatric- alert and oriented ×3. Normal affect.     Gait -  Minimally antalgic without assist device    Neuro/Muscloskeletal  Tenderness to palpation over the left and right occipital ridge, tenderness over cervical pillars on the right greater than left    No focal motor deficits in the lower extremities bilaterally    Functional ROM of the right knee    MEDICAL DECISION MAKING    Medical records review: see under HPI section.     DATA    Labs:     03/14/2020 CRP 0.19  03/14/2020 Sed rate 20  02/25/2020: Tramadol and metabolites      Lab Results   Component Value Date/Time    SODIUM 139 10/07/2020 10:45 PM    POTASSIUM 3.9 10/07/2020 10:45 PM    CHLORIDE 98 10/07/2020 10:45 PM    CO2 27 10/07/2020 10:45 PM    ANION 14.0 10/07/2020 10:45 PM    GLUCOSE 100 (H) 10/07/2020 10:45 PM    BUN 25 (H) 10/07/2020 10:45 PM    CREATININE 0.86 10/07/2020 10:45 PM    CALCIUM 10.6 (H) 10/07/2020 10:45 PM    ASTSGOT 13 01/18/2020 07:41 AM    ALTSGPT 11 01/18/2020 07:41 AM    TBILIRUBIN 0.5 01/18/2020 07:41 AM    ALBUMIN 4.1 01/18/2020 07:41 AM    TOTPROTEIN 6.8 01/18/2020 07:41 AM    GLOBULIN 2.7 01/18/2020 07:41 AM    AGRATIO 1.5 01/18/2020 07:41 AM       Lab Results   Component Value Date/Time    PROTHROMBTM 12.6 10/07/2020 10:45 PM    INR 0.91 10/07/2020 10:45 PM        Lab Results   Component Value Date/Time    WBC 9.2 10/07/2020 10:45 PM    RBC 4.30 10/07/2020 10:45 PM    HEMOGLOBIN 13.6 10/07/2020 10:45 PM    HEMATOCRIT 41.6 10/07/2020 10:45 PM    MCV 96.7 10/07/2020 10:45 PM    MCH 31.6 10/07/2020 10:45 PM    MCHC 32.7 (L) 10/07/2020 10:45 PM    MPV 11.0 10/07/2020 10:45 PM    NEUTSPOLYS 56.30 10/07/2020 10:45 PM    LYMPHOCYTES 34.00 10/07/2020 10:45 PM    MONOCYTES 6.60  10/07/2020 10:45 PM    EOSINOPHILS 2.40 10/07/2020 10:45 PM    BASOPHILS 0.30 10/07/2020 10:45 PM        Lab Results   Component Value Date/Time    HBA1C 6.1 (H) 01/18/2020 07:41 AM        Imaging: I personally reviewed following images, these are my reads  CT cervical spine 09/22/2020  There is note of cervical spondylosis with multilevel uncovertebral arthropathy    Xray right hip 03/14/2020  There is mild osteoarthritis of the right hip.      Xray lumbar 03/14/2020  There is mild anterolisthesis at L5-S1.  No abnormal motion on flexion and extension  There is DDD and facet arthropathy that is most noted at L5-S1 and less at L4-5    Xray right knee 09/16/2019  There is note of right knee arthroplasty    IMAGING radiology reads. I reviewed the following radiology reads    CT cervical spine 09/22/2020  IMPRESSION:  Degenerative change without evidence of cervical spine fracture.    Xray lumbar 03/14/2020  IMPRESSION:  1.  No compression deformity or acute fracture is identified.  2.  Mild anterolisthesis at L5-S1.  3.  Degenerative disc disease and facet arthropathy.  4.  No focal instability noted on flexion extension views.                                                Xray right hip 03/14/2020  IMPRESSION:     1.  No radiographic evidence of acute traumatic injury.     2.  Findings are consistent with mild osteoarthritis.     3.  Probable constipation.                                   Results for orders placed during the hospital encounter of 09/16/19   DX-KNEE 2- RIGHT    Impression Right knee arthroplasty.      Results for orders placed during the hospital encounter of 03/28/19   DX-KNEE 3 VIEWS RIGHT    Impression No evidence of acute fracture or dislocation.    Degenerative changes as above described.                              Diagnosis   Visit Diagnoses     ICD-10-CM   1. Lumbar radiculopathy  M54.16   2. Spondylolisthesis at L5-S1 level  M43.17   3. S/P TKR (total knee replacement), right  Z96.651   4.  Chronic knee pain, unspecified laterality  M25.569    G89.29   5. Major depressive disorder, recurrent episode, in partial remission (HCC)  F33.41           ASSESSMENT:  Debby Desai 60 y.o. female seen for above.     Debby was seen today for follow-up.    Diagnoses and all orders for this visit:    Lumbar radiculopathy  -     Pain Management Screen  -     gabapentin (NEURONTIN) 300 MG Cap; Take 1 in  6AM, 1-2 with lunch and 2 bedtime  -     traMADol (ULTRAM) 50 MG Tab; Take 1 Tab by mouth every 8 hours as needed for Moderate Pain or Severe Pain for up to 30 days.  -     traMADol (ULTRAM) 50 MG Tab; Take 1 Tab by mouth every 8 hours as needed for Severe Pain for up to 30 days.  -     Consent for Opiate Prescription  -     Controlled Substance Treatment Agreement    Spondylolisthesis at L5-S1 level  -     Pain Management Screen    S/P TKR (total knee replacement), right    Chronic knee pain, unspecified laterality  -     Pain Management Screen  -     traMADol (ULTRAM) 50 MG Tab; Take 1 Tab by mouth every 8 hours as needed for Moderate Pain or Severe Pain for up to 30 days.  -     traMADol (ULTRAM) 50 MG Tab; Take 1 Tab by mouth every 8 hours as needed for Severe Pain for up to 30 days.  -     Consent for Opiate Prescription  -     Controlled Substance Treatment Agreement    Major depressive disorder, recurrent episode, in partial remission (HCC)  -     Patient has been identified as having a positive depression screening. Appropriate orders and counseling have been given.         1. Work on decreasing tramadol.  Wean to #72 for the next month followed by #70.   reviewed.  Most recent UDS reviewed.  UDS ordered today.  2. Continue gabapentin, we will work on weaning the dose to see if this contributes to dizziness and if she can tolerate a reduced dose.  Reduce to 1500mg.  Change and will reduce by 300mg as tolerated.  3. Continue duloxetine.    4. Continue psychology.  5. Discussed referral to Neurology for  her report of ongoing dizziness.  No plans for now, she would like to try reducing medications first.    Also, return to follow-up.    Follow-up: Return in about 2 months (around 3/14/2021).      Thank you very much for asking me to participate in Debby Desai's care.  Please contact me with any questions or concerns.      Please note that this dictation was created using voice recognition software. I have made every reasonable attempt to correct obvious errors but there may be errors of grammar and content that I may have overlooked prior to finalization of this note.      Volodymyr Herring MD  Physical Medicine and Rehabilitation  Interventional Spine and Sports Physiatry  [facility]

## 2021-01-30 PROCEDURE — 91301 MODERNA SARS-COV-2 VACCINE: CPT

## 2021-01-30 PROCEDURE — 0012A MODERNA SARS-COV-2 VACCINE: CPT

## 2021-01-31 ENCOUNTER — IMMUNIZATION (OUTPATIENT)
Dept: FAMILY PLANNING/WOMEN'S HEALTH CLINIC | Facility: IMMUNIZATION CENTER | Age: 61
End: 2021-01-31
Payer: COMMERCIAL

## 2021-01-31 DIAGNOSIS — Z23 ENCOUNTER FOR VACCINATION: Primary | ICD-10-CM

## 2021-02-01 DIAGNOSIS — R73.02 IGT (IMPAIRED GLUCOSE TOLERANCE): ICD-10-CM

## 2021-02-01 DIAGNOSIS — F33.41 MAJOR DEPRESSIVE DISORDER, RECURRENT EPISODE, IN PARTIAL REMISSION (HCC): ICD-10-CM

## 2021-02-01 DIAGNOSIS — M54.16 LUMBAR RADICULOPATHY: ICD-10-CM

## 2021-02-01 RX ORDER — GABAPENTIN 300 MG/1
CAPSULE ORAL
Qty: 150 CAP | Refills: 1 | Status: CANCELLED | OUTPATIENT
Start: 2021-02-01 | End: 2021-03-03

## 2021-02-01 RX ORDER — BUPROPION HYDROCHLORIDE 150 MG/1
150 TABLET ORAL EVERY MORNING
Qty: 90 TAB | Refills: 3 | Status: CANCELLED | OUTPATIENT
Start: 2021-02-01

## 2021-02-01 RX ORDER — METFORMIN HYDROCHLORIDE 500 MG/1
500 TABLET, EXTENDED RELEASE ORAL 2 TIMES DAILY
Qty: 180 TAB | Refills: 3 | Status: CANCELLED | OUTPATIENT
Start: 2021-02-01

## 2021-02-01 RX ORDER — DULOXETIN HYDROCHLORIDE 60 MG/1
60 CAPSULE, DELAYED RELEASE ORAL DAILY
Qty: 90 CAP | Refills: 3 | Status: CANCELLED | OUTPATIENT
Start: 2021-02-01

## 2021-02-01 NOTE — TELEPHONE ENCOUNTER
Received request via: Pharmacy    Was the patient seen in the last year in this department? Yes    Does the patient have an active prescription (recently filled or refills available) for medication(s) requested? Yes. Requesting change of pharmacy.

## 2021-02-02 DIAGNOSIS — F33.41 MAJOR DEPRESSIVE DISORDER, RECURRENT EPISODE, IN PARTIAL REMISSION (HCC): ICD-10-CM

## 2021-02-02 PROCEDURE — RXMED WILLOW AMBULATORY MEDICATION CHARGE: Performed by: INTERNAL MEDICINE

## 2021-02-02 RX ORDER — DULOXETIN HYDROCHLORIDE 60 MG/1
60 CAPSULE, DELAYED RELEASE ORAL DAILY
Qty: 90 CAP | Refills: 0 | Status: SHIPPED | OUTPATIENT
Start: 2021-02-02 | End: 2021-05-13

## 2021-02-08 ENCOUNTER — PHARMACY VISIT (OUTPATIENT)
Dept: PHARMACY | Facility: MEDICAL CENTER | Age: 61
End: 2021-02-08
Payer: COMMERCIAL

## 2021-02-08 DIAGNOSIS — E78.5 DYSLIPIDEMIA: ICD-10-CM

## 2021-02-08 DIAGNOSIS — I10 HTN (HYPERTENSION), BENIGN: Chronic | ICD-10-CM

## 2021-02-08 RX ORDER — LISINOPRIL AND HYDROCHLOROTHIAZIDE 20; 12.5 MG/1; MG/1
1 TABLET ORAL
Qty: 90 TAB | Refills: 1 | Status: SHIPPED | OUTPATIENT
Start: 2021-02-08 | End: 2021-09-07 | Stop reason: SDUPTHER

## 2021-02-08 RX ORDER — ATORVASTATIN CALCIUM 20 MG/1
20 TABLET, FILM COATED ORAL
Qty: 90 TAB | Refills: 1 | Status: SHIPPED | OUTPATIENT
Start: 2021-02-08 | End: 2021-09-07 | Stop reason: SDUPTHER

## 2021-02-09 DIAGNOSIS — E78.5 DYSLIPIDEMIA: ICD-10-CM

## 2021-02-09 PROCEDURE — RXMED WILLOW AMBULATORY MEDICATION CHARGE: Performed by: NURSE PRACTITIONER

## 2021-02-09 NOTE — TELEPHONE ENCOUNTER
Please resend to [facility], per patient request.    Received request via: Pharmacy    Was the patient seen in the last year in this department? Yes    Does the patient have an active prescription (recently filled or refills available) for medication(s) requested? No

## 2021-02-10 RX ORDER — ATORVASTATIN CALCIUM 20 MG/1
20 TABLET, FILM COATED ORAL
Qty: 90 TABLET | Refills: 1 | OUTPATIENT
Start: 2021-02-10

## 2021-02-10 RX ORDER — LEVOTHYROXINE SODIUM 0.05 MG/1
50 TABLET ORAL
Qty: 30 TABLET | Refills: 5 | Status: SHIPPED | OUTPATIENT
Start: 2021-02-10 | End: 2021-10-20 | Stop reason: SDUPTHER

## 2021-02-10 RX ORDER — LISINOPRIL AND HYDROCHLOROTHIAZIDE 20; 12.5 MG/1; MG/1
1 TABLET ORAL DAILY
Qty: 30 TABLET | OUTPATIENT
Start: 2021-02-10

## 2021-02-11 PROCEDURE — RXMED WILLOW AMBULATORY MEDICATION CHARGE: Performed by: NURSE PRACTITIONER

## 2021-02-13 ENCOUNTER — PHARMACY VISIT (OUTPATIENT)
Dept: PHARMACY | Facility: MEDICAL CENTER | Age: 61
End: 2021-02-13
Payer: COMMERCIAL

## 2021-03-16 ENCOUNTER — OFFICE VISIT (OUTPATIENT)
Dept: PHYSICAL MEDICINE AND REHAB | Facility: MEDICAL CENTER | Age: 61
End: 2021-03-16
Payer: COMMERCIAL

## 2021-03-16 VITALS
BODY MASS INDEX: 30.72 KG/M2 | OXYGEN SATURATION: 93 % | SYSTOLIC BLOOD PRESSURE: 128 MMHG | DIASTOLIC BLOOD PRESSURE: 64 MMHG | HEART RATE: 80 BPM | HEIGHT: 66 IN | WEIGHT: 191.14 LBS | TEMPERATURE: 97.6 F

## 2021-03-16 DIAGNOSIS — M25.569 CHRONIC KNEE PAIN, UNSPECIFIED LATERALITY: ICD-10-CM

## 2021-03-16 DIAGNOSIS — G89.29 CHRONIC KNEE PAIN, UNSPECIFIED LATERALITY: ICD-10-CM

## 2021-03-16 DIAGNOSIS — M43.17 SPONDYLOLISTHESIS AT L5-S1 LEVEL: ICD-10-CM

## 2021-03-16 DIAGNOSIS — F33.41 MAJOR DEPRESSIVE DISORDER, RECURRENT EPISODE, IN PARTIAL REMISSION (HCC): ICD-10-CM

## 2021-03-16 DIAGNOSIS — M54.16 LUMBAR RADICULOPATHY: ICD-10-CM

## 2021-03-16 PROCEDURE — 99214 OFFICE O/P EST MOD 30 MIN: CPT | Performed by: PHYSICAL MEDICINE & REHABILITATION

## 2021-03-16 PROCEDURE — RXMED WILLOW AMBULATORY MEDICATION CHARGE: Performed by: NURSE PRACTITIONER

## 2021-03-16 PROCEDURE — RXMED WILLOW AMBULATORY MEDICATION CHARGE: Performed by: PHYSICAL MEDICINE & REHABILITATION

## 2021-03-16 RX ORDER — TRAMADOL HYDROCHLORIDE 50 MG/1
50 TABLET ORAL EVERY 8 HOURS PRN
Qty: 72 TABLET | Refills: 0 | Status: SHIPPED | OUTPATIENT
Start: 2021-04-21 | End: 2021-05-18 | Stop reason: SDUPTHER

## 2021-03-16 RX ORDER — GABAPENTIN 400 MG/1
400 CAPSULE ORAL 3 TIMES DAILY
Qty: 90 CAPSULE | Refills: 1 | Status: SHIPPED | OUTPATIENT
Start: 2021-03-16 | End: 2021-05-18 | Stop reason: SDUPTHER

## 2021-03-16 RX ORDER — TRAMADOL HYDROCHLORIDE 50 MG/1
50 TABLET ORAL EVERY 8 HOURS PRN
Qty: 72 TABLET | Refills: 0 | Status: SHIPPED | OUTPATIENT
Start: 2021-03-22 | End: 2021-04-21

## 2021-03-16 ASSESSMENT — PATIENT HEALTH QUESTIONNAIRE - PHQ9
SUM OF ALL RESPONSES TO PHQ QUESTIONS 1-9: 14
5. POOR APPETITE OR OVEREATING: 1 - SEVERAL DAYS
CLINICAL INTERPRETATION OF PHQ2 SCORE: 5

## 2021-03-16 ASSESSMENT — FIBROSIS 4 INDEX: FIB4 SCORE: 0.63

## 2021-03-16 ASSESSMENT — PAIN SCALES - GENERAL: PAINLEVEL: 5=MODERATE PAIN

## 2021-03-16 NOTE — PROGRESS NOTES
Follow-up patient note    Physiatry (physical medicine and  Rehabilitation), interventional spine and sports medicine    Date of Service: 03/16/2021    Chief complaint:   Chief Complaint   Patient presents with   • Follow-Up     Back pain        HISTORY    HPI: Debby Desai 60 y.o. female who presents today for follow-up evaluation of her pain complaints.     Since the last visit on 01/14/2021, Debby reports that she has decreased her gabapentin to 300mg po tid.  This has reduced her difficulty with balance, but she thinks that the pain in the low back and right leg are a little bit worse.  Heat helps and has automatic shut off.  She has been doing her home exercises from PT.    Tramadol decreasing.  She is taking duloxetine 60mg daily.    Denies suicidality.  PHQ-9: 14.  She continues to see her therapist every other week.    She reports that she has not had any trips, falls or stumbles.  Balance is normal now.      By history:   She reports that she had surgery on 09/16/2019.  This was a right total knee arthroplasty for osteoarthritis with Dr. Saba.    In 2001, she had discectomy.  She reports that she has had a spinal cord stimulator placed, but she has not been using this as much.  She reports that the unit is not currently working.    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  She gets up about once an hour.  She has a sit to stand desk, but it is difficult to stand much.  Currently, she is working from home due to the COVID-19 pandemic.    Medical records review:  ED records from 10/07/2020 and 09/22/2020 reviewed.  ED visit from [id number].  Negative head CT.  She was given instructions about taking ibuprofen and tylenol.  Noted that she was also given a script for valium.     I reviewed the note from the referring provider Loy Miles M.D. dated 01/08/2020: Visits included nausea/epigastric pain, hypertension, chronic knee pain, IGT,  dyslipidemia, MDD, hypothyroidism, iron deficiency    Previous treatments:    Physical Therapy: Yes    Medications the patient is tried: tramadol, tylenol (usually for a headache); in the past she took gabapentin 400mg po tid, she was taking vimovo and norco in the past; lyrica caused weight gain    Previous interventions: She had radiofrequency ablation in the past, prior to surgery    Previous surgeries to relieve the above pain:  None other than surgery 09/16/2019      ROS: Unchanged, see HPI  Gen: weight loss  Eyes: vision problems, glasses and contacts  CV: chest pain/anxiety  GI: nausea, ulcers  : urgency  Psych: depression  Endo: thyroid problems  Heme: anemia    Red Flags ROS:   Fever, Chills, Sweats: Denies  Involuntary Weight Loss: Denies  Bladder Incontinence: Denies  Bowel Incontinence: Denies  Saddle Anesthesia: Denies    All other systems reviewed and negative.       PMHx:   Past Medical History:   Diagnosis Date   • Anemia     in past   • Anginal syndrome (HCC)     had work up and feet r/t anxiety   • Anxiety    • Arthritis     OA knees   • Dental disorder 5/24/12    partial upper denture   • High cholesterol    • HTN (hypertension), benign 3/19/2012   • Hypothyroid 3/19/2012   • Major depression 3/19/2012   • Orbital floor (blow-out) closed fracture (HCC)     left   • Other specified symptom associated with female genital organs     post menopausal bleeding   • Pain     thoracic spine, Right knee, myofacial pain, and Right shoulder   • Pain     recently fell and has bruise left forehead   • Pain 02/2018    chronic back pain, right shoulder   • Psychiatric problem     depression, anxiety   • Unspecified disorder of thyroid    • Urinary bladder disorder     stress incont.   • Urinary incontinence     Occasional       PSHx:   Past Surgical History:   Procedure Laterality Date   • PB TOTAL KNEE ARTHROPLASTY Right 9/16/2019    Procedure: RIGHT ARTHROPLASTY, KNEE, TOTAL;  Surgeon: Rufus Saba M.D.;   Location: [facility];  Service: Orthopedics   • KNEE ARTHROSCOPY Right 2/9/2018    Procedure: KNEE ARTHROSCOPY;  Surgeon: Harsh Baltazar M.D.;  Location: [facility];  Service: Orthopedics   • MEDIAL MENISCECTOMY Right 2/9/2018    Procedure: MEDIAL AND LATERAL MENISCECTOMY- PARTIAL;  Surgeon: Harsh Baltazar M.D.;  Location: [facility];  Service: Orthopedics   • KNEE ARTHROSCOPY Right 7/12/2017    Procedure: KNEE ARTHROSCOPY- CESPEDES;  Surgeon: Harsh Baltazar M.D.;  Location: [facility];  Service:    • MEDIAL MENISCECTOMY Right 7/12/2017    Procedure: PARTIAL MEDIAL AND LATERAL MENISCECTOMY;  Surgeon: Harsh Baltazar M.D.;  Location: [facility];  Service:    • SYNOVECTOMY Right 7/12/2017    Procedure: SYNOVECTOMY;  Surgeon: Harsh Baltazar M.D.;  Location: [facility];  Service:    • KNEE ARTHROSCOPY  4/21/2015    Performed by Rufus Saba M.D. at [facility]   • MEDIAL MENISCECTOMY  4/21/2015    Performed by Rufus Saba M.D. at [facility]   • PUMP REVISION  12/10/2012    Performed by Allison Guerra M.D. at [facility]   • SPINAL CORD STIMULATOR  5/30/2012    Performed by ALLISON GUERRA at [facility]   • BIOPSY GENERAL  5/1/12    endometrial   • SPINAL CORD STIMULATOR  7/11/2011    Performed by ALLISON GUERRA at [facility]   • BLOCK EPIDURAL STEROID INJECTION  2008    x 3-4   • LYMPH NODE EXCISION Left 2007    cervicle   • OTHER Right 2001    thoracic discectomy     • ARTHROSCOPY, KNEE Left 1999   • RIB RESECTION Right 1980   • LUMPECTOMY         Family history   Family History   Problem Relation Age of Onset   • Hypertension Father    • Diabetes Father    • Heart Disease Father    • Alcohol abuse Father    • Anesthesia Sister         slow to come out (as a child)   • Diabetes Other    • Heart Disease Other    • Cancer Other   "  • Hypertension Other    • Stroke Other    • Lung Disease Other          Medications:   Current Outpatient Medications   Medication   • gabapentin (NEURONTIN) 400 MG Cap   • [START ON 3/22/2021] traMADol (ULTRAM) 50 MG Tab   • [START ON 4/21/2021] traMADol (ULTRAM) 50 MG Tab   • levothyroxine (SYNTHROID) 50 MCG Tab   • atorvastatin (LIPITOR) 20 MG Tab   • lisinopril-hydrochlorothiazide (PRINZIDE) 20-12.5 MG per tablet   • DULoxetine (CYMBALTA) 60 MG Cap DR Particles delayed-release capsule   • buPROPion (WELLBUTRIN XL) 150 MG XL tablet   • atorvastatin (LIPITOR) 20 MG Tab   • lisinopril-hydrochlorothiazide (PRINZIDE) 20-12.5 MG per tablet   • ibuprofen (MOTRIN) 200 MG Tab   • Ferrous Sulfate (IRON) 325 (65 Fe) MG Tab   • metFORMIN ER (GLUCOPHAGE XR) 500 MG TABLET SR 24 HR   • ondansetron (ZOFRAN) 4 MG Tab tablet   • acetaminophen (TYLENOL) 500 MG Tab     No current facility-administered medications for this visit.       Allergies:   Allergies   Allergen Reactions   • Bactrim Ds Rash and Swelling     Pt states \"My hand swells up and rash all over body\".   • Sulfamethoxazole-Trimethoprim Rash and Swelling     Pt states \"My hand swells up and rash all over body\".       Social Hx:   Social History     Socioeconomic History   • Marital status: Single     Spouse name: Not on file   • Number of children: Not on file   • Years of education: Not on file   • Highest education level: Not on file   Occupational History   • Not on file   Tobacco Use   • Smoking status: Never Smoker   • Smokeless tobacco: Never Used   Substance and Sexual Activity   • Alcohol use: Not Currently     Alcohol/week: 0.0 oz     Comment: 1 glass wine per month   • Drug use: No   • Sexual activity: Never     Partners: Male   Other Topics Concern   •  Service No   • Blood Transfusions No   • Caffeine Concern No   • Occupational Exposure No   • Hobby Hazards No   • Sleep Concern Yes   • Stress Concern Yes   • Weight Concern Yes   • Special Diet " "No   • Back Care No   • Exercise No   • Bike Helmet No   • Seat Belt Yes   • Self-Exams No   Social History Narrative   • Not on file     Social Determinants of Health     Financial Resource Strain:    • Difficulty of Paying Living Expenses:    Food Insecurity:    • Worried About Running Out of Food in the Last Year:    • Ran Out of Food in the Last Year:    Transportation Needs:    • Lack of Transportation (Medical):    • Lack of Transportation (Non-Medical):    Physical Activity:    • Days of Exercise per Week:    • Minutes of Exercise per Session:    Stress:    • Feeling of Stress :    Social Connections:    • Frequency of Communication with Friends and Family:    • Frequency of Social Gatherings with Friends and Family:    • Attends Christian Services:    • Active Member of Clubs or Organizations:    • Attends Club or Organization Meetings:    • Marital Status:    Intimate Partner Violence:    • Fear of Current or Ex-Partner:    • Emotionally Abused:    • Physically Abused:    • Sexually Abused:          EXAMINATION     Physical Exam:   Vitals: /64 (BP Location: Right arm, Patient Position: Sitting, BP Cuff Size: Small adult)   Pulse 80   Temp 36.4 °C (97.6 °F) (Temporal)   Ht 1.676 m (5' 6\")   Wt 86.7 kg (191 lb 2.2 oz)   SpO2 93%     Constitutional:   Body Habitus: Body mass index is 30.85 kg/m².  Cooperation: Fully cooperates with exam  Appearance: Well-groomed, well-nourished, not disheveled no acute distress    Eyes: No scleral icterus, no proptosis     ENT -no obvious auditory deficits, wearing a face mask    Skin -no visible skin lesions    Respiratory-  breathing comfortable on room air, no audible wheezing    Cardiovascular- No lower extremity edema is noted.     Psychiatric- alert and oriented ×3. Normal affect.     Gait -  Minimally antalgic without assist device    Neuro/Muscloskeletal  No focal motor or sensory deficits in the lower extremities bilaterally    Functional ROM of the right " knee     MEDICAL DECISION MAKING    Medical records review: see under HPI section.     DATA    Labs:     03/14/2020 CRP 0.19  03/14/2020 Sed rate 20  02/25/2020: Tramadol and metabolites      Lab Results   Component Value Date/Time    SODIUM 139 10/07/2020 10:45 PM    POTASSIUM 3.9 10/07/2020 10:45 PM    CHLORIDE 98 10/07/2020 10:45 PM    CO2 27 10/07/2020 10:45 PM    ANION 14.0 10/07/2020 10:45 PM    GLUCOSE 100 (H) 10/07/2020 10:45 PM    BUN 25 (H) 10/07/2020 10:45 PM    CREATININE 0.86 10/07/2020 10:45 PM    CALCIUM 10.6 (H) 10/07/2020 10:45 PM    ASTSGOT 13 01/18/2020 07:41 AM    ALTSGPT 11 01/18/2020 07:41 AM    TBILIRUBIN 0.5 01/18/2020 07:41 AM    ALBUMIN 4.1 01/18/2020 07:41 AM    TOTPROTEIN 6.8 01/18/2020 07:41 AM    GLOBULIN 2.7 01/18/2020 07:41 AM    AGRATIO 1.5 01/18/2020 07:41 AM       Lab Results   Component Value Date/Time    PROTHROMBTM 12.6 10/07/2020 10:45 PM    INR 0.91 10/07/2020 10:45 PM        Lab Results   Component Value Date/Time    WBC 9.2 10/07/2020 10:45 PM    RBC 4.30 10/07/2020 10:45 PM    HEMOGLOBIN 13.6 10/07/2020 10:45 PM    HEMATOCRIT 41.6 10/07/2020 10:45 PM    MCV 96.7 10/07/2020 10:45 PM    MCH 31.6 10/07/2020 10:45 PM    MCHC 32.7 (L) 10/07/2020 10:45 PM    MPV 11.0 10/07/2020 10:45 PM    NEUTSPOLYS 56.30 10/07/2020 10:45 PM    LYMPHOCYTES 34.00 10/07/2020 10:45 PM    MONOCYTES 6.60 10/07/2020 10:45 PM    EOSINOPHILS 2.40 10/07/2020 10:45 PM    BASOPHILS 0.30 10/07/2020 10:45 PM        Lab Results   Component Value Date/Time    HBA1C 6.1 (H) 01/18/2020 07:41 AM        Imaging: I personally reviewed following images, these are my reads  CT cervical spine 09/22/2020  There is note of cervical spondylosis with multilevel uncovertebral arthropathy    Xray right hip 03/14/2020  There is mild osteoarthritis of the right hip.      Xray lumbar 03/14/2020  There is mild anterolisthesis at L5-S1.  No abnormal motion on flexion and extension  There is DDD and facet arthropathy that is most  noted at L5-S1 and less at L4-5    Xray right knee 09/16/2019  There is note of right knee arthroplasty    IMAGING radiology reads. I reviewed the following radiology reads    CT cervical spine 09/22/2020  IMPRESSION:  Degenerative change without evidence of cervical spine fracture.    Xray lumbar 03/14/2020  IMPRESSION:  1.  No compression deformity or acute fracture is identified.  2.  Mild anterolisthesis at L5-S1.  3.  Degenerative disc disease and facet arthropathy.  4.  No focal instability noted on flexion extension views.                                                Xray right hip 03/14/2020  IMPRESSION:     1.  No radiographic evidence of acute traumatic injury.     2.  Findings are consistent with mild osteoarthritis.     3.  Probable constipation.                                   Results for orders placed during the hospital encounter of 09/16/19   DX-KNEE 2- RIGHT    Impression Right knee arthroplasty.      Results for orders placed during the hospital encounter of 03/28/19   DX-KNEE 3 VIEWS RIGHT    Impression No evidence of acute fracture or dislocation.    Degenerative changes as above described.                              Diagnosis   Visit Diagnoses     ICD-10-CM   1. Spondylolisthesis at L5-S1 level  M43.17   2. Lumbar radiculopathy  M54.16   3. Chronic knee pain, unspecified laterality  M25.569    G89.29   4. Major depressive disorder, recurrent episode, in partial remission ([facility])  F33.41           ASSESSMENT:  Debby Desai 60 y.o. female seen for above.     Debby was seen today for follow-up.    Diagnoses and all orders for this visit:    Spondylolisthesis at L5-S1 level  -     REFERRAL TO PSYCHIATRY  -     REFERRAL TO OUTPATIENT INTERVENTIONAL PAIN CLINIC    Lumbar radiculopathy  -     gabapentin (NEURONTIN) 400 MG Cap; Take 1 capsule by mouth 3 times a day for 30 days.  -     traMADol (ULTRAM) 50 MG Tab; Take 1 tablet by mouth every 8 hours as needed for Moderate Pain or Severe Pain for  up to 30 days.  -     traMADol (ULTRAM) 50 MG Tab; Take 1 tablet by mouth every 8 hours as needed for Moderate Pain or Severe Pain for up to 30 days.  -     Consent for Opiate Prescription  -     Controlled Substance Treatment Agreement  -     REFERRAL TO OUTPATIENT INTERVENTIONAL PAIN CLINIC    Chronic knee pain, unspecified laterality  -     traMADol (ULTRAM) 50 MG Tab; Take 1 tablet by mouth every 8 hours as needed for Moderate Pain or Severe Pain for up to 30 days.  -     traMADol (ULTRAM) 50 MG Tab; Take 1 tablet by mouth every 8 hours as needed for Moderate Pain or Severe Pain for up to 30 days.  -     Consent for Opiate Prescription  -     Controlled Substance Treatment Agreement    Major depressive disorder, recurrent episode, in partial remission (HCC)  -     REFERRAL TO PSYCHIATRY         1. Discussed increase of symptoms, plan to trial return to gabapentin 400mg po tid.  She was taking 600mg po tid and has had resolution of balance issues, but increased pain at 300mg po tid.  We will trial increase back to 400mg po tid and she will let me know if this leads to return of symptoms.  2. Patient requests referral to Psychiatry.  She has seen Dr. Correa in the past.  Continue counseling with psychologist every other week.  3. Reviewed  and UDS reviewed and as expected, patient taking tramadol.  4. Continue tramadol #72/month.  No reduction today.  5. Continue duloxetine.  6. Discussed right lumbar five and sacral one transforaminal epidural steroid injection, discussed we will do this if increasing the dose of gabapentin is not helpful.  She would like to avoid more surgery.  The risks benefits and alternatives to this procedure were discussed and the patient wishes to proceed with the procedure. Risks include but are not limited to damage to surrounding structures, infection, bleeding, worsening of pain which can be permanent, weakness which can be permanent. Benefits include pain relief, improved function.  Alternatives includes not doing the procedure.        Follow-up: Return in about 2 months (around 5/16/2021).      Thank you very much for asking me to participate in Debby Desai's care.  Please contact me with any questions or concerns.      Please note that this dictation was created using voice recognition software. I have made every reasonable attempt to correct obvious errors but there may be errors of grammar and content that I may have overlooked prior to finalization of this note.      Volodymyr Herring MD  Physical Medicine and Rehabilitation  Interventional Spine and Sports Physiatry  [facility]

## 2021-03-16 NOTE — H&P (VIEW-ONLY)
Follow-up patient note    Physiatry (physical medicine and  Rehabilitation), interventional spine and sports medicine    Date of Service: 03/16/2021    Chief complaint:   Chief Complaint   Patient presents with   • Follow-Up     Back pain        HISTORY    HPI: Debby Desai 60 y.o. female who presents today for follow-up evaluation of her pain complaints.     Since the last visit on 01/14/2021, Debby reports that she has decreased her gabapentin to 300mg po tid.  This has reduced her difficulty with balance, but she thinks that the pain in the low back and right leg are a little bit worse.  Heat helps and has automatic shut off.  She has been doing her home exercises from PT.    Tramadol decreasing.  She is taking duloxetine 60mg daily.    Denies suicidality.  PHQ-9: 14.  She continues to see her therapist every other week.    She reports that she has not had any trips, falls or stumbles.  Balance is normal now.      By history:   She reports that she had surgery on 09/16/2019.  This was a right total knee arthroplasty for osteoarthritis with Dr. Saba.    In 2001, she had discectomy.  She reports that she has had a spinal cord stimulator placed, but she has not been using this as much.  She reports that the unit is not currently working.    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  She gets up about once an hour.  She has a sit to stand desk, but it is difficult to stand much.  Currently, she is working from home due to the COVID-19 pandemic.    Medical records review:  ED records from 10/07/2020 and 09/22/2020 reviewed.  ED visit from [id number].  Negative head CT.  She was given instructions about taking ibuprofen and tylenol.  Noted that she was also given a script for valium.     I reviewed the note from the referring provider Loy Miles M.D. dated 01/08/2020: Visits included nausea/epigastric pain, hypertension, chronic knee pain, IGT,  dyslipidemia, MDD, hypothyroidism, iron deficiency    Previous treatments:    Physical Therapy: Yes    Medications the patient is tried: tramadol, tylenol (usually for a headache); in the past she took gabapentin 400mg po tid, she was taking vimovo and norco in the past; lyrica caused weight gain    Previous interventions: She had radiofrequency ablation in the past, prior to surgery    Previous surgeries to relieve the above pain:  None other than surgery 09/16/2019      ROS: Unchanged, see HPI  Gen: weight loss  Eyes: vision problems, glasses and contacts  CV: chest pain/anxiety  GI: nausea, ulcers  : urgency  Psych: depression  Endo: thyroid problems  Heme: anemia    Red Flags ROS:   Fever, Chills, Sweats: Denies  Involuntary Weight Loss: Denies  Bladder Incontinence: Denies  Bowel Incontinence: Denies  Saddle Anesthesia: Denies    All other systems reviewed and negative.       PMHx:   Past Medical History:   Diagnosis Date   • Anemia     in past   • Anginal syndrome (HCC)     had work up and feet r/t anxiety   • Anxiety    • Arthritis     OA knees   • Dental disorder 5/24/12    partial upper denture   • High cholesterol    • HTN (hypertension), benign 3/19/2012   • Hypothyroid 3/19/2012   • Major depression 3/19/2012   • Orbital floor (blow-out) closed fracture (HCC)     left   • Other specified symptom associated with female genital organs     post menopausal bleeding   • Pain     thoracic spine, Right knee, myofacial pain, and Right shoulder   • Pain     recently fell and has bruise left forehead   • Pain 02/2018    chronic back pain, right shoulder   • Psychiatric problem     depression, anxiety   • Unspecified disorder of thyroid    • Urinary bladder disorder     stress incont.   • Urinary incontinence     Occasional       PSHx:   Past Surgical History:   Procedure Laterality Date   • PB TOTAL KNEE ARTHROPLASTY Right 9/16/2019    Procedure: RIGHT ARTHROPLASTY, KNEE, TOTAL;  Surgeon: Rufus Saba M.D.;   Location: [facility];  Service: Orthopedics   • KNEE ARTHROSCOPY Right 2/9/2018    Procedure: KNEE ARTHROSCOPY;  Surgeon: Harsh Baltazar M.D.;  Location: [facility];  Service: Orthopedics   • MEDIAL MENISCECTOMY Right 2/9/2018    Procedure: MEDIAL AND LATERAL MENISCECTOMY- PARTIAL;  Surgeon: Harsh Baltazar M.D.;  Location: [facility];  Service: Orthopedics   • KNEE ARTHROSCOPY Right 7/12/2017    Procedure: KNEE ARTHROSCOPY- CESPEDES;  Surgeon: Harsh Baltazar M.D.;  Location: [facility];  Service:    • MEDIAL MENISCECTOMY Right 7/12/2017    Procedure: PARTIAL MEDIAL AND LATERAL MENISCECTOMY;  Surgeon: Harsh Baltazar M.D.;  Location: [facility];  Service:    • SYNOVECTOMY Right 7/12/2017    Procedure: SYNOVECTOMY;  Surgeon: Harsh Baltazar M.D.;  Location: [facility];  Service:    • KNEE ARTHROSCOPY  4/21/2015    Performed by Rufus Saba M.D. at [facility]   • MEDIAL MENISCECTOMY  4/21/2015    Performed by Rufus Saba M.D. at [facility]   • PUMP REVISION  12/10/2012    Performed by Allison Guerra M.D. at [facility]   • SPINAL CORD STIMULATOR  5/30/2012    Performed by ALLISON GUERRA at [facility]   • BIOPSY GENERAL  5/1/12    endometrial   • SPINAL CORD STIMULATOR  7/11/2011    Performed by ALLISON GUERRA at [facility]   • BLOCK EPIDURAL STEROID INJECTION  2008    x 3-4   • LYMPH NODE EXCISION Left 2007    cervicle   • OTHER Right 2001    thoracic discectomy     • ARTHROSCOPY, KNEE Left 1999   • RIB RESECTION Right 1980   • LUMPECTOMY         Family history   Family History   Problem Relation Age of Onset   • Hypertension Father    • Diabetes Father    • Heart Disease Father    • Alcohol abuse Father    • Anesthesia Sister         slow to come out (as a child)   • Diabetes Other    • Heart Disease Other    • Cancer Other   "  • Hypertension Other    • Stroke Other    • Lung Disease Other          Medications:   Current Outpatient Medications   Medication   • gabapentin (NEURONTIN) 400 MG Cap   • [START ON 3/22/2021] traMADol (ULTRAM) 50 MG Tab   • [START ON 4/21/2021] traMADol (ULTRAM) 50 MG Tab   • levothyroxine (SYNTHROID) 50 MCG Tab   • atorvastatin (LIPITOR) 20 MG Tab   • lisinopril-hydrochlorothiazide (PRINZIDE) 20-12.5 MG per tablet   • DULoxetine (CYMBALTA) 60 MG Cap DR Particles delayed-release capsule   • buPROPion (WELLBUTRIN XL) 150 MG XL tablet   • atorvastatin (LIPITOR) 20 MG Tab   • lisinopril-hydrochlorothiazide (PRINZIDE) 20-12.5 MG per tablet   • ibuprofen (MOTRIN) 200 MG Tab   • Ferrous Sulfate (IRON) 325 (65 Fe) MG Tab   • metFORMIN ER (GLUCOPHAGE XR) 500 MG TABLET SR 24 HR   • ondansetron (ZOFRAN) 4 MG Tab tablet   • acetaminophen (TYLENOL) 500 MG Tab     No current facility-administered medications for this visit.       Allergies:   Allergies   Allergen Reactions   • Bactrim Ds Rash and Swelling     Pt states \"My hand swells up and rash all over body\".   • Sulfamethoxazole-Trimethoprim Rash and Swelling     Pt states \"My hand swells up and rash all over body\".       Social Hx:   Social History     Socioeconomic History   • Marital status: Single     Spouse name: Not on file   • Number of children: Not on file   • Years of education: Not on file   • Highest education level: Not on file   Occupational History   • Not on file   Tobacco Use   • Smoking status: Never Smoker   • Smokeless tobacco: Never Used   Substance and Sexual Activity   • Alcohol use: Not Currently     Alcohol/week: 0.0 oz     Comment: 1 glass wine per month   • Drug use: No   • Sexual activity: Never     Partners: Male   Other Topics Concern   •  Service No   • Blood Transfusions No   • Caffeine Concern No   • Occupational Exposure No   • Hobby Hazards No   • Sleep Concern Yes   • Stress Concern Yes   • Weight Concern Yes   • Special Diet " "No   • Back Care No   • Exercise No   • Bike Helmet No   • Seat Belt Yes   • Self-Exams No   Social History Narrative   • Not on file     Social Determinants of Health     Financial Resource Strain:    • Difficulty of Paying Living Expenses:    Food Insecurity:    • Worried About Running Out of Food in the Last Year:    • Ran Out of Food in the Last Year:    Transportation Needs:    • Lack of Transportation (Medical):    • Lack of Transportation (Non-Medical):    Physical Activity:    • Days of Exercise per Week:    • Minutes of Exercise per Session:    Stress:    • Feeling of Stress :    Social Connections:    • Frequency of Communication with Friends and Family:    • Frequency of Social Gatherings with Friends and Family:    • Attends [employer] Services:    • Active Member of Clubs or Organizations:    • Attends Club or Organization Meetings:    • Marital Status:    Intimate Partner Violence:    • Fear of Current or Ex-Partner:    • Emotionally Abused:    • Physically Abused:    • Sexually Abused:          EXAMINATION     Physical Exam:   Vitals: /64 (BP Location: Right arm, Patient Position: Sitting, BP Cuff Size: Small adult)   Pulse 80   Temp 36.4 °C (97.6 °F) (Temporal)   Ht 1.676 m (5' 6\")   Wt 86.7 kg (191 lb 2.2 oz)   SpO2 93%     Constitutional:   Body Habitus: Body mass index is 30.85 kg/m².  Cooperation: Fully cooperates with exam  Appearance: Well-groomed, well-nourished, not disheveled no acute distress    Eyes: No scleral icterus, no proptosis     ENT -no obvious auditory deficits, wearing a face mask    Skin -no visible skin lesions    Respiratory-  breathing comfortable on room air, no audible wheezing    Cardiovascular- No lower extremity edema is noted.     Psychiatric- alert and oriented ×3. Normal affect.     Gait -  Minimally antalgic without assist device    Neuro/Muscloskeletal  No focal motor or sensory deficits in the lower extremities bilaterally    Functional ROM of the right " knee     MEDICAL DECISION MAKING    Medical records review: see under HPI section.     DATA    Labs:     03/14/2020 CRP 0.19  03/14/2020 Sed rate 20  02/25/2020: Tramadol and metabolites      Lab Results   Component Value Date/Time    SODIUM 139 10/07/2020 10:45 PM    POTASSIUM 3.9 10/07/2020 10:45 PM    CHLORIDE 98 10/07/2020 10:45 PM    CO2 27 10/07/2020 10:45 PM    ANION 14.0 10/07/2020 10:45 PM    GLUCOSE 100 (H) 10/07/2020 10:45 PM    BUN 25 (H) 10/07/2020 10:45 PM    CREATININE 0.86 10/07/2020 10:45 PM    CALCIUM 10.6 (H) 10/07/2020 10:45 PM    ASTSGOT 13 01/18/2020 07:41 AM    ALTSGPT 11 01/18/2020 07:41 AM    TBILIRUBIN 0.5 01/18/2020 07:41 AM    ALBUMIN 4.1 01/18/2020 07:41 AM    TOTPROTEIN 6.8 01/18/2020 07:41 AM    GLOBULIN 2.7 01/18/2020 07:41 AM    AGRATIO 1.5 01/18/2020 07:41 AM       Lab Results   Component Value Date/Time    PROTHROMBTM 12.6 10/07/2020 10:45 PM    INR 0.91 10/07/2020 10:45 PM        Lab Results   Component Value Date/Time    WBC 9.2 10/07/2020 10:45 PM    RBC 4.30 10/07/2020 10:45 PM    HEMOGLOBIN 13.6 10/07/2020 10:45 PM    HEMATOCRIT 41.6 10/07/2020 10:45 PM    MCV 96.7 10/07/2020 10:45 PM    MCH 31.6 10/07/2020 10:45 PM    MCHC 32.7 (L) 10/07/2020 10:45 PM    MPV 11.0 10/07/2020 10:45 PM    NEUTSPOLYS 56.30 10/07/2020 10:45 PM    LYMPHOCYTES 34.00 10/07/2020 10:45 PM    MONOCYTES 6.60 10/07/2020 10:45 PM    EOSINOPHILS 2.40 10/07/2020 10:45 PM    BASOPHILS 0.30 10/07/2020 10:45 PM        Lab Results   Component Value Date/Time    HBA1C 6.1 (H) 01/18/2020 07:41 AM        Imaging: I personally reviewed following images, these are my reads  CT cervical spine 09/22/2020  There is note of cervical spondylosis with multilevel uncovertebral arthropathy    Xray right hip 03/14/2020  There is mild osteoarthritis of the right hip.      Xray lumbar 03/14/2020  There is mild anterolisthesis at L5-S1.  No abnormal motion on flexion and extension  There is DDD and facet arthropathy that is most  noted at L5-S1 and less at L4-5    Xray right knee 09/16/2019  There is note of right knee arthroplasty    IMAGING radiology reads. I reviewed the following radiology reads    CT cervical spine 09/22/2020  IMPRESSION:  Degenerative change without evidence of cervical spine fracture.    Xray lumbar 03/14/2020  IMPRESSION:  1.  No compression deformity or acute fracture is identified.  2.  Mild anterolisthesis at L5-S1.  3.  Degenerative disc disease and facet arthropathy.  4.  No focal instability noted on flexion extension views.                                                Xray right hip 03/14/2020  IMPRESSION:     1.  No radiographic evidence of acute traumatic injury.     2.  Findings are consistent with mild osteoarthritis.     3.  Probable constipation.                                   Results for orders placed during the hospital encounter of 09/16/19   DX-KNEE 2- RIGHT    Impression Right knee arthroplasty.      Results for orders placed during the hospital encounter of 03/28/19   DX-KNEE 3 VIEWS RIGHT    Impression No evidence of acute fracture or dislocation.    Degenerative changes as above described.                              Diagnosis   Visit Diagnoses     ICD-10-CM   1. Spondylolisthesis at L5-S1 level  M43.17   2. Lumbar radiculopathy  M54.16   3. Chronic knee pain, unspecified laterality  M25.569    G89.29   4. Major depressive disorder, recurrent episode, in partial remission ([facility])  F33.41           ASSESSMENT:  Debby Desai 60 y.o. female seen for above.     Debby was seen today for follow-up.    Diagnoses and all orders for this visit:    Spondylolisthesis at L5-S1 level  -     REFERRAL TO PSYCHIATRY  -     REFERRAL TO OUTPATIENT INTERVENTIONAL PAIN CLINIC    Lumbar radiculopathy  -     gabapentin (NEURONTIN) 400 MG Cap; Take 1 capsule by mouth 3 times a day for 30 days.  -     traMADol (ULTRAM) 50 MG Tab; Take 1 tablet by mouth every 8 hours as needed for Moderate Pain or Severe Pain for  up to 30 days.  -     traMADol (ULTRAM) 50 MG Tab; Take 1 tablet by mouth every 8 hours as needed for Moderate Pain or Severe Pain for up to 30 days.  -     Consent for Opiate Prescription  -     Controlled Substance Treatment Agreement  -     REFERRAL TO OUTPATIENT INTERVENTIONAL PAIN CLINIC    Chronic knee pain, unspecified laterality  -     traMADol (ULTRAM) 50 MG Tab; Take 1 tablet by mouth every 8 hours as needed for Moderate Pain or Severe Pain for up to 30 days.  -     traMADol (ULTRAM) 50 MG Tab; Take 1 tablet by mouth every 8 hours as needed for Moderate Pain or Severe Pain for up to 30 days.  -     Consent for Opiate Prescription  -     Controlled Substance Treatment Agreement    Major depressive disorder, recurrent episode, in partial remission (HCC)  -     REFERRAL TO PSYCHIATRY         1. Discussed increase of symptoms, plan to trial return to gabapentin 400mg po tid.  She was taking 600mg po tid and has had resolution of balance issues, but increased pain at 300mg po tid.  We will trial increase back to 400mg po tid and she will let me know if this leads to return of symptoms.  2. Patient requests referral to Psychiatry.  She has seen Dr. Correa in the past.  Continue counseling with psychologist every other week.  3. Reviewed  and UDS reviewed and as expected, patient taking tramadol.  4. Continue tramadol #72/month.  No reduction today.  5. Continue duloxetine.  6. Discussed right lumbar five and sacral one transforaminal epidural steroid injection, discussed we will do this if increasing the dose of gabapentin is not helpful.  She would like to avoid more surgery.  The risks benefits and alternatives to this procedure were discussed and the patient wishes to proceed with the procedure. Risks include but are not limited to damage to surrounding structures, infection, bleeding, worsening of pain which can be permanent, weakness which can be permanent. Benefits include pain relief, improved function.  Alternatives includes not doing the procedure.        Follow-up: Return in about 2 months (around 5/16/2021).      Thank you very much for asking me to participate in Debby Desai's care.  Please contact me with any questions or concerns.      Please note that this dictation was created using voice recognition software. I have made every reasonable attempt to correct obvious errors but there may be errors of grammar and content that I may have overlooked prior to finalization of this note.      Volodymyr Herring MD  Physical Medicine and Rehabilitation  Interventional Spine and Sports Physiatry  [facility]

## 2021-03-16 NOTE — PATIENT INSTRUCTIONS
Your procedure will be at the [facility] special procedure suite.    [facility] [place] [address]       PRE-PROCEDURE INSTRUCTIONS  You may take your regular medications except:   · No Anti-inflammatories 5 days prior to your procedure. Anti-inflammatories include medicines such as  ibuprofen (Motrin, Advil), Excedrin, Naproxen (Aleve, Anaprox, Naprelan, Naprosyn), Celecoxib (Celebrex), Diclofenac (Voltaren-XR tab), and Meloxicam (Mobic).   · No Glucophage or Metformin 24 hours before your procedure. You may resume next day after your procedure.  · Call the physiatry office if you are taking or prescribed anti-biotics within five days of procedure.  · Please ask provider if you are taking any new diabetes medication.  · CONTINUE TAKING BLOOD PRESSURE MEDICATIONS AS PRESCRIBED.  · Pain medications will not be prescribed on the procedure day. Procedural pain medication may be used by your provider   · Call your doctor's office performing the procedure if you have a fever, chills, rash or new illness prior to your procedure    Anticoagulation/antiplatelet medications  No Blood thinning medications such as Coumadin or Plavix 5 days prior to procedure unless your doctor said to continue these medications. Call your doctor if a new medication is prescribed in this class.     Restrictions for eating before procedure:   · If you are getting procedural sedation, then do not eat to for 8 hours prior to procedure appointment time. Do not drink fluids for four hours prior to your procedure time.   · If you are not having procedural sedation, then Skip the meal prior to your procedure. If you have a morning procedure then skip breakfast. If you have an afternoon procedure then skip lunch.   · You may drink clear liquids up to 2 hours prior to your procedure  · You must have a  the day of procedure to accompany you home.      POST PROCEDURE INSTRUCTIONS   · No heavy lifting, strenuous bending or  strenuous exercise for 3 days after your procedure.  · No hot tubs, baths, swimming for 3 days after your procedure  · You can remove the bandage the day after the procedure.  · IF YOU RECEIVED A STEROID INJECTION. PLEASE NOTE THAT THERE MAY BE A DELAY FOR THE INJECTION TO START WORKING, THE DELAY MAY BE UP TO TWO WEEKS. IF YOU HAVE DIABETES, PLEASE NOTE THAT YOUR SUGAR LEVELS MAY BE ELEVATED FOR 1-2 DAYS AFTER A STEROID INJECTION.  THE STEROID MAY CAUSE TEMPORARY SYMPTOMS WHICH USUALLY RESOLVE ON THEIR OWN WITHIN 1 TO 2 DAYS INCLUDING FACIAL FLUSHING OR A FEELING OF WARMTH ON THE FACE, TEMPORARY INCREASES IN BLOOD SUGAR, INSOMNIA, INCREASED HUNGER  · IF YOU RECEIVED A DIAGNOSTIC PROCEDURE (SUCH AS A MEDIAL BRANCH BLOCK), PLEASE NOTE THAT WE DO EXPECT THIS INJECTION TO WEAR OFF.  IT IS IMPORTANT TO COMPLETE THE PAIN DIARY AND LIST THE PAIN SCORE ONLY FOR THE REGION WHERE THE PROCEDURE WAS AND BRING THIS TO YOUR FOLLOW UP VISIT.  · IF YOU RECEIVED A RADIOFREQUENCY PROCEDURE, THERE MAY BE SOME SORENESS AFTER THE PROCEDURE.  THIS IS NORMAL.  · IF YOU EXPERIENCE PROLONGED WEAKNESS LONGER THAN ONE DAY, BOWEL OR BLADDER INCONTINENCE THEN PLEASE CALL THE PHYSIATRY OFFICE.  · Your leg may feel heavy, weak and numb for up to 1-2 days. Be very careful walking.   ·  You may resume normal activities 3 days after procedure.

## 2021-03-20 ENCOUNTER — PHARMACY VISIT (OUTPATIENT)
Dept: PHARMACY | Facility: MEDICAL CENTER | Age: 61
End: 2021-03-20
Payer: COMMERCIAL

## 2021-03-23 PROCEDURE — RXMED WILLOW AMBULATORY MEDICATION CHARGE: Performed by: PHYSICAL MEDICINE & REHABILITATION

## 2021-03-24 ENCOUNTER — PHARMACY VISIT (OUTPATIENT)
Dept: PHARMACY | Facility: MEDICAL CENTER | Age: 61
End: 2021-03-24
Payer: COMMERCIAL

## 2021-03-30 PROCEDURE — RXMED WILLOW AMBULATORY MEDICATION CHARGE: Performed by: NURSE PRACTITIONER

## 2021-04-05 NOTE — PREPROCEDURE INSTRUCTIONS
PAT call returned by pt 4/5/2021 at approx 12:42, spoke with pt after confirming 2 pt identifiers. Health hx, medications, allergies reviewed with pt, as well as pre-procedure/sedation instructions. Respiratory screen completed. Pt denies being sick/symptomatic w/ any covid type symptoms or having close contact w/ sick individuals in the past 2 wks. Pt told to wear a form fitting mask. Informed pt it is recommended pt and also that we request the  to remain on site until pt is discharged. Pt verbalizes understanding. Pt reports she had her 2nd covid vaccine in Jan.

## 2021-04-07 ENCOUNTER — HOSPITAL ENCOUNTER (OUTPATIENT)
Facility: REHABILITATION | Age: 61
End: 2021-04-07
Attending: PHYSICAL MEDICINE & REHABILITATION | Admitting: PHYSICAL MEDICINE & REHABILITATION
Payer: COMMERCIAL

## 2021-04-07 ENCOUNTER — APPOINTMENT (OUTPATIENT)
Dept: RADIOLOGY | Facility: REHABILITATION | Age: 61
End: 2021-04-07
Attending: PHYSICAL MEDICINE & REHABILITATION
Payer: COMMERCIAL

## 2021-04-07 VITALS
BODY MASS INDEX: 29.24 KG/M2 | HEIGHT: 67 IN | RESPIRATION RATE: 12 BRPM | TEMPERATURE: 97.4 F | HEART RATE: 76 BPM | OXYGEN SATURATION: 99 % | DIASTOLIC BLOOD PRESSURE: 78 MMHG | SYSTOLIC BLOOD PRESSURE: 142 MMHG | WEIGHT: 186.29 LBS

## 2021-04-07 PROCEDURE — 64483 NJX AA&/STRD TFRM EPI L/S 1: CPT

## 2021-04-07 PROCEDURE — 700117 HCHG RX CONTRAST REV CODE 255

## 2021-04-07 PROCEDURE — 64484 NJX AA&/STRD TFRM EPI L/S EA: CPT

## 2021-04-07 PROCEDURE — 700111 HCHG RX REV CODE 636 W/ 250 OVERRIDE (IP)

## 2021-04-07 PROCEDURE — 700101 HCHG RX REV CODE 250

## 2021-04-07 RX ORDER — DEXAMETHASONE SODIUM PHOSPHATE 10 MG/ML
INJECTION, SOLUTION INTRAMUSCULAR; INTRAVENOUS
Status: COMPLETED
Start: 2021-04-07 | End: 2021-04-07

## 2021-04-07 RX ORDER — LIDOCAINE HYDROCHLORIDE 20 MG/ML
INJECTION, SOLUTION EPIDURAL; INFILTRATION; INTRACAUDAL; PERINEURAL
Status: COMPLETED
Start: 2021-04-07 | End: 2021-04-07

## 2021-04-07 RX ORDER — MIDAZOLAM HYDROCHLORIDE 1 MG/ML
INJECTION INTRAMUSCULAR; INTRAVENOUS
Status: COMPLETED
Start: 2021-04-07 | End: 2021-04-07

## 2021-04-07 RX ADMIN — IOHEXOL 5 ML: 240 INJECTION, SOLUTION INTRATHECAL; INTRAVASCULAR; INTRAVENOUS; ORAL at 11:09

## 2021-04-07 RX ADMIN — LIDOCAINE HYDROCHLORIDE 5 ML: 20 INJECTION, SOLUTION EPIDURAL; INFILTRATION; INTRACAUDAL; PERINEURAL at 11:09

## 2021-04-07 RX ADMIN — DEXAMETHASONE SODIUM PHOSPHATE: 10 INJECTION, SOLUTION INTRAMUSCULAR; INTRAVENOUS at 11:10

## 2021-04-07 RX ADMIN — DEXAMETHASONE SODIUM PHOSPHATE 10 MG: 10 INJECTION, SOLUTION INTRAMUSCULAR; INTRAVENOUS at 11:00

## 2021-04-07 ASSESSMENT — FIBROSIS 4 INDEX: FIB4 SCORE: 0.64

## 2021-04-07 ASSESSMENT — PAIN DESCRIPTION - PAIN TYPE
TYPE: CHRONIC PAIN
TYPE: CHRONIC PAIN

## 2021-04-07 NOTE — PROGRESS NOTES
Pt discharged home with care from family member. Vital signs and post assessment documented. Education reviewed and all questions answered. Pt able to ambulate appropriately. All belongings returned to patient.     Faith Batista R.N.

## 2021-04-07 NOTE — PROGRESS NOTES
Pt identified. Vitals recorded. Consent signed and procedure verified with patient. Education overview. H&P is updated and pre assessment is documented. PMH and medications reviewed. No Acs or HTN meds taken. Pt decided to place IV prior to procedure in case need of sedation is necessary through procedure. MD verified and approved, RN placed.

## 2021-04-07 NOTE — PROGRESS NOTES
Preprocedure assessment completed.  Pertinent health information(  HTN) communicated to physician and staff during time out.  Patient positioned preprocedure by RN, CST and XRAY.  Pillow under ankles for support.

## 2021-04-07 NOTE — OP REPORT
Pre-operative Diagnosis: Spondylolisthesis at L5-S1 level(M43.17), Lumbar radiculopathy(M54.16)     Post-operative Diagnosis: Spondylolisthesis at L5-S1 level(M43.17), Lumbar radiculopathy(M54.16)     Procedure:Right Lumbar Transforaminal Epidural Steroid  at the L5-S1 and sacral one levels.      Type of Anesthesia: Local anesthesia  Blood Loss: None  Physician: Volodymyr Herring MD     Description of procedure:     The risks, benefits, and alternatives of the procedure were reviewed and discussed with the patient.  Written informed consent was freely obtained. A pre-procedural time-out was conducted by the physician verifying patient’s identity, procedure to be performed, procedure site and side, and allergy verification. Appropriate equipment was determined to be in place for the procedure.      Method of Procedure: The patient signed an informed consent form in the pre-op area after all risks and complications were discussed and all questions were answered.     The patient was prepped and draped in a sterile fashion in the prone position.  The patient's spine was surveyed under fluoroscopic visualization and anatomical landmarks were identified.     The patient's vital signs were carefully monitored before, throughout, and after the procedure.      Right L5 transforaminal epidural steroid injection     The fluoroscope was placed over the lumbar spine and adjusted into the proper AP/Oblique view to enter the transforaminal space at the levels below. The targets for injection were then marked at the right L5-S1. A 25g 1.5 inch needle was placed into the marked site, and approx 2cc of 1% Lidocaine was injected subcutaneously into the epidermal and dermal layers. The needle was removed. A 25 gauge 3.5 inch spinal needle was then placed and advanced under fluoroscopic guidance in an oblique view towards the subpedicular epidural space of the levels noted below. The needle position was confirmed to not be past the 6  "o'clock position in the AP view and it was in the neural foramen and the lateral view. Under live fluoroscopic guidance in the AP view, contrast dye was used to highlight the epidural space spread.  Following negative aspiration, approx 0.8 cc of 2% lidocaine preservative free with 1 cc of dexamethasone (10mg/cc) and 1 cc of normal saline was then injected at each level, and the needles were removed intact after restyleted. The patient's back was covered with a 4x4 gauze, the area was cleansed with sterile normal saline, and a dressing was applied. There were no complications noted.      Perisheathogram and Spot Film:  After Omnipaque was injected, a right L5 \"perisheathogram\" occurred without evidence of subarachnoid or vascular flow.  A spot films was ordered in AP and lateral to confirm the correct needle tip placement.     Right S1 transforaminal epidural steroid injection     The fluoroscope was placed over the sacral spine and adjusted into the proper AP/Oblique view to enter the transforaminal space at the levels below. The targets for injection were then marked at the right sacral one foramen. A 25g 1.5 inch needle was placed into the marked site, and approx 2cc of 1% Lidocaine was injected subcutaneously into the epidermal and dermal layers. The needle was removed. A 25 gauge 3.5 inch spinal needle was then placed and advanced under fluoroscopic guidance in an oblique view towards the epidural space of the levels noted below. The needle position was confirmed to not be past the 6 o'clock position in the AP view and it was in the neural foramen and the lateral view. Under live fluoroscopic guidance in the AP view, contrast dye was used to highlight the epidural space spread.  Following negative aspiration, approx 0.8 cc of 2% lidocaine preservative free with 1 cc of dexamethasone (10mg/cc) and 1 cc of normal saline was then injected at each level, and the needles were removed intact after restyleted. The " "patient's back was covered with a 4x4 gauze, the area was cleansed with sterile normal saline, and a dressing was applied. There were no complications noted.      Perisheathogram and Spot Film:  After Omnipaque was injected, a right S1 \"perisheathogram\" occurred without evidence of subarachnoid or vascular flow.  A spot films was ordered in AP and lateral to confirm the correct needle tip placement.     Volodymyr Herring MD  Physical Medicine and Rehabilitation  Interventional Spine and Sports Physiatry  [facility]     CPT: 20001, [id number]  "

## 2021-04-07 NOTE — INTERVAL H&P NOTE
"H&P reviewed. The patient was examined and there are no changes to the H&P      /76   Pulse 69   Temp 36.3 °C (97.4 °F) (Temporal)   Resp 14   Ht 1.689 m (5' 6.5\")   Wt 84.5 kg (186 lb 4.6 oz)   SpO2 92%     CV: RRR, Normal S1, S2  RESP: Clear to auscultation bilaterally    Continue with injection as planned.    Volodymyr Herring MD  Physical Medicine and Rehabilitation  Interventional Spine and Sports Physiatry  [facility]      "

## 2021-04-08 ENCOUNTER — TELEPHONE (OUTPATIENT)
Dept: PHYSICAL MEDICINE AND REHAB | Facility: MEDICAL CENTER | Age: 61
End: 2021-04-08

## 2021-04-08 NOTE — TELEPHONE ENCOUNTER
I called patient to follow up after her Special procedure Right lumbar five and sacral one transforaminal epidural and she stated she is doing Ok and feel good, no bladder and bowel issues. RR

## 2021-04-09 ENCOUNTER — APPOINTMENT (OUTPATIENT)
Dept: MEDICAL GROUP | Facility: MEDICAL CENTER | Age: 61
End: 2021-04-09
Payer: COMMERCIAL

## 2021-04-11 ENCOUNTER — PHARMACY VISIT (OUTPATIENT)
Dept: PHARMACY | Facility: MEDICAL CENTER | Age: 61
End: 2021-04-11
Payer: COMMERCIAL

## 2021-04-21 PROCEDURE — RXMED WILLOW AMBULATORY MEDICATION CHARGE: Performed by: PHYSICAL MEDICINE & REHABILITATION

## 2021-04-21 PROCEDURE — RXMED WILLOW AMBULATORY MEDICATION CHARGE: Performed by: FAMILY MEDICINE

## 2021-04-21 PROCEDURE — RXMED WILLOW AMBULATORY MEDICATION CHARGE: Performed by: NURSE PRACTITIONER

## 2021-04-28 ENCOUNTER — PHARMACY VISIT (OUTPATIENT)
Dept: PHARMACY | Facility: MEDICAL CENTER | Age: 61
End: 2021-04-28
Payer: COMMERCIAL

## 2021-05-13 ENCOUNTER — TELEMEDICINE (OUTPATIENT)
Dept: BEHAVIORAL HEALTH | Facility: CLINIC | Age: 61
End: 2021-05-13
Payer: COMMERCIAL

## 2021-05-13 DIAGNOSIS — F33.2 SEVERE EPISODE OF RECURRENT MAJOR DEPRESSIVE DISORDER, WITHOUT PSYCHOTIC FEATURES (HCC): ICD-10-CM

## 2021-05-13 DIAGNOSIS — F33.41 MAJOR DEPRESSIVE DISORDER, RECURRENT EPISODE, IN PARTIAL REMISSION (HCC): ICD-10-CM

## 2021-05-13 DIAGNOSIS — F41.1 GENERALIZED ANXIETY DISORDER: ICD-10-CM

## 2021-05-13 DIAGNOSIS — G47.09 OTHER INSOMNIA: ICD-10-CM

## 2021-05-13 PROCEDURE — 96127 BRIEF EMOTIONAL/BEHAV ASSMT: CPT | Performed by: PSYCHIATRY & NEUROLOGY

## 2021-05-13 PROCEDURE — 99204 OFFICE O/P NEW MOD 45 MIN: CPT | Performed by: PSYCHIATRY & NEUROLOGY

## 2021-05-13 PROCEDURE — RXMED WILLOW AMBULATORY MEDICATION CHARGE: Performed by: PSYCHIATRY & NEUROLOGY

## 2021-05-13 RX ORDER — HYDROXYZINE 50 MG/1
50 TABLET, FILM COATED ORAL NIGHTLY PRN
Qty: 30 TABLET | Refills: 1 | Status: SHIPPED | OUTPATIENT
Start: 2021-05-13 | End: 2021-06-11

## 2021-05-13 RX ORDER — BUPROPION HYDROCHLORIDE 300 MG/1
300 TABLET ORAL EVERY MORNING
Qty: 30 TABLET | Refills: 1 | Status: SHIPPED | OUTPATIENT
Start: 2021-05-13 | End: 2021-06-11 | Stop reason: SDUPTHER

## 2021-05-13 RX ORDER — DULOXETIN HYDROCHLORIDE 30 MG/1
30 CAPSULE, DELAYED RELEASE ORAL DAILY
Qty: 30 CAPSULE | Refills: 1 | Status: SHIPPED | OUTPATIENT
Start: 2021-05-13 | End: 2021-06-11

## 2021-05-13 RX ORDER — DULOXETIN HYDROCHLORIDE 60 MG/1
60 CAPSULE, DELAYED RELEASE ORAL DAILY
Qty: 30 CAPSULE | Refills: 1 | Status: SHIPPED | OUTPATIENT
Start: 2021-05-13 | End: 2021-06-11

## 2021-05-13 ASSESSMENT — ANXIETY QUESTIONNAIRES
6. BECOMING EASILY ANNOYED OR IRRITABLE: NEARLY EVERY DAY
4. TROUBLE RELAXING: NEARLY EVERY DAY
1. FEELING NERVOUS, ANXIOUS, OR ON EDGE: NEARLY EVERY DAY
7. FEELING AFRAID AS IF SOMETHING AWFUL MIGHT HAPPEN: NOT AT ALL
GAD7 TOTAL SCORE: 13
2. NOT BEING ABLE TO STOP OR CONTROL WORRYING: SEVERAL DAYS
5. BEING SO RESTLESS THAT IT IS HARD TO SIT STILL: SEVERAL DAYS
3. WORRYING TOO MUCH ABOUT DIFFERENT THINGS: MORE THAN HALF THE DAYS

## 2021-05-13 ASSESSMENT — PATIENT HEALTH QUESTIONNAIRE - PHQ9
CLINICAL INTERPRETATION OF PHQ2 SCORE: 6
5. POOR APPETITE OR OVEREATING: 2 - MORE THAN HALF THE DAYS
SUM OF ALL RESPONSES TO PHQ QUESTIONS 1-9: 22

## 2021-05-13 NOTE — PROGRESS NOTES
"This evaluation was conducted via Zoom using secure and encrypted videoconferencing technology. The [facility].    The patient's identity was confirmed and verbal consent was obtained for this virtual visit.      INITIAL PSYCHIATRIC EVALUATION      This provider informed the patient their medical records are totally confidential except for the use by other providers involved in their care, or if the patient signs a release, or to report instances of child or elder abuse, or if it is determined they are an immediate risk to harm themselves or others.      CHIEF COMPLAINT  \" Need help with depression and anxiety\"      HISTORY OF PRESENT ILLNESS  Debby Desai is a 61 y.o. old female comes in today to establish care and for evaluation of depression and anxiety.  I did reviewed all outpatient psychiatry follow up notes over last 3 years. Patient is new to the clinic.   Patient is following with her primary care physician for management of depression with following medication combination: wellbutrin 150 mg daily to cymbalta 60 mg daily.  Patient reports struggling with anxiety and depression since early childhood and attributes this to multiple losses in family during childhood and describes last 7 years as difficult as her sister was diagnosed with glioblastoma and is not doing well, lost both parents and a close family member.  Patient describes her chronic pain is also contributing to worsening depression and anxiety.  Patient reports worsening depression with low energy, low interest, low motivation, feelings of guilt, poor concentration with passive death wishes.  Patient scored 21 on PHQ 9 indicating severe depression but denies current or past history of mitra, hypomania or psychosis.  Initial part of the session was dedicated to safety assessment.  Patient reports she will never do anything to harm self as this will affect her family negatively.  She describes her " family member as a protective factor.  Patient understand the importance of reaching out and calling 911 or going to nearest emergency room if worsening of suicidal ideation or safety concern is noted.  Patient reports struggling with anxiety on most days of the week with her worrying about multiple topic and have difficulty relaxing self with associated irritability.  Patient scored 13 on TIM 7 indicating moderate severity of anxiety.  Patient has not seen any worsening of anxiety after addition of Wellbutrin.    Patient agreed with plan of initiating referral in the clinic to Dr. Lise Schwarz as patient is struggling with depression and anxiety with comorbid pain at this time.  Patient is dedicated to get better and was given positive encouragement to work on psychosocial aspect of depression.  Patient reports reaching out to family member close to her and keep self busy on most weekends when she is not working.  She agreed with maximizing Cymbalta and increasing Wellbutrin and agreed with future plan of switching Cymbalta to Effexor if improvement is not seen in over the next 4-week time.    I also discussed the option of transcranial magnetic stimulation once we start the treatment.      PSYCHIATRIC REVIEW OF SYSTEMS: denies manic symptoms, denies psychotic symptoms including AH / VH, denies OCD symptoms, denies restrictive eating or purging, denies trauma related symptoms, see HPI for depressive symptoms and see HPI for anxeity symptoms      MEDICAL REVIEW OF SYSTEMS:   Constitutional  fatigue   Eyes negative   Ears/Nose/Mouth/Throat negative   Cardiovascular  hypertension, hyperlipidemia   Respiratory negative   Gastrointestinal negative   Genitourinary negative   Muscular  osteoarthritis   Integumentary negative   Neurological negative   Endocrine  hypothyroidism   Hematologic/Lymphatic negative     CURRENT MEDICATIONS:  Current Outpatient Medications   Medication Sig Dispense Refill   • gabapentin  (NEURONTIN) 400 MG Cap Take 1 capsule by mouth 3 times a day for 30 days. 90 capsule 1   • traMADol (ULTRAM) 50 MG Tab Take 1 tablet by mouth every 8 hours as needed for Moderate Pain or Severe Pain for up to 30 days. (dnf 4/21/2021) 72 tablet 0   • levothyroxine (SYNTHROID) 50 MCG Tab Take 1 tablet by mouth Every morning on an empty stomach. 30 tablet 5   • atorvastatin (LIPITOR) 20 MG Tab TAKE ONE TABLET BY MOUTH EVERY DAY 90 Tab 1   • lisinopril-hydrochlorothiazide (PRINZIDE) 20-12.5 MG per tablet Take one tablet by mouth 90 Tab 1   • DULoxetine (CYMBALTA) 60 MG Cap DR Particles delayed-release capsule Take 1 Cap by mouth every day. 90 Cap 0   • buPROPion (WELLBUTRIN XL) 150 MG XL tablet Take 1 Tab by mouth every morning. 90 Tab 3   • atorvastatin (LIPITOR) 20 MG Tab Take 20 mg by mouth every day.     • lisinopril-hydrochlorothiazide (PRINZIDE) 20-12.5 MG per tablet Take 1 Tab by mouth every day.     • ibuprofen (MOTRIN) 200 MG Tab Take 600 mg by mouth every 6 hours as needed.     • Ferrous Sulfate (IRON) 325 (65 Fe) MG Tab Take 65 mg by mouth every day at 6 PM.     • metFORMIN ER (GLUCOPHAGE XR) 500 MG TABLET SR 24 HR Take 1 Tab by mouth 2 times a day. 180 Tab 3   • ondansetron (ZOFRAN) 4 MG Tab tablet Take 1 Tab by mouth every four hours as needed for Nausea/Vomiting. 20 Tab 6   • acetaminophen (TYLENOL) 500 MG Tab Take 500-1,000 mg by mouth 2 Times a Day. Pt takes 1000MG @ 0700 and then @ 0730 500MG       No current facility-administered medications for this visit.       ALLERGIES:  Bactrim ds and Sulfamethoxazole-trimethoprim    PAST PSYCHIATRIC HISTORY  Prior psychiatric hospitalization: no  Prior Self harm/suicide attempt: no  Prior Diagnosis: MDD, Anxiety    PAST PSYCHIATRIC MEDICATIONS  • Fluoxetine  • Paroxetine  • Citalopram  • Sertraline  • Duloxetine  • Wellbutrin  • Amitriptyline   • Ativan     FAMILY HISTORY  Psychiatric diagnosis: [place] with depression and anxiety  History of suicide attempts:  No  Substance abuse history: 1 sister with drug and alcohol abuse history    SUBSTANCE USE HISTORY:  ALCOHOL: no  TOBACCO: no  CANNABIS: no  OPIOIDS: no  PRESCRIPTION MEDICATIONS: no  OTHERS: no  History of inpatient/outpatient rehab treatment: none    SOCIAL HISTORY  Childhood: born in [place] and describes childhood as difficult  Moved a lot due to parents financial concerns  Employment: For [employer]  Relationship: single (never )  Kids: no kids  Current living situation: alone (but strong family support as they all live nearby)  Current/past legal issues: denies  History of emotional/physical/sexual abuse - denies      MEDICAL HISTORY  Past Medical History:   Diagnosis Date   • Anemia     in past   • Anginal syndrome (HCC)     had work up and feet r/t anxiety   • Anxiety    • Arthritis     OA knees   • Dental disorder 5/24/12    partial upper denture   • High cholesterol    • HTN (hypertension), benign 3/19/2012   • Hypothyroid 3/19/2012   • Major depression 3/19/2012   • Orbital floor (blow-out) closed fracture (HCC)     left   • Other specified symptom associated with female genital organs     post menopausal bleeding   • Pain     thoracic spine, Right knee, myofacial pain, and Right shoulder   • Pain     recently fell and has bruise left forehead   • Pain 02/2018    chronic back pain, right shoulder   • Psychiatric problem     depression, anxiety   • Unspecified disorder of thyroid    • Urinary bladder disorder     stress incont.   • Urinary incontinence     Occasional     Past Surgical History:   Procedure Laterality Date   • INJ,EPI ANES/STER LUM/SAC ADDL Right 4/7/2021    Procedure: RIGHT lumbar five and sacral one transforaminal epidural;  Surgeon: Volodymyr Herring M.D.;  [facility];  Service: Pain Management   • PB TOTAL KNEE ARTHROPLASTY Right 9/16/2019    Procedure: RIGHT ARTHROPLASTY, KNEE, TOTAL;  Surgeon: Rufus Saba M.D.;  Location: SURGERY [facility];  Service: Orthopedics   • KNEE ARTHROSCOPY Right 2/9/2018    Procedure: KNEE ARTHROSCOPY;  Surgeon: Harsh Baltazar M.D.;  Location: [facility];  Service: Orthopedics   • MEDIAL MENISCECTOMY Right 2/9/2018    Procedure: MEDIAL AND LATERAL MENISCECTOMY- PARTIAL;  Surgeon: Harsh Baltazar M.D.;  Location: [facility];  Service: Orthopedics   • KNEE ARTHROSCOPY Right 7/12/2017    Procedure: KNEE ARTHROSCOPY- CESPEDES;  Surgeon: Harsh Baltazar M.D.;  Location: [facility];  Service:    • MEDIAL MENISCECTOMY Right 7/12/2017    Procedure: PARTIAL MEDIAL AND LATERAL MENISCECTOMY;  Surgeon: Harsh Baltazar M.D.;  Location: [facility];  Service:    • SYNOVECTOMY Right 7/12/2017    Procedure: SYNOVECTOMY;  Surgeon: Harsh Baltazar M.D.;  Location: [facility];  Service:    • KNEE ARTHROSCOPY  4/21/2015    Performed by Rufus Saba M.D. at [facility]   • MEDIAL MENISCECTOMY  4/21/2015    Performed by Rufus Saba M.D. at [facility]   • PUMP REVISION  12/10/2012    Performed by Allison Guerra M.D. at [facility]   • SPINAL CORD STIMULATOR  5/30/2012    Performed by ALLISON GUERRA at [facility]   • BIOPSY GENERAL  5/1/12    endometrial   • SPINAL CORD STIMULATOR  7/11/2011    Performed by ALLISON GUERRA at [facility]   • BLOCK EPIDURAL STEROID INJECTION  2008    x 3-4   • LYMPH NODE EXCISION Left 2007    cervicle   • OTHER Right 2001    thoracic discectomy     • ARTHROSCOPY, KNEE Left 1999   • RIB RESECTION Right 1980   • LUMPECTOMY           PHYSICAL EXAMINAION:  Vital signs: LMP 02/26/2012   Musculoskeletal: sitting in chair  Abnormal movements: none noted    MENTAL STATUS EXAMINATION      General:   - Grooming and hygiene: Casual,   - Apparent distress: tense,   - Behavior: Tense  - Eye Contact:  Good,   - no psychomotor agitation or retardation     - Participation: Active verbal participation  Orientation: Alert and Fully Oriented to person, place and time  Mood: Depressed and Anxious  Affect: Constricted,  Thought Process: Logical and Goal-directed  Thought Content: Denies suicidal or homicidal ideations, intent or plan Within normal limits  Perception: Denies auditory or visual hallucinations. No delusions noted Within normal limits  Attention span and concentration: fair  Speech:Rate within normal limits and Volume within normal limits  Language: Appropriate   Insight: Good  Judgment: Good  Recent and remote memory: No gross evidence of memory deficits      DEPRESSION SCREENING:  Depression Screen (PHQ-2/PHQ-9) 11/3/2020 1/14/2021 3/16/2021   PHQ-2 Total Score - - -   PHQ-2 Total Score - - -   PHQ-2 Total Score 2 3 5   PHQ-9 Total Score - - -   PHQ-9 Total Score 11 14 14       Interpretation of PHQ-9 Total Score   Score Severity   1-4 No Depression   5-9 Mild Depression   10-14 Moderate Depression   15-19 Moderately Severe Depression   20-27 Severe Depression      SAFETY ASSESSMENT - SELF:    Does patient acknowledge current or past symptoms of dangerousness to self? no  History of suicide by family member: no  History of suicide by friend/significant other: no  Recent change in amount/specificity/intensity of suicidal thoughts or self-harm behavior? no  Current access to firearms, medications, or other identified means of suicide/self-harm? no  Protective factors present: family support       SAFETY ASSESSMENT - OTHERS:    Does patient acknowledge current or past symptoms of aggressive behavior or risk to others? no  Recent change in amount/specificity/intensity of thoughts or threats to harm others? no  Current access to firearms/other identified means of harm? no      CURRENT RISK:       Suicidal: Low       Homicidal: Low       Self-Harm: Low       Relapse: Low       Crisis Safety Plan Reviewed Not Indicated    MEDICAL RECORDS/LABS/DIAGNOSTIC TESTS  REVIEWED:  Component      Latest Ref Rng & Units 1/18/2020 10/7/2020           7:41 AM 10:45 PM   Sodium      135 - 145 mmol/L 142 139   Potassium      3.6 - 5.5 mmol/L 3.7 3.9   Chloride      96 - 112 mmol/L 103 98   Co2      20 - 33 mmol/L 31 27   Anion Gap      7.0 - 16.0 8.0 14.0   Glucose      65 - 99 mg/dL 103 (H) 100 (H)   Bun      8 - 22 mg/dL 13 25 (H)   Creatinine      0.50 - 1.40 mg/dL 0.71 0.86   Calcium      8.5 - 10.5 mg/dL 9.5 10.6 (H)     Component      Latest Ref Rng & Units 10/7/2020          10:45 PM   WBC      4.8 - 10.8 K/uL 9.2   RBC      4.20 - 5.40 M/uL 4.30   Hemoglobin      12.0 - 16.0 g/dL 13.6   Hematocrit      37.0 - 47.0 % 41.6   MCV      81.4 - 97.8 fL 96.7   MCH      27.0 - 33.0 pg 31.6   MCHC      33.6 - 35.0 g/dL 32.7 (L)   RDW      35.9 - 50.0 fL 48.1   Platelet Count      164 - 446 K/uL 384   MPV      9.0 - 12.9 fL 11.0   Neutrophils-Polys      44.00 - 72.00 % 56.30   Lymphocytes      22.00 - 41.00 % 34.00   Monocytes      0.00 - 13.40 % 6.60   Eosinophils      0.00 - 6.90 % 2.40   Basophils      0.00 - 1.80 % 0.30   Immature Granulocytes      0.00 - 0.90 % 0.40   Nucleated RBC      /100 WBC 0.00   Neutrophils (Absolute)      2.00 - 7.15 K/uL 5.15   Lymphs (Absolute)      1.00 - 4.80 K/uL 3.11   Monos (Absolute)      0.00 - 0.85 K/uL 0.60   Eos (Absolute)      0.00 - 0.51 K/uL 0.22   Baso (Absolute)      0.00 - 0.12 K/uL 0.03   Immature Granulocytes (abs)      0.00 - 0.11 K/uL 0.04   NRBC (Absolute)      K/uL 0.00     Component      Latest Ref Rng & Units 1/18/2020           7:41 AM   TSH      0.380 - 5.330 uIU/mL 0.960       [place]  records -   Reviewed      ASSESSMENT  Debby Desai is a 61 y.o. old female comes in today for evaluation of depression and anxiety and is meeting criteria for major depressive disorder and generalized anxiety disorder.  Her chronic comorbid pain is also causing worsening of underlying mood and anxiety symptoms.  Patient in agreement with initiating  psychotherapy referral and doing medication adjustment as discussed below.  Emphasis given on working on all 3 component of biopsychosocial aspects for depression improvement.      DIFFERENTIAL DIAGNOSES  1. Major depressive disorder, recurrent, severe without psychotic features  2. Generalized anxiety disorder  3. Insomnia      PLAN:  (1) Major depressive disorder, recurrent, severe without psychotic features  • PHQ9: 21  • Increase Cymbalta to 90 mg daily for mood, anxiety and comorbid pain management.  30-day supply with 1 refill given for 30+60 mg dose.  • Increase Wellbutrin XL to 300 mg daily for depression augmentation.  30-day supply with 1 refill given.  • Add hydroxyzine 50 mg at bedtime as needed for insomnia.  30 tabs with 1 refill given.  • Initiate referral for psychotherapy in the clinic for mood and anxiety management.  • Consider transcranial magnetic stimulation if improvement is not seen with current approach.  • Medication options, alternatives (including no medications) and medication risks/benefits/side effects were discussed in detail.  • Explained importance of contraceptive measures while on psychotropic medications, educated to let provider know if ever pregnant or wanting to become pregnant. Verbalized understanding.  • The patient was advised to call, message provider on [facility] [place], or come in to the clinic if symptoms worsen or if any future questions/issues regarding their medications arise; the patient verbalized understanding and agreement.    • The patient was educated to call 911, call the suicide hotline, or go to local ER if having thoughts of suicide or homicide; verbalized understanding.    (2) Generalized anxiety disorder  • GAD7: 13  • Increase Cymbalta to 90 mg daily for mood, anxiety and comorbid pain management.  30-day supply with 1 refill given for 30+60 mg dose.  • Increase Wellbutrin XL to 300 mg daily for depression augmentation.  30-day supply with 1 refill given.  • Add  hydroxyzine 50 mg at bedtime as needed for insomnia.  30 tabs with 1 refill given.  • Initiate referral for psychotherapy in the clinic for mood and anxiety management.  • Consider transcranial magnetic stimulation if improvement is not seen with current approach.    (3) Insomnia  • Poor onset with awakenings  • Add hydroxyzine 50 mg at bedtime as needed for insomnia.  30 tabs with 1 refill given.  • Initiate referral for psychotherapy in the clinic for mood and anxiety management.  • Consider transcranial magnetic stimulation if improvement is not seen with current approach.    Return to clinic in 4 weeks or sooner if symptoms worsen.  Next Appointment:  instruction provided on how to make the next appointment.     The proposed treatment plan was discussed with the patient who was provided the opportunity to ask questions and make suggestions regarding alternative treatment. Patient verbalized understanding and expressed agreement with the plan.     Thank you for allowing me to participate in the care of this patient.    Genaro Stevenson M.D.  05/13/21    CC:   ARELIS Chris    This note was created using voice recognition software (Dragon). The accuracy of the dictation is limited by the abilities of the software. I have reviewed the note prior to signing, however some errors in grammar and context are still possible. If you have any questions related to this note please do not hesitate to contact our office.

## 2021-05-15 ENCOUNTER — PHARMACY VISIT (OUTPATIENT)
Dept: PHARMACY | Facility: MEDICAL CENTER | Age: 61
End: 2021-05-15
Payer: COMMERCIAL

## 2021-05-18 ENCOUNTER — OFFICE VISIT (OUTPATIENT)
Dept: PHYSICAL MEDICINE AND REHAB | Facility: MEDICAL CENTER | Age: 61
End: 2021-05-18
Payer: COMMERCIAL

## 2021-05-18 ENCOUNTER — TELEMEDICINE (OUTPATIENT)
Dept: BEHAVIORAL HEALTH | Facility: CLINIC | Age: 61
End: 2021-05-18
Payer: COMMERCIAL

## 2021-05-18 VITALS
BODY MASS INDEX: 31.14 KG/M2 | HEIGHT: 66 IN | OXYGEN SATURATION: 92 % | DIASTOLIC BLOOD PRESSURE: 70 MMHG | WEIGHT: 193.78 LBS | TEMPERATURE: 97.6 F | SYSTOLIC BLOOD PRESSURE: 130 MMHG | HEART RATE: 92 BPM

## 2021-05-18 DIAGNOSIS — M54.16 LUMBAR RADICULOPATHY: ICD-10-CM

## 2021-05-18 DIAGNOSIS — M43.17 SPONDYLOLISTHESIS AT L5-S1 LEVEL: ICD-10-CM

## 2021-05-18 DIAGNOSIS — F34.1 PERSISTENT DEPRESSIVE DISORDER: ICD-10-CM

## 2021-05-18 DIAGNOSIS — M25.569 CHRONIC KNEE PAIN, UNSPECIFIED LATERALITY: ICD-10-CM

## 2021-05-18 DIAGNOSIS — G89.29 CHRONIC KNEE PAIN, UNSPECIFIED LATERALITY: ICD-10-CM

## 2021-05-18 DIAGNOSIS — R52 PAIN DISORDER: ICD-10-CM

## 2021-05-18 DIAGNOSIS — F41.1 GAD (GENERALIZED ANXIETY DISORDER): ICD-10-CM

## 2021-05-18 DIAGNOSIS — F33.41 MAJOR DEPRESSIVE DISORDER, RECURRENT EPISODE, IN PARTIAL REMISSION (HCC): ICD-10-CM

## 2021-05-18 DIAGNOSIS — Z96.651 S/P TKR (TOTAL KNEE REPLACEMENT), RIGHT: ICD-10-CM

## 2021-05-18 PROCEDURE — RXMED WILLOW AMBULATORY MEDICATION CHARGE: Performed by: PHYSICAL MEDICINE & REHABILITATION

## 2021-05-18 PROCEDURE — 99214 OFFICE O/P EST MOD 30 MIN: CPT | Performed by: PHYSICAL MEDICINE & REHABILITATION

## 2021-05-18 PROCEDURE — 90791 PSYCH DIAGNOSTIC EVALUATION: CPT | Performed by: PSYCHOLOGIST

## 2021-05-18 RX ORDER — TRAMADOL HYDROCHLORIDE 50 MG/1
50 TABLET ORAL EVERY 8 HOURS PRN
Qty: 68 TABLET | Refills: 0 | Status: SHIPPED | OUTPATIENT
Start: 2021-05-22 | End: 2021-06-22

## 2021-05-18 RX ORDER — TRAMADOL HYDROCHLORIDE 50 MG/1
50 TABLET ORAL EVERY 8 HOURS PRN
Qty: 68 TABLET | Refills: 0 | Status: SHIPPED | OUTPATIENT
Start: 2021-06-21 | End: 2021-07-20 | Stop reason: SDUPTHER

## 2021-05-18 RX ORDER — GABAPENTIN 400 MG/1
400 CAPSULE ORAL 3 TIMES DAILY
Qty: 90 CAPSULE | Refills: 1 | Status: SHIPPED | OUTPATIENT
Start: 2021-05-18 | End: 2022-04-27

## 2021-05-18 ASSESSMENT — PATIENT HEALTH QUESTIONNAIRE - PHQ9
5. POOR APPETITE OR OVEREATING: 2 - MORE THAN HALF THE DAYS
SUM OF ALL RESPONSES TO PHQ QUESTIONS 1-9: 20
CLINICAL INTERPRETATION OF PHQ2 SCORE: 6

## 2021-05-18 ASSESSMENT — PAIN SCALES - GENERAL: PAINLEVEL: 5=MODERATE PAIN

## 2021-05-18 ASSESSMENT — FIBROSIS 4 INDEX: FIB4 SCORE: 0.64
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
## 2021-05-18 NOTE — PROGRESS NOTES
Follow-up patient note    Physiatry (physical medicine and  Rehabilitation), interventional spine and sports medicine    Date of Service: 05/18/2021  Chief complaint:   Chief Complaint   Patient presents with   • Follow-Up     Right hip and knee pain        HISTORY    HPI: Debby Desai 61 y.o. female who presents today for follow-up evaluation of her pain complaints.     Since the last visit, Debby was seen for right L5 and S1 TFESI on 04/07/2021.  She reports that this was very helpful with her low back and right leg pain.  It has been starting to return, but not nearly as painful as before.    Her right pain continues to be painful, primarily on the medial side.  She has tried icy hot.  She has not used diclofenac gel, she thinks that she has some at home.    Her balance has been better and she has been doing her exercises at home.  She has been more active.  For a family dinner, she made a lasagna and was on her feet for about 90 minutes.  Getting out and seeing family.  She went to a craft fair.    Overall, her pain has been 5/10 on the NRS.  One other accomplishment is that she took a bath and was able to get in and out without difficulty.  She likes to take baths and she has not done this in about 3 years.  Also, she was able to perform floor recovery.    She is continuing to work with her psychiatrist.  Antidepressants have been increased recently.  She is taking hydroxyzine at bedtime.  She has a new therapist that she will be seeing today, who came from [facility] and is hopeful about this.  She is taking duloxetine 90mg daily and wellbutrin.  Denies suicidality.    Continuing gabapentin 400mg po tid.  No side effects    Denies suicidality.  PHQ-9: 20  She continues to see her therapist every other week.       By history:   She reports that she had surgery on 09/16/2019.  This was a right total knee arthroplasty for osteoarthritis with Dr. Saba.    In 2001, she had discectomy.  She reports  that she has had a spinal cord stimulator placed, but she has not been using this as much.  She reports that the unit is not currently working.    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  She gets up about once an hour.  She has a sit to stand desk, but it is difficult to stand much.  Currently, she is working from home due to the COVID-19 pandemic.    Medical records review:  ED records from 10/07/2020 and 09/22/2020 reviewed.  ED visit from [id number].  Negative head CT.  She was given instructions about taking ibuprofen and tylenol.  Noted that she was also given a script for valium.     I reviewed the note from the referring provider Loy Miles M.D. dated 01/08/2020: Visits included nausea/epigastric pain, hypertension, chronic knee pain, IGT, dyslipidemia, MDD, hypothyroidism, iron deficiency    Previous treatments:    Physical Therapy: Yes    Medications the patient is tried: tramadol, tylenol (usually for a headache); in the past she took gabapentin 400mg po tid, she was taking vimovo and norco in the past; lyrica caused weight gain    Previous interventions: She had radiofrequency ablation in the past, prior to surgery    Previous surgeries to relieve the above pain:  None other than surgery 09/16/2019      ROS: Unchanged, see HPI  Gen: weight loss  Eyes: vision problems, glasses and contacts  CV: chest pain/anxiety  GI: nausea, ulcers  : urgency  Psych: depression  Endo: thyroid problems  Heme: anemia    Red Flags ROS:   Fever, Chills, Sweats: Denies  Involuntary Weight Loss: Denies  Bladder Incontinence: Denies  Bowel Incontinence: Denies  Saddle Anesthesia: Denies    All other systems reviewed and negative.       PMHx:   Past Medical History:   Diagnosis Date   • Anemia     in past   • Anginal syndrome (HCC)     had work up and feet r/t anxiety   • Anxiety    • Arthritis     OA knees   • Dental disorder 5/24/12    partial upper denture    • High cholesterol    • HTN (hypertension), benign 3/19/2012   • Hypothyroid 3/19/2012   • Major depression 3/19/2012   • Orbital floor (blow-out) closed fracture (HCC)     left   • Other specified symptom associated with female genital organs     post menopausal bleeding   • Pain     thoracic spine, Right knee, myofacial pain, and Right shoulder   • Pain     recently fell and has bruise left forehead   • Pain 02/2018    chronic back pain, right shoulder   • Psychiatric problem     depression, anxiety   • Unspecified disorder of thyroid    • Urinary bladder disorder     stress incont.   • Urinary incontinence     Occasional       PSHx:   Past Surgical History:   Procedure Laterality Date   • INJ,EPI ANES/STER LUM/SAC ADDL Right 4/7/2021    Procedure: RIGHT lumbar five and sacral one transforaminal epidural;  Surgeon: Volodymyr Herring M.D.;  [facility];  Service: Pain Management   • PB TOTAL KNEE ARTHROPLASTY Right 9/16/2019    Procedure: RIGHT ARTHROPLASTY, KNEE, TOTAL;  Surgeon: Rufus Saba M.D.;  Location: [facility];  Service: Orthopedics   • KNEE ARTHROSCOPY Right 2/9/2018    Procedure: KNEE ARTHROSCOPY;  Surgeon: Harsh Baltazar M.D.;  Location: [facility];  Service: Orthopedics   • MEDIAL MENISCECTOMY Right 2/9/2018    Procedure: MEDIAL AND LATERAL MENISCECTOMY- PARTIAL;  Surgeon: Harsh Baltazar M.D.;  Location: [facility];  Service: Orthopedics   • KNEE ARTHROSCOPY Right 7/12/2017    Procedure: KNEE ARTHROSCOPY- CESPEDES;  Surgeon: Harsh Baltazar M.D.;  Location: [facility];  Service:    • MEDIAL MENISCECTOMY Right 7/12/2017    Procedure: PARTIAL MEDIAL AND LATERAL MENISCECTOMY;  Surgeon: Harsh Baltazar M.D.;  Location: [facility];  Service:    • SYNOVECTOMY Right 7/12/2017    Procedure: SYNOVECTOMY;  Surgeon: Harsh Baltazar M.D.;  Location: [facility];   Service:    • KNEE ARTHROSCOPY  4/21/2015    Performed by Rufus Saba M.D. at SURGERY [facility]   • MEDIAL MENISCECTOMY  4/21/2015    Performed by Rufus Saba M.D. at SURGERY [facility]   • PUMP REVISION  12/10/2012    Performed by Allison Guerra M.D. at SURGERY [facility]   • SPINAL CORD STIMULATOR  5/30/2012    Performed by ALLISON GUERRA at SURGERY [facility]   • BIOPSY GENERAL  5/1/12    endometrial   • SPINAL CORD STIMULATOR  7/11/2011    Performed by ALLISON GUERRA at SURGERY [facility]   • BLOCK EPIDURAL STEROID INJECTION  2008    x 3-4   • LYMPH NODE EXCISION Left 2007    cervicle   • OTHER Right 2001    thoracic discectomy     • ARTHROSCOPY, KNEE Left 1999   • RIB RESECTION Right 1980   • LUMPECTOMY         Family history   Family History   Problem Relation Age of Onset   • Hypertension Father    • Diabetes Father    • Heart Disease Father    • Alcohol abuse Father    • Anesthesia Sister         slow to come out (as a child)   • Diabetes Other    • Heart Disease Other    • Cancer Other    • Hypertension Other    • Stroke Other    • Lung Disease Other          Medications:   Current Outpatient Medications   Medication   • [START ON 5/22/2021] traMADol (ULTRAM) 50 MG Tab   • [START ON 6/21/2021] traMADol (ULTRAM) 50 MG Tab   • gabapentin (NEURONTIN) 400 MG Cap   • buPROPion (WELLBUTRIN XL) 300 MG XL tablet   • DULoxetine (CYMBALTA) 30 MG Cap DR Particles   • DULoxetine (CYMBALTA) 60 MG Cap DR Particles delayed-release capsule   • hydrOXYzine HCl (ATARAX) 50 MG Tab   • levothyroxine (SYNTHROID) 50 MCG Tab   • atorvastatin (LIPITOR) 20 MG Tab   • lisinopril-hydrochlorothiazide (PRINZIDE) 20-12.5 MG per tablet   • atorvastatin (LIPITOR) 20 MG Tab   • lisinopril-hydrochlorothiazide (PRINZIDE) 20-12.5 MG per tablet   • ibuprofen (MOTRIN) 200 MG Tab   • Ferrous Sulfate (IRON) 325 (65 Fe) MG Tab   • metFORMIN ER (GLUCOPHAGE XR) 500 MG TABLET SR 24 HR   • ondansetron  "(ZOFRAN) 4 MG Tab tablet   • acetaminophen (TYLENOL) 500 MG Tab     No current facility-administered medications for this visit.       Allergies:   Allergies   Allergen Reactions   • Bactrim Ds Rash and Swelling     Pt states \"My hand swells up and rash all over body\".   • Sulfamethoxazole-Trimethoprim Rash and Swelling     Pt states \"My hand swells up and rash all over body\".       Social Hx:   Social History     Socioeconomic History   • Marital status: Single     Spouse name: Not on file   • Number of children: Not on file   • Years of education: Not on file   • Highest education level: Not on file   Occupational History   • Not on file   Tobacco Use   • Smoking status: Never Smoker   • Smokeless tobacco: Never Used   Vaping Use   • Vaping Use: Never used   Substance and Sexual Activity   • Alcohol use: Not Currently     Alcohol/week: 0.0 oz     Comment: 1 glass wine per month   • Drug use: No   • Sexual activity: Never     Partners: Male   Other Topics Concern   •  Service No   • Blood Transfusions No   • Caffeine Concern No   • Occupational Exposure No   • Hobby Hazards No   • Sleep Concern Yes   • Stress Concern Yes   • Weight Concern Yes   • Special Diet No   • Back Care No   • Exercise No   • Bike Helmet No   • Seat Belt Yes   • Self-Exams No   Social History Narrative   • Not on file     Social Determinants of Health     Financial Resource Strain:    • Difficulty of Paying Living Expenses:    Food Insecurity:    • Worried About Running Out of Food in the Last Year:    • Ran Out of Food in the Last Year:    Transportation Needs:    • Lack of Transportation (Medical):    • Lack of Transportation (Non-Medical):    Physical Activity:    • Days of Exercise per Week:    • Minutes of Exercise per Session:    Stress:    • Feeling of Stress :    Social Connections:    • Frequency of Communication with Friends and Family:    • Frequency of Social Gatherings with Friends and Family:    • Attends [employer] " "Services:    • Active Member of Clubs or Organizations:    • Attends Club or Organization Meetings:    • Marital Status:    Intimate Partner Violence:    • Fear of Current or Ex-Partner:    • Emotionally Abused:    • Physically Abused:    • Sexually Abused:          EXAMINATION     Physical Exam:   Vitals: /70 (BP Location: Right arm, Patient Position: Sitting, BP Cuff Size: Adult long)   Pulse 92   Temp 36.4 °C (97.6 °F) (Temporal)   Ht 1.676 m (5' 6\")   Wt 87.9 kg (193 lb 12.6 oz)   SpO2 92%     Constitutional:   Body Habitus: Body mass index is 31.28 kg/m².  Cooperation: Fully cooperates with exam  Appearance: Well-groomed, well-nourished, not disheveled no acute distress    Eyes: No scleral icterus, no proptosis     ENT -no obvious auditory deficits, wearing a face mask    Skin -no visible skin lesions, no warmth or erythema was noted at the injection site    Respiratory-  breathing comfortable on room air, no audible wheezing    Cardiovascular- No lower extremity edema is noted.     Psychiatric- alert and oriented ×3. Normal affect.     Gait -  Minimally antalgic without assist device    Neuro/Muscloskeletal  No focal motor or sensory deficits in the lower extremities bilaterally    Reflexes are 2+ left patella and 2+ achilles bilaterally    Functional ROM of the right knee, flexion greater than 120     MEDICAL DECISION MAKING    Medical records review: see under HPI section.     DATA    Labs:     03/14/2020 CRP 0.19  03/14/2020 Sed rate 20  02/25/2020: Tramadol and metabolites      Lab Results   Component Value Date/Time    SODIUM 139 10/07/2020 10:45 PM    POTASSIUM 3.9 10/07/2020 10:45 PM    CHLORIDE 98 10/07/2020 10:45 PM    CO2 27 10/07/2020 10:45 PM    ANION 14.0 10/07/2020 10:45 PM    GLUCOSE 100 (H) 10/07/2020 10:45 PM    BUN 25 (H) 10/07/2020 10:45 PM    CREATININE 0.86 10/07/2020 10:45 PM    CALCIUM 10.6 (H) 10/07/2020 10:45 PM    ASTSGOT 13 01/18/2020 07:41 AM    ALTSGPT 11 01/18/2020 " 07:41 AM    TBILIRUBIN 0.5 01/18/2020 07:41 AM    ALBUMIN 4.1 01/18/2020 07:41 AM    TOTPROTEIN 6.8 01/18/2020 07:41 AM    GLOBULIN 2.7 01/18/2020 07:41 AM    AGRATIO 1.5 01/18/2020 07:41 AM       Lab Results   Component Value Date/Time    PROTHROMBTM 12.6 10/07/2020 10:45 PM    INR 0.91 10/07/2020 10:45 PM        Lab Results   Component Value Date/Time    WBC 9.2 10/07/2020 10:45 PM    RBC 4.30 10/07/2020 10:45 PM    HEMOGLOBIN 13.6 10/07/2020 10:45 PM    HEMATOCRIT 41.6 10/07/2020 10:45 PM    MCV 96.7 10/07/2020 10:45 PM    MCH 31.6 10/07/2020 10:45 PM    MCHC 32.7 (L) 10/07/2020 10:45 PM    MPV 11.0 10/07/2020 10:45 PM    NEUTSPOLYS 56.30 10/07/2020 10:45 PM    LYMPHOCYTES 34.00 10/07/2020 10:45 PM    MONOCYTES 6.60 10/07/2020 10:45 PM    EOSINOPHILS 2.40 10/07/2020 10:45 PM    BASOPHILS 0.30 10/07/2020 10:45 PM        Lab Results   Component Value Date/Time    HBA1C 6.1 (H) 01/18/2020 07:41 AM        Imaging: I personally reviewed following images, these are my reads  CT cervical spine 09/22/2020  There is note of cervical spondylosis with multilevel uncovertebral arthropathy    Xray right hip 03/14/2020  There is mild osteoarthritis of the right hip.      Xray lumbar 03/14/2020  There is mild anterolisthesis at L5-S1.  No abnormal motion on flexion and extension  There is DDD and facet arthropathy that is most noted at L5-S1 and less at L4-5    Xray right knee 09/16/2019  There is note of right knee arthroplasty    IMAGING radiology reads. I reviewed the following radiology reads    CT cervical spine 09/22/2020  IMPRESSION:  Degenerative change without evidence of cervical spine fracture.    Xray lumbar 03/14/2020  IMPRESSION:  1.  No compression deformity or acute fracture is identified.  2.  Mild anterolisthesis at L5-S1.  3.  Degenerative disc disease and facet arthropathy.  4.  No focal instability noted on flexion extension views.                                                Xray right hip  03/14/2020  IMPRESSION:     1.  No radiographic evidence of acute traumatic injury.     2.  Findings are consistent with mild osteoarthritis.     3.  Probable constipation.                                   Results for orders placed during the hospital encounter of 09/16/19   DX-KNEE 2- RIGHT    Impression Right knee arthroplasty.      Results for orders placed during the hospital encounter of 03/28/19   DX-KNEE 3 VIEWS RIGHT    Impression No evidence of acute fracture or dislocation.    Degenerative changes as above described.                              Diagnosis   Visit Diagnoses     ICD-10-CM   1. Spondylolisthesis at L5-S1 level  M43.17   2. Lumbar radiculopathy  M54.16   3. S/P TKR (total knee replacement), right  Z96.651   4. Chronic knee pain, unspecified laterality  M25.569    G89.29   5. Major depressive disorder, recurrent episode, in partial remission ([facility])  F33.41           ASSESSMENT:  Debby Desai 61 y.o. female seen for above.     Debby was seen today for follow-up.    Diagnoses and all orders for this visit:    Spondylolisthesis at L5-S1 level  -     traMADol (ULTRAM) 50 MG Tab; Take 1 tablet by mouth every 8 hours as needed for Moderate Pain or Severe Pain for up to 30 days.  -     traMADol (ULTRAM) 50 MG Tab; Take 1 tablet by mouth every 8 hours as needed for Moderate Pain or Severe Pain for up to 30 days.  -     Consent for Opiate Prescription  -     Controlled Substance Treatment Agreement    Lumbar radiculopathy  -     traMADol (ULTRAM) 50 MG Tab; Take 1 tablet by mouth every 8 hours as needed for Moderate Pain or Severe Pain for up to 30 days.  -     traMADol (ULTRAM) 50 MG Tab; Take 1 tablet by mouth every 8 hours as needed for Moderate Pain or Severe Pain for up to 30 days.  -     Consent for Opiate Prescription  -     Controlled Substance Treatment Agreement  -     gabapentin (NEURONTIN) 400 MG Cap; Take 1 capsule by mouth 3 times a day for 30 days.    S/P TKR (total knee replacement),  right  -     traMADol (ULTRAM) 50 MG Tab; Take 1 tablet by mouth every 8 hours as needed for Moderate Pain or Severe Pain for up to 30 days.  -     traMADol (ULTRAM) 50 MG Tab; Take 1 tablet by mouth every 8 hours as needed for Moderate Pain or Severe Pain for up to 30 days.  -     Consent for Opiate Prescription  -     Controlled Substance Treatment Agreement    Chronic knee pain, unspecified laterality  -     traMADol (ULTRAM) 50 MG Tab; Take 1 tablet by mouth every 8 hours as needed for Moderate Pain or Severe Pain for up to 30 days.  -     traMADol (ULTRAM) 50 MG Tab; Take 1 tablet by mouth every 8 hours as needed for Moderate Pain or Severe Pain for up to 30 days.  -     Consent for Opiate Prescription  -     Controlled Substance Treatment Agreement    Major depressive disorder, recurrent episode, in partial remission (HCC)       1. She has tolerated return to gabapentin 400mg po tid without change in balance.  2. Continue care with Psychiatry and psychology  3. Reviewed  and UDS reviewed and as expected, patient taking tramadol.  4. Continue tramadol #72/month.  She would like to decrease today, decrease to #68/month  5. Continue duloxetine, now increased to 90mg daily.  Wellbutrin was also increased by Dr. Stevenson.  6. Discussed right lumbar five and sacral one transforaminal epidural steroid injection with good response.  7. Continue HEP for knee and low back.  Activity as tolerated.      Follow-up: Return in about 2 months (around 7/18/2021), or if symptoms worsen or fail to improve.      Thank you very much for asking me to participate in Debby Desai's care.  Please contact me with any questions or concerns.      Please note that this dictation was created using voice recognition software. I have made every reasonable attempt to correct obvious errors but there may be errors of grammar and content that I may have overlooked prior to finalization of this note.      Volodymyr Herring MD  Physical Medicine  and Rehabilitation  Interventional Spine and Sports Physiatry  [facility]

## 2021-05-23 ENCOUNTER — PHARMACY VISIT (OUTPATIENT)
Dept: PHARMACY | Facility: MEDICAL CENTER | Age: 61
End: 2021-05-23
Payer: COMMERCIAL

## 2021-05-23 DIAGNOSIS — R73.02 IGT (IMPAIRED GLUCOSE TOLERANCE): ICD-10-CM

## 2021-05-25 PROCEDURE — RXMED WILLOW AMBULATORY MEDICATION CHARGE: Performed by: NURSE PRACTITIONER

## 2021-05-25 RX ORDER — METFORMIN HYDROCHLORIDE 500 MG/1
500 TABLET, EXTENDED RELEASE ORAL 2 TIMES DAILY
Qty: 180 TABLET | Refills: 0 | Status: SHIPPED | OUTPATIENT
Start: 2021-05-25 | End: 2021-09-27 | Stop reason: SDUPTHER

## 2021-06-01 ENCOUNTER — PHARMACY VISIT (OUTPATIENT)
Dept: PHARMACY | Facility: MEDICAL CENTER | Age: 61
End: 2021-06-01
Payer: COMMERCIAL

## 2021-06-02 ENCOUNTER — TELEMEDICINE (OUTPATIENT)
Dept: BEHAVIORAL HEALTH | Facility: CLINIC | Age: 61
End: 2021-06-02
Payer: COMMERCIAL

## 2021-06-02 DIAGNOSIS — F41.1 GAD (GENERALIZED ANXIETY DISORDER): ICD-10-CM

## 2021-06-02 DIAGNOSIS — F34.1 PERSISTENT DEPRESSIVE DISORDER: ICD-10-CM

## 2021-06-02 DIAGNOSIS — G89.4 CHRONIC PAIN SYNDROME: ICD-10-CM

## 2021-06-02 PROBLEM — G89.29 CHRONIC PAIN: Status: ACTIVE | Noted: 2021-06-02

## 2021-06-02 PROCEDURE — 90834 PSYTX W PT 45 MINUTES: CPT | Mod: 95 | Performed by: PSYCHOLOGIST

## 2021-06-02 NOTE — PROGRESS NOTES
[facility]   Therapy Progress Note      This visit was conducted via Zoom using secure and encrypted videoconferencing technology.  The patient was in a private location in the [facility].  The patient's identity was confirmed and verbal consent was obtained for this virtual visit.      Name: Debby Desai  MRN: [id number]  : 1960  Age: 61 y.o.  Date of assessment: 2021  PCP: ARELIS Chris  Persons in attendance: Patient  Total session time: 45 minutes    Objective Observations:   Participation:Active verbal participation   Grooming:Good   Cognition:Alert and Fully Oriented   Eye Contact:Good   Mood:Euthymic   Affect:Full range   Thought Process:Logical and Goal-directed   Speech:Rate within normal limits and Volume within normal limits    Current Risk:   Suicide: low   Homicide: low   Self-Harm: low   Relapse: N/A   Safety Plan Reviewed: no    Topics addressed in psychotherapy include: Today's session focused on issues related to her chronic pain. Discussed triggers and stressors, in addition to current events that might contribute to the pain.  Discussed a couple of tools/distractions that may be utilized to help reduce pain levels.  Also discussed following up with her DrMaxwell to ask about a SCS revision, to see if that would be helpful again.   Writer offered validation and support, in addition to a homework assignment.      Care Plan Updated: Yes    Does patient express agreement with the above plan? Yes     Diagnosis:  1. Chronic pain syndrome    2. TIM (generalized anxiety disorder)    3. Persistent depressive disorder        Referral appointment(s) scheduled? Yes       Lise Schwarz PsyD

## 2021-06-02 NOTE — PSYCHOTHERAPY
Gave homework to do something she hasn't done physically in a while, and identify her level of discomfort with work stress/anxiety.

## 2021-06-05 ENCOUNTER — HOSPITAL ENCOUNTER (OUTPATIENT)
Dept: LAB | Facility: MEDICAL CENTER | Age: 61
End: 2021-06-05
Attending: NURSE PRACTITIONER
Payer: COMMERCIAL

## 2021-06-05 DIAGNOSIS — E78.5 DYSLIPIDEMIA: ICD-10-CM

## 2021-06-05 DIAGNOSIS — Z00.00 ANNUAL PHYSICAL EXAM: ICD-10-CM

## 2021-06-05 DIAGNOSIS — I10 HTN (HYPERTENSION), BENIGN: Chronic | ICD-10-CM

## 2021-06-05 DIAGNOSIS — E03.9 ACQUIRED HYPOTHYROIDISM: ICD-10-CM

## 2021-06-05 LAB
ALBUMIN SERPL BCP-MCNC: 4.3 G/DL (ref 3.2–4.9)
ALBUMIN/GLOB SERPL: 1.5 G/DL
ALP SERPL-CCNC: 135 U/L (ref 30–99)
ALT SERPL-CCNC: 10 U/L (ref 2–50)
ANION GAP SERPL CALC-SCNC: 13 MMOL/L (ref 7–16)
AST SERPL-CCNC: 16 U/L (ref 12–45)
BASOPHILS # BLD AUTO: 0.4 % (ref 0–1.8)
BASOPHILS # BLD: 0.03 K/UL (ref 0–0.12)
BILIRUB SERPL-MCNC: 0.4 MG/DL (ref 0.1–1.5)
BUN SERPL-MCNC: 15 MG/DL (ref 8–22)
CALCIUM SERPL-MCNC: 9.4 MG/DL (ref 8.5–10.5)
CHLORIDE SERPL-SCNC: 103 MMOL/L (ref 96–112)
CHOLEST SERPL-MCNC: 177 MG/DL (ref 100–199)
CO2 SERPL-SCNC: 26 MMOL/L (ref 20–33)
CREAT SERPL-MCNC: 0.84 MG/DL (ref 0.5–1.4)
CREAT UR-MCNC: 100.42 MG/DL
EOSINOPHIL # BLD AUTO: 0.31 K/UL (ref 0–0.51)
EOSINOPHIL NFR BLD: 4.3 % (ref 0–6.9)
ERYTHROCYTE [DISTWIDTH] IN BLOOD BY AUTOMATED COUNT: 47.9 FL (ref 35.9–50)
EST. AVERAGE GLUCOSE BLD GHB EST-MCNC: 120 MG/DL
FASTING STATUS PATIENT QL REPORTED: NORMAL
GLOBULIN SER CALC-MCNC: 2.8 G/DL (ref 1.9–3.5)
GLUCOSE SERPL-MCNC: 83 MG/DL (ref 65–99)
HBA1C MFR BLD: 5.8 % (ref 4–5.6)
HCT VFR BLD AUTO: 39.8 % (ref 37–47)
HDLC SERPL-MCNC: 71 MG/DL
HGB BLD-MCNC: 12.8 G/DL (ref 12–16)
IMM GRANULOCYTES # BLD AUTO: 0.02 K/UL (ref 0–0.11)
IMM GRANULOCYTES NFR BLD AUTO: 0.3 % (ref 0–0.9)
LDLC SERPL CALC-MCNC: 89 MG/DL
LYMPHOCYTES # BLD AUTO: 1.96 K/UL (ref 1–4.8)
LYMPHOCYTES NFR BLD: 27.3 % (ref 22–41)
MCH RBC QN AUTO: 31.1 PG (ref 27–33)
MCHC RBC AUTO-ENTMCNC: 32.2 G/DL (ref 33.6–35)
MCV RBC AUTO: 96.8 FL (ref 81.4–97.8)
MICROALBUMIN UR-MCNC: 2.3 MG/DL
MICROALBUMIN/CREAT UR: 23 MG/G (ref 0–30)
MONOCYTES # BLD AUTO: 0.46 K/UL (ref 0–0.85)
MONOCYTES NFR BLD AUTO: 6.4 % (ref 0–13.4)
NEUTROPHILS # BLD AUTO: 4.39 K/UL (ref 2–7.15)
NEUTROPHILS NFR BLD: 61.3 % (ref 44–72)
NRBC # BLD AUTO: 0 K/UL
NRBC BLD-RTO: 0 /100 WBC
PLATELET # BLD AUTO: 330 K/UL (ref 164–446)
PMV BLD AUTO: 11.3 FL (ref 9–12.9)
POTASSIUM SERPL-SCNC: 4.4 MMOL/L (ref 3.6–5.5)
PROT SERPL-MCNC: 7.1 G/DL (ref 6–8.2)
RBC # BLD AUTO: 4.11 M/UL (ref 4.2–5.4)
SODIUM SERPL-SCNC: 142 MMOL/L (ref 135–145)
T4 FREE SERPL-MCNC: 1.17 NG/DL (ref 0.93–1.7)
TRIGL SERPL-MCNC: 83 MG/DL (ref 0–149)
TSH SERPL DL<=0.005 MIU/L-ACNC: 1.64 UIU/ML (ref 0.38–5.33)
WBC # BLD AUTO: 7.2 K/UL (ref 4.8–10.8)

## 2021-06-05 PROCEDURE — 82570 ASSAY OF URINE CREATININE: CPT

## 2021-06-05 PROCEDURE — 83036 HEMOGLOBIN GLYCOSYLATED A1C: CPT

## 2021-06-05 PROCEDURE — 80061 LIPID PANEL: CPT

## 2021-06-05 PROCEDURE — 85025 COMPLETE CBC W/AUTO DIFF WBC: CPT

## 2021-06-05 PROCEDURE — 84443 ASSAY THYROID STIM HORMONE: CPT

## 2021-06-05 PROCEDURE — 82043 UR ALBUMIN QUANTITATIVE: CPT

## 2021-06-05 PROCEDURE — 36415 COLL VENOUS BLD VENIPUNCTURE: CPT

## 2021-06-05 PROCEDURE — 80053 COMPREHEN METABOLIC PANEL: CPT

## 2021-06-05 PROCEDURE — 82306 VITAMIN D 25 HYDROXY: CPT

## 2021-06-05 PROCEDURE — 84439 ASSAY OF FREE THYROXINE: CPT

## 2021-06-08 LAB — 25(OH)D3 SERPL-MCNC: 7 NG/ML (ref 30–80)

## 2021-06-11 ENCOUNTER — TELEMEDICINE (OUTPATIENT)
Dept: MEDICAL GROUP | Facility: MEDICAL CENTER | Age: 61
End: 2021-06-11
Payer: COMMERCIAL

## 2021-06-11 ENCOUNTER — TELEMEDICINE (OUTPATIENT)
Dept: BEHAVIORAL HEALTH | Facility: CLINIC | Age: 61
End: 2021-06-11
Payer: COMMERCIAL

## 2021-06-11 VITALS
SYSTOLIC BLOOD PRESSURE: 124 MMHG | WEIGHT: 193 LBS | DIASTOLIC BLOOD PRESSURE: 70 MMHG | HEIGHT: 66 IN | HEART RATE: 67 BPM | BODY MASS INDEX: 31.02 KG/M2

## 2021-06-11 DIAGNOSIS — G89.4 CHRONIC PAIN SYNDROME: ICD-10-CM

## 2021-06-11 DIAGNOSIS — F33.2 SEVERE EPISODE OF RECURRENT MAJOR DEPRESSIVE DISORDER, WITHOUT PSYCHOTIC FEATURES (HCC): ICD-10-CM

## 2021-06-11 DIAGNOSIS — E03.9 ACQUIRED HYPOTHYROIDISM: ICD-10-CM

## 2021-06-11 DIAGNOSIS — Z12.31 SCREENING MAMMOGRAM, ENCOUNTER FOR: ICD-10-CM

## 2021-06-11 DIAGNOSIS — G47.09 OTHER INSOMNIA: ICD-10-CM

## 2021-06-11 DIAGNOSIS — E66.9 OBESITY (BMI 30-39.9): ICD-10-CM

## 2021-06-11 DIAGNOSIS — Z78.0 POSTMENOPAUSAL: ICD-10-CM

## 2021-06-11 DIAGNOSIS — F41.1 GAD (GENERALIZED ANXIETY DISORDER): ICD-10-CM

## 2021-06-11 DIAGNOSIS — Z00.00 ANNUAL PHYSICAL EXAM: ICD-10-CM

## 2021-06-11 DIAGNOSIS — E55.9 VITAMIN D DEFICIENCY: ICD-10-CM

## 2021-06-11 PROCEDURE — 99214 OFFICE O/P EST MOD 30 MIN: CPT | Mod: 95 | Performed by: PSYCHIATRY & NEUROLOGY

## 2021-06-11 PROCEDURE — RXMED WILLOW AMBULATORY MEDICATION CHARGE: Performed by: NURSE PRACTITIONER

## 2021-06-11 PROCEDURE — 90833 PSYTX W PT W E/M 30 MIN: CPT | Mod: 95 | Performed by: PSYCHIATRY & NEUROLOGY

## 2021-06-11 PROCEDURE — RXMED WILLOW AMBULATORY MEDICATION CHARGE: Performed by: PSYCHIATRY & NEUROLOGY

## 2021-06-11 PROCEDURE — 99396 PREV VISIT EST AGE 40-64: CPT | Mod: 95 | Performed by: NURSE PRACTITIONER

## 2021-06-11 RX ORDER — HYDROXYZINE PAMOATE 100 MG
100 CAPSULE ORAL NIGHTLY PRN
Qty: 30 CAPSULE | Refills: 1 | Status: SHIPPED | OUTPATIENT
Start: 2021-06-11 | End: 2021-08-03 | Stop reason: SDUPTHER

## 2021-06-11 RX ORDER — DULOXETIN HYDROCHLORIDE 30 MG/1
CAPSULE, DELAYED RELEASE ORAL
Qty: 21 CAPSULE | Refills: 0 | Status: SHIPPED | OUTPATIENT
Start: 2021-06-11 | End: 2021-06-22 | Stop reason: SDUPTHER

## 2021-06-11 RX ORDER — VENLAFAXINE HYDROCHLORIDE 75 MG/1
CAPSULE, EXTENDED RELEASE ORAL
Qty: 87 CAPSULE | Refills: 0 | Status: SHIPPED | OUTPATIENT
Start: 2021-06-11 | End: 2021-07-30

## 2021-06-11 RX ORDER — BUPROPION HYDROCHLORIDE 300 MG/1
300 TABLET ORAL EVERY MORNING
Qty: 30 TABLET | Refills: 1 | Status: SHIPPED | OUTPATIENT
Start: 2021-06-11 | End: 2021-08-03 | Stop reason: SDUPTHER

## 2021-06-11 RX ORDER — ERGOCALCIFEROL 1.25 MG/1
50000 CAPSULE ORAL
Qty: 12 CAPSULE | Refills: 0 | Status: SHIPPED | OUTPATIENT
Start: 2021-06-11 | End: 2022-09-28

## 2021-06-11 RX ORDER — CHOLECALCIFEROL (VITAMIN D3) 125 MCG
5 CAPSULE ORAL
Qty: 30 TABLET | Refills: 1 | Status: SHIPPED | OUTPATIENT
Start: 2021-06-11 | End: 2021-09-14 | Stop reason: SDUPTHER

## 2021-06-11 ASSESSMENT — ANXIETY QUESTIONNAIRES
5. BEING SO RESTLESS THAT IT IS HARD TO SIT STILL: NOT AT ALL
GAD7 TOTAL SCORE: 9
4. TROUBLE RELAXING: NEARLY EVERY DAY
1. FEELING NERVOUS, ANXIOUS, OR ON EDGE: MORE THAN HALF THE DAYS
7. FEELING AFRAID AS IF SOMETHING AWFUL MIGHT HAPPEN: NOT AT ALL
6. BECOMING EASILY ANNOYED OR IRRITABLE: NEARLY EVERY DAY
2. NOT BEING ABLE TO STOP OR CONTROL WORRYING: SEVERAL DAYS
3. WORRYING TOO MUCH ABOUT DIFFERENT THINGS: NOT AT ALL

## 2021-06-11 ASSESSMENT — PATIENT HEALTH QUESTIONNAIRE - PHQ9
SUM OF ALL RESPONSES TO PHQ QUESTIONS 1-9: 19
5. POOR APPETITE OR OVEREATING: 3 - NEARLY EVERY DAY
CLINICAL INTERPRETATION OF PHQ2 SCORE: 6

## 2021-06-11 ASSESSMENT — FIBROSIS 4 INDEX: FIB4 SCORE: 0.97

## 2021-06-11 NOTE — ASSESSMENT & PLAN NOTE
Chronic. Currently seeing psychiatry, has second appointment today. Currently taking duloxetine 90 mg daily, wellbutrin 300 mg daily, hydroxyzine nightly for sleep.    Henri HARRINGTON.     Continues seeing therapy with [facility] and is doing a work book for living with chronic pain.

## 2021-06-11 NOTE — ASSESSMENT & PLAN NOTE
Chronic knee and back pain. Seeing physiatry for this. Currently taking 400 mg TID, duloxetine 90 mg daily, tramadol 50 mg PRN.

## 2021-06-11 NOTE — PROGRESS NOTES
"Telemedicine Visit: Established Patient     This encounter was conducted via Zoom.   Verbal consent was obtained. Patient's identity was verified.    Subjective:   CC: lab review  Debby Desai is a 61 y.o. female presenting for evaluation and management of:    Severe episode of recurrent major depressive disorder, without psychotic features (HCC)  Chronic. Currently seeing psychiatry, has second appointment today. Currently taking duloxetine 90 mg daily, wellbutrin 300 mg daily, hydroxyzine nightly for sleep.    Denies SI.     Continues seeing therapy with [facility] and is doing a work book for living with chronic pain.     Chronic pain  Chronic knee and back pain. Seeing physiatry for this. Currently taking 400 mg TID, duloxetine 90 mg daily, tramadol 50 mg PRN.     Acquired hypothyroidism  Chronic. Stable on levothyroxine 50 mg daily. Labs stable        ROS   Positive ROS as per HPI. All other systems reviewed and are negative.    Allergies   Allergen Reactions   • Bactrim Ds Rash and Swelling     Pt states \"My hand swells up and rash all over body\".   • Sulfamethoxazole-Trimethoprim Rash and Swelling     Pt states \"My hand swells up and rash all over body\".       Current medicines (including changes today)  Current Outpatient Medications   Medication Sig Dispense Refill   • ergocalciferol (DRISDOL) 33365 UNIT capsule Take 1 capsule by mouth every 7 days. 12 capsule 0   • buPROPion (WELLBUTRIN XL) 300 MG XL tablet Take 1 tablet by mouth every morning. 30 tablet 1   • DULoxetine (CYMBALTA) 30 MG Cap DR Particles Take 2 Capsules by mouth every day for 7 days, THEN 1 capsule every day for 7 days. 21 capsule 0   • venlafaxine XR (EFFEXOR XR) 75 MG CAPSULE SR 24 HR Take 1 capsule by mouth every day for 7 days, THEN 2 Capsules every day for 40 days. 87 capsule 0   • hydrOXYzine pamoate (VISTARIL) 100 MG Cap Take 1 capsule by mouth at bedtime as needed (sleep). 30 capsule 1   • melatonin 5 mg Tab Take 1 tablet by mouth " at bedtime. 30 tablet 1   • metFORMIN ER (GLUCOPHAGE XR) 500 MG TABLET SR 24 HR Take 1 Tab by mouth 2 times a day. 180 tablet 0   • traMADol (ULTRAM) 50 MG Tab Take 1 tablet by mouth every 8 hours as needed for Moderate Pain or Severe Pain for up to 30 days. 68 tablet 0   • [START ON 6/21/2021] traMADol (ULTRAM) 50 MG Tab Take 1 tablet by mouth every 8 hours as needed for Moderate Pain or Severe Pain for up to 30 days. 68 tablet 0   • gabapentin (NEURONTIN) 400 MG Cap Take 1 capsule by mouth 3 times a day for 30 days. 90 capsule 1   • levothyroxine (SYNTHROID) 50 MCG Tab Take 1 tablet by mouth Every morning on an empty stomach. 30 tablet 5   • atorvastatin (LIPITOR) 20 MG Tab TAKE ONE TABLET BY MOUTH EVERY DAY 90 Tab 1   • lisinopril-hydrochlorothiazide (PRINZIDE) 20-12.5 MG per tablet Take one tablet by mouth 90 Tab 1   • ibuprofen (MOTRIN) 200 MG Tab Take 600 mg by mouth every 6 hours as needed.     • Ferrous Sulfate (IRON) 325 (65 Fe) MG Tab Take 65 mg by mouth every day at 6 PM.     • ondansetron (ZOFRAN) 4 MG Tab tablet Take 1 Tab by mouth every four hours as needed for Nausea/Vomiting. 20 Tab 6   • acetaminophen (TYLENOL) 500 MG Tab Take 500-1,000 mg by mouth 2 Times a Day. Pt takes 1000MG @ 0700 and then @ 0730 500MG       No current facility-administered medications for this visit.       Patient Active Problem List    Diagnosis Date Noted   • Chronic pain 06/02/2021   • Persistent depressive disorder 05/18/2021   • Other insomnia 05/13/2021   • Post concussion syndrome 10/21/2020   • Chronic knee pain 01/27/2020   • Opioid dependence in controlled environment ([facility]) 09/27/2018   • Obesity (BMI 30-39.9) 07/09/2018   • IGT (impaired glucose tolerance) 09/29/2017   • Osteoarthritis of right knee 07/12/2017   • Severe episode of recurrent major depressive disorder, without psychotic features ([facility]) 05/05/2017   • TIM (generalized anxiety disorder) 10/24/2016   • Osteoarthrosis involving lower leg 04/21/2015   •  "Dyslipidemia 12/02/2014   • HTN (hypertension), benign 03/19/2012   • Acquired hypothyroidism 03/19/2012   • Back pain 03/19/2012   • Neck pain 03/19/2012       Family History   Problem Relation Age of Onset   • Hypertension Father    • Diabetes Father    • Heart Disease Father    • Alcohol abuse Father    • Anesthesia Sister         slow to come out (as a child)   • Diabetes Other    • Heart Disease Other    • Cancer Other    • Hypertension Other    • Stroke Other    • Lung Disease Other        She  has a past medical history of Anemia, Anginal syndrome (HCC), Anxiety, Arthritis, Chronic pain (6/2/2021), Dental disorder (5/24/12), High cholesterol, HTN (hypertension), benign (3/19/2012), Hypothyroid (3/19/2012), Major depression (3/19/2012), Orbital floor (blow-out) closed fracture (HCC), Other specified symptom associated with female genital organs, Pain, Pain, Pain (02/2018), Psychiatric problem, Unspecified disorder of thyroid, Urinary bladder disorder, and Urinary incontinence. She also has no past medical history of Breast cancer (HCC).  She  has a past surgical history that includes lymph node excision (Left, 2007); other (Right, 2001); arthroscopy, knee (Left, 1999); rib resection (Right, 1980); block epidural steroid injection (2008); spinal cord stimulator (7/11/2011); biopsy general (5/1/12); spinal cord stimulator (5/30/2012); pump revision (12/10/2012); knee arthroscopy (4/21/2015); medial meniscectomy (4/21/2015); knee arthroscopy (Right, 7/12/2017); medial meniscectomy (Right, 7/12/2017); synovectomy (Right, 7/12/2017); knee arthroscopy (Right, 2/9/2018); medial meniscectomy (Right, 2/9/2018); pr total knee arthroplasty (Right, 9/16/2019); lumpectomy; and inj,epi anes/ster lum/sac addl (Right, 4/7/2021).       Objective:   /70 (BP Location: Right arm)   Pulse 67   Ht 1.676 m (5' 6\")   Wt 87.5 kg (193 lb)   LMP 02/26/2012   BMI 31.15 kg/m²     Physical Exam:  Constitutional: Alert, no " distress, well-groomed.  Skin: No rashes in visible areas.  Eye: Round. Conjunctiva clear, lids normal. No icterus.   ENMT: Lips pink without lesions, good dentition, moist mucous membranes. Phonation normal.  Neck: No masses, no thyromegaly. Moves freely without pain.  CV: Pulse as reported by patient  Respiratory: Unlabored respiratory effort, no cough or audible wheeze  Psych: Alert and oriented x3, normal affect and mood.       Assessment and Plan:   The following treatment plan was discussed:     1. Annual physical exam  Labs reviewed.     2. Severe episode of recurrent major depressive disorder, without psychotic features (HCC)  Unstable  Continue medication and follow up with psychiatry.   Continue therapy    3. Chronic pain syndrome  Unstable  Continue follow up with physiatry and pain psychology    4. Postmenopausal  Due for bone density  - DS-BONE DENSITY STUDY (DEXA); Future    5. Acquired hypothyroidism  Stable  Continue levothyroxine 50 mcg daily  - DS-BONE DENSITY STUDY (DEXA); Future    6. Vitamin D deficiency  Unstable  Ergo weekly x 12 weeks, then D3 2000 IU daily  Check Dexa  - DS-BONE DENSITY STUDY (DEXA); Future  - ergocalciferol (DRISDOL) 13753 UNIT capsule; Take 1 capsule by mouth every 7 days.  Dispense: 12 capsule; Refill: 0    7. Screening mammogram, encounter for  - MA-SCREENING MAMMO BILAT W/CAD; Future        Follow-up: Return in about 6 months (around 12/11/2021).

## 2021-06-11 NOTE — PROGRESS NOTES
This evaluation was conducted via Zoom using secure and encrypted videoconferencing technology. The patient was in a private location in the [facility].    The patient's identity was confirmed and verbal consent was obtained for this virtual visit.     PSYCHIATRY FOLLOW-UP NOTE      Name: Debby Desai  MRN: [id number]  : 1960  Age: 61 y.o.  Date of assessment: 2021  PCP: ARELIS Chris  Persons in attendance: Patient      REASON FOR VISIT/CHIEF COMPLAINT (as stated by Patient):  Debby Desai is a 61 y.o., White female, attending follow-up appointment for mood and anxiety management.      HISTORY OF PRESENT ILLNESS:  Debby Desai is a 61 y.o. old female with MDD and TIM comes in today for follow up. Patient was last seen 1 month ago, and following treatment planning recommendations were done:  · Increase Cymbalta to 90 mg daily for mood, anxiety and comorbid pain management.  30-day supply with 1 refill given for 30+60 mg dose.  · Increase Wellbutrin XL to 300 mg daily for depression augmentation.  30-day supply with 1 refill given.  · Add hydroxyzine 50 mg at bedtime as needed for insomnia.  30 tabs with 1 refill given.  · Initiate referral for psychotherapy in the clinic for mood and anxiety management.  · Consider transcranial magnetic stimulation if improvement is not seen with current approach.    Patient is compliant with medications with no side effect and has noticed mild improvement in anxiety but denies any changes in depression as is evident with persistence of PHQ 9 at goal of 19 compared to score of 21 in last session.  TIM-7 has improved from score of 13 and last session 2 score of 9 today.  Patient does report feeling emotionally like.  But continues to struggle with symptoms of depression on most days of the week.  She agreed with plan of cross tapering duloxetine to venlafaxine and continuing Wellbutrin at current dosage.  Patient does report sleeping for 4 hours  uninterrupted sleep on hydroxyzine 50 mg dose and describes this as a daily helped she felt less pain next day.  Agreed with plan of increasing Vistaril to 2 to 100 mg at bedtime with melatonin for additional benefit with sleep and anxiety.  Patient is engaged in psychotherapy and was motivated to continue psychotherapy with daily medication compliance.  Extensive psychoeducation given on the impact of pain on mood and anxiety and limitations imposed by pain on witnessing poor response from medications.    On PHQ 9 patient answered several days to passive death wishes.  She denies having any specific plan or intent and reports feeling stable and in control at this time.  She describes these thoughts as not intrusive and did acknowledge reduction in the intensity.  Patient understand the importance of reaching out and calling 911 or going to nearest emergency room if worsening of suicidal ideation or safety concern is noted.    Patient is also on tramadol but tries her best to minimize the use.  Patient understand the risk of serotonin syndrome between serotonergic medication like Cymbalta and Effexor with tramadol.  She is currently denying any signs or symptoms of serotonin syndrome.    RESPONSE TO TREATMENT:  Slow improvement      MEDICATION SIDE EFFECTS:  none      PSYCHOTHERAPY ASPECT OF SESSION (16 MIN):  • Most part of the session was dedicated to letting patient express her feelings regarding persistence of depression and slow improvement.  Validation was provided for appropriate emotional responses and normalization was done.  • Importance of monitoring for slow improvement on a weekly basis rather than the need for larger improvement and shorter duration was discussed.  • Extensive psychoeducation given on the impact of pain on mood and anxiety and limitations imposed by pain on witnessing poor response from medications.  • Most session dedicated to active listening and implementing supportive psychotherapy  skills.      CURRENT MEDICATIONS:  Current Outpatient Medications   Medication Sig Dispense Refill   • metFORMIN ER (GLUCOPHAGE XR) 500 MG TABLET SR 24 HR Take 1 Tab by mouth 2 times a day. 180 tablet 0   • traMADol (ULTRAM) 50 MG Tab Take 1 tablet by mouth every 8 hours as needed for Moderate Pain or Severe Pain for up to 30 days. 68 tablet 0   • [START ON 6/21/2021] traMADol (ULTRAM) 50 MG Tab Take 1 tablet by mouth every 8 hours as needed for Moderate Pain or Severe Pain for up to 30 days. 68 tablet 0   • gabapentin (NEURONTIN) 400 MG Cap Take 1 capsule by mouth 3 times a day for 30 days. 90 capsule 1   • buPROPion (WELLBUTRIN XL) 300 MG XL tablet Take 1 tablet by mouth every morning. 30 tablet 1   • DULoxetine (CYMBALTA) 30 MG Cap DR Particles Take 1 capsule by mouth every day. (take duloxetine 30 mg + 60 mg = 90 mg daily) 30 capsule 1   • DULoxetine (CYMBALTA) 60 MG Cap DR Particles delayed-release capsule Take 1 capsule by mouth every day. (take duloxetine 30 mg + 60 mg = 90 mg daily) 30 capsule 1   • hydrOXYzine HCl (ATARAX) 50 MG Tab Take 1 tablet by mouth at bedtime as needed (sleep). 30 tablet 1   • levothyroxine (SYNTHROID) 50 MCG Tab Take 1 tablet by mouth Every morning on an empty stomach. 30 tablet 5   • atorvastatin (LIPITOR) 20 MG Tab TAKE ONE TABLET BY MOUTH EVERY DAY 90 Tab 1   • lisinopril-hydrochlorothiazide (PRINZIDE) 20-12.5 MG per tablet Take one tablet by mouth 90 Tab 1   • atorvastatin (LIPITOR) 20 MG Tab Take 20 mg by mouth every day.     • lisinopril-hydrochlorothiazide (PRINZIDE) 20-12.5 MG per tablet Take 1 Tab by mouth every day.     • ibuprofen (MOTRIN) 200 MG Tab Take 600 mg by mouth every 6 hours as needed.     • Ferrous Sulfate (IRON) 325 (65 Fe) MG Tab Take 65 mg by mouth every day at 6 PM.     • ondansetron (ZOFRAN) 4 MG Tab tablet Take 1 Tab by mouth every four hours as needed for Nausea/Vomiting. 20 Tab 6   • acetaminophen (TYLENOL) 500 MG Tab Take 500-1,000 mg by mouth 2 Times  a Day. Pt takes 1000MG @ 0700 and then @ 0730 500MG       No current facility-administered medications for this visit.       MEDICAL HISTORY  Past Medical History:   Diagnosis Date   • Anemia     in past   • Anginal syndrome (HCC)     had work up and feet r/t anxiety   • Anxiety    • Arthritis     OA knees   • Chronic pain 6/2/2021   • Dental disorder 5/24/12    partial upper denture   • High cholesterol    • HTN (hypertension), benign 3/19/2012   • Hypothyroid 3/19/2012   • Major depression 3/19/2012   • Orbital floor (blow-out) closed fracture (HCC)     left   • Other specified symptom associated with female genital organs     post menopausal bleeding   • Pain     thoracic spine, Right knee, myofacial pain, and Right shoulder   • Pain     recently fell and has bruise left forehead   • Pain 02/2018    chronic back pain, right shoulder   • Psychiatric problem     depression, anxiety   • Unspecified disorder of thyroid    • Urinary bladder disorder     stress incont.   • Urinary incontinence     Occasional     Past Surgical History:   Procedure Laterality Date   • INJ,EPI ANES/STER LUM/SAC ADDL Right 4/7/2021    Procedure: RIGHT lumbar five and sacral one transforaminal epidural;  Surgeon: Volodymyr Herring M.D.;  [facility];  Service: Pain Management   • PB TOTAL KNEE ARTHROPLASTY Right 9/16/2019    Procedure: RIGHT ARTHROPLASTY, KNEE, TOTAL;  Surgeon: Rufus Saba M.D.;  Location: [facility];  Service: Orthopedics   • KNEE ARTHROSCOPY Right 2/9/2018    Procedure: KNEE ARTHROSCOPY;  Surgeon: Harsh Baltazar M.D.;  Location: [facility];  Service: Orthopedics   • MEDIAL MENISCECTOMY Right 2/9/2018    Procedure: MEDIAL AND LATERAL MENISCECTOMY- PARTIAL;  Surgeon: Harsh Baltazar M.D.;  Location: [facility];  Service: Orthopedics   • KNEE ARTHROSCOPY Right 7/12/2017    Procedure: KNEE ARTHROSCOPY- CESPEDES;  Surgeon: Harsh Baltazar  M.D.;  Location: [facility];  Service:    • MEDIAL MENISCECTOMY Right 7/12/2017    Procedure: PARTIAL MEDIAL AND LATERAL MENISCECTOMY;  Surgeon: Harsh Baltazar M.D.;  Location: [facility];  Service:    • SYNOVECTOMY Right 7/12/2017    Procedure: SYNOVECTOMY;  Surgeon: Harsh Baltazar M.D.;  Location: [facility];  Service:    • KNEE ARTHROSCOPY  4/21/2015    Performed by Rufus Saba M.D. at SUR[facility]ERY [facility]   • MEDIAL MENISCECTOMY  4/21/2015    Performed by Rufus Saba M.D. at SUR[facility]   • PUMP REVISION  12/10/2012    Performed by Allison Guerra M.D. at [facility]   • SPINAL CORD STIMULATOR  5/30/2012    Performed by ALLISON GUERRA at [facility]   • BIOPSY GENERAL  5/1/12    endometrial   • SPINAL CORD STIMULATOR  7/11/2011    Performed by ALLISON GUERRA at [facility]   • BLOCK EPIDURAL STEROID INJECTION  2008    x 3-4   • LYMPH NODE EXCISION Left 2007    cervicle   • OTHER Right 2001    thoracic discectomy     • ARTHROSCOPY, KNEE Left 1999   • RIB RESECTION Right 1980   • LUMPECTOMY         PAST PSYCHIATRIC HISTORY  Prior psychiatric hospitalization: no  Prior Self harm/suicide attempt: no  Prior Diagnosis: MDD, Anxiety     PAST PSYCHIATRIC MEDICATIONS  · Fluoxetine  · Paroxetine  · Citalopram  · Sertraline  · Duloxetine  · Wellbutrin  · Amitriptyline   · Ativan      FAMILY HISTORY  Psychiatric diagnosis: [place] with depression and anxiety  History of suicide attempts: No  Substance abuse history: 1 sister with drug and alcohol abuse history     SUBSTANCE USE HISTORY:  ALCOHOL: no  TOBACCO: no  CANNABIS: no  OPIOIDS: no  PRESCRIPTION MEDICATIONS: no  OTHERS: no  History of inpatient/outpatient rehab treatment: none     SOCIAL HISTORY  Childhood: born in [place] and describes childhood as difficult  Moved a lot due to parents financial concerns  Employment: For [employer]  Relationship: single (never )  Kids: no kids  Current living situation: alone (but strong family support as they all live nearby)  Current/past legal issues: denies  History of emotional/physical/sexual abuse - denies      MEDICAL REVIEW OF SYSTEMS:   Constitutional  fatigue; chronic pain   Eyes negative   Ears/Nose/Mouth/Throat negative   Cardiovascular  hypertension, hyperlipidemia   Respiratory negative   Gastrointestinal negative   Genitourinary negative   Muscular  osteoarthritis   Integumentary negative   Neurological negative   Endocrine  hypothyroidism   Hematologic/Lymphatic negative        PHYSICAL EXAMINAION:  Vital signs: LMP 02/26/2012   Musculoskeletal: Normal gait.   Abnormal movements: none      MENTAL STATUS EXAMINATION      General:   - Grooming and hygiene: Casual,   - Apparent distress: none,   - Behavior: Tense  - Eye Contact:  Good,   - no psychomotor agitation or retardation    - Participation: Active verbal participation  Orientation: Alert and Fully Oriented to person, place and time  Mood: Depressed and Anxious  Affect: Constricted,  Thought Process: Logical and Goal-directed  Thought Content: Denies suicidal or homicidal ideations, intent or plan Within normal limits  Perception: Denies auditory or visual hallucinations. No delusions noted Within normal limits  Attention span and concentration: Intact   Speech:Rate within normal limits and Volume within normal limits  Language: Appropriate   Insight: Good  Judgment: Good  Recent and remote memory: No gross evidence of memory deficits        DEPRESSION SCREENING:  Depression Screen (PHQ-2/PHQ-9) 3/16/2021 5/13/2021 5/18/2021   PHQ-2 Total Score - - -   PHQ-2 Total Score - - -   PHQ-2 Total Score 5 6 6   PHQ-9 Total Score - - -   PHQ-9 Total Score 14 22 20       Interpretation of PHQ-9 Total Score   Score Severity   1-4 No Depression   5-9 Mild Depression   10-14 Moderate Depression   15-19 Moderately Severe Depression   20-27  Severe Depression    CURRENT RISK:       Suicidal: Low       Homicidal: Low       Self-Harm: Low       Relapse: Low       Crisis Safety Plan Reviewed Not Indicated       If evidence of imminent risk is present, intervention/plan:      MEDICAL RECORDS/LABS/DIAGNOSTIC TESTS REVIEWED:  Component      Latest Ref Rng & Units 6/5/2021           7:42 AM   Sodium      135 - 145 mmol/L 142   Potassium      3.6 - 5.5 mmol/L 4.4   Chloride      96 - 112 mmol/L 103   Co2      20 - 33 mmol/L 26   Anion Gap      7.0 - 16.0 13.0   Glucose      65 - 99 mg/dL 83   Bun      8 - 22 mg/dL 15   Creatinine      0.50 - 1.40 mg/dL 0.84   Calcium      8.5 - 10.5 mg/dL 9.4   AST(SGOT)      12 - 45 U/L 16   ALT(SGPT)      2 - 50 U/L 10   Alkaline Phosphatase      30 - 99 U/L 135 (H)   Total Bilirubin      0.1 - 1.5 mg/dL 0.4   Albumin      3.2 - 4.9 g/dL 4.3   Total Protein      6.0 - 8.2 g/dL 7.1   Globulin      1.9 - 3.5 g/dL 2.8   A-G Ratio      g/dL 1.5     Component      Latest Ref Rng & Units 6/5/2021           7:42 AM   TSH      0.380 - 5.330 uIU/mL 1.640     Component      Latest Ref Rng & Units 6/5/2021           7:42 AM   WBC      4.8 - 10.8 K/uL 7.2   RBC      4.20 - 5.40 M/uL 4.11 (L)   Hemoglobin      12.0 - 16.0 g/dL 12.8   Hematocrit      37.0 - 47.0 % 39.8   MCV      81.4 - 97.8 fL 96.8   MCH      27.0 - 33.0 pg 31.1   MCHC      33.6 - 35.0 g/dL 32.2 (L)   RDW      35.9 - 50.0 fL 47.9   Platelet Count      164 - 446 K/uL 330   MPV      9.0 - 12.9 fL 11.3   Neutrophils-Polys      44.00 - 72.00 % 61.30   Lymphocytes      22.00 - 41.00 % 27.30   Monocytes      0.00 - 13.40 % 6.40   Eosinophils      0.00 - 6.90 % 4.30   Basophils      0.00 - 1.80 % 0.40   Immature Granulocytes      0.00 - 0.90 % 0.30   Nucleated RBC      /100 WBC 0.00   Neutrophils (Absolute)      2.00 - 7.15 K/uL 4.39   Lymphs (Absolute)      1.00 - 4.80 K/uL 1.96   Monos (Absolute)      0.00 - 0.85 K/uL 0.46   Eos (Absolute)      0.00 - 0.51 K/uL 0.31   Baso  (Absolute)      0.00 - 0.12 K/uL 0.03   Immature Granulocytes (abs)      0.00 - 0.11 K/uL 0.02   NRBC (Absolute)      K/uL 0.00     Component      Latest Ref Rng & Units 6/5/2021           7:42 AM   Glycohemoglobin      4.0 - 5.6 % 5.8 (H)   Estim. Avg Glu      mg/dL 120       [place]  records -   Reviewed       DIAGNOSTIC IMPRESSION(S):  1. Major depressive disorder, recurrent, severe without psychotic features  2. Generalized anxiety disorder  3. Insomnia        PLAN:  (1) Major depressive disorder, recurrent, severe without psychotic features  · PHQ9: 19 (comapared to 21 in last session)  · Taper Cymbalta from 90 mg to 60 mg daily for 1 week and then to 30 mg daily for next 1 week and stop.  2-week supply sent to pharmacy with no refill.  · Add Effexor XR 75 mg daily for 1 week and then increase to 150 mg daily for mood, anxiety and comorbid pain management. 47 days supply given with no refill.  · Continue Wellbutrin XL to 300 mg daily for depression augmentation.  30-day supply with 1 refill given.  · Increase hydroxyzine (vistaril) to 100 mg HS + melatonin 5 mg HS as needed for insomnia.  30 tabs with 1 refill given.  · Continue psychotherapy in the clinic for mood and anxiety management.  · Consider transcranial magnetic stimulation if improvement is not seen with current approach.  · Medication options, alternatives (including no medications) and medication risks/benefits/side effects were discussed in detail.  · Explained importance of contraceptive measures while on psychotropic medications, educated to let provider know if ever pregnant or wanting to become pregnant. Verbalized understanding.  · The patient was advised to call, message provider on [employer], or come in to the clinic if symptoms worsen or if any future questions/issues regarding their medications arise; the patient verbalized understanding and agreement.    · The patient was educated to call 911, call the suicide hotline, or go to local ER if  having thoughts of suicide or homicide; verbalized understanding.     (2) Generalized anxiety disorder  · GAD7: 9 (compared to 13 in last session)  · Taper Cymbalta from 90 mg to 60 mg daily for 1 week and then to 30 mg daily for next 1 week and stop.  2-week supply sent to pharmacy with no refill.  · Add Effexor XR 75 mg daily for 1 week and then increase to 150 mg daily for mood, anxiety and comorbid pain management. 47 days supply given with no refill.  · Continue Wellbutrin XL to 300 mg daily for depression augmentation.  30-day supply with 1 refill given.  · Increase hydroxyzine (vistaril) to 100 mg HS + melatonin 5 mg HS as needed for insomnia.  30 tabs with 1 refill given.  · Continue psychotherapy in the clinic for mood and anxiety management.  · Consider transcranial magnetic stimulation if improvement is not seen with current approach.     (3) Insomnia  · Slow improvement: 4 hr uninterrupted sleep  · Taper Cymbalta from 90 mg to 60 mg daily for 1 week and then to 30 mg daily for next 1 week and stop.  2-week supply sent to pharmacy with no refill.  · Add Effexor XR 75 mg daily for 1 week and then increase to 150 mg daily for mood, anxiety and comorbid pain management. 47 days supply given with no refill.  · Continue Wellbutrin XL to 300 mg daily for depression augmentation.  30-day supply with 1 refill given.  · Increase hydroxyzine (vistaril) to 100 mg HS + melatonin 5 mg HS as needed for insomnia.  30 tabs with 1 refill given.  · Continue psychotherapy in the clinic for mood and anxiety management.  · Consider transcranial magnetic stimulation if improvement is not seen with current approach.     Billing Coding based on:  [id number]: based on [facility]  [id number]: based on psychotherapy timing    Return to clinic in 5-6 weeks or sooner if symptoms worsen.  Next Appointment: instruction provided on how to make the next appointment.     The proposed treatment plan was discussed with the patient who was provided the  opportunity to ask questions and make suggestions regarding alternative treatment. Patient verbalized understanding and expressed agreement with the plan.       Genaro Stevenson M.D.  06/11/21    This note was created using voice recognition software (Dragon). The accuracy of the dictation is limited by the abilities of the software. I have reviewed the note prior to signing, however some errors in grammar and context are still possible. If you have any questions related to this note please do not hesitate to contact our office.

## 2021-06-13 ENCOUNTER — PHARMACY VISIT (OUTPATIENT)
Dept: PHARMACY | Facility: MEDICAL CENTER | Age: 61
End: 2021-06-13
Payer: COMMERCIAL

## 2021-06-14 ENCOUNTER — TELEMEDICINE (OUTPATIENT)
Dept: BEHAVIORAL HEALTH | Facility: CLINIC | Age: 61
End: 2021-06-14
Payer: COMMERCIAL

## 2021-06-14 DIAGNOSIS — F34.1 PERSISTENT DEPRESSIVE DISORDER: ICD-10-CM

## 2021-06-14 DIAGNOSIS — F41.1 GAD (GENERALIZED ANXIETY DISORDER): ICD-10-CM

## 2021-06-14 PROCEDURE — 90834 PSYTX W PT 45 MINUTES: CPT | Mod: 95 | Performed by: PSYCHOLOGIST

## 2021-06-14 NOTE — PROGRESS NOTES
[facility]   Therapy Progress Note    This visit was conducted via Zoom using secure and encrypted videoconferencing technology.  The patient was in a private location in the [facility].  The                                                                     patient's identity was confirmed and verbal consent was obtained for this virtual visit.          Name: Debby Desai  MRN: [id number]  : 1960  Age: 61 y.o.  Date of assessment: 2021  PCP: ARELIS Chris  Persons in attendance: Patient  Total session time: 45 minutes      Topics addressed in psychotherapy include: Today's session focused on client's anxiety issues, triggers, stressors, and discussion of chronic pain. Discussed her homework from the last session, and she stated she was able to utilize the tool, and in turn was managing work stress more effectively.  Writer provided feeback, tools and encouraged more of the same homework to help reduce overall stress. No SI. Follow up appointment offered.     Objective Observations:   Participation:Active verbal participation   Grooming:Good   Cognition:Alert and Fully Oriented   Eye Contact:Good   Mood:Euthymic   Affect:Full range   Thought Process:Logical and Goal-directed   Speech:Rate within normal limits and Volume within normal limits    Current Risk:   Suicide: low   Homicide: low   Self-Harm: low   Relapse: N/A   Safety Plan Reviewed: not applicable    Care Plan Updated: No    Does patient express agreement with the above plan? Yes     Diagnosis:  1. TIM (generalized anxiety disorder)    2. Persistent depressive disorder        Referral appointment(s) scheduled? Follow up offered.       Lise Schwarz PsyD

## 2021-06-18 ENCOUNTER — APPOINTMENT (OUTPATIENT)
Dept: RADIOLOGY | Facility: MEDICAL CENTER | Age: 61
End: 2021-06-18
Attending: EMERGENCY MEDICINE
Payer: COMMERCIAL

## 2021-06-18 ENCOUNTER — HOSPITAL ENCOUNTER (EMERGENCY)
Facility: MEDICAL CENTER | Age: 61
End: 2021-06-18
Attending: EMERGENCY MEDICINE
Payer: COMMERCIAL

## 2021-06-18 ENCOUNTER — TELEPHONE (OUTPATIENT)
Dept: MEDICAL GROUP | Facility: MEDICAL CENTER | Age: 61
End: 2021-06-18

## 2021-06-18 ENCOUNTER — TELEPHONE (OUTPATIENT)
Dept: BEHAVIORAL HEALTH | Facility: CLINIC | Age: 61
End: 2021-06-18

## 2021-06-18 VITALS
BODY MASS INDEX: 31.02 KG/M2 | HEIGHT: 66 IN | HEART RATE: 60 BPM | WEIGHT: 193 LBS | DIASTOLIC BLOOD PRESSURE: 58 MMHG | TEMPERATURE: 97.7 F | RESPIRATION RATE: 20 BRPM | SYSTOLIC BLOOD PRESSURE: 118 MMHG | OXYGEN SATURATION: 94 %

## 2021-06-18 DIAGNOSIS — T43.225A ADVERSE EFFECT OF SELECTIVE SEROTONIN REUPTAKE INHIBITOR (SSRI), INITIAL ENCOUNTER: ICD-10-CM

## 2021-06-18 LAB
ALBUMIN SERPL BCP-MCNC: 3.8 G/DL (ref 3.2–4.9)
ALBUMIN/GLOB SERPL: 1.4 G/DL
ALP SERPL-CCNC: 110 U/L (ref 30–99)
ALT SERPL-CCNC: 15 U/L (ref 2–50)
ANION GAP SERPL CALC-SCNC: 10 MMOL/L (ref 7–16)
AST SERPL-CCNC: 26 U/L (ref 12–45)
BASOPHILS # BLD AUTO: 0.3 % (ref 0–1.8)
BASOPHILS # BLD: 0.03 K/UL (ref 0–0.12)
BILIRUB SERPL-MCNC: 0.3 MG/DL (ref 0.1–1.5)
BUN SERPL-MCNC: 15 MG/DL (ref 8–22)
CALCIUM SERPL-MCNC: 9.2 MG/DL (ref 8.5–10.5)
CHLORIDE SERPL-SCNC: 101 MMOL/L (ref 96–112)
CO2 SERPL-SCNC: 25 MMOL/L (ref 20–33)
CREAT SERPL-MCNC: 0.85 MG/DL (ref 0.5–1.4)
EOSINOPHIL # BLD AUTO: 0.33 K/UL (ref 0–0.51)
EOSINOPHIL NFR BLD: 3.8 % (ref 0–6.9)
ERYTHROCYTE [DISTWIDTH] IN BLOOD BY AUTOMATED COUNT: 47.6 FL (ref 35.9–50)
GLOBULIN SER CALC-MCNC: 2.7 G/DL (ref 1.9–3.5)
GLUCOSE SERPL-MCNC: 83 MG/DL (ref 65–99)
HCT VFR BLD AUTO: 37.1 % (ref 37–47)
HGB BLD-MCNC: 12.2 G/DL (ref 12–16)
IMM GRANULOCYTES # BLD AUTO: 0.03 K/UL (ref 0–0.11)
IMM GRANULOCYTES NFR BLD AUTO: 0.3 % (ref 0–0.9)
LYMPHOCYTES # BLD AUTO: 2.17 K/UL (ref 1–4.8)
LYMPHOCYTES NFR BLD: 25.3 % (ref 22–41)
MCH RBC QN AUTO: 31.3 PG (ref 27–33)
MCHC RBC AUTO-ENTMCNC: 32.9 G/DL (ref 33.6–35)
MCV RBC AUTO: 95.1 FL (ref 81.4–97.8)
MONOCYTES # BLD AUTO: 0.6 K/UL (ref 0–0.85)
MONOCYTES NFR BLD AUTO: 7 % (ref 0–13.4)
NEUTROPHILS # BLD AUTO: 5.42 K/UL (ref 2–7.15)
NEUTROPHILS NFR BLD: 63.3 % (ref 44–72)
NRBC # BLD AUTO: 0 K/UL
NRBC BLD-RTO: 0 /100 WBC
PLATELET # BLD AUTO: 239 K/UL (ref 164–446)
PMV BLD AUTO: 11.9 FL (ref 9–12.9)
POTASSIUM SERPL-SCNC: 5 MMOL/L (ref 3.6–5.5)
PROT SERPL-MCNC: 6.5 G/DL (ref 6–8.2)
RBC # BLD AUTO: 3.9 M/UL (ref 4.2–5.4)
SODIUM SERPL-SCNC: 136 MMOL/L (ref 135–145)
TROPONIN T SERPL-MCNC: <6 NG/L (ref 6–19)
WBC # BLD AUTO: 8.6 K/UL (ref 4.8–10.8)

## 2021-06-18 PROCEDURE — 84484 ASSAY OF TROPONIN QUANT: CPT

## 2021-06-18 PROCEDURE — 99284 EMERGENCY DEPT VISIT MOD MDM: CPT

## 2021-06-18 PROCEDURE — 80053 COMPREHEN METABOLIC PANEL: CPT

## 2021-06-18 PROCEDURE — 85025 COMPLETE CBC W/AUTO DIFF WBC: CPT

## 2021-06-18 PROCEDURE — 93005 ELECTROCARDIOGRAM TRACING: CPT | Performed by: EMERGENCY MEDICINE

## 2021-06-18 PROCEDURE — 71045 X-RAY EXAM CHEST 1 VIEW: CPT

## 2021-06-18 PROCEDURE — 96374 THER/PROPH/DIAG INJ IV PUSH: CPT

## 2021-06-18 PROCEDURE — 70450 CT HEAD/BRAIN W/O DYE: CPT

## 2021-06-18 PROCEDURE — 700111 HCHG RX REV CODE 636 W/ 250 OVERRIDE (IP): Performed by: EMERGENCY MEDICINE

## 2021-06-18 RX ORDER — LORAZEPAM 2 MG/ML
0.5 INJECTION INTRAMUSCULAR ONCE
Status: COMPLETED | OUTPATIENT
Start: 2021-06-18 | End: 2021-06-18

## 2021-06-18 RX ADMIN — LORAZEPAM 0.5 MG: 2 INJECTION INTRAMUSCULAR; INTRAVENOUS at 14:29

## 2021-06-18 ASSESSMENT — FIBROSIS 4 INDEX: FIB4 SCORE: 0.97

## 2021-06-18 NOTE — ED PROVIDER NOTES
"ED Provider Note    CHIEF COMPLAINT  Chief Complaint   Patient presents with   • Medication Reaction     Pt believes she is having a reaction to her Effexor - started on Monday. States she has been feeling weak & shakey today, was walking from her computer to the door & fell down \"flat on my face\" as she felt \"my legs were shaky\". No signs of injury, neg thinners/LOC. Pt's speech is at times clear in triage but sometimes stuttery. BG 77 with [facility].       HPI  Debby Desai is a 61 y.o. female who presents to the emergency department with possible medication reaction. Patient started Effexor on Monday. Her Cymbalta dose was decreased. Couple days later some mild tremor. Worsening quite significant today. Feels generally weak with generalized tremor. Feels anxious with stuttering speech. Patient states that her legs were shaky and felt weak. She ended up falling down and hitting her face and chest on the ground. She denies loss of consciousness. She has mild headache. No focal weakness or numbness. She has chest tightness she believes from hitting her chest. She has not had fever, sweats or rash. No extremity trauma.    REVIEW OF SYSTEMS  As per HPI, otherwise a 10 point review of systems is negative    PAST MEDICAL HISTORY  Past Medical History:   Diagnosis Date   • Anemia     in past   • Anginal syndrome (HCC)     had work up and feet r/t anxiety   • Anxiety    • Arthritis     OA knees   • Chronic pain 6/2/2021   • Dental disorder 5/24/12    partial upper denture   • High cholesterol    • HTN (hypertension), benign 3/19/2012   • Hypothyroid 3/19/2012   • Major depression 3/19/2012   • Orbital floor (blow-out) closed fracture (HCC)     left   • Other specified symptom associated with female genital organs     post menopausal bleeding   • Pain     thoracic spine, Right knee, myofacial pain, and Right shoulder   • Pain     recently fell and has bruise left forehead   • Pain 02/2018    chronic back pain, right " shoulder   • Psychiatric problem     depression, anxiety   • Unspecified disorder of thyroid    • Urinary bladder disorder     stress incont.   • Urinary incontinence     Occasional       SOCIAL HISTORY  Social History     Tobacco Use   • Smoking status: Never Smoker   • Smokeless tobacco: Never Used   Vaping Use   • Vaping Use: Never used   Substance Use Topics   • Alcohol use: Not Currently     Alcohol/week: 0.0 oz     Comment: 1 glass wine per month   • Drug use: No       SURGICAL HISTORY  Past Surgical History:   Procedure Laterality Date   • INJ,EPI ANES/STER LUM/SAC ADDL Right 4/7/2021    Procedure: RIGHT lumbar five and sacral one transforaminal epidural;  Surgeon: Volodymyr Herring M.D.;  [facility];  Service: Pain Management   • PB TOTAL KNEE ARTHROPLASTY Right 9/16/2019    Procedure: RIGHT ARTHROPLASTY, KNEE, TOTAL;  Surgeon: Rufus Saba M.D.;  Location: [facility];  Service: Orthopedics   • KNEE ARTHROSCOPY Right 2/9/2018    Procedure: KNEE ARTHROSCOPY;  Surgeon: Harsh Baltazar M.D.;  Location: [facility];  Service: Orthopedics   • MEDIAL MENISCECTOMY Right 2/9/2018    Procedure: MEDIAL AND LATERAL MENISCECTOMY- PARTIAL;  Surgeon: Harsh Baltazar M.D.;  Location: [facility];  Service: Orthopedics   • KNEE ARTHROSCOPY Right 7/12/2017    Procedure: KNEE ARTHROSCOPY- CESPEDES;  Surgeon: Harsh Baltazar M.D.;  Location: [facility];  Service:    • MEDIAL MENISCECTOMY Right 7/12/2017    Procedure: PARTIAL MEDIAL AND LATERAL MENISCECTOMY;  Surgeon: Harsh Baltazar M.D.;  Location: [facility];  Service:    • SYNOVECTOMY Right 7/12/2017    Procedure: SYNOVECTOMY;  Surgeon: Harsh Baltazar M.D.;  Location: [facility];  Service:    • KNEE ARTHROSCOPY  4/21/2015    Performed by Rufus Saba M.D. at [facility]   • MEDIAL MENISCECTOMY  4/21/2015    Performed by  "Rufus Saba M.D. at SURGERY [facility]S   • PUMP REVISION  12/10/2012    Performed by Allison Guerra M.D. at SURGERY [facility]   • SPINAL CORD STIMULATOR  5/30/2012    Performed by ALLISON GUERRA at SURGERY [facility]   • BIOPSY GENERAL  5/1/12    endometrial   • SPINAL CORD STIMULATOR  7/11/2011    Performed by ALLISON GUERRA at SURGERY [facility]   • BLOCK EPIDURAL STEROID INJECTION  2008    x 3-4   • LYMPH NODE EXCISION Left 2007    cervicle   • OTHER Right 2001    thoracic discectomy     • ARTHROSCOPY, KNEE Left 1999   • RIB RESECTION Right 1980   • LUMPECTOMY         CURRENT MEDICATIONS  Home Medications    **Home medications have not yet been reviewed for this encounter**         ALLERGIES  Allergies   Allergen Reactions   • Sulfamethoxazole-Trimethoprim Rash and Swelling     Pt states \"My hand swells up and rash all over body\".       PHYSICAL EXAM  VITAL SIGNS: /60   Pulse 60   Temp 37.1 °C (98.7 °F) (Temporal)   Resp (!) 22   Ht 1.676 m (5' 6\")   Wt 87.5 kg (193 lb)   LMP 02/26/2012   SpO2 93%   BMI 31.15 kg/m²    Constitutional: Awake and alert  HENT: Normal inspection  Eyes: Normal inspection  Neck: Grossly normal range of motion.  Cardiovascular: Normal heart rate, Normal rhythm.  Symmetric peripheral pulses.   Thorax & Lungs: No respiratory distress, No wheezing, No rales, No rhonchi, No chest tenderness.   Abdomen: Bowel sounds normal, soft, non-distended, nontender, no mass  Skin: No obvious rash.  Back: No tenderness, No CVA tenderness.   Extremities: No clubbing, cyanosis, edema, no Homans or cords.  Neurologic: Awake alert oriented.  Clear speech although stutters occasionally.  No aphasia or dysarthria.  Cranial nerves are intact.  No drift in any extremity although mild generalized weakness.  There is generalized tremor.  There is 2-3 beat clonus on dorsiflexion of the feet.  Psychiatric: Anxious    RADIOLOGY/PROCEDURES  CT-HEAD W/O   Final Result "      No acute intracranial abnormality.      DX-CHEST-PORTABLE (1 VIEW)   Final Result      No acute cardiopulmonary findings.           Imaging is interpreted by radiologist    Labs:  Results for orders placed or performed during the hospital encounter of 06/18/21   CBC WITH DIFFERENTIAL   Result Value Ref Range    WBC 8.6 4.8 - 10.8 K/uL    RBC 3.90 (L) 4.20 - 5.40 M/uL    Hemoglobin 12.2 12.0 - 16.0 g/dL    Hematocrit 37.1 37.0 - 47.0 %    MCV 95.1 81.4 - 97.8 fL    MCH 31.3 27.0 - 33.0 pg    MCHC 32.9 (L) 33.6 - 35.0 g/dL    RDW 47.6 35.9 - 50.0 fL    Platelet Count 239 164 - 446 K/uL    MPV 11.9 9.0 - 12.9 fL    Neutrophils-Polys 63.30 44.00 - 72.00 %    Lymphocytes 25.30 22.00 - 41.00 %    Monocytes 7.00 0.00 - 13.40 %    Eosinophils 3.80 0.00 - 6.90 %    Basophils 0.30 0.00 - 1.80 %    Immature Granulocytes 0.30 0.00 - 0.90 %    Nucleated RBC 0.00 /100 WBC    Neutrophils (Absolute) 5.42 2.00 - 7.15 K/uL    Lymphs (Absolute) 2.17 1.00 - 4.80 K/uL    Monos (Absolute) 0.60 0.00 - 0.85 K/uL    Eos (Absolute) 0.33 0.00 - 0.51 K/uL    Baso (Absolute) 0.03 0.00 - 0.12 K/uL    Immature Granulocytes (abs) 0.03 0.00 - 0.11 K/uL    NRBC (Absolute) 0.00 K/uL   COMP METABOLIC PANEL   Result Value Ref Range    Sodium 136 135 - 145 mmol/L    Potassium 5.0 3.6 - 5.5 mmol/L    Chloride 101 96 - 112 mmol/L    Co2 25 20 - 33 mmol/L    Anion Gap 10.0 7.0 - 16.0    Glucose 83 65 - 99 mg/dL    Bun 15 8 - 22 mg/dL    Creatinine 0.85 0.50 - 1.40 mg/dL    Calcium 9.2 8.5 - 10.5 mg/dL    AST(SGOT) 26 12 - 45 U/L    ALT(SGPT) 15 2 - 50 U/L    Alkaline Phosphatase 110 (H) 30 - 99 U/L    Total Bilirubin 0.3 0.1 - 1.5 mg/dL    Albumin 3.8 3.2 - 4.9 g/dL    Total Protein 6.5 6.0 - 8.2 g/dL    Globulin 2.7 1.9 - 3.5 g/dL    A-G Ratio 1.4 g/dL   TROPONIN   Result Value Ref Range    Troponin T <6 6 - 19 ng/L   ESTIMATED GFR   Result Value Ref Range    GFR If African American >60 >60 mL/min/1.73 m 2    GFR If Non  >60 >60  mL/min/1.73 m 2   EKG normal sinus rhythm.    Medications   LORazepam (ATIVAN) injection 0.5 mg (0.5 mg Intravenous Given 6/18/21 6452)           COURSE & MEDICAL DECISION MAKING  Patient presents to the ER with suspected adverse reaction to medication.  She is on Cymbalta, Effexor and tramadol all of which can affect serotonin.  She has 2-3 beat clonus and her lower extremity dorsiflexion of the feet.  Thankfully she does not have any fever, confusiondramatic neurologic symptoms or vital sign instability to suggest serotonin syndrome.  I obtained screening labs which were reassuring.  CT scan of the head for trauma was negative.  Patient is given lorazepam in the ER and she felt improved.  At this point patient is appropriate for outpatient management with discontinuation of Effexor.  I have asked her to call her doctor to discuss alternatives.  I have precautioned patient to return to the ER for any fever, confusion, focal weakness numbness or neurologic symptoms or if she has concern.    FINAL IMPRESSION  1.  Adverse reaction to Effexor      This dictation was created using voice recognition software. The accuracy of the dictation is limited to the abilities of the software.  The nursing notes were reviewed and certain aspects of this information were incorporated into this note.      Electronically signed by: Manuel Nettles M.D., 6/18/2021 2:59 PM

## 2021-06-18 NOTE — ED NOTES
"Med Rec completed: per pt at bedside.     Pt reports starting Effexor on Monday (6/14/21), believes her condition is brought on by use of this medication.    No ORAL antibiotics in last 14 days    Preferred Pharmacy: [facility] P: [phone]    Pt confirmed following allergies:  Allergies   Allergen Reactions   • Sulfamethoxazole-Trimethoprim Rash and Swelling     Pt states \"My hand swells up and rash all over body\".        Pt's home medications:   No current facility-administered medications on file prior to encounter.     Medication Sig   • ergocalciferol (DRISDOL) 06283 UNIT capsule Take 1 capsule by mouth every 7 days. (Patient taking differently: Take 50,000 Units by mouth every Monday.)   • buPROPion (WELLBUTRIN XL) 300 MG XL tablet Take 1 tablet by mouth every morning.   • DULoxetine (CYMBALTA) 30 MG Cap DR Particles Take 2 Capsules by mouth every day for 7 days, THEN 1 capsule every day for 7 days.   • venlafaxine XR (EFFEXOR XR) 75 MG CAPSULE SR 24 HR Take 1 capsule by mouth every day for 7 days, THEN 2 Capsules every day for 40 days.   • hydrOXYzine pamoate (VISTARIL) 100 MG Cap Take 1 capsule by mouth at bedtime as needed (sleep).   • melatonin 5 mg Tab Take 1 tablet by mouth at bedtime.   • metFORMIN ER (GLUCOPHAGE XR) 500 MG TABLET SR 24 HR Take 1 Tab by mouth 2 times a day.   • traMADol (ULTRAM) 50 MG Tab Take 1 tablet by mouth every 8 hours as needed for Moderate Pain or Severe Pain for up to 30 days.   • [START ON 6/21/2021] traMADol (ULTRAM) 50 MG Tab Take 1 tablet by mouth every 8 hours as needed for Moderate Pain or Severe Pain for up to 30 days.    • gabapentin (NEURONTIN) 400 MG Cap Take 1 capsule by mouth 3 times a day for 30 days.   • levothyroxine (SYNTHROID) 50 MCG Tab Take 1 tablet by mouth Every morning on an empty stomach.   • atorvastatin (LIPITOR) 20 MG Tab TAKE ONE TABLET BY MOUTH EVERY DAY   • lisinopril-hydrochlorothiazide (PRINZIDE) 20-12.5 MG per tablet Take one tablet by " mouth   • ibuprofen (MOTRIN) 200 MG Tab Take 600 mg by mouth every 6 hours as needed for Inflammation.   • Ferrous Sulfate (IRON) 325 (65 Fe) MG Tab Take 65 mg by mouth every day at 6 PM.   • acetaminophen (TYLENOL) 500 MG Tab Take 500-1,000 mg by mouth 2 times a day as needed for Moderate Pain.

## 2021-06-18 NOTE — ED NOTES
Pt started effexor on Monday and states today began to have muscle twitching and speech stuttering.

## 2021-06-18 NOTE — TELEPHONE ENCOUNTER
VOICEMAIL  1. Caller Name: Debby Desai  Call Back Number: [phone]    2. Message: Pt left message stating she is having side effects from her Effexor 75 MG which was given to her by her psychiatrist. Dr. Stevenson is out of office today and she was advised by MA to contact PCP. Pt reports whole body muscle shaking, difficulty typing, feeling like she wants to cry, nervousness. Please advise.

## 2021-06-18 NOTE — TELEPHONE ENCOUNTER
Second attempt to call patient went to voicemail. Appears patient has just checked into ER for evaluation.     JOSHUA Chris.

## 2021-06-18 NOTE — ED NOTES
Pt cont to have uncontrolled movements in hands and legs. Pt also noted stuttering speech that she feels has gotten worse throughout the day

## 2021-06-18 NOTE — ED NOTES
Lab called results hemolized on CMP per Dr. Nettles notified and v/o to go ahead and release lab values

## 2021-06-18 NOTE — TELEPHONE ENCOUNTER
Patient started taking her effexor on 6/14, starting today she has been feeling nervous, muscles jumping, hard time working, feels awful and tired. Not sure how to proceed.

## 2021-06-18 NOTE — ED TRIAGE NOTES
"Debby Oniel Lloyd  61 y.o.  female  Chief Complaint   Patient presents with   • Medication Reaction     Pt believes she is having a reaction to her Effexor - started on Monday. States she has been feeling weak & shakey today, was walking from her computer to the door & fell down \"flat on my face\" as she felt \"my legs were shaky\". No signs of injury, neg thinners/LOC. Pt's speech is at times clear in triage but sometimes stuttery. BG 77 with [facility].       Pt BIB [facility] per pt but no report received - pt from [facility] by W/C. States she cannot walk at this time due to weakness. Speech was originally stuttery & quiet, but at times very clear & pt speaking full sentences with no difficulty.     Patient educated on triage process, to alert staff if any changes in condition.   * Addendum : pt has a small reddened area & abrasion on bridge of nose that she states was from the fall. No facial droop, numbness or slurred speech. GCS 15, looks well.  "

## 2021-06-18 NOTE — DISCHARGE INSTRUCTIONS
Return to the ER for any fevers, severe headache, focal weakness or numbness, worsening, not improving or concern

## 2021-06-22 DIAGNOSIS — F33.2 SEVERE EPISODE OF RECURRENT MAJOR DEPRESSIVE DISORDER, WITHOUT PSYCHOTIC FEATURES (HCC): ICD-10-CM

## 2021-06-22 DIAGNOSIS — G89.4 CHRONIC PAIN SYNDROME: ICD-10-CM

## 2021-06-22 DIAGNOSIS — F41.1 GAD (GENERALIZED ANXIETY DISORDER): ICD-10-CM

## 2021-06-22 PROCEDURE — RXMED WILLOW AMBULATORY MEDICATION CHARGE: Performed by: PSYCHIATRY & NEUROLOGY

## 2021-06-22 PROCEDURE — RXMED WILLOW AMBULATORY MEDICATION CHARGE: Performed by: PHYSICAL MEDICINE & REHABILITATION

## 2021-06-22 RX ORDER — DULOXETIN HYDROCHLORIDE 30 MG/1
CAPSULE, DELAYED RELEASE ORAL
Qty: 21 CAPSULE | Refills: 0 | Status: SHIPPED | OUTPATIENT
Start: 2021-06-22 | End: 2021-07-20

## 2021-06-22 NOTE — PROGRESS NOTES
Telephone Appointment Visit   As a means of avoiding spread of COVID-19, this visit is being conducted by telephone. This telephone visit was initiated by the patient and they verbally consented.    Time at start of call: 9:55 am    Reason for Call:  Symptom Follow-up    HPI:    Patient contacted the clinic 4 days ago as she was having symptoms consistent with serotonin syndrome but resolved and anxiety called her back today after coming back to work.  Patient reports she stayed on Cymbalta 90 mg and started Effexor at 75 mg and she was on tramadol 2 tablets daily.  This combination caused the symptoms consistent with serotonin syndrome causing nervousness, shakes and muscle twitches.  She went to the emergency room last Friday and was given Ativan as as needed.  Discussed that our plan was to begin taper of Cymbalta and her taking 90 mg of Cymbalta with Effexor and tramadol did the serotonin syndrome risk.  Discussed that it is less likely that Effexor was the culprit but reports the combination of these serotonergic medication during this.    After detailed discussion patient agreed with following planning:  · Begin Cymbalta taper to 30 mg 2 tablets daily for next 1 week and then reduce to 30 mg 1 tablet daily for next 1 week and stop.  · After finishing this 2-week Cymbalta taper: Begin Effexor XR 75 mg daily dose for the next 3 weeks.  · Continue other medications as prescribed.  · Monitor for changes in shakes and other symptoms of high serotonin level (muscle twitches, sweating, worsening anxiety, nausea, diarrhea and racing heart)  · Make a follow-up with me in 5 weeks.      Assessment and Plan:     1. Severe episode of recurrent major depressive disorder, without psychotic features (HCC)  - DULoxetine (CYMBALTA) 30 MG Cap DR Particles; Take 2 Capsules by mouth every day for 7 days, THEN 1 capsule every day for 7 days.  Dispense: 21 capsule; Refill: 0    2. TIM (generalized anxiety disorder)  - DULoxetine  (CYMBALTA) 30 MG Cap DR Particles; Take 2 Capsules by mouth every day for 7 days, THEN 1 capsule every day for 7 days.  Dispense: 21 capsule; Refill: 0    3. Chronic pain syndrome  - DULoxetine (CYMBALTA) 30 MG Cap DR Particles; Take 2 Capsules by mouth every day for 7 days, THEN 1 capsule every day for 7 days.  Dispense: 21 capsule; Refill: 0      Follow-up: 5 weeks    Time at end of call: 10:10 am  Total Time Spent: 11-20 minutes    Genaro Stevenson M.D.

## 2021-06-23 ENCOUNTER — PHARMACY VISIT (OUTPATIENT)
Dept: PHARMACY | Facility: MEDICAL CENTER | Age: 61
End: 2021-06-23
Payer: COMMERCIAL

## 2021-07-12 ENCOUNTER — TELEMEDICINE (OUTPATIENT)
Dept: BEHAVIORAL HEALTH | Facility: CLINIC | Age: 61
End: 2021-07-12
Payer: COMMERCIAL

## 2021-07-12 DIAGNOSIS — F45.42 PAIN DISORDER ASSOCIATED WITH PSYCHOLOGICAL FACTORS: ICD-10-CM

## 2021-07-12 DIAGNOSIS — F41.1 GAD (GENERALIZED ANXIETY DISORDER): ICD-10-CM

## 2021-07-12 DIAGNOSIS — F34.1 PERSISTENT DEPRESSIVE DISORDER: ICD-10-CM

## 2021-07-12 PROCEDURE — 90834 PSYTX W PT 45 MINUTES: CPT | Mod: 95 | Performed by: PSYCHOLOGIST

## 2021-07-14 ENCOUNTER — APPOINTMENT (OUTPATIENT)
Dept: RADIOLOGY | Facility: MEDICAL CENTER | Age: 61
End: 2021-07-14
Attending: EMERGENCY MEDICINE
Payer: COMMERCIAL

## 2021-07-14 ENCOUNTER — HOSPITAL ENCOUNTER (EMERGENCY)
Facility: MEDICAL CENTER | Age: 61
End: 2021-07-14
Attending: EMERGENCY MEDICINE
Payer: COMMERCIAL

## 2021-07-14 VITALS
RESPIRATION RATE: 17 BRPM | HEIGHT: 67 IN | OXYGEN SATURATION: 94 % | SYSTOLIC BLOOD PRESSURE: 112 MMHG | TEMPERATURE: 96.8 F | WEIGHT: 190 LBS | DIASTOLIC BLOOD PRESSURE: 60 MMHG | HEART RATE: 61 BPM | BODY MASS INDEX: 29.82 KG/M2
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
## 2021-07-14 DIAGNOSIS — S49.91XA INJURY OF RIGHT SHOULDER, INITIAL ENCOUNTER: ICD-10-CM

## 2021-07-14 DIAGNOSIS — E83.42 HYPOMAGNESEMIA: ICD-10-CM

## 2021-07-14 DIAGNOSIS — N17.9 AKI (ACUTE KIDNEY INJURY) (HCC): ICD-10-CM

## 2021-07-14 LAB
ALBUMIN SERPL BCP-MCNC: 4.6 G/DL (ref 3.2–4.9)
ALBUMIN/GLOB SERPL: 1.8 G/DL
ALP SERPL-CCNC: 134 U/L (ref 30–99)
ALT SERPL-CCNC: 10 U/L (ref 2–50)
ANION GAP SERPL CALC-SCNC: 15 MMOL/L (ref 7–16)
AST SERPL-CCNC: 18 U/L (ref 12–45)
BASOPHILS # BLD AUTO: 0.3 % (ref 0–1.8)
BASOPHILS # BLD: 0.03 K/UL (ref 0–0.12)
BILIRUB SERPL-MCNC: 0.3 MG/DL (ref 0.1–1.5)
BUN SERPL-MCNC: 24 MG/DL (ref 8–22)
CALCIUM SERPL-MCNC: 9.2 MG/DL (ref 8.5–10.5)
CHLORIDE SERPL-SCNC: 99 MMOL/L (ref 96–112)
CO2 SERPL-SCNC: 27 MMOL/L (ref 20–33)
CREAT SERPL-MCNC: 1.72 MG/DL (ref 0.5–1.4)
EOSINOPHIL # BLD AUTO: 0.26 K/UL (ref 0–0.51)
EOSINOPHIL NFR BLD: 2.5 % (ref 0–6.9)
ERYTHROCYTE [DISTWIDTH] IN BLOOD BY AUTOMATED COUNT: 45.4 FL (ref 35.9–50)
GLOBULIN SER CALC-MCNC: 2.6 G/DL (ref 1.9–3.5)
GLUCOSE SERPL-MCNC: 93 MG/DL (ref 65–99)
HCT VFR BLD AUTO: 42.8 % (ref 37–47)
HGB BLD-MCNC: 13.9 G/DL (ref 12–16)
IMM GRANULOCYTES # BLD AUTO: 0.04 K/UL (ref 0–0.11)
IMM GRANULOCYTES NFR BLD AUTO: 0.4 % (ref 0–0.9)
LYMPHOCYTES # BLD AUTO: 2.56 K/UL (ref 1–4.8)
LYMPHOCYTES NFR BLD: 25 % (ref 22–41)
MAGNESIUM SERPL-MCNC: 1.4 MG/DL (ref 1.5–2.5)
MCH RBC QN AUTO: 30.9 PG (ref 27–33)
MCHC RBC AUTO-ENTMCNC: 32.5 G/DL (ref 33.6–35)
MCV RBC AUTO: 95.1 FL (ref 81.4–97.8)
MONOCYTES # BLD AUTO: 0.85 K/UL (ref 0–0.85)
MONOCYTES NFR BLD AUTO: 8.3 % (ref 0–13.4)
NEUTROPHILS # BLD AUTO: 6.52 K/UL (ref 2–7.15)
NEUTROPHILS NFR BLD: 63.5 % (ref 44–72)
NRBC # BLD AUTO: 0 K/UL
NRBC BLD-RTO: 0 /100 WBC
PLATELET # BLD AUTO: 345 K/UL (ref 164–446)
PMV BLD AUTO: 11.6 FL (ref 9–12.9)
POTASSIUM SERPL-SCNC: 4 MMOL/L (ref 3.6–5.5)
PROT SERPL-MCNC: 7.2 G/DL (ref 6–8.2)
RBC # BLD AUTO: 4.5 M/UL (ref 4.2–5.4)
SODIUM SERPL-SCNC: 141 MMOL/L (ref 135–145)
WBC # BLD AUTO: 10.3 K/UL (ref 4.8–10.8)

## 2021-07-14 PROCEDURE — 700111 HCHG RX REV CODE 636 W/ 250 OVERRIDE (IP): Performed by: EMERGENCY MEDICINE

## 2021-07-14 PROCEDURE — A9270 NON-COVERED ITEM OR SERVICE: HCPCS | Performed by: EMERGENCY MEDICINE

## 2021-07-14 PROCEDURE — 99285 EMERGENCY DEPT VISIT HI MDM: CPT

## 2021-07-14 PROCEDURE — 70450 CT HEAD/BRAIN W/O DYE: CPT

## 2021-07-14 PROCEDURE — 73030 X-RAY EXAM OF SHOULDER: CPT | Mod: LT

## 2021-07-14 PROCEDURE — 700105 HCHG RX REV CODE 258: Performed by: EMERGENCY MEDICINE

## 2021-07-14 PROCEDURE — 80053 COMPREHEN METABOLIC PANEL: CPT

## 2021-07-14 PROCEDURE — 700102 HCHG RX REV CODE 250 W/ 637 OVERRIDE(OP): Performed by: EMERGENCY MEDICINE

## 2021-07-14 PROCEDURE — 73560 X-RAY EXAM OF KNEE 1 OR 2: CPT | Mod: LT

## 2021-07-14 PROCEDURE — 72131 CT LUMBAR SPINE W/O DYE: CPT

## 2021-07-14 PROCEDURE — 96365 THER/PROPH/DIAG IV INF INIT: CPT

## 2021-07-14 PROCEDURE — 73560 X-RAY EXAM OF KNEE 1 OR 2: CPT | Mod: RT

## 2021-07-14 PROCEDURE — 72128 CT CHEST SPINE W/O DYE: CPT

## 2021-07-14 PROCEDURE — 73030 X-RAY EXAM OF SHOULDER: CPT | Mod: RT

## 2021-07-14 PROCEDURE — 83735 ASSAY OF MAGNESIUM: CPT

## 2021-07-14 PROCEDURE — 36415 COLL VENOUS BLD VENIPUNCTURE: CPT

## 2021-07-14 PROCEDURE — 71250 CT THORAX DX C-: CPT

## 2021-07-14 PROCEDURE — 85025 COMPLETE CBC W/AUTO DIFF WBC: CPT

## 2021-07-14 PROCEDURE — 72125 CT NECK SPINE W/O DYE: CPT

## 2021-07-14 PROCEDURE — RXMED WILLOW AMBULATORY MEDICATION CHARGE: Performed by: NURSE PRACTITIONER

## 2021-07-14 RX ORDER — SODIUM CHLORIDE 9 MG/ML
1000 INJECTION, SOLUTION INTRAVENOUS ONCE
Status: COMPLETED | OUTPATIENT
Start: 2021-07-14 | End: 2021-07-14

## 2021-07-14 RX ORDER — TRAMADOL HYDROCHLORIDE 50 MG/1
50 TABLET ORAL ONCE
Status: COMPLETED | OUTPATIENT
Start: 2021-07-14 | End: 2021-07-14

## 2021-07-14 RX ORDER — MAGNESIUM SULFATE HEPTAHYDRATE 40 MG/ML
2 INJECTION, SOLUTION INTRAVENOUS ONCE
Status: COMPLETED | OUTPATIENT
Start: 2021-07-14 | End: 2021-07-14

## 2021-07-14 RX ADMIN — SODIUM CHLORIDE 1000 ML: 9 INJECTION, SOLUTION INTRAVENOUS at 20:59

## 2021-07-14 RX ADMIN — TRAMADOL HYDROCHLORIDE 50 MG: 50 TABLET, FILM COATED ORAL at 19:30

## 2021-07-14 RX ADMIN — MAGNESIUM SULFATE 2 G: 2 INJECTION INTRAVENOUS at 20:59

## 2021-07-14 ASSESSMENT — PAIN DESCRIPTION - PAIN TYPE
TYPE: ACUTE PAIN
TYPE: ACUTE PAIN

## 2021-07-14 ASSESSMENT — FIBROSIS 4 INDEX: FIB4 SCORE: 1.77

## 2021-07-15 NOTE — ED PROVIDER NOTES
ED Provider Note    Scribed for Lise No M.D. by Jose Leos. 7/14/2021  6:45 PM    Means of arrival: Wheel chair  History obtained from: Patient  History limited by: None      CHIEF COMPLAINT  Chief Complaint   Patient presents with   • Fall     Pt seen [facility] ED for Seratonin Storm [id number]. Pt fell today x2 onset 1220. First fall while ambulating from outside into home, pt fell while stepping up onto sigle step but denies tripping. Sencond fall occured while attemping to stand after first fall. Pt denies head trauma, -LOC. Pt fell onto R scapula/upper back, R ribs, R shoulder, and R arm.       HPI  Debby Desai is a 61 y.o. female with history of diabetes, hypertension, chronic pain who presents to the Emergency Department for continued right knee and right shoulder pain as well as balance issues. Patient reports that 3 weeks ago she was seen her for possible medication reaction after starting venlafaxine. At that time she had fell to the ground after feeling weak and off balance, landing on her left shoulder. Earlier today she began to feel weak and fell onto her right side. She comes in with pain now to her right shoulder, right knee, and right chest wall. Denies any head trauma, loss of consciousness or vomiting.  Patient is not taking any blood thinning medications.    REVIEW OF SYSTEMS  Pertinent positive include right knee pain, right shoulder pain, right chest wall pain, balance issues, weakness. Pertinent negative include of consciousness or vomiting. All other systems reviewed and are negative.      PAST MEDICAL HISTORY   has a past medical history of Anemia, Anginal syndrome ([facility]), Anxiety, Arthritis, Chronic pain (6/2/2021), Dental disorder (5/24/12), Diabetes ([facility]), High cholesterol, HTN (hypertension), benign (3/19/2012), Hypothyroid (3/19/2012), Major depression (3/19/2012), Orbital floor (blow-out) closed fracture ([facility]), Other specified symptom associated with female genital organs,  Pain, Pain, Pain (02/2018), Psychiatric problem, Unspecified disorder of thyroid, Urinary bladder disorder, and Urinary incontinence.    SOCIAL HISTORY  Social History     Tobacco Use   • Smoking status: Never Smoker   • Smokeless tobacco: Never Used   Vaping Use   • Vaping Use: Never used   Substance and Sexual Activity   • Alcohol use: Not Currently     Alcohol/week: 0.0 oz     Comment: 1 glass wine per month   • Drug use: No   • Sexual activity: Never     Partners: Male       SURGICAL HISTORY   has a past surgical history that includes lymph node excision (Left, 2007); other (Right, 2001); arthroscopy, knee (Left, 1999); rib resection (Right, 1980); block epidural steroid injection (2008); spinal cord stimulator (7/11/2011); biopsy general (5/1/12); spinal cord stimulator (5/30/2012); pump revision (12/10/2012); knee arthroscopy (4/21/2015); medial meniscectomy (4/21/2015); knee arthroscopy (Right, 7/12/2017); medial meniscectomy (Right, 7/12/2017); synovectomy (Right, 7/12/2017); knee arthroscopy (Right, 2/9/2018); medial meniscectomy (Right, 2/9/2018); total knee arthroplasty (Right, 9/16/2019); lumpectomy; and inj,epi anes/ster lum/sac addl (Right, 4/7/2021).    CURRENT MEDICATIONS  Current Outpatient Medications   Medication Instructions   • acetaminophen (TYLENOL) 500-1,000 mg, Oral, 2 TIMES DAILY PRN   • atorvastatin (LIPITOR) 20 MG Tab TAKE ONE TABLET BY MOUTH EVERY DAY   • buPROPion (WELLBUTRIN XL) 300 mg, Oral, EVERY MORNING   • hydrOXYzine pamoate (VISTARIL) 100 mg, Oral, NIGHTLY PRN   • ibuprofen (MOTRIN) 600 mg, Oral, EVERY 6 HOURS PRN   • Iron 65 mg, Oral, DAILY   • levothyroxine (SYNTHROID) 50 MCG Tab Take 1 tablet by mouth Every morning on an empty stomach.   • lisinopril-hydrochlorothiazide (PRINZIDE) 20-12.5 MG per tablet Take one tablet by mouth   • melatonin 5 mg, Oral, EVERY BEDTIME   • metFORMIN ER (GLUCOPHAGE XR) 500 MG TABLET SR 24 HR Take 1 Tab by mouth 2 times a day.   • ondansetron  "(ZOFRAN) 4 mg, Oral, EVERY 4 HOURS PRN   • traMADol (ULTRAM) 50 MG Tab Take 1 tablet by mouth every 8 hours as needed for Moderate or Severe Pain for up to 30 days   • venlafaxine XR (EFFEXOR XR) 75 MG CAPSULE SR 24 HR Take 1 capsule by mouth every day for 7 days, THEN 2 Capsules every day for 40 days.   • vitamin D (Ergocalciferol) (DRISDOL) 50,000 Units, Oral, EVERY 7 DAYS       ALLERGIES  Allergies   Allergen Reactions   • Sulfamethoxazole-Trimethoprim Rash and Swelling     Pt states \"My hand swells up and rash all over body\".       PHYSICAL EXAM   VITAL SIGNS: /66   Pulse 98   Temp 35.9 °C (96.7 °F) (Temporal)   Resp 18   Ht 1.689 m (5' 6.5\")   Wt 86.2 kg (190 lb)   LMP 02/26/2012   SpO2 92%   BMI 30.21 kg/m²    Constitutional: Non-toxic appearing, Alert in no apparent distress.  HENT: Normocephalic, Atraumatic. Bilateral external ears normal. Nose normal.  Moist mucous membranes.  Oropharynx clear.  Eyes: Pupils are equal and reactive. Conjunctiva normal.   Neck: Supple, full range of motion  Heart: Regular rate and rhythm.  No murmurs.    Lungs: No respiratory distress, normal work of breathing. Lungs clear to auscultation bilaterally.  Abdomen Soft, no distention.  No tenderness to palpation.  Musculoskeletal: Mild pain with ROM of both knees without obvious swelling. Pain with ROM of both shoulders. Midline C-T-L tenderness. Right chest wall deformity. Atraumatic. No obvious deformities noted.  No lower extremity edema.  Skin: Warm, Dry.  No erythema, No rash.   Neurologic: Alert and oriented x3.  Cranial nerves II-XII intact.  Strength 5/5 and sensation intact throughout all 4 extremities.  No pronator drift.  No dysmetria.  Normal speech.  Normal gait.  Psychiatric: Affect normal, Mood normal, Appears appropriate and not intoxicated.      DIAGNOSTIC STUDIES    LABS  Personally reviewed by me  Labs Reviewed   CBC WITH DIFFERENTIAL - Abnormal; Notable for the following components:       Result " Value    MCHC 32.5 (*)     All other components within normal limits   COMP METABOLIC PANEL - Abnormal; Notable for the following components:    Bun 24 (*)     Creatinine 1.72 (*)     Alkaline Phosphatase 134 (*)     All other components within normal limits   MAGNESIUM - Abnormal; Notable for the following components:    Magnesium 1.4 (*)     All other components within normal limits   ESTIMATED GFR - Abnormal; Notable for the following components:    GFR If  36 (*)     GFR If Non  30 (*)     All other components within normal limits         RADIOLOGY  Personally reviewed by me  CT-TSPINE W/O PLUS RECONS   Final Result         1.  No acute traumatic bony injury of the thoracic spine.      CT-LSPINE W/O PLUS RECONS   Final Result         1.  No acute traumatic bony injury of the lumbar spine.      CT-CHEST (THORAX) W/O   Final Result         1.  No acute thoracic abnormality   2.  Large hiatal hernia   3.  Atherosclerosis      Fleischner Society pulmonary nodule recommendations:      Not Applicable         CT-HEAD W/O   Final Result         1. No evidence of acute cerebral infarction, hemorrhage or mass lesion.      CT-CSPINE WITHOUT PLUS RECONS   Final Result         1.  Multilevel degenerative changes of the cervical spine limit diagnostic sensitivity of this examination, otherwise no acute traumatic bony injury of the cervical spine is apparent.      DX-KNEE 2- LEFT   Final Result         1.  No acute traumatic bony injury.      DX-KNEE 2- RIGHT   Final Result         1.  No acute traumatic bony injury.   2.  Knee joint effusion      DX-SHOULDER 2+ LEFT   Final Result      No gross evidence of fracture or dislocation.      Degenerative change with high riding humerus could indicate chronic rotator cuff tear.                  INTERPRETING LOCATION:  [address] [place] [address], [facility], [address]      DX-SHOULDER 2+ RIGHT   Final Result         1.  No acute traumatic bony injury.             ED  COURSE  Vitals:    07/14/21 2015 07/14/21 2030 07/14/21 2100 07/14/21 2236   BP: 103/50 117/58 111/56 112/60   Pulse: 69 67 63 61   Resp: 12 12 15 17   Temp:    36 °C (96.8 °F)   TempSrc:    Tympanic   SpO2: 94% 95% 92% 94%   Weight:       Height:             Medications administered:  Medications   traMADol (ULTRAM) 50 MG tablet 50 mg (50 mg Oral Given 7/14/21 1930)   NS infusion 1,000 mL (0 mL Intravenous Stopped 7/14/21 2227)   magnesium sulfate IVPB premix 2 g (0 g Intravenous Stopped 7/14/21 2203)       6:45 PM Patient seen and examined at bedside. The patient presents with right sided pain. Ordered for CT-chest w/o, CT-Tspine w/o, CT-Lspine w/o, DX-shoulder right, DX-shoulder left, DX-knee right, DX-knee left, CT-head w/o, CT-Cspine w/o, CBC w/ diff, CMP, and Magnesium to evaluate. Patient will be treated with Ultram 50 mg for her symptoms.       MEDICAL DECISION MAKING  Patient presents with various complaints of pain after multiple recent falls.  She is nontoxic-appearing with normal vital signs on arrival.  She has no focal neurologic deficits concerning for stroke.  Imaging was performed without intracranial hemorrhage, cervical or spinal fracture, rib fracture or pneumothorax.  X-rays of her shoulders and knees were also performed without fracture.  She may have a rotator cuff injury of the right shoulder.  Labs show an MYKEL when compared to prior which may be due to mild dehydration.  Her magnesium level was also low.  Labs are otherwise reassuring.  Patient will be given fluid hydration here as well as repleting her magnesium and if is feeling well will likely be able to be discharged home with outpatient follow-up    10:15 PM - Upon reassessment, patient is resting comfortably with normal vital signs.  No new complaints at this time.  He is ambulatory with a steady gait.  Discussed results with patient and/or family as well as importance of primary care follow up.  She understands importance of repeat  labs for kidney function in 1 week.  Patient understands plan of care and strict return precautions for new or changing symptoms.           The patient will return for new or worsening symptoms and is stable at the time of discharge.    The patient is referred to a primary physician for blood pressure management, diabetic screening, and for all other preventative health concerns.    DISPOSITION:  Patient will be discharged home in stable condition.    FOLLOW UP:  ARELIS Chris  [address]  [address]  [facility] [id number] [phone]    Schedule an appointment as soon as possible for a visit   Recheck of kidney function in 1 week    José Antonio Palacios M.D.  [address]  [address]  [facility] [id number] [phone]    Schedule an appointment as soon as possible for a visit   Orthopedic follow-up    [facility], Emergency Dept  [address] [facility] [id number] [phone]    If symptoms worsen      IMPRESSION  (W19.XXXA) Fall, initial encounter  (S49.91XA) Injury of right shoulder, initial encounter  (N17.9) MYKEL (acute kidney injury) (HCC)  (E83.42) Hypomagnesemia    Results, diagnoses, and treatment options were discussed with the patient and/or family. Patient verbalized understanding of plan of care.    Discharge Medication List as of 7/14/2021 10:27 PM              Jose DAVIS (Scribe), am scribing for, and in the presence of, Lise No M.D..    Electronically signed by: Jose Leos ([place]), 7/14/2021    Lise DAVIS M.D. personally performed the services described in this documentation, as scribed by Jose Leos in my presence, and it is both accurate and complete. C.    The note accurately reflects work and decisions made by me.  Lise No M.D.  7/14/2021  10:50 PM

## 2021-07-15 NOTE — ED NOTES
Pt road tested and was able to ambulate with minimal assistance around nurses station. Pt stated she was dizzy when she first stood up, but after standing a minute was resolved. Pt said she felt steady on her feet and ambulated successfully.

## 2021-07-15 NOTE — DISCHARGE INSTRUCTIONS
You were seen in the Emergency Department following a fall    Labs were completed without significant acute abnormalities other than mildly elevated kidney function which likely indicates dehydration as well as low magnesium which was repleted.    Imaging was performed showing no evidence of fracture.  You may have a ligament injury in the right shoulder however.    Please use 1,000mg of tylenol or 600mg of ibuprofen every 6 hours as needed for pain.  Continue home pain medication.    Please follow up with your primary care physician for recheck of kidney function in 1 week.  Call orthopedics for follow-up on the right shoulder.    Return to the Emergency Department with worsening pain, difficulty breathing, lightheadedness or fainting, or other concerns.

## 2021-07-15 NOTE — ED TRIAGE NOTES
Debby Desai  61 y.o. female  Chief Complaint   Patient presents with   • Fall     Pt seen [facility] ED for Seratonin Storm [id number]. Pt fell today x2 onset 1220. First fall while ambulating from outside into home, pt fell while stepping up onto sigle step but denies tripping. Sencond fall occured while attemping to stand after first fall. Pt denies head trauma, -LOC. Pt fell onto R scapula/upper back, R ribs, R shoulder, and R arm.       Pt ambulatory to triage with steady gait for above complaint.   Pt is alert and oriented, speaking in full sentences, follows commands and responds appropriately to questions. Resp are even and unlabored. Pt placed in lobby. Pt educated on triage process. Pt encouraged to alert staff for any changes. This RN masked and in appropriate PPE during encounter.

## 2021-07-19 ENCOUNTER — OFFICE VISIT (OUTPATIENT)
Dept: MEDICAL GROUP | Facility: MEDICAL CENTER | Age: 61
End: 2021-07-19
Payer: COMMERCIAL

## 2021-07-19 VITALS
OXYGEN SATURATION: 93 % | WEIGHT: 186 LBS | DIASTOLIC BLOOD PRESSURE: 78 MMHG | TEMPERATURE: 96.3 F | HEIGHT: 66 IN | BODY MASS INDEX: 29.89 KG/M2 | SYSTOLIC BLOOD PRESSURE: 124 MMHG | HEART RATE: 98 BPM

## 2021-07-19 DIAGNOSIS — R29.6 MULTIPLE FALLS: ICD-10-CM

## 2021-07-19 DIAGNOSIS — N17.9 AKI (ACUTE KIDNEY INJURY) (HCC): ICD-10-CM

## 2021-07-19 DIAGNOSIS — E83.42 HYPOMAGNESEMIA: ICD-10-CM

## 2021-07-19 DIAGNOSIS — Z09 HOSPITAL DISCHARGE FOLLOW-UP: ICD-10-CM

## 2021-07-19 PROCEDURE — 99214 OFFICE O/P EST MOD 30 MIN: CPT | Performed by: NURSE PRACTITIONER

## 2021-07-19 ASSESSMENT — FIBROSIS 4 INDEX: FIB4 SCORE: 1.04

## 2021-07-19 NOTE — PROGRESS NOTES
Subjective:   Debby Desai is a 61 y.o. female here today for hospital follow up and lab review    Hospital discharge follow-up  Patient seen in ER on 6/18 for multiple falls. Found to be in serotonin storm, was on Cymbalta 60 mg daily, this was accidentally increased to 90 mg instead of reduced to 30 mg when she was being transitioned to venlafaxine 75 mg daily. Also taking wellbutrin  mg daily.     She was seen in ER again due to more falls. Labs showed low magnesium, decreased kidney function, likely dehydrated. Was rehydrated and given magnesium replacement.     She is trying to stay more hydrated. Has not had anymore falls. Is having some muscle pain in her ribs and back. No electrolyte replacements.     Currently taking venlafaxine 150 mg daily, wellbutrin 300 mg daily, OFF Cymbalta. Venlafaxine was increased 8 days ago.     Still feeling shaky, weak, unwell. Despite significantly increasing water intake she is not urinating as often, only about twice daily. Urine is clear in color.        Current medicines (including changes today)  Current Outpatient Medications   Medication Sig Dispense Refill   • ergocalciferol (DRISDOL) 49740 UNIT capsule Take 1 capsule by mouth every 7 days. (Patient taking differently: Take 50,000 Units by mouth every Monday.) 12 capsule 0   • buPROPion (WELLBUTRIN XL) 300 MG XL tablet Take 1 tablet by mouth every morning. 30 tablet 1   • venlafaxine XR (EFFEXOR XR) 75 MG CAPSULE SR 24 HR Take 1 capsule by mouth every day for 7 days, THEN 2 Capsules every day for 40 days. 87 capsule 0   • hydrOXYzine pamoate (VISTARIL) 100 MG Cap Take 1 capsule by mouth at bedtime as needed (sleep). 30 capsule 1   • melatonin 5 mg Tab Take 1 tablet by mouth at bedtime. 30 tablet 1   • metFORMIN ER (GLUCOPHAGE XR) 500 MG TABLET SR 24 HR Take 1 Tab by mouth 2 times a day. 180 tablet 0   • traMADol (ULTRAM) 50 MG Tab Take 1 tablet by mouth every 8 hours as needed for Moderate or Severe Pain for  "up to 30 days 68 tablet 0   • levothyroxine (SYNTHROID) 50 MCG Tab Take 1 tablet by mouth Every morning on an empty stomach. 30 tablet 5   • atorvastatin (LIPITOR) 20 MG Tab TAKE ONE TABLET BY MOUTH EVERY DAY 90 Tab 1   • lisinopril-hydrochlorothiazide (PRINZIDE) 20-12.5 MG per tablet Take one tablet by mouth 90 Tab 1   • ibuprofen (MOTRIN) 200 MG Tab Take 600 mg by mouth every 6 hours as needed for Inflammation.     • Ferrous Sulfate (IRON) 325 (65 Fe) MG Tab Take 65 mg by mouth every day at 6 PM.     • acetaminophen (TYLENOL) 500 MG Tab Take 500-1,000 mg by mouth 2 times a day as needed for Moderate Pain.       No current facility-administered medications for this visit.     She  has a past medical history of Anemia, Anginal syndrome ([facility]), Anxiety, Arthritis, Chronic pain (6/2/2021), Dental disorder (5/24/12), Diabetes ([facility]), High cholesterol, HTN (hypertension), benign (3/19/2012), Hypothyroid (3/19/2012), Major depression (3/19/2012), Orbital floor (blow-out) closed fracture ([facility]), Other specified symptom associated with female genital organs, Pain, Pain, Pain (02/2018), Psychiatric problem, Unspecified disorder of thyroid, Urinary bladder disorder, and Urinary incontinence. She also has no past medical history of Breast cancer ([facility]).    ROS   No chest pain, no shortness of breath, no abdominal pain  Positive ROS as per HPI.  All other systems reviewed and are negative.     Objective:     /78 (BP Location: Right arm, Patient Position: Sitting, BP Cuff Size: Adult)   Pulse 98   Temp (!) 35.7 °C (96.3 °F) (Temporal)   Ht 1.676 m (5' 6\")   Wt 84.4 kg (186 lb)   SpO2 93%  Body mass index is 30.02 kg/m².   Physical Exam:  Constitutional: Alert, no distress.  Skin: Warm, dry, good turgor, no rashes in visible areas.  Eye: Equal, round and reactive, conjunctiva clear, lids normal.  ENMT: Lips without lesions, good dentition, oropharynx clear.  Neck: Trachea midline, no masses, no thyromegaly. No cervical or " supraclavicular lymphadenopathy  Respiratory: Unlabored respiratory effort, lungs clear to auscultation, no wheezes, no ronchi.  Cardiovascular: Normal S1, S2, no murmur, no edema.  Abdomen: Soft, non-tender, no masses, no hepatosplenomegaly.  Psych: Alert and oriented x3, normal affect and mood.        Assessment and Plan:   The following treatment plan was discussed    1. Hospital discharge follow-up  Stable  Continues having some residual symptoms of serotonin storm, likely because she is still on multiple serotonergic medications  Advised reducing tramadol use if able and use tylenol and ibuprofen for pain relief as this may help with overabundance of serotonin while not affecting her depression treatment  Follow up with psychiatry for continued adjustment of medications.     2. Hypomagnesemia  Repeat Mag  - MAGNESIUM    3. MYKEL (acute kidney injury) (HCC)  Repeat CMP for renal function and electrolytes  - Comp Metabolic Panel; Future    4. Multiple falls  Concern for continued weakness, MYKEL, and reduced urination with multiple calls, check CPK to eval for rhabdo.   - CREATINE KINASE; Future      Followup: Return in about 5 months (around 12/19/2021).    I have placed the below orders and discussed them with an approved delegating provider. The MA is performing the below orders under the direction of Dr. Tena

## 2021-07-19 NOTE — ASSESSMENT & PLAN NOTE
Patient seen in ER on 6/18 for multiple falls. Found to be in serotonin storm, was on Cymbalta 60 mg daily, this was accidentally increased to 90 mg instead of reduced to 30 mg when she was being transitioned to venlafaxine 75 mg daily. Also taking wellbutrin  mg daily.     She was seen in ER again due to more falls. Labs showed low magnesium, decreased kidney function, likely dehydrated. Was rehydrated and given magnesium replacement.     She is trying to stay more hydrated. Has not had anymore falls. Is having some muscle pain in her ribs and back. No electrolyte replacements.     Currently taking venlafaxine 150 mg daily, wellbutrin 300 mg daily, OFF Cymbalta. Venlafaxine was increased 8 days ago.     Still feeling shaky, weak, unwell. Despite significantly increasing water intake she is not urinating as often, only about twice daily. Urine is clear in color.

## 2021-07-20 ENCOUNTER — OFFICE VISIT (OUTPATIENT)
Dept: PHYSICAL MEDICINE AND REHAB | Facility: MEDICAL CENTER | Age: 61
End: 2021-07-20
Payer: COMMERCIAL

## 2021-07-20 VITALS
HEART RATE: 97 BPM | WEIGHT: 186.07 LBS | BODY MASS INDEX: 29.9 KG/M2 | DIASTOLIC BLOOD PRESSURE: 64 MMHG | OXYGEN SATURATION: 94 % | SYSTOLIC BLOOD PRESSURE: 124 MMHG | HEIGHT: 66 IN | TEMPERATURE: 97.8 F

## 2021-07-20 DIAGNOSIS — M25.569 CHRONIC KNEE PAIN, UNSPECIFIED LATERALITY: ICD-10-CM

## 2021-07-20 DIAGNOSIS — M17.12 PRIMARY OSTEOARTHRITIS OF LEFT KNEE: ICD-10-CM

## 2021-07-20 DIAGNOSIS — M54.16 LUMBAR RADICULOPATHY: ICD-10-CM

## 2021-07-20 DIAGNOSIS — G89.29 CHRONIC KNEE PAIN, UNSPECIFIED LATERALITY: ICD-10-CM

## 2021-07-20 DIAGNOSIS — M43.17 SPONDYLOLISTHESIS AT L5-S1 LEVEL: ICD-10-CM

## 2021-07-20 DIAGNOSIS — M48.061 SPINAL STENOSIS OF LUMBAR REGION, UNSPECIFIED WHETHER NEUROGENIC CLAUDICATION PRESENT: ICD-10-CM

## 2021-07-20 DIAGNOSIS — Z96.651 S/P TKR (TOTAL KNEE REPLACEMENT), RIGHT: ICD-10-CM

## 2021-07-20 PROBLEM — F45.42 PAIN DISORDER ASSOCIATED WITH PSYCHOLOGICAL FACTORS: Status: ACTIVE | Noted: 2021-07-20

## 2021-07-20 PROCEDURE — 99214 OFFICE O/P EST MOD 30 MIN: CPT | Performed by: PHYSICAL MEDICINE & REHABILITATION

## 2021-07-20 PROCEDURE — RXMED WILLOW AMBULATORY MEDICATION CHARGE: Performed by: PHYSICAL MEDICINE & REHABILITATION

## 2021-07-20 RX ORDER — TRAMADOL HYDROCHLORIDE 50 MG/1
50 TABLET ORAL EVERY 8 HOURS PRN
Qty: 66 TABLET | Refills: 0 | Status: SHIPPED | OUTPATIENT
Start: 2021-08-22 | End: 2021-09-21 | Stop reason: SDUPTHER

## 2021-07-20 RX ORDER — TRAMADOL HYDROCHLORIDE 50 MG/1
50 TABLET ORAL EVERY 8 HOURS PRN
Qty: 66 TABLET | Refills: 0 | Status: SHIPPED | OUTPATIENT
Start: 2021-07-23 | End: 2021-09-17

## 2021-07-20 RX ORDER — GABAPENTIN 400 MG/1
400 CAPSULE ORAL 3 TIMES DAILY
COMMUNITY
End: 2021-07-20 | Stop reason: SDUPTHER

## 2021-07-20 RX ORDER — GABAPENTIN 400 MG/1
400 CAPSULE ORAL 3 TIMES DAILY
Qty: 270 CAPSULE | Refills: 1 | Status: SHIPPED | OUTPATIENT
Start: 2021-07-20 | End: 2021-10-26

## 2021-07-20 ASSESSMENT — PAIN SCALES - GENERAL: PAINLEVEL: 7=MODERATE-SEVERE PAIN

## 2021-07-20 ASSESSMENT — PATIENT HEALTH QUESTIONNAIRE - PHQ9
5. POOR APPETITE OR OVEREATING: 2 - MORE THAN HALF THE DAYS
CLINICAL INTERPRETATION OF PHQ2 SCORE: 4
SUM OF ALL RESPONSES TO PHQ QUESTIONS 1-9: 13

## 2021-07-20 ASSESSMENT — FIBROSIS 4 INDEX: FIB4 SCORE: 1.04

## 2021-07-20 NOTE — PROGRESS NOTES
Follow-up patient note    Physiatry (physical medicine and  Rehabilitation), interventional spine and sports medicine    Date of Service: 07/20/2021  Chief complaint:   Chief Complaint   Patient presents with   • Follow-Up     Right leg pain        HISTORY    HPI: Debby Desai 61 y.o. female who presents today for follow-up evaluation of her pain complaints.     Since the last visit, Debby reports that Dr. Stevenson was changing her antidepressants and she had an issue with serotonin syndrome.  She had a fall prior to her going to the ED on 06/18/2021.  She has now transitioned off of cymbalta.    Then, last Wednesday, she had another fall.  It is not clear what happened, but she fell again and then was seen in the ED on 07/14/2021.      Essentially, she had three falls in the last three weeks.  She landed on her knees.  Since landing on the right knee, it is painful.  It was swollen.  Her ROM is less now.    Her pain is a 7/10 on the NRS.  At this point, she reports that she is limping and she is not able to stand for more than 5 minutes.  Her low back pain is worse and her right leg is primarily painful in the anterior knee.     She has not been back to see Dr. Saba yet.    Work has been busy.    Continuing gabapentin 400mg po tid.  No side effects   She had been doing well with reducing tramadol, but this has been more difficult recently.    Denies suicidality.  PHQ-9: 13 She continues to see her therapist every other week.       By history:   She reports that she had surgery on 09/16/2019.  This was a right total knee arthroplasty for osteoarthritis with Dr. Saba.    In 2001, she had discectomy.  She reports that she has had a spinal cord stimulator placed, but she has not been using this as much.  She reports that the unit is not currently working.    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  She gets up about once an hour.  She has a sit to stand  desk, but it is difficult to stand much.  Currently, she is working from home due to the COVID-19 pandemic.    Medical records review:  ED records from 10/07/2020 and 09/22/2020 reviewed.  ED visit from [id number].  Negative head CT.  She was given instructions about taking ibuprofen and tylenol.  Noted that she was also given a script for valium.     I reviewed the note from the referring provider Loy Miles M.D. dated 01/08/2020: Visits included nausea/epigastric pain, hypertension, chronic knee pain, IGT, dyslipidemia, MDD, hypothyroidism, iron deficiency    Previous treatments:    Physical Therapy: Yes    Medications the patient is tried: tramadol, tylenol (usually for a headache); in the past she took gabapentin 400mg po tid, she was taking vimovo and norco in the past; lyrica caused weight gain    Previous interventions: She had radiofrequency ablation in the past, prior to surgery    Previous surgeries to relieve the above pain:  None other than surgery 09/16/2019      ROS: Unchanged, see HPI  Gen: weight loss  Eyes: vision problems, glasses and contacts  CV: chest pain/anxiety  GI: nausea, ulcers  : urgency  Psych: depression  Endo: thyroid problems  Heme: anemia    Red Flags ROS:   Fever, Chills, Sweats: Denies  Involuntary Weight Loss: Denies  Bladder Incontinence: Denies  Bowel Incontinence: Denies  Saddle Anesthesia: Denies    All other systems reviewed and negative.       PMHx:   Past Medical History:   Diagnosis Date   • Anemia     in past   • Anginal syndrome (HCC)     had work up and feet r/t anxiety   • Anxiety    • Arthritis     OA knees   • Chronic pain 6/2/2021   • Dental disorder 5/24/12    partial upper denture   • Diabetes (HCC)     pre diabetic   • High cholesterol    • HTN (hypertension), benign 3/19/2012   • Hypothyroid 3/19/2012   • Major depression 3/19/2012   • Orbital floor (blow-out) closed fracture (HCC)     left   • Other specified symptom associated with female genital organs      post menopausal bleeding   • Pain     thoracic spine, Right knee, myofacial pain, and Right shoulder   • Pain     recently fell and has bruise left forehead   • Pain 02/2018    chronic back pain, right shoulder   • Psychiatric problem     depression, anxiety   • Unspecified disorder of thyroid    • Urinary bladder disorder     stress incont.   • Urinary incontinence     Occasional       PSHx:   Past Surgical History:   Procedure Laterality Date   • INJ,EPI ANES/STER LUM/SAC ADDL Right 4/7/2021    Procedure: RIGHT lumbar five and sacral one transforaminal epidural;  Surgeon: Volodymyr Herring M.D.;  [facility];  Service: Pain Management   • PB TOTAL KNEE ARTHROPLASTY Right 9/16/2019    Procedure: RIGHT ARTHROPLASTY, KNEE, TOTAL;  Surgeon: Rufus Saba M.D.;  Location: [facility];  Service: Orthopedics   • KNEE ARTHROSCOPY Right 2/9/2018    Procedure: KNEE ARTHROSCOPY;  Surgeon: Harsh Baltazar M.D.;  Location: [facility];  Service: Orthopedics   • MEDIAL MENISCECTOMY Right 2/9/2018    Procedure: MEDIAL AND LATERAL MENISCECTOMY- PARTIAL;  Surgeon: Harsh Baltazar M.D.;  Location: [facility];  Service: Orthopedics   • KNEE ARTHROSCOPY Right 7/12/2017    Procedure: KNEE ARTHROSCOPY- CESPEDES;  Surgeon: Harsh Baltazar M.D.;  Location: [facility];  Service:    • MEDIAL MENISCECTOMY Right 7/12/2017    Procedure: PARTIAL MEDIAL AND LATERAL MENISCECTOMY;  Surgeon: Harsh Baltazar M.D.;  Location: [facility];  Service:    • SYNOVECTOMY Right 7/12/2017    Procedure: SYNOVECTOMY;  Surgeon: Harsh Baltazar M.D.;  Location: [facility];  Service:    • KNEE ARTHROSCOPY  4/21/2015    Performed by Rufus Saba M.D. at [facility]   • MEDIAL MENISCECTOMY  4/21/2015    Performed by Rufus Saba M.D. at [facility]   • PUMP REVISION  12/10/2012    Performed by  "Allison Guerra M.D. at [facility]   • SPINAL CORD STIMULATOR  5/30/2012    Performed by ALLISON GUERRA at SUR[facility]ERY [facility]   • BIOPSY GENERAL  5/1/12    endometrial   • SPINAL CORD STIMULATOR  7/11/2011    Performed by ALLISON GUERRA at SUR[facility]ERY [facility]   • BLOCK EPIDURAL STEROID INJECTION  2008    x 3-4   • LYMPH NODE EXCISION Left 2007    cervicle   • OTHER Right 2001    thoracic discectomy     • ARTHROSCOPY, KNEE Left 1999   • RIB RESECTION Right 1980   • LUMPECTOMY         Family history   Family History   Problem Relation Age of Onset   • Hypertension Father    • Diabetes Father    • Heart Disease Father    • Alcohol abuse Father    • Anesthesia Sister         slow to come out (as a child)   • Diabetes Other    • Heart Disease Other    • Cancer Other    • Hypertension Other    • Stroke Other    • Lung Disease Other          Medications:   Current Outpatient Medications   Medication   • [START ON 7/23/2021] traMADol (ULTRAM) 50 MG Tab   • [START ON 8/22/2021] traMADol (ULTRAM) 50 MG Tab   • gabapentin (NEURONTIN) 400 MG Cap   • ergocalciferol (DRISDOL) 12691 UNIT capsule   • buPROPion (WELLBUTRIN XL) 300 MG XL tablet   • venlafaxine XR (EFFEXOR XR) 75 MG CAPSULE SR 24 HR   • hydrOXYzine pamoate (VISTARIL) 100 MG Cap   • melatonin 5 mg Tab   • metFORMIN ER (GLUCOPHAGE XR) 500 MG TABLET SR 24 HR   • levothyroxine (SYNTHROID) 50 MCG Tab   • atorvastatin (LIPITOR) 20 MG Tab   • lisinopril-hydrochlorothiazide (PRINZIDE) 20-12.5 MG per tablet   • ibuprofen (MOTRIN) 200 MG Tab   • Ferrous Sulfate (IRON) 325 (65 Fe) MG Tab   • acetaminophen (TYLENOL) 500 MG Tab     No current facility-administered medications for this visit.       Allergies:   Allergies   Allergen Reactions   • Sulfamethoxazole-Trimethoprim Rash and Swelling     Pt states \"My hand swells up and rash all over body\".       Social Hx:   Social History     Socioeconomic History   • Marital status: Single     Spouse " "name: Not on file   • Number of children: Not on file   • Years of education: Not on file   • Highest education level: Not on file   Occupational History   • Not on file   Tobacco Use   • Smoking status: Never Smoker   • Smokeless tobacco: Never Used   Vaping Use   • Vaping Use: Never used   Substance and Sexual Activity   • Alcohol use: Not Currently     Alcohol/week: 0.0 oz     Comment: 1 glass wine per month   • Drug use: No   • Sexual activity: Never     Partners: Male   Other Topics Concern   •  Service No   • Blood Transfusions No   • Caffeine Concern No   • Occupational Exposure No   • Hobby Hazards No   • Sleep Concern Yes   • Stress Concern Yes   • Weight Concern Yes   • Special Diet No   • Back Care No   • Exercise No   • Bike Helmet No   • Seat Belt Yes   • Self-Exams No   Social History Narrative   • Not on file     Social Determinants of Health     Financial Resource Strain:    • Difficulty of Paying Living Expenses:    Food Insecurity:    • Worried About Running Out of Food in the Last Year:    • Ran Out of Food in the Last Year:    Transportation Needs:    • Lack of Transportation (Medical):    • Lack of Transportation (Non-Medical):    Physical Activity:    • Days of Exercise per Week:    • Minutes of Exercise per Session:    Stress:    • Feeling of Stress :    Social Connections:    • Frequency of Communication with Friends and Family:    • Frequency of Social Gatherings with Friends and Family:    • Attends Gnosticist Services:    • Active Member of Clubs or Organizations:    • Attends Club or Organization Meetings:    • Marital Status:    Intimate Partner Violence:    • Fear of Current or Ex-Partner:    • Emotionally Abused:    • Physically Abused:    • Sexually Abused:          EXAMINATION     Physical Exam:   Vitals: /64 (BP Location: Right arm, Patient Position: Sitting, BP Cuff Size: Small adult)   Pulse 97   Temp 36.6 °C (97.8 °F) (Temporal)   Ht 1.676 m (5' 6\")   Wt 84.4 kg " (186 lb 1.1 oz)   SpO2 94%     Constitutional:   Body Habitus: Body mass index is 30.03 kg/m².  Cooperation: Fully cooperates with exam  Appearance: Well-groomed, well-nourished, not disheveled no acute distress    Eyes: No scleral icterus, no proptosis     ENT -no obvious auditory deficits, wearing a face mask    Skin -no visible skin lesions, no warmth or erythema was noted at the injection site    Respiratory-  breathing comfortable on room air, no audible wheezing    Cardiovascular- No lower extremity edema is noted.     Psychiatric- alert and oriented ×3. Normal affect.     Gait -  Minimally antalgic without assist device    Neuro/Muscloskeletal  No focal motor or sensory deficits in the lower extremities bilaterally    Reflexes are 2+ left patella and 2+ achilles bilaterally    Functional ROM of the right knee, flexion greater than 120 degrees is maintained    MEDICAL DECISION MAKING    Medical records review: see under HPI section.     DATA    Labs:     03/14/2020 CRP 0.19  03/14/2020 Sed rate 20  02/25/2020: Tramadol and metabolites      Lab Results   Component Value Date/Time    SODIUM 141 07/14/2021 07:36 PM    POTASSIUM 4.0 07/14/2021 07:36 PM    CHLORIDE 99 07/14/2021 07:36 PM    CO2 27 07/14/2021 07:36 PM    ANION 15.0 07/14/2021 07:36 PM    GLUCOSE 93 07/14/2021 07:36 PM    BUN 24 (H) 07/14/2021 07:36 PM    CREATININE 1.72 (H) 07/14/2021 07:36 PM    CALCIUM 9.2 07/14/2021 07:36 PM    ASTSGOT 18 07/14/2021 07:36 PM    ALTSGPT 10 07/14/2021 07:36 PM    TBILIRUBIN 0.3 07/14/2021 07:36 PM    ALBUMIN 4.6 07/14/2021 07:36 PM    TOTPROTEIN 7.2 07/14/2021 07:36 PM    GLOBULIN 2.6 07/14/2021 07:36 PM    AGRATIO 1.8 07/14/2021 07:36 PM       Lab Results   Component Value Date/Time    PROTHROMBTM 12.6 10/07/2020 10:45 PM    INR 0.91 10/07/2020 10:45 PM        Lab Results   Component Value Date/Time    WBC 10.3 07/14/2021 07:36 PM    RBC 4.50 07/14/2021 07:36 PM    HEMOGLOBIN 13.9 07/14/2021 07:36 PM     HEMATOCRIT 42.8 07/14/2021 07:36 PM    MCV 95.1 07/14/2021 07:36 PM    MCH 30.9 07/14/2021 07:36 PM    MCHC 32.5 (L) 07/14/2021 07:36 PM    MPV 11.6 07/14/2021 07:36 PM    NEUTSPOLYS 63.50 07/14/2021 07:36 PM    LYMPHOCYTES 25.00 07/14/2021 07:36 PM    MONOCYTES 8.30 07/14/2021 07:36 PM    EOSINOPHILS 2.50 07/14/2021 07:36 PM    BASOPHILS 0.30 07/14/2021 07:36 PM        Lab Results   Component Value Date/Time    HBA1C 5.8 (H) 06/05/2021 07:42 AM        Imaging: I personally reviewed following images, these are my reads  CT cervical spine 09/22/2020  There is note of cervical spondylosis with multilevel uncovertebral arthropathy    Xray right hip 03/14/2020  There is mild osteoarthritis of the right hip.      Xray lumbar 03/14/2020  There is mild anterolisthesis at L5-S1.  No abnormal motion on flexion and extension  There is DDD and facet arthropathy that is most noted at L5-S1 and less at L4-5    Xray right knee 09/16/2019  There is note of right knee arthroplasty    IMAGING radiology reads. I reviewed the following radiology reads    CT cervical spine 09/22/2020  IMPRESSION:  Degenerative change without evidence of cervical spine fracture.    Xray lumbar 03/14/2020  IMPRESSION:  1.  No compression deformity or acute fracture is identified.  2.  Mild anterolisthesis at L5-S1.  3.  Degenerative disc disease and facet arthropathy.  4.  No focal instability noted on flexion extension views.                                                Xray right hip 03/14/2020  IMPRESSION:     1.  No radiographic evidence of acute traumatic injury.     2.  Findings are consistent with mild osteoarthritis.     3.  Probable constipation.                                   Results for orders placed during the hospital encounter of 09/16/19   DX-KNEE 2- RIGHT    Impression Right knee arthroplasty.      Results for orders placed during the hospital encounter of 03/28/19   DX-KNEE 3 VIEWS RIGHT    Impression No evidence of acute fracture  or dislocation.    Degenerative changes as above described.                              Diagnosis   Visit Diagnoses     ICD-10-CM   1. Spondylolisthesis at L5-S1 level  M43.17   2. Spinal stenosis of lumbar region, unspecified whether neurogenic claudication present  M48.061   3. Lumbar radiculopathy  M54.16   4. S/P TKR (total knee replacement), right  Z96.651   5. Chronic knee pain, unspecified laterality  M25.569    G89.29   6. Primary osteoarthritis of left knee  M17.12           ASSESSMENT:  Debby Desai 61 y.o. female seen for above.     Debby was seen today for follow-up.    Diagnoses and all orders for this visit:    Spondylolisthesis at L5-S1 level  -     traMADol (ULTRAM) 50 MG Tab; Take 1 tablet by mouth every 8 hours as needed for Moderate or Severe Pain for up to 30 days  -     traMADol (ULTRAM) 50 MG Tab; Take 1 tablet by mouth every 8 hours as needed for Moderate or Severe Pain for up to 30 days  -     Consent for Opiate Prescription  -     Controlled Substance Treatment Agreement    Spinal stenosis of lumbar region, unspecified whether neurogenic claudication present    Lumbar radiculopathy  -     traMADol (ULTRAM) 50 MG Tab; Take 1 tablet by mouth every 8 hours as needed for Moderate or Severe Pain for up to 30 days  -     traMADol (ULTRAM) 50 MG Tab; Take 1 tablet by mouth every 8 hours as needed for Moderate or Severe Pain for up to 30 days  -     Consent for Opiate Prescription  -     Controlled Substance Treatment Agreement  -     gabapentin (NEURONTIN) 400 MG Cap; Take 1 capsule by mouth 3 times a day for 90 days.    S/P TKR (total knee replacement), right  -     traMADol (ULTRAM) 50 MG Tab; Take 1 tablet by mouth every 8 hours as needed for Moderate or Severe Pain for up to 30 days  -     traMADol (ULTRAM) 50 MG Tab; Take 1 tablet by mouth every 8 hours as needed for Moderate or Severe Pain for up to 30 days  -     Consent for Opiate Prescription  -     Controlled Substance Treatment  Agreement    Chronic knee pain, unspecified laterality  -     traMADol (ULTRAM) 50 MG Tab; Take 1 tablet by mouth every 8 hours as needed for Moderate or Severe Pain for up to 30 days  -     traMADol (ULTRAM) 50 MG Tab; Take 1 tablet by mouth every 8 hours as needed for Moderate or Severe Pain for up to 30 days  -     Consent for Opiate Prescription  -     Controlled Substance Treatment Agreement    Primary osteoarthritis of left knee         1. Discussed concerns about right knee.  There does appear to be effusion, recommended follow-up with Dr. Saba/[facility]O.  2. Discussed continuing wean of the tramadol.  Decrease by 2 pills again this next month.  Discussed that this is also a risk with serotonin syndrome, she is aware and we discussed this before. Dr. Stevenson is adjusting her antidepressants.  3. Reviewed  and UDS reviewed and as expected, patient taking tramadol.  4. Continue tramadol, decrease to #66.  5. Continue care with Psychiatry and psychology  6. Discussed right lumbar five and sacral one transforaminal epidural steroid injection with good response.  Discussed possible repeat if pain worsens.  Continue activity as tolerated  7. Continue HEP for knee and low back.  Activity as tolerated.      Follow-up: Return in about 2 months (around 9/20/2021).      Thank you very much for asking me to participate in Debby Desai's care.  Please contact me with any questions or concerns.      Please note that this dictation was created using voice recognition software. I have made every reasonable attempt to correct obvious errors but there may be errors of grammar and content that I may have overlooked prior to finalization of this note.      Volodymyr Herring MD  Physical Medicine and Rehabilitation  Interventional Spine and Sports Physiatry  [facility]

## 2021-07-20 NOTE — PROGRESS NOTES
..[facility]   Therapy Progress Note      This visit was conducted via Zoom using secure and encrypted videoconferencing technology.  The patient was in a private location in the [facility].  The patient's identity was confirmed and verbal consent was obtained for this virtual visit.      Name: Debby Desai  MRN: [id number]  : 1960  Age: 61 y.o.  Date of assessment: 2021  PCP: ARELIS Chris  Persons in attendance: Patient  Total session time: 45 minutes    Objective Observations:   Participation:Active verbal participation   Grooming:Good   Cognition:Alert and Fully Oriented   Eye Contact:Good   Mood:Euthymic   Affect:Full range   Thought Process:Logical and Goal-directed   Speech:Rate within normal limits and Volume within normal limits    Current Risk:   Suicide: low   Homicide: low   Self-Harm: low   Relapse: N/A   Safety Plan Reviewed: not applicable    Topics addressed in psychotherapy include: today's session focused on her moods and chronic pain.  Discussed how things have been a bit better, and that she continues with the chronic pain. Work has gotten less stressful because she has managed herself better. Discussed other tools and provided validation and support.  There was no SI.      Care Plan Updated: No    Does patient express agreement with the above plan? Yes     Diagnosis:  1. Pain disorder associated with psychological factors    2. Persistent depressive disorder    3. TIM (generalized anxiety disorder)        Referral appointment(s) scheduled? No       Lise Schwarz PsyD

## 2021-07-28 ENCOUNTER — PHARMACY VISIT (OUTPATIENT)
Dept: PHARMACY | Facility: MEDICAL CENTER | Age: 61
End: 2021-07-28
Payer: COMMERCIAL

## 2021-07-28 PROCEDURE — RXMED WILLOW AMBULATORY MEDICATION CHARGE: Performed by: PSYCHIATRY & NEUROLOGY

## 2021-08-02 ENCOUNTER — TELEMEDICINE (OUTPATIENT)
Dept: BEHAVIORAL HEALTH | Facility: CLINIC | Age: 61
End: 2021-08-02
Payer: COMMERCIAL

## 2021-08-02 DIAGNOSIS — F45.42 PAIN DISORDER ASSOCIATED WITH PSYCHOLOGICAL FACTORS: ICD-10-CM

## 2021-08-02 PROCEDURE — 90834 PSYTX W PT 45 MINUTES: CPT | Mod: 95 | Performed by: PSYCHOLOGIST

## 2021-08-03 ENCOUNTER — TELEMEDICINE (OUTPATIENT)
Dept: BEHAVIORAL HEALTH | Facility: CLINIC | Age: 61
End: 2021-08-03
Payer: COMMERCIAL

## 2021-08-03 DIAGNOSIS — F33.2 SEVERE EPISODE OF RECURRENT MAJOR DEPRESSIVE DISORDER, WITHOUT PSYCHOTIC FEATURES (HCC): ICD-10-CM

## 2021-08-03 DIAGNOSIS — G47.09 OTHER INSOMNIA: ICD-10-CM

## 2021-08-03 DIAGNOSIS — F41.1 GAD (GENERALIZED ANXIETY DISORDER): ICD-10-CM

## 2021-08-03 PROCEDURE — RXMED WILLOW AMBULATORY MEDICATION CHARGE: Performed by: PSYCHIATRY & NEUROLOGY

## 2021-08-03 PROCEDURE — 90833 PSYTX W PT W E/M 30 MIN: CPT | Mod: 95 | Performed by: PSYCHIATRY & NEUROLOGY

## 2021-08-03 PROCEDURE — 99214 OFFICE O/P EST MOD 30 MIN: CPT | Mod: 95 | Performed by: PSYCHIATRY & NEUROLOGY

## 2021-08-03 RX ORDER — HYDROXYZINE PAMOATE 100 MG
100 CAPSULE ORAL NIGHTLY PRN
Qty: 30 CAPSULE | Refills: 1 | Status: SHIPPED | OUTPATIENT
Start: 2021-08-03 | End: 2021-10-05 | Stop reason: SDUPTHER

## 2021-08-03 RX ORDER — VENLAFAXINE HYDROCHLORIDE 150 MG/1
150 CAPSULE, EXTENDED RELEASE ORAL DAILY
Qty: 30 CAPSULE | Refills: 1 | Status: SHIPPED | OUTPATIENT
Start: 2021-08-03 | End: 2021-10-05 | Stop reason: SDUPTHER

## 2021-08-03 RX ORDER — BUPROPION HYDROCHLORIDE 300 MG/1
300 TABLET ORAL EVERY MORNING
Qty: 30 TABLET | Refills: 1 | Status: SHIPPED | OUTPATIENT
Start: 2021-08-03 | End: 2021-10-05 | Stop reason: SDUPTHER

## 2021-08-03 NOTE — PROGRESS NOTES
This evaluation was conducted via Zoom using secure and encrypted videoconferencing technology. The patient was in a private location in the state of [place].    The patient's identity was confirmed and verbal consent was obtained for this virtual visit.     PSYCHIATRY FOLLOW-UP NOTE      Name: Debby Desai  MRN: [id number]  : 1960  Age: 61 y.o.  Date of assessment: 8/3/2021  PCP: ARELIS Chris  Persons in attendance: Patient      REASON FOR VISIT/CHIEF COMPLAINT (as stated by Patient):  Debby Desai is a 61 y.o., White female, attending follow-up appointment for mood and anxiety management.      HISTORY OF PRESENT ILLNESS:  Debby Desai is a 61 y.o. old female with MDD and TIM comes in today for follow up. Patient was last seen 1 month ago, and following treatment planning recommendations were done:  · Taper Cymbalta from 90 mg to 60 mg daily for 1 week and then to 30 mg daily for next 1 week and stop.  2-week supply sent to pharmacy with no refill.  · Add Effexor XR 75 mg daily for 1 week and then increase to 150 mg daily for mood, anxiety and comorbid pain management. 47 days supply given with no refill.  · Continue Wellbutrin XL to 300 mg daily for depression augmentation.  30-day supply with 1 refill given.  · Increase hydroxyzine (vistaril) to 100 mg HS + melatonin 5 mg HS as needed for insomnia.  30 tabs with 1 refill given.  · Continue psychotherapy in the clinic for mood and anxiety management.  · Consider transcranial magnetic stimulation if improvement is not seen with current approach.    Patient had difficult course with cross tapering Cymbalta to Effexor and had visits to emergency room for symptoms consistent with serotonin syndrome.  I had a telephone discussion with her during this course and agreed with plan of first stopping Cymbalta taper and then initiating Effexor.  Patient reports slow improvement in the weakness and reports tremors as mild now.  Patient acknowledged  that symptoms are improving and agreed with plan of not changing the medication dosing and giving her consistent dosing moving forward.  Patient is also on tramadol and understand the risk of serotonin syndrome.  Reports her dosing for tramadol was also reduced by her physician.  She is denying any symptoms of serotonin syndrome other than mild tremors which she reports is improving on daily basis and describes this as minimal.  She no longer feels weak in the muscles and legs.  She was found to have low magnesium level and was repleted and reports feeling better after that.      PSYCHOTHERAPY ASPECT OF SESSION (16 MIN):  • Most part of the session was dedicated to active listening and implementing supportive psychotherapy skills.  • Importance of  appropriate from intrusive emotional response was emphasized.  • Patient gave many examples where she felt appropriate emotional response of anxiety.  • Importance of understanding the positive aspects of anxiety was emphasized.  • Importance of giving self credit for engaging in treatment and role of biopsychosocial intervention for mood and anxiety improvement was discussed.      CURRENT MEDICATIONS:  Current Outpatient Medications   Medication Sig Dispense Refill   • buPROPion (WELLBUTRIN XL) 300 MG XL tablet Take 1 tablet by mouth every morning. 30 tablet 1   • hydrOXYzine pamoate (VISTARIL) 100 MG Cap Take 1 capsule by mouth at bedtime as needed (sleep). 30 capsule 1   • venlafaxine (EFFEXOR-XR) 150 MG extended-release capsule Take 1 capsule by mouth every day. 30 capsule 1   • traMADol (ULTRAM) 50 MG Tab Take 1 tablet by mouth every 8 hours as needed for Moderate or Severe Pain for up to 30 days 66 tablet 0   • [START ON 8/22/2021] traMADol (ULTRAM) 50 MG Tab Take 1 tablet by mouth every 8 hours as needed for Moderate or Severe Pain for up to 30 days 66 tablet 0   • gabapentin (NEURONTIN) 400 MG Cap Take 1 capsule by mouth 3 times a day for 90 days. 270  capsule 1   • ergocalciferol (DRISDOL) 76649 UNIT capsule Take 1 capsule by mouth every 7 days. (Patient taking differently: Take 50,000 Units by mouth every Monday.) 12 capsule 0   • melatonin 5 mg Tab Take 1 tablet by mouth at bedtime. 30 tablet 1   • metFORMIN ER (GLUCOPHAGE XR) 500 MG TABLET SR 24 HR Take 1 Tab by mouth 2 times a day. 180 tablet 0   • levothyroxine (SYNTHROID) 50 MCG Tab Take 1 tablet by mouth Every morning on an empty stomach. 30 tablet 5   • atorvastatin (LIPITOR) 20 MG Tab TAKE ONE TABLET BY MOUTH EVERY DAY 90 Tab 1   • lisinopril-hydrochlorothiazide (PRINZIDE) 20-12.5 MG per tablet Take one tablet by mouth 90 Tab 1   • ibuprofen (MOTRIN) 200 MG Tab Take 600 mg by mouth every 6 hours as needed for Inflammation.     • Ferrous Sulfate (IRON) 325 (65 Fe) MG Tab Take 65 mg by mouth every day at 6 PM.     • acetaminophen (TYLENOL) 500 MG Tab Take 500-1,000 mg by mouth 2 times a day as needed for Moderate Pain.       No current facility-administered medications for this visit.       MEDICAL HISTORY  Past Medical History:   Diagnosis Date   • Anemia     in past   • Anginal syndrome (HCC)     had work up and feet r/t anxiety   • Anxiety    • Arthritis     OA knees   • Chronic pain 6/2/2021   • Dental disorder 5/24/12    partial upper denture   • Diabetes (HCC)     pre diabetic   • High cholesterol    • HTN (hypertension), benign 3/19/2012   • Hypothyroid 3/19/2012   • Major depression 3/19/2012   • Orbital floor (blow-out) closed fracture (HCC)     left   • Other specified symptom associated with female genital organs     post menopausal bleeding   • Pain     thoracic spine, Right knee, myofacial pain, and Right shoulder   • Pain     recently fell and has bruise left forehead   • Pain 02/2018    chronic back pain, right shoulder   • Psychiatric problem     depression, anxiety   • Unspecified disorder of thyroid    • Urinary bladder disorder     stress incont.   • Urinary incontinence     Occasional      Past Surgical History:   Procedure Laterality Date   • INJ,EPI ANES/STER LUM/SAC ADDL Right 4/7/2021    Procedure: RIGHT lumbar five and sacral one transforaminal epidural;  Surgeon: Volodymyr Herring M.D.;  [facility];  Service: Pain Management   • PB TOTAL KNEE ARTHROPLASTY Right 9/16/2019    Procedure: RIGHT ARTHROPLASTY, KNEE, TOTAL;  Surgeon: Rufus Saba M.D.;  Location: [facility];  Service: Orthopedics   • KNEE ARTHROSCOPY Right 2/9/2018    Procedure: KNEE ARTHROSCOPY;  Surgeon: Harsh Baltazar M.D.;  Location: [facility];  Service: Orthopedics   • MEDIAL MENISCECTOMY Right 2/9/2018    Procedure: MEDIAL AND LATERAL MENISCECTOMY- PARTIAL;  Surgeon: Harsh Baltazar M.D.;  Location: [facility];  Service: Orthopedics   • KNEE ARTHROSCOPY Right 7/12/2017    Procedure: KNEE ARTHROSCOPY- CESPEDES;  Surgeon: Harsh Baltazar M.D.;  Location: [facility];  Service:    • MEDIAL MENISCECTOMY Right 7/12/2017    Procedure: PARTIAL MEDIAL AND LATERAL MENISCECTOMY;  Surgeon: Harsh Baltazar M.D.;  Location: [facility];  Service:    • SYNOVECTOMY Right 7/12/2017    Procedure: SYNOVECTOMY;  Surgeon: Harsh Baltazar M.D.;  Location: [facility];  Service:    • KNEE ARTHROSCOPY  4/21/2015    Performed by Rufus Saba M.D. at [facility]   • MEDIAL MENISCECTOMY  4/21/2015    Performed by Rufus Saba M.D. at [facility]   • PUMP REVISION  12/10/2012    Performed by Allison Guerra M.D. at [facility]   • SPINAL CORD STIMULATOR  5/30/2012    Performed by ALLISON GUERRA at [facility]   • BIOPSY GENERAL  5/1/12    endometrial   • SPINAL CORD STIMULATOR  7/11/2011    Performed by ALLISON GUERRA at [facility]   • BLOCK EPIDURAL STEROID INJECTION  2008    x 3-4   • LYMPH NODE EXCISION Left 2007    cervicle   • OTHER  Right 2001    thoracic discectomy     • ARTHROSCOPY, KNEE Left 1999   • RIB RESECTION Right 1980   • LUMPECTOMY         PAST PSYCHIATRIC HISTORY  Prior psychiatric hospitalization: no  Prior Self harm/suicide attempt: no  Prior Diagnosis: MDD, Anxiety     PAST PSYCHIATRIC MEDICATIONS  · Fluoxetine  · Paroxetine  · Citalopram  · Sertraline  · Duloxetine  · Wellbutrin  · Amitriptyline   · Ativan      FAMILY HISTORY  Psychiatric diagnosis: Nieces with depression and anxiety  History of suicide attempts: No  Substance abuse history: 1 sister with drug and alcohol abuse history     SUBSTANCE USE HISTORY:  ALCOHOL: no  TOBACCO: no  CANNABIS: no  OPIOIDS: no  PRESCRIPTION MEDICATIONS: no  OTHERS: no  History of inpatient/outpatient rehab treatment: none     SOCIAL HISTORY  Childhood: born in [place] and describes childhood as difficult  Moved a lot due to parents financial concerns  Employment: For [employer]  Relationship: single (never )  Kids: no kids  Current living situation: alone (but strong family support as they all live nearby)  Current/past legal issues: denies  History of emotional/physical/sexual abuse - denies      REVIEW OF SYSTEMS:        Constitutional  fatigue; chronic pain   Eyes negative   Ears/Nose/Mouth/Throat negative   Cardiovascular  hypertension, hyperlipidemia   Respiratory negative   Gastrointestinal negative   Genitourinary negative   Muscular  osteoarthritis   Integumentary negative   Neurological negative   Endocrine  hypothyroidism   Hematologic/Lymphatic negative        PHYSICAL EXAMINAION:  Vital signs: LMP 02/26/2012   Musculoskeletal: sitting  Abnormal movements: none      MENTAL STATUS EXAMINATION      General:   - Grooming and hygiene: Casual,   - Apparent distress: none,   - Behavior: Calm  - Eye Contact:  Good,   - no psychomotor agitation or retardation    - Participation: Active verbal participation  Orientation: Alert and Fully Oriented to person, place and time  Mood:  Depressed and Anxious  Affect: Full range,  Thought Process: Logical and Goal-directed  Thought Content: Denies suicidal or homicidal ideations, intent or plan Within normal limits  Perception: Denies auditory or visual hallucinations. No delusions noted Within normal limits  Attention span and concentration: Intact   Speech:Rate within normal limits and Volume within normal limits  Language: Appropriate   Insight: Good  Judgment: Good  Recent and remote memory: No gross evidence of memory deficits        DEPRESSION SCREENING:  Depression Screen (PHQ-2/PHQ-9) 5/18/2021 6/11/2021 7/20/2021   PHQ-2 Total Score - - -   PHQ-2 Total Score - - -   PHQ-2 Total Score 6 6 4   PHQ-9 Total Score - - -   PHQ-9 Total Score 20 19 13       Interpretation of PHQ-9 Total Score   Score Severity   1-4 No Depression   5-9 Mild Depression   10-14 Moderate Depression   15-19 Moderately Severe Depression   20-27 Severe Depression    CURRENT RISK:       Suicidal: Low       Homicidal: Low       Self-Harm: Low       Relapse: Low       Crisis Safety Plan Reviewed Not Indicated       If evidence of imminent risk is present, intervention/plan:      MEDICAL RECORDS/LABS/DIAGNOSTIC TESTS REVIEWED:  Reviewed    [place] [facility] records -   Reviewed     DIAGNOSTIC IMPRESSION(S):  1. Major depressive disorder, recurrent  2. Generalized anxiety disorder  3. Insomnia        PLAN:  (1) Major depressive disorder, recurrent  · Slow improvement  · Continue Effexor  mg daily for mood, anxiety and comorbid pain management. 30 days supply given with 1 refill.  · Continue Wellbutrin XL to 300 mg daily for depression augmentation.  30-day supply with 1 refill given.  · Continue hydroxyzine (vistaril) to 100 mg HS + melatonin 5 mg HS as needed for insomnia.  30 tabs with 1 refill given.  · Continue psychotherapy in the clinic for mood and anxiety management.  · Medication options, alternatives (including no medications) and medication risks/benefits/side effects  were discussed in detail.  · Explained importance of contraceptive measures while on psychotropic medications, educated to let provider know if ever pregnant or wanting to become pregnant. Verbalized understanding.  · The patient was advised to call, message provider on MyChart, or come in to the clinic if symptoms worsen or if any future questions/issues regarding their medications arise; the patient verbalized understanding and agreement.    · The patient was educated to call 911, call the suicide hotline, or go to local ER if having thoughts of suicide or homicide; verbalized understanding.     (2) Generalized anxiety disorder  · Slow improvement  · Continue Effexor  mg daily for mood, anxiety and comorbid pain management. 30 days supply given with 1 refill.  · Continue Wellbutrin XL to 300 mg daily for depression augmentation.  30-day supply with 1 refill given.  · Continue hydroxyzine (vistaril) to 100 mg HS + melatonin 5 mg HS as needed for insomnia.  30 tabs with 1 refill given.  · Continue psychotherapy in the clinic for mood and anxiety management.     (3) Insomnia  · Slow improvement  · Continue Effexor  mg daily for mood, anxiety and comorbid pain management. 30 days supply given with 1 refill.  · Continue Wellbutrin XL to 300 mg daily for depression augmentation.  30-day supply with 1 refill given.  · Continue hydroxyzine (vistaril) to 100 mg HS + melatonin 5 mg HS as needed for insomnia.  30 tabs with 1 refill given.  · Continue psychotherapy in the clinic for mood and anxiety management.     Billing Coding based on:  97100: based on [facility]  48009: based on psychotherapy timing    Return to clinic in 2 months or sooner if symptoms worsen.  Next Appointment: instruction provided on how to make the next appointment.     The proposed treatment plan was discussed with the patient who was provided the opportunity to ask questions and make suggestions regarding alternative treatment. Patient  verbalized understanding and expressed agreement with the plan.       Genaro Stevenson M.D.  08/03/21    This note was created using voice recognition software (Dragon). The accuracy of the dictation is limited by the abilities of the software. I have reviewed the note prior to signing, however some errors in grammar and context are still possible. If you have any questions related to this note please do not hesitate to contact our office.

## 2021-08-04 ENCOUNTER — PHARMACY VISIT (OUTPATIENT)
Dept: PHARMACY | Facility: MEDICAL CENTER | Age: 61
End: 2021-08-04
Payer: COMMERCIAL

## 2021-08-09 NOTE — PROGRESS NOTES
..[facility]   Therapy Progress Note      This visit was conducted via Zoom using secure and encrypted videoconferencing technology.  The patient was in a private location in the [facility].  The patient's identity was confirmed and verbal consent was obtained for this virtual visit.      Name: Debby Desai  MRN: [id number]  : 1960  Age: 61 y.o.  Date of assessment: 2021  PCP: ARELIS Chris  Persons in attendance: Patient  Total session time: 45 minutes    Objective Observations:   Participation:Active verbal participation   Grooming:Good   Cognition:Alert and Fully Oriented   Eye Contact:Good   Mood:Euthymic   Affect:Full range   Thought Process:Logical and Goal-directed   Speech:Rate within normal limits and Volume within normal limits    Current Risk:   Suicide: low   Homicide: low   Self-Harm: low   Relapse: N/A   Safety Plan Reviewed: no    Topics addressed in psychotherapy include:  Today's session focused on chronic pain issues in relationship to her moods and behaviors. She discussed recent happenings, and recent improvement in her ability to be more active.  Discussed another tool/strategy to utilize for improved pain management.  Provided support, validation and feedback.  There was no SI.        Care Plan Updated: This needs to be finished    Does patient express agreement with the above plan? Yes     Diagnosis:  1. Pain disorder associated with psychological factors        Referral appointment(s) scheduled? No       Lise Schwarz PsyD

## 2021-08-24 PROCEDURE — RXMED WILLOW AMBULATORY MEDICATION CHARGE: Performed by: PHYSICAL MEDICINE & REHABILITATION

## 2021-08-26 ENCOUNTER — PHARMACY VISIT (OUTPATIENT)
Dept: PHARMACY | Facility: MEDICAL CENTER | Age: 61
End: 2021-08-26
Payer: COMMERCIAL

## 2021-09-07 DIAGNOSIS — E78.5 DYSLIPIDEMIA: ICD-10-CM

## 2021-09-07 DIAGNOSIS — I10 HTN (HYPERTENSION), BENIGN: Chronic | ICD-10-CM

## 2021-09-07 PROCEDURE — RXMED WILLOW AMBULATORY MEDICATION CHARGE: Performed by: NURSE PRACTITIONER

## 2021-09-08 PROCEDURE — RXMED WILLOW AMBULATORY MEDICATION CHARGE: Performed by: NURSE PRACTITIONER

## 2021-09-08 RX ORDER — LISINOPRIL AND HYDROCHLOROTHIAZIDE 20; 12.5 MG/1; MG/1
1 TABLET ORAL
Qty: 90 TABLET | Refills: 2 | Status: SHIPPED | OUTPATIENT
Start: 2021-09-08 | End: 2022-07-12 | Stop reason: SDUPTHER

## 2021-09-08 RX ORDER — ATORVASTATIN CALCIUM 20 MG/1
20 TABLET, FILM COATED ORAL
Qty: 90 TABLET | Refills: 2 | Status: SHIPPED | OUTPATIENT
Start: 2021-09-08 | End: 2022-07-12 | Stop reason: SDUPTHER

## 2021-09-13 ENCOUNTER — TELEMEDICINE (OUTPATIENT)
Dept: BEHAVIORAL HEALTH | Facility: CLINIC | Age: 61
End: 2021-09-13
Payer: COMMERCIAL

## 2021-09-13 DIAGNOSIS — F41.1 GAD (GENERALIZED ANXIETY DISORDER): ICD-10-CM

## 2021-09-13 DIAGNOSIS — F45.42 PAIN DISORDER ASSOCIATED WITH PSYCHOLOGICAL FACTORS: ICD-10-CM

## 2021-09-13 PROCEDURE — 90834 PSYTX W PT 45 MINUTES: CPT | Mod: 95 | Performed by: PSYCHOLOGIST

## 2021-09-14 ENCOUNTER — PHARMACY VISIT (OUTPATIENT)
Dept: PHARMACY | Facility: MEDICAL CENTER | Age: 61
End: 2021-09-14
Payer: COMMERCIAL

## 2021-09-14 DIAGNOSIS — G47.09 OTHER INSOMNIA: ICD-10-CM

## 2021-09-14 PROCEDURE — RXMED WILLOW AMBULATORY MEDICATION CHARGE: Performed by: PSYCHIATRY & NEUROLOGY

## 2021-09-14 RX ORDER — CHOLECALCIFEROL (VITAMIN D3) 125 MCG
5 CAPSULE ORAL
Qty: 30 TABLET | Refills: 1 | Status: SHIPPED | OUTPATIENT
Start: 2021-09-14 | End: 2021-12-02 | Stop reason: SDUPTHER

## 2021-09-15 NOTE — PROGRESS NOTES
[facility]  [facility]   Therapy Progress Note      This visit was conducted via Zoom using secure and encrypted videoconferencing technology.  The patient was in a private location in the [facility].  The patient's identity was confirmed and verbal consent was obtained for this virtual visit.      Name: Debby Desai  MRN: [id number]  : 1960  Age: 61 y.o.  Date of assessment: 9/15/2021  PCP: ARELIS Chris  Persons in attendance: Patient  Total session time: 45 minutes      Topics addressed in psychotherapy include: Today's session covered the topic of pain, anxiety and depression. Discussed strategies she has used since the last session. She reported some improvements. Therapist provided validation, support and feedback.  Therapist also provided a new tool for patient to utilize. The patient was encouraged to utilize the tool often at home and other settings.  There was no SI.       Objective Observations:   Participation:Active verbal participation   Grooming:Casual   Cognition:Alert   Eye Contact:Good   Mood:Euthymic   Affect:Congruent with content   Thought Process:Logical   Speech:Rate within normal limits and Volume within normal limits    Current Risk:   Suicide: low   Homicide: low   Self-Harm: low   Relapse: N/A   Safety Plan Reviewed: not applicable      Care Plan Updated: No    Does patient express agreement with the above plan? Yes     Diagnosis:  1. Pain disorder associated with psychological factors    2. TIM (generalized anxiety disorder)        Follow up appointment scheduled? Yes       Lise Schwarz PsyD

## 2021-09-20 ENCOUNTER — PHARMACY VISIT (OUTPATIENT)
Dept: PHARMACY | Facility: MEDICAL CENTER | Age: 61
End: 2021-09-20
Payer: COMMERCIAL

## 2021-09-21 ENCOUNTER — OFFICE VISIT (OUTPATIENT)
Dept: PHYSICAL MEDICINE AND REHAB | Facility: MEDICAL CENTER | Age: 61
End: 2021-09-21
Payer: COMMERCIAL

## 2021-09-21 VITALS
BODY MASS INDEX: 30.26 KG/M2 | HEIGHT: 66 IN | OXYGEN SATURATION: 94 % | DIASTOLIC BLOOD PRESSURE: 64 MMHG | TEMPERATURE: 97.6 F | HEART RATE: 82 BPM | SYSTOLIC BLOOD PRESSURE: 110 MMHG | WEIGHT: 188.27 LBS

## 2021-09-21 DIAGNOSIS — Z96.651 S/P TKR (TOTAL KNEE REPLACEMENT), RIGHT: ICD-10-CM

## 2021-09-21 DIAGNOSIS — M43.17 SPONDYLOLISTHESIS AT L5-S1 LEVEL: ICD-10-CM

## 2021-09-21 DIAGNOSIS — M54.16 LUMBAR RADICULOPATHY: ICD-10-CM

## 2021-09-21 DIAGNOSIS — M17.12 PRIMARY OSTEOARTHRITIS OF LEFT KNEE: ICD-10-CM

## 2021-09-21 DIAGNOSIS — G89.29 CHRONIC KNEE PAIN, UNSPECIFIED LATERALITY: ICD-10-CM

## 2021-09-21 DIAGNOSIS — F33.41 MAJOR DEPRESSIVE DISORDER, RECURRENT EPISODE, IN PARTIAL REMISSION (HCC): ICD-10-CM

## 2021-09-21 DIAGNOSIS — M25.569 CHRONIC KNEE PAIN, UNSPECIFIED LATERALITY: ICD-10-CM

## 2021-09-21 DIAGNOSIS — M48.061 SPINAL STENOSIS OF LUMBAR REGION, UNSPECIFIED WHETHER NEUROGENIC CLAUDICATION PRESENT: ICD-10-CM

## 2021-09-21 PROCEDURE — RXMED WILLOW AMBULATORY MEDICATION CHARGE: Performed by: PHYSICAL MEDICINE & REHABILITATION

## 2021-09-21 PROCEDURE — 99214 OFFICE O/P EST MOD 30 MIN: CPT | Performed by: PHYSICAL MEDICINE & REHABILITATION

## 2021-09-21 RX ORDER — TRAMADOL HYDROCHLORIDE 50 MG/1
50 TABLET ORAL EVERY 8 HOURS PRN
Qty: 66 TABLET | Refills: 0 | Status: SHIPPED | OUTPATIENT
Start: 2021-10-23 | End: 2021-11-19 | Stop reason: SDUPTHER

## 2021-09-21 RX ORDER — TRAMADOL HYDROCHLORIDE 50 MG/1
50 TABLET ORAL EVERY 8 HOURS PRN
Qty: 66 TABLET | Refills: 0 | Status: SHIPPED | OUTPATIENT
Start: 2021-09-23 | End: 2021-10-28

## 2021-09-21 ASSESSMENT — PATIENT HEALTH QUESTIONNAIRE - PHQ9
CLINICAL INTERPRETATION OF PHQ2 SCORE: 4
SUM OF ALL RESPONSES TO PHQ QUESTIONS 1-9: 13
5. POOR APPETITE OR OVEREATING: 1 - SEVERAL DAYS

## 2021-09-21 ASSESSMENT — FIBROSIS 4 INDEX: FIB4 SCORE: 1.04

## 2021-09-21 ASSESSMENT — PAIN SCALES - GENERAL: PAINLEVEL: 6=MODERATE PAIN

## 2021-09-21 NOTE — PROGRESS NOTES
Follow-up patient note    Physiatry (physical medicine and  Rehabilitation), interventional spine and sports medicine    Date of Service: 09/21/2021  Chief complaint:   Chief Complaint   Patient presents with   • Follow-Up     Right knee and leg pain        HISTORY    HPI: Debby Desai 61 y.o. female who presents today for follow-up evaluation of her pain complaints.     Since the last visit, Debby reports that she is doing better with regard with her depression.  She is working with Dr. Stevenson and then sees Dr. Schwarz, psychologist.  Also, she has been working with her MOBE guide and working on breathing exercises and working on pain.    Since her right L5 and S1 TFESI on 04/07/2021, pain is better on the lateral side, but she continues to have pain on the knee.   No falls.  The pain has not returned as it was prior to that injection.  Still has right knee pain.  It seems like she still has not been to see orthopedic surgeon yet.  She had an injection on Saturday at [facility] on the left knee.  This has started to help.  It sounds like she will see Dr. Adin Valle on Oct. 13.      Pain is making it difficult for her, she is missing work a few times a month, usually 2/month.  Her pain is a 6/10 on the NRS.     She has been trying to walk more.  Doing her ROM program.    She has been trying to stop taking ibuprofen.  Continuing gabapentin 400mg po tid.  No side effects   She had been doing well with reducing tramadol, but this has been more difficult recently.    Denies suicidality.  PHQ-9: 13 She continues to see her therapist every other week.       By history:   She reports that she had surgery on 09/16/2019.  This was a right total knee arthroplasty for osteoarthritis with Dr. Saba.    In 2001, she had discectomy.  She reports that she has had a spinal cord stimulator placed, but she has not been using this as much.  She reports that the unit is not currently working.    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  She gets up about once an hour.  She has a sit to stand desk, but it is difficult to stand much.  Currently, she is working from home due to the COVID-19 pandemic.    Medical records review:  ED records from 10/07/2020 and 09/22/2020 reviewed.  ED visit from [id number].  Negative head CT.  She was given instructions about taking ibuprofen and tylenol.  Noted that she was also given a script for valium.     I reviewed the note from the referring provider Loy Miles M.D. dated 01/08/2020: Visits included nausea/epigastric pain, hypertension, chronic knee pain, IGT, dyslipidemia, MDD, hypothyroidism, iron deficiency    Previous treatments:    Physical Therapy: Yes    Medications the patient is tried: tramadol, tylenol (usually for a headache); in the past she took gabapentin 400mg po tid, she was taking vimovo and norco in the past; lyrica caused weight gain    Previous interventions: She had radiofrequency ablation in the past, prior to surgery    Previous surgeries to relieve the above pain:  None other than surgery 09/16/2019      ROS: Unchanged, see HPI  Gen: weight loss  Eyes: vision problems, glasses and contacts  CV: chest pain/anxiety  GI: nausea, ulcers  : urgency  Psych: depression  Endo: thyroid problems  Heme: anemia    Red Flags ROS:   Fever, Chills, Sweats: Denies  Involuntary Weight Loss: Denies  Bladder Incontinence: Denies  Bowel Incontinence: Denies  Saddle Anesthesia: Denies    All other systems reviewed and negative.       PMHx:   Past Medical History:   Diagnosis Date   • Anemia     in past   • Anginal syndrome (HCC)     had work up and feet r/t anxiety   • Anxiety    • Arthritis     OA knees   • Chronic pain 6/2/2021   • Dental disorder 5/24/12    partial upper denture   • Diabetes (HCC)     pre diabetic   • High cholesterol    • HTN (hypertension), benign 3/19/2012   • Hypothyroid 3/19/2012   • Major depression 3/19/2012   •  Orbital floor (blow-out) closed fracture (HCC)     left   • Other specified symptom associated with female genital organs     post menopausal bleeding   • Pain     thoracic spine, Right knee, myofacial pain, and Right shoulder   • Pain     recently fell and has bruise left forehead   • Pain 02/2018    chronic back pain, right shoulder   • Psychiatric problem     depression, anxiety   • Unspecified disorder of thyroid    • Urinary bladder disorder     stress incont.   • Urinary incontinence     Occasional       PSHx:   Past Surgical History:   Procedure Laterality Date   • INJ,EPI ANES/STER LUM/SAC ADDL Right 4/7/2021    Procedure: RIGHT lumbar five and sacral one transforaminal epidural;  Surgeon: Volodymyr Herring M.D.;  [facility];  Service: Pain Management   • PB TOTAL KNEE ARTHROPLASTY Right 9/16/2019    Procedure: RIGHT ARTHROPLASTY, KNEE, TOTAL;  Surgeon: Rufus Saba M.D.;  Location: [facility];  Service: Orthopedics   • KNEE ARTHROSCOPY Right 2/9/2018    Procedure: KNEE ARTHROSCOPY;  Surgeon: Harsh Baltazar M.D.;  Location: [facility];  Service: Orthopedics   • MEDIAL MENISCECTOMY Right 2/9/2018    Procedure: MEDIAL AND LATERAL MENISCECTOMY- PARTIAL;  Surgeon: Harsh Baltazar M.D.;  Location: [facility];  Service: Orthopedics   • KNEE ARTHROSCOPY Right 7/12/2017    Procedure: KNEE ARTHROSCOPY- CESPEDES;  Surgeon: Harsh Baltazar M.D.;  Location: [facility];  Service:    • MEDIAL MENISCECTOMY Right 7/12/2017    Procedure: PARTIAL MEDIAL AND LATERAL MENISCECTOMY;  Surgeon: Harsh Baltazar M.D.;  Location: [facility];  Service:    • SYNOVECTOMY Right 7/12/2017    Procedure: SYNOVECTOMY;  Surgeon: Harsh Baltazar M.D.;  Location: [facility];  Service:    • KNEE ARTHROSCOPY  4/21/2015    Performed by Rufus Saba M.D. at [facility]   • MEDIAL MENISCECTOMY   "4/21/2015    Performed by Rufus Saba M.D. at SURGERY [facility]   • PUMP REVISION  12/10/2012    Performed by Allison Guerra M.D. at SURGERY [facility]   • SPINAL CORD STIMULATOR  5/30/2012    Performed by ALLISON GUERRA at SURGERY [facility]   • BIOPSY GENERAL  5/1/12    endometrial   • SPINAL CORD STIMULATOR  7/11/2011    Performed by ALLISON GUERRA at SURGERY [facility]   • BLOCK EPIDURAL STEROID INJECTION  2008    x 3-4   • LYMPH NODE EXCISION Left 2007    cervicle   • OTHER Right 2001    thoracic discectomy     • ARTHROSCOPY, KNEE Left 1999   • RIB RESECTION Right 1980   • LUMPECTOMY         Family history   Family History   Problem Relation Age of Onset   • Hypertension Father    • Diabetes Father    • Heart Disease Father    • Alcohol abuse Father    • Anesthesia Sister         slow to come out (as a child)   • Diabetes Other    • Heart Disease Other    • Cancer Other    • Hypertension Other    • Stroke Other    • Lung Disease Other          Medications:   Current Outpatient Medications   Medication   • [START ON 9/23/2021] traMADol (ULTRAM) 50 MG Tab   • [START ON 10/23/2021] traMADol (ULTRAM) 50 MG Tab   • melatonin 5 mg Tab   • atorvastatin (LIPITOR) 20 MG Tab   • lisinopril-hydrochlorothiazide (PRINZIDE) 20-12.5 MG per tablet   • buPROPion (WELLBUTRIN XL) 300 MG XL tablet   • hydrOXYzine pamoate (VISTARIL) 100 MG Cap   • venlafaxine (EFFEXOR-XR) 150 MG extended-release capsule   • gabapentin (NEURONTIN) 400 MG Cap   • metFORMIN ER (GLUCOPHAGE XR) 500 MG TABLET SR 24 HR   • levothyroxine (SYNTHROID) 50 MCG Tab   • Ferrous Sulfate (IRON) 325 (65 Fe) MG Tab   • acetaminophen (TYLENOL) 500 MG Tab   • ergocalciferol (DRISDOL) 04636 UNIT capsule   • ibuprofen (MOTRIN) 200 MG Tab     No current facility-administered medications for this visit.       Allergies:   Allergies   Allergen Reactions   • Sulfamethoxazole-Trimethoprim Rash and Swelling     Pt states \"My hand swells up " "and rash all over body\".       Social Hx:   Social History     Socioeconomic History   • Marital status: Single     Spouse name: Not on file   • Number of children: Not on file   • Years of education: Not on file   • Highest education level: Not on file   Occupational History   • Not on file   Tobacco Use   • Smoking status: Never Smoker   • Smokeless tobacco: Never Used   Vaping Use   • Vaping Use: Never used   Substance and Sexual Activity   • Alcohol use: Not Currently     Alcohol/week: 0.0 oz     Comment: 1 glass wine per month   • Drug use: No   • Sexual activity: Never     Partners: Male   Other Topics Concern   •  Service No   • Blood Transfusions No   • Caffeine Concern No   • Occupational Exposure No   • Hobby Hazards No   • Sleep Concern Yes   • Stress Concern Yes   • Weight Concern Yes   • Special Diet No   • Back Care No   • Exercise No   • Bike Helmet No   • Seat Belt Yes   • Self-Exams No   Social History Narrative   • Not on file     Social Determinants of Health     Financial Resource Strain:    • Difficulty of Paying Living Expenses:    Food Insecurity:    • Worried About Running Out of Food in the Last Year:    • Ran Out of Food in the Last Year:    Transportation Needs:    • Lack of Transportation (Medical):    • Lack of Transportation (Non-Medical):    Physical Activity:    • Days of Exercise per Week:    • Minutes of Exercise per Session:    Stress:    • Feeling of Stress :    Social Connections:    • Frequency of Communication with Friends and Family:    • Frequency of Social Gatherings with Friends and Family:    • Attends Orthodox Services:    • Active Member of Clubs or Organizations:    • Attends Club or Organization Meetings:    • Marital Status:    Intimate Partner Violence:    • Fear of Current or Ex-Partner:    • Emotionally Abused:    • Physically Abused:    • Sexually Abused:          EXAMINATION     Physical Exam:   Vitals: /64 (BP Location: Right arm, Patient " "Position: Sitting, BP Cuff Size: Small adult)   Pulse 82   Temp 36.4 °C (97.6 °F) (Temporal)   Ht 1.676 m (5' 6\")   Wt 85.4 kg (188 lb 4.4 oz)   SpO2 94%     Constitutional:   Body Habitus: Body mass index is 30.39 kg/m².  Cooperation: Fully cooperates with exam  Appearance: Well-groomed, well-nourished, not disheveled no acute distress    Eyes: No scleral icterus, no proptosis     ENT -no obvious auditory deficits, wearing a face mask    Skin -no visible skin lesions, mild ecchymosis over the medial left knee.    Respiratory-  breathing comfortable on room air, no audible wheezing    Cardiovascular- No lower extremity edema is noted.     Psychiatric- alert and oriented ×3. Normal affect.     Gait -  Minimally antalgic without assist device    Neuro/Muscloskeletal  No focal motor or sensory deficits in the lower extremities bilaterally    Reflexes are 2+ left patella and 2+ achilles bilaterally    Functional ROM of the right knee, flexion greater than 120 degrees is maintained    MEDICAL DECISION MAKING    Medical records review: see under HPI section.     DATA    Labs:     03/14/2020 CRP 0.19  03/14/2020 Sed rate 20  02/25/2020: Tramadol and metabolites      Lab Results   Component Value Date/Time    SODIUM 141 07/14/2021 07:36 PM    POTASSIUM 4.0 07/14/2021 07:36 PM    CHLORIDE 99 07/14/2021 07:36 PM    CO2 27 07/14/2021 07:36 PM    ANION 15.0 07/14/2021 07:36 PM    GLUCOSE 93 07/14/2021 07:36 PM    BUN 24 (H) 07/14/2021 07:36 PM    CREATININE 1.72 (H) 07/14/2021 07:36 PM    CALCIUM 9.2 07/14/2021 07:36 PM    ASTSGOT 18 07/14/2021 07:36 PM    ALTSGPT 10 07/14/2021 07:36 PM    TBILIRUBIN 0.3 07/14/2021 07:36 PM    ALBUMIN 4.6 07/14/2021 07:36 PM    TOTPROTEIN 7.2 07/14/2021 07:36 PM    GLOBULIN 2.6 07/14/2021 07:36 PM    AGRATIO 1.8 07/14/2021 07:36 PM       Lab Results   Component Value Date/Time    PROTHROMBTM 12.6 10/07/2020 10:45 PM    INR 0.91 10/07/2020 10:45 PM        Lab Results   Component Value " Date/Time    WBC 10.3 07/14/2021 07:36 PM    RBC 4.50 07/14/2021 07:36 PM    HEMOGLOBIN 13.9 07/14/2021 07:36 PM    HEMATOCRIT 42.8 07/14/2021 07:36 PM    MCV 95.1 07/14/2021 07:36 PM    MCH 30.9 07/14/2021 07:36 PM    MCHC 32.5 (L) 07/14/2021 07:36 PM    MPV 11.6 07/14/2021 07:36 PM    NEUTSPOLYS 63.50 07/14/2021 07:36 PM    LYMPHOCYTES 25.00 07/14/2021 07:36 PM    MONOCYTES 8.30 07/14/2021 07:36 PM    EOSINOPHILS 2.50 07/14/2021 07:36 PM    BASOPHILS 0.30 07/14/2021 07:36 PM        Lab Results   Component Value Date/Time    HBA1C 5.8 (H) 06/05/2021 07:42 AM        Imaging: I personally reviewed following images, these are my reads  CT cervical spine 09/22/2020  There is note of cervical spondylosis with multilevel uncovertebral arthropathy    Xray right hip 03/14/2020  There is mild osteoarthritis of the right hip.      Xray lumbar 03/14/2020  There is mild anterolisthesis at L5-S1.  No abnormal motion on flexion and extension  There is DDD and facet arthropathy that is most noted at L5-S1 and less at L4-5    Xray right knee 09/16/2019  There is note of right knee arthroplasty    IMAGING radiology reads. I reviewed the following radiology reads    CT cervical spine 09/22/2020  IMPRESSION:  Degenerative change without evidence of cervical spine fracture.    Xray lumbar 03/14/2020  IMPRESSION:  1.  No compression deformity or acute fracture is identified.  2.  Mild anterolisthesis at L5-S1.  3.  Degenerative disc disease and facet arthropathy.  4.  No focal instability noted on flexion extension views.                                                Xray right hip 03/14/2020  IMPRESSION:     1.  No radiographic evidence of acute traumatic injury.     2.  Findings are consistent with mild osteoarthritis.     3.  Probable constipation.                                   Results for orders placed during the hospital encounter of 09/16/19   DX-KNEE 2- RIGHT    Impression Right knee arthroplasty.      Results for orders  placed during the hospital encounter of 03/28/19   DX-KNEE 3 VIEWS RIGHT    Impression No evidence of acute fracture or dislocation.    Degenerative changes as above described.                              Diagnosis   Visit Diagnoses     ICD-10-CM   1. Spondylolisthesis at L5-S1 level  M43.17   2. Spinal stenosis of lumbar region, unspecified whether neurogenic claudication present  M48.061   3. S/P TKR (total knee replacement), right  Z96.651   4. Primary osteoarthritis of left knee  M17.12   5. Lumbar radiculopathy  M54.16   6. Chronic knee pain, unspecified laterality  M25.569    G89.29   7. Major depressive disorder, recurrent episode, in partial remission ([facility])  F33.41           ASSESSMENT:  Debby Desai 61 y.o. female seen for above.     Debby was seen today for follow-up.    Diagnoses and all orders for this visit:    Spondylolisthesis at L5-S1 level  -     Pain Management Screen  -     traMADol (ULTRAM) 50 MG Tab; Take 1 Tablet by mouth every 8 hours as needed for Moderate Pain or Severe Pain for up to 30 days.  -     traMADol (ULTRAM) 50 MG Tab; Take 1 Tablet by mouth every 8 hours as needed for Moderate Pain or Severe Pain for up to 30 days.  -     Consent for Opiate Prescription  -     Controlled Substance Treatment Agreement    Spinal stenosis of lumbar region, unspecified whether neurogenic claudication present    S/P TKR (total knee replacement), right  -     Pain Management Screen  -     traMADol (ULTRAM) 50 MG Tab; Take 1 Tablet by mouth every 8 hours as needed for Moderate Pain or Severe Pain for up to 30 days.  -     traMADol (ULTRAM) 50 MG Tab; Take 1 Tablet by mouth every 8 hours as needed for Moderate Pain or Severe Pain for up to 30 days.  -     Consent for Opiate Prescription  -     Controlled Substance Treatment Agreement    Primary osteoarthritis of left knee    Lumbar radiculopathy  -     Pain Management Screen  -     traMADol (ULTRAM) 50 MG Tab; Take 1 Tablet by mouth every 8 hours as  needed for Moderate Pain or Severe Pain for up to 30 days.  -     traMADol (ULTRAM) 50 MG Tab; Take 1 Tablet by mouth every 8 hours as needed for Moderate Pain or Severe Pain for up to 30 days.  -     Consent for Opiate Prescription  -     Controlled Substance Treatment Agreement    Chronic knee pain, unspecified laterality    Major depressive disorder, recurrent episode, in partial remission (HCC)         1. Discussed that she is going to see [facility] and will see Dr. Adin Valle  2. She is missing work 1-2 days a month.  She reports that at times, the pain in the right knee is so painful that she starts making mistakes and cannot tolerate it.  3. Continue working with [facility], Psychologist and Psychiatrist, Dr. Stevenson.  4.  reviewed.  UDS reviewed and plan to continue taking #66/month.  5. She has been doing better after right L5 and S1 TFESI.  This continues to be less painful.  She will continue her home exercise program.  6. Discussed possible treatment options for the right knee, if the treatment is conservative.  Consider referral to PT, but she declines as she wants to consult with Dr. Valle first.      Follow-up: Return in about 2 months (around 11/21/2021).      Thank you very much for asking me to participate in Debby Desai's care.  Please contact me with any questions or concerns.      Please note that this dictation was created using voice recognition software. I have made every reasonable attempt to correct obvious errors but there may be errors of grammar and content that I may have overlooked prior to finalization of this note.      Volodymyr Herring MD  Physical Medicine and Rehabilitation  Interventional Spine and Sports Physiatry  [facility]

## 2021-09-27 DIAGNOSIS — R73.02 IGT (IMPAIRED GLUCOSE TOLERANCE): ICD-10-CM

## 2021-09-27 PROCEDURE — RXMED WILLOW AMBULATORY MEDICATION CHARGE: Performed by: NURSE PRACTITIONER

## 2021-09-27 RX ORDER — METFORMIN HYDROCHLORIDE 500 MG/1
500 TABLET, EXTENDED RELEASE ORAL 2 TIMES DAILY
Qty: 180 TABLET | Refills: 2 | Status: SHIPPED | OUTPATIENT
Start: 2021-09-27 | End: 2022-09-20 | Stop reason: SDUPTHER

## 2021-09-28 ENCOUNTER — PHARMACY VISIT (OUTPATIENT)
Dept: PHARMACY | Facility: MEDICAL CENTER | Age: 61
End: 2021-09-28
Payer: COMMERCIAL

## 2021-10-05 ENCOUNTER — TELEMEDICINE (OUTPATIENT)
Dept: BEHAVIORAL HEALTH | Facility: CLINIC | Age: 61
End: 2021-10-05
Payer: COMMERCIAL

## 2021-10-05 DIAGNOSIS — G47.09 OTHER INSOMNIA: ICD-10-CM

## 2021-10-05 DIAGNOSIS — F41.1 GAD (GENERALIZED ANXIETY DISORDER): ICD-10-CM

## 2021-10-05 DIAGNOSIS — F33.2 SEVERE EPISODE OF RECURRENT MAJOR DEPRESSIVE DISORDER, WITHOUT PSYCHOTIC FEATURES (HCC): ICD-10-CM

## 2021-10-05 DIAGNOSIS — F33.41 RECURRENT MAJOR DEPRESSION IN PARTIAL REMISSION (HCC): ICD-10-CM

## 2021-10-05 PROCEDURE — 90833 PSYTX W PT W E/M 30 MIN: CPT | Mod: 95 | Performed by: PSYCHIATRY & NEUROLOGY

## 2021-10-05 PROCEDURE — 99214 OFFICE O/P EST MOD 30 MIN: CPT | Mod: 95 | Performed by: PSYCHIATRY & NEUROLOGY

## 2021-10-05 RX ORDER — VENLAFAXINE HYDROCHLORIDE 150 MG/1
150 CAPSULE, EXTENDED RELEASE ORAL DAILY
Qty: 30 CAPSULE | Refills: 1 | Status: SHIPPED | OUTPATIENT
Start: 2021-10-05 | End: 2021-12-02 | Stop reason: SDUPTHER

## 2021-10-05 RX ORDER — HYDROXYZINE PAMOATE 100 MG
100 CAPSULE ORAL NIGHTLY PRN
Qty: 30 CAPSULE | Refills: 1 | Status: SHIPPED | OUTPATIENT
Start: 2021-10-05 | End: 2021-12-02 | Stop reason: SDUPTHER

## 2021-10-05 RX ORDER — BUPROPION HYDROCHLORIDE 300 MG/1
300 TABLET ORAL EVERY MORNING
Qty: 30 TABLET | Refills: 1 | Status: SHIPPED | OUTPATIENT
Start: 2021-10-05 | End: 2021-12-02 | Stop reason: SDUPTHER

## 2021-10-05 NOTE — PROGRESS NOTES
This evaluation was conducted via Zoom using secure and encrypted videoconferencing technology. The patient was in a private location in the [facility].    The patient's identity was confirmed and verbal consent was obtained for this virtual visit.     PSYCHIATRY FOLLOW-UP NOTE      Name: Debby Desai  MRN: [id number]  : 1960  Age: 61 y.o.  Date of assessment: 10/5/2021  PCP: ARELIS Chris  Persons in attendance: Patient      REASON FOR VISIT/CHIEF COMPLAINT (as stated by Patient):  Debby Desai is a 61 y.o., White female, attending follow-up appointment for mood and anxiety management.      HISTORY OF PRESENT ILLNESS:  Debby Desai is a 61 y.o. old female with MDD and TIM comes in today for follow up. Patient was last seen 2 months ago, and following treatment planning recommendations were done:  · Continue Effexor  mg daily for mood, anxiety and comorbid pain management. 30 days supply given with 1 refill.  · Continue Wellbutrin XL to 300 mg daily for depression augmentation.  30-day supply with 1 refill given.  · Continue hydroxyzine (vistaril) to 100 mg HS + melatonin 5 mg HS as needed for insomnia.  30 tabs with 1 refill given.  · Continue psychotherapy in the clinic for mood and anxiety management.    Patient is compliant with medication with no side effect and reports stable mood, anxiety and sleep.  Patient has noticed improvement in mood and anxiety and able to deal with stressors more effectively.  Patient gave example how she got more interactive with family and enjoyed time with them now.    Patient does have mild shakes which we discussed could likely be from the mild serotonin syndrome effect with Effexor and tramadol.  Patient reports his pain management doctor is planning to go down on tramadol and will monitor closely for improvement in shakes with this approach but patient denies any other signs or symptoms of serotonin syndrome.    Note from intake evaluation  on 5/13/21:  Patient is following with her primary care physician for management of depression with following medication combination: wellbutrin 150 mg daily to cymbalta 60 mg daily.  Patient reports struggling with anxiety and depression since early childhood and attributes this to multiple losses in family during childhood and describes last 7 years as difficult as her sister was diagnosed with glioblastoma and is not doing      PSYCHOTHERAPY ASPECT OF SESSION (16 MIN):  • Most part of the session was dedicated to letting patient express her feelings related to social stressors and pain.  Validation was provided for appropriate emotional responses and normalization was done.  • Importance of reaching out and maintaining interaction with close family and friend was emphasized.  • Most session was dedicated to active listening and implementing supportive psychotherapy skills.      CURRENT MEDICATIONS:  Current Outpatient Medications   Medication Sig Dispense Refill   • metFORMIN ER (GLUCOPHAGE XR) 500 MG TABLET SR 24 HR Take 1 Tab by mouth 2 times a day. 180 Tablet 2   • traMADol (ULTRAM) 50 MG Tab Take 1 Tablet by mouth every 8 hours as needed for Moderate Pain or Severe Pain for up to 30 days. 66 Tablet 0   • [START ON 10/23/2021] traMADol (ULTRAM) 50 MG Tab Take 1 Tablet by mouth every 8 hours as needed for Moderate Pain or Severe Pain for up to 30 days. 66 Tablet 0   • melatonin 5 mg Tab Take 1 tablet by mouth at bedtime. 30 Tablet 1   • atorvastatin (LIPITOR) 20 MG Tab TAKE ONE TABLET BY MOUTH EVERY DAY 90 Tablet 2   • lisinopril-hydrochlorothiazide (PRINZIDE) 20-12.5 MG per tablet Take one tablet by mouth daily 90 Tablet 2   • buPROPion (WELLBUTRIN XL) 300 MG XL tablet Take 1 tablet by mouth every morning. 30 tablet 1   • hydrOXYzine pamoate (VISTARIL) 100 MG Cap Take 1 capsule by mouth at bedtime as needed (sleep). 30 capsule 1   • venlafaxine (EFFEXOR-XR) 150 MG extended-release capsule Take 1 capsule by  mouth every day. 30 capsule 1   • gabapentin (NEURONTIN) 400 MG Cap Take 1 capsule by mouth 3 times a day for 90 days. 270 capsule 1   • ergocalciferol (DRISDOL) 42943 UNIT capsule Take 1 capsule by mouth every 7 days. (Patient not taking: Reported on 9/18/2021) 12 capsule 0   • levothyroxine (SYNTHROID) 50 MCG Tab Take 1 tablet by mouth Every morning on an empty stomach. 30 tablet 5   • ibuprofen (MOTRIN) 200 MG Tab Take 600 mg by mouth every 6 hours as needed for Inflammation. (Patient not taking: Reported on 9/18/2021)     • Ferrous Sulfate (IRON) 325 (65 Fe) MG Tab Take 65 mg by mouth every day at 6 PM.     • acetaminophen (TYLENOL) 500 MG Tab Take 500-1,000 mg by mouth 2 times a day as needed for Moderate Pain.       No current facility-administered medications for this visit.       MEDICAL HISTORY  Past Medical History:   Diagnosis Date   • Anemia     in past   • Anginal syndrome (HCC)     had work up and feet r/t anxiety   • Anxiety    • Arthritis     OA knees   • Chronic pain 6/2/2021   • Dental disorder 5/24/12    partial upper denture   • Diabetes (HCC)     pre diabetic   • High cholesterol    • HTN (hypertension), benign 3/19/2012   • Hypothyroid 3/19/2012   • Major depression 3/19/2012   • Orbital floor (blow-out) closed fracture (HCC)     left   • Other specified symptom associated with female genital organs     post menopausal bleeding   • Pain     thoracic spine, Right knee, myofacial pain, and Right shoulder   • Pain     recently fell and has bruise left forehead   • Pain 02/2018    chronic back pain, right shoulder   • Psychiatric problem     depression, anxiety   • Unspecified disorder of thyroid    • Urinary bladder disorder     stress incont.   • Urinary incontinence     Occasional     Past Surgical History:   Procedure Laterality Date   • INJ,EPI ANES/STER LUM/SAC ADDL Right 4/7/2021    Procedure: RIGHT lumbar five and sacral one transforaminal epidural;  Surgeon: Volodymyr Herring M.D.;   [facility];  Service: Pain Management   • PB TOTAL KNEE ARTHROPLASTY Right 9/16/2019    Procedure: RIGHT ARTHROPLASTY, KNEE, TOTAL;  Surgeon: Rufus Saba M.D.;  Location: [facility];  Service: Orthopedics   • KNEE ARTHROSCOPY Right 2/9/2018    Procedure: KNEE ARTHROSCOPY;  Surgeon: Harsh Baltazar M.D.;  Location: [facility];  Service: Orthopedics   • MEDIAL MENISCECTOMY Right 2/9/2018    Procedure: MEDIAL AND LATERAL MENISCECTOMY- PARTIAL;  Surgeon: Harsh Baltazar M.D.;  Location: [facility];  Service: Orthopedics   • KNEE ARTHROSCOPY Right 7/12/2017    Procedure: KNEE ARTHROSCOPY- CESPEDES;  Surgeon: Harsh Baltazar M.D.;  Location: [facility];  Service:    • MEDIAL MENISCECTOMY Right 7/12/2017    Procedure: PARTIAL MEDIAL AND LATERAL MENISCECTOMY;  Surgeon: Harsh Baltazar M.D.;  Location: [facility];  Service:    • SYNOVECTOMY Right 7/12/2017    Procedure: SYNOVECTOMY;  Surgeon: Harsh Baltazar M.D.;  Location: [facility];  Service:    • KNEE ARTHROSCOPY  4/21/2015    Performed by Rufus Saba M.D. at [facility]   • MEDIAL MENISCECTOMY  4/21/2015    Performed by Rufus Saba M.D. at [facility]   • PUMP REVISION  12/10/2012    Performed by Allison Guerra M.D. at [facility]   • SPINAL CORD STIMULATOR  5/30/2012    Performed by ALLISON GUERRA at [facility]   • BIOPSY GENERAL  5/1/12    endometrial   • SPINAL CORD STIMULATOR  7/11/2011    Performed by ALLISON GUERRA at [facility]   • BLOCK EPIDURAL STEROID INJECTION  2008    x 3-4   • LYMPH NODE EXCISION Left 2007    cervicle   • OTHER Right 2001    thoracic discectomy     • ARTHROSCOPY, KNEE Left 1999   • RIB RESECTION Right 1980   • LUMPECTOMY         PAST PSYCHIATRIC HISTORY  Prior psychiatric hospitalization: no  Prior Self harm/suicide  attempt: no  Prior Diagnosis: MDD, Anxiety     PAST PSYCHIATRIC MEDICATIONS  · Fluoxetine  · Paroxetine  · Citalopram  · Sertraline  · Duloxetine  · Wellbutrin  · Amitriptyline   · Ativan      FAMILY HISTORY  Psychiatric diagnosis: Nieces with depression and anxiety  History of suicide attempts: No  Substance abuse history: 1 sister with drug and alcohol abuse history     SUBSTANCE USE HISTORY:  ALCOHOL: no  TOBACCO: no  CANNABIS: no  OPIOIDS: no  PRESCRIPTION MEDICATIONS: no  OTHERS: no  History of inpatient/outpatient rehab treatment: none     SOCIAL HISTORY  Childhood: born in [place] and describes childhood as difficult  Moved a lot due to parents financial concerns  Employment: [employer]  Relationship: single (never )  Kids: no kids  Current living situation: alone (but strong family support as they all live nearby)  Current/past legal issues: denies  History of emotional/physical/sexual abuse - denies      REVIEW OF SYSTEMS:        Constitutional  fatigue; chronic pain   Eyes negative   Ears/Nose/Mouth/Throat negative   Cardiovascular  hypertension, hyperlipidemia   Respiratory negative   Gastrointestinal negative   Genitourinary negative   Muscular  osteoarthritis   Integumentary negative   Neurological negative   Endocrine  hypothyroidism   Hematologic/Lymphatic negative     PHYSICAL EXAMINAION:  Vital signs: [facility] 02/26/2012   Musculoskeletal: sitting in chair.   Abnormal movements: none      MENTAL STATUS EXAMINATION      General:   - Grooming and hygiene: Casual,   - Apparent distress: none,   - Behavior: Calm  - Eye Contact:  Good,   - no psychomotor agitation or retardation    - Participation: Active verbal participation  Orientation: Alert and Fully Oriented to person, place and time  Mood: Euthymic  Affect: Flexible and Full range,  Thought Process: Logical and Goal-directed  Thought Content: Denies suicidal or homicidal ideations, intent or plan Within normal limits  Perception: Denies  auditory or visual hallucinations. No delusions noted Within normal limits  Attention span and concentration: Intact   Speech:Rate within normal limits and Volume within normal limits  Language: Appropriate   Insight: Good  Judgment: Good  Recent and remote memory: No gross evidence of memory deficits        DEPRESSION SCREENING:  Depression Screen (PHQ-2/PHQ-9) 6/11/2021 7/20/2021 9/21/2021   PHQ-2 Total Score - - -   PHQ-2 Total Score - - -   PHQ-2 Total Score 6 4 4   PHQ-9 Total Score - - -   PHQ-9 Total Score 19 13 13       Interpretation of PHQ-9 Total Score   Score Severity   1-4 No Depression   5-9 Mild Depression   10-14 Moderate Depression   15-19 Moderately Severe Depression   20-27 Severe Depression    CURRENT RISK:       Suicidal: Low       Homicidal: Low       Self-Harm: Low       Relapse: Low       Crisis Safety Plan Reviewed Not Indicated       If evidence of imminent risk is present, intervention/plan:      MEDICAL RECORDS/LABS/DIAGNOSTIC TESTS REVIEWED:  No new lab since last visit     [place]  records -   Reviewed     DIAGNOSTIC IMPRESSION(S):  1. Major depressive disorder, recurrent  2. Generalized anxiety disorder  3. Insomnia        PLAN:  (1) Major depressive disorder, recurrent  · Slow improvement  · Continue Effexor  mg daily for mood, anxiety and comorbid pain management. 30 days supply given with 1 refill.  · Continue Wellbutrin XL to 300 mg daily for depression augmentation.  30-day supply with 1 refill given.  · Continue hydroxyzine (vistaril) to 100 mg HS + melatonin 5 mg HS as needed for insomnia.  30 tabs with 1 refill given.  · Continue psychotherapy in the clinic for mood and anxiety management.  · Medication options, alternatives (including no medications) and medication risks/benefits/side effects were discussed in detail.  · Explained importance of contraceptive measures while on psychotropic medications, educated to let provider know if ever pregnant or wanting to become  pregnant. Verbalized understanding.  · The patient was advised to call, message provider on [email or web address], or come in to the clinic if symptoms worsen or if any future questions/issues regarding their medications arise; the patient verbalized understanding and agreement.    · The patient was educated to call 911, call the suicide hotline, or go to local ER if having thoughts of suicide or homicide; verbalized understanding.     (2) Generalized anxiety disorder  · Slow improvement  · Continue Effexor  mg daily for mood, anxiety and comorbid pain management. 30 days supply given with 1 refill.  · Continue Wellbutrin XL to 300 mg daily for depression augmentation.  30-day supply with 1 refill given.  · Continue hydroxyzine (vistaril) to 100 mg HS + melatonin 5 mg HS as needed for insomnia.  30 tabs with 1 refill given.  · Continue psychotherapy in the clinic for mood and anxiety management.     (3) Insomnia  · Slow improvement  · Continue Effexor  mg daily for mood, anxiety and comorbid pain management. 30 days supply given with 1 refill.  · Continue Wellbutrin XL to 300 mg daily for depression augmentation.  30-day supply with 1 refill given.  · Continue hydroxyzine (vistaril) to 100 mg HS + melatonin 5 mg HS as needed for insomnia.  30 tabs with 1 refill given.  · Continue psychotherapy in the clinic for mood and anxiety management.     Billing Coding based on:  61674: based on [facility]  [id number]: based on psychotherapy timing    Return to clinic in 2 months or sooner if symptoms worsen.  Next Appointment: instruction provided on how to make the next appointment.     The proposed treatment plan was discussed with the patient who was provided the opportunity to ask questions and make suggestions regarding alternative treatment. Patient verbalized understanding and expressed agreement with the plan.       Genaro Stevenson M.D.  10/05/21    This note was created using voice recognition software (Dragon). The accuracy of  the dictation is limited by the abilities of the software. I have reviewed the note prior to signing, however some errors in grammar and context are still possible. If you have any questions related to this note please do not hesitate to contact our office.

## 2021-10-18 PROCEDURE — RXMED WILLOW AMBULATORY MEDICATION CHARGE: Performed by: PHYSICAL MEDICINE & REHABILITATION

## 2021-10-18 PROCEDURE — RXMED WILLOW AMBULATORY MEDICATION CHARGE: Performed by: PSYCHIATRY & NEUROLOGY

## 2021-10-20 PROCEDURE — RXMED WILLOW AMBULATORY MEDICATION CHARGE: Performed by: NURSE PRACTITIONER

## 2021-10-20 RX ORDER — LEVOTHYROXINE SODIUM 0.05 MG/1
50 TABLET ORAL
Qty: 90 TABLET | Refills: 3 | Status: SHIPPED | OUTPATIENT
Start: 2021-10-20 | End: 2022-12-21 | Stop reason: SDUPTHER

## 2021-10-25 ENCOUNTER — PHARMACY VISIT (OUTPATIENT)
Dept: PHARMACY | Facility: MEDICAL CENTER | Age: 61
End: 2021-10-25
Payer: COMMERCIAL

## 2021-10-27 ENCOUNTER — HOSPITAL ENCOUNTER (OUTPATIENT)
Facility: MEDICAL CENTER | Age: 61
End: 2021-10-27
Attending: FAMILY MEDICINE
Payer: COMMERCIAL

## 2021-10-27 ENCOUNTER — OFFICE VISIT (OUTPATIENT)
Dept: URGENT CARE | Facility: PHYSICIAN GROUP | Age: 61
End: 2021-10-27
Payer: COMMERCIAL

## 2021-10-27 ENCOUNTER — TELEPHONE (OUTPATIENT)
Dept: BEHAVIORAL HEALTH | Facility: CLINIC | Age: 61
End: 2021-10-27

## 2021-10-27 VITALS
DIASTOLIC BLOOD PRESSURE: 78 MMHG | WEIGHT: 185 LBS | HEIGHT: 66 IN | SYSTOLIC BLOOD PRESSURE: 112 MMHG | RESPIRATION RATE: 14 BRPM | OXYGEN SATURATION: 98 % | TEMPERATURE: 97.7 F | BODY MASS INDEX: 29.73 KG/M2 | HEART RATE: 76 BPM

## 2021-10-27 DIAGNOSIS — T43.225A SSRI (SELECTIVE SEROTONIN REUPTAKE INHIBITOR) CAUSING ADVERSE EFFECT IN THERAPEUTIC USE: ICD-10-CM

## 2021-10-27 PROCEDURE — 84436 ASSAY OF TOTAL THYROXINE: CPT

## 2021-10-27 PROCEDURE — 99214 OFFICE O/P EST MOD 30 MIN: CPT | Performed by: FAMILY MEDICINE

## 2021-10-27 PROCEDURE — 84443 ASSAY THYROID STIM HORMONE: CPT

## 2021-10-27 PROCEDURE — 85025 COMPLETE CBC W/AUTO DIFF WBC: CPT

## 2021-10-27 PROCEDURE — 80053 COMPREHEN METABOLIC PANEL: CPT

## 2021-10-27 ASSESSMENT — FIBROSIS 4 INDEX: FIB4 SCORE: 1.04

## 2021-10-27 NOTE — TELEPHONE ENCOUNTER
Patient called stating she is having trouble talking and is feeling very tired due to the medications she is taking, Wellbutrin and Effexor. Please advise

## 2021-10-27 NOTE — TELEPHONE ENCOUNTER
Pt states she is going to urgent care since symptoms got worse, did not give me time to notified her what you advised.

## 2021-10-27 NOTE — PROGRESS NOTES
"      Chief Complaint   Patient presents with   • Tremors     x2 days, Pt states tremors, repetitive shaking movements, pt stated affecting speech          C/o tremors \"all over\" and slurred speech x 1.5 days.    She is currently on wellbutrin, effexor for severe depression and on tramadol for chronic pain.   She has been on the same dose of these medications for several months.       Depression currently managed by Dr. Graham Han I called his office and left message to please call back urgently      Past Medical History:   Diagnosis Date   • Anemia     in past   • Anginal syndrome (HCC)     had work up and feet r/t anxiety   • Anxiety    • Arthritis     OA knees   • Chronic pain 6/2/2021   • Dental disorder 5/24/12    partial upper denture   • Diabetes (HCC)     pre diabetic   • High cholesterol    • HTN (hypertension), benign 3/19/2012   • Hypothyroid 3/19/2012   • Major depression 3/19/2012   • Orbital floor (blow-out) closed fracture (HCC)     left   • Other specified symptom associated with female genital organs     post menopausal bleeding   • Pain     thoracic spine, Right knee, myofacial pain, and Right shoulder   • Pain     recently fell and has bruise left forehead   • Pain 02/2018    chronic back pain, right shoulder   • Psychiatric problem     depression, anxiety   • Unspecified disorder of thyroid    • Urinary bladder disorder     stress incont.   • Urinary incontinence     Occasional     Current Outpatient Medications on File Prior to Visit   Medication Sig Dispense Refill   • levothyroxine (SYNTHROID) 50 MCG Tab Take 1 tablet by mouth Every morning on an empty stomach. 90 Tablet 3   • buPROPion (WELLBUTRIN XL) 300 MG XL tablet Take 1 tablet by mouth every morning. 30 Tablet 1   • hydrOXYzine pamoate (VISTARIL) 100 MG Cap Take 1 capsule by mouth at bedtime as needed (sleep). 30 Capsule 1   • venlafaxine (EFFEXOR-XR) 150 MG extended-release capsule Take 1 capsule by mouth every day. 30 Capsule 1   • " metFORMIN ER (GLUCOPHAGE XR) 500 MG TABLET SR 24 HR Take 1 Tab by mouth 2 times a day. 180 Tablet 2   • traMADol (ULTRAM) 50 MG Tab Take 1 Tablet by mouth every 8 hours as needed for Moderate Pain or Severe Pain for up to 30 days. 66 Tablet 0   • traMADol (ULTRAM) 50 MG Tab Take 1 Tablet by mouth every 8 hours as needed for Moderate Pain or Severe Pain for up to 30 days. 66 Tablet 0   • melatonin 5 mg Tab Take 1 tablet by mouth at bedtime. 30 Tablet 1   • atorvastatin (LIPITOR) 20 MG Tab TAKE ONE TABLET BY MOUTH EVERY DAY 90 Tablet 2   • lisinopril-hydrochlorothiazide (PRINZIDE) 20-12.5 MG per tablet Take one tablet by mouth daily 90 Tablet 2   • ergocalciferol (DRISDOL) 21448 UNIT capsule Take 1 capsule by mouth every 7 days. 12 capsule 0   • ibuprofen (MOTRIN) 200 MG Tab Take 600 mg by mouth every 6 hours as needed for Inflammation.     • Ferrous Sulfate (IRON) 325 (65 Fe) MG Tab Take 65 mg by mouth every day at 6 PM.     • acetaminophen (TYLENOL) 500 MG Tab Take 500-1,000 mg by mouth 2 times a day as needed for Moderate Pain.       No current facility-administered medications on file prior to visit.     Social History     Tobacco Use   • Smoking status: Never Smoker   • Smokeless tobacco: Never Used   Vaping Use   • Vaping Use: Never used   Substance Use Topics   • Alcohol use: Not Currently     Alcohol/week: 0.0 oz     Comment: 1 glass wine per month   • Drug use: No             Review of Systems   Constitutional: Negative for fever, chills and malaise/fatigue.   Eyes: Negative for vision changes, d/c.    Respiratory: Negative for cough and sputum production.    Cardiovascular: Negative for chest pain and palpitations.   Gastrointestinal: Negative for nausea, vomiting, abdominal pain, diarrhea and constipation.   Genitourinary: Negative for dysuria, urgency and frequency.   Skin: Negative for rash or  itching.   Neurological: Negative for dizziness and tingling.   Psychiatric/Behavioral: Negative for depression.  "  Hematologic/lymphatic - denies bruising or excessive bleeding  All other systems reviewed and are negative.    OBJECTIVE  /78   Pulse 76   Temp 36.5 °C (97.7 °F) (Temporal)   Resp 14   Ht 1.676 m (5' 6\")   Wt 83.9 kg (185 lb)   SpO2 98%   HEENT - PERRLA, EOMI  Neuro - alert and oriented x3. CN 2-12 grossly intact.  Reflexes 2+ bilat   there is fine, mild resting bilat hand tremor.   Speech is intact  Lungs - CTA. No wheezes, rhonchi or rales.  Heart - regular rate and rhythm without murmur.  Abdomen - soft and non-tender, bowel sounds active x4.   skin - no diaphoresis or rash  Musculoskeletal - No lower extremity edema noted.  Ruiz's sign negative bilaterally.   Tone is normal            A/P:     1. SSRI (selective serotonin reuptake inhibitor) causing adverse effect in therapeutic use  Body temperature is normal and no occular clonus, ridigity, peripherial clonus, etc.     Currently on welbutrin and effexor    Likely will need to decrease dosage.      Sx today do not represent severe SSRI syndrome      1930 - spoke with pt at home.   States that she was contacted by Dr. Stevenson and was given instructions to change dosage of both wellbutrin and Effexor.            "

## 2021-10-28 LAB
ALBUMIN SERPL BCP-MCNC: 4.8 G/DL (ref 3.2–4.9)
ALBUMIN/GLOB SERPL: 1.9 G/DL
ALP SERPL-CCNC: 114 U/L (ref 30–99)
ALT SERPL-CCNC: 13 U/L (ref 2–50)
ANION GAP SERPL CALC-SCNC: 13 MMOL/L (ref 7–16)
AST SERPL-CCNC: 18 U/L (ref 12–45)
BASOPHILS # BLD AUTO: 0.3 % (ref 0–1.8)
BASOPHILS # BLD: 0.03 K/UL (ref 0–0.12)
BILIRUB SERPL-MCNC: 0.2 MG/DL (ref 0.1–1.5)
BUN SERPL-MCNC: 18 MG/DL (ref 8–22)
CALCIUM SERPL-MCNC: 8.9 MG/DL (ref 8.5–10.5)
CHLORIDE SERPL-SCNC: 99 MMOL/L (ref 96–112)
CO2 SERPL-SCNC: 27 MMOL/L (ref 20–33)
CREAT SERPL-MCNC: 0.82 MG/DL (ref 0.5–1.4)
EOSINOPHIL # BLD AUTO: 0.06 K/UL (ref 0–0.51)
EOSINOPHIL NFR BLD: 0.6 % (ref 0–6.9)
ERYTHROCYTE [DISTWIDTH] IN BLOOD BY AUTOMATED COUNT: 50.6 FL (ref 35.9–50)
GLOBULIN SER CALC-MCNC: 2.5 G/DL (ref 1.9–3.5)
GLUCOSE SERPL-MCNC: 105 MG/DL (ref 65–99)
HCT VFR BLD AUTO: 39.9 % (ref 37–47)
HGB BLD-MCNC: 12.6 G/DL (ref 12–16)
IMM GRANULOCYTES # BLD AUTO: 0.03 K/UL (ref 0–0.11)
IMM GRANULOCYTES NFR BLD AUTO: 0.3 % (ref 0–0.9)
LYMPHOCYTES # BLD AUTO: 1.59 K/UL (ref 1–4.8)
LYMPHOCYTES NFR BLD: 16.2 % (ref 22–41)
MCH RBC QN AUTO: 30.8 PG (ref 27–33)
MCHC RBC AUTO-ENTMCNC: 31.6 G/DL (ref 33.6–35)
MCV RBC AUTO: 97.6 FL (ref 81.4–97.8)
MONOCYTES # BLD AUTO: 0.3 K/UL (ref 0–0.85)
MONOCYTES NFR BLD AUTO: 3.1 % (ref 0–13.4)
NEUTROPHILS # BLD AUTO: 7.82 K/UL (ref 2–7.15)
NEUTROPHILS NFR BLD: 79.5 % (ref 44–72)
NRBC # BLD AUTO: 0 K/UL
NRBC BLD-RTO: 0 /100 WBC
PLATELET # BLD AUTO: 426 K/UL (ref 164–446)
PMV BLD AUTO: 11.4 FL (ref 9–12.9)
POTASSIUM SERPL-SCNC: 4.7 MMOL/L (ref 3.6–5.5)
PROT SERPL-MCNC: 7.3 G/DL (ref 6–8.2)
RBC # BLD AUTO: 4.09 M/UL (ref 4.2–5.4)
SODIUM SERPL-SCNC: 139 MMOL/L (ref 135–145)
T4 SERPL-MCNC: 7.6 UG/DL (ref 4–12)
TSH SERPL DL<=0.005 MIU/L-ACNC: 1.44 UIU/ML (ref 0.38–5.33)
WBC # BLD AUTO: 9.8 K/UL (ref 4.8–10.8)

## 2021-11-03 ENCOUNTER — APPOINTMENT (OUTPATIENT)
Dept: BEHAVIORAL HEALTH | Facility: CLINIC | Age: 61
End: 2021-11-03
Payer: COMMERCIAL

## 2021-11-05 ENCOUNTER — TELEMEDICINE (OUTPATIENT)
Dept: BEHAVIORAL HEALTH | Facility: CLINIC | Age: 61
End: 2021-11-05
Payer: COMMERCIAL

## 2021-11-05 DIAGNOSIS — F33.1 MDD (MAJOR DEPRESSIVE DISORDER), RECURRENT EPISODE, MODERATE (HCC): ICD-10-CM

## 2021-11-05 PROCEDURE — 90837 PSYTX W PT 60 MINUTES: CPT | Mod: 95 | Performed by: PSYCHOLOGIST

## 2021-11-16 NOTE — PROGRESS NOTES
[facility]   Psychotherapy Progress Note      This visit was conducted via Zoom using secure and encrypted videoconferencing technology.  The patient was in a private location in the [facility].  The patient's identity was confirmed and verbal consent was obtained for this virtual visit.      Name: Debby Desai  MRN: [id number]  : 1960  Age: 61 y.o.  Date of assessment: 2021  PCP: ARELIS Chris  Persons in attendance: Patient  Total session time: 54 minutes      Topics addressed in psychotherapy include: Today's session covered the topic of depression and physical health.  Discussed how patient's mood is directly related to her physical issues and that she feels unstable both mentally and physically lately. Discussed strategies to help her start feeling better again. This included utilizing her support system and other resources. Therapist provided validation, support and feedback.  Therapist also provided a new tool for patient to utilize. The patient was encouraged to utilize the tool often at home and other settings.  There was no SI.        Objective Observations:   Participation:Active verbal participation   Grooming:Casual   Cognition:Alert and Fully Oriented   Eye Contact:Good   Mood:Euthymic   Affect:Congruent with content   Thought Process:Logical and Goal-directed   Speech:Rate within normal limits and Volume within normal limits    Current Risk:   Suicide: low   Homicide: low   Self-Harm: low   Relapse:    Safety Plan Needed:       Care Plan Updated: No    Does patient express agreement with the above plan? Yes     Diagnosis:  1. MDD (major depressive disorder), recurrent episode, moderate (HCC)        Follow up appointment scheduled? Yes       Lise Schwarz PsyD

## 2021-11-19 ENCOUNTER — TELEMEDICINE (OUTPATIENT)
Dept: BEHAVIORAL HEALTH | Facility: CLINIC | Age: 61
End: 2021-11-19
Payer: COMMERCIAL

## 2021-11-19 ENCOUNTER — OFFICE VISIT (OUTPATIENT)
Dept: PHYSICAL MEDICINE AND REHAB | Facility: MEDICAL CENTER | Age: 61
End: 2021-11-19
Payer: COMMERCIAL

## 2021-11-19 VITALS
HEIGHT: 66 IN | WEIGHT: 188.93 LBS | OXYGEN SATURATION: 97 % | SYSTOLIC BLOOD PRESSURE: 130 MMHG | HEART RATE: 82 BPM | BODY MASS INDEX: 30.36 KG/M2 | DIASTOLIC BLOOD PRESSURE: 70 MMHG | TEMPERATURE: 97.1 F

## 2021-11-19 DIAGNOSIS — M48.061 SPINAL STENOSIS OF LUMBAR REGION, UNSPECIFIED WHETHER NEUROGENIC CLAUDICATION PRESENT: ICD-10-CM

## 2021-11-19 DIAGNOSIS — F41.1 GAD (GENERALIZED ANXIETY DISORDER): ICD-10-CM

## 2021-11-19 DIAGNOSIS — M43.17 SPONDYLOLISTHESIS AT L5-S1 LEVEL: ICD-10-CM

## 2021-11-19 DIAGNOSIS — Z96.651 S/P TKR (TOTAL KNEE REPLACEMENT), RIGHT: ICD-10-CM

## 2021-11-19 DIAGNOSIS — M54.16 LUMBAR RADICULOPATHY: ICD-10-CM

## 2021-11-19 DIAGNOSIS — M17.12 PRIMARY OSTEOARTHRITIS OF LEFT KNEE: ICD-10-CM

## 2021-11-19 PROCEDURE — 99214 OFFICE O/P EST MOD 30 MIN: CPT | Performed by: PHYSICAL MEDICINE & REHABILITATION

## 2021-11-19 PROCEDURE — 90837 PSYTX W PT 60 MINUTES: CPT | Mod: 95 | Performed by: PSYCHOLOGIST

## 2021-11-19 RX ORDER — TRAMADOL HYDROCHLORIDE 50 MG/1
50 TABLET ORAL EVERY 8 HOURS PRN
Qty: 62 TABLET | Refills: 0 | Status: SHIPPED | OUTPATIENT
Start: 2021-12-19 | End: 2022-01-18

## 2021-11-19 RX ORDER — TRAMADOL HYDROCHLORIDE 50 MG/1
50 TABLET ORAL EVERY 8 HOURS PRN
Qty: 64 TABLET | Refills: 0 | Status: SHIPPED | OUTPATIENT
Start: 2021-11-19 | End: 2022-01-21 | Stop reason: SDUPTHER

## 2021-11-19 ASSESSMENT — PATIENT HEALTH QUESTIONNAIRE - PHQ9
SUM OF ALL RESPONSES TO PHQ QUESTIONS 1-9: 17
CLINICAL INTERPRETATION OF PHQ2 SCORE: 5
5. POOR APPETITE OR OVEREATING: 0 - NOT AT ALL

## 2021-11-19 ASSESSMENT — PAIN SCALES - GENERAL: PAINLEVEL: 4=SLIGHT-MODERATE PAIN

## 2021-11-19 ASSESSMENT — FIBROSIS 4 INDEX: FIB4 SCORE: 0.74

## 2021-11-19 NOTE — PROGRESS NOTES
"Follow-up patient note    Physiatry (physical medicine and  Rehabilitation), interventional spine and sports medicine    Date of Service:11/19/2021    Chief complaint:   Chief Complaint   Patient presents with   • Follow-Up     Back and leg pain        HISTORY    HPI: Debby Desai 61 y.o. female who presents today for follow-up evaluation of her pain complaints.     Since the last visit, she had a left shoulder injection.  From what she reports, she has had long-standing shoulder pain and this has been intermittent.  Aggravated after her fall June 2021.  Plan for referral to PT from [facility].  However, she has plans for having PT for her knees first, some \"weakness in her quads\" she was told.    Working with Dr. Stevenson on her depression and denies suicidal plan.     Low back pain is somewhat aching, pain is aching into the right leg, but manageable.  Pain is a 4/10 on the NRS.  Using heat at the end of the day.    Has not missed work this month.    Continuing gabapentin 400mg po tid.  No side effects   She had been doing well with reducing tramadol, but this has been more difficult recently.      Her SCS has not been working.  It sounds like the battery is not functional, but she has not contacted [email or web address] about this yet.  Right leg pain has been returning.    Continuing gabapentin 400mg po tid.  No side effects   She had been doing well with reducing tramadol, feels positive about this reduction.    Denies suicidality.  PHQ-9: 17     By history:   She reports that she had surgery on 09/16/2019.  This was a right total knee arthroplasty for osteoarthritis with Dr. Saba.    In 2001, she had discectomy.  She reports that she has had a spinal cord stimulator placed, but she has not been using this as much.  She reports that the unit is not currently working.    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  She gets up about once an hour.  She has a sit to stand " desk, but it is difficult to stand much.  Currently, she is working from home due to the COVID-19 pandemic.    Medical records review:  ED records from 10/07/2020 and 09/22/2020 reviewed.  ED visit from [id number].  Negative head CT.  She was given instructions about taking ibuprofen and tylenol.  Noted that she was also given a script for valium.     I reviewed the note from the referring provider Loy Miles M.D. dated 01/08/2020: Visits included nausea/epigastric pain, hypertension, chronic knee pain, IGT, dyslipidemia, MDD, hypothyroidism, iron deficiency    Previous treatments:    Physical Therapy: Yes    Medications the patient is tried: tramadol, tylenol (usually for a headache); in the past she took gabapentin 400mg po tid, she was taking vimovo and norco in the past; lyrica caused weight gain    Previous interventions: She had radiofrequency ablation in the past, prior to surgery    Previous surgeries to relieve the above pain:  None other than surgery 09/16/2019      ROS: Unchanged, see HPI  Gen: weight loss  Eyes: vision problems, glasses and contacts  CV: chest pain/anxiety  GI: nausea, ulcers  : urgency  Psych: depression  Endo: thyroid problems  Heme: anemia    Red Flags ROS:   Fever, Chills, Sweats: Denies  Involuntary Weight Loss: Denies  Bladder Incontinence: Denies  Bowel Incontinence: Denies  Saddle Anesthesia: Denies    All other systems reviewed and negative.       PMHx:   Past Medical History:   Diagnosis Date   • Anemia     in past   • Anginal syndrome (HCC)     had work up and feet r/t anxiety   • Anxiety    • Arthritis     OA knees   • Chronic pain 6/2/2021   • Dental disorder 5/24/12    partial upper denture   • Diabetes (HCC)     pre diabetic   • High cholesterol    • HTN (hypertension), benign 3/19/2012   • Hypothyroid 3/19/2012   • Major depression 3/19/2012   • Orbital floor (blow-out) closed fracture (HCC)     left   • Other specified symptom associated with female genital organs      post menopausal bleeding   • Pain     thoracic spine, Right knee, myofacial pain, and Right shoulder   • Pain     recently fell and has bruise left forehead   • Pain 02/2018    chronic back pain, right shoulder   • Psychiatric problem     depression, anxiety   • Unspecified disorder of thyroid    • Urinary bladder disorder     stress incont.   • Urinary incontinence     Occasional       PSHx:   Past Surgical History:   Procedure Laterality Date   • INJ,EPI ANES/STER LUM/SAC ADDL Right 4/7/2021    Procedure: RIGHT lumbar five and sacral one transforaminal epidural;  Surgeon: Volodymyr Herring M.D.;  [facility];  Service: Pain Management   • PB TOTAL KNEE ARTHROPLASTY Right 9/16/2019    Procedure: RIGHT ARTHROPLASTY, KNEE, TOTAL;  Surgeon: Rufus Saba M.D.;  Location: [facility];  Service: Orthopedics   • KNEE ARTHROSCOPY Right 2/9/2018    Procedure: KNEE ARTHROSCOPY;  Surgeon: Harsh Baltazar M.D.;  Location: [facility];  Service: Orthopedics   • MEDIAL MENISCECTOMY Right 2/9/2018    Procedure: MEDIAL AND LATERAL MENISCECTOMY- PARTIAL;  Surgeon: Harsh Baltazar M.D.;  Location: [facility];  Service: Orthopedics   • KNEE ARTHROSCOPY Right 7/12/2017    Procedure: KNEE ARTHROSCOPY- CESPEDES;  Surgeon: Harsh Baltazar M.D.;  Location: [facility];  Service:    • MEDIAL MENISCECTOMY Right 7/12/2017    Procedure: PARTIAL MEDIAL AND LATERAL MENISCECTOMY;  Surgeon: Harsh Baltazar M.D.;  Location: [facility];  Service:    • SYNOVECTOMY Right 7/12/2017    Procedure: SYNOVECTOMY;  Surgeon: Harsh Baltazar M.D.;  Location: [facility];  Service:    • KNEE ARTHROSCOPY  4/21/2015    Performed by Rufus Saba M.D. at [facility]   • MEDIAL MENISCECTOMY  4/21/2015    Performed by Rufus Saba M.D. at [facility]   • PUMP REVISION  12/10/2012    Performed by  "Allison Guerra M.D. at SUR[facility]S   • SPINAL CORD STIMULATOR  5/30/2012    Performed by ALLISON GUERRA at SUR[facility]ERY [facility]   • BIOPSY GENERAL  5/1/12    endometrial   • SPINAL CORD STIMULATOR  7/11/2011    Performed by ALLISON GUERRA at SUR[facility]   • BLOCK EPIDURAL STEROID INJECTION  2008    x 3-4   • LYMPH NODE EXCISION Left 2007    cervicle   • OTHER Right 2001    thoracic discectomy     • ARTHROSCOPY, KNEE Left 1999   • RIB RESECTION Right 1980   • LUMPECTOMY         Family history   Family History   Problem Relation Age of Onset   • Hypertension Father    • Diabetes Father    • Heart Disease Father    • Alcohol abuse Father    • Anesthesia Sister         slow to come out (as a child)   • Diabetes Other    • Heart Disease Other    • Cancer Other    • Hypertension Other    • Stroke Other    • Lung Disease Other          Medications:   Current Outpatient Medications   Medication   • traMADol (ULTRAM) 50 MG Tab   • [START ON 12/19/2021] traMADol (ULTRAM) 50 MG Tab   • levothyroxine (SYNTHROID) 50 MCG Tab   • buPROPion (WELLBUTRIN XL) 300 MG XL tablet   • hydrOXYzine pamoate (VISTARIL) 100 MG Cap   • venlafaxine (EFFEXOR-XR) 150 MG extended-release capsule   • metFORMIN ER (GLUCOPHAGE XR) 500 MG TABLET SR 24 HR   • melatonin 5 mg Tab   • atorvastatin (LIPITOR) 20 MG Tab   • lisinopril-hydrochlorothiazide (PRINZIDE) 20-12.5 MG per tablet   • ergocalciferol (DRISDOL) 78227 UNIT capsule   • ibuprofen (MOTRIN) 200 MG Tab   • Ferrous Sulfate (IRON) 325 (65 Fe) MG Tab   • acetaminophen (TYLENOL) 500 MG Tab     No current facility-administered medications for this visit.       Allergies:   Allergies   Allergen Reactions   • Sulfamethoxazole-Trimethoprim Rash and Swelling     Pt states \"My hand swells up and rash all over body\".       Social Hx:   Social History     Socioeconomic History   • Marital status: Single     Spouse name: Not on file   • Number of children: Not on file "   • Years of education: Not on file   • Highest education level: Associate degree: academic program   Occupational History   • Not on file   Tobacco Use   • Smoking status: Never Smoker   • Smokeless tobacco: Never Used   Vaping Use   • Vaping Use: Never used   Substance and Sexual Activity   • Alcohol use: Not Currently     Alcohol/week: 0.0 oz     Comment: 1 glass wine per month   • Drug use: No   • Sexual activity: Never     Partners: Male   Other Topics Concern   •  Service No   • Blood Transfusions No   • Caffeine Concern No   • Occupational Exposure No   • Hobby Hazards No   • Sleep Concern Yes   • Stress Concern Yes   • Weight Concern Yes   • Special Diet No   • Back Care No   • Exercise No   • Bike Helmet No   • Seat Belt Yes   • Self-Exams No   Social History Narrative   • Not on file     Social Determinants of Health     Financial Resource Strain: Low Risk    • Difficulty of Paying Living Expenses: Not hard at all   Food Insecurity: No Food Insecurity   • Worried About Running Out of Food in the Last Year: Never true   • Ran Out of Food in the Last Year: Never true   Transportation Needs: No Transportation Needs   • Lack of Transportation (Medical): No   • Lack of Transportation (Non-Medical): No   Physical Activity: Inactive   • Days of Exercise per Week: 1 day   • Minutes of Exercise per Session: 0 min   Stress: Stress Concern Present   • Feeling of Stress : Rather much   Social Connections: Unknown   • Frequency of Communication with Friends and Family: Not on file   • Frequency of Social Gatherings with Friends and Family: Twice a week   • Attends Gnosticist Services: Never   • Active Member of Clubs or Organizations: No   • Attends Club or Organization Meetings: Never   • Marital Status: Never    Intimate Partner Violence:    • Fear of Current or Ex-Partner: Not on file   • Emotionally Abused: Not on file   • Physically Abused: Not on file   • Sexually Abused: Not on file   Housing  "Stability: Unknown   • Unable to Pay for Housing in the Last Year: No   • Number of Places Lived in the Last Year: 1   • Unstable Housing in the Last Year: Not on file         EXAMINATION     Physical Exam:   Vitals: /70 (BP Location: Right arm, Patient Position: Sitting, BP Cuff Size: Small adult)   Pulse 82   Temp 36.2 °C (97.1 °F) (Temporal)   Ht 1.676 m (5' 6\")   Wt 85.7 kg (188 lb 15 oz)   SpO2 97%     Constitutional:   Body Habitus: Body mass index is 30.49 kg/m².  Cooperation: Fully cooperates with exam  Appearance: Well-groomed, well-nourished, not disheveled no acute distress    Eyes: No scleral icterus, no proptosis     ENT -no obvious auditory deficits, wearing a face mask    Skin -no visible skin lesions    Respiratory-  breathing comfortable on room air, no audible wheezing    Cardiovascular- No lower extremity edema is noted.     Psychiatric- alert and oriented ×3. Normal affect.     Gait -  Minimally antalgic without assist device    Neuro/Muscloskeletal  No focal motor or sensory deficits in the lower extremities bilaterally    Reflexes are 2+ left patella and 2+ achilles bilaterally    Functional ROM of the right knee, flexion greater than 120 degrees is maintained    MEDICAL DECISION MAKING    Medical records review: see under HPI section.     DATA    Labs:     03/14/2020 CRP 0.19  03/14/2020 Sed rate 20  02/25/2020: Tramadol and metabolites  09/21/2021 UDS consistent with tramadol and metabolites    Lab Results   Component Value Date/Time    SODIUM 139 10/27/2021 01:00 PM    POTASSIUM 4.7 10/27/2021 01:00 PM    CHLORIDE 99 10/27/2021 01:00 PM    CO2 27 10/27/2021 01:00 PM    ANION 13.0 10/27/2021 01:00 PM    GLUCOSE 105 (H) 10/27/2021 01:00 PM    BUN 18 10/27/2021 01:00 PM    CREATININE 0.82 10/27/2021 01:00 PM    CALCIUM 8.9 10/27/2021 01:00 PM    ASTSGOT 18 10/27/2021 01:00 PM    ALTSGPT 13 10/27/2021 01:00 PM    TBILIRUBIN 0.2 10/27/2021 01:00 PM    ALBUMIN 4.8 10/27/2021 01:00 PM    " TOTPROTEIN 7.3 10/27/2021 01:00 PM    GLOBULIN 2.5 10/27/2021 01:00 PM    AGRATIO 1.9 10/27/2021 01:00 PM       Lab Results   Component Value Date/Time    PROTHROMBTM 12.6 10/07/2020 10:45 PM    INR 0.91 10/07/2020 10:45 PM        Lab Results   Component Value Date/Time    WBC 9.8 10/27/2021 01:00 PM    RBC 4.09 (L) 10/27/2021 01:00 PM    HEMOGLOBIN 12.6 10/27/2021 01:00 PM    HEMATOCRIT 39.9 10/27/2021 01:00 PM    MCV 97.6 10/27/2021 01:00 PM    MCH 30.8 10/27/2021 01:00 PM    MCHC 31.6 (L) 10/27/2021 01:00 PM    MPV 11.4 10/27/2021 01:00 PM    NEUTSPOLYS 79.50 (H) 10/27/2021 01:00 PM    LYMPHOCYTES 16.20 (L) 10/27/2021 01:00 PM    MONOCYTES 3.10 10/27/2021 01:00 PM    EOSINOPHILS 0.60 10/27/2021 01:00 PM    BASOPHILS 0.30 10/27/2021 01:00 PM        Lab Results   Component Value Date/Time    HBA1C 5.8 (H) 06/05/2021 07:42 AM        Imaging: I personally reviewed following images, these are my reads  CT cervical spine 09/22/2020  There is note of cervical spondylosis with multilevel uncovertebral arthropathy    Xray right hip 03/14/2020  There is mild osteoarthritis of the right hip.      Xray lumbar 03/14/2020  There is mild anterolisthesis at L5-S1.  No abnormal motion on flexion and extension  There is DDD and facet arthropathy that is most noted at L5-S1 and less at L4-5    Xray right knee 09/16/2019  There is note of right knee arthroplasty    IMAGING radiology reads. I reviewed the following radiology reads    Xray right knee 09/18/2021  Multiple standing views of the right knee show previous right total knee   replacement with hardware in good position without signs of loosening or   dislocation.  No obvious fracture noted.    Xray left shoulder 10/27/2021  X-rays reviewed today of the left shoulder show normal overall bony   alignment she has some AC degenerative change.  She has some osteophyte   formation off the inferior humeral head.  Some mild glenohumeral joint   space narrowing patient.  Type III  acromion.    CT cervical spine 09/22/2020  IMPRESSION:  Degenerative change without evidence of cervical spine fracture.    Xray lumbar 03/14/2020  IMPRESSION:  1.  No compression deformity or acute fracture is identified.  2.  Mild anterolisthesis at L5-S1.  3.  Degenerative disc disease and facet arthropathy.  4.  No focal instability noted on flexion extension views.                                                Xray right hip 03/14/2020  IMPRESSION:     1.  No radiographic evidence of acute traumatic injury.     2.  Findings are consistent with mild osteoarthritis.     3.  Probable constipation.                                   Results for orders placed during the hospital encounter of 09/16/19   DX-KNEE 2- RIGHT    Impression Right knee arthroplasty.      Results for orders placed during the hospital encounter of 03/28/19   DX-KNEE 3 VIEWS RIGHT    Impression No evidence of acute fracture or dislocation.    Degenerative changes as above described.                              Diagnosis   Visit Diagnoses     ICD-10-CM   1. Spondylolisthesis at L5-S1 level  M43.17   2. Spinal stenosis of lumbar region, unspecified whether neurogenic claudication present  M48.061   3. Primary osteoarthritis of left knee  M17.12   4. S/P TKR (total knee replacement), right  Z96.651   5. Lumbar radiculopathy  M54.16           ASSESSMENT:  Debby Desai 61 y.o. female seen for above.     Debby was seen today for follow-up.    Diagnoses and all orders for this visit:    Spondylolisthesis at L5-S1 level  -     traMADol (ULTRAM) 50 MG Tab; Take 1 Tablet by mouth every 8 hours as needed for Moderate Pain or Severe Pain for up to 30 days.  -     traMADol (ULTRAM) 50 MG Tab; Take 1 Tablet by mouth every 8 hours as needed for Moderate Pain or Severe Pain for up to 30 days.  -     Consent for Opiate Prescription  -     Controlled Substance Treatment Agreement    Spinal stenosis of lumbar region, unspecified whether neurogenic  claudication present    Primary osteoarthritis of left knee    S/P TKR (total knee replacement), right  -     traMADol (ULTRAM) 50 MG Tab; Take 1 Tablet by mouth every 8 hours as needed for Moderate Pain or Severe Pain for up to 30 days.  -     traMADol (ULTRAM) 50 MG Tab; Take 1 Tablet by mouth every 8 hours as needed for Moderate Pain or Severe Pain for up to 30 days.  -     Consent for Opiate Prescription  -     Controlled Substance Treatment Agreement    Lumbar radiculopathy  -     traMADol (ULTRAM) 50 MG Tab; Take 1 Tablet by mouth every 8 hours as needed for Moderate Pain or Severe Pain for up to 30 days.  -     traMADol (ULTRAM) 50 MG Tab; Take 1 Tablet by mouth every 8 hours as needed for Moderate Pain or Severe Pain for up to 30 days.  -     Consent for Opiate Prescription  -     Controlled Substance Treatment Agreement         1. Discussed that she will follow-up with [employer] rep.  We will consider options for using her SCS and whether or not the battery needs replacement and if the leads are also MRI incompatible.  2. Continue with plans for PT as ordered by ortho.  3. Discussed that she is continuing to work with Psychologist and Psychiatrist, Dr. Stevenson.  4.  reviewed.  UDS reviewed from 09/21/2021, as consistent.  5. Continue tramadol wean.  #64 and #62 for the next two months.       Follow-up: Return in about 2 months (around 1/19/2022).      Thank you very much for asking me to participate in Debby Desai's care.  Please contact me with any questions or concerns.      Please note that this dictation was created using voice recognition software. I have made every reasonable attempt to correct obvious errors but there may be errors of grammar and content that I may have overlooked prior to finalization of this note.      Volodymyr Herring MD  Physical Medicine and Rehabilitation  Interventional Spine and Sports Physiatry  [facility]     CC: Lara Garcia, HUBER

## 2021-11-22 PROCEDURE — RXMED WILLOW AMBULATORY MEDICATION CHARGE: Performed by: PHYSICAL MEDICINE & REHABILITATION

## 2021-11-24 ENCOUNTER — PHARMACY VISIT (OUTPATIENT)
Dept: PHARMACY | Facility: MEDICAL CENTER | Age: 61
End: 2021-11-24
Payer: COMMERCIAL

## 2021-11-24 PROCEDURE — RXMED WILLOW AMBULATORY MEDICATION CHARGE: Performed by: PSYCHIATRY & NEUROLOGY

## 2021-11-29 NOTE — PROGRESS NOTES
.[place]  [facility]   Psychotherapy Progress Note      This visit was conducted via Zoom using secure and encrypted videoconferencing technology.  The patient was in a private location in the [facility].  The patient's identity was confirmed and verbal consent was obtained for this virtual visit.      Name: Debby Desai  MRN: [id number]  : 1960  Age: 61 y.o.  Date of assessment: 2021  PCP: ARELIS Chris  Persons in attendance: Patient  Total session time: 54 minutes      Topics addressed in psychotherapy include: Today's session covered the topic of anxiety, anxiety related triggers and stressors, and ways to become aware of anxiety. Discussed how this applies to decision making and communication.  Therapist provided validation, support and feedback.  Therapist also provided a new tool for patient to utilize. The patient was encouraged to utilize the tool often at home and other settings.  There was no SI.      Objective Observations:   Participation:Active verbal participation   Grooming:Casual   Cognition:Alert and Fully Oriented   Eye Contact:Good   Mood:Euthymic   Affect:Congruent with content   Thought Process:Logical and Goal-directed   Speech:Rate within normal limits and Volume within normal limits    Current Risk:   Suicide: low   Homicide: low   Self-Harm: low   Relapse:    Safety Plan Needed:       Care Plan Updated: No    Does patient express agreement with the above plan? Yes     Diagnosis:  1. TIM (generalized anxiety disorder)        Follow up appointment scheduled? Yes       Lise Schwarz PsyD

## 2021-12-02 ENCOUNTER — TELEMEDICINE (OUTPATIENT)
Dept: BEHAVIORAL HEALTH | Facility: CLINIC | Age: 61
End: 2021-12-02
Payer: COMMERCIAL

## 2021-12-02 DIAGNOSIS — F33.2 SEVERE EPISODE OF RECURRENT MAJOR DEPRESSIVE DISORDER, WITHOUT PSYCHOTIC FEATURES (HCC): ICD-10-CM

## 2021-12-02 DIAGNOSIS — F33.1 MDD (MAJOR DEPRESSIVE DISORDER), RECURRENT EPISODE, MODERATE (HCC): ICD-10-CM

## 2021-12-02 DIAGNOSIS — G47.09 OTHER INSOMNIA: ICD-10-CM

## 2021-12-02 DIAGNOSIS — R25.1 TREMORS OF NERVOUS SYSTEM: ICD-10-CM

## 2021-12-02 DIAGNOSIS — F41.1 GAD (GENERALIZED ANXIETY DISORDER): ICD-10-CM

## 2021-12-02 PROCEDURE — RXMED WILLOW AMBULATORY MEDICATION CHARGE: Performed by: PSYCHIATRY & NEUROLOGY

## 2021-12-02 PROCEDURE — 90833 PSYTX W PT W E/M 30 MIN: CPT | Mod: 95 | Performed by: PSYCHIATRY & NEUROLOGY

## 2021-12-02 PROCEDURE — 99214 OFFICE O/P EST MOD 30 MIN: CPT | Mod: 95 | Performed by: PSYCHIATRY & NEUROLOGY

## 2021-12-02 RX ORDER — BUPROPION HYDROCHLORIDE 300 MG/1
300 TABLET ORAL EVERY MORNING
Qty: 30 TABLET | Refills: 1 | Status: SHIPPED | OUTPATIENT
Start: 2021-12-02 | End: 2021-12-29 | Stop reason: SDUPTHER

## 2021-12-02 RX ORDER — HYDROXYZINE PAMOATE 100 MG
100 CAPSULE ORAL NIGHTLY PRN
Qty: 30 CAPSULE | Refills: 1 | Status: SHIPPED | OUTPATIENT
Start: 2021-12-02 | End: 2021-12-29 | Stop reason: SDUPTHER

## 2021-12-02 RX ORDER — VENLAFAXINE HYDROCHLORIDE 150 MG/1
150 CAPSULE, EXTENDED RELEASE ORAL DAILY
Qty: 30 CAPSULE | Refills: 1 | Status: SHIPPED | OUTPATIENT
Start: 2021-12-02 | End: 2021-12-29 | Stop reason: SDUPTHER

## 2021-12-02 RX ORDER — CHOLECALCIFEROL (VITAMIN D3) 125 MCG
5 CAPSULE ORAL
Qty: 30 TABLET | Refills: 1 | Status: SHIPPED | OUTPATIENT
Start: 2021-12-02 | End: 2022-04-20

## 2021-12-02 RX ORDER — PROPRANOLOL HYDROCHLORIDE 10 MG/1
10 TABLET ORAL 2 TIMES DAILY
Qty: 90 TABLET | Refills: 1 | Status: SHIPPED | OUTPATIENT
Start: 2021-12-02 | End: 2021-12-29

## 2021-12-02 NOTE — PROGRESS NOTES
This evaluation was conducted via Zoom using secure and encrypted videoconferencing technology. The patient was in a private location in the state of [place].    The patient's identity was confirmed and verbal consent was obtained for this virtual visit.     PSYCHIATRY FOLLOW-UP NOTE      Name: Debby Desai  MRN: [id number]  : 1960  Age: 61 y.o.  Date of assessment: 2021  PCP: ARELIS Chris  Persons in attendance: Patient      REASON FOR VISIT/CHIEF COMPLAINT (as stated by Patient):  Debby Desai is a 61 y.o., White female, attending follow-up appointment for mood and anxiety management.      HISTORY OF PRESENT ILLNESS:  Debby Desai is a 61 y.o. old female with MDD, TIM and insomnia comes in today for follow up. Patient was last seen 2 months ago, and following treatment planning recommendations were done:  · Continue Effexor  mg daily for mood, anxiety and comorbid pain management. 30 days supply given with 1 refill.  · Continue Wellbutrin XL to 300 mg daily for depression augmentation.  30-day supply with 1 refill given.  · Continue hydroxyzine (vistaril) to 100 mg HS + melatonin 5 mg HS as needed for insomnia.  30 tabs with 1 refill given.  · Continue psychotherapy in the clinic for mood and anxiety management.    Patient is compliant with medications and reports slow improvement in mood but persistence of anxiety.  She reached out to me on my chart with sedation symptoms and agreed with plan of moving Effexor to after dinner, which was helpful.  Patient does have mild shakes which we discussed could likely be from the mild serotonin syndrome effect with Effexor and tramadol.    Patient is working with her pain management physician and dose of tramadol is reduced drastically but denies any changes in the shakes.  Her family also noticed her shakes during Thanksgiving dinner.  We discussed the likelihood of serotonin syndrome but patient denies any association with initiation  of Effexor and agreed with plan of adding propanolol for shakes and getting neurology referral as well.     Note from intake evaluation on 5/13/21:  Patient is following with her primary care physician for management of depression with following medication combination: wellbutrin 150 mg daily to cymbalta 60 mg daily.  Patient reports struggling with anxiety and depression since early childhood and attributes this to multiple losses in family during childhood and describes last 7 years as difficult as her sister was diagnosed with glioblastoma and is not doing      PSYCHOTHERAPY ASPECT OF SESSION (16 MIN):  • Most part of the session was dedicated to letting patient express her feelings of frustration due to multiple changes in the appointments.  Validation was provided for a few responses.  • Importance of monitoring for triggers that can destabilize mood and anxiety was emphasized.  • Most session was dedicated to active listening and implementing supportive psychotherapy skills.      CURRENT MEDICATIONS:  Current Outpatient Medications   Medication Sig Dispense Refill   • traMADol (ULTRAM) 50 MG Tab Take 1 Tablet by mouth every 8 hours as needed for Moderate Pain or Severe Pain for up to 30 days. 64 Tablet 0   • [START ON 12/19/2021] traMADol (ULTRAM) 50 MG Tab Take 1 Tablet by mouth every 8 hours as needed for Moderate Pain or Severe Pain for up to 30 days. 62 Tablet 0   • levothyroxine (SYNTHROID) 50 MCG Tab Take 1 tablet by mouth Every morning on an empty stomach. 90 Tablet 3   • buPROPion (WELLBUTRIN XL) 300 MG XL tablet Take 1 tablet by mouth every morning. 30 Tablet 1   • hydrOXYzine pamoate (VISTARIL) 100 MG Cap Take 1 capsule by mouth at bedtime as needed (sleep). 30 Capsule 1   • venlafaxine (EFFEXOR-XR) 150 MG extended-release capsule Take 1 capsule by mouth every day. 30 Capsule 1   • metFORMIN ER (GLUCOPHAGE XR) 500 MG TABLET SR 24 HR Take 1 Tab by mouth 2 times a day. 180 Tablet 2   • melatonin 5 mg  Tab Take 1 tablet by mouth at bedtime. 30 Tablet 1   • atorvastatin (LIPITOR) 20 MG Tab TAKE ONE TABLET BY MOUTH EVERY DAY 90 Tablet 2   • lisinopril-hydrochlorothiazide (PRINZIDE) 20-12.5 MG per tablet Take one tablet by mouth daily 90 Tablet 2   • ergocalciferol (DRISDOL) 51000 UNIT capsule Take 1 capsule by mouth every 7 days. 12 capsule 0   • ibuprofen (MOTRIN) 200 MG Tab Take 600 mg by mouth every 6 hours as needed for Inflammation.     • Ferrous Sulfate (IRON) 325 (65 Fe) MG Tab Take 65 mg by mouth every day at 6 PM.     • acetaminophen (TYLENOL) 500 MG Tab Take 500-1,000 mg by mouth 2 times a day as needed for Moderate Pain.       No current facility-administered medications for this visit.       MEDICAL HISTORY  Past Medical History:   Diagnosis Date   • Anemia     in past   • Anginal syndrome (HCC)     had work up and feet r/t anxiety   • Anxiety    • Arthritis     OA knees   • Chronic pain 6/2/2021   • Dental disorder 5/24/12    partial upper denture   • Diabetes (HCC)     pre diabetic   • High cholesterol    • HTN (hypertension), benign 3/19/2012   • Hypothyroid 3/19/2012   • Major depression 3/19/2012   • Orbital floor (blow-out) closed fracture (HCC)     left   • Other specified symptom associated with female genital organs     post menopausal bleeding   • Pain     thoracic spine, Right knee, myofacial pain, and Right shoulder   • Pain     recently fell and has bruise left forehead   • Pain 02/2018    chronic back pain, right shoulder   • Psychiatric problem     depression, anxiety   • Unspecified disorder of thyroid    • Urinary bladder disorder     stress incont.   • Urinary incontinence     Occasional     Past Surgical History:   Procedure Laterality Date   • INJ,EPI ANES/STER LUM/SAC ADDL Right 4/7/2021    Procedure: RIGHT lumbar five and sacral one transforaminal epidural;  Surgeon: Volodymyr Herring M.D.;  [facility];  Service: Pain Management   • PB TOTAL KNEE  ARTHROPLASTY Right 9/16/2019    Procedure: RIGHT ARTHROPLASTY, KNEE, TOTAL;  Surgeon: Rufus Saba M.D.;  Location: [facility];  Service: Orthopedics   • KNEE ARTHROSCOPY Right 2/9/2018    Procedure: KNEE ARTHROSCOPY;  Surgeon: Harsh Baltazar M.D.;  Location: [facility];  Service: Orthopedics   • MEDIAL MENISCECTOMY Right 2/9/2018    Procedure: MEDIAL AND LATERAL MENISCECTOMY- PARTIAL;  Surgeon: Harsh Baltazar M.D.;  Location: [facility];  Service: Orthopedics   • KNEE ARTHROSCOPY Right 7/12/2017    Procedure: KNEE ARTHROSCOPY- CESPEDES;  Surgeon: Harsh Baltazar M.D.;  Location: [facility];  Service:    • MEDIAL MENISCECTOMY Right 7/12/2017    Procedure: PARTIAL MEDIAL AND LATERAL MENISCECTOMY;  Surgeon: Harsh Baltazar M.D.;  Location: [facility];  Service:    • SYNOVECTOMY Right 7/12/2017    Procedure: SYNOVECTOMY;  Surgeon: Harsh Baltazar M.D.;  Location: [facility];  Service:    • KNEE ARTHROSCOPY  4/21/2015    Performed by Rufus Saba M.D. at [facility]   • MEDIAL MENISCECTOMY  4/21/2015    Performed by Rufus Saba M.D. at [facility]   • PUMP REVISION  12/10/2012    Performed by Allison Guerra M.D. at [facility]   • SPINAL CORD STIMULATOR  5/30/2012    Performed by ALLISON GUERRA at [facility]   • BIOPSY GENERAL  5/1/12    endometrial   • SPINAL CORD STIMULATOR  7/11/2011    Performed by ALLISON GUERRA at [facility]   • BLOCK EPIDURAL STEROID INJECTION  2008    x 3-4   • LYMPH NODE EXCISION Left 2007    cervicle   • OTHER Right 2001    thoracic discectomy     • ARTHROSCOPY, KNEE Left 1999   • RIB RESECTION Right 1980   • LUMPECTOMY         PAST PSYCHIATRIC HISTORY  Prior psychiatric hospitalization: no  Prior Self harm/suicide attempt: no  Prior Diagnosis: MDD, Anxiety     PAST PSYCHIATRIC  MEDICATIONS  · Fluoxetine  · Paroxetine  · Citalopram  · Sertraline  · Duloxetine  · Wellbutrin  · Amitriptyline   · Ativan      FAMILY HISTORY  Psychiatric diagnosis: Nieces with depression and anxiety  History of suicide attempts: No  Substance abuse history: 1 sister with drug and alcohol abuse history     SUBSTANCE USE HISTORY:  ALCOHOL: no  TOBACCO: no  CANNABIS: no  OPIOIDS: no  PRESCRIPTION MEDICATIONS: no  OTHERS: no  History of inpatient/outpatient rehab treatment: none     SOCIAL HISTORY  Childhood: born in [place] and describes childhood as difficult  Moved a lot due to parents financial concerns  Employment: [employer]  Relationship: single (never )  Kids: no kids  Current living situation: alone (but strong family support as they all live nearby)  Current/past legal issues: denies  History of emotional/physical/sexual abuse - denies      REVIEW OF SYSTEMS:        Constitutional  fatigue; chronic pain   Eyes negative   Ears/Nose/Mouth/Throat negative   Cardiovascular  hypertension, hyperlipidemia   Respiratory negative   Gastrointestinal negative   Genitourinary negative   Muscular  osteoarthritis   Integumentary negative   Neurological negative   Endocrine  hypothyroidism   Hematologic/Lymphatic negative     PHYSICAL EXAMINAION:  Vital signs: [facility] 02/26/2012   Musculoskeletal: Normal gait.   Abnormal movements: none      MENTAL STATUS EXAMINATION      General:   - Grooming and hygiene: Casual,   - Apparent distress: tense,   - Behavior: Tense  - Eye Contact:  Good,   - no psychomotor agitation or retardation    - Participation: Active verbal participation  Orientation: Alert and Fully Oriented to person, place and time  Mood: Anxious  Affect: Full range,  Thought Process: Logical  Thought Content: Denies suicidal or homicidal ideations, intent or plan Within normal limits  Perception: Denies auditory or visual hallucinations. No delusions noted Within normal limits  Attention span and  concentration: Intact   Speech:Rate within normal limits and Volume within normal limits  Language: Appropriate   Insight: Good  Judgment: Good  Recent and remote memory: No gross evidence of memory deficits        DEPRESSION SCREENING:  Depression Screen (PHQ-2/PHQ-9) 7/20/2021 9/21/2021 11/19/2021   PHQ-2 Total Score - - -   PHQ-2 Total Score - - -   PHQ-2 Total Score 4 4 5   PHQ-9 Total Score - - -   PHQ-9 Total Score 13 13 17       Interpretation of PHQ-9 Total Score   Score Severity   1-4 No Depression   5-9 Mild Depression   10-14 Moderate Depression   15-19 Moderately Severe Depression   20-27 Severe Depression    CURRENT RISK:       Suicidal: Low       Homicidal: Low       Self-Harm: Low       Relapse: Low       Crisis Safety Plan Reviewed Not Indicated       If evidence of imminent risk is present, intervention/plan:      MEDICAL RECORDS/LABS/DIAGNOSTIC TESTS REVIEWED:  No new lab since last visit     [facility] records -   Reviewed     DIAGNOSTIC IMPRESSION(S):  1. Major depressive disorder, recurrent  2. Generalized anxiety disorder  3. Insomnia        PLAN:  (1) Major depressive disorder; (2) TIM; (3) Insomnia  · Slow improvement but persistence of anxiety and tremors  · Continue Effexor  mg daily for mood, anxiety and comorbid pain management. 30 days supply given with 1 refill.  · Continue Wellbutrin XL to 300 mg daily for depression augmentation.  30-day supply with 1 refill given.  · Continue hydroxyzine (vistaril) to 100 mg HS + melatonin 5 mg HS as needed for insomnia.  30 tabs with 1 refill given.  · Add propanolol 10 mg twice daily for 2-week and then increase to 20 mg twice daily if no improvement in anxiety and shake is noted.  90 tabs with 1 refill given.  · Initiate referral to neurology for evaluation of persistent tremors.  · Continue psychotherapy in the clinic for mood and anxiety management.  · Medication options, alternatives (including no medications) and medication  risks/benefits/side effects were discussed in detail.  · Explained importance of contraceptive measures while on psychotropic medications, educated to let provider know if ever pregnant or wanting to become pregnant. Verbalized understanding.  · The patient was advised to call, message provider on MyChart, or come in to the clinic if symptoms worsen or if any future questions/issues regarding their medications arise; the patient verbalized understanding and agreement.    · The patient was educated to call 911, call the suicide hotline, or go to local ER if having thoughts of suicide or homicide; verbalized understanding.       Billing Coding based on:  04703: based on [facility]  41485: based on psychotherapy timing    Return to clinic in 4 weeks or sooner if symptoms worsen.  Next Appointment: instruction provided on how to make the next appointment.     The proposed treatment plan was discussed with the patient who was provided the opportunity to ask questions and make suggestions regarding alternative treatment. Patient verbalized understanding and expressed agreement with the plan.       Genaro Stevenson M.D.  12/02/21    This note was created using voice recognition software (Dragon). The accuracy of the dictation is limited by the abilities of the software. I have reviewed the note prior to signing, however some errors in grammar and context are still possible. If you have any questions related to this note please do not hesitate to contact our office.

## 2021-12-08 ENCOUNTER — PHARMACY VISIT (OUTPATIENT)
Dept: PHARMACY | Facility: MEDICAL CENTER | Age: 61
End: 2021-12-08
Payer: COMMERCIAL

## 2021-12-14 ENCOUNTER — PHYSICAL THERAPY (OUTPATIENT)
Dept: PHYSICAL THERAPY | Facility: REHABILITATION | Age: 61
End: 2021-12-14
Attending: ORTHOPAEDIC SURGERY
Payer: COMMERCIAL

## 2021-12-14 DIAGNOSIS — M25.561 PAIN IN BOTH KNEES, UNSPECIFIED CHRONICITY: ICD-10-CM

## 2021-12-14 DIAGNOSIS — M25.562 PAIN IN BOTH KNEES, UNSPECIFIED CHRONICITY: ICD-10-CM

## 2021-12-14 PROCEDURE — 97110 THERAPEUTIC EXERCISES: CPT

## 2021-12-14 PROCEDURE — 97161 PT EVAL LOW COMPLEX 20 MIN: CPT

## 2021-12-14 SDOH — ECONOMIC STABILITY: GENERAL: QUALITY OF LIFE: FAIR

## 2021-12-14 ASSESSMENT — ENCOUNTER SYMPTOMS
PAIN SCALE AT LOWEST: 3
PAIN SCALE AT HIGHEST: 8
PAIN SCALE: 4

## 2021-12-14 NOTE — OP THERAPY EVALUATION
Outpatient Physical Therapy  INITIAL EVALUATION    [facility] [place]  [address], [address]  [place] [address]  Phone:  [phone]  Fax:  [phone]    Date of Evaluation: 2021    Patient: Debby Desai  YOB: 1960  MRN: [id number]     Referring Provider: Adin Valle M.D.  [address] [place] [address]   Referring Diagnosis Pain in both knees, unspecified chronicity [M25.561, M25.562]     Time Calculation  Start time: 730  Stop time: 0815 Time Calculation (min): 45 minutes         Chief Complaint: Difficulty Walking and Knee Problem    Visit Diagnoses     ICD-10-CM   1. Pain in both knees, unspecified chronicity  M25.561    M25.562       Date of onset of impairment: 2021    Subjective:   History of Present Illness:     Date of onset:  2020  Quality of life:  Fair  Prior level of function:  Ongoing knee pain and falls  Pain:     Current pain ratin    At best pain rating:  3    At worst pain ratin  Diagnostic Tests:     X-ray: abnormal    Activities of Daily Living:     Patient reported ADL status: Limited ambulation tolerance  Limited standing tolerance  Fall  Limited ability to participate in fitness activities  Patient Goals:     Patient goals for therapy:  Increased motion, decreased pain, increased strength and improved balance    Patient is a 61 y.o. female that presents to therapy with instability in B legs and weakness in R LE.She reports her main concern is the falls.  States that symptoms were insidious in onset, change in medications. Reports the pain quality to be sharp/dull, constant and are primarily in the medial portion of B knees. Reports that symptoms now worsening/ not changing. States that aggravating factors are walking, standing, lack of movement. States that easng factors are supine, head/ice, meds.  Pain meds.  Falls. Patient also notes that she has a L shoulder injury from a fall.   Past Medical History:    Diagnosis Date   • Anemia     in past   • Anginal syndrome (HCC)     had work up and feet r/t anxiety   • Anxiety    • Arthritis     OA knees   • Chronic pain 6/2/2021   • Dental disorder 5/24/12    partial upper denture   • Diabetes (HCC)     pre diabetic   • High cholesterol    • HTN (hypertension), benign 3/19/2012   • Hypothyroid 3/19/2012   • Major depression 3/19/2012   • Orbital floor (blow-out) closed fracture (HCC)     left   • Other specified symptom associated with female genital organs     post menopausal bleeding   • Pain     thoracic spine, Right knee, myofacial pain, and Right shoulder   • Pain     recently fell and has bruise left forehead   • Pain 02/2018    chronic back pain, right shoulder   • Psychiatric problem     depression, anxiety   • Unspecified disorder of thyroid    • Urinary bladder disorder     stress incont.   • Urinary incontinence     Occasional     Past Surgical History:   Procedure Laterality Date   • INJ,EPI ANES/STER LUM/SAC ADDL Right 4/7/2021    Procedure: RIGHT lumbar five and sacral one transforaminal epidural;  Surgeon: Volodymyr Herring M.D.;  [facility];  Service: Pain Management   • PB TOTAL KNEE ARTHROPLASTY Right 9/16/2019    Procedure: RIGHT ARTHROPLASTY, KNEE, TOTAL;  Surgeon: Rufus Saba M.D.;  Location: [facility];  Service: Orthopedics   • KNEE ARTHROSCOPY Right 2/9/2018    Procedure: KNEE ARTHROSCOPY;  Surgeon: Harsh Baltazar M.D.;  Location: [facility];  Service: Orthopedics   • MEDIAL MENISCECTOMY Right 2/9/2018    Procedure: MEDIAL AND LATERAL MENISCECTOMY- PARTIAL;  Surgeon: Harsh Baltazar M.D.;  Location: [facility];  Service: Orthopedics   • KNEE ARTHROSCOPY Right 7/12/2017    Procedure: KNEE ARTHROSCOPY- CESPEDES;  Surgeon: Harsh Baltazar M.D.;  Location: [facility];  Service:    • MEDIAL MENISCECTOMY Right 7/12/2017    Procedure: PARTIAL MEDIAL AND  LATERAL MENISCECTOMY;  Surgeon: Harsh Baltazar M.D.;  Location: [facility];  Service:    • SYNOVECTOMY Right 7/12/2017    Procedure: SYNOVECTOMY;  Surgeon: Harsh Baltazar M.D.;  Location: [facility];  Service:    • KNEE ARTHROSCOPY  4/21/2015    Performed by Rufus Saba M.D. at [facility]   • MEDIAL MENISCECTOMY  4/21/2015    Performed by Rufus Saba M.D. at [facility]   • PUMP REVISION  12/10/2012    Performed by Allison Guerra M.D. at [facility]   • SPINAL CORD STIMULATOR  5/30/2012    Performed by ALLISON GUERRA at [facility]   • BIOPSY GENERAL  5/1/12    endometrial   • SPINAL CORD STIMULATOR  7/11/2011    Performed by ALLISON GUERRA at [facility]   • BLOCK EPIDURAL STEROID INJECTION  2008    x 3-4   • LYMPH NODE EXCISION Left 2007    cervicle   • OTHER Right 2001    thoracic discectomy     • ARTHROSCOPY, KNEE Left 1999   • RIB RESECTION Right 1980   • LUMPECTOMY       Social History     Tobacco Use   • Smoking status: Never Smoker   • Smokeless tobacco: Never Used   Substance Use Topics   • Alcohol use: Not Currently     Alcohol/week: 0.0 oz     Comment: 1 glass wine per month     Family and Occupational History     Socioeconomic History   • Marital status: Single     Spouse name: Not on file   • Number of children: Not on file   • Years of education: Not on file   • Highest education level: Associate degree: academic program   Occupational History   • Not on file       Objective     Neurological Testing     Reflexes   Left   Patellar (L4): trace (1+)  Achilles (S1): trace (1+)  Ankle clonus reflex: negative  Babinski sign: negative    Right   Patellar (L4): trace (1+)  Achilles (S1): trace (1+)  Ankle clonus reflex: negative  Babinski sign: negative    Myotome testing   Lumbar (left)   L1 (hip flexors): 5  L2 (hip flexors): 5  L3 (knee extensors): 4+  L4 (ankle dorsiflexors): 4+  L5  (great toe extension): 4+  S1 (ankle plantar flexors): 4    Lumbar (right)   L1 (hip flexors): 5  L2 (hip flexors): 5  L3 (knee extensors): 4  L4 (ankle dorsiflexors): 4+  L5 (great toe extension): 4+  S1 (ankle plantar flexors): 4    Dermatome testing   Lumbar (left)   All left lumbar dermatomes intact    Lumbar (right)   All right lumbar dermatomes intact    Active Range of Motion   Left Hip   Flexion: WFL  External rotation (90/90): WFL  Internal rotation (90/90): WFL    Right Hip   Flexion: WFL  External rotation (90/90): WFL  Internal rotation (90/90): WFL  Left Knee   Flexion: 0 degrees   Extension: 138 degrees     Right Knee   Flexion: 116 degrees   Extension: 4 degrees   Left Ankle/Foot   Dorsiflexion (kf): 19 degrees     Right Ankle/Foot   Dorsiflexion (kf): 19 degrees     Patellar Mobility     Right Knee Hypomobile in the medial, lateral, superior and inferior patellar tendon(s).     Tests     Additional Tests Details  (+) palpation to pes anserine         Therapeutic Exercises (CPT 03878):     1. Seated hip abd, x20 blue    2. Tra instruct    3. Ask to take log of how she feels after meds      Time-based treatments/modalities:    Physical Therapy Timed Treatment Charges  Therapeutic exercise minutes (CPT 38930): 10 minutes      Assessment, Response and Plan:   Impairments: abnormal or restricted ROM, activity intolerance, impaired physical strength and pain with function    Assessment details:  Patient presents with signs and symptoms consistent with LE weakness, muscle dysfunction, and dynamic balance deficits. Patient limitations include weakness, decreased ROM, and pain. Patient demonstrated signs and symptoms with pes anserine dysfunction. Patient will benefit from skilled therapy to improve the aforementioned deficits and decrease further functional decline.   Prognosis: fair    Goals:   Short Term Goals:   1) Patient's quad strength will improve by a half muscle grade to facilitate improved  gait.  2) Patient's hip abd strength will improve by a half muscle grade to facilitate improved standing tolerance.  Short term goal time span:  2-4 weeks      Long Term Goals:    1) Patient's symptoms will improve to allow for ambulation >400ft.  2) Patient's LEFS will improve by 10 to demonstrate functional improvement  Long term goal time span:  6-8 weeks    Plan:   Therapy options:  Physical therapy treatment to continue  Planned therapy interventions:  Manual Therapy (CPT 74673), Neuromuscular Re-education (CPT 68669), Therapeutic Exercise (CPT 75185) and Hot or Cold Pack Therapy (CPT 98679)  Frequency:  2x week  Duration in weeks:  8  Discussed with:  Patient      Functional Assessment Used  PT Functional Assessment Tool Used: LEFS/BBS  PT Functional Assessment Score: 28/51     Referring provider co-signature:  I have reviewed this plan of care and my co-signature certifies the need for services.    Certification Period: 12/14/2021 to  02/08/22    Physician Signature: ________________________________ Date: ______________

## 2021-12-16 ENCOUNTER — PHYSICAL THERAPY (OUTPATIENT)
Dept: PHYSICAL THERAPY | Facility: REHABILITATION | Age: 61
End: 2021-12-16
Attending: ORTHOPAEDIC SURGERY
Payer: COMMERCIAL

## 2021-12-16 DIAGNOSIS — M25.562 PAIN IN BOTH KNEES, UNSPECIFIED CHRONICITY: ICD-10-CM

## 2021-12-16 DIAGNOSIS — M25.561 PAIN IN BOTH KNEES, UNSPECIFIED CHRONICITY: ICD-10-CM

## 2021-12-16 PROCEDURE — 97110 THERAPEUTIC EXERCISES: CPT

## 2021-12-16 NOTE — OP THERAPY DAILY TREATMENT
Outpatient Physical Therapy  DAILY TREATMENT     [facility] [place]  [address], [address]  [facility] [address]  Phone:  [phone]  Fax:  [phone]    Date: 12/16/2021    Patient: Debby Desai  YOB: 1960  MRN: [id number]     Time Calculation    Start time: 0730  Stop time: 0815 Time Calculation (min): 45 minutes         Chief Complaint: Knee Problem    Visit #: 2    SUBJECTIVE:  Patient reports that she is doing fair today. Notes that symptoms are ok. Notes no falls.     OBJECTIVE:  Current objective measures:           Therapeutic Exercises (CPT 01283):     1. Nu Step, x10min    2. 1/2 foam roller standing, x3min    3. Foam pad standing, x3min    4. Proper transfer / log roll training, x5min      Therapeutic Exercise Summary: Access Code: [email or web address]      Exercises  Supine Transversus Abdominis Bracing - Hands on Stomach - 1 x daily - 7 x weekly - 2 sets - 10 reps  Hooklying Clamshell with Resistance - 1 x daily - 7 x weekly - 2 sets - 10 reps  Seated Multifidi Isometric - 1 x daily - 7 x weekly - 2 sets - 10 reps  Sit to Stand - 1 x daily - 7 x weekly - 2 sets - 10 reps        Time-based treatments/modalities:    Physical Therapy Timed Treatment Charges  Therapeutic exercise minutes (CPT 86342): 40 minutes      Pain rating (1-10) before treatment:  2  Pain rating (1-10) after treatment:  2    ASSESSMENT:   Response to treatment: Patient responded well to therapy with an overall good exercise tolerance and good demonstration of tra function. Plan to continue to advance HEP as able.     PLAN/RECOMMENDATIONS:   Plan for treatment: therapy treatment to continue next visit.  Planned interventions for next visit: continue with current treatment.

## 2021-12-17 PROCEDURE — RXMED WILLOW AMBULATORY MEDICATION CHARGE: Performed by: PSYCHIATRY & NEUROLOGY

## 2021-12-21 ENCOUNTER — PHYSICAL THERAPY (OUTPATIENT)
Dept: PHYSICAL THERAPY | Facility: REHABILITATION | Age: 61
End: 2021-12-21
Attending: ORTHOPAEDIC SURGERY
Payer: COMMERCIAL

## 2021-12-21 DIAGNOSIS — M25.562 PAIN IN BOTH KNEES, UNSPECIFIED CHRONICITY: ICD-10-CM

## 2021-12-21 DIAGNOSIS — M25.561 PAIN IN BOTH KNEES, UNSPECIFIED CHRONICITY: ICD-10-CM

## 2021-12-21 PROCEDURE — 97140 MANUAL THERAPY 1/> REGIONS: CPT

## 2021-12-21 PROCEDURE — 97110 THERAPEUTIC EXERCISES: CPT

## 2021-12-21 NOTE — OP THERAPY DAILY TREATMENT
Outpatient Physical Therapy  DAILY TREATMENT     [facility] Outpatient Physical Therapy [place]  [address], [address]  [place] [address]  Phone:  [phone]  Fax:  [phone]    Date: 12/21/2021    Patient: Debby Desai  YOB: 1960  MRN: [id number]     Time Calculation    Start time: 0730  Stop time: 0830 Time Calculation (min): 60 minutes         Chief Complaint: Difficulty Walking and Knee Problem    Visit #: 3    SUBJECTIVE:  Notes one bout of increased knee pain on Sat and today. Notes no falls.     OBJECTIVE:  Current objective measures:   TKE: Limited          Therapeutic Exercises (CPT 82216):     1. Nu Step, x10min    2. 1/2 foam roller standing, x3min    3. Foam pad standing, x3min    4. Proper transfer / log roll training, x5min    5. Shuttle 3c, x5min    6. SPC use, x5min      Therapeutic Exercise Summary: Access Code: [email or web address]  URL: [email or web address]  Date: 12/21/2021  Prepared by: Jos Torres    Exercises  Supine Transversus Abdominis Bracing - Hands on Stomach - 1 x daily - 7 x weekly - 2 sets - 10 reps  Hooklying Clamshell with Resistance - 1 x daily - 7 x weekly - 2 sets - 10 reps  Seated Multifidi Isometric - 1 x daily - 7 x weekly - 2 sets - 10 reps  Sit to Stand - 1 x daily - 7 x weekly - 2 sets - 10 reps  Supine Quad Set - 1 x daily - 7 x weekly - 2 sets - 10 reps  Active Straight Leg Raise with Quad Set - 1 x daily - 7 x weekly - 2 sets - 10 reps  Standing Gastroc Stretch on Foam 1/2 Roll - 3 x daily - 7 x weekly - 3 reps - 1 sets - 15sec hold  Supine Knee Extension Stretch on Towel Roll - 1-3 x daily - 7 x weekly - 1 sets - 1 reps - 2min hold      Therapeutic Treatments and Modalities:     1. Manual Therapy (CPT 66863), CFM to pes anserine x8min 6min ice massage    Time-based treatments/modalities:    Physical Therapy Timed Treatment Charges  Manual therapy minutes (CPT 09459): 8 minutes  Therapeutic exercise minutes (CPT 28884): 45 minutes      Pain rating  (1-10) before treatment:  5  Pain rating (1-10) after treatment:  2    ASSESSMENT:   Response to treatment: Patient responded well to therapy with an overall decrease in symptoms and improvement in pain post MT. Patient will continue to try to increase knee ext.     PLAN/RECOMMENDATIONS:   Plan for treatment: therapy treatment to continue next visit.  Planned interventions for next visit: continue with current treatment.

## 2021-12-23 ENCOUNTER — PHARMACY VISIT (OUTPATIENT)
Dept: PHARMACY | Facility: MEDICAL CENTER | Age: 61
End: 2021-12-23
Payer: COMMERCIAL

## 2021-12-23 ENCOUNTER — PHYSICAL THERAPY (OUTPATIENT)
Dept: PHYSICAL THERAPY | Facility: REHABILITATION | Age: 61
End: 2021-12-23
Attending: ORTHOPAEDIC SURGERY
Payer: COMMERCIAL

## 2021-12-23 DIAGNOSIS — M25.562 PAIN IN BOTH KNEES, UNSPECIFIED CHRONICITY: ICD-10-CM

## 2021-12-23 DIAGNOSIS — M25.561 PAIN IN BOTH KNEES, UNSPECIFIED CHRONICITY: ICD-10-CM

## 2021-12-23 PROCEDURE — RXMED WILLOW AMBULATORY MEDICATION CHARGE: Performed by: NURSE PRACTITIONER

## 2021-12-23 PROCEDURE — 97110 THERAPEUTIC EXERCISES: CPT

## 2021-12-23 PROCEDURE — RXMED WILLOW AMBULATORY MEDICATION CHARGE: Performed by: PHYSICAL MEDICINE & REHABILITATION

## 2021-12-23 NOTE — OP THERAPY DAILY TREATMENT
Outpatient Physical Therapy  DAILY TREATMENT     [facility] Outpatient Physical Therapy [place]  [address], [address]  [place] [address]  Phone:  [phone]  Fax:  [phone]    Date: 12/23/2021    Patient: Debby Desai  YOB: 1960  MRN: [id number]     Time Calculation    Start time: 0730  Stop time: 0815 Time Calculation (min): 45 minutes         Chief Complaint: Difficulty Walking and Knee Problem    Visit #: 4    SUBJECTIVE:  Patient reports an increase in symptoms post last visit. Notes continued soreness.    OBJECTIVE:  Current objective measures:   Continued poor gait mechancis          Therapeutic Exercises (CPT 21002):     1. Nu step, x10min L1    2. LAQ, x20    3. SAQ, x20    4. TKE with pink and fww, x20    5. Backwards walking in //bars, x8min    6. Gait training with fww and use of mirror, x10min    7. Edu re: footwear, x5min    8. Static knee extension stretch, [id number]      Time-based treatments/modalities:    Physical Therapy Timed Treatment Charges  Therapeutic exercise minutes (CPT 79643): 38 minutes      Pain rating (1-10) before treatment:  3  Pain rating (1-10) after treatment:  2    ASSESSMENT:   Response to treatment: Patient demonstrated an understanding of the deficits related to gait. Patient will attempt to use her FWW at home to work on mechanics. FWW is not an a.d. recommendation but a training aid.     PLAN/RECOMMENDATIONS:   Plan for treatment: therapy treatment to continue next visit.  Planned interventions for next visit: continue with current treatment.

## 2021-12-28 ENCOUNTER — PHARMACY VISIT (OUTPATIENT)
Dept: PHARMACY | Facility: MEDICAL CENTER | Age: 61
End: 2021-12-28
Payer: COMMERCIAL

## 2021-12-29 ENCOUNTER — TELEMEDICINE (OUTPATIENT)
Dept: BEHAVIORAL HEALTH | Facility: CLINIC | Age: 61
End: 2021-12-29
Payer: COMMERCIAL

## 2021-12-29 DIAGNOSIS — G47.09 OTHER INSOMNIA: ICD-10-CM

## 2021-12-29 DIAGNOSIS — F33.2 SEVERE EPISODE OF RECURRENT MAJOR DEPRESSIVE DISORDER, WITHOUT PSYCHOTIC FEATURES (HCC): ICD-10-CM

## 2021-12-29 DIAGNOSIS — R25.1 TREMORS OF NERVOUS SYSTEM: ICD-10-CM

## 2021-12-29 DIAGNOSIS — F41.1 GAD (GENERALIZED ANXIETY DISORDER): ICD-10-CM

## 2021-12-29 DIAGNOSIS — F33.1 MDD (MAJOR DEPRESSIVE DISORDER), RECURRENT EPISODE, MODERATE (HCC): ICD-10-CM

## 2021-12-29 PROBLEM — F33.41 RECURRENT MAJOR DEPRESSION IN PARTIAL REMISSION (HCC): Status: RESOLVED | Noted: 2017-03-31 | Resolved: 2021-12-29

## 2021-12-29 PROCEDURE — 99214 OFFICE O/P EST MOD 30 MIN: CPT | Mod: 95 | Performed by: PSYCHIATRY & NEUROLOGY

## 2021-12-29 PROCEDURE — RXMED WILLOW AMBULATORY MEDICATION CHARGE: Performed by: PSYCHIATRY & NEUROLOGY

## 2021-12-29 RX ORDER — BUPROPION HYDROCHLORIDE 300 MG/1
300 TABLET ORAL EVERY MORNING
Qty: 30 TABLET | Refills: 2 | Status: SHIPPED | OUTPATIENT
Start: 2021-12-29 | End: 2022-02-03 | Stop reason: SDUPTHER

## 2021-12-29 RX ORDER — VENLAFAXINE HYDROCHLORIDE 150 MG/1
150 CAPSULE, EXTENDED RELEASE ORAL DAILY
Qty: 30 CAPSULE | Refills: 2 | Status: SHIPPED | OUTPATIENT
Start: 2021-12-29 | End: 2022-02-03 | Stop reason: SDUPTHER

## 2021-12-29 RX ORDER — PROPRANOLOL HYDROCHLORIDE 20 MG/1
20 TABLET ORAL 2 TIMES DAILY
Qty: 60 TABLET | Refills: 2 | Status: SHIPPED | OUTPATIENT
Start: 2021-12-29 | End: 2022-02-03 | Stop reason: SDUPTHER

## 2021-12-29 RX ORDER — HYDROXYZINE PAMOATE 100 MG
100 CAPSULE ORAL NIGHTLY PRN
Qty: 30 CAPSULE | Refills: 2 | Status: SHIPPED | OUTPATIENT
Start: 2021-12-29 | End: 2022-02-03 | Stop reason: SDUPTHER

## 2021-12-29 NOTE — PROGRESS NOTES
This evaluation was conducted via Zoom using secure and encrypted videoconferencing technology. The patient was in a private location in the [facility].    The patient's identity was confirmed and verbal consent was obtained for this virtual visit.     PSYCHIATRY FOLLOW-UP NOTE      Name: Debby Desai  MRN: [id number]  : 1960  Age: 61 y.o.  Date of assessment: 2021  PCP: ARELIS Chris  Persons in attendance: Patient      REASON FOR VISIT/CHIEF COMPLAINT (as stated by Patient):  Debby Desai is a 61 y.o., White female, attending follow-up appointment for mood and anxiety management.      HISTORY OF PRESENT ILLNESS:  Debby Desai is a 61 y.o. old female with MDD, TIM and insomnia comes in today for follow up. Patient was last seen 1 month ago, and following treatment planning recommendations were done:  · Continue Effexor  mg daily for mood, anxiety and comorbid pain management. 30 days supply given with 1 refill.  · Continue Wellbutrin XL to 300 mg daily for depression augmentation.  30-day supply with 1 refill given.  · Continue hydroxyzine (vistaril) to 100 mg HS + melatonin 5 mg HS as needed for insomnia.  30 tabs with 1 refill given.  · Add propanolol 10 mg twice daily for 2-week and then increase to 20 mg twice daily if no improvement in anxiety and shake is noted.  90 tabs with 1 refill given.  · Initiate referral to neurology for evaluation of persistent tremors.  · Continue psychotherapy in the clinic for mood and anxiety management.    Patient is compliant with medication with no side effect and reports slow improvement in mood and anxiety.  She has seen marked improvement in hand tremors with the addition of propanolol and currently taking total 20 mg twice daily with no signs of hypotension.  She has an upcoming appointment with neurology in January for evaluation of shakes.  Patient is also on tramadol and understands the risk of serotonin syndrome with  venlafaxine and tramadol, but she reports the dose of tramadol was reduced by her pain management physician recently.  Again discussed the possibility of these cheeks secondary to serotonin syndrome but she is denying any other signs or symptoms of serotonin syndrome at this time.    Patient agreed with plan of not titrating medications further and agreed with plan of initiating psychotherapy for more additional benefit with mood and anxiety.    Most part of the session was dedicated to letting patient express her feelings related to social stressors.  Validation was provided for appropriate emotional responses.  Importance of behavioral activation activities was emphasized.    Note from intake evaluation on 5/13/21:  Patient is following with her primary care physician for management of depression with following medication combination: wellbutrin 150 mg daily to cymbalta 60 mg daily.  Patient reports struggling with anxiety and depression since early childhood and attributes this to multiple losses in family during childhood and describes last 7 years as difficult as her sister was diagnosed with glioblastoma and is not doing      CURRENT MEDICATIONS:  Current Outpatient Medications   Medication Sig Dispense Refill   • buPROPion (WELLBUTRIN XL) 300 MG XL tablet Take 1 tablet by mouth every morning. 30 Tablet 1   • hydrOXYzine pamoate (VISTARIL) 100 MG Cap Take 1 capsule by mouth at bedtime as needed (sleep). 30 Capsule 1   • melatonin 5 mg Tab Take 1 tablet by mouth at bedtime. 30 Tablet 1   • venlafaxine (EFFEXOR-XR) 150 MG extended-release capsule Take 1 capsule by mouth every day. 30 Capsule 1   • propranolol (INDERAL) 10 MG Tab Take 1 Tablet by mouth 2 times a day. (if no improvement seen after 2 weeks: increase to 2 tabs twice daily) 90 Tablet 1   • traMADol (ULTRAM) 50 MG Tab Take 1 Tablet by mouth every 8 hours as needed for Moderate Pain or Severe Pain for up to 30 days. 64 Tablet 0   • traMADol (ULTRAM) 50  MG Tab Take 1 Tablet by mouth every 8 hours as needed for Moderate Pain or Severe Pain for up to 30 days. 62 Tablet 0   • levothyroxine (SYNTHROID) 50 MCG Tab Take 1 tablet by mouth Every morning on an empty stomach. 90 Tablet 3   • metFORMIN ER (GLUCOPHAGE XR) 500 MG TABLET SR 24 HR Take 1 Tab by mouth 2 times a day. 180 Tablet 2   • atorvastatin (LIPITOR) 20 MG Tab TAKE ONE TABLET BY MOUTH EVERY DAY 90 Tablet 2   • lisinopril-hydrochlorothiazide (PRINZIDE) 20-12.5 MG per tablet Take one tablet by mouth daily 90 Tablet 2   • ergocalciferol (DRISDOL) 11870 UNIT capsule Take 1 capsule by mouth every 7 days. 12 capsule 0   • ibuprofen (MOTRIN) 200 MG Tab Take 600 mg by mouth every 6 hours as needed for Inflammation.     • Ferrous Sulfate (IRON) 325 (65 Fe) MG Tab Take 65 mg by mouth every day at 6 PM.     • acetaminophen (TYLENOL) 500 MG Tab Take 500-1,000 mg by mouth 2 times a day as needed for Moderate Pain.       No current facility-administered medications for this visit.       MEDICAL HISTORY  Past Medical History:   Diagnosis Date   • Anemia     in past   • Anginal syndrome (HCC)     had work up and feet r/t anxiety   • Anxiety    • Arthritis     OA knees   • Chronic pain 6/2/2021   • Dental disorder 5/24/12    partial upper denture   • Diabetes (HCC)     pre diabetic   • High cholesterol    • HTN (hypertension), benign 3/19/2012   • Hypothyroid 3/19/2012   • Major depression 3/19/2012   • Orbital floor (blow-out) closed fracture (HCC)     left   • Other specified symptom associated with female genital organs     post menopausal bleeding   • Pain     thoracic spine, Right knee, myofacial pain, and Right shoulder   • Pain     recently fell and has bruise left forehead   • Pain 02/2018    chronic back pain, right shoulder   • Psychiatric problem     depression, anxiety   • Unspecified disorder of thyroid    • Urinary bladder disorder     stress incont.   • Urinary incontinence     Occasional     Past Surgical  History:   Procedure Laterality Date   • INJ,EPI ANES/STER LUM/SAC ADDL Right 4/7/2021    Procedure: RIGHT lumbar five and sacral one transforaminal epidural;  Surgeon: Volodymyr Herring M.D.;  [facility];  Service: Pain Management   • PB TOTAL KNEE ARTHROPLASTY Right 9/16/2019    Procedure: RIGHT ARTHROPLASTY, KNEE, TOTAL;  Surgeon: Rufus Saba M.D.;  Location: [facility];  Service: Orthopedics   • KNEE ARTHROSCOPY Right 2/9/2018    Procedure: KNEE ARTHROSCOPY;  Surgeon: Harsh Baltazar M.D.;  Location: [facility];  Service: Orthopedics   • MENISCECTOMY, KNEE, MEDIAL Right 2/9/2018    Procedure: MEDIAL AND LATERAL MENISCECTOMY- PARTIAL;  Surgeon: Harsh Baltazar M.D.;  Location: [facility];  Service: Orthopedics   • KNEE ARTHROSCOPY Right 7/12/2017    Procedure: KNEE ARTHROSCOPY- CESPEDES;  Surgeon: Harsh Baltazar M.D.;  Location: [facility];  Service:    • MENISCECTOMY, KNEE, MEDIAL Right 7/12/2017    Procedure: PARTIAL MEDIAL AND LATERAL MENISCECTOMY;  Surgeon: Harsh Baltazar M.D.;  Location: [facility];  Service:    • SYNOVECTOMY Right 7/12/2017    Procedure: SYNOVECTOMY;  Surgeon: Harsh Baltazar M.D.;  Location: [facility];  Service:    • KNEE ARTHROSCOPY  4/21/2015    Performed by Rufus Saba M.D. at [facility]   • MENISCECTOMY, KNEE, MEDIAL  4/21/2015    Performed by Rufus Saba M.D. at [facility]   • PUMP REVISION  12/10/2012    Performed by Allison Guerra M.D. at [facility]   • SPINAL CORD STIMULATOR  5/30/2012    Performed by ALLISON GUERRA at [facility]   • BIOPSY GENERAL  5/1/12    endometrial   • SPINAL CORD STIMULATOR  7/11/2011    Performed by ALLISON GUERRA at [facility]   • BLOCK EPIDURAL STEROID INJECTION  2008    x 3-4   • LYMPH NODE EXCISION Left 2007    cervicle   •  OTHER Right 2001    thoracic discectomy     • ARTHROSCOPY, KNEE Left 1999   • RIB RESECTION Right 1980   • LUMPECTOMY         PAST PSYCHIATRIC HISTORY  Prior psychiatric hospitalization: no  Prior Self harm/suicide attempt: no  Prior Diagnosis: MDD, Anxiety     PAST PSYCHIATRIC MEDICATIONS  · Fluoxetine  · Paroxetine  · Citalopram  · Sertraline  · Duloxetine  · Wellbutrin  · Amitriptyline   · Ativan      FAMILY HISTORY  Psychiatric diagnosis: Nieces with depression and anxiety  History of suicide attempts: No  Substance abuse history: 1 sister with drug and alcohol abuse history     SUBSTANCE USE HISTORY:  ALCOHOL: no  TOBACCO: no  CANNABIS: no  OPIOIDS: no  PRESCRIPTION MEDICATIONS: no  OTHERS: no  History of inpatient/outpatient rehab treatment: none     SOCIAL HISTORY  Childhood: born in [place] and describes childhood as difficult  Moved a lot due to parents financial concerns  Employment: For [employer]  Relationship: single (never )  Kids: no kids  Current living situation: alone (but strong family support as they all live nearby)  Current/past legal issues: denies  History of emotional/physical/sexual abuse - denies      REVIEW OF SYSTEMS:        Constitutional  fatigue; chronic pain   Eyes negative   Ears/Nose/Mouth/Throat negative   Cardiovascular  hypertension, hyperlipidemia   Respiratory negative   Gastrointestinal negative   Genitourinary negative   Muscular  osteoarthritis   Integumentary negative   Neurological negative   Endocrine  hypothyroidism   Hematologic/Lymphatic negative     PHYSICAL EXAMINAION:  Vital signs: LMP 02/26/2012   Musculoskeletal: Normal gait.   Abnormal movements: none      MENTAL STATUS EXAMINATION      General:   - Grooming and hygiene: Casual,   - Apparent distress: none,   - Behavior: Calm  - Eye Contact:  Good,   - no psychomotor agitation or retardation    - Participation: Active verbal participation  Orientation: Alert and Fully Oriented to person, place and  time  Mood: Depressed and Anxious  Affect: Full range,  Thought Process: Logical and Goal-directed  Thought Content: Denies suicidal or homicidal ideations, intent or plan Within normal limits  Perception: Denies auditory or visual hallucinations. No delusions noted Within normal limits  Attention span and concentration: Intact   Speech:Rate within normal limits and Volume within normal limits  Language: Appropriate   Insight: Good  Judgment: Good  Recent and remote memory: No gross evidence of memory deficits        DEPRESSION SCREENING:  Depression Screen (PHQ-2/PHQ-9) 7/20/2021 9/21/2021 11/19/2021   PHQ-2 Total Score - - -   PHQ-2 Total Score - - -   PHQ-2 Total Score 4 4 5   PHQ-9 Total Score - - -   PHQ-9 Total Score 13 13 17       Interpretation of PHQ-9 Total Score   Score Severity   1-4 No Depression   5-9 Mild Depression   10-14 Moderate Depression   15-19 Moderately Severe Depression   20-27 Severe Depression    CURRENT RISK:       Suicidal: Low       Homicidal: Low       Self-Harm: Low       Relapse: Low       Crisis Safety Plan Reviewed Not Indicated       If evidence of imminent risk is present, intervention/plan:      MEDICAL RECORDS/LABS/DIAGNOSTIC TESTS REVIEWED:  No new lab since last visit     [facility] records -   Reviewed     DIAGNOSTIC IMPRESSION(S):  1. Major depressive disorder, recurrent  2. Generalized anxiety disorder  3. Insomnia        PLAN:  (1) Major depressive disorder; (2) TIM; (3) Insomnia  · Slow improvement  · Continue Effexor  mg daily for mood, anxiety and comorbid pain management. 30 days supply given with 2 refill.  · Continue Wellbutrin XL to 300 mg daily for depression augmentation.  30-day supply with 2 refill given.  · Continue hydroxyzine (vistaril) to 100 mg HS + melatonin 5 mg HS as needed for insomnia.  30 tabs with 2 refill given.  · Continue propanolol 20 mg BID for anxiety and shake. 60 tabs with 2 refill given.  · Initiate referral to neurology for evaluation of  persistent tremors: appointment in Jan 2022  · Continue psychotherapy in the clinic for mood and anxiety management.  · Medication options, alternatives (including no medications) and medication risks/benefits/side effects were discussed in detail.  · Explained importance of contraceptive measures while on psychotropic medications, educated to let provider know if ever pregnant or wanting to become pregnant. Verbalized understanding.  · The patient was advised to call, message provider on MyChart, or come in to the clinic if symptoms worsen or if any future questions/issues regarding their medications arise; the patient verbalized understanding and agreement.    · The patient was educated to call 911, call the suicide hotline, or go to local ER if having thoughts of suicide or homicide; verbalized understanding.    Billing Coding based on:  33527: based on MDM    Return to clinic in 2 months or sooner if symptoms worsen.  Next Appointment: instruction provided on how to make the next appointment.     The proposed treatment plan was discussed with the patient who was provided the opportunity to ask questions and make suggestions regarding alternative treatment. Patient verbalized understanding and expressed agreement with the plan.       Genaro Stevenson M.D.  12/29/21    This note was created using voice recognition software (Dragon). The accuracy of the dictation is limited by the abilities of the software. I have reviewed the note prior to signing, however some errors in grammar and context are still possible. If you have any questions related to this note please do not hesitate to contact our office.

## 2022-01-05 ENCOUNTER — PHYSICAL THERAPY (OUTPATIENT)
Dept: PHYSICAL THERAPY | Facility: REHABILITATION | Age: 62
End: 2022-01-05
Attending: ORTHOPAEDIC SURGERY
Payer: COMMERCIAL

## 2022-01-05 DIAGNOSIS — M25.562 PAIN IN BOTH KNEES, UNSPECIFIED CHRONICITY: ICD-10-CM

## 2022-01-05 DIAGNOSIS — M25.561 PAIN IN BOTH KNEES, UNSPECIFIED CHRONICITY: ICD-10-CM

## 2022-01-05 PROCEDURE — 97110 THERAPEUTIC EXERCISES: CPT

## 2022-01-05 NOTE — OP THERAPY DAILY TREATMENT
Outpatient Physical Therapy  DAILY TREATMENT     [facility] Outpatient Physical Therapy [place]  [address]  [place] [facility] [place] [address]  Phone:  [phone]  Fax:  [phone]    Date: 01/05/2022    Patient: Debby Desai  YOB: 1960  MRN: [id number]     Time Calculation    Start time: 1115  Stop time: 1200 Time Calculation (min): 45 minutes         Chief Complaint: Knee Problem    Visit #: 5    SUBJECTIVE:  Patient reports that her symptoms are still about the same. Notes compliance with HEP.     OBJECTIVE:  Current objective measures:   Continued limited knee ext          Therapeutic Exercises (CPT 38148):     1. Nu step, x10min L1    2. SLR, x20    3. SAQ, x20    4. TKE with pink and fww, x20    5. Backwards walking in //bars, x8min    6. Also program as below      Therapeutic Exercise Summary: Access Code: [email or web address]  URL: [email or web address]  Date: 01/05/2022  Prepared by: Jos Torres    Exercises  Supine Transversus Abdominis Bracing - Hands on Stomach - 1 x daily - 7 x weekly - 2 sets - 10 reps  Hooklying Clamshell with Resistance - 1 x daily - 7 x weekly - 2 sets - 10 reps  Seated Multifidi Isometric - 1 x daily - 7 x weekly - 2 sets - 10 reps  Sit to Stand - 1 x daily - 7 x weekly - 2 sets - 10 reps  Supine Quad Set - 1 x daily - 7 x weekly - 2 sets - 10 reps  Active Straight Leg Raise with Quad Set - 1 x daily - 7 x weekly - 2 sets - 10 reps  Supine Knee Extension Stretch on Towel Roll - 1-3 x daily - 7 x weekly - 1 sets - 1 reps - 2min hold  Gastroc Stretch on Wall - 3 x daily - 7 x weekly - 3 reps - 1 sets - 15sec hold  Soleus Stretch on Wall - 3 x daily - 7 x weekly - 3 reps - 1 sets - 15sec hold        Time-based treatments/modalities:    Physical Therapy Timed Treatment Charges  Therapeutic exercise minutes (CPT 02037): 40 minutes      Pain rating (1-10) before treatment:  2  Pain rating (1-10) after treatment:  2    ASSESSMENT:   Response to treatment: Patient responded  well to therapy with an increase in quad fatigue with no increase in symptoms. Patient quad contraction continues to be poor. Continued knee valgus during gait.     PLAN/RECOMMENDATIONS:   Plan for treatment: therapy treatment to continue next visit.  Planned interventions for next visit: continue with current treatment.

## 2022-01-07 PROCEDURE — RXMED WILLOW AMBULATORY MEDICATION CHARGE: Performed by: NURSE PRACTITIONER

## 2022-01-10 ENCOUNTER — PHARMACY VISIT (OUTPATIENT)
Dept: PHARMACY | Facility: MEDICAL CENTER | Age: 62
End: 2022-01-10
Payer: COMMERCIAL

## 2022-01-10 PROCEDURE — RXMED WILLOW AMBULATORY MEDICATION CHARGE: Performed by: PSYCHIATRY & NEUROLOGY

## 2022-01-11 ENCOUNTER — PHYSICAL THERAPY (OUTPATIENT)
Dept: PHYSICAL THERAPY | Facility: REHABILITATION | Age: 62
End: 2022-01-11
Attending: ORTHOPAEDIC SURGERY
Payer: COMMERCIAL

## 2022-01-11 DIAGNOSIS — M25.561 PAIN IN BOTH KNEES, UNSPECIFIED CHRONICITY: ICD-10-CM

## 2022-01-11 DIAGNOSIS — M25.562 PAIN IN BOTH KNEES, UNSPECIFIED CHRONICITY: ICD-10-CM

## 2022-01-11 PROCEDURE — 97110 THERAPEUTIC EXERCISES: CPT

## 2022-01-11 NOTE — OP THERAPY DAILY TREATMENT
Outpatient Physical Therapy  DAILY TREATMENT     [facility] Outpatient Physical Therapy [place]  [address]  [place] [address]  Phone:  [phone]  Fax:  [phone]    Date: 01/11/2022    Patient: Debby Desai  YOB: 1960  MRN: [id number]     Time Calculation    Start time: 0900  Stop time: 0945 Time Calculation (min): 45 minutes         Chief Complaint: Knee Problem    Visit #: 6    SUBJECTIVE:  Patient reports that symptoms are about the same. Notes she was able to walk around in a store with no increase in symptoms.    OBJECTIVE:  Current objective measures:           Therapeutic Exercises (CPT 24103):     1. Nu step, x10min L1    2. SLR, x20    3. SAQ, x20    4. TKE with pink and fww, x20    5. Backwards walking in //bars, x8min    6. Also program as below    7. Shuttle, x3c x5min      Therapeutic Exercise Summary: Access Code: [email or web address]  URL: [email or web address]  Date: 01/05/2022  Prepared by: Jos Torres    Exercises  Supine Transversus Abdominis Bracing - Hands on Stomach - 1 x daily - 7 x weekly - 2 sets - 10 reps  Hooklying Clamshell with Resistance - 1 x daily - 7 x weekly - 2 sets - 10 reps  Seated Multifidi Isometric - 1 x daily - 7 x weekly - 2 sets - 10 reps  Sit to Stand - 1 x daily - 7 x weekly - 2 sets - 10 reps  Supine Quad Set - 1 x daily - 7 x weekly - 2 sets - 10 reps  Active Straight Leg Raise with Quad Set - 1 x daily - 7 x weekly - 2 sets - 10 reps  Supine Knee Extension Stretch on Towel Roll - 1-3 x daily - 7 x weekly - 1 sets - 1 reps - 2min hold  Gastroc Stretch on Wall - 3 x daily - 7 x weekly - 3 reps - 1 sets - 15sec hold  Soleus Stretch on Wall - 3 x daily - 7 x weekly - 3 reps - 1 sets - 15sec hold        Time-based treatments/modalities:    Physical Therapy Timed Treatment Charges  Therapeutic exercise minutes (CPT 28367): 40 minutes      Pain rating (1-10) before treatment:  2  Pain rating (1-10) after treatment:  1    ASSESSMENT:    Response to treatment: Patient responded well to therapy with an overall decrease in symptoms and increase in fatigue.     PLAN/RECOMMENDATIONS:   Plan for treatment: therapy treatment to continue next visit.  Planned interventions for next visit: continue with current treatment.

## 2022-01-13 ENCOUNTER — APPOINTMENT (OUTPATIENT)
Dept: PHYSICAL THERAPY | Facility: REHABILITATION | Age: 62
End: 2022-01-13
Attending: ORTHOPAEDIC SURGERY
Payer: COMMERCIAL

## 2022-01-18 ENCOUNTER — PHYSICAL THERAPY (OUTPATIENT)
Dept: PHYSICAL THERAPY | Facility: REHABILITATION | Age: 62
End: 2022-01-18
Attending: ORTHOPAEDIC SURGERY
Payer: COMMERCIAL

## 2022-01-18 DIAGNOSIS — M25.561 PAIN IN BOTH KNEES, UNSPECIFIED CHRONICITY: ICD-10-CM

## 2022-01-18 DIAGNOSIS — M25.562 PAIN IN BOTH KNEES, UNSPECIFIED CHRONICITY: ICD-10-CM

## 2022-01-18 PROCEDURE — 97110 THERAPEUTIC EXERCISES: CPT

## 2022-01-18 PROCEDURE — 97140 MANUAL THERAPY 1/> REGIONS: CPT

## 2022-01-18 NOTE — OP THERAPY PROGRESS SUMMARY
Outpatient Physical Therapy  PROGRESS SUMMARY NOTE      [facility] [place]  [address], [address]  [place] [address]  Phone:  [phone]  Fax:  [phone]    Date of Visit: 01/18/2022    Patient: Debby Desai  YOB: 1960  MRN: [id number]     Referring Provider: Adin Valle M.D.  [address] [place] [address]   Referring Diagnosis Pain in right knee [M25.561];Pain in left knee [M25.562]     Visit Diagnoses     ICD-10-CM   1. Pain in both knees, unspecified chronicity  M25.561    M25.562       Rehab Potential: fair    Progress Report Period: 12/14/21 to 1/18/22    Functional Assessment Used  PT Functional Assessment Tool Used: LEFS  PT Functional Assessment Score: 30       Objective Findings and Assessment:   Patient progression towards goals: Patient has been seen for 7 visits. Patient has been responding fair to therapy. Patient reports an overall slight increase in strength. Symptoms are largely the same. Patient symptoms continue to be consistent with pes anserine irritation and poor quad activation. Due to her lacking full knee extension expect symptoms to continue.     Objective findings and assessment details: 4-119deg AROM  Hip abd strength: 4+ B  Quad strength R: 4+    Goals:   Short Term Goals:   1) Patient's quad strength will improve by a half muscle grade to facilitate improved gait. MET  2) Patient's hip abd strength will improve by a half muscle grade to facilitate improved standing tolerance. MET  Short term goal time span:  2-4 weeks      Long Term Goals:    1) Patient's symptoms will improve to allow for ambulation >400ft. Not met  2) Patient's LEFS will improve by 10 to demonstrate functional improvement Not met  Long term goal time span:  6-8 weeks    Plan:   Planned therapy interventions:  E Stim Unattended (CPT 28287), Hot or Cold Pack Therapy (CPT 14864), Manual Therapy (CPT 78784), Neuromuscular Re-education (CPT 43955) and Therapeutic  Exercise (CPT 96575)  Frequency: 1-2x week.  Duration in weeks:  8      Referring provider co-signature:  I have reviewed this plan of care and my co-signature certifies the need for services.     Certification Period: 01/18/2022 to 03/15/22    Physician Signature: ________________________________ Date: ______________

## 2022-01-18 NOTE — OP THERAPY DAILY TREATMENT
Outpatient Physical Therapy  DAILY TREATMENT     [facility] [place]  [address], [address]  [place] [address]  Phone:  [phone]  Fax:  [phone]    Date: 01/18/2022    Patient: Debby Desai  YOB: 1960  MRN: [id number]     Time Calculation    Start time: 0730  Stop time: 0815 Time Calculation (min): 45 minutes         Chief Complaint: Knee Problem    Visit #: 7    SUBJECTIVE:  Patient reports very little change. Notes a slight increase in quad contraction R.     OBJECTIVE:  Current objective measures:   4-119deg AROM  Hip abd strength: 4+ B  Quad strength R: 4+          Therapeutic Exercises (CPT 36297):     1. Nu step, x10min L1    2. SLR, x20    3. SAQ, x20    4. HS stretch, [id number]    5. Backwards walking in //bars, x5min    6. Also program as below    7. Shuttle, x3c x5min      Therapeutic Exercise Summary: Access Code: [email or web address]  URL: [email or web address]  Date: 01/05/2022  Prepared by: Jos Torres    Exercises  Supine Transversus Abdominis Bracing - Hands on Stomach - 1 x daily - 7 x weekly - 2 sets - 10 reps  Hooklying Clamshell with Resistance - 1 x daily - 7 x weekly - 2 sets - 10 reps  Seated Multifidi Isometric - 1 x daily - 7 x weekly - 2 sets - 10 reps  Sit to Stand - 1 x daily - 7 x weekly - 2 sets - 10 reps  Supine Quad Set - 1 x daily - 7 x weekly - 2 sets - 10 reps  Active Straight Leg Raise with Quad Set - 1 x daily - 7 x weekly - 2 sets - 10 reps  Supine Knee Extension Stretch on Towel Roll - 1-3 x daily - 7 x weekly - 1 sets - 1 reps - 2min hold  Gastroc Stretch on Wall - 3 x daily - 7 x weekly - 3 reps - 1 sets - 15sec hold  Soleus Stretch on Wall - 3 x daily - 7 x weekly - 3 reps - 1 sets - 15sec hold        Time-based treatments/modalities:    Physical Therapy Timed Treatment Charges  Therapeutic exercise minutes (CPT 12391): 30 minutes      Pain rating (1-10) before treatment:  2  Pain rating (1-10) after treatment:   1    ASSESSMENT:   Response to treatment: Patient is responding fair to therapy. Progress is slow and strength gains are occurring. Patient continues to present with pes anserine irritation. Plan to continue with LE strength training.     PLAN/RECOMMENDATIONS:   Plan for treatment: therapy treatment to continue next visit.  Planned interventions for next visit: continue with current treatment.

## 2022-01-20 ENCOUNTER — APPOINTMENT (OUTPATIENT)
Dept: PHYSICAL THERAPY | Facility: REHABILITATION | Age: 62
End: 2022-01-20
Attending: ORTHOPAEDIC SURGERY
Payer: COMMERCIAL

## 2022-01-21 ENCOUNTER — OFFICE VISIT (OUTPATIENT)
Dept: PHYSICAL MEDICINE AND REHAB | Facility: MEDICAL CENTER | Age: 62
End: 2022-01-21
Payer: COMMERCIAL

## 2022-01-21 VITALS
OXYGEN SATURATION: 94 % | TEMPERATURE: 96.8 F | WEIGHT: 188 LBS | BODY MASS INDEX: 30.22 KG/M2 | HEIGHT: 66 IN | HEART RATE: 62 BPM | DIASTOLIC BLOOD PRESSURE: 86 MMHG | SYSTOLIC BLOOD PRESSURE: 126 MMHG

## 2022-01-21 DIAGNOSIS — M54.16 LUMBAR RADICULOPATHY: ICD-10-CM

## 2022-01-21 DIAGNOSIS — Z96.89 SPINAL CORD STIMULATOR STATUS: ICD-10-CM

## 2022-01-21 DIAGNOSIS — Z96.651 S/P TKR (TOTAL KNEE REPLACEMENT), RIGHT: ICD-10-CM

## 2022-01-21 DIAGNOSIS — F33.41 MAJOR DEPRESSIVE DISORDER, RECURRENT EPISODE, IN PARTIAL REMISSION (HCC): ICD-10-CM

## 2022-01-21 DIAGNOSIS — M43.17 SPONDYLOLISTHESIS AT L5-S1 LEVEL: ICD-10-CM

## 2022-01-21 DIAGNOSIS — M47.816 LUMBAR SPONDYLOSIS: ICD-10-CM

## 2022-01-21 DIAGNOSIS — M48.061 SPINAL STENOSIS OF LUMBAR REGION, UNSPECIFIED WHETHER NEUROGENIC CLAUDICATION PRESENT: ICD-10-CM

## 2022-01-21 PROCEDURE — RXMED WILLOW AMBULATORY MEDICATION CHARGE: Performed by: PHYSICAL MEDICINE & REHABILITATION

## 2022-01-21 PROCEDURE — 99214 OFFICE O/P EST MOD 30 MIN: CPT | Performed by: PHYSICAL MEDICINE & REHABILITATION

## 2022-01-21 RX ORDER — TRAMADOL HYDROCHLORIDE 50 MG/1
50 TABLET ORAL EVERY 8 HOURS PRN
Qty: 58 TABLET | Refills: 0 | Status: SHIPPED | OUTPATIENT
Start: 2022-02-21 | End: 2022-03-18 | Stop reason: SDUPTHER

## 2022-01-21 RX ORDER — TRAMADOL HYDROCHLORIDE 50 MG/1
50 TABLET ORAL EVERY 8 HOURS PRN
Qty: 60 TABLET | Refills: 0 | Status: SHIPPED | OUTPATIENT
Start: 2022-01-22 | End: 2022-02-21

## 2022-01-21 ASSESSMENT — PATIENT HEALTH QUESTIONNAIRE - PHQ9
SUM OF ALL RESPONSES TO PHQ QUESTIONS 1-9: 16
5. POOR APPETITE OR OVEREATING: 1 - SEVERAL DAYS
CLINICAL INTERPRETATION OF PHQ2 SCORE: 6

## 2022-01-21 ASSESSMENT — PAIN SCALES - GENERAL: PAINLEVEL: 5=MODERATE PAIN

## 2022-01-21 ASSESSMENT — FIBROSIS 4 INDEX: FIB4 SCORE: 0.74

## 2022-01-21 NOTE — PROGRESS NOTES
Follow-up patient note    Physiatry (physical medicine and  Rehabilitation), interventional spine and sports medicine    Date of Service:1/21/2022    Chief complaint:   Chief Complaint   Patient presents with   • Follow-Up     Back pain        HISTORY    HPI: Debby Desai 61 y.o. female who presents today for follow-up evaluation of her pain complaints.     Debby has bernardino going to PT.  She is working on quad strengthening exercises.  She has had about 6 visits total so far. Doing exercises at home.  Some irritation in the right knee persists medially.  Icing her knee.    She reports that her back pain continues, mild aching.  This is stable.    Overall, she rates her pain 6/10 on the NRS.  Knee pain is most painful.    SCS was previously placed for thoracic pain.  This pain has improved and she is not using the device currently.    Working with Dr. Stevenson on her depression and denies suicidal plan.     She occasionally misses work due to pain, but this is manageable as she is able to work from home.    Continuing gabapentin 400mg po tid.  No side effects   She continues to want to reduce her tramadol, gradually    Denies suicidality.  PHQ-9: 16     By history:   She reports that she had surgery on 09/16/2019.  This was a right total knee arthroplasty for osteoarthritis with Dr. Saba.    In 2001, she had discectomy.  She reports that she has had a spinal cord stimulator placed, but she has not been using this as much.  She reports that the unit is not currently working.    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  She gets up about once an hour.  She has a sit to stand desk, but it is difficult to stand much.  Currently, she is working from home due to the COVID-19 pandemic.    Medical records review:  ED records from 10/07/2020 and 09/22/2020 reviewed.  ED visit from 10/917831.  Negative head CT.  She was given instructions about taking ibuprofen and  tylenol.  Noted that she was also given a script for valium.     I reviewed the note from the referring provider Loy Miles M.D. dated 01/08/2020: Visits included nausea/epigastric pain, hypertension, chronic knee pain, IGT, dyslipidemia, MDD, hypothyroidism, iron deficiency    Previous treatments:    Physical Therapy: Yes    Medications the patient is tried: tramadol, tylenol (usually for a headache); in the past she took gabapentin 400mg po tid, she was taking vimovo and norco in the past; lyrica caused weight gain    Previous interventions: She had radiofrequency ablation in the past, prior to surgery    Previous surgeries to relieve the above pain:  None other than surgery 09/16/2019      ROS: Unchanged, see HPI  Gen: weight loss  Eyes: vision problems, glasses and contacts  CV: chest pain/anxiety  GI: nausea, ulcers  : urgency  Psych: depression  Endo: thyroid problems  Heme: anemia    Red Flags ROS:   Fever, Chills, Sweats: Denies  Involuntary Weight Loss: Denies  Bladder Incontinence: Denies  Bowel Incontinence: Denies  Saddle Anesthesia: Denies    All other systems reviewed and negative.       PMHx:   Past Medical History:   Diagnosis Date   • Anemia     in past   • Anginal syndrome (HCC)     had work up and feet r/t anxiety   • Anxiety    • Arthritis     OA knees   • Chronic pain 6/2/2021   • Dental disorder 5/24/12    partial upper denture   • Diabetes (HCC)     pre diabetic   • High cholesterol    • HTN (hypertension), benign 3/19/2012   • Hypothyroid 3/19/2012   • Major depression 3/19/2012   • Orbital floor (blow-out) closed fracture (HCC)     left   • Other specified symptom associated with female genital organs     post menopausal bleeding   • Pain     thoracic spine, Right knee, myofacial pain, and Right shoulder   • Pain     recently fell and has bruise left forehead   • Pain 02/2018    chronic back pain, right shoulder   • Psychiatric problem     depression, anxiety   • Unspecified disorder  of thyroid    • Urinary bladder disorder     stress incont.   • Urinary incontinence     Occasional       PSHx:   Past Surgical History:   Procedure Laterality Date   • INJ,EPI ANES/STER LUM/SAC ADDL Right 4/7/2021    Procedure: RIGHT lumbar five and sacral one transforaminal epidural;  Surgeon: Volodymyr Herring M.D.;  [facility];  Service: Pain Management   • PB TOTAL KNEE ARTHROPLASTY Right 9/16/2019    Procedure: RIGHT ARTHROPLASTY, KNEE, TOTAL;  Surgeon: Rufus Saba M.D.;  Location: [facility];  Service: Orthopedics   • KNEE ARTHROSCOPY Right 2/9/2018    Procedure: KNEE ARTHROSCOPY;  Surgeon: Harsh Baltazar M.D.;  Location: [facility];  Service: Orthopedics   • MENISCECTOMY, KNEE, MEDIAL Right 2/9/2018    Procedure: MEDIAL AND LATERAL MENISCECTOMY- PARTIAL;  Surgeon: Harsh Baltazar M.D.;  Location: [facility];  Service: Orthopedics   • KNEE ARTHROSCOPY Right 7/12/2017    Procedure: KNEE ARTHROSCOPY- CESPEDES;  Surgeon: Harsh Baltazar M.D.;  Location: [facility];  Service:    • MENISCECTOMY, KNEE, MEDIAL Right 7/12/2017    Procedure: PARTIAL MEDIAL AND LATERAL MENISCECTOMY;  Surgeon: Harsh Baltazar M.D.;  Location: [facility];  Service:    • SYNOVECTOMY Right 7/12/2017    Procedure: SYNOVECTOMY;  Surgeon: Harsh Baltazar M.D.;  Location: [facility];  Service:    • KNEE ARTHROSCOPY  4/21/2015    Performed by Rufus Saba M.D. at [facility]   • MENISCECTOMY, KNEE, MEDIAL  4/21/2015    Performed by Rufus Saba M.D. at [facility]   • PUMP REVISION  12/10/2012    Performed by Allison Guerra M.D. at [facility]   • SPINAL CORD STIMULATOR  5/30/2012    Performed by ALLISON GUERRA at [facility]   • BIOPSY GENERAL  5/1/12    endometrial   • SPINAL CORD STIMULATOR  7/11/2011    Performed by ALLISON GUERRA at  "[facility]UR[facility]ERY [facility]   • BLOCK EPIDURAL STEROID INJECTION  2008    x 3-4   • LYMPH NODE EXCISION Left 2007    cervicle   • OTHER Right 2001    thoracic discectomy     • ARTHROSCOPY, KNEE Left 1999   • RIB RESECTION Right 1980   • LUMPECTOMY         Family history   Family History   Problem Relation Age of Onset   • Hypertension Father    • Diabetes Father    • Heart Disease Father    • Alcohol abuse Father    • Anesthesia Sister         slow to come out (as a child)   • Diabetes Other    • Heart Disease Other    • Cancer Other    • Hypertension Other    • Stroke Other    • Lung Disease Other          Medications:   Current Outpatient Medications   Medication   • traMADol (ULTRAM) 50 MG Tab   • [START ON 2/21/2022] traMADol (ULTRAM) 50 MG Tab   • buPROPion (WELLBUTRIN XL) 300 MG XL tablet   • hydrOXYzine pamoate (VISTARIL) 100 MG Cap   • venlafaxine (EFFEXOR-XR) 150 MG extended-release capsule   • propranolol (INDERAL) 20 MG Tab   • melatonin 5 mg Tab   • levothyroxine (SYNTHROID) 50 MCG Tab   • metFORMIN ER (GLUCOPHAGE XR) 500 MG TABLET SR 24 HR   • atorvastatin (LIPITOR) 20 MG Tab   • lisinopril-hydrochlorothiazide (PRINZIDE) 20-12.5 MG per tablet   • ergocalciferol (DRISDOL) 83312 UNIT capsule   • ibuprofen (MOTRIN) 200 MG Tab   • Ferrous Sulfate (IRON) 325 (65 Fe) MG Tab   • acetaminophen (TYLENOL) 500 MG Tab     No current facility-administered medications for this visit.       Allergies:   Allergies   Allergen Reactions   • Sulfamethoxazole-Trimethoprim Rash and Swelling     Pt states \"My hand swells up and rash all over body\".       Social Hx:   Social History     Socioeconomic History   • Marital status: Single     Spouse name: Not on file   • Number of children: Not on file   • Years of education: Not on file   • Highest education level: Associate degree: academic program   Occupational History   • Not on file   Tobacco Use   • Smoking status: Never Smoker   • Smokeless tobacco: Never Used   Vaping Use "   • Vaping Use: Never used   Substance and Sexual Activity   • Alcohol use: Not Currently     Alcohol/week: 0.0 oz     Comment: 1 glass wine per month   • Drug use: No   • Sexual activity: Never     Partners: Male   Other Topics Concern   •  Service No   • Blood Transfusions No   • Caffeine Concern No   • Occupational Exposure No   • Hobby Hazards No   • Sleep Concern Yes   • Stress Concern Yes   • Weight Concern Yes   • Special Diet No   • Back Care No   • Exercise No   • Bike Helmet No   • Seat Belt Yes   • Self-Exams No   Social History Narrative   • Not on file     Social Determinants of Health     Financial Resource Strain: Low Risk    • Difficulty of Paying Living Expenses: Not hard at all   Food Insecurity: No Food Insecurity   • Worried About Running Out of Food in the Last Year: Never true   • Ran Out of Food in the Last Year: Never true   Transportation Needs: No Transportation Needs   • Lack of Transportation (Medical): No   • Lack of Transportation (Non-Medical): No   Physical Activity: Inactive   • Days of Exercise per Week: 1 day   • Minutes of Exercise per Session: 0 min   Stress: Stress Concern Present   • Feeling of Stress : Rather much   Social Connections: Unknown   • Frequency of Communication with Friends and Family: Not on file   • Frequency of Social Gatherings with Friends and Family: Twice a week   • Attends [employer] Services: Never   • Active Member of Clubs or Organizations: No   • Attends Club or Organization Meetings: Never   • Marital Status: Never    Intimate Partner Violence:    • Fear of Current or Ex-Partner: Not on file   • Emotionally Abused: Not on file   • Physically Abused: Not on file   • Sexually Abused: Not on file   Housing Stability: Unknown   • Unable to Pay for Housing in the Last Year: No   • Number of Places Lived in the Last Year: 1   • Unstable Housing in the Last Year: Not on file         EXAMINATION     Physical Exam:   Vitals: /86 (BP  "Location: Right arm, Patient Position: Sitting, BP Cuff Size: Small adult)   Pulse 62   Temp 36 °C (96.8 °F) (Temporal)   Ht 1.676 m (5' 6\")   Wt 85.3 kg (188 lb)   SpO2 94%     Constitutional:   Body Habitus: Body mass index is 30.34 kg/m².  Cooperation: Fully cooperates with exam  Appearance: Well-groomed, well-nourished, not disheveled no acute distress    Eyes: No scleral icterus, no proptosis     ENT -no obvious auditory deficits, wearing a face mask    Skin -no visible skin lesions    Respiratory-  breathing comfortable on room air, no audible wheezing    Cardiovascular- No lower extremity edema is noted.     Psychiatric- alert and oriented ×3. Normal affect.     Gait -  Minimally antalgic without assist device    Neuro/Muscloskeletal  No focal motor or sensory deficits in the lower extremities bilaterally    Reflexes are 2+ left patella and 2+ achilles bilaterally    Functional ROM of the right knee, flexion greater than 120 degrees is maintained.  Tenderness over right medial knee    MEDICAL DECISION MAKING    Medical records review: see under HPI section.     DATA    Labs:     03/14/2020 CRP 0.19  03/14/2020 Sed rate 20  02/25/2020: Tramadol and metabolites  09/21/2021 UDS consistent with tramadol and metabolites    Lab Results   Component Value Date/Time    SODIUM 139 10/27/2021 01:00 PM    POTASSIUM 4.7 10/27/2021 01:00 PM    CHLORIDE 99 10/27/2021 01:00 PM    CO2 27 10/27/2021 01:00 PM    ANION 13.0 10/27/2021 01:00 PM    GLUCOSE 105 (H) 10/27/2021 01:00 PM    BUN 18 10/27/2021 01:00 PM    CREATININE 0.82 10/27/2021 01:00 PM    CALCIUM 8.9 10/27/2021 01:00 PM    ASTSGOT 18 10/27/2021 01:00 PM    ALTSGPT 13 10/27/2021 01:00 PM    TBILIRUBIN 0.2 10/27/2021 01:00 PM    ALBUMIN 4.8 10/27/2021 01:00 PM    TOTPROTEIN 7.3 10/27/2021 01:00 PM    GLOBULIN 2.5 10/27/2021 01:00 PM    AGRATIO 1.9 10/27/2021 01:00 PM       Lab Results   Component Value Date/Time    PROTHROMBTM 12.6 10/07/2020 10:45 PM    INR " 0.91 10/07/2020 10:45 PM        Lab Results   Component Value Date/Time    WBC 9.8 10/27/2021 01:00 PM    RBC 4.09 (L) 10/27/2021 01:00 PM    HEMOGLOBIN 12.6 10/27/2021 01:00 PM    HEMATOCRIT 39.9 10/27/2021 01:00 PM    MCV 97.6 10/27/2021 01:00 PM    MCH 30.8 10/27/2021 01:00 PM    MCHC 31.6 (L) 10/27/2021 01:00 PM    MPV 11.4 10/27/2021 01:00 PM    NEUTSPOLYS 79.50 (H) 10/27/2021 01:00 PM    LYMPHOCYTES 16.20 (L) 10/27/2021 01:00 PM    MONOCYTES 3.10 10/27/2021 01:00 PM    EOSINOPHILS 0.60 10/27/2021 01:00 PM    BASOPHILS 0.30 10/27/2021 01:00 PM        Lab Results   Component Value Date/Time    HBA1C 5.8 (H) 06/05/2021 07:42 AM        Imaging: I personally reviewed following images, these are my reads  CT cervical spine 09/22/2020  There is note of cervical spondylosis with multilevel uncovertebral arthropathy    Xray right hip 03/14/2020  There is mild osteoarthritis of the right hip.      Xray lumbar 03/14/2020  There is mild anterolisthesis at L5-S1.  No abnormal motion on flexion and extension  There is DDD and facet arthropathy that is most noted at L5-S1 and less at L4-5    Xray right knee 09/16/2019  There is note of right knee arthroplasty    IMAGING radiology reads. I reviewed the following radiology reads    Xray right knee 09/18/2021  Multiple standing views of the right knee show previous right total knee   replacement with hardware in good position without signs of loosening or   dislocation.  No obvious fracture noted.    Xray left shoulder 10/27/2021  X-rays reviewed today of the left shoulder show normal overall bony   alignment she has some AC degenerative change.  She has some osteophyte   formation off the inferior humeral head.  Some mild glenohumeral joint   space narrowing patient.  Type III acromion.    CT cervical spine 09/22/2020  IMPRESSION:  Degenerative change without evidence of cervical spine fracture.    Xray lumbar 03/14/2020  IMPRESSION:  1.  No compression deformity or acute  fracture is identified.  2.  Mild anterolisthesis at L5-S1.  3.  Degenerative disc disease and facet arthropathy.  4.  No focal instability noted on flexion extension views.                                                Xray right hip 03/14/2020  IMPRESSION:     1.  No radiographic evidence of acute traumatic injury.     2.  Findings are consistent with mild osteoarthritis.     3.  Probable constipation.                                   Results for orders placed during the hospital encounter of 09/16/19   DX-KNEE 2- RIGHT    Impression Right knee arthroplasty.      Results for orders placed during the hospital encounter of 03/28/19   DX-KNEE 3 VIEWS RIGHT    Impression No evidence of acute fracture or dislocation.    Degenerative changes as above described.                              Diagnosis   Visit Diagnoses     ICD-10-CM   1. Spondylolisthesis at L5-S1 level  M43.17   2. Spinal stenosis of lumbar region, unspecified whether neurogenic claudication present  M48.061   3. Lumbar spondylosis  M47.816   4. Major depressive disorder, recurrent episode, in partial remission ([facility])  F33.41   5. Spinal cord stimulator status  Z96.89   6. S/P TKR (total knee replacement), right  Z96.651   7. Lumbar radiculopathy  M54.16           ASSESSMENT:  Debby Desai 61 y.o. female seen for above.     Debby was seen today for follow-up.    Diagnoses and all orders for this visit:    Spondylolisthesis at L5-S1 level  -     traMADol (ULTRAM) 50 MG Tab; Take 1 Tablet by mouth every 8 hours as needed for Moderate Pain or Severe Pain for up to 30 days.  -     traMADol (ULTRAM) 50 MG Tab; Take 1 Tablet by mouth every 8 hours as needed for Moderate Pain or Severe Pain for up to 30 days.  -     Consent for Opiate Prescription  -     Controlled Substance Treatment Agreement    Spinal stenosis of lumbar region, unspecified whether neurogenic claudication present    Lumbar spondylosis    Major depressive disorder, recurrent episode, in  partial remission (HCC)    Spinal cord stimulator status  -     DX-THORACOLUMBAR SPINE-2 VIEWS; Future    S/P TKR (total knee replacement), right  -     traMADol (ULTRAM) 50 MG Tab; Take 1 Tablet by mouth every 8 hours as needed for Moderate Pain or Severe Pain for up to 30 days.  -     traMADol (ULTRAM) 50 MG Tab; Take 1 Tablet by mouth every 8 hours as needed for Moderate Pain or Severe Pain for up to 30 days.  -     Consent for Opiate Prescription  -     Controlled Substance Treatment Agreement    Lumbar radiculopathy  -     traMADol (ULTRAM) 50 MG Tab; Take 1 Tablet by mouth every 8 hours as needed for Moderate Pain or Severe Pain for up to 30 days.  -     traMADol (ULTRAM) 50 MG Tab; Take 1 Tablet by mouth every 8 hours as needed for Moderate Pain or Severe Pain for up to 30 days.  -     Consent for Opiate Prescription  -     Controlled Substance Treatment Agreement         1. Tramadol wean continues.  #60 and then #58.  reviewed.  Consents obtained.  2. Plan to contact [employer] representative about her device.  Thoracolumbar xray ordered to assess device electrode placement.  3. Continue with PT.  She feels hopeful about this, wants to give this more time.  4. Low back pain persists, but is stable.  No significant radicular symptoms at this time with history of lumbar radiculopathy  5. Discussed that she is continuing to work with Psychologist and Psychiatrist, Dr. Stevenson. Denies suicidality.      Follow-up: Return in about 2 months (around 3/21/2022), or if symptoms worsen or fail to improve after xray.      Thank you very much for asking me to participate in Debby Desai's care.  Please contact me with any questions or concerns.      Please note that this dictation was created using voice recognition software. I have made every reasonable attempt to correct obvious errors but there may be errors of grammar and content that I may have overlooked prior to finalization of this note.      Volodymyr Herring,  MD  Physical Medicine and Rehabilitation  Interventional Spine and Sports Physiatry  [facility]

## 2022-01-24 ENCOUNTER — PHARMACY VISIT (OUTPATIENT)
Dept: PHARMACY | Facility: MEDICAL CENTER | Age: 62
End: 2022-01-24
Payer: COMMERCIAL

## 2022-01-25 ENCOUNTER — PHYSICAL THERAPY (OUTPATIENT)
Dept: PHYSICAL THERAPY | Facility: REHABILITATION | Age: 62
End: 2022-01-25
Attending: ORTHOPAEDIC SURGERY
Payer: COMMERCIAL

## 2022-01-25 DIAGNOSIS — M25.562 PAIN IN BOTH KNEES, UNSPECIFIED CHRONICITY: ICD-10-CM

## 2022-01-25 DIAGNOSIS — M25.561 PAIN IN BOTH KNEES, UNSPECIFIED CHRONICITY: ICD-10-CM

## 2022-01-25 PROCEDURE — 97110 THERAPEUTIC EXERCISES: CPT

## 2022-01-25 NOTE — OP THERAPY DAILY TREATMENT
Outpatient Physical Therapy  DAILY TREATMENT     [facility] [place]  [address], [address]  [place] [address]  Phone:  [phone]  Fax:  [phone]    Date: 01/25/2022    Patient: Debby Desai  YOB: 1960  MRN: [id number]     Time Calculation    Start time: 1015  Stop time: 1100 Time Calculation (min): 45 minutes         Chief Complaint: Knee pain  Visit #: 7    SUBJECTIVE:  Pt states she saw dr. Herring and they are working on weaning her off Tramadol.     OBJECTIVE:        Therapeutic Exercises (CPT 51985):     1. Nu step, x10min L1    2. SLR, x20    3. SAQ, x20    4. HS stretch, [id number]    5. Backwards walking in //bars, x5min    6. Ball hs curls, 20x    7. Shuttle, x3c x5min    8. Sit to stand    9. TKE BALL ON WALL, 2 X 10      Time-based treatments/modalities:    Physical Therapy Timed Treatment Charges  Therapeutic exercise minutes (CPT 68333): 45 minutes        ASSESSMENT:   Pt needs frequent breaks during treatment. she's very proactive with HEP, psych, and pain management.     PLAN/RECOMMENDATIONS:   Plan for treatment: therapy treatment to continue next visit.  Planned interventions for next visit: neuromuscular re-education (CPT 31431) and therapeutic exercise (CPT 41650).

## 2022-02-02 ENCOUNTER — APPOINTMENT (OUTPATIENT)
Dept: PHYSICAL THERAPY | Facility: REHABILITATION | Age: 62
End: 2022-02-02
Attending: ORTHOPAEDIC SURGERY
Payer: COMMERCIAL

## 2022-02-03 ENCOUNTER — TELEMEDICINE (OUTPATIENT)
Dept: BEHAVIORAL HEALTH | Facility: CLINIC | Age: 62
End: 2022-02-03
Payer: COMMERCIAL

## 2022-02-03 DIAGNOSIS — F41.1 GAD (GENERALIZED ANXIETY DISORDER): ICD-10-CM

## 2022-02-03 DIAGNOSIS — G47.09 OTHER INSOMNIA: ICD-10-CM

## 2022-02-03 DIAGNOSIS — R25.1 TREMORS OF NERVOUS SYSTEM: ICD-10-CM

## 2022-02-03 DIAGNOSIS — F33.2 SEVERE EPISODE OF RECURRENT MAJOR DEPRESSIVE DISORDER, WITHOUT PSYCHOTIC FEATURES (HCC): ICD-10-CM

## 2022-02-03 PROCEDURE — RXMED WILLOW AMBULATORY MEDICATION CHARGE: Performed by: PSYCHIATRY & NEUROLOGY

## 2022-02-03 PROCEDURE — 99214 OFFICE O/P EST MOD 30 MIN: CPT | Mod: 95 | Performed by: PSYCHIATRY & NEUROLOGY

## 2022-02-03 RX ORDER — VENLAFAXINE HYDROCHLORIDE 150 MG/1
150 CAPSULE, EXTENDED RELEASE ORAL DAILY
Qty: 30 CAPSULE | Refills: 2 | Status: SHIPPED | OUTPATIENT
Start: 2022-02-03 | End: 2022-04-20 | Stop reason: SDUPTHER

## 2022-02-03 RX ORDER — VENLAFAXINE HYDROCHLORIDE 75 MG/1
75 CAPSULE, EXTENDED RELEASE ORAL DAILY
Qty: 30 CAPSULE | Refills: 2 | Status: SHIPPED | OUTPATIENT
Start: 2022-02-03 | End: 2022-04-20 | Stop reason: SDUPTHER

## 2022-02-03 RX ORDER — HYDROXYZINE PAMOATE 100 MG
100 CAPSULE ORAL NIGHTLY PRN
Qty: 30 CAPSULE | Refills: 2 | Status: SHIPPED | OUTPATIENT
Start: 2022-02-03 | End: 2022-04-20

## 2022-02-03 RX ORDER — BUPROPION HYDROCHLORIDE 300 MG/1
300 TABLET ORAL EVERY MORNING
Qty: 30 TABLET | Refills: 2 | Status: SHIPPED | OUTPATIENT
Start: 2022-02-03 | End: 2022-04-20 | Stop reason: SDUPTHER

## 2022-02-03 RX ORDER — PROPRANOLOL HYDROCHLORIDE 20 MG/1
20 TABLET ORAL 3 TIMES DAILY PRN
Qty: 90 TABLET | Refills: 1 | Status: SHIPPED | OUTPATIENT
Start: 2022-02-03 | End: 2022-04-20 | Stop reason: SDUPTHER

## 2022-02-03 ASSESSMENT — PATIENT HEALTH QUESTIONNAIRE - PHQ9
SUM OF ALL RESPONSES TO PHQ QUESTIONS 1-9: 18
CLINICAL INTERPRETATION OF PHQ2 SCORE: 6
5. POOR APPETITE OR OVEREATING: 1 - SEVERAL DAYS

## 2022-02-03 NOTE — PROGRESS NOTES
This evaluation was conducted via Zoom using secure and encrypted videoconferencing technology. The patient was in their home in the [facility].    The patient's identity was confirmed and verbal consent was obtained for this virtual visit.     PSYCHIATRY FOLLOW-UP NOTE      Name: Debby Desai  MRN: [id number]  : 1960  Age: 61 y.o.  Date of assessment: 2/3/2022  PCP: ARELIS Chris  Persons in attendance: Patient      REASON FOR VISIT/CHIEF COMPLAINT (as stated by Patient):  Debby Desai is a 61 y.o., White female, attending follow-up appointment for mood and anxiety management.      HISTORY OF PRESENT ILLNESS:  Debby Desai is a 61 y.o. old female with MDD and TIM comes in today for follow up. Patient was last seen 1 month ago, and following treatment planning recommendations were done:  · Continue Effexor  mg daily for mood, anxiety and comorbid pain management. 30 days supply given with 2 refill.  · Continue Wellbutrin XL to 300 mg daily for depression augmentation.  30-day supply with 2 refill given.  · Continue hydroxyzine (vistaril) to 100 mg HS + melatonin 5 mg HS as needed for insomnia.  30 tabs with 2 refill given.  · Continue propanolol 20 mg BID for anxiety and shake. 60 tabs with 2 refill given.  · Initiate referral to neurology for evaluation of persistent tremors: appointment in 2022  · Continue psychotherapy in the clinic for mood and anxiety management.      Patient is compliant with medication with no side effects but has seen recent worsening of depression and anxiety due to limitations imposed by pain and its sequelae.  Patient remained persistent pain causing sleep disruption and she feels guilty for not able to engage or help with family members.  Patient understand the limitation of medication response with comorbid pain and understand the importance of initiating psychotherapy, which patient will begin next week.  Patient is also on tramadol and  understands the risk of serotonin syndrome with venlafaxine and tramadol, but she reports the dose of tramadol was reduced by her pain management physician recently.    Patient scored high on PHQ 9 with passive death wishes.  Safety assessment was done and patient denies active plan or intent and describes family as a protective factor.  Patient to understand the importance of reaching out and calling 911 if worsening of depression or suicidal ideation or safety concern is seen.  After reviewing risk and benefits she agreed with plan of increasing venlafaxine to total to 25 mg dosage to target mood and anxiety but also comorbid pain.  Discussed the future plan of considering either doxepin or amitriptyline for sleep if indicated, but agreed with plan of avoiding more serotonergic medications.      CURRENT MEDICATIONS:  Current Outpatient Medications   Medication Sig Dispense Refill   • traMADol (ULTRAM) 50 MG Tab Take 1 Tablet by mouth every 8 hours as needed for Moderate Pain or Severe Pain for up to 30 days. 60 Tablet 0   • [START ON 2/21/2022] traMADol (ULTRAM) 50 MG Tab Take 1 Tablet by mouth every 8 hours as needed for Moderate Pain or Severe Pain for up to 30 days. 58 Tablet 0   • buPROPion (WELLBUTRIN XL) 300 MG XL tablet Take 1 tablet by mouth every morning. 30 Tablet 2   • hydrOXYzine pamoate (VISTARIL) 100 MG Cap Take 1 capsule by mouth at bedtime as needed (sleep). 30 Capsule 2   • venlafaxine (EFFEXOR-XR) 150 MG extended-release capsule Take 1 capsule by mouth every day. 30 Capsule 2   • propranolol (INDERAL) 20 MG Tab Take 1 Tablet by mouth 2 times a day. 60 Tablet 2   • melatonin 5 mg Tab Take 1 tablet by mouth at bedtime. 30 Tablet 1   • levothyroxine (SYNTHROID) 50 MCG Tab Take 1 tablet by mouth Every morning on an empty stomach. 90 Tablet 3   • metFORMIN ER (GLUCOPHAGE XR) 500 MG TABLET SR 24 HR Take 1 Tab by mouth 2 times a day. 180 Tablet 2   • atorvastatin (LIPITOR) 20 MG Tab TAKE ONE TABLET BY  MOUTH EVERY DAY 90 Tablet 2   • lisinopril-hydrochlorothiazide (PRINZIDE) 20-12.5 MG per tablet Take one tablet by mouth daily 90 Tablet 2   • ergocalciferol (DRISDOL) 69673 UNIT capsule Take 1 capsule by mouth every 7 days. 12 capsule 0   • ibuprofen (MOTRIN) 200 MG Tab Take 600 mg by mouth every 6 hours as needed for Inflammation.     • Ferrous Sulfate (IRON) 325 (65 Fe) MG Tab Take 65 mg by mouth every day at 6 PM.     • acetaminophen (TYLENOL) 500 MG Tab Take 500-1,000 mg by mouth 2 times a day as needed for Moderate Pain.       No current facility-administered medications for this visit.       MEDICAL HISTORY  Past Medical History:   Diagnosis Date   • Anemia     in past   • Anginal syndrome (HCC)     had work up and feet r/t anxiety   • Anxiety    • Arthritis     OA knees   • Chronic pain 6/2/2021   • Dental disorder 5/24/12    partial upper denture   • Diabetes (HCC)     pre diabetic   • High cholesterol    • HTN (hypertension), benign 3/19/2012   • Hypothyroid 3/19/2012   • Major depression 3/19/2012   • Orbital floor (blow-out) closed fracture (HCC)     left   • Other specified symptom associated with female genital organs     post menopausal bleeding   • Pain     thoracic spine, Right knee, myofacial pain, and Right shoulder   • Pain     recently fell and has bruise left forehead   • Pain 02/2018    chronic back pain, right shoulder   • Psychiatric problem     depression, anxiety   • Unspecified disorder of thyroid    • Urinary bladder disorder     stress incont.   • Urinary incontinence     Occasional     Past Surgical History:   Procedure Laterality Date   • INJ,EPI ANES/STER LUM/SAC ADDL Right 4/7/2021    Procedure: RIGHT lumbar five and sacral one transforaminal epidural;  Surgeon: Volodymyr Herring M.D.;  [facility];  Service: Pain Management   • PB TOTAL KNEE ARTHROPLASTY Right 9/16/2019    Procedure: RIGHT ARTHROPLASTY, KNEE, TOTAL;  Surgeon: Rufus Saba M.D.;   Location: [facility];  Service: Orthopedics   • KNEE ARTHROSCOPY Right 2/9/2018    Procedure: KNEE ARTHROSCOPY;  Surgeon: Harsh Baltazar M.D.;  Location: [facility];  Service: Orthopedics   • MENISCECTOMY, KNEE, MEDIAL Right 2/9/2018    Procedure: MEDIAL AND LATERAL MENISCECTOMY- PARTIAL;  Surgeon: Harsh Baltazar M.D.;  Location: [facility];  Service: Orthopedics   • KNEE ARTHROSCOPY Right 7/12/2017    Procedure: KNEE ARTHROSCOPY- CESPEDES;  Surgeon: Harsh Baltazar M.D.;  Location: [facility];  Service:    • MENISCECTOMY, KNEE, MEDIAL Right 7/12/2017    Procedure: PARTIAL MEDIAL AND LATERAL MENISCECTOMY;  Surgeon: Harsh Baltazar M.D.;  Location: [facility];  Service:    • SYNOVECTOMY Right 7/12/2017    Procedure: SYNOVECTOMY;  Surgeon: Harsh Baltazar M.D.;  Location: [facility];  Service:    • KNEE ARTHROSCOPY  4/21/2015    Performed by Rufus Saba M.D. at [facility]   • MENISCECTOMY, KNEE, MEDIAL  4/21/2015    Performed by Rufus Saba M.D. at [facility]   • PUMP REVISION  12/10/2012    Performed by Allison Guerra M.D. at [facility]   • SPINAL CORD STIMULATOR  5/30/2012    Performed by ALLISON GUERRA at [facility]   • BIOPSY GENERAL  5/1/12    endometrial   • SPINAL CORD STIMULATOR  7/11/2011    Performed by ALLISON GUERRA at [facility]   • BLOCK EPIDURAL STEROID INJECTION  2008    x 3-4   • LYMPH NODE EXCISION Left 2007    cervicle   • OTHER Right 2001    thoracic discectomy     • ARTHROSCOPY, KNEE Left 1999   • RIB RESECTION Right 1980   • LUMPECTOMY         PAST PSYCHIATRIC HISTORY  Prior psychiatric hospitalization: no  Prior Self harm/suicide attempt: no  Prior Diagnosis: MDD, Anxiety     PAST PSYCHIATRIC  MEDICATIONS  · Fluoxetine  · Paroxetine  · Citalopram  · Sertraline  · Duloxetine  · Wellbutrin  · Amitriptyline   · Ativan      FAMILY HISTORY  Psychiatric diagnosis: Nieces with depression and anxiety  History of suicide attempts: No  Substance abuse history: 1 sister with drug and alcohol abuse history     SUBSTANCE USE HISTORY:  ALCOHOL: no  TOBACCO: no  CANNABIS: no  OPIOIDS: no  PRESCRIPTION MEDICATIONS: no  OTHERS: no  History of inpatient/outpatient rehab treatment: none     SOCIAL HISTORY  Childhood: born in [place] and describes childhood as difficult  Moved a lot due to parents financial concerns  Employment: For [employer]  Relationship: single (never )  Kids: no kids  Current living situation: alone (but strong family support as they all live nearby)  Current/past legal issues: denies  History of emotional/physical/sexual abuse - denies      REVIEW OF SYSTEMS:        Constitutional  fatigue; chronic pain   Eyes negative   Ears/Nose/Mouth/Throat negative   Cardiovascular  hypertension, hyperlipidemia   Respiratory negative   Gastrointestinal negative   Genitourinary negative   Muscular  osteoarthritis   Integumentary negative   Neurological negative   Endocrine  hypothyroidism   Hematologic/Lymphatic negative     PHYSICAL EXAMINAION:  Vital signs: [facility] 02/26/2012   Musculoskeletal: sitting in chair  Abnormal movements: none      MENTAL STATUS EXAMINATION      General:   - Grooming and hygiene: Casual,   - Apparent distress: tense,   - Behavior: Tense  - Eye Contact:  Good,   - no psychomotor agitation or retardation    - Participation: Active verbal participation  Orientation: Alert and Fully Oriented to person, place and time  Mood: Depressed and Anxious  Affect: Constricted,  Thought Process: Logical and Goal-directed  Thought Content: Denies suicidal or homicidal ideations, intent or plan Within normal limits  Perception: Denies auditory or visual hallucinations. No delusions noted Within  normal limits  Attention span and concentration: Intact   Speech:Rate within normal limits and Volume within normal limits  Language: Appropriate   Insight: Good  Judgment: Good  Recent and remote memory: No gross evidence of memory deficits        DEPRESSION SCREENING:  Depression Screen (PHQ-2/PHQ-9) 9/21/2021 11/19/2021 1/21/2022   PHQ-2 Total Score - - -   PHQ-2 Total Score - - -   PHQ-2 Total Score 4 5 6   PHQ-9 Total Score - - -   PHQ-9 Total Score 13 17 16       Interpretation of PHQ-9 Total Score   Score Severity   1-4 No Depression   5-9 Mild Depression   10-14 Moderate Depression   15-19 Moderately Severe Depression   20-27 Severe Depression    CURRENT RISK:       Suicidal: Low       Homicidal: Low       Self-Harm: Low       Relapse: Low       Crisis Safety Plan Reviewed Not Indicated       If evidence of imminent risk is present, intervention/plan:      MEDICAL RECORDS/LABS/DIAGNOSTIC TESTS REVIEWED:  No new lab since last visit     [facility] records -   Reviewed     DIAGNOSTIC IMPRESSION(S):  1. Major depressive disorder, recurrent  2. Generalized anxiety disorder  3. Insomnia        PLAN:  (1) Major depressive disorder; (2) TIM; (3) Insomnia  · Worsening. PHQ9: 18 (due to comorbid pain)  · Increase Effexor XR to 225 mg daily for mood, anxiety and comorbid pain management.   · Continue Wellbutrin XL to 300 mg daily for depression augmentation.    · Continue hydroxyzine (vistaril) to 100 mg HS + melatonin 5 mg HS as needed for insomnia.    · Continue propanolol 20 mg TID PRN for anxiety and shake.   · Referral was placed to neurology in last session for evaluation of persistent tremors.  · Continue psychotherapy for mood and anxiety management.  · Medication options, alternatives (including no medications) and medication risks/benefits/side effects were discussed in detail.  · Explained importance of contraceptive measures while on psychotropic medications, educated to let provider know if ever pregnant or  wanting to become pregnant. Verbalized understanding.  · The patient was advised to call, message provider on MyChart, or come in to the clinic if symptoms worsen or if any future questions/issues regarding their medications arise; the patient verbalized understanding and agreement.    · The patient was educated to call 911, call the suicide hotline, or go to local ER if having thoughts of suicide or homicide; verbalized understanding.    Billing Coding based on:  42607: based on MDM    Return to clinic in 4-6 weeks or sooner if symptoms worsen.  Next Appointment: instruction provided on how to make the next appointment.     The proposed treatment plan was discussed with the patient who was provided the opportunity to ask questions and make suggestions regarding alternative treatment. Patient verbalized understanding and expressed agreement with the plan.       Genaro Stevenson M.D.  02/03/22    This note was created using voice recognition software (Dragon). The accuracy of the dictation is limited by the abilities of the software. I have reviewed the note prior to signing, however some errors in grammar and context are still possible. If you have any questions related to this note please do not hesitate to contact our office.

## 2022-02-08 PROCEDURE — RXMED WILLOW AMBULATORY MEDICATION CHARGE: Performed by: NURSE PRACTITIONER

## 2022-02-09 ENCOUNTER — PHYSICAL THERAPY (OUTPATIENT)
Dept: PHYSICAL THERAPY | Facility: REHABILITATION | Age: 62
End: 2022-02-09
Attending: ORTHOPAEDIC SURGERY
Payer: COMMERCIAL

## 2022-02-09 DIAGNOSIS — M25.562 PAIN IN BOTH KNEES, UNSPECIFIED CHRONICITY: ICD-10-CM

## 2022-02-09 DIAGNOSIS — M25.561 PAIN IN BOTH KNEES, UNSPECIFIED CHRONICITY: ICD-10-CM

## 2022-02-09 PROCEDURE — 97110 THERAPEUTIC EXERCISES: CPT

## 2022-02-09 PROCEDURE — 97140 MANUAL THERAPY 1/> REGIONS: CPT

## 2022-02-09 NOTE — OP THERAPY DAILY TREATMENT
Outpatient Physical Therapy  DAILY TREATMENT     [facility] [place]  [address], [address]  [place] [address]  Phone:  [phone]  Fax:  [phone]    Date: 02/09/2022    Patient: Debby Desai  YOB: 1960  MRN: [id number]     Time Calculation    Start time: 0730  Stop time: 0815 Time Calculation (min): 45 minutes         Chief Complaint: Knee Problem    Visit #: 9    SUBJECTIVE:  Notes that symptoms are largely unchanged. States that he standing tolerance has improved.     OBJECTIVE:  Current objective measures:           Therapeutic Exercises (CPT 98452):     1. Nu step, x10min L1    2. SLR, x20    3. SAQ, x20    4. HS stretch, [id number]    5. Backwards walking in //bars, x5min    6. Ball hs curls, 20x    7. Shuttle, x3c x5min    9. TKE BALL ON WALL, 2 X 10    Therapeutic Treatments and Modalities:     1. Manual Therapy (CPT 00217), STM tool assited to quad; hamstring, and adductor    Time-based treatments/modalities:    Physical Therapy Timed Treatment Charges  Manual therapy minutes (CPT 66543): 10 minutes  Therapeutic exercise minutes (CPT 48453): 30 minutes      Pain rating (1-10) before treatment:  3  Pain rating (1-10) after treatment:  3    ASSESSMENT:   Response to treatment: Patient continues to demonstrate minor improvements in strength but symptoms continue to remain consistent. Plan to continue to increase LE strength training.     PLAN/RECOMMENDATIONS:   Plan for treatment: therapy treatment to continue next visit.  Planned interventions for next visit: continue with current treatment.

## 2022-02-10 ENCOUNTER — PHARMACY VISIT (OUTPATIENT)
Dept: PHARMACY | Facility: MEDICAL CENTER | Age: 62
End: 2022-02-10
Payer: COMMERCIAL

## 2022-02-15 ENCOUNTER — PHYSICAL THERAPY (OUTPATIENT)
Dept: PHYSICAL THERAPY | Facility: REHABILITATION | Age: 62
End: 2022-02-15
Attending: ORTHOPAEDIC SURGERY
Payer: COMMERCIAL

## 2022-02-15 DIAGNOSIS — M25.561 PAIN IN BOTH KNEES, UNSPECIFIED CHRONICITY: ICD-10-CM

## 2022-02-15 DIAGNOSIS — M25.562 PAIN IN BOTH KNEES, UNSPECIFIED CHRONICITY: ICD-10-CM

## 2022-02-15 PROCEDURE — 97140 MANUAL THERAPY 1/> REGIONS: CPT

## 2022-02-15 PROCEDURE — 97110 THERAPEUTIC EXERCISES: CPT

## 2022-02-15 NOTE — OP THERAPY DAILY TREATMENT
Outpatient Physical Therapy  DAILY TREATMENT     [facility] [place]  [address]  [place] [address]  Phone:  [phone]  Fax:  [phone]    Date: 02/15/2022    Patient: Debby Desai  YOB: 1960  MRN: [id number]     Time Calculation    Start time: 1030  Stop time: 1115 Time Calculation (min): 45 minutes         Chief Complaint: Knee Problem    Visit #: 10    SUBJECTIVE:  Patient reports that she was able to go on two long walks the last few days without an increase in pain. Patient notes that overall she still feels the same.     OBJECTIVE:  Current objective measures:           Therapeutic Exercises (CPT 82355):     1. Nu step, x10min L1    2. SLR, x20    3. SAQ, x20    4. HS stretch, [id number]    5. Backwards walking in //bars, x5min    6. Ball hs curls, 20x    7. Shuttle, x3c x5min    9. TKE BALL ON WALL, 2 X 10    Therapeutic Treatments and Modalities:     1. Manual Therapy (CPT 47790), STM tool assited to quad; hamstring, and adductor; tib femoral a-p glides grade I-III    Time-based treatments/modalities:    Physical Therapy Timed Treatment Charges  Manual therapy minutes (CPT 67979): 10 minutes  Therapeutic exercise minutes (CPT 58759): 30 minutes      Pain rating (1-10) before treatment:  2  Pain rating (1-10) after treatment:  1    ASSESSMENT:   Response to treatment: Patient responded well to therapy with an overall decrease in pain post MT. Patient to continue with HEP and advance exercise as able. Continue 1-4 more sessions.     PLAN/RECOMMENDATIONS:   Plan for treatment: therapy treatment to continue next visit.  Planned interventions for next visit: continue with current treatment.

## 2022-02-20 PROCEDURE — RXMED WILLOW AMBULATORY MEDICATION CHARGE: Performed by: PHYSICAL MEDICINE & REHABILITATION

## 2022-02-22 ENCOUNTER — PHYSICAL THERAPY (OUTPATIENT)
Dept: PHYSICAL THERAPY | Facility: REHABILITATION | Age: 62
End: 2022-02-22
Attending: ORTHOPAEDIC SURGERY
Payer: COMMERCIAL

## 2022-02-22 DIAGNOSIS — M25.562 PAIN IN BOTH KNEES, UNSPECIFIED CHRONICITY: ICD-10-CM

## 2022-02-22 DIAGNOSIS — M25.561 PAIN IN BOTH KNEES, UNSPECIFIED CHRONICITY: ICD-10-CM

## 2022-02-22 PROCEDURE — 97140 MANUAL THERAPY 1/> REGIONS: CPT

## 2022-02-22 PROCEDURE — 97110 THERAPEUTIC EXERCISES: CPT

## 2022-02-28 ENCOUNTER — PHARMACY VISIT (OUTPATIENT)
Dept: PHARMACY | Facility: MEDICAL CENTER | Age: 62
End: 2022-02-28
Payer: COMMERCIAL

## 2022-03-01 ENCOUNTER — PHYSICAL THERAPY (OUTPATIENT)
Dept: PHYSICAL THERAPY | Facility: REHABILITATION | Age: 62
End: 2022-03-01
Attending: ORTHOPAEDIC SURGERY
Payer: COMMERCIAL

## 2022-03-01 DIAGNOSIS — M25.562 PAIN IN BOTH KNEES, UNSPECIFIED CHRONICITY: ICD-10-CM

## 2022-03-01 DIAGNOSIS — M25.561 PAIN IN BOTH KNEES, UNSPECIFIED CHRONICITY: ICD-10-CM

## 2022-03-01 PROCEDURE — 97110 THERAPEUTIC EXERCISES: CPT

## 2022-03-01 NOTE — OP THERAPY DAILY TREATMENT
Outpatient Physical Therapy  DAILY TREATMENT     [facility] [place]  [address], [address]  [place] [facility] [address]  Phone:  [phone]  Fax:  [phone]    Date: 03/01/2022    Patient: Debby Desai  YOB: 1960  MRN: [id number]     Time Calculation    Start time: 1030  Stop time: 1115 Time Calculation (min): 45 minutes         Chief Complaint: Knee Problem    Visit #: 12    SUBJECTIVE:  Patient reports that her knee is sore today. States she is 3/7 on days where she did 2 tasks.     OBJECTIVE:  Current objective measures:           Therapeutic Exercises (CPT 45318):     1. Nu step, x10min L1    2. SLR, x20    3. SAQ, x20, 4lbs each    4. HS stretch, [id number]    5. Backwards walking in //bars, x5min    6. Ball hs curls, 20x    7. Shuttle, 4c x5min DL, 2c SL B x3min    8. TKE stretch on roller, x2min    9. TKE BALL ON WALL, 2 X 10    10. Walking with FWW focus on knee ext, x5min    Therapeutic Treatments and Modalities:     1. Manual Therapy (CPT 08755), STM tool assited to quad; hamstring, and adductor; tib femoral a-p glides grade I-III    Time-based treatments/modalities:    Physical Therapy Timed Treatment Charges  Therapeutic exercise minutes (CPT 77543): 45 minutes      Pain rating (1-10) before treatment:  3  Pain rating (1-10) after treatment:  3    ASSESSMENT:   Response to treatment: Patient responded fair to therapy with an increase in fatigue with no change in pain. Progress continues to be gradual.     PLAN/RECOMMENDATIONS:   Plan for treatment: therapy treatment to continue next visit.  Planned interventions for next visit: continue with current treatment.

## 2022-03-08 ENCOUNTER — PHYSICAL THERAPY (OUTPATIENT)
Dept: PHYSICAL THERAPY | Facility: REHABILITATION | Age: 62
End: 2022-03-08
Attending: ORTHOPAEDIC SURGERY
Payer: COMMERCIAL

## 2022-03-08 DIAGNOSIS — M25.562 PAIN IN BOTH KNEES, UNSPECIFIED CHRONICITY: ICD-10-CM

## 2022-03-08 DIAGNOSIS — M25.561 PAIN IN BOTH KNEES, UNSPECIFIED CHRONICITY: ICD-10-CM

## 2022-03-08 PROCEDURE — 97110 THERAPEUTIC EXERCISES: CPT

## 2022-03-08 NOTE — OP THERAPY DAILY TREATMENT
Outpatient Physical Therapy  DAILY TREATMENT     [facility] [place]  [address]  [place] [address]  Phone:  [phone]  Fax:  [phone]    Date: 03/08/2022    Patient: Debby Desai  YOB: 1960  MRN: [id number]     Time Calculation    Start time: 1030  Stop time: 1115 Time Calculation (min): 45 minutes         Chief Complaint: Difficulty Walking and Knee Problem    Visit #: 13    SUBJECTIVE:  Patient reports that she had two bad days. Notes that overall symptoms are improved slightly.     OBJECTIVE:  Current objective measures:   AROM: 2-120deg          Therapeutic Exercises (CPT 84017):     1. Nu step, x10min L1    2. SLR, x20    3. SAQ, x20, 4lbs each    4. HS stretch, [id number]    5. Backwards walking in //bars, x5min    6. Ball hs curls, 20x    7. Shuttle, 4c x5min DL, 2c SL B x3min    8. TKE stretch on roller, x2min    9. TKE BALL ON WALL, 2 X 10    10. Walking with FWW focus on knee ext, x5min    11. Aqua program edu, x10min      Time-based treatments/modalities:    Physical Therapy Timed Treatment Charges  Therapeutic exercise minutes (CPT 99929): 40 minutes      Pain rating (1-10) before treatment:  3  Pain rating (1-10) after treatment:  3    ASSESSMENT:   Response to treatment: Patient to now transition to a full HEP. Patient is nearing max improvement at this time. Gains are still occurring but are slow. Patient will now hold for 3 weeks. If progress is similar continue with HEP and follow up with MD.     PLAN/RECOMMENDATIONS:   Plan for treatment: refer patient back to MD.  Planned interventions for next visit: Hold PT x3 weeks.

## 2022-03-17 PROCEDURE — RXMED WILLOW AMBULATORY MEDICATION CHARGE: Performed by: PSYCHIATRY & NEUROLOGY

## 2022-03-18 ENCOUNTER — OFFICE VISIT (OUTPATIENT)
Dept: PHYSICAL MEDICINE AND REHAB | Facility: MEDICAL CENTER | Age: 62
End: 2022-03-18
Payer: COMMERCIAL

## 2022-03-18 VITALS
HEIGHT: 66 IN | WEIGHT: 183.86 LBS | HEART RATE: 68 BPM | TEMPERATURE: 97.1 F | OXYGEN SATURATION: 95 % | SYSTOLIC BLOOD PRESSURE: 124 MMHG | BODY MASS INDEX: 29.55 KG/M2 | DIASTOLIC BLOOD PRESSURE: 64 MMHG

## 2022-03-18 DIAGNOSIS — Z96.89 SPINAL CORD STIMULATOR STATUS: ICD-10-CM

## 2022-03-18 DIAGNOSIS — M43.17 SPONDYLOLISTHESIS AT L5-S1 LEVEL: ICD-10-CM

## 2022-03-18 DIAGNOSIS — F33.41 MAJOR DEPRESSIVE DISORDER, RECURRENT EPISODE, IN PARTIAL REMISSION (HCC): ICD-10-CM

## 2022-03-18 DIAGNOSIS — M47.816 LUMBAR SPONDYLOSIS: ICD-10-CM

## 2022-03-18 DIAGNOSIS — M48.061 SPINAL STENOSIS OF LUMBAR REGION, UNSPECIFIED WHETHER NEUROGENIC CLAUDICATION PRESENT: ICD-10-CM

## 2022-03-18 DIAGNOSIS — Z96.651 S/P TKR (TOTAL KNEE REPLACEMENT), RIGHT: ICD-10-CM

## 2022-03-18 DIAGNOSIS — M54.16 LUMBAR RADICULOPATHY: ICD-10-CM

## 2022-03-18 PROCEDURE — 99214 OFFICE O/P EST MOD 30 MIN: CPT | Performed by: PHYSICAL MEDICINE & REHABILITATION

## 2022-03-18 RX ORDER — TRAMADOL HYDROCHLORIDE 50 MG/1
50 TABLET ORAL EVERY 8 HOURS PRN
Qty: 60 TABLET | Refills: 1 | Status: SHIPPED | OUTPATIENT
Start: 2022-03-26 | End: 2022-05-13 | Stop reason: SDUPTHER

## 2022-03-18 ASSESSMENT — PATIENT HEALTH QUESTIONNAIRE - PHQ9
SUM OF ALL RESPONSES TO PHQ QUESTIONS 1-9: 16
CLINICAL INTERPRETATION OF PHQ2 SCORE: 6
5. POOR APPETITE OR OVEREATING: 0 - NOT AT ALL

## 2022-03-18 ASSESSMENT — PAIN SCALES - GENERAL: PAINLEVEL: 8=MODERATE-SEVERE PAIN

## 2022-03-18 ASSESSMENT — FIBROSIS 4 INDEX: FIB4 SCORE: 0.74

## 2022-03-18 NOTE — PROGRESS NOTES
Follow-up patient note    Physiatry (physical medicine and  Rehabilitation), interventional spine and sports medicine    Date of Service:03/18/2022    Chief complaint:   Chief Complaint   Patient presents with   • Follow-Up     Right leg pain        HISTORY    HPI: Debby Desai 61 y.o. female who presents today for follow-up evaluation of her pain complaints.     Debby reports that she has a new therapist and is looking forward to working with her.  This is at [facility] [employer] [facility] [employer].  She is feeling hopeful about this.  Ongoing depression, Dr. Stevenson increase her effexor dose and they will follow-up on April 20.  This has helped.    She feels like her depression is getting worse with goal to get rid of her medication.    She did PT until last week and has a home program.  She did not continue to progress with ROM      Pain in the right knee is most difficult for her, some of the low back pain continues.  Some continued right knee pain, primarily aching.    Overall, her pain is 8/10 on the NRS.    SCS was previously placed for thoracic pain.  This pain has improved and she is not using the device currently.  Did not get imaging done yet.    Work has been challenging for her.    She did stop her gabapentin, but was 400mg po TID.  She has been reducing her tramadol 50mg twice a day, but is concerned about reducing further.    Denies suicidality.  PHQ-9: 16     By history:   She reports that she had surgery on 09/16/2019.  This was a right total knee arthroplasty for osteoarthritis with Dr. Saba.    In 2001, she had discectomy.  She reports that she has had a spinal cord stimulator placed, but she has not been using this as much.  She reports that the unit is not currently working.    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  She gets up about once an hour.  She has a sit to stand desk, but it is difficult to stand much.  Currently, she is working from home due to  the COVID-19 pandemic.    Medical records review:  ED records from 10/07/2020 and 09/22/2020 reviewed.  ED visit from [id number].  Negative head CT.  She was given instructions about taking ibuprofen and tylenol.  Noted that she was also given a script for valium.     I reviewed the note from the referring provider Loy Miles M.D. dated 01/08/2020: Visits included nausea/epigastric pain, hypertension, chronic knee pain, IGT, dyslipidemia, MDD, hypothyroidism, iron deficiency    Previous treatments:    Physical Therapy: Yes    Medications the patient is tried: tramadol, tylenol (usually for a headache); in the past she took gabapentin 400mg po tid, she was taking vimovo and norco in the past; lyrica caused weight gain    Previous interventions: She had radiofrequency ablation in the past, prior to surgery    Previous surgeries to relieve the above pain:  None other than surgery 09/16/2019      ROS: Unchanged, see HPI  Gen: weight loss  Eyes: vision problems, glasses and contacts  CV: chest pain/anxiety  GI: nausea, ulcers  : urgency  Psych: depression  Endo: thyroid problems  Heme: anemia    Red Flags ROS:   Fever, Chills, Sweats: Denies  Involuntary Weight Loss: Denies  Bladder Incontinence: Denies  Bowel Incontinence: Denies  Saddle Anesthesia: Denies    All other systems reviewed and negative.       PMHx:   Past Medical History:   Diagnosis Date   • Anemia     in past   • Anginal syndrome (HCC)     had work up and feet r/t anxiety   • Anxiety    • Arthritis     OA knees   • Chronic pain 6/2/2021   • Dental disorder 5/24/12    partial upper denture   • Diabetes (HCC)     pre diabetic   • High cholesterol    • HTN (hypertension), benign 3/19/2012   • Hypothyroid 3/19/2012   • Major depression 3/19/2012   • Orbital floor (blow-out) closed fracture (HCC)     left   • Other specified symptom associated with female genital organs     post menopausal bleeding   • Pain     thoracic spine, Right knee, myofacial  pain, and Right shoulder   • Pain     recently fell and has bruise left forehead   • Pain 02/2018    chronic back pain, right shoulder   • Psychiatric problem     depression, anxiety   • Unspecified disorder of thyroid    • Urinary bladder disorder     stress incont.   • Urinary incontinence     Occasional       PSHx:   Past Surgical History:   Procedure Laterality Date   • INJ,EPI ANES/STER LUM/SAC ADDL Right 4/7/2021    Procedure: RIGHT lumbar five and sacral one transforaminal epidural;  Surgeon: Volodymyr Herring M.D.;  [facility];  Service: Pain Management   • PB TOTAL KNEE ARTHROPLASTY Right 9/16/2019    Procedure: RIGHT ARTHROPLASTY, KNEE, TOTAL;  Surgeon: Rufus Sbaa M.D.;  Location: [facility];  Service: Orthopedics   • KNEE ARTHROSCOPY Right 2/9/2018    Procedure: KNEE ARTHROSCOPY;  Surgeon: Harsh Baltazar M.D.;  Location: [facility];  Service: Orthopedics   • MENISCECTOMY, KNEE, MEDIAL Right 2/9/2018    Procedure: MEDIAL AND LATERAL MENISCECTOMY- PARTIAL;  Surgeon: Harsh Baltazar M.D.;  Location: [facility];  Service: Orthopedics   • KNEE ARTHROSCOPY Right 7/12/2017    Procedure: KNEE ARTHROSCOPY- CESPEDES;  Surgeon: Harsh Baltazar M.D.;  Location: [facility];  Service:    • MENISCECTOMY, KNEE, MEDIAL Right 7/12/2017    Procedure: PARTIAL MEDIAL AND LATERAL MENISCECTOMY;  Surgeon: Harsh Baltazar M.D.;  Location: [facility];  Service:    • SYNOVECTOMY Right 7/12/2017    Procedure: SYNOVECTOMY;  Surgeon: Harsh Baltazar M.D.;  Location: [facility];  Service:    • KNEE ARTHROSCOPY  4/21/2015    Performed by Rufus Saba M.D. at [facility]   • MENISCECTOMY, KNEE, MEDIAL  4/21/2015    Performed by Rufus Saba M.D. at [facility]   • PUMP REVISION  12/10/2012    Performed by Mak Guerra M.D. at [facility]   • SPINAL  "CORD STIMULATOR  5/30/2012    Performed by ALLISON CAM at [facility]   • BIOPSY GENERAL  5/1/12    endometrial   • SPINAL CORD STIMULATOR  7/11/2011    Performed by ALLISON CAM at [facility]   • BLOCK EPIDURAL STEROID INJECTION  2008    x 3-4   • LYMPH NODE EXCISION Left 2007    cervicle   • OTHER Right 2001    thoracic discectomy     • ARTHROSCOPY, KNEE Left 1999   • RIB RESECTION Right 1980   • LUMPECTOMY         Family history   Family History   Problem Relation Age of Onset   • Hypertension Father    • Diabetes Father    • Heart Disease Father    • Alcohol abuse Father    • Anesthesia Sister         slow to come out (as a child)   • Diabetes Other    • Heart Disease Other    • Cancer Other    • Hypertension Other    • Stroke Other    • Lung Disease Other          Medications:   Current Outpatient Medications   Medication   • [START ON 3/26/2022] traMADol (ULTRAM) 50 MG Tab   • venlafaxine (EFFEXOR-XR) 150 MG extended-release capsule   • venlafaxine XR (EFFEXOR XR) 75 MG CAPSULE SR 24 HR   • hydrOXYzine pamoate (VISTARIL) 100 MG Cap   • buPROPion (WELLBUTRIN XL) 300 MG XL tablet   • propranolol (INDERAL) 20 MG Tab   • melatonin 5 mg Tab   • levothyroxine (SYNTHROID) 50 MCG Tab   • metFORMIN ER (GLUCOPHAGE XR) 500 MG TABLET SR 24 HR   • atorvastatin (LIPITOR) 20 MG Tab   • lisinopril-hydrochlorothiazide (PRINZIDE) 20-12.5 MG per tablet   • ergocalciferol (DRISDOL) 83902 UNIT capsule   • ibuprofen (MOTRIN) 200 MG Tab   • Ferrous Sulfate (IRON) 325 (65 Fe) MG Tab   • acetaminophen (TYLENOL) 500 MG Tab     No current facility-administered medications for this visit.       Allergies:   Allergies   Allergen Reactions   • Sulfamethoxazole-Trimethoprim Rash and Swelling     Pt states \"My hand swells up and rash all over body\".       Social Hx:   Social History     Socioeconomic History   • Marital status: Single     Spouse name: Not on file   • Number of children: Not on file   • " Years of education: Not on file   • Highest education level: Associate degree: academic program   Occupational History   • Not on file   Tobacco Use   • Smoking status: Never Smoker   • Smokeless tobacco: Never Used   Vaping Use   • Vaping Use: Never used   Substance and Sexual Activity   • Alcohol use: Not Currently     Alcohol/week: 0.0 oz     Comment: 1 glass wine per month   • Drug use: No   • Sexual activity: Never     Partners: Male   Other Topics Concern   •  Service No   • Blood Transfusions No   • Caffeine Concern No   • Occupational Exposure No   • Hobby Hazards No   • Sleep Concern Yes   • Stress Concern Yes   • Weight Concern Yes   • Special Diet No   • Back Care No   • Exercise No   • Bike Helmet No   • Seat Belt Yes   • Self-Exams No   Social History Narrative   • Not on file     Social Determinants of Health     Financial Resource Strain: Low Risk    • Difficulty of Paying Living Expenses: Not hard at all   Food Insecurity: No Food Insecurity   • Worried About Running Out of Food in the Last Year: Never true   • Ran Out of Food in the Last Year: Never true   Transportation Needs: No Transportation Needs   • Lack of Transportation (Medical): No   • Lack of Transportation (Non-Medical): No   Physical Activity: Inactive   • Days of Exercise per Week: 1 day   • Minutes of Exercise per Session: 0 min   Stress: Stress Concern Present   • Feeling of Stress : Rather much   Social Connections: Unknown   • Frequency of Communication with Friends and Family: Not on file   • Frequency of Social Gatherings with Friends and Family: Twice a week   • Attends Quaker Services: Never   • Active Member of Clubs or Organizations: No   • Attends Club or Organization Meetings: Never   • Marital Status: Never    Intimate Partner Violence: Not on file   Housing Stability: Unknown   • Unable to Pay for Housing in the Last Year: No   • Number of Places Lived in the Last Year: 1   • Unstable Housing in the Last  "Year: Not on file         EXAMINATION     Physical Exam:   Vitals: /64 (BP Location: Right arm, Patient Position: Sitting, BP Cuff Size: Small adult)   Pulse 68   Temp 36.2 °C (97.1 °F) (Temporal)   Ht 1.676 m (5' 6\")   Wt 83.4 kg (183 lb 13.8 oz)   SpO2 95%     Constitutional:   Body Habitus: Body mass index is 29.68 kg/m².  Cooperation: Fully cooperates with exam  Appearance: Well-groomed, well-nourished, not disheveled no acute distress    Eyes: No scleral icterus, no proptosis     ENT -no obvious auditory deficits, wearing a face mask    Skin -no visible skin lesions    Respiratory-  breathing comfortable on room air, no audible wheezing    Cardiovascular- No lower extremity edema is noted.     Psychiatric- alert and oriented ×3. Normal affect.     Gait -  Minimally antalgic without assist device    Neuro/Muscloskeletal  No focal motor or sensory deficits in the lower extremities bilaterally    Reflexes are 2+ left patella and 2+ achilles bilaterally    Functional ROM of the right knee, flexion greater than 120 degrees is maintained.  Tenderness over right medial knee      MEDICAL DECISION MAKING    Medical records review: see under HPI section.     DATA    Labs:     03/14/2020 CRP 0.19  03/14/2020 Sed rate 20  02/25/2020: Tramadol and metabolites  09/21/2021 UDS consistent with tramadol and metabolites    Lab Results   Component Value Date/Time    SODIUM 139 10/27/2021 01:00 PM    POTASSIUM 4.7 10/27/2021 01:00 PM    CHLORIDE 99 10/27/2021 01:00 PM    CO2 27 10/27/2021 01:00 PM    ANION 13.0 10/27/2021 01:00 PM    GLUCOSE 105 (H) 10/27/2021 01:00 PM    BUN 18 10/27/2021 01:00 PM    CREATININE 0.82 10/27/2021 01:00 PM    CALCIUM 8.9 10/27/2021 01:00 PM    ASTSGOT 18 10/27/2021 01:00 PM    ALTSGPT 13 10/27/2021 01:00 PM    TBILIRUBIN 0.2 10/27/2021 01:00 PM    ALBUMIN 4.8 10/27/2021 01:00 PM    TOTPROTEIN 7.3 10/27/2021 01:00 PM    GLOBULIN 2.5 10/27/2021 01:00 PM    AGRATIO 1.9 10/27/2021 01:00 PM "       Lab Results   Component Value Date/Time    PROTHROMBTM 12.6 10/07/2020 10:45 PM    INR 0.91 10/07/2020 10:45 PM        Lab Results   Component Value Date/Time    WBC 9.8 10/27/2021 01:00 PM    RBC 4.09 (L) 10/27/2021 01:00 PM    HEMOGLOBIN 12.6 10/27/2021 01:00 PM    HEMATOCRIT 39.9 10/27/2021 01:00 PM    MCV 97.6 10/27/2021 01:00 PM    MCH 30.8 10/27/2021 01:00 PM    MCHC 31.6 (L) 10/27/2021 01:00 PM    MPV 11.4 10/27/2021 01:00 PM    NEUTSPOLYS 79.50 (H) 10/27/2021 01:00 PM    LYMPHOCYTES 16.20 (L) 10/27/2021 01:00 PM    MONOCYTES 3.10 10/27/2021 01:00 PM    EOSINOPHILS 0.60 10/27/2021 01:00 PM    BASOPHILS 0.30 10/27/2021 01:00 PM        Lab Results   Component Value Date/Time    HBA1C 5.8 (H) 06/05/2021 07:42 AM        Imaging: I personally reviewed following images, these are my reads  CT cervical spine 09/22/2020  There is note of cervical spondylosis with multilevel uncovertebral arthropathy    Xray right hip 03/14/2020  There is mild osteoarthritis of the right hip.      Xray lumbar 03/14/2020  There is mild anterolisthesis at L5-S1.  No abnormal motion on flexion and extension  There is DDD and facet arthropathy that is most noted at L5-S1 and less at L4-5    Xray right knee 09/16/2019  There is note of right knee arthroplasty    IMAGING radiology reads. I reviewed the following radiology reads    Xray right knee 09/18/2021  Multiple standing views of the right knee show previous right total knee   replacement with hardware in good position without signs of loosening or   dislocation.  No obvious fracture noted.    Xray left shoulder 10/27/2021  X-rays reviewed today of the left shoulder show normal overall bony   alignment she has some AC degenerative change.  She has some osteophyte   formation off the inferior humeral head.  Some mild glenohumeral joint   space narrowing patient.  Type III acromion.    CT cervical spine 09/22/2020  IMPRESSION:  Degenerative change without evidence of cervical spine  fracture.    Xray lumbar 03/14/2020  IMPRESSION:  1.  No compression deformity or acute fracture is identified.  2.  Mild anterolisthesis at L5-S1.  3.  Degenerative disc disease and facet arthropathy.  4.  No focal instability noted on flexion extension views.                                                Xray right hip 03/14/2020  IMPRESSION:     1.  No radiographic evidence of acute traumatic injury.     2.  Findings are consistent with mild osteoarthritis.     3.  Probable constipation.                                   Results for orders placed during the hospital encounter of 09/16/19   DX-KNEE 2- RIGHT    Impression Right knee arthroplasty.      Results for orders placed during the hospital encounter of 03/28/19   DX-KNEE 3 VIEWS RIGHT    Impression No evidence of acute fracture or dislocation.    Degenerative changes as above described.                              Diagnosis   Visit Diagnoses     ICD-10-CM   1. Spondylolisthesis at L5-S1 level  M43.17   2. Lumbar spondylosis  M47.816   3. Spinal stenosis of lumbar region, unspecified whether neurogenic claudication present  M48.061   4. S/P TKR (total knee replacement), right  Z96.651   5. Spinal cord stimulator status  Z96.89   6. Lumbar radiculopathy  M54.16   7. Major depressive disorder, recurrent episode, in partial remission ([facility])  F33.41           ASSESSMENT:  Debby Desai 61 y.o. female seen for above.     Debby was seen today for follow-up.    Diagnoses and all orders for this visit:    Spondylolisthesis at L5-S1 level  -     traMADol (ULTRAM) 50 MG Tab; Take 1 Tablet by mouth every 8 hours as needed for Moderate Pain or Severe Pain for up to 30 days.  -     Consent for Opiate Prescription  -     Controlled Substance Treatment Agreement    Lumbar spondylosis    Spinal stenosis of lumbar region, unspecified whether neurogenic claudication present    S/P TKR (total knee replacement), right  -     traMADol (ULTRAM) 50 MG Tab; Take 1 Tablet by  mouth every 8 hours as needed for Moderate Pain or Severe Pain for up to 30 days.  -     Consent for Opiate Prescription  -     Controlled Substance Treatment Agreement    Spinal cord stimulator status    Lumbar radiculopathy  -     traMADol (ULTRAM) 50 MG Tab; Take 1 Tablet by mouth every 8 hours as needed for Moderate Pain or Severe Pain for up to 30 days.  -     Consent for Opiate Prescription  -     Controlled Substance Treatment Agreement    Major depressive disorder, recurrent episode, in partial remission (HCC)           1. Discussed holding wean of tramadol.  Refilled tramadol #60 for the next two months.  2. Continue home program   3. Encouraged her to get xrays of the thoracic spine.  4. Plan to try to arrange for [employer] to u rep to meet at the next visit, unclear if this could be reprogrammed to address her low back and right leg pain.  5. Continue treatment with her Psychologist and Psychiatrist, Dr. Stevenson. Denies suicidality.      Follow-up: Return in about 2 months (around 5/18/2022).      Thank you very much for asking me to participate in Debby Desai's care.  Please contact me with any questions or concerns.      Please note that this dictation was created using voice recognition software. I have made every reasonable attempt to correct obvious errors but there may be errors of grammar and content that I may have overlooked prior to finalization of this note.      Volodymyr Herring MD  Physical Medicine and Rehabilitation  Interventional Spine and Sports Physiatry  [facility]

## 2022-03-21 ENCOUNTER — PHARMACY VISIT (OUTPATIENT)
Dept: PHARMACY | Facility: MEDICAL CENTER | Age: 62
End: 2022-03-21
Payer: COMMERCIAL

## 2022-03-21 PROCEDURE — RXMED WILLOW AMBULATORY MEDICATION CHARGE: Performed by: PSYCHIATRY & NEUROLOGY

## 2022-03-21 PROCEDURE — RXMED WILLOW AMBULATORY MEDICATION CHARGE: Performed by: PHYSICAL MEDICINE & REHABILITATION

## 2022-03-21 PROCEDURE — RXMED WILLOW AMBULATORY MEDICATION CHARGE: Performed by: NURSE PRACTITIONER

## 2022-03-27 PROCEDURE — RXMED WILLOW AMBULATORY MEDICATION CHARGE: Performed by: PHYSICAL MEDICINE & REHABILITATION

## 2022-03-28 ENCOUNTER — PHARMACY VISIT (OUTPATIENT)
Dept: PHARMACY | Facility: MEDICAL CENTER | Age: 62
End: 2022-03-28
Payer: COMMERCIAL

## 2022-03-28 PROCEDURE — RXMED WILLOW AMBULATORY MEDICATION CHARGE: Performed by: NURSE PRACTITIONER

## 2022-04-05 PROCEDURE — RXMED WILLOW AMBULATORY MEDICATION CHARGE: Performed by: PSYCHIATRY & NEUROLOGY

## 2022-04-06 ENCOUNTER — PHARMACY VISIT (OUTPATIENT)
Dept: PHARMACY | Facility: MEDICAL CENTER | Age: 62
End: 2022-04-06
Payer: COMMERCIAL

## 2022-04-20 ENCOUNTER — TELEMEDICINE (OUTPATIENT)
Dept: BEHAVIORAL HEALTH | Facility: CLINIC | Age: 62
End: 2022-04-20
Payer: COMMERCIAL

## 2022-04-20 DIAGNOSIS — F33.2 SEVERE EPISODE OF RECURRENT MAJOR DEPRESSIVE DISORDER, WITHOUT PSYCHOTIC FEATURES (HCC): ICD-10-CM

## 2022-04-20 DIAGNOSIS — F41.1 GAD (GENERALIZED ANXIETY DISORDER): ICD-10-CM

## 2022-04-20 DIAGNOSIS — R25.1 TREMORS OF NERVOUS SYSTEM: ICD-10-CM

## 2022-04-20 DIAGNOSIS — G47.09 OTHER INSOMNIA: ICD-10-CM

## 2022-04-20 PROCEDURE — RXMED WILLOW AMBULATORY MEDICATION CHARGE: Performed by: PSYCHIATRY & NEUROLOGY

## 2022-04-20 PROCEDURE — 90833 PSYTX W PT W E/M 30 MIN: CPT | Mod: 95 | Performed by: PSYCHIATRY & NEUROLOGY

## 2022-04-20 PROCEDURE — 99214 OFFICE O/P EST MOD 30 MIN: CPT | Mod: 95 | Performed by: PSYCHIATRY & NEUROLOGY

## 2022-04-20 RX ORDER — VENLAFAXINE HYDROCHLORIDE 75 MG/1
75 CAPSULE, EXTENDED RELEASE ORAL DAILY
Qty: 90 CAPSULE | Refills: 2 | Status: SHIPPED | OUTPATIENT
Start: 2022-04-20 | End: 2022-05-31 | Stop reason: SDUPTHER

## 2022-04-20 RX ORDER — AMITRIPTYLINE HYDROCHLORIDE 50 MG/1
50 TABLET, FILM COATED ORAL NIGHTLY
Qty: 30 TABLET | Refills: 5 | Status: SHIPPED | OUTPATIENT
Start: 2022-04-20 | End: 2022-05-31 | Stop reason: SDUPTHER

## 2022-04-20 RX ORDER — PROPRANOLOL HYDROCHLORIDE 20 MG/1
20 TABLET ORAL 3 TIMES DAILY PRN
Qty: 90 TABLET | Refills: 5 | Status: SHIPPED | OUTPATIENT
Start: 2022-04-20 | End: 2022-05-31 | Stop reason: SDUPTHER

## 2022-04-20 RX ORDER — BUPROPION HYDROCHLORIDE 300 MG/1
300 TABLET ORAL EVERY MORNING
Qty: 90 TABLET | Refills: 2 | Status: SHIPPED | OUTPATIENT
Start: 2022-04-20 | End: 2022-05-31 | Stop reason: SDUPTHER

## 2022-04-20 RX ORDER — VENLAFAXINE HYDROCHLORIDE 150 MG/1
150 CAPSULE, EXTENDED RELEASE ORAL DAILY
Qty: 90 CAPSULE | Refills: 2 | Status: SHIPPED | OUTPATIENT
Start: 2022-04-20 | End: 2022-05-31 | Stop reason: SDUPTHER

## 2022-04-20 NOTE — PROGRESS NOTES
This evaluation was conducted via Zoom using secure and encrypted videoconferencing technology. The patient was in their home in the [facility].    The patient's identity was confirmed and verbal consent was obtained for this virtual visit.      PSYCHIATRY FOLLOW-UP NOTE      Name: Debby Desai  MRN: [id number]  : 1960  Age: 62 y.o.  Date of assessment: 2022  PCP: ARELIS Chris  Persons in attendance: Patient      REASON FOR VISIT/CHIEF COMPLAINT (as stated by Patient):  Debby Desai is a 62 y.o., White female, attending follow-up appointment for mood and anxiety management.      HISTORY OF PRESENT ILLNESS:  Debby Desai is a 62 y.o. old female with MDD, TIM and insomnia comes in today for follow up. Patient was last seen 3 months ago, and following treatment planning recommendations were done:  · Increase Effexor XR to 225 mg daily for mood, anxiety and comorbid pain management.   · Continue Wellbutrin XL to 300 mg daily for depression augmentation.    · Continue hydroxyzine (vistaril) to 100 mg HS + melatonin 5 mg HS as needed for insomnia.    · Continue propanolol 20 mg TID PRN for anxiety and shake.   · Referral was placed to neurology in last session for evaluation of persistent tremors.  · Continue psychotherapy for mood and anxiety management.    Patient is compliant with medication and her symptoms do not prevent in mood and anxiety after increasing Effexor dosage and denies any acute side effects with this approach.  Patient continues to struggle with poor sleep for a few hours only that causes tiredness next day and difficulty dealing with stressors.  Patient attributes poor sleep due to anxiety and pain.  She recently decided to quit her job due to increased stressors coming from the job.  After reviewing risk and benefit agreed with adding amitriptyline at 50 mg bedtime dosing and stopping hydroxyzine and melatonin at this time.  Upon evaluation the patient did not  have any shakes but agreed with plan of monitoring closely for any signs or symptoms of serotonin syndrome.  Patient is using propranolol once to twice daily with good response and no side effects.    PSYCHOTHERAPY ASPECT OF SESSION (16 MIN):  • Most part of the session was medicated to letting patient express her feelings of concerns related to social stressors and discussion done regarding her recent decision to quit the job.  • Pros and cons of the situation was discussed and patient agrees that process out with any negative problems with this decision.  • Importance of monitoring for triggers that can destabilize mood and anxiety was emphasized.  • Most part of the session was dedicated to active listening and implementing supportive psychotherapy skills.      CURRENT MEDICATIONS:  Current Outpatient Medications   Medication Sig Dispense Refill   • traMADol (ULTRAM) 50 MG Tab Take 1 Tablet by mouth every 8 hours as needed for Moderate Pain or Severe Pain for up to 30 days. 60 Tablet 1   • venlafaxine (EFFEXOR-XR) 150 MG extended-release capsule Take 1 Capsule by mouth every day. (venlafaxine xr 150 mg + 75 mg = 225 mg daily) 30 Capsule 2   • venlafaxine XR (EFFEXOR XR) 75 MG CAPSULE SR 24 HR Take 1 Capsule by mouth every day. (venlafaxine xr 150 mg + 75 mg = 225 mg daily) 30 Capsule 2   • hydrOXYzine pamoate (VISTARIL) 100 MG Cap Take 1 capsule by mouth at bedtime as needed (sleep). 30 Capsule 2   • buPROPion (WELLBUTRIN XL) 300 MG XL tablet Take 1 tablet by mouth every morning. 30 Tablet 2   • propranolol (INDERAL) 20 MG Tab Take 1 Tablet by mouth 3 times a day as needed (anxiety and shakes). 90 Tablet 1   • melatonin 5 mg Tab Take 1 tablet by mouth at bedtime. 30 Tablet 1   • levothyroxine (SYNTHROID) 50 MCG Tab Take 1 tablet by mouth Every morning on an empty stomach. 90 Tablet 3   • metFORMIN ER (GLUCOPHAGE XR) 500 MG TABLET SR 24 HR Take 1 Tab by mouth 2 times a day. 180 Tablet 2   • atorvastatin (LIPITOR)  20 MG Tab TAKE ONE TABLET BY MOUTH EVERY DAY 90 Tablet 2   • lisinopril-hydrochlorothiazide (PRINZIDE) 20-12.5 MG per tablet Take one tablet by mouth daily 90 Tablet 2   • ergocalciferol (DRISDOL) 65280 UNIT capsule Take 1 capsule by mouth every 7 days. 12 capsule 0   • gabapentin (NEURONTIN) 400 MG Cap Take 1 capsule by mouth 3 times a day for 30 days. 90 capsule 1   • ibuprofen (MOTRIN) 200 MG Tab Take 600 mg by mouth every 6 hours as needed for Inflammation.     • Ferrous Sulfate (IRON) 325 (65 Fe) MG Tab Take 65 mg by mouth every day at 6 PM.     • acetaminophen (TYLENOL) 500 MG Tab Take 500-1,000 mg by mouth 2 times a day as needed for Moderate Pain.       No current facility-administered medications for this visit.       MEDICAL HISTORY  Past Medical History:   Diagnosis Date   • Anemia     in past   • Anginal syndrome (HCC)     had work up and feet r/t anxiety   • Anxiety    • Arthritis     OA knees   • Chronic pain 6/2/2021   • Dental disorder 5/24/12    partial upper denture   • Diabetes (HCC)     pre diabetic   • High cholesterol    • HTN (hypertension), benign 3/19/2012   • Hypothyroid 3/19/2012   • Major depression 3/19/2012   • Orbital floor (blow-out) closed fracture (HCC)     left   • Other specified symptom associated with female genital organs     post menopausal bleeding   • Pain     thoracic spine, Right knee, myofacial pain, and Right shoulder   • Pain     recently fell and has bruise left forehead   • Pain 02/2018    chronic back pain, right shoulder   • Psychiatric problem     depression, anxiety   • Unspecified disorder of thyroid    • Urinary bladder disorder     stress incont.   • Urinary incontinence     Occasional     Past Surgical History:   Procedure Laterality Date   • INJ,EPI ANES/STER LUM/SAC ADDL Right 4/7/2021    Procedure: RIGHT lumbar five and sacral one transforaminal epidural;  Surgeon: Volodymyr Herring M.D.;  [facility];  Service: Pain Management   •  PB TOTAL KNEE ARTHROPLASTY Right 9/16/2019    Procedure: RIGHT ARTHROPLASTY, KNEE, TOTAL;  Surgeon: Rufus Saba M.D.;  Location: [facility];  Service: Orthopedics   • KNEE ARTHROSCOPY Right 2/9/2018    Procedure: KNEE ARTHROSCOPY;  Surgeon: Harsh Baltazar M.D.;  Location: [facility];  Service: Orthopedics   • MENISCECTOMY, KNEE, MEDIAL Right 2/9/2018    Procedure: MEDIAL AND LATERAL MENISCECTOMY- PARTIAL;  Surgeon: Harsh Baltazar M.D.;  Location: [facility];  Service: Orthopedics   • KNEE ARTHROSCOPY Right 7/12/2017    Procedure: KNEE ARTHROSCOPY- CESPEDES;  Surgeon: Harsh Baltazar M.D.;  Location: [facility];  Service:    • MENISCECTOMY, KNEE, MEDIAL Right 7/12/2017    Procedure: PARTIAL MEDIAL AND LATERAL MENISCECTOMY;  Surgeon: Harsh Baltazar M.D.;  Location: [facility];  Service:    • SYNOVECTOMY Right 7/12/2017    Procedure: SYNOVECTOMY;  Surgeon: Harsh Baltazar M.D.;  Location: [facility];  Service:    • KNEE ARTHROSCOPY  4/21/2015    Performed by Rufus Saba M.D. at [facility]   • MENISCECTOMY, KNEE, MEDIAL  4/21/2015    Performed by Rufus Saba M.D. at [facility]   • PUMP REVISION  12/10/2012    Performed by Allison Guerra M.D. at [facility]   • SPINAL CORD STIMULATOR  5/30/2012    Performed by ALLISON GUERRA at [facility]   • BIOPSY GENERAL  5/1/12    endometrial   • SPINAL CORD STIMULATOR  7/11/2011    Performed by ALLISON GUERRA at [facility]   • BLOCK EPIDURAL STEROID INJECTION  2008    x 3-4   • LYMPH NODE EXCISION Left 2007    cervicle   • OTHER Right 2001    thoracic discectomy     • ARTHROSCOPY, KNEE Left 1999   • RIB RESECTION Right 1980   • LUMPECTOMY         PAST PSYCHIATRIC HISTORY  Prior psychiatric hospitalization: no  Prior Self harm/suicide attempt: no  Prior Diagnosis: MDD, Anxiety     PAST  PSYCHIATRIC MEDICATIONS  · Fluoxetine  · Paroxetine  · Citalopram  · Sertraline  · Duloxetine  · Wellbutrin  · Amitriptyline   · Ativan      FAMILY HISTORY  Psychiatric diagnosis: [place]ieces with depression and anxiety  History of suicide attempts: No  Substance abuse history: 1 sister with drug and alcohol abuse history     SUBSTANCE USE HISTORY:  ALCOHOL: no  TOBACCO: no  CANNABIS: no  OPIOIDS: no  PRESCRIPTION MEDICATIONS: no  OTHERS: no  History of inpatient/outpatient rehab treatment: none     SOCIAL HISTORY  Childhood: born in [place] and describes childhood as difficult  Moved a lot due to parents financial concerns  Employment: [employer]  Relationship: single (never )  Kids: no kids  Current living situation: alone (but strong family support as they all live nearby)  Current/past legal issues: denies  History of emotional/physical/sexual abuse - denies      REVIEW OF SYSTEMS:        Constitutional  fatigue; chronic pain   Eyes negative   Ears/Nose/Mouth/Throat negative   Cardiovascular  hypertension, hyperlipidemia   Respiratory negative   Gastrointestinal negative   Genitourinary negative   Muscular  osteoarthritis   Integumentary negative   Neurological negative   Endocrine  hypothyroidism   Hematologic/Lymphatic negative       PHYSICAL EXAMINAION:  Vital signs: [facility] 02/26/2012   Musculoskeletal: Normal gait.   Abnormal movements: none      MENTAL STATUS EXAMINATION      General:   - Grooming and hygiene: Casual,   - Apparent distress: none,   - Behavior: Calm  - Eye Contact:  Good,   - no psychomotor agitation or retardation    - Participation: Active verbal participation  Orientation: Alert and Fully Oriented to person, place and time  Mood: Depressed and Anxious  Affect: Full range,  Thought Process: Goal-directed  Thought Content: Denies suicidal or homicidal ideations, intent or plan Within normal limits  Perception: Denies auditory or visual hallucinations. No delusions noted Within normal  limits  Attention span and concentration: Intact   Speech:Rate within normal limits  Language: Appropriate   Insight: Good  Judgment: Good  Recent and remote memory: No gross evidence of memory deficits        DEPRESSION SCREENING:  Depression Screen (PHQ-2/PHQ-9) 1/21/2022 2/3/2022 3/18/2022   PHQ-2 Total Score - - -   PHQ-2 Total Score - - -   PHQ-2 Total Score 6 6 6   PHQ-9 Total Score - - -   PHQ-9 Total Score 16 18 16       Interpretation of PHQ-9 Total Score   Score Severity   1-4 No Depression   5-9 Mild Depression   10-14 Moderate Depression   15-19 Moderately Severe Depression   20-27 Severe Depression    CURRENT RISK:       Suicidal: Low       Homicidal: Low       Self-Harm: Low       Relapse: Low       Crisis Safety Plan Reviewed Not Indicated       If evidence of imminent risk is present, intervention/plan:      MEDICAL RECORDS/LABS/DIAGNOSTIC TESTS REVIEWED:  No new lab since last visit     [facility] records -   Reviewed     DIAGNOSTIC IMPRESSION(S):  1. Major depressive disorder, recurrent  2. Generalized anxiety disorder  3. Insomnia        PLAN:  (1) Major depressive disorder; (2) TIM; (3) Insomnia  · Slow improvement in mood & anxiety; persistence of insomnia  · Continue Effexor XR to 225 mg daily for mood, anxiety and comorbid pain management.   · Continue Wellbutrin XL to 300 mg daily for depression augmentation.    · Stop hydroxyzine (vistaril) to 100 mg HS + melatonin 5 mg HS   · Add amitriptyline 50 mg at at bedtime as needed for insomnia.    · Continue propanolol 20 mg TID PRN for anxiety and shake.   · Monitor for serotonin syndrome.  · Continue psychotherapy for mood and anxiety management.  · Medication options, alternatives (including no medications) and medication risks/benefits/side effects were discussed in detail.  · Explained importance of contraceptive measures while on psychotropic medications, educated to let provider know if ever pregnant or wanting to become pregnant. Verbalized  understanding.  · The patient was advised to call, message provider on [employer], or come in to the clinic if symptoms worsen or if any future questions/issues regarding their medications arise; the patient verbalized understanding and agreement.    · The patient was educated to call 911, call the suicide hotline, or go to local ER if having thoughts of suicide or homicide; verbalized understanding.    Billing Coding based on:  43621: based on [facility]  [id number]: based on psychotherapy timing    Return to clinic in 6-8 weeks or sooner if symptoms worsen.  Next Appointment: instruction provided on how to make the next appointment.     The proposed treatment plan was discussed with the patient who was provided the opportunity to ask questions and make suggestions regarding alternative treatment. Patient verbalized understanding and expressed agreement with the plan.       Genaro Stevenson M.D.  04/20/22    This note was created using voice recognition software (Dragon). The accuracy of the dictation is limited by the abilities of the software. I have reviewed the note prior to signing, however some errors in grammar and context are still possible. If you have any questions related to this note please do not hesitate to contact our office.

## 2022-04-22 PROCEDURE — RXMED WILLOW AMBULATORY MEDICATION CHARGE: Performed by: PSYCHIATRY & NEUROLOGY

## 2022-04-25 ENCOUNTER — PHARMACY VISIT (OUTPATIENT)
Dept: PHARMACY | Facility: MEDICAL CENTER | Age: 62
End: 2022-04-25
Payer: COMMERCIAL

## 2022-04-28 PROCEDURE — RXMED WILLOW AMBULATORY MEDICATION CHARGE: Performed by: PSYCHIATRY & NEUROLOGY

## 2022-04-29 PROCEDURE — RXMED WILLOW AMBULATORY MEDICATION CHARGE: Performed by: PHYSICAL MEDICINE & REHABILITATION

## 2022-05-02 ENCOUNTER — PHARMACY VISIT (OUTPATIENT)
Dept: PHARMACY | Facility: MEDICAL CENTER | Age: 62
End: 2022-05-02
Payer: COMMERCIAL

## 2022-05-08 ENCOUNTER — APPOINTMENT (OUTPATIENT)
Dept: RADIOLOGY | Facility: MEDICAL CENTER | Age: 62
End: 2022-05-08
Attending: EMERGENCY MEDICINE
Payer: COMMERCIAL

## 2022-05-08 ENCOUNTER — HOSPITAL ENCOUNTER (EMERGENCY)
Facility: MEDICAL CENTER | Age: 62
End: 2022-05-08
Attending: EMERGENCY MEDICINE
Payer: COMMERCIAL

## 2022-05-08 VITALS
RESPIRATION RATE: 20 BRPM | HEIGHT: 66 IN | TEMPERATURE: 98 F | OXYGEN SATURATION: 92 % | WEIGHT: 186 LBS | SYSTOLIC BLOOD PRESSURE: 142 MMHG | BODY MASS INDEX: 29.89 KG/M2 | HEART RATE: 74 BPM | DIASTOLIC BLOOD PRESSURE: 81 MMHG

## 2022-05-08 DIAGNOSIS — S99.911A INJURY OF RIGHT ANKLE, INITIAL ENCOUNTER: ICD-10-CM

## 2022-05-08 DIAGNOSIS — S82.832A OTHER CLOSED FRACTURE OF PROXIMAL END OF LEFT FIBULA, INITIAL ENCOUNTER: ICD-10-CM

## 2022-05-08 LAB
ALBUMIN SERPL BCP-MCNC: 4.2 G/DL (ref 3.2–4.9)
ALBUMIN/GLOB SERPL: 1.3 G/DL
ALP SERPL-CCNC: 182 U/L (ref 30–99)
ALT SERPL-CCNC: 8 U/L (ref 2–50)
ANION GAP SERPL CALC-SCNC: 12 MMOL/L (ref 7–16)
AST SERPL-CCNC: 17 U/L (ref 12–45)
BASOPHILS # BLD AUTO: 0.4 % (ref 0–1.8)
BASOPHILS # BLD: 0.03 K/UL (ref 0–0.12)
BILIRUB SERPL-MCNC: 0.5 MG/DL (ref 0.1–1.5)
BUN SERPL-MCNC: 7 MG/DL (ref 8–22)
CALCIUM SERPL-MCNC: 9.5 MG/DL (ref 8.5–10.5)
CHLORIDE SERPL-SCNC: 100 MMOL/L (ref 96–112)
CO2 SERPL-SCNC: 29 MMOL/L (ref 20–33)
CREAT SERPL-MCNC: 0.74 MG/DL (ref 0.5–1.4)
EKG IMPRESSION: NORMAL
EOSINOPHIL # BLD AUTO: 0.13 K/UL (ref 0–0.51)
EOSINOPHIL NFR BLD: 1.8 % (ref 0–6.9)
ERYTHROCYTE [DISTWIDTH] IN BLOOD BY AUTOMATED COUNT: 46 FL (ref 35.9–50)
GFR SERPLBLD CREATININE-BSD FMLA CKD-EPI: 90 ML/MIN/1.73 M 2
GLOBULIN SER CALC-MCNC: 3.3 G/DL (ref 1.9–3.5)
GLUCOSE SERPL-MCNC: 92 MG/DL (ref 65–99)
HCT VFR BLD AUTO: 41.9 % (ref 37–47)
HGB BLD-MCNC: 13.9 G/DL (ref 12–16)
IMM GRANULOCYTES # BLD AUTO: 0.03 K/UL (ref 0–0.11)
IMM GRANULOCYTES NFR BLD AUTO: 0.4 % (ref 0–0.9)
LYMPHOCYTES # BLD AUTO: 2.08 K/UL (ref 1–4.8)
LYMPHOCYTES NFR BLD: 28.1 % (ref 22–41)
MCH RBC QN AUTO: 31 PG (ref 27–33)
MCHC RBC AUTO-ENTMCNC: 33.2 G/DL (ref 33.6–35)
MCV RBC AUTO: 93.3 FL (ref 81.4–97.8)
MONOCYTES # BLD AUTO: 0.59 K/UL (ref 0–0.85)
MONOCYTES NFR BLD AUTO: 8 % (ref 0–13.4)
NEUTROPHILS # BLD AUTO: 4.54 K/UL (ref 2–7.15)
NEUTROPHILS NFR BLD: 61.3 % (ref 44–72)
NRBC # BLD AUTO: 0 K/UL
NRBC BLD-RTO: 0 /100 WBC
PLATELET # BLD AUTO: 376 K/UL (ref 164–446)
PMV BLD AUTO: 10.8 FL (ref 9–12.9)
POTASSIUM SERPL-SCNC: 3.4 MMOL/L (ref 3.6–5.5)
PROT SERPL-MCNC: 7.5 G/DL (ref 6–8.2)
RBC # BLD AUTO: 4.49 M/UL (ref 4.2–5.4)
SODIUM SERPL-SCNC: 141 MMOL/L (ref 135–145)
WBC # BLD AUTO: 7.4 K/UL (ref 4.8–10.8)

## 2022-05-08 PROCEDURE — 73700 CT LOWER EXTREMITY W/O DYE: CPT | Mod: LT

## 2022-05-08 PROCEDURE — 93005 ELECTROCARDIOGRAM TRACING: CPT | Performed by: EMERGENCY MEDICINE

## 2022-05-08 PROCEDURE — 700102 HCHG RX REV CODE 250 W/ 637 OVERRIDE(OP): Performed by: EMERGENCY MEDICINE

## 2022-05-08 PROCEDURE — 80053 COMPREHEN METABOLIC PANEL: CPT

## 2022-05-08 PROCEDURE — 73564 X-RAY EXAM KNEE 4 OR MORE: CPT | Mod: LT

## 2022-05-08 PROCEDURE — 73610 X-RAY EXAM OF ANKLE: CPT | Mod: LT

## 2022-05-08 PROCEDURE — A9270 NON-COVERED ITEM OR SERVICE: HCPCS | Performed by: EMERGENCY MEDICINE

## 2022-05-08 PROCEDURE — 99283 EMERGENCY DEPT VISIT LOW MDM: CPT

## 2022-05-08 PROCEDURE — 73610 X-RAY EXAM OF ANKLE: CPT | Mod: RT

## 2022-05-08 PROCEDURE — 73630 X-RAY EXAM OF FOOT: CPT | Mod: RT

## 2022-05-08 PROCEDURE — 85025 COMPLETE CBC W/AUTO DIFF WBC: CPT

## 2022-05-08 PROCEDURE — 93005 ELECTROCARDIOGRAM TRACING: CPT

## 2022-05-08 PROCEDURE — 36415 COLL VENOUS BLD VENIPUNCTURE: CPT

## 2022-05-08 RX ORDER — HYDROCODONE BITARTRATE AND ACETAMINOPHEN 5; 325 MG/1; MG/1
1 TABLET ORAL ONCE
Status: COMPLETED | OUTPATIENT
Start: 2022-05-08 | End: 2022-05-08

## 2022-05-08 RX ADMIN — HYDROCODONE BITARTRATE AND ACETAMINOPHEN 1 TABLET: 5; 325 TABLET ORAL at 17:09

## 2022-05-08 ASSESSMENT — FIBROSIS 4 INDEX: FIB4 SCORE: 0.75

## 2022-05-08 ASSESSMENT — LIFESTYLE VARIABLES: DO YOU DRINK ALCOHOL: NO

## 2022-05-08 NOTE — ED PROVIDER NOTES
ED Physician Note    Chief Concern:   Left knee pain, fall from standing    HPI:  Debby Desai is a very pleasant 62-year-old woman who presents to the emergency department for evaluation of left knee pain.  She tried to get up out of bed 3 days ago and had an episode of lightheadedness, she believes she syncopized because she woke up on the floor.  After she fell to the floor she noticed that she had some left knee pain, as well as bilateral ankle pain and right lateral foot pain.  She tried to stay in bed and wait till her symptoms improved, however she was unable to do this as her pain remains consistent, and did not improve at all.  She states she has not been up out of bed much over the past 2 to 3 days.  Primary concern is pain localized to the left knee, she states she does have a walker at home which she can barely use at this point.  She is predominantly putting weight on her right foot, but she states she has to walk on tiptoe to keep some of the weight off of the heel and ankle.  She has not had any further episodes of lightheadedness, she states she has had previous episodes of lightheadedness before her episode on Friday, with no known etiology.  She does have history of depression, and recently started a prescription for amitriptyline, otherwise she reports no new medication changes.  She has not had any preceding chest pain, no headaches.  She does not believe she struck her head, has not had headaches, nausea, or vomiting.  Her symptoms are significantly exacerbated by weightbearing, and alleviated by not resolved with rest.    Review of Systems:  See HPI for pertinent positives and negatives. All other systems negative.    Past Medical History:   has a past medical history of Anemia, Anginal syndrome (HCC), Anxiety, Arthritis, Chronic pain (6/2/2021), Dental disorder (5/24/12), Diabetes (HCC), High cholesterol, HTN (hypertension), benign (3/19/2012), Hypothyroid (3/19/2012), Major depression  (3/19/2012), Orbital floor (blow-out) closed fracture (HCC), Other specified symptom associated with female genital organs, Pain, Pain, Pain (02/2018), Psychiatric problem, Unspecified disorder of thyroid, Urinary bladder disorder, and Urinary incontinence.    Social History:  Social History     Tobacco Use   • Smoking status: Never Smoker   • Smokeless tobacco: Never Used   Vaping Use   • Vaping Use: Never used   Substance and Sexual Activity   • Alcohol use: Not Currently     Alcohol/week: 0.0 oz     Comment: 1 glass wine per month   • Drug use: No   • Sexual activity: Never     Partners: Male       Surgical History:   has a past surgical history that includes lymph node excision (Left, 2007); other (Right, 2001); arthroscopy, knee (Left, 1999); rib resection (Right, 1980); block epidural steroid injection (2008); spinal cord stimulator (7/11/2011); biopsy general (5/1/12); spinal cord stimulator (5/30/2012); pump revision (12/10/2012); knee arthroscopy (4/21/2015); meniscectomy, knee, medial (4/21/2015); knee arthroscopy (Right, 7/12/2017); meniscectomy, knee, medial (Right, 7/12/2017); synovectomy (Right, 7/12/2017); knee arthroscopy (Right, 2/9/2018); meniscectomy, knee, medial (Right, 2/9/2018); total knee arthroplasty (Right, 9/16/2019); lumpectomy; and inj,epi anes/ster lum/sac addl (Right, 4/7/2021).    Current Medications:  Home Medications     Reviewed by Beatriz Perez R.N. (Registered Nurse) on 05/08/22 at 1135  Med List Status: Partial   Medication Last Dose Status   acetaminophen (TYLENOL) 500 MG Tab  Active   amitriptyline (ELAVIL) 50 MG Tab  Active   atorvastatin (LIPITOR) 20 MG Tab  Active   buPROPion (WELLBUTRIN XL) 300 MG XL tablet  Active   ergocalciferol (DRISDOL) 20485 UNIT capsule  Active   Ferrous Sulfate (IRON) 325 (65 Fe) MG Tab  Active   ibuprofen (MOTRIN) 200 MG Tab  Active   levothyroxine (SYNTHROID) 50 MCG Tab  Active   lisinopril-hydrochlorothiazide (PRINZIDE) 20-12.5 MG per tablet   "Active   metFORMIN ER (GLUCOPHAGE XR) 500 MG TABLET SR 24 HR  Active   propranolol (INDERAL) 20 MG Tab  Active   traMADol (ULTRAM) 50 MG Tab  Active   venlafaxine (EFFEXOR-XR) 150 MG extended-release capsule  Active   venlafaxine XR (EFFEXOR XR) 75 MG CAPSULE SR 24 HR  Active                Allergies:  Allergies   Allergen Reactions   • Sulfamethoxazole-Trimethoprim Rash and Swelling     Pt states \"My hand swells up and rash all over body\".       Physical Exam:  Vital Signs: /81   Pulse 74   Temp 36.7 °C (98 °F) (Temporal)   Resp 20   Ht 1.676 m (5' 6\")   Wt 84.4 kg (186 lb)   LMP 02/26/2012   SpO2 92%   BMI 30.02 kg/m²   Constitutional: Alert, awake  HENT: Normocephalic, atraumatic  Eyes: Pupils equal and reactive, normal conjunctiva  Neck: Supple, normal range of motion, no stridor  Cardiovascular: Extremities are warm and well perfused, no murmur appreciated, normal cardiac auscultation, 2+ DP pulses bilaterally  Pulmonary: No respiratory distress, normal work of breathing, no accessory muscule usage, breath sounds clear and equal bilaterally, no wheezing, no coarse breath sounds  Abdomen: Soft, non-distended  Skin: Warm, dry, no rashes or lesions, no significant lacerations nor abrasions  Musculoskeletal: No visible deformity, she does have some soft tissue swelling around bilateral ankles, tenderness to palpation of the lateral aspect of both ankles, she also has some tenderness to palpation of the lateral aspect of the right foot.  Describes pain throughout the left knee, worse with flexing the knee and movement, soft compartments throughout bilateral lower extremities.  Lower extremities are warm and well perfused.  Neurologic: Alert, oriented, normal speech, normal motor function, the motor function in the lower extremities is somewhat limited by pain  Psychiatric: Normal and appropriate mood and affect    Medical records reviewed for continuity of care.  No recent visits for similar " symptoms.    EKG: EKG independently interpreted by myself.  Rate 84, sinus rhythm, no ST elevation, ST depression present in V2 and V3 with T wave inversions in V1 through V5, this is new from prior EKG however most recent EKG in our system is from 2018.  No ectopy.    Labs:  Labs Reviewed   CBC WITH DIFFERENTIAL - Abnormal; Notable for the following components:       Result Value    MCHC 33.2 (*)     All other components within normal limits   COMP METABOLIC PANEL - Abnormal; Notable for the following components:    Potassium 3.4 (*)     Bun 7 (*)     Alkaline Phosphatase 182 (*)     All other components within normal limits   ESTIMATED GFR       Radiology:  CT-KNEE W/O PLUS RECONS LEFT   Final Result      1.  Nondisplaced fracture of medial aspect of the proximal fibula is identified.      2.  No other fractures identified.      3.  Joint effusion is identified.      4.  Moderate osteoarthritis.      DX-KNEE COMPLETE 4+ LEFT   Final Result      No evidence of fracture or dislocation.   Findings are consistent with mild to moderate osteoarthritis.      DX-ANKLE 3+ VIEWS LEFT   Final Result      No evidence of fracture or dislocation.      DX-FOOT-COMPLETE 3+ RIGHT   Final Result      No evidence of fracture or dislocation.      DX-ANKLE 3+ VIEWS RIGHT   Final Result      No evidence of fracture or dislocation.           ED Medications Administered:  Medications   HYDROcodone-acetaminophen (NORCO) 5-325 MG per tablet 1 Tablet (1 Tablet Oral Given 5/8/22 1709)       Differential diagnosis:  Knee or ankle sprain, strain, fracture, less likely dislocation.    MDM:  Ms. Desai presents to the emergency department today for evaluation of bilateral ankle pain, right foot pain, and left knee pain after a fall that occurred 3 days ago.  Her primary concern is left knee pain, she has not been able to bear weight at all on the extremity since the time of the injury, and has been using her walker with minimal success.  She has  no visible deformity.  Bilateral lower extremities with no evidence of neurovascular compromise.    Laboratory evaluation is reassuring, screening lab work reveals no significant abnormalities on CMP.  White blood count is within normal limits, hemoglobin and platelet count is within normal limits.    Plain film of the knee is negative for evidence of fracture or dislocation.  Bilateral ankles show no evidence of fracture or dislocation Plain film of the foot is negative for fracture or dislocation.    As she has been unable to bear weight on the left knee for 3 days, CT imaging was obtained to evaluate for evidence of an occult fracture that may have been missed on x-ray.  This demonstrates a nondisplaced fracture of the medial aspect of the proximal fibula.  No other fractures identified.    I discussed the options of discharge versus admission with likely rehabilitation placement, if she thought she was not able to get around at home.  Both patient and her family member say that she does have a walker, and is able to get around.  She has plenty of help at home including a nephew who can stay with her.  She would much prefer to go home, states that she has been getting around fine for the past 3 days.  I did discuss her injury with Dr. Soria, orthopedic surgeon on-call this evening.  He states that a cam boot will likely help her to be able to walk more comfortably with the assistance of her walker.  He agrees with plan for outpatient follow-up.    She is counseled to call the orthopedic clinic in the morning to schedule follow-up appointment.  She is provided with a cam boot.  She does have a walker at home.  If she has any difficulty with her activities of daily living, she knows she can return to the emergency department for assistance. Return precautions were discussed with the patient, and provided in written form with the patient's discharge instructions.       Personal protective equipment including N95  surgical respirator, goggles, and gloves were used during this encounter.       Disposition:  Discharge home in stable condition    Final Impression:  1. Other closed fracture of proximal end of left fibula, initial encounter    2. Injury of right ankle, initial encounter        Electronically signed by: Apolonia Garces MD, 5/8/2022 7:45 PM

## 2022-05-08 NOTE — ED TRIAGE NOTES
Debby Desai  62 y.o. female  Chief Complaint   Patient presents with   • GLF     States Friday went to get out of bed, became dizzy then remembers waking up on the ground in the hallway. States fell onto L side. Unknown LOC or hitting head. -thinners. Reports L leg pain, R foot pain and L arm pain.   • Leg Pain     Pt presents to triage for above complaints. States unable to bear weight on L side due to pain. Feels like L knee is swollen. Denies any current dizziness or HA.     Triage process explained to patient, returned to lobby. Pt encouraged to notify staff of any change in condition.

## 2022-05-09 NOTE — ED NOTES
Pt discharged to home. Pt was given follow up instructions. Patient given boot per ortho tech and given follow up instructions for orthopedic surgeon. Pt verbalized understanding of all instructions for discharge and is out of ED in wheelchair with family member. AOx4

## 2022-05-09 NOTE — DISCHARGE INSTRUCTIONS
Please follow-up with the orthopedic surgeon listed above, to recheck your knee, as well as your ankles.  Call tomorrow morning to schedule follow-up appointment.  Return to the emergency department if you develop any new or worsening symptoms including worsening pain, swelling, numbness or tingling, or if you have any further concerns.  Additionally, please return if you have any difficulty walking, or difficulty caring for yourself at home.

## 2022-05-13 ENCOUNTER — OFFICE VISIT (OUTPATIENT)
Dept: PHYSICAL MEDICINE AND REHAB | Facility: MEDICAL CENTER | Age: 62
End: 2022-05-13
Payer: COMMERCIAL

## 2022-05-13 VITALS
HEIGHT: 66 IN | HEART RATE: 95 BPM | BODY MASS INDEX: 29.8 KG/M2 | DIASTOLIC BLOOD PRESSURE: 80 MMHG | SYSTOLIC BLOOD PRESSURE: 124 MMHG | TEMPERATURE: 97.5 F | OXYGEN SATURATION: 98 % | WEIGHT: 185.41 LBS

## 2022-05-13 DIAGNOSIS — M48.061 SPINAL STENOSIS OF LUMBAR REGION, UNSPECIFIED WHETHER NEUROGENIC CLAUDICATION PRESENT: ICD-10-CM

## 2022-05-13 DIAGNOSIS — M43.17 SPONDYLOLISTHESIS AT L5-S1 LEVEL: ICD-10-CM

## 2022-05-13 DIAGNOSIS — M17.12 PRIMARY OSTEOARTHRITIS OF LEFT KNEE: ICD-10-CM

## 2022-05-13 DIAGNOSIS — M47.816 LUMBAR SPONDYLOSIS: ICD-10-CM

## 2022-05-13 DIAGNOSIS — F33.41 MAJOR DEPRESSIVE DISORDER, RECURRENT EPISODE, IN PARTIAL REMISSION (HCC): ICD-10-CM

## 2022-05-13 DIAGNOSIS — S82.832A CLOSED FRACTURE OF PROXIMAL END OF LEFT FIBULA, UNSPECIFIED FRACTURE MORPHOLOGY, INITIAL ENCOUNTER: ICD-10-CM

## 2022-05-13 DIAGNOSIS — Z96.89 SPINAL CORD STIMULATOR STATUS: ICD-10-CM

## 2022-05-13 DIAGNOSIS — Z96.651 S/P TKR (TOTAL KNEE REPLACEMENT), RIGHT: ICD-10-CM

## 2022-05-13 DIAGNOSIS — M54.16 LUMBAR RADICULOPATHY: ICD-10-CM

## 2022-05-13 PROCEDURE — 99214 OFFICE O/P EST MOD 30 MIN: CPT | Performed by: PHYSICAL MEDICINE & REHABILITATION

## 2022-05-13 RX ORDER — TRAMADOL HYDROCHLORIDE 50 MG/1
50 TABLET ORAL EVERY 8 HOURS PRN
Qty: 90 TABLET | Refills: 0 | Status: SHIPPED | OUTPATIENT
Start: 2022-05-20 | End: 2022-07-06 | Stop reason: SDUPTHER

## 2022-05-13 RX ORDER — TRAMADOL HYDROCHLORIDE 50 MG/1
50 TABLET ORAL EVERY 8 HOURS PRN
Qty: 60 TABLET | Refills: 0 | Status: SHIPPED | OUTPATIENT
Start: 2022-06-19 | End: 2022-07-28

## 2022-05-13 ASSESSMENT — PAIN SCALES - GENERAL: PAINLEVEL: 5=MODERATE PAIN

## 2022-05-13 ASSESSMENT — PATIENT HEALTH QUESTIONNAIRE - PHQ9
5. POOR APPETITE OR OVEREATING: 0 - NOT AT ALL
CLINICAL INTERPRETATION OF PHQ2 SCORE: 3
SUM OF ALL RESPONSES TO PHQ QUESTIONS 1-9: 10

## 2022-05-13 ASSESSMENT — FIBROSIS 4 INDEX: FIB4 SCORE: 1.02

## 2022-05-13 NOTE — PROGRESS NOTES
"Follow-up patient note    Physiatry (physical medicine and  Rehabilitation), interventional spine and sports medicine    Date of Service:05/13/2022    Chief complaint:   Chief Complaint   Patient presents with   • Follow-Up     Right leg and knee        HISTORY    HPI: Debby Desai 62 y.o. female who presents today for follow-up evaluation of her pain complaints related to lumbar radiculopathy and pain after right total knee replacement.    Since the last visit, Debby has had a fall and was seen at the ED. from what she describes it sounds like she had a syncopal event and did not seek care initially but 2 days later did go to the ED.  She has a closed proximal fibular fracture and has been seen by Dr. Soria in follow-up and he suggested that she use a walker and will not require surgical management.  They will start PT in the future.    She retired last week.  This has been helpful with her stress level.  She is working with Dr. Stevenson.    Pain is a 5/10 on the NRS.  The left lower extremity is 9/10 on the NRS.    She did PT and transitioned to HEP prior to accident, feeling good with improved right leg strength.      SCS was previously placed for thoracic pain.  Device battery is \"dead\"    Working with a new behavioral therapist.  She sees Estella at [facility] and is feeling good about this.  Working on goals of reducing pain medications and using nonpharm techniques.    She has been reducing her tramadol 50mg twice a day, but is concerned about reducing further.  Taking tramadol and wellbutrin, effexor, elavil.    Denies suicidality.  PHQ-9: 16, now 10     By history:   She reports that she had surgery on 09/16/2019.  This was a right total knee arthroplasty for osteoarthritis with Dr. Saba.    In 2001, she had discectomy.  She reports that she has had a spinal cord stimulator placed, but she has not been using this as much.  She reports that the unit is not currently working.    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  She gets up about once an hour.  She has a sit to stand desk, but it is difficult to stand much.  Currently, she is working from home due to the COVID-19 pandemic.    Medical records review:  ED records from 10/07/2020 and 09/22/2020 reviewed.  ED visit from [id number].  Negative head CT.  She was given instructions about taking ibuprofen and tylenol.  Noted that she was also given a script for valium.     I reviewed the note from the referring provider Loy Miles M.D. dated 01/08/2020: Visits included nausea/epigastric pain, hypertension, chronic knee pain, IGT, dyslipidemia, MDD, hypothyroidism, iron deficiency    Previous treatments:    Physical Therapy: Yes    Medications the patient is tried: tramadol, tylenol (usually for a headache); in the past she took gabapentin 400mg po tid, she was taking vimovo and norco in the past; lyrica caused weight gain    Previous interventions: She had radiofrequency ablation in the past, prior to surgery    Previous surgeries to relieve the above pain:  None other than surgery 09/16/2019      ROS: Unchanged, see HPI  Gen: weight loss  Eyes: vision problems, glasses and contacts  CV: chest pain/anxiety  GI: nausea, ulcers  : urgency  Psych: depression  Endo: thyroid problems  Heme: anemia    Red Flags ROS:   Fever, Chills, Sweats: Denies  Involuntary Weight Loss: Denies  Bladder Incontinence: Denies  Bowel Incontinence: Denies  Saddle Anesthesia: Denies    All other systems reviewed and negative.       PMHx:   Past Medical History:   Diagnosis Date   • Anemia     in past   • Anginal syndrome (HCC)     had work up and feet r/t anxiety   • Anxiety    • Arthritis     OA knees   • Chronic pain 6/2/2021   • Dental disorder 5/24/12    partial upper denture   • Diabetes (HCC)     pre diabetic   • High cholesterol    • HTN (hypertension), benign 3/19/2012   • Hypothyroid 3/19/2012   • Major depression  3/19/2012   • Orbital floor (blow-out) closed fracture (HCC)     left   • Other specified symptom associated with female genital organs     post menopausal bleeding   • Pain     thoracic spine, Right knee, myofacial pain, and Right shoulder   • Pain     recently fell and has bruise left forehead   • Pain 02/2018    chronic back pain, right shoulder   • Psychiatric problem     depression, anxiety   • Unspecified disorder of thyroid    • Urinary bladder disorder     stress incont.   • Urinary incontinence     Occasional       PSHx:   Past Surgical History:   Procedure Laterality Date   • INJ,EPI ANES/STER LUM/SAC ADDL Right 4/7/2021    Procedure: RIGHT lumbar five and sacral one transforaminal epidural;  Surgeon: Volodymyr Herring M.D.;  [facility];  Service: Pain Management   • PB TOTAL KNEE ARTHROPLASTY Right 9/16/2019    Procedure: RIGHT ARTHROPLASTY, KNEE, TOTAL;  Surgeon: Rufus Saba M.D.;  Location: [facility];  Service: Orthopedics   • KNEE ARTHROSCOPY Right 2/9/2018    Procedure: KNEE ARTHROSCOPY;  Surgeon: Harsh Baltazar M.D.;  Location: [facility];  Service: Orthopedics   • MENISCECTOMY, KNEE, MEDIAL Right 2/9/2018    Procedure: MEDIAL AND LATERAL MENISCECTOMY- PARTIAL;  Surgeon: Harsh Baltazar M.D.;  Location: [facility];  Service: Orthopedics   • KNEE ARTHROSCOPY Right 7/12/2017    Procedure: KNEE ARTHROSCOPY- CESPEDES;  Surgeon: Harsh Baltazar M.D.;  Location: [facility];  Service:    • MENISCECTOMY, KNEE, MEDIAL Right 7/12/2017    Procedure: PARTIAL MEDIAL AND LATERAL MENISCECTOMY;  Surgeon: Harsh Baltazar M.D.;  Location: [facility];  Service:    • SYNOVECTOMY Right 7/12/2017    Procedure: SYNOVECTOMY;  Surgeon: Harsh Baltazar M.D.;  Location: [facility];  Service:    • KNEE ARTHROSCOPY  4/21/2015    Performed by Rufus Saba M.D. at [facility]  ORS   • MENISCECTOMY, KNEE, MEDIAL  4/21/2015    Performed by Rufus Saba M.D. at SURGERY [facility] ORS   • PUMP REVISION  12/10/2012    Performed by Allison Guerra M.D. at SURGERY [facility] ORS   • SPINAL CORD STIMULATOR  5/30/2012    Performed by ALLISON GUERRA at SURGERY [facility] ORS   • BIOPSY GENERAL  5/1/12    endometrial   • SPINAL CORD STIMULATOR  7/11/2011    Performed by ALLISON GUERRA at SURGERY [facility] ORS   • BLOCK EPIDURAL STEROID INJECTION  2008    x 3-4   • LYMPH NODE EXCISION Left 2007    cervicle   • OTHER Right 2001    thoracic discectomy     • ARTHROSCOPY, KNEE Left 1999   • RIB RESECTION Right 1980   • LUMPECTOMY         Family history   Family History   Problem Relation Age of Onset   • Hypertension Father    • Diabetes Father    • Heart Disease Father    • Alcohol abuse Father    • Anesthesia Sister         slow to come out (as a child)   • Diabetes Other    • Heart Disease Other    • Cancer Other    • Hypertension Other    • Stroke Other    • Lung Disease Other          Medications:   Current Outpatient Medications   Medication   • [START ON 6/19/2022] traMADol (ULTRAM) 50 MG Tab   • [START ON 5/20/2022] traMADol (ULTRAM) 50 MG Tab   • buPROPion (WELLBUTRIN XL) 300 MG XL tablet   • propranolol (INDERAL) 20 MG Tab   • venlafaxine (EFFEXOR-XR) 150 MG extended-release capsule   • venlafaxine XR (EFFEXOR XR) 75 MG CAPSULE SR 24 HR   • amitriptyline (ELAVIL) 50 MG Tab   • levothyroxine (SYNTHROID) 50 MCG Tab   • metFORMIN ER (GLUCOPHAGE XR) 500 MG TABLET SR 24 HR   • atorvastatin (LIPITOR) 20 MG Tab   • lisinopril-hydrochlorothiazide (PRINZIDE) 20-12.5 MG per tablet   • ergocalciferol (DRISDOL) 07958 UNIT capsule   • ibuprofen (MOTRIN) 200 MG Tab   • Ferrous Sulfate (IRON) 325 (65 Fe) MG Tab   • acetaminophen (TYLENOL) 500 MG Tab     No current facility-administered medications for this visit.       Allergies:   Allergies   Allergen Reactions   •  "Sulfamethoxazole-Trimethoprim Rash and Swelling     Pt states \"My hand swells up and rash all over body\".       Social Hx:   Social History     Socioeconomic History   • Marital status: Single     Spouse name: Not on file   • Number of children: Not on file   • Years of education: Not on file   • Highest education level: Associate degree: academic program   Occupational History   • Not on file   Tobacco Use   • Smoking status: Never Smoker   • Smokeless tobacco: Never Used   Vaping Use   • Vaping Use: Never used   Substance and Sexual Activity   • Alcohol use: Not Currently     Alcohol/week: 0.0 oz     Comment: 1 glass wine per month   • Drug use: No   • Sexual activity: Never     Partners: Male   Other Topics Concern   •  Service No   • Blood Transfusions No   • Caffeine Concern No   • Occupational Exposure No   • Hobby Hazards No   • Sleep Concern Yes   • Stress Concern Yes   • Weight Concern Yes   • Special Diet No   • Back Care No   • Exercise No   • Bike Helmet No   • Seat Belt Yes   • Self-Exams No   Social History Narrative   • Not on file     Social Determinants of Health     Financial Resource Strain: Low Risk    • Difficulty of Paying Living Expenses: Not hard at all   Food Insecurity: No Food Insecurity   • Worried About Running Out of Food in the Last Year: Never true   • Ran Out of Food in the Last Year: Never true   Transportation Needs: No Transportation Needs   • Lack of Transportation (Medical): No   • Lack of Transportation (Non-Medical): No   Physical Activity: Inactive   • Days of Exercise per Week: 0 days   • Minutes of Exercise per Session: 0 min   Stress: Stress Concern Present   • Feeling of Stress : Rather much   Social Connections: Socially Isolated   • Frequency of Communication with Friends and Family: More than three times a week   • Frequency of Social Gatherings with Friends and Family: Once a week   • Attends Confucianism Services: Never   • Active Member of Clubs or " "Organizations: No   • Attends Club or Organization Meetings: Never   • Marital Status: Never    Intimate Partner Violence: Not on file   Housing Stability: Low Risk    • Unable to Pay for Housing in the Last Year: No   • Number of Places Lived in the Last Year: 1   • Unstable Housing in the Last Year: No         EXAMINATION     Physical Exam:   Vitals: /80 (BP Location: Right arm, Patient Position: Sitting, BP Cuff Size: Small adult)   Pulse 95   Temp 36.4 °C (97.5 °F) (Temporal)   Ht 1.676 m (5' 6\")   Wt 84.1 kg (185 lb 6.5 oz)   SpO2 98%     Constitutional:   Body Habitus: Body mass index is 29.93 kg/m².  Cooperation: Fully cooperates with exam  Appearance: Well-groomed, well-nourished, not disheveled no acute distress    Eyes: No scleral icterus, no proptosis     ENT -no obvious auditory deficits, wearing a face mask    Skin -no visible skin lesions    Respiratory-  breathing comfortable on room air, no audible wheezing    Cardiovascular- No lower extremity edema is noted.     Psychiatric- alert and oriented ×3. Normal affect.     Gait -  Minimally antalgic without assist device    Neuro/Muscloskeletal  No focal motor deficits in the lower extremities bilaterally    Left knee range of motion within functional limits.  Moderate effusion of right knee.  Mild tenderness over the lateral fibula.  No medial or lateral instability.    Functional ROM of the right knee, flexion greater than 120 degrees is maintained.         MEDICAL DECISION MAKING    Medical records review: see under HPI section.     DATA    Labs:     03/14/2020 CRP 0.19  03/14/2020 Sed rate 20  02/25/2020: Tramadol and metabolites  09/21/2021 UDS consistent with tramadol and metabolites    Lab Results   Component Value Date/Time    SODIUM 141 05/08/2022 03:19 PM    POTASSIUM 3.4 (L) 05/08/2022 03:19 PM    CHLORIDE 100 05/08/2022 03:19 PM    CO2 29 05/08/2022 03:19 PM    ANION 12.0 05/08/2022 03:19 PM    GLUCOSE 92 05/08/2022 03:19 PM "    BUN 7 (L) 05/08/2022 03:19 PM    CREATININE 0.74 05/08/2022 03:19 PM    CALCIUM 9.5 05/08/2022 03:19 PM    ASTSGOT 17 05/08/2022 03:19 PM    ALTSGPT 8 05/08/2022 03:19 PM    TBILIRUBIN 0.5 05/08/2022 03:19 PM    ALBUMIN 4.2 05/08/2022 03:19 PM    TOTPROTEIN 7.5 05/08/2022 03:19 PM    GLOBULIN 3.3 05/08/2022 03:19 PM    AGRATIO 1.3 05/08/2022 03:19 PM       Lab Results   Component Value Date/Time    PROTHROMBTM 12.6 10/07/2020 10:45 PM    INR 0.91 10/07/2020 10:45 PM        Lab Results   Component Value Date/Time    WBC 7.4 05/08/2022 03:19 PM    RBC 4.49 05/08/2022 03:19 PM    HEMOGLOBIN 13.9 05/08/2022 03:19 PM    HEMATOCRIT 41.9 05/08/2022 03:19 PM    MCV 93.3 05/08/2022 03:19 PM    MCH 31.0 05/08/2022 03:19 PM    MCHC 33.2 (L) 05/08/2022 03:19 PM    MPV 10.8 05/08/2022 03:19 PM    NEUTSPOLYS 61.30 05/08/2022 03:19 PM    LYMPHOCYTES 28.10 05/08/2022 03:19 PM    MONOCYTES 8.00 05/08/2022 03:19 PM    EOSINOPHILS 1.80 05/08/2022 03:19 PM    BASOPHILS 0.40 05/08/2022 03:19 PM        Lab Results   Component Value Date/Time    HBA1C 5.8 (H) 06/05/2021 07:42 AM        Imaging: I personally reviewed following images, these are my reads  CT left knee May 8, 2022  There is note of moderate to large joint effusion with note of nondisplaced fracture at the medial aspect of the proximal fibula.  Moderate osteoarthritis    CT cervical spine 09/22/2020  There is note of cervical spondylosis with multilevel uncovertebral arthropathy    Xray right hip 03/14/2020  There is mild osteoarthritis of the right hip.      Xray lumbar 03/14/2020  There is mild anterolisthesis at L5-S1.  No abnormal motion on flexion and extension  There is DDD and facet arthropathy that is most noted at L5-S1 and less at L4-5    Xray right knee 09/16/2019  There is note of right knee arthroplasty    IMAGING radiology reads. I reviewed the following radiology reads    CT left knee May 8, 2022  IMPRESSION:     1.  Nondisplaced fracture of medial aspect of  the proximal fibula is identified.     2.  No other fractures identified.     3.  Joint effusion is identified.     4.  Moderate osteoarthritis.    Xray right knee 09/18/2021  Multiple standing views of the right knee show previous right total knee   replacement with hardware in good position without signs of loosening or   dislocation.  No obvious fracture noted.    Xray left shoulder 10/27/2021  X-rays reviewed today of the left shoulder show normal overall bony   alignment she has some AC degenerative change.  She has some osteophyte   formation off the inferior humeral head.  Some mild glenohumeral joint   space narrowing patient.  Type III acromion.    CT cervical spine 09/22/2020  IMPRESSION:  Degenerative change without evidence of cervical spine fracture.    Xray lumbar 03/14/2020  IMPRESSION:  1.  No compression deformity or acute fracture is identified.  2.  Mild anterolisthesis at L5-S1.  3.  Degenerative disc disease and facet arthropathy.  4.  No focal instability noted on flexion extension views.                                                Xray right hip 03/14/2020  IMPRESSION:     1.  No radiographic evidence of acute traumatic injury.     2.  Findings are consistent with mild osteoarthritis.     3.  Probable constipation.                                   Results for orders placed during the hospital encounter of 09/16/19   DX-KNEE 2- RIGHT    Impression Right knee arthroplasty.      Results for orders placed during the hospital encounter of 03/28/19   DX-KNEE 3 VIEWS RIGHT    Impression No evidence of acute fracture or dislocation.    Degenerative changes as above described.                              Diagnosis   Visit Diagnoses     ICD-10-CM   1. Spondylolisthesis at L5-S1 level  M43.17   2. Spinal stenosis of lumbar region, unspecified whether neurogenic claudication present  M48.061   3. Spinal cord stimulator status  Z96.89   4. S/P TKR (total knee replacement), right  Z96.651   5. Lumbar  spondylosis  M47.816   6. Lumbar radiculopathy  M54.16   7. Major depressive disorder, recurrent episode, in partial remission ([facility])  F33.41   8. Primary osteoarthritis of left knee  M17.12   9. Closed fracture of proximal end of left fibula, unspecified fracture morphology, initial encounter  S84.756V           ASSESSMENT:  Debby Desai 62 y.o. female seen for above.     Debby was seen today for follow-up.    Diagnoses and all orders for this visit:    Spondylolisthesis at L5-S1 level  -     traMADol (ULTRAM) 50 MG Tab; Take 1 Tablet by mouth in the morning, at noon, and in the evening as needed for Moderate-Severe Pain for up to 30 days.  -     traMADol (ULTRAM) 50 MG Tab; Take 1 Tablet by mouth in the morning, at noon, and in the evening as needed for Moderate-Severe Pain for up to 30 days.  -     Consent for Opiate Prescription  -     Controlled Substance Treatment Agreement    Spinal stenosis of lumbar region, unspecified whether neurogenic claudication present    Spinal cord stimulator status    S/P TKR (total knee replacement), right  -     traMADol (ULTRAM) 50 MG Tab; Take 1 Tablet by mouth in the morning, at noon, and in the evening as needed for Moderate-Severe Pain for up to 30 days.  -     traMADol (ULTRAM) 50 MG Tab; Take 1 Tablet by mouth in the morning, at noon, and in the evening as needed for Moderate-Severe Pain for up to 30 days.  -     Consent for Opiate Prescription  -     Controlled Substance Treatment Agreement    Lumbar spondylosis    Lumbar radiculopathy  -     traMADol (ULTRAM) 50 MG Tab; Take 1 Tablet by mouth in the morning, at noon, and in the evening as needed for Moderate-Severe Pain for up to 30 days.  -     traMADol (ULTRAM) 50 MG Tab; Take 1 Tablet by mouth in the morning, at noon, and in the evening as needed for Moderate-Severe Pain for up to 30 days.  -     Consent for Opiate Prescription  -     Controlled Substance Treatment Agreement    Major depressive disorder, recurrent  "episode, in partial remission (HCC)  -     Patient has been identified as having a positive depression screening. Appropriate orders and counseling have been given.    Primary osteoarthritis of left knee    Closed fracture of proximal end of left fibula, unspecified fracture morphology, initial encounter         1.  She has had a closed proximal fracture of the left fibula.  We reviewed the CT scans and discussed that she is seeing Dr. Soria who recommended conservative care.  She was advised to discontinue use of the cam walker as it was below her fracture she is using a walker for mobility and they plan to pursue physical therapy in the future.  2.  Reviewed that her left knee pain is making it more difficult for her to sleep at night.  Increase tramadol to 3 a day for the next month and advised that she take it with Tylenol at bedtime.  Plan to wean back to 60 mg for the next month continue to pursue weaning as tolerated.  3.  Discussed that she will get the thoracolumbar x-rays done and that we will confer with Loy Churchill RN, [employer] rep.  Reportedly her battery is \"dead \"but we will need to determine whether or not coverage could be obtained with current lead placement for low back and right lower extremity versus need to remove device and consider spinal cord stimulator trial future  4.  Encouraged her to continue to follow-up with her counselor at [facility] as well as her psychiatrist, Dr. Stevenson. Denies suicidality.  5.  Encouraged her to follow-up with her PCP regarding any need for further work-up related to what she describes as syncopal event.      Follow-up: Return in about 2 months (around 7/13/2022).      Thank you very much for asking me to participate in Debby Desai's care.  Please contact me with any questions or concerns.      Please note that this dictation was created using voice recognition software. I have made every reasonable attempt to correct obvious errors but there may be " errors of grammar and content that I may have overlooked prior to finalization of this note.      Volodymyr Herring MD  Physical Medicine and Rehabilitation  Interventional Spine and Sports Physiatry  [facility]

## 2022-05-24 PROCEDURE — RXMED WILLOW AMBULATORY MEDICATION CHARGE: Performed by: NURSE PRACTITIONER

## 2022-05-26 ENCOUNTER — PHARMACY VISIT (OUTPATIENT)
Dept: PHARMACY | Facility: MEDICAL CENTER | Age: 62
End: 2022-05-26
Payer: COMMERCIAL

## 2022-05-26 PROCEDURE — RXMED WILLOW AMBULATORY MEDICATION CHARGE: Performed by: PHYSICAL MEDICINE & REHABILITATION

## 2022-05-31 ENCOUNTER — TELEMEDICINE (OUTPATIENT)
Dept: BEHAVIORAL HEALTH | Facility: CLINIC | Age: 62
End: 2022-05-31
Payer: COMMERCIAL

## 2022-05-31 DIAGNOSIS — G47.09 OTHER INSOMNIA: ICD-10-CM

## 2022-05-31 DIAGNOSIS — F33.41 RECURRENT MAJOR DEPRESSIVE DISORDER, IN PARTIAL REMISSION (HCC): ICD-10-CM

## 2022-05-31 DIAGNOSIS — F33.2 SEVERE EPISODE OF RECURRENT MAJOR DEPRESSIVE DISORDER, WITHOUT PSYCHOTIC FEATURES (HCC): ICD-10-CM

## 2022-05-31 DIAGNOSIS — F41.1 GAD (GENERALIZED ANXIETY DISORDER): ICD-10-CM

## 2022-05-31 PROCEDURE — RXMED WILLOW AMBULATORY MEDICATION CHARGE: Performed by: PSYCHIATRY & NEUROLOGY

## 2022-05-31 PROCEDURE — 90833 PSYTX W PT W E/M 30 MIN: CPT | Mod: 95 | Performed by: PSYCHIATRY & NEUROLOGY

## 2022-05-31 PROCEDURE — 99214 OFFICE O/P EST MOD 30 MIN: CPT | Mod: 95 | Performed by: PSYCHIATRY & NEUROLOGY

## 2022-05-31 RX ORDER — VENLAFAXINE HYDROCHLORIDE 75 MG/1
75 CAPSULE, EXTENDED RELEASE ORAL DAILY
Qty: 90 CAPSULE | Refills: 2 | Status: SHIPPED | OUTPATIENT
Start: 2022-05-31 | End: 2022-11-10 | Stop reason: SDUPTHER

## 2022-05-31 RX ORDER — PROPRANOLOL HYDROCHLORIDE 20 MG/1
20 TABLET ORAL 3 TIMES DAILY PRN
Qty: 90 TABLET | Refills: 5 | Status: SHIPPED | OUTPATIENT
Start: 2022-05-31 | End: 2022-08-10

## 2022-05-31 RX ORDER — AMITRIPTYLINE HYDROCHLORIDE 50 MG/1
50 TABLET, FILM COATED ORAL NIGHTLY
Qty: 30 TABLET | Refills: 5 | Status: SHIPPED | OUTPATIENT
Start: 2022-05-31 | End: 2022-11-10 | Stop reason: SDUPTHER

## 2022-05-31 RX ORDER — VENLAFAXINE HYDROCHLORIDE 150 MG/1
150 CAPSULE, EXTENDED RELEASE ORAL DAILY
Qty: 90 CAPSULE | Refills: 2 | Status: SHIPPED | OUTPATIENT
Start: 2022-05-31 | End: 2022-08-10 | Stop reason: SDUPTHER

## 2022-05-31 RX ORDER — BUPROPION HYDROCHLORIDE 300 MG/1
300 TABLET ORAL EVERY MORNING
Qty: 90 TABLET | Refills: 2 | Status: SHIPPED | OUTPATIENT
Start: 2022-05-31 | End: 2022-11-10 | Stop reason: SDUPTHER

## 2022-05-31 NOTE — PROGRESS NOTES
This evaluation was conducted via Zoom using secure and encrypted videoconferencing technology. The patient was in their home in the [facility].    The patient's identity was confirmed and verbal consent was obtained for this virtual visit.      PSYCHIATRY FOLLOW-UP NOTE      Name: Debby Desai  MRN: [id number]  : 1960  Age: 62 y.o.  Date of assessment: 2022  PCP: ARELIS Chris  Persons in attendance: Patient      REASON FOR VISIT/CHIEF COMPLAINT (as stated by Patient):  Debby Desai is a 62 y.o., White female, attending follow-up appointment for mood and anxiety management.      HISTORY OF PRESENT ILLNESS:  Debby Desai is a 62 y.o. old female with MDD, TIM and insomnia comes in today for follow up. Patient was last seen 6 weeks ago, and following treatment planning recommendations were done:  · Continue Effexor XR to 225 mg daily for mood, anxiety and comorbid pain management.   · Continue Wellbutrin XL to 300 mg daily for depression augmentation.    · Stop hydroxyzine (vistaril) to 100 mg HS + melatonin 5 mg HS   · Add amitriptyline 50 mg at at bedtime as needed for insomnia.    · Continue propanolol 20 mg TID PRN for anxiety and shake.   · Monitor for serotonin syndrome.  · Continue psychotherapy for mood and anxiety management.    Patient is compliant with medication including recent addition of amitriptyline with good response to sleep.  She ended up resigning from her job due to increased depressive and reports feeling much lighter with improved mood and anxiety.  Patient agreed with plan of not titrating medications further and continuing psychotherapy.    PSYCHOTHERAPY ASPECT OF SESSION (16 MIN):  • Most part of the session was dedicated to letting patient express her feelings related to resign from her job and how this was a positive decision after reviewing pros and cons.  • Impact of social stressors on mood and anxiety was discussed in detail.  • Importance of  monitoring for trigger that can destabilize mood and anxiety was emphasized.  • Most part of the session was dedicated to active listening and implementing supportive psychotherapy skills.      CURRENT MEDICATIONS:  Current Outpatient Medications   Medication Sig Dispense Refill   • [START ON 6/19/2022] traMADol (ULTRAM) 50 MG Tab Take 1 Tablet by mouth in the morning, at noon, and in the evening as needed for Moderate-Severe Pain for up to 30 days. 60 Tablet 0   • traMADol (ULTRAM) 50 MG Tab Take 1 Tablet by mouth in the morning, at noon, and in the evening as needed for Moderate-Severe Pain for up to 30 days. 90 Tablet 0   • buPROPion (WELLBUTRIN XL) 300 MG XL tablet Take 1 tablet by mouth every morning. 90 Tablet 2   • propranolol (INDERAL) 20 MG Tab Take 1 Tablet by mouth 3 times a day as needed (anxiety and shakes). 90 Tablet 5   • venlafaxine (EFFEXOR-XR) 150 MG extended-release capsule Take 1 Capsule by mouth every day. (venlafaxine xr 150 mg + 75 mg = 225 mg daily) 90 Capsule 2   • venlafaxine XR (EFFEXOR XR) 75 MG CAPSULE SR 24 HR Take 1 Capsule by mouth every day. (venlafaxine xr 150 mg + 75 mg = 225 mg daily) 90 Capsule 2   • amitriptyline (ELAVIL) 50 MG Tab Take 1 Tablet by mouth every evening. 30 Tablet 5   • levothyroxine (SYNTHROID) 50 MCG Tab Take 1 tablet by mouth Every morning on an empty stomach. 90 Tablet 3   • metFORMIN ER (GLUCOPHAGE XR) 500 MG TABLET SR 24 HR Take 1 Tab by mouth 2 times a day. 180 Tablet 2   • atorvastatin (LIPITOR) 20 MG Tab TAKE ONE TABLET BY MOUTH EVERY DAY 90 Tablet 2   • lisinopril-hydrochlorothiazide (PRINZIDE) 20-12.5 MG per tablet Take one tablet by mouth daily 90 Tablet 2   • ergocalciferol (DRISDOL) 03121 UNIT capsule Take 1 capsule by mouth every 7 days. 12 capsule 0   • ibuprofen (MOTRIN) 200 MG Tab Take 600 mg by mouth every 6 hours as needed for Inflammation.     • Ferrous Sulfate (IRON) 325 (65 Fe) MG Tab Take 65 mg by mouth every day at 6 PM.     •  acetaminophen (TYLENOL) 500 MG Tab Take 500-1,000 mg by mouth 2 times a day as needed for Moderate Pain.       No current facility-administered medications for this visit.       MEDICAL HISTORY  Past Medical History:   Diagnosis Date   • Anemia     in past   • Anginal syndrome (HCC)     had work up and feet r/t anxiety   • Anxiety    • Arthritis     OA knees   • Chronic pain 6/2/2021   • Dental disorder 5/24/12    partial upper denture   • Diabetes (HCC)     pre diabetic   • High cholesterol    • HTN (hypertension), benign 3/19/2012   • Hypothyroid 3/19/2012   • Major depression 3/19/2012   • Orbital floor (blow-out) closed fracture (HCC)     left   • Other specified symptom associated with female genital organs     post menopausal bleeding   • Pain     thoracic spine, Right knee, myofacial pain, and Right shoulder   • Pain     recently fell and has bruise left forehead   • Pain 02/2018    chronic back pain, right shoulder   • Psychiatric problem     depression, anxiety   • Unspecified disorder of thyroid    • Urinary bladder disorder     stress incont.   • Urinary incontinence     Occasional     Past Surgical History:   Procedure Laterality Date   • INJ,EPI ANES/STER LUM/SAC ADDL Right 4/7/2021    Procedure: RIGHT lumbar five and sacral one transforaminal epidural;  Surgeon: Volodymyr Herring M.D.;  [facility];  Service: Pain Management   • PB TOTAL KNEE ARTHROPLASTY Right 9/16/2019    Procedure: RIGHT ARTHROPLASTY, KNEE, TOTAL;  Surgeon: Rufus Saba M.D.;  Location: [facility];  Service: Orthopedics   • KNEE ARTHROSCOPY Right 2/9/2018    Procedure: KNEE ARTHROSCOPY;  Surgeon: Harsh Baltazar M.D.;  Location: SUR[facility];  Service: Orthopedics   • MENISCECTOMY, KNEE, MEDIAL Right 2/9/2018    Procedure: MEDIAL AND LATERAL MENISCECTOMY- PARTIAL;  Surgeon: Harsh Baltazar M.D.;  Location: SURGERY [facility];  Service: Orthopedics   • KNEE  ARTHROSCOPY Right 7/12/2017    Procedure: KNEE ARTHROSCOPY- CESPEDES;  Surgeon: Harsh Baltazar M.D.;  Location: [facility];  Service:    • MENISCECTOMY, KNEE, MEDIAL Right 7/12/2017    Procedure: PARTIAL MEDIAL AND LATERAL MENISCECTOMY;  Surgeon: Harsh Baltazar M.D.;  Location: [facility];  Service:    • SYNOVECTOMY Right 7/12/2017    Procedure: SYNOVECTOMY;  Surgeon: Harsh Baltazar M.D.;  Location: [facility];  Service:    • KNEE ARTHROSCOPY  4/21/2015    Performed by Rufus Saba M.D. at SURGERY [facility]   • MENISCECTOMY, KNEE, MEDIAL  4/21/2015    Performed by Rufus Saba M.D. at [facility]   • PUMP REVISION  12/10/2012    Performed by Allison Guerra M.D. at [facility]   • SPINAL CORD STIMULATOR  5/30/2012    Performed by ALLISON GUERRA at [facility]   • BIOPSY GENERAL  5/1/12    endometrial   • SPINAL CORD STIMULATOR  7/11/2011    Performed by ALLISON GUERRA at [facility]   • BLOCK EPIDURAL STEROID INJECTION  2008    x 3-4   • LYMPH NODE EXCISION Left 2007    cervicle   • OTHER Right 2001    thoracic discectomy     • ARTHROSCOPY, KNEE Left 1999   • RIB RESECTION Right 1980   • LUMPECTOMY         PAST PSYCHIATRIC HISTORY  Prior psychiatric hospitalization: no  Prior Self harm/suicide attempt: no  Prior Diagnosis: MDD, Anxiety     PAST PSYCHIATRIC MEDICATIONS  · Fluoxetine  · Paroxetine  · Citalopram  · Sertraline  · Duloxetine  · Wellbutrin  · Amitriptyline   · Ativan      FAMILY HISTORY  Psychiatric diagnosis: Nieces with depression and anxiety  History of suicide attempts: No  Substance abuse history: 1 sister with drug and alcohol abuse history     SUBSTANCE USE HISTORY:  ALCOHOL: no  TOBACCO: no  CANNABIS: no  OPIOIDS: no  PRESCRIPTION MEDICATIONS: no  OTHERS: no  History of inpatient/outpatient rehab treatment: none     SOCIAL HISTORY  Childhood: born in [place] and  describes childhood as difficult  Moved a lot due to parents financial concerns  Employment: For [employer]  Relationship: single (never )  Kids: no kids  Current living situation: alone (but strong family support as they all live nearby)  Current/past legal issues: denies  History of emotional/physical/sexual abuse - denies      REVIEW OF SYSTEMS:        Constitutional  fatigue; chronic pain   Eyes negative   Ears/Nose/Mouth/Throat negative   Cardiovascular  hypertension, hyperlipidemia   Respiratory negative   Gastrointestinal negative   Genitourinary negative   Muscular  osteoarthritis   Integumentary negative   Neurological negative   Endocrine  hypothyroidism   Hematologic/Lymphatic negative     PHYSICAL EXAMINAION:  Vital signs: LMP 02/26/2012   Musculoskeletal: Normal gait.   Abnormal movements: none      MENTAL STATUS EXAMINATION      General:   - Grooming and hygiene: Casual,   - Apparent distress: none,   - Behavior: Calm  - Eye Contact:  Good,   - no psychomotor agitation or retardation    - Participation: Active verbal participation  Orientation: Alert and Fully Oriented to person, place and time  Mood: Euthymic  Affect: Flexible and Full range,  Thought Process: Logical and Goal-directed  Thought Content: Denies suicidal or homicidal ideations, intent or plan Within normal limits  Perception: Denies auditory or visual hallucinations. No delusions noted Within normal limits  Attention span and concentration: Intact   Speech:Rate within normal limits and Volume within normal limits  Language: Appropriate   Insight: Good  Judgment: Good  Recent and remote memory: No gross evidence of memory deficits        DEPRESSION SCREENING:  Depression Screen (PHQ-2/PHQ-9) 2/3/2022 3/18/2022 5/13/2022   PHQ-2 Total Score - - -   PHQ-2 Total Score - - -   PHQ-2 Total Score 6 6 3   PHQ-9 Total Score - - -   PHQ-9 Total Score 18 16 10       Interpretation of PHQ-9 Total Score   Score Severity   1-4 No  Depression   5-9 Mild Depression   10-14 Moderate Depression   15-19 Moderately Severe Depression   20-27 Severe Depression    CURRENT RISK:       Suicidal: Low       Homicidal: Low       Self-Harm: Low       Relapse: Low       Crisis Safety Plan Reviewed Not Indicated       If evidence of imminent risk is present, intervention/plan:      MEDICAL RECORDS/LABS/DIAGNOSTIC TESTS REVIEWED:  No new lab since last visit     [facility] records -   Reviewed     PLAN:  (1) Major depressive disorder; (2) TIM; (3) Insomnia  · Improving  · Continue Effexor XR to 225 mg daily for mood, anxiety and comorbid pain management.   · Continue Wellbutrin XL to 300 mg daily for depression augmentation.    · Continue Amitriptyline 50 mg at at bedtime as needed for insomnia.    · Continue Propanolol 20 mg TID PRN for anxiety and shake.   · Monitor for serotonin syndrome.  · Continue psychotherapy for mood and anxiety management.  · Medication options, alternatives (including no medications) and medication risks/benefits/side effects were discussed in detail.  · Explained importance of contraceptive measures while on psychotropic medications, educated to let provider know if ever pregnant or wanting to become pregnant. Verbalized understanding.  · The patient was advised to call, message provider on [facility], or come in to the clinic if symptoms worsen or if any future questions/issues regarding their medications arise; the patient verbalized understanding and agreement.    · The patient was educated to call 911, call the suicide hotline, or go to local ER if having thoughts of suicide or homicide; verbalized understanding.    Billing Coding based on:  [id number] based on [facility]  [id number]: based on psychotherapy timing    Return to clinic in 2 months or sooner if symptoms worsen.  Next Appointment: instruction provided on how to make the next appointment.     The proposed treatment plan was discussed with the patient who was provided the opportunity to ask  questions and make suggestions regarding alternative treatment. Patient verbalized understanding and expressed agreement with the plan.       Genaro Stevenson M.D.  05/31/22    This note was created using voice recognition software (Dragon). The accuracy of the dictation is limited by the abilities of the software. I have reviewed the note prior to signing, however some errors in grammar and context are still possible. If you have any questions related to this note please do not hesitate to contact our office.

## 2022-06-09 PROCEDURE — RXMED WILLOW AMBULATORY MEDICATION CHARGE: Performed by: NURSE PRACTITIONER

## 2022-06-15 ENCOUNTER — PHARMACY VISIT (OUTPATIENT)
Dept: PHARMACY | Facility: MEDICAL CENTER | Age: 62
End: 2022-06-15
Payer: COMMERCIAL

## 2022-06-27 PROCEDURE — RXMED WILLOW AMBULATORY MEDICATION CHARGE: Performed by: PHYSICAL MEDICINE & REHABILITATION

## 2022-06-28 ENCOUNTER — PHARMACY VISIT (OUTPATIENT)
Dept: PHARMACY | Facility: MEDICAL CENTER | Age: 62
End: 2022-06-28
Payer: COMMERCIAL

## 2022-07-06 ENCOUNTER — OFFICE VISIT (OUTPATIENT)
Dept: PHYSICAL MEDICINE AND REHAB | Facility: MEDICAL CENTER | Age: 62
End: 2022-07-06
Payer: COMMERCIAL

## 2022-07-06 VITALS
WEIGHT: 182.1 LBS | DIASTOLIC BLOOD PRESSURE: 82 MMHG | OXYGEN SATURATION: 98 % | BODY MASS INDEX: 29.27 KG/M2 | HEIGHT: 66 IN | HEART RATE: 96 BPM | TEMPERATURE: 97.2 F | SYSTOLIC BLOOD PRESSURE: 128 MMHG

## 2022-07-06 DIAGNOSIS — M43.17 SPONDYLOLISTHESIS AT L5-S1 LEVEL: ICD-10-CM

## 2022-07-06 DIAGNOSIS — M25.561 ACUTE PAIN OF RIGHT KNEE: ICD-10-CM

## 2022-07-06 DIAGNOSIS — Z96.651 S/P TKR (TOTAL KNEE REPLACEMENT), RIGHT: ICD-10-CM

## 2022-07-06 DIAGNOSIS — M79.646 PAIN OF MIDDLE FINGER: ICD-10-CM

## 2022-07-06 DIAGNOSIS — Z91.81 RISK FOR FALLS: ICD-10-CM

## 2022-07-06 DIAGNOSIS — M54.16 LUMBAR RADICULOPATHY: ICD-10-CM

## 2022-07-06 DIAGNOSIS — M48.061 SPINAL STENOSIS OF LUMBAR REGION, UNSPECIFIED WHETHER NEUROGENIC CLAUDICATION PRESENT: ICD-10-CM

## 2022-07-06 DIAGNOSIS — Z96.89 SPINAL CORD STIMULATOR STATUS: ICD-10-CM

## 2022-07-06 PROCEDURE — 99214 OFFICE O/P EST MOD 30 MIN: CPT | Performed by: PHYSICAL MEDICINE & REHABILITATION

## 2022-07-06 PROCEDURE — RXMED WILLOW AMBULATORY MEDICATION CHARGE: Performed by: PHYSICAL MEDICINE & REHABILITATION

## 2022-07-06 RX ORDER — TRAMADOL HYDROCHLORIDE 50 MG/1
50 TABLET ORAL EVERY 8 HOURS PRN
Qty: 60 TABLET | Refills: 0 | Status: SHIPPED | OUTPATIENT
Start: 2022-07-29 | End: 2022-08-28

## 2022-07-06 RX ORDER — GABAPENTIN 300 MG/1
300 CAPSULE ORAL 3 TIMES DAILY
Qty: 90 CAPSULE | Refills: 1 | Status: SHIPPED | OUTPATIENT
Start: 2022-07-06 | End: 2022-09-04

## 2022-07-06 ASSESSMENT — FIBROSIS 4 INDEX: FIB4 SCORE: 1.02

## 2022-07-06 ASSESSMENT — PATIENT HEALTH QUESTIONNAIRE - PHQ9
CLINICAL INTERPRETATION OF PHQ2 SCORE: 3
SUM OF ALL RESPONSES TO PHQ QUESTIONS 1-9: 9
5. POOR APPETITE OR OVEREATING: 0 - NOT AT ALL

## 2022-07-06 ASSESSMENT — PAIN SCALES - GENERAL: PAINLEVEL: 7=MODERATE-SEVERE PAIN

## 2022-07-06 NOTE — PROGRESS NOTES
Follow-up patient note    Physiatry (physical medicine and  Rehabilitation), interventional spine and sports medicine    Date of Service:07/06/2022    Chief complaint:   Chief Complaint   Patient presents with   • Follow-Up     Right leg pain        HISTORY    HPI: Debby Desai 62 y.o. female who presents today for follow-up evaluation of her pain complaints related to lumbar radiculopathy and pain after right total knee replacement.    She is going to see Dr. Prasad in August regarding repeated episodes of syncope.  She has not been to see her PCP.  She has fallen again.  Did have another fall.  Her right knee is more swollen and left third digit.  Symptoms are tunnel vision and then she passes out.  This has happened again on Saturday.  Reports that she developed tunnel vision and then developed tremor and spasms.    Went to [place] and this went well.  Drove up for a wedding.  Being in the car for 8 hours was difficult for her.    Depression is better.  She is seeing Dr. Stevenson.  Denies suicidality.    Reports that her pain is about 7/10 on the NRS.  This is near her right knee.  Low back continues to be aching and reports that her left ankle is sprained and starting PT.  No significant left knee pain.    Stress levels are a lot lower.  Headaches are better.  Working with a behavioral therapist.  She sees Estella at [facility] and is feeling good about this.      She has been reducing her tramadol 50mg twice a day, this has been fair to okay.  Taking tramadol and wellbutrin, effexor, elavil.    Denies suicidality.  PHQ-9: 16, now 9     By history:   She reports that she had surgery on 09/16/2019.  This was a right total knee arthroplasty for osteoarthritis with Dr. Saba.    In 2001, she had discectomy.  She reports that she has had a spinal cord stimulator placed, but she has not been using this as much.  She reports that the unit is not currently working.    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  She gets up about once an hour.  She has a sit to stand desk, but it is difficult to stand much.  Currently, she is working from home due to the COVID-19 pandemic.    Medical records review:  ED records from 10/07/2020 and 09/22/2020 reviewed.  ED visit from [id number].  Negative head CT.  She was given instructions about taking ibuprofen and tylenol.  Noted that she was also given a script for valium.     I reviewed the note from the referring provider Loy Miles M.D. dated 01/08/2020: Visits included nausea/epigastric pain, hypertension, chronic knee pain, IGT, dyslipidemia, MDD, hypothyroidism, iron deficiency    Previous treatments:    Physical Therapy: Yes    Medications the patient is tried: tramadol, tylenol (usually for a headache); in the past she took gabapentin 400mg po tid, she was taking vimovo and norco in the past; lyrica caused weight gain    Previous interventions: She had radiofrequency ablation in the past, prior to surgery    Previous surgeries to relieve the above pain:  None other than surgery 09/16/2019      ROS: Unchanged, see HPI  Gen: weight loss  Eyes: vision problems, glasses and contacts  CV: chest pain/anxiety  GI: nausea, ulcers  : urgency  Psych: depression  Endo: thyroid problems  Heme: anemia    Red Flags ROS:   Fever, Chills, Sweats: Denies  Involuntary Weight Loss: Denies  Bladder Incontinence: Denies  Bowel Incontinence: Denies  Saddle Anesthesia: Denies    All other systems reviewed and negative.       PMHx:   Past Medical History:   Diagnosis Date   • Anemia     in past   • Anginal syndrome (HCC)     had work up and feet r/t anxiety   • Anxiety    • Arthritis     OA knees   • Chronic pain 6/2/2021   • Dental disorder 5/24/12    partial upper denture   • Diabetes (HCC)     pre diabetic   • High cholesterol    • HTN (hypertension), benign 3/19/2012   • Hypothyroid 3/19/2012   • Major depression 3/19/2012   • Orbital floor  (blow-out) closed fracture (HCC)     left   • Other specified symptom associated with female genital organs     post menopausal bleeding   • Pain     thoracic spine, Right knee, myofacial pain, and Right shoulder   • Pain     recently fell and has bruise left forehead   • Pain 02/2018    chronic back pain, right shoulder   • Psychiatric problem     depression, anxiety   • Unspecified disorder of thyroid    • Urinary bladder disorder     stress incont.   • Urinary incontinence     Occasional       PSHx:   Past Surgical History:   Procedure Laterality Date   • INJ,EPI ANES/STER LUM/SAC ADDL Right 4/7/2021    Procedure: RIGHT lumbar five and sacral one transforaminal epidural;  Surgeon: Volodymyr Herring M.D.;  [facility];  Service: Pain Management   • PB TOTAL KNEE ARTHROPLASTY Right 9/16/2019    Procedure: RIGHT ARTHROPLASTY, KNEE, TOTAL;  Surgeon: Rufus Saba M.D.;  Location: [facility];  Service: Orthopedics   • KNEE ARTHROSCOPY Right 2/9/2018    Procedure: KNEE ARTHROSCOPY;  Surgeon: Harsh Baltazar M.D.;  Location: [facility];  Service: Orthopedics   • MENISCECTOMY, KNEE, MEDIAL Right 2/9/2018    Procedure: MEDIAL AND LATERAL MENISCECTOMY- PARTIAL;  Surgeon: Harsh Baltazar M.D.;  Location: [facility];  Service: Orthopedics   • KNEE ARTHROSCOPY Right 7/12/2017    Procedure: KNEE ARTHROSCOPY- CESPEDES;  Surgeon: Harsh Baltazar M.D.;  Location: [facility];  Service:    • MENISCECTOMY, KNEE, MEDIAL Right 7/12/2017    Procedure: PARTIAL MEDIAL AND LATERAL MENISCECTOMY;  Surgeon: Harsh Baltazar M.D.;  Location: [facility];  Service:    • SYNOVECTOMY Right 7/12/2017    Procedure: SYNOVECTOMY;  Surgeon: Harsh Baltazar M.D.;  Location: [facility];  Service:    • KNEE ARTHROSCOPY  4/21/2015    Performed by Rufsu Saba M.D. at [facility]   • MENISCECTOMY, KNEE,  MEDIAL  4/21/2015    Performed by Rufus Saba M.D. at SURGERY [facility]   • PUMP REVISION  12/10/2012    Performed by Allison Guerra M.D. at SURGERY [facility]   • SPINAL CORD STIMULATOR  5/30/2012    Performed by ALLISON GUERRA at SURGERY [facility]   • BIOPSY GENERAL  5/1/12    endometrial   • SPINAL CORD STIMULATOR  7/11/2011    Performed by ALLISON GUERRA at SURGERY [facility]   • BLOCK EPIDURAL STEROID INJECTION  2008    x 3-4   • LYMPH NODE EXCISION Left 2007    cervicle   • OTHER Right 2001    thoracic discectomy     • ARTHROSCOPY, KNEE Left 1999   • RIB RESECTION Right 1980   • LUMPECTOMY         Family history   Family History   Problem Relation Age of Onset   • Hypertension Father    • Diabetes Father    • Heart Disease Father    • Alcohol abuse Father    • Anesthesia Sister         slow to come out (as a child)   • Diabetes Other    • Heart Disease Other    • Cancer Other    • Hypertension Other    • Stroke Other    • Lung Disease Other          Medications:   Current Outpatient Medications   Medication   • [START ON 7/29/2022] traMADol (ULTRAM) 50 MG Tab   • gabapentin (NEURONTIN) 300 MG Cap   • amitriptyline (ELAVIL) 50 MG Tab   • buPROPion (WELLBUTRIN XL) 300 MG XL tablet   • venlafaxine XR (EFFEXOR XR) 75 MG CAPSULE SR 24 HR   • venlafaxine (EFFEXOR-XR) 150 MG extended-release capsule   • propranolol (INDERAL) 20 MG Tab   • traMADol (ULTRAM) 50 MG Tab   • levothyroxine (SYNTHROID) 50 MCG Tab   • metFORMIN ER (GLUCOPHAGE XR) 500 MG TABLET SR 24 HR   • atorvastatin (LIPITOR) 20 MG Tab   • lisinopril-hydrochlorothiazide (PRINZIDE) 20-12.5 MG per tablet   • ergocalciferol (DRISDOL) 61334 UNIT capsule   • ibuprofen (MOTRIN) 200 MG Tab   • Ferrous Sulfate (IRON) 325 (65 Fe) MG Tab   • acetaminophen (TYLENOL) 500 MG Tab     No current facility-administered medications for this visit.       Allergies:   Allergies   Allergen Reactions   • Sulfamethoxazole-Trimethoprim Rash  "and Swelling     Pt states \"My hand swells up and rash all over body\".       Social Hx:   Social History     Socioeconomic History   • Marital status: Single     Spouse name: Not on file   • Number of children: Not on file   • Years of education: Not on file   • Highest education level: Associate degree: academic program   Occupational History   • Not on file   Tobacco Use   • Smoking status: Never Smoker   • Smokeless tobacco: Never Used   Vaping Use   • Vaping Use: Never used   Substance and Sexual Activity   • Alcohol use: Not Currently     Alcohol/week: 0.0 oz     Comment: 1 glass wine per month   • Drug use: No   • Sexual activity: Never     Partners: Male   Other Topics Concern   •  Service No   • Blood Transfusions No   • Caffeine Concern No   • Occupational Exposure No   • Hobby Hazards No   • Sleep Concern Yes   • Stress Concern Yes   • Weight Concern Yes   • Special Diet No   • Back Care No   • Exercise No   • Bike Helmet No   • Seat Belt Yes   • Self-Exams No   Social History Narrative   • Not on file     Social Determinants of Health     Financial Resource Strain: Low Risk    • Difficulty of Paying Living Expenses: Not hard at all   Food Insecurity: No Food Insecurity   • Worried About Running Out of Food in the Last Year: Never true   • Ran Out of Food in the Last Year: Never true   Transportation Needs: No Transportation Needs   • Lack of Transportation (Medical): No   • Lack of Transportation (Non-Medical): No   Physical Activity: Inactive   • Days of Exercise per Week: 0 days   • Minutes of Exercise per Session: 0 min   Stress: Stress Concern Present   • Feeling of Stress : Rather much   Social Connections: Socially Isolated   • Frequency of Communication with Friends and Family: More than three times a week   • Frequency of Social Gatherings with Friends and Family: Once a week   • Attends Anabaptism Services: Never   • Active Member of Clubs or Organizations: No   • Attends Club or " "Organization Meetings: Never   • Marital Status: Never    Intimate Partner Violence: Not on file   Housing Stability: Low Risk    • Unable to Pay for Housing in the Last Year: No   • Number of Places Lived in the Last Year: 1   • Unstable Housing in the Last Year: No         EXAMINATION     Physical Exam:   Vitals: /82 (BP Location: Right arm, Patient Position: Sitting, BP Cuff Size: Adult long)   Pulse 96   Temp 36.2 °C (97.2 °F) (Temporal)   Ht 1.676 m (5' 6\")   Wt 82.6 kg (182 lb 1.6 oz)   SpO2 98%     Constitutional:   Body Habitus: Body mass index is 29.39 kg/m².  Cooperation: Fully cooperates with exam  Appearance: Well-groomed, well-nourished, not disheveled no acute distress    Eyes: No scleral icterus, no proptosis     ENT -no obvious auditory deficits, wearing a face mask    Skin -no visible skin lesions    Respiratory-  breathing comfortable on room air, no audible wheezing    Cardiovascular- No lower extremity edema is noted.     Psychiatric- alert and oriented ×3. Normal affect.     Gait -  Antalgic, using a walker.    Neuro/Muscloskeletal  No focal motor deficits in the lower extremities bilaterally    Left knee range of motion within functional limits.  Moderate effusion of right knee.  Mild tenderness over the lateral fibula.  No medial or lateral instability.     Left middle digit with full ROM, PIP tenderness and edema, bruising.    Right knee with functional ROM of the right knee, flexion greater than 120 degrees is maintained.   No significant bruising noted, effusion of the right knee.        MEDICAL DECISION MAKING    Medical records review: see under HPI section.     DATA    Labs:     03/14/2020 CRP 0.19  03/14/2020 Sed rate 20  02/25/2020: Tramadol and metabolites  09/21/2021 UDS consistent with tramadol and metabolites    Lab Results   Component Value Date/Time    SODIUM 141 05/08/2022 03:19 PM    POTASSIUM 3.4 (L) 05/08/2022 03:19 PM    CHLORIDE 100 05/08/2022 03:19 PM    " CO2 29 05/08/2022 03:19 PM    ANION 12.0 05/08/2022 03:19 PM    GLUCOSE 92 05/08/2022 03:19 PM    BUN 7 (L) 05/08/2022 03:19 PM    CREATININE 0.74 05/08/2022 03:19 PM    CALCIUM 9.5 05/08/2022 03:19 PM    ASTSGOT 17 05/08/2022 03:19 PM    ALTSGPT 8 05/08/2022 03:19 PM    TBILIRUBIN 0.5 05/08/2022 03:19 PM    ALBUMIN 4.2 05/08/2022 03:19 PM    TOTPROTEIN 7.5 05/08/2022 03:19 PM    GLOBULIN 3.3 05/08/2022 03:19 PM    AGRATIO 1.3 05/08/2022 03:19 PM       Lab Results   Component Value Date/Time    PROTHROMBTM 12.6 10/07/2020 10:45 PM    INR 0.91 10/07/2020 10:45 PM        Lab Results   Component Value Date/Time    WBC 7.4 05/08/2022 03:19 PM    RBC 4.49 05/08/2022 03:19 PM    HEMOGLOBIN 13.9 05/08/2022 03:19 PM    HEMATOCRIT 41.9 05/08/2022 03:19 PM    MCV 93.3 05/08/2022 03:19 PM    MCH 31.0 05/08/2022 03:19 PM    MCHC 33.2 (L) 05/08/2022 03:19 PM    MPV 10.8 05/08/2022 03:19 PM    NEUTSPOLYS 61.30 05/08/2022 03:19 PM    LYMPHOCYTES 28.10 05/08/2022 03:19 PM    MONOCYTES 8.00 05/08/2022 03:19 PM    EOSINOPHILS 1.80 05/08/2022 03:19 PM    BASOPHILS 0.40 05/08/2022 03:19 PM        Lab Results   Component Value Date/Time    HBA1C 5.8 (H) 06/05/2021 07:42 AM        Imaging: I personally reviewed following images, these are my reads  CT left knee May 8, 2022  There is note of moderate to large joint effusion with note of nondisplaced fracture at the medial aspect of the proximal fibula.  Moderate osteoarthritis    CT cervical spine 09/22/2020  There is note of cervical spondylosis with multilevel uncovertebral arthropathy    Xray right hip 03/14/2020  There is mild osteoarthritis of the right hip.      Xray lumbar 03/14/2020  There is mild anterolisthesis at L5-S1.  No abnormal motion on flexion and extension  There is DDD and facet arthropathy that is most noted at L5-S1 and less at L4-5    Xray right knee 09/16/2019  There is note of right knee arthroplasty    IMAGING radiology reads. I reviewed the following radiology  reads    CT left knee May 8, 2022  IMPRESSION:     1.  Nondisplaced fracture of medial aspect of the proximal fibula is identified.     2.  No other fractures identified.     3.  Joint effusion is identified.     4.  Moderate osteoarthritis.    Xray right knee 09/18/2021  Multiple standing views of the right knee show previous right total knee   replacement with hardware in good position without signs of loosening or   dislocation.  No obvious fracture noted.    Xray left shoulder 10/27/2021  X-rays reviewed today of the left shoulder show normal overall bony   alignment she has some AC degenerative change.  She has some osteophyte   formation off the inferior humeral head.  Some mild glenohumeral joint   space narrowing patient.  Type III acromion.    CT cervical spine 09/22/2020  IMPRESSION:  Degenerative change without evidence of cervical spine fracture.    Xray lumbar 03/14/2020  IMPRESSION:  1.  No compression deformity or acute fracture is identified.  2.  Mild anterolisthesis at L5-S1.  3.  Degenerative disc disease and facet arthropathy.  4.  No focal instability noted on flexion extension views.                                                Xray right hip 03/14/2020  IMPRESSION:     1.  No radiographic evidence of acute traumatic injury.  2.  Findings are consistent with mild osteoarthritis.  3.  Probable constipation.                                   Results for orders placed during the hospital encounter of 09/16/19   DX-KNEE 2- RIGHT    Impression Right knee arthroplasty.      Results for orders placed during the hospital encounter of 03/28/19   DX-KNEE 3 VIEWS RIGHT    Impression No evidence of acute fracture or dislocation.    Degenerative changes as above described.                              Diagnosis   Visit Diagnoses     ICD-10-CM   1. Lumbar radiculopathy  M54.16   2. Spondylolisthesis at L5-S1 level  M43.17   3. Spinal stenosis of lumbar region, unspecified whether neurogenic claudication  present  M48.061   4. Spinal cord stimulator status  Z96.89   5. S/P TKR (total knee replacement), right  Z96.651   6. Acute pain of right knee  M25.561   7. Pain of middle finger  M79.646   8. Risk for falls  Z91.81           ASSESSMENT:  Debby Desai 62 y.o. female seen for above.     Debby was seen today for follow-up.    Diagnoses and all orders for this visit:    Lumbar radiculopathy  -     traMADol (ULTRAM) 50 MG Tab; Take 1 Tablet by mouth in the morning, at noon, and in the evening as needed for Moderate-Severe Pain for up to 30 days.  -     Consent for Opiate Prescription  -     Controlled Substance Treatment Agreement  -     gabapentin (NEURONTIN) 300 MG Cap; Take 1 Capsule by mouth 3 times a day for 60 days.    Spondylolisthesis at L5-S1 level  -     traMADol (ULTRAM) 50 MG Tab; Take 1 Tablet by mouth in the morning, at noon, and in the evening as needed for Moderate-Severe Pain for up to 30 days.  -     Consent for Opiate Prescription  -     Controlled Substance Treatment Agreement    Spinal stenosis of lumbar region, unspecified whether neurogenic claudication present    Spinal cord stimulator status    S/P TKR (total knee replacement), right  -     DX-KNEE COMPLETE 4+ RIGHT; Future  -     traMADol (ULTRAM) 50 MG Tab; Take 1 Tablet by mouth in the morning, at noon, and in the evening as needed for Moderate-Severe Pain for up to 30 days.  -     Consent for Opiate Prescription  -     Controlled Substance Treatment Agreement    Acute pain of right knee  -     DX-KNEE COMPLETE 4+ RIGHT; Future    Pain of middle finger  -     DX-HAND 2- LEFT; Future    Risk for falls  -     Patient identified as fall risk.  Appropriate orders and counseling given.         1. Encouraged her to use her walker at all times.  She has had several falls without using walker in the house.  She has agreed that she will go to the ED for any future episodes.  She is going to call her PCP today for a visit.  2. Discussed that she  "has been doing okay with reduction of tramadol.  Reviewed .  Plan to continue with tramadol twice a day for now.  We can reassess this as needed, will continue this for now.  3. Discussed that she will get the thoracolumbar x-rays done and that we will confer with Loy Churchill RN, [employer] rep.  Reportedly her battery is \"dead \"but we will need to determine whether or not coverage could be obtained with current lead placement for low back and right lower extremity versus need to remove device and consider spinal cord stimulator trial future.  She will get these done soon and by the next visit, she reports.  4.  Encouraged her to continue to follow-up with her counselor at [facility] as well as her psychiatrist, Dr. Stevenson. Denies suicidality.  Doing better.  5.  Encouraged her to follow-up with her PCP regarding any need for further work-up related to what she describes as syncopal event.  She has plans to see Neurology and I have again encouraged her to go to her PCP.      Follow-up: Return in about 6 weeks (around 8/17/2022).      Thank you very much for asking me to participate in Debby Desai's care.  Please contact me with any questions or concerns.      Please note that this dictation was created using voice recognition software. I have made every reasonable attempt to correct obvious errors but there may be errors of grammar and content that I may have overlooked prior to finalization of this note.      Volodymyr Herring MD  Physical Medicine and Rehabilitation  Interventional Spine and Sports Physiatry  [facility]     "

## 2022-07-07 ENCOUNTER — OFFICE VISIT (OUTPATIENT)
Dept: MEDICAL GROUP | Facility: MEDICAL CENTER | Age: 62
End: 2022-07-07
Payer: COMMERCIAL

## 2022-07-07 ENCOUNTER — HOSPITAL ENCOUNTER (OUTPATIENT)
Dept: LAB | Facility: MEDICAL CENTER | Age: 62
End: 2022-07-07
Attending: PHYSICIAN ASSISTANT
Payer: COMMERCIAL

## 2022-07-07 VITALS
HEIGHT: 66 IN | BODY MASS INDEX: 29.12 KG/M2 | OXYGEN SATURATION: 97 % | WEIGHT: 181.2 LBS | TEMPERATURE: 96.2 F | SYSTOLIC BLOOD PRESSURE: 120 MMHG | HEART RATE: 115 BPM | DIASTOLIC BLOOD PRESSURE: 86 MMHG

## 2022-07-07 DIAGNOSIS — R94.31 ABNORMAL EKG: ICD-10-CM

## 2022-07-07 DIAGNOSIS — H53.483 TUNNEL VISUAL FIELD CONSTRICTION OF BOTH EYES: ICD-10-CM

## 2022-07-07 DIAGNOSIS — R00.0 TACHYCARDIA: ICD-10-CM

## 2022-07-07 DIAGNOSIS — R55 NEAR SYNCOPE: ICD-10-CM

## 2022-07-07 DIAGNOSIS — R79.89 POSITIVE D DIMER: ICD-10-CM

## 2022-07-07 LAB
D DIMER PPP IA.FEU-MCNC: 2.2 UG/ML (FEU) (ref 0–0.5)
NT-PROBNP SERPL IA-MCNC: 78 PG/ML (ref 0–125)
TROPONIN T SERPL-MCNC: 9 NG/L (ref 6–19)

## 2022-07-07 PROCEDURE — 36415 COLL VENOUS BLD VENIPUNCTURE: CPT

## 2022-07-07 PROCEDURE — 85379 FIBRIN DEGRADATION QUANT: CPT

## 2022-07-07 PROCEDURE — 99214 OFFICE O/P EST MOD 30 MIN: CPT | Performed by: PHYSICIAN ASSISTANT

## 2022-07-07 PROCEDURE — 83880 ASSAY OF NATRIURETIC PEPTIDE: CPT

## 2022-07-07 PROCEDURE — 84484 ASSAY OF TROPONIN QUANT: CPT

## 2022-07-07 ASSESSMENT — FIBROSIS 4 INDEX: FIB4 SCORE: 1.02

## 2022-07-07 NOTE — ASSESSMENT & PLAN NOTE
Pleasant 62-year-old female here for recurring episodes of self-described tunnel vision.  States its been going on for about 4 months.  Was initially intermittent but now in the last few weeks has been more daily.  Reports that it usually occurs when she is walking and not sitting but can also occur with change in position.  Can get some associated muscle spasms of the lower extremities.  Denies nauseousness or vomiting associated.  No chest pain or shortness of breath.  Has not seen an eye doctor.  Has no known history of glaucoma.  Does wear corrective lenses

## 2022-07-07 NOTE — ASSESSMENT & PLAN NOTE
Patient reports episode of near syncope in May 2022.  Go to the emergency room for this sensation.  Did not hit her head or lose consciousness.  EKG was done.  Patient reports has no known history of heart attack or stroke.  Has not seen cardiology in many years but approximately 4 years ago had stress test via treadmill and echocardiogram because of chest pain.  Results were negative and it was thought to be attributed to anxiety.  She does take atorvastatin 20 mg for hyperlipidemia and blood pressure controlled with lisinopril hydrochlorothiazide 20-12.5 mg daily.  Propanolol is taken for anxiety and tremor

## 2022-07-07 NOTE — PROGRESS NOTES
Subjective:   No chief complaint on file.    Debby Desai is a 62 y.o. 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  States its been going on for about 4 months.  Was initially intermittent but now in the last few weeks has been more daily.  Reports that it usually occurs when she is walking and not sitting but can also occur with change in position.  Can get some associated muscle spasms of the lower extremities.  Denies nauseousness or vomiting associated.  No chest pain or shortness of breath.  Has not seen an eye doctor.  Has no known history of glaucoma.  Does wear corrective lenses    Near syncope  Patient reports episode of near syncope in May 2022.  Go to the emergency room for this sensation.  Did not hit her head or lose consciousness.  EKG was done.  Patient reports has no known history of heart attack or stroke.  Has not seen cardiology in many years but approximately 4 years ago had stress test via treadmill and echocardiogram because of chest pain.  Results were negative and it was thought to be attributed to anxiety.  She does take atorvastatin 20 mg for hyperlipidemia and blood pressure controlled with lisinopril hydrochlorothiazide 20-12.5 mg daily.  Propanolol is taken for anxiety and tremor       Current medicines (including changes today)  Current Outpatient Medications   Medication Sig Dispense Refill   • [START ON 7/29/2022] traMADol (ULTRAM) 50 MG Tab Take 1 Tablet by mouth in the morning, at noon, and in the evening as needed for Moderate-Severe Pain for up to 30 days. 60 Tablet 0   • gabapentin (NEURONTIN) 300 MG Cap Take 1 Capsule by mouth 3 times a day for 60 days. 90 Capsule 1   • amitriptyline (ELAVIL) 50 MG Tab Take 1 Tablet by mouth every evening. 30 Tablet 5   • buPROPion (WELLBUTRIN XL) 300 MG XL tablet Take 1 tablet by mouth every morning. 90  Tablet 2   • venlafaxine XR (EFFEXOR XR) 75 MG CAPSULE SR 24 HR Take 1 Capsule by mouth every day. (venlafaxine xr 150 mg + 75 mg = 225 mg daily) 90 Capsule 2   • venlafaxine (EFFEXOR-XR) 150 MG extended-release capsule Take 1 Capsule by mouth every day. (venlafaxine xr 150 mg + 75 mg = 225 mg daily) 90 Capsule 2   • propranolol (INDERAL) 20 MG Tab Take 1 Tablet by mouth 3 times a day as needed (anxiety and shakes). 90 Tablet 5   • levothyroxine (SYNTHROID) 50 MCG Tab Take 1 tablet by mouth Every morning on an empty stomach. 90 Tablet 3   • metFORMIN ER (GLUCOPHAGE XR) 500 MG TABLET SR 24 HR Take 1 Tab by mouth 2 times a day. 180 Tablet 2   • atorvastatin (LIPITOR) 20 MG Tab TAKE ONE TABLET BY MOUTH EVERY DAY 90 Tablet 2   • lisinopril-hydrochlorothiazide (PRINZIDE) 20-12.5 MG per tablet Take one tablet by mouth daily 90 Tablet 2   • ibuprofen (MOTRIN) 200 MG Tab Take 600 mg by mouth every 6 hours as needed for Inflammation.     • Ferrous Sulfate (IRON) 325 (65 Fe) MG Tab Take 65 mg by mouth every day at 6 PM.     • traMADol (ULTRAM) 50 MG Tab Take 1 Tablet by mouth in the morning, at noon, and in the evening as needed for Moderate-Severe Pain for up to 30 days. (Patient not taking: Reported on 7/7/2022) 60 Tablet 0   • ergocalciferol (DRISDOL) 72092 UNIT capsule Take 1 capsule by mouth every 7 days. (Patient not taking: Reported on 7/7/2022) 12 capsule 0   • acetaminophen (TYLENOL) 500 MG Tab Take 500-1,000 mg by mouth 2 times a day as needed for Moderate Pain.       No current facility-administered medications for this visit.     She  has a past medical history of Anemia, Anginal syndrome ([facility]), Anxiety, Arthritis, Chronic pain (6/2/2021), Dental disorder (5/24/12), Diabetes ([facility]), High cholesterol, HTN (hypertension), benign (3/19/2012), Hypothyroid (3/19/2012), Major depression (3/19/2012), Orbital floor (blow-out) closed fracture (HCC), Other specified symptom associated with female genital organs, Pain, Pain,  "Pain (02/2018), Psychiatric problem, Unspecified disorder of thyroid, Urinary bladder disorder, and Urinary incontinence.    She has no past medical history of Breast cancer (HCC).    ROS  As per listed in the HPI, otherwise negative     Objective:     /86 (BP Location: Right arm, Patient Position: Sitting, BP Cuff Size: Large adult)   Pulse (!) 115   Temp (!) 35.7 °C (96.2 °F) (Temporal)   Ht 1.676 m (5' 6\")   Wt 82.2 kg (181 lb 3.2 oz)   SpO2 97%  Body mass index is 29.25 kg/m².     Physical Exam:  General: Patient appears well-nourished, well-hydrated, nontoxic  HEENT, normocephalic atraumatic, PERRLA, extraocular movements intact, nares are patent and clear  Neck: No visible masses, thyromegaly or abnormalities noted  Cardiovascular.  Sitting comfortably without visible signs of edema  Lungs: No cyanosis noted, nondyspneic  Skin: Well perfused without evidence of rash or lesions  Neurological: Cranial nerves II through XII intact, normal gait  Musculoskeletal: Normal range of motion, normal strength and no deficit noted       Assessment and Plan:   The following treatment plan was discussed and signs and symptoms for which to return were discussed with patient.  Patient verbalized understanding.      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  Patient does have follow-up with neurology  August 5, 2022 and will also discuss your syncopal episodes    5: Tachycardia:   Ordering stat BNP, Trop, D-dimer   Patient has elevated heart rate today.  Denies chest pain or shortness of breath.   Will go to the ER symptoms worsen.  Did put an urgent referral to cardiology.   She has not taken her propranolol yet " and I do want her to make sure she is  taking 1 tablet by mouth 3 times daily and to check her heart rate.  If elevated in  the next few days she is instructed to come back ASAP      These are all new and unstable conditions.  Patient will be given referral to ophthalmology as sometimes tunnel vision may be related to open-angle glaucoma.  However this may also be cardiac in origin.  She had abnormal EKG noted in the ER but no follow-up.  I am sending her to cardiology for further work-up as she likely needs chemical stress test and echocardiogram.        Addendum: Spoke with patient at 6:15 PM patient had tachycardia in the office with a heart rate of 115, abnormal EKG with T wave inversions and ST depression in the ER a few days ago and mildly elevated BNP.  She needs current further cardiac work-up.  Having near syncopal episodes that may be cardiac in origin.  Sending her to the ER.  She understands risks of and futile and agrees to go    Followup: Return in about 4 weeks (around 8/4/2022).         Please note that this dictation was created using voice recognition software. I have made every reasonable attempt to correct obvious errors, but I expect that there are errors of grammar and possibly content that I did not discover before finalizing the note.

## 2022-07-08 ENCOUNTER — APPOINTMENT (OUTPATIENT)
Dept: RADIOLOGY | Facility: MEDICAL CENTER | Age: 62
End: 2022-07-08
Attending: EMERGENCY MEDICINE
Payer: COMMERCIAL

## 2022-07-08 ENCOUNTER — HOSPITAL ENCOUNTER (EMERGENCY)
Facility: MEDICAL CENTER | Age: 62
End: 2022-07-08
Attending: EMERGENCY MEDICINE
Payer: COMMERCIAL

## 2022-07-08 VITALS
BODY MASS INDEX: 29.8 KG/M2 | TEMPERATURE: 97.9 F | HEIGHT: 66 IN | OXYGEN SATURATION: 96 % | DIASTOLIC BLOOD PRESSURE: 54 MMHG | HEART RATE: 50 BPM | SYSTOLIC BLOOD PRESSURE: 101 MMHG | RESPIRATION RATE: 22 BRPM | WEIGHT: 185.41 LBS

## 2022-07-08 DIAGNOSIS — R00.1 BRADYCARDIA: ICD-10-CM

## 2022-07-08 DIAGNOSIS — R55 NEAR SYNCOPE: ICD-10-CM

## 2022-07-08 LAB
ALBUMIN SERPL BCP-MCNC: 4.5 G/DL (ref 3.2–4.9)
ALBUMIN/GLOB SERPL: 1.6 G/DL
ALP SERPL-CCNC: 150 U/L (ref 30–99)
ALT SERPL-CCNC: 6 U/L (ref 2–50)
ANION GAP SERPL CALC-SCNC: 12 MMOL/L (ref 7–16)
AST SERPL-CCNC: 12 U/L (ref 12–45)
BASOPHILS # BLD AUTO: 0.5 % (ref 0–1.8)
BASOPHILS # BLD: 0.04 K/UL (ref 0–0.12)
BILIRUB SERPL-MCNC: 0.2 MG/DL (ref 0.1–1.5)
BUN SERPL-MCNC: 26 MG/DL (ref 8–22)
CALCIUM SERPL-MCNC: 9.4 MG/DL (ref 8.4–10.2)
CHLORIDE SERPL-SCNC: 99 MMOL/L (ref 96–112)
CO2 SERPL-SCNC: 27 MMOL/L (ref 20–33)
CREAT SERPL-MCNC: 1.04 MG/DL (ref 0.5–1.4)
EKG IMPRESSION: NORMAL
EOSINOPHIL # BLD AUTO: 0.26 K/UL (ref 0–0.51)
EOSINOPHIL NFR BLD: 3.2 % (ref 0–6.9)
ERYTHROCYTE [DISTWIDTH] IN BLOOD BY AUTOMATED COUNT: 46.8 FL (ref 35.9–50)
GFR SERPLBLD CREATININE-BSD FMLA CKD-EPI: 60 ML/MIN/1.73 M 2
GLOBULIN SER CALC-MCNC: 2.9 G/DL (ref 1.9–3.5)
GLUCOSE SERPL-MCNC: 85 MG/DL (ref 65–99)
HCT VFR BLD AUTO: 38.6 % (ref 37–47)
HGB BLD-MCNC: 12.4 G/DL (ref 12–16)
IMM GRANULOCYTES # BLD AUTO: 0.03 K/UL (ref 0–0.11)
IMM GRANULOCYTES NFR BLD AUTO: 0.4 % (ref 0–0.9)
LYMPHOCYTES # BLD AUTO: 2.87 K/UL (ref 1–4.8)
LYMPHOCYTES NFR BLD: 35 % (ref 22–41)
MCH RBC QN AUTO: 30.6 PG (ref 27–33)
MCHC RBC AUTO-ENTMCNC: 32.1 G/DL (ref 33.6–35)
MCV RBC AUTO: 95.3 FL (ref 81.4–97.8)
MONOCYTES # BLD AUTO: 0.68 K/UL (ref 0–0.85)
MONOCYTES NFR BLD AUTO: 8.3 % (ref 0–13.4)
NEUTROPHILS # BLD AUTO: 4.32 K/UL (ref 2–7.15)
NEUTROPHILS NFR BLD: 52.6 % (ref 44–72)
NRBC # BLD AUTO: 0 K/UL
NRBC BLD-RTO: 0 /100 WBC
PLATELET # BLD AUTO: 363 K/UL (ref 164–446)
PMV BLD AUTO: 11.4 FL (ref 9–12.9)
POTASSIUM SERPL-SCNC: 3.6 MMOL/L (ref 3.6–5.5)
PROT SERPL-MCNC: 7.4 G/DL (ref 6–8.2)
RBC # BLD AUTO: 4.05 M/UL (ref 4.2–5.4)
SODIUM SERPL-SCNC: 138 MMOL/L (ref 135–145)
TROPONIN T SERPL-MCNC: 10 NG/L (ref 6–19)
WBC # BLD AUTO: 8.2 K/UL (ref 4.8–10.8)

## 2022-07-08 PROCEDURE — 93005 ELECTROCARDIOGRAM TRACING: CPT | Performed by: EMERGENCY MEDICINE

## 2022-07-08 PROCEDURE — 71275 CT ANGIOGRAPHY CHEST: CPT

## 2022-07-08 PROCEDURE — 700117 HCHG RX CONTRAST REV CODE 255: Performed by: EMERGENCY MEDICINE

## 2022-07-08 PROCEDURE — 84484 ASSAY OF TROPONIN QUANT: CPT

## 2022-07-08 PROCEDURE — 80053 COMPREHEN METABOLIC PANEL: CPT

## 2022-07-08 PROCEDURE — 93970 EXTREMITY STUDY: CPT

## 2022-07-08 PROCEDURE — 85025 COMPLETE CBC W/AUTO DIFF WBC: CPT

## 2022-07-08 PROCEDURE — 36415 COLL VENOUS BLD VENIPUNCTURE: CPT

## 2022-07-08 PROCEDURE — 99284 EMERGENCY DEPT VISIT MOD MDM: CPT

## 2022-07-08 RX ADMIN — IOHEXOL 65 ML: 350 INJECTION, SOLUTION INTRAVENOUS at 07:40

## 2022-07-08 ASSESSMENT — FIBROSIS 4 INDEX: FIB4 SCORE: 1.02

## 2022-07-08 NOTE — ED PROVIDER NOTES
ED Provider Note    CHIEF COMPLAINT  Dizziness, abnormal lab work    HPI  Debby Desai is a 62 y.o. female who presents to the emergency department with episodes of dizziness and balance problems.  Patient reports that since April this year occasionally when she stands up from a sitting position or when she is ambulating she will have tunnel vision and feel off balance.  If she sits down the symptoms go away.  She has had several falls because of this.  Falls have resulted in fracture left lower extremity.  She describes when she feels a sensation she will have muscle spasm in her extremities.  She has not had chest pain or shortness of breath specifically with these events.  She does describe some current chest tightness as she is quite anxious.  She has never hit her head during these events.  Denies headache, focal weakness numbness slurred speech or confusion.  Denies abdominal pain, nausea vomiting diarrhea.  She has pain in her left leg from previous fracture.  She suffers from chronic knee pain and is followed by pain management.  She was seen by her primary provider yesterday.  Primary sent a D-dimer, troponin and BNP.  D-dimer was elevated and patient was advised to come to the ER.  On review of the chart it looks like she has been referred to cardiology for further assessment.    Patient has a history of significant anxiety and tremor.  She has prescribed propranolol by her psychiatrist.  She has been on this for quite some time.  She did not take the propranolol yesterday when she was in her doctor's office and her heart rate was elevated.  She took her propranolol today and all her other medications before coming in.  Heart rate is noted to be slow in the 40s and 50s.    REVIEW OF SYSTEMS  As per HPI, otherwise a 10 point review of systems is negative    PAST MEDICAL HISTORY  Past Medical History:   Diagnosis Date   • Anemia     in past   • Anginal syndrome (HCC)     had work up and feet r/t anxiety    • Anxiety    • Arthritis     OA knees   • Chronic pain 6/2/2021   • Dental disorder 5/24/12    partial upper denture   • Diabetes (HCC)     pre diabetic   • High cholesterol    • HTN (hypertension), benign 3/19/2012   • Hypothyroid 3/19/2012   • Major depression 3/19/2012   • Orbital floor (blow-out) closed fracture (HCC)     left   • Other specified symptom associated with female genital organs     post menopausal bleeding   • Pain     thoracic spine, Right knee, myofacial pain, and Right shoulder   • Pain     recently fell and has bruise left forehead   • Pain 02/2018    chronic back pain, right shoulder   • Psychiatric problem     depression, anxiety   • Unspecified disorder of thyroid    • Urinary bladder disorder     stress incont.   • Urinary incontinence     Occasional       SOCIAL HISTORY  Social History     Tobacco Use   • Smoking status: Never Smoker   • Smokeless tobacco: Never Used   Vaping Use   • Vaping Use: Never used   Substance Use Topics   • Alcohol use: Not Currently     Alcohol/week: 0.0 oz     Comment: 1 glass wine per month   • Drug use: No       SURGICAL HISTORY  Past Surgical History:   Procedure Laterality Date   • INJ,EPI ANES/STER LUM/SAC ADDL Right 4/7/2021    Procedure: RIGHT lumbar five and sacral one transforaminal epidural;  Surgeon: Volodymyr Herring M.D.;  [facility];  Service: Pain Management   • PB TOTAL KNEE ARTHROPLASTY Right 9/16/2019    Procedure: RIGHT ARTHROPLASTY, KNEE, TOTAL;  Surgeon: Rufus Saba M.D.;  Location: [facility];  Service: Orthopedics   • KNEE ARTHROSCOPY Right 2/9/2018    Procedure: KNEE ARTHROSCOPY;  Surgeon: Harsh Baltazar M.D.;  Location: [facility];  Service: Orthopedics   • MENISCECTOMY, KNEE, MEDIAL Right 2/9/2018    Procedure: MEDIAL AND LATERAL MENISCECTOMY- PARTIAL;  Surgeon: Harsh Baltazar M.D.;  Location: [facility];  Service: Orthopedics   • KNEE ARTHROSCOPY  "Right 7/12/2017    Procedure: KNEE ARTHROSCOPY- CESPEDES;  Surgeon: Harsh Baltazar M.D.;  Location: [facility];  Service:    • MENISCECTOMY, KNEE, MEDIAL Right 7/12/2017    Procedure: PARTIAL MEDIAL AND LATERAL MENISCECTOMY;  Surgeon: Harsh Baltazar M.D.;  Location: [facility];  Service:    • SYNOVECTOMY Right 7/12/2017    Procedure: SYNOVECTOMY;  Surgeon: Harsh Baltazar M.D.;  Location: [facility];  Service:    • KNEE ARTHROSCOPY  4/21/2015    Performed by Rufus Saba M.D. at [facility]   • MENISCECTOMY, KNEE, MEDIAL  4/21/2015    Performed by Rufus Saba M.D. at [facility]   • PUMP REVISION  12/10/2012    Performed by Allison Guerra M.D. at [facility]   • SPINAL CORD STIMULATOR  5/30/2012    Performed by ALLISON GUERRA at [facility]   • BIOPSY GENERAL  5/1/12    endometrial   • SPINAL CORD STIMULATOR  7/11/2011    Performed by ALLISON GUERRA at [facility]   • BLOCK EPIDURAL STEROID INJECTION  2008    x 3-4   • LYMPH NODE EXCISION Left 2007    cervicle   • OTHER Right 2001    thoracic discectomy     • ARTHROSCOPY, KNEE Left 1999   • RIB RESECTION Right 1980   • LUMPECTOMY         CURRENT MEDICATIONS  Home Medications    **Home medications have not yet been reviewed for this encounter**         ALLERGIES  Allergies   Allergen Reactions   • Sulfamethoxazole-Trimethoprim Rash and Swelling     Pt states \"My hand swells up and rash all over body\".       PHYSICAL EXAM  VITAL SIGNS: /62   Pulse (!) 59   Temp (!) 35.2 °C (95.3 °F) (Temporal)   Resp 18   Ht 1.676 m (5' 6\")   Wt 84.1 kg (185 lb 6.5 oz)   LMP 02/26/2012   BMI 29.93 kg/m²    Constitutional: Awake and alert  HENT: Normal inspection  Eyes: Normal inspection  Neck: Grossly normal range of motion.  Cardiovascular: Bradycardic, Normal rhythm.  Symmetric peripheral pulses.   Thorax & Lungs: No respiratory " distress, No wheezing, No rales, No rhonchi, No chest tenderness.   Abdomen: Bowel sounds normal, soft, non-distended, nontender, no mass  Skin: No obvious rash.  Extremities: Mild swelling around the left ankle.  There was ankle brace in place  Neurologic: Awake alert oriented.  Cranial nerves are intact.  Clear speech.  Symmetric motor or sensory.  Ambulates using a walker just in case she feels unsteady.  Psychiatric: Anxious appearing    RADIOLOGY/PROCEDURES  US-EXTREMITY VENOUS LOWER BILAT   Final Result      CT-CTA CHEST PULMONARY ARTERY W/ RECONS   Final Result      1.  No CT evidence for pulmonary emboli.   2.  No pneumonia or pneumothorax.                 Imaging is interpreted by radiologist    Labs:  Results for orders placed or performed during the hospital encounter of 07/08/22   CBC WITH DIFFERENTIAL   Result Value Ref Range    WBC 8.2 4.8 - 10.8 K/uL    RBC 4.05 (L) 4.20 - 5.40 M/uL    Hemoglobin 12.4 12.0 - 16.0 g/dL    Hematocrit 38.6 37.0 - 47.0 %    MCV 95.3 81.4 - 97.8 fL    MCH 30.6 27.0 - 33.0 pg    MCHC 32.1 (L) 33.6 - 35.0 g/dL    RDW 46.8 35.9 - 50.0 fL    Platelet Count 363 164 - 446 K/uL    MPV 11.4 9.0 - 12.9 fL    Neutrophils-Polys 52.60 44.00 - 72.00 %    Lymphocytes 35.00 22.00 - 41.00 %    Monocytes 8.30 0.00 - 13.40 %    Eosinophils 3.20 0.00 - 6.90 %    Basophils 0.50 0.00 - 1.80 %    Immature Granulocytes 0.40 0.00 - 0.90 %    Nucleated RBC 0.00 /100 WBC    Neutrophils (Absolute) 4.32 2.00 - 7.15 K/uL    Lymphs (Absolute) 2.87 1.00 - 4.80 K/uL    Monos (Absolute) 0.68 0.00 - 0.85 K/uL    Eos (Absolute) 0.26 0.00 - 0.51 K/uL    Baso (Absolute) 0.04 0.00 - 0.12 K/uL    Immature Granulocytes (abs) 0.03 0.00 - 0.11 K/uL    NRBC (Absolute) 0.00 K/uL   COMP METABOLIC PANEL   Result Value Ref Range    Sodium 138 135 - 145 mmol/L    Potassium 3.6 3.6 - 5.5 mmol/L    Chloride 99 96 - 112 mmol/L    Co2 27 20 - 33 mmol/L    Anion Gap 12.0 7.0 - 16.0    Glucose 85 65 - 99 mg/dL    Bun 26 (H) 8  - 22 mg/dL    Creatinine 1.04 0.50 - 1.40 mg/dL    Calcium 9.4 8.4 - 10.2 mg/dL    AST(SGOT) 12 12 - 45 U/L    ALT(SGPT) 6 2 - 50 U/L    Alkaline Phosphatase 150 (H) 30 - 99 U/L    Total Bilirubin 0.2 0.1 - 1.5 mg/dL    Albumin 4.5 3.2 - 4.9 g/dL    Total Protein 7.4 6.0 - 8.2 g/dL    Globulin 2.9 1.9 - 3.5 g/dL    A-G Ratio 1.6 g/dL   TROPONIN   Result Value Ref Range    Troponin T 10 6 - 19 ng/L   ESTIMATED GFR   Result Value Ref Range    GFR (CKD-EPI) 60 >60 mL/min/1.73 m 2   EKG (NOW)   Result Value Ref Range    Report       [facility] Emergency Dept[facility]    Test Date:  2022  Pt Name:    CONNOR PAULA                  Department: [facility]  MRN:        [id number]                      [facility]   Gender:     Female                       Technician: YAMILE  :        [id number]                   Requested By:FRANCK COELLO  Order #:    [id number]                    Reading MD: FRANCK COELLO MD    Measurements  Intervals                                Axis  Rate:       44                           P:          46  VT:         168                          QRS:        5  QRSD:       98                           T:          29  QT:         467  QTc:        400    Interpretive Statements  Sinus bradycardia  Abnormal R-wave progression, early transition  Compared to ECG 2022 11:42:44  Sinus rhythm no longer present  ST (T wave) deviation no longer present  Electronically Signed On 2022 12:10:33 PDT by FRANCK COELLO MD             COURSE & MEDICAL DECISION MAKING  Patient presents with gait disturbance and near syncope that sounds primarily orthostatic.  She has bradycardic in the setting of propranolol.  Blood pressure stable.  She had an elevated D-dimer with her primary provider.  Troponin and BNP were normal.  Obtain repeat EKG, CBC, CMP evaluate for anemia or electrolyte disturbance.  Previous TSH normal.  Will obtain CT pulmonary angiogram.  If negative follow-up with lower  extremity ultrasound.    CT pulmonary angiogram returned negative.  Laboratory data is unremarkable.  We will obtain venous duplex    Venous duplex is negative.    Patient remained bradycardic here.  I believe this is the cause of her near syncope with standing and ambulation.  Her blood pressure is also soft.  Have asked her to discontinue propranolol.  She will need to discuss alternative with primary provider.  Primary has already referred to cardiology for further assessment.  Will need recheck EKG without beta-blocker.  She however was tachycardic yesterday when she did not take her propranolol.  Further outpatient evaluation appears reasonable.  Patient was precautioned to continue to use a walker as needed.  She should return to the ER if she has any chest pain, abdominal pain, fevers, weakness numbness or other acute symptoms.    FINAL IMPRESSION  1.  Near syncope  2.  Iatrogenic bradycardia      This dictation was created using voice recognition software. The accuracy of the dictation is limited to the abilities of the software.  The nursing notes were reviewed and certain aspects of this information were incorporated into this note.      Electronically signed by: Manuel Nettles M.D., 7/8/2022 6:34 AM

## 2022-07-08 NOTE — ED TRIAGE NOTES
"Pt here alone with c/o    Chief Complaint   Patient presents with   • Abnormal Labs     Pt sent from MD office for elevated D-Dimer.  Pt has hx of displaced fibula frx in May 2022. Denies chest pain and SOB   • Anxiety       /62   Pulse (!) 59   Temp 36.6 °C (97.9 °F) (Oral)   Resp 18   Ht 1.676 m (5' 6\")   Wt 84.1 kg (185 lb 6.5 oz)   LMP 02/26/2012   BMI 29.93 kg/m²   "

## 2022-07-12 DIAGNOSIS — I10 HTN (HYPERTENSION), BENIGN: Chronic | ICD-10-CM

## 2022-07-12 DIAGNOSIS — E78.5 DYSLIPIDEMIA: ICD-10-CM

## 2022-07-12 DIAGNOSIS — Z00.00 ANNUAL PHYSICAL EXAM: ICD-10-CM

## 2022-07-14 ENCOUNTER — HOSPITAL ENCOUNTER (OUTPATIENT)
Dept: RADIOLOGY | Facility: MEDICAL CENTER | Age: 62
End: 2022-07-14
Attending: PHYSICAL MEDICINE & REHABILITATION
Payer: COMMERCIAL

## 2022-07-14 DIAGNOSIS — S62.609A CLOSED FRACTURE DISLOCATION OF PROXIMAL INTERPHALANGEAL (PIP) JOINT OF FINGER, INITIAL ENCOUNTER: ICD-10-CM

## 2022-07-14 DIAGNOSIS — Z96.89 SPINAL CORD STIMULATOR STATUS: ICD-10-CM

## 2022-07-14 DIAGNOSIS — M79.646 PAIN OF MIDDLE FINGER: ICD-10-CM

## 2022-07-14 DIAGNOSIS — M25.561 ACUTE PAIN OF RIGHT KNEE: ICD-10-CM

## 2022-07-14 DIAGNOSIS — Z96.651 S/P TKR (TOTAL KNEE REPLACEMENT), RIGHT: ICD-10-CM

## 2022-07-14 PROCEDURE — 73564 X-RAY EXAM KNEE 4 OR MORE: CPT | Mod: RT

## 2022-07-14 PROCEDURE — 73120 X-RAY EXAM OF HAND: CPT | Mod: LT

## 2022-07-14 PROCEDURE — 72080 X-RAY EXAM THORACOLMB 2/> VW: CPT

## 2022-07-15 ENCOUNTER — PHARMACY VISIT (OUTPATIENT)
Dept: PHARMACY | Facility: MEDICAL CENTER | Age: 62
End: 2022-07-15
Payer: COMMERCIAL

## 2022-07-15 PROCEDURE — RXMED WILLOW AMBULATORY MEDICATION CHARGE: Performed by: NURSE PRACTITIONER

## 2022-07-15 RX ORDER — LISINOPRIL AND HYDROCHLOROTHIAZIDE 20; 12.5 MG/1; MG/1
1 TABLET ORAL
Qty: 90 TABLET | Refills: 0 | Status: SHIPPED | OUTPATIENT
Start: 2022-07-15 | End: 2022-11-16

## 2022-07-15 RX ORDER — ATORVASTATIN CALCIUM 20 MG/1
20 TABLET, FILM COATED ORAL
Qty: 90 TABLET | Refills: 0 | Status: SHIPPED | OUTPATIENT
Start: 2022-07-15 | End: 2022-11-17 | Stop reason: SDUPTHER

## 2022-07-15 NOTE — TELEPHONE ENCOUNTER
Refill done. Patient is due for annual appointment and labs, labs ordered. Please call and schedule patient.  JOSHUA Chris.

## 2022-07-27 PROCEDURE — RXMED WILLOW AMBULATORY MEDICATION CHARGE: Performed by: PHYSICAL MEDICINE & REHABILITATION

## 2022-07-29 ENCOUNTER — PHARMACY VISIT (OUTPATIENT)
Dept: PHARMACY | Facility: MEDICAL CENTER | Age: 62
End: 2022-07-29
Payer: COMMERCIAL

## 2022-08-05 ENCOUNTER — OFFICE VISIT (OUTPATIENT)
Dept: NEUROLOGY | Facility: MEDICAL CENTER | Age: 62
End: 2022-08-05
Attending: PSYCHIATRY & NEUROLOGY
Payer: COMMERCIAL

## 2022-08-05 VITALS
WEIGHT: 193.34 LBS | BODY MASS INDEX: 31.07 KG/M2 | SYSTOLIC BLOOD PRESSURE: 112 MMHG | OXYGEN SATURATION: 96 % | DIASTOLIC BLOOD PRESSURE: 60 MMHG | TEMPERATURE: 98.4 F | HEIGHT: 66 IN | RESPIRATION RATE: 14 BRPM | HEART RATE: 86 BPM

## 2022-08-05 DIAGNOSIS — R55 NEAR SYNCOPE: ICD-10-CM

## 2022-08-05 DIAGNOSIS — G25.0 ESSENTIAL TREMOR: ICD-10-CM

## 2022-08-05 PROCEDURE — 99212 OFFICE O/P EST SF 10 MIN: CPT

## 2022-08-05 PROCEDURE — 99204 OFFICE O/P NEW MOD 45 MIN: CPT | Performed by: PSYCHIATRY & NEUROLOGY

## 2022-08-05 ASSESSMENT — FIBROSIS 4 INDEX: FIB4 SCORE: 0.86

## 2022-08-05 NOTE — PROGRESS NOTES
No chief complaint on file.      History of present illness:  Debby Desai 62 y.o. female with depression/anxiety, prediabetes, chronic pain referred for tremors. She has a fine tremor of the arms where she has trouble holding things. She has spasms of the legs that may lead to falls. She fell May 6 after standing up from bed, and lost consciousness.   She falls often. She has a cardiology appointment in September for her recurrent syncope.   She has had tremors since April.   She was on propranolol in the past but this was stopped due to bradycardia.   She notices the tremor most when she is doing something with her arms, and it interferes with her writing and typing.     Past medical history:   Past Medical History:   Diagnosis Date   • Anemia     in past   • Anginal syndrome (HCC)     had work up and feet r/t anxiety   • Anxiety    • Arthritis     OA knees   • Chronic pain 6/2/2021   • Dental disorder 5/24/12    partial upper denture   • Diabetes (HCC)     pre diabetic   • High cholesterol    • HTN (hypertension), benign 3/19/2012   • Hypothyroid 3/19/2012   • Major depression 3/19/2012   • Orbital floor (blow-out) closed fracture (HCC)     left   • Other specified symptom associated with female genital organs     post menopausal bleeding   • Pain     thoracic spine, Right knee, myofacial pain, and Right shoulder   • Pain     recently fell and has bruise left forehead   • Pain 02/2018    chronic back pain, right shoulder   • Psychiatric problem     depression, anxiety   • Unspecified disorder of thyroid    • Urinary bladder disorder     stress incont.   • Urinary incontinence     Occasional       Past surgical history:   Past Surgical History:   Procedure Laterality Date   • INJ,EPI ANES/STER LUM/SAC ADDL Right 4/7/2021    Procedure: RIGHT lumbar five and sacral one transforaminal epidural;  Surgeon: Volodymyr Herring M.D.;  [facility];  Service: Pain Management   • PB TOTAL KNEE  ARTHROPLASTY Right 9/16/2019    Procedure: RIGHT ARTHROPLASTY, KNEE, TOTAL;  Surgeon: Rufus Saba M.D.;  Location: [facility];  Service: Orthopedics   • KNEE ARTHROSCOPY Right 2/9/2018    Procedure: KNEE ARTHROSCOPY;  Surgeon: Harsh Baltazar M.D.;  Location: [facility];  Service: Orthopedics   • MENISCECTOMY, KNEE, MEDIAL Right 2/9/2018    Procedure: MEDIAL AND LATERAL MENISCECTOMY- PARTIAL;  Surgeon: Harsh Baltazar M.D.;  Location: [facility];  Service: Orthopedics   • KNEE ARTHROSCOPY Right 7/12/2017    Procedure: KNEE ARTHROSCOPY- CESPEDES;  Surgeon: Harsh Baltazar M.D.;  Location: [facility];  Service:    • MENISCECTOMY, KNEE, MEDIAL Right 7/12/2017    Procedure: PARTIAL MEDIAL AND LATERAL MENISCECTOMY;  Surgeon: Harsh Baltazar M.D.;  Location: [facility];  Service:    • SYNOVECTOMY Right 7/12/2017    Procedure: SYNOVECTOMY;  Surgeon: Harsh Baltazar M.D.;  Location: [facility];  Service:    • KNEE ARTHROSCOPY  4/21/2015    Performed by Rufus Saba M.D. at [facility]   • MENISCECTOMY, KNEE, MEDIAL  4/21/2015    Performed by Rufus Saba M.D. at [facility]   • PUMP REVISION  12/10/2012    Performed by Allison Guerra M.D. at [facility]   • SPINAL CORD STIMULATOR  5/30/2012    Performed by ALLISON GUERRA at [facility]   • BIOPSY GENERAL  5/1/12    endometrial   • SPINAL CORD STIMULATOR  7/11/2011    Performed by ALLISON GUERRA at [facility]   • BLOCK EPIDURAL STEROID INJECTION  2008    x 3-4   • LYMPH NODE EXCISION Left 2007    cervicle   • OTHER Right 2001    thoracic discectomy     • ARTHROSCOPY, KNEE Left 1999   • RIB RESECTION Right 1980   • LUMPECTOMY         Family history:   Family History   Problem Relation Age of Onset   • Hypertension Father    • Diabetes Father    • Heart Disease Father    • Alcohol abuse  Father    • Anesthesia Sister         slow to come out (as a child)   • Diabetes Other    • Heart Disease Other    • Cancer Other    • Hypertension Other    • Stroke Other    • Lung Disease Other        Social history:   Social History     Socioeconomic History   • Marital status: Single     Spouse name: Not on file   • Number of children: Not on file   • Years of education: Not on file   • Highest education level: Associate degree: academic program   Occupational History   • Not on file   Tobacco Use   • Smoking status: Never Smoker   • Smokeless tobacco: Never Used   Vaping Use   • Vaping Use: Never used   Substance and Sexual Activity   • Alcohol use: Not Currently     Alcohol/week: 0.0 oz     Comment: 1 glass wine per month   • Drug use: No   • Sexual activity: Never     Partners: Male   Other Topics Concern   •  Service No   • Blood Transfusions No   • Caffeine Concern No   • Occupational Exposure No   • Hobby Hazards No   • Sleep Concern Yes   • Stress Concern Yes   • Weight Concern Yes   • Special Diet No   • Back Care No   • Exercise No   • Bike Helmet No   • Seat Belt Yes   • Self-Exams No   Social History Narrative   • Not on file     Social Determinants of Health     Financial Resource Strain: Low Risk    • Difficulty of Paying Living Expenses: Not hard at all   Food Insecurity: No Food Insecurity   • Worried About Running Out of Food in the Last Year: Never true   • Ran Out of Food in the Last Year: Never true   Transportation Needs: No Transportation Needs   • Lack of Transportation (Medical): No   • Lack of Transportation (Non-Medical): No   Physical Activity: Inactive   • Days of Exercise per Week: 0 days   • Minutes of Exercise per Session: 0 min   Stress: Stress Concern Present   • Feeling of Stress : Rather much   Social Connections: Socially Isolated   • Frequency of Communication with Friends and Family: More than three times a week   • Frequency of Social Gatherings with Friends and  "Family: Once a week   • Attends Yarsanism Services: Never   • Active Member of Clubs or Organizations: No   • Attends Club or Organization Meetings: Never   • Marital Status: Never    Intimate Partner Violence: Not on file   Housing Stability: Low Risk    • Unable to Pay for Housing in the Last Year: No   • Number of Places Lived in the Last Year: 1   • Unstable Housing in the Last Year: No       Current medications:   Current Outpatient Medications   Medication   • atorvastatin (LIPITOR) 20 MG Tab   • lisinopril-hydrochlorothiazide (PRINZIDE) 20-12.5 MG per tablet   • traMADol (ULTRAM) 50 MG Tab   • gabapentin (NEURONTIN) 300 MG Cap   • amitriptyline (ELAVIL) 50 MG Tab   • buPROPion (WELLBUTRIN XL) 300 MG XL tablet   • venlafaxine XR (EFFEXOR XR) 75 MG CAPSULE SR 24 HR   • venlafaxine (EFFEXOR-XR) 150 MG extended-release capsule   • levothyroxine (SYNTHROID) 50 MCG Tab   • metFORMIN ER (GLUCOPHAGE XR) 500 MG TABLET SR 24 HR   • ergocalciferol (DRISDOL) 78234 UNIT capsule   • ibuprofen (MOTRIN) 200 MG Tab   • Ferrous Sulfate (IRON) 325 (65 Fe) MG Tab   • propranolol (INDERAL) 20 MG Tab   • acetaminophen (TYLENOL) 500 MG Tab     No current facility-administered medications for this visit.       Medication Allergy:  Allergies   Allergen Reactions   • Sulfamethoxazole-Trimethoprim Rash and Swelling     Pt states \"My hand swells up and rash all over body\".     Physical examination:   Vitals:    08/05/22 1616 08/05/22 1643 08/05/22 1644   BP: 122/76 110/68 112/60   BP Location: Right arm     Patient Position: Sitting Sitting Standing   BP Cuff Size: Adult     Pulse: 87  86   Resp: 14     Temp: 36.9 °C (98.4 °F)     TempSrc: Temporal     SpO2: 96%     Weight: 87.7 kg (193 lb 5.5 oz)     Height: 1.676 m (5' 6\")       Neurological Exam  Mental Status  Awake and alert. Speech is normal. Language is fluent with no aphasia.    Motor   The following abnormal movements were seen: No resting tremor.   Mild postural tremors " bilaterally.   .    Normal speed and amplitude of rapid finger and foot tapping, hand opening/closing and pronation/supination bilaterally.   Her handwriting is normal.     .    Coordination  Right: Finger-to-nose normal.Left: Finger-to-nose normal.    Gait  Casual gait: Tandem gait abnormality: There is impaired tandem walking.      Labs:  I reviewed the following labs personally:  None     Imagin CT head:   I reviewed the images personally and agree with the following read:     IMPRESSION:        1. No evidence of acute cerebral infarction, hemorrhage or mass lesion.    ASSESSMENT AND PLAN:  Problem List Items Addressed This Visit     Near syncope      Other Visit Diagnoses     Essential tremor              1. Essential tremor    2. Near syncope    62-year-old female with falls.  She endorses syncopal episodes, where she loses consciousness after standing up.  Her orthostatic vital signs were negative today.  She has an appointment with cardiology coming up to ensure that there is not a cardiac etiology for her syncope.  Also has falls secondary to muscle spasms and loss of balance.  Today, she does not have a ataxic gait, but is unsteady when she walks.  Recommend that she work with physical therapy.  There is no unifying neurologic process that is obvious here.  She has mild essential tremor, but this is not a serious problem.    FOLLOW-UP:   No follow-ups on file.    Total time spent for the day for this patient unrelated to procedure time is: 35 minutes. I spent 26 minutes in face to face time and I spent 4 minutes pre-charting and 5 minutes in post-visit documentation.      Dr. Manolo Prasad D.O.  [facility]

## 2022-08-08 PROCEDURE — RXMED WILLOW AMBULATORY MEDICATION CHARGE: Performed by: PSYCHIATRY & NEUROLOGY

## 2022-08-10 ENCOUNTER — TELEMEDICINE (OUTPATIENT)
Dept: BEHAVIORAL HEALTH | Facility: CLINIC | Age: 62
End: 2022-08-10
Payer: COMMERCIAL

## 2022-08-10 DIAGNOSIS — F41.1 GAD (GENERALIZED ANXIETY DISORDER): ICD-10-CM

## 2022-08-10 DIAGNOSIS — F33.2 SEVERE EPISODE OF RECURRENT MAJOR DEPRESSIVE DISORDER, WITHOUT PSYCHOTIC FEATURES (HCC): ICD-10-CM

## 2022-08-10 DIAGNOSIS — G47.09 OTHER INSOMNIA: ICD-10-CM

## 2022-08-10 PROCEDURE — 99214 OFFICE O/P EST MOD 30 MIN: CPT | Mod: 95 | Performed by: PSYCHIATRY & NEUROLOGY

## 2022-08-10 PROCEDURE — RXMED WILLOW AMBULATORY MEDICATION CHARGE: Performed by: PSYCHIATRY & NEUROLOGY

## 2022-08-10 RX ORDER — VENLAFAXINE HYDROCHLORIDE 75 MG/1
75 CAPSULE, EXTENDED RELEASE ORAL DAILY
Qty: 90 CAPSULE | Refills: 2 | Status: SHIPPED | OUTPATIENT
Start: 2022-08-10 | End: 2022-09-28

## 2022-08-10 RX ORDER — VENLAFAXINE HYDROCHLORIDE 150 MG/1
150 CAPSULE, EXTENDED RELEASE ORAL DAILY
Qty: 90 CAPSULE | Refills: 2 | Status: SHIPPED | OUTPATIENT
Start: 2022-08-10 | End: 2022-11-10 | Stop reason: SDUPTHER

## 2022-08-10 RX ORDER — BUPROPION HYDROCHLORIDE 300 MG/1
300 TABLET ORAL EVERY MORNING
Qty: 90 TABLET | Refills: 2 | Status: SHIPPED | OUTPATIENT
Start: 2022-08-10 | End: 2022-09-28

## 2022-08-10 RX ORDER — AMITRIPTYLINE HYDROCHLORIDE 50 MG/1
50 TABLET, FILM COATED ORAL NIGHTLY
Qty: 30 TABLET | Refills: 5 | Status: SHIPPED | OUTPATIENT
Start: 2022-08-10 | End: 2022-09-28

## 2022-08-10 NOTE — PROGRESS NOTES
This evaluation was conducted via Zoom using secure and encrypted videoconferencing technology. The patient was in their home in the [facility].    The patient's identity was confirmed and verbal consent was obtained for this virtual visit.      PSYCHIATRY FOLLOW-UP NOTE      Name: Debby Desai  MRN: [id number]  : 1960  Age: 62 y.o.  Date of assessment: 8/10/2022  PCP: ARELIS Chris  Persons in attendance: Patient      REASON FOR VISIT/CHIEF COMPLAINT (as stated by Patient):  Debby Desai is a 62 y.o., White female, attending follow-up appointment for mood and anxiety management.      HISTORY OF PRESENT ILLNESS:  Debby Desai is a 62 y.o. old female with MDD and TIM comes in today for follow up. Patient was last seen 2 months ago, and following treatment planning recommendations were done:  Continue Effexor XR to 225 mg daily for mood, anxiety and comorbid pain management.   Continue Wellbutrin XL to 300 mg daily for depression augmentation.    Continue Amitriptyline 50 mg at at bedtime as needed for insomnia.    Continue Propanolol 20 mg TID PRN for anxiety and shake.   Monitor for serotonin syndrome.  Continue psychotherapy for mood and anxiety management.    Patient is compliant with medication with no side effect and describes stable mood, anxiety and sleep.  She is able to deal with stressors effectively.  Patient is seen with frequent ER visits due to frequent falls and was instructed to stop propanolol.  Patient is not on propranolol for more than 1 month but continues to have these episodes where her legs will cramp followed by lightheadedness and these are triggered by sudden changes in position.  Patient have an upcoming appointment with cardiology for syncopal episode evaluation.  She saw neurology last week and will likely cause worse sounds per their evaluation.  Patient denies any association of addition of amitriptyline causing these symptoms.  Discussed the importance  of monitoring for serotonin syndrome any signs or symptoms at this time.    Patient talked in length how her stopping the job is so therapeutic that she feels stable and no longer having headaches also.  Patient feels proud of taking care of self and was motivated to continue that moving forward.    CURRENT MEDICATIONS:  Current Outpatient Medications   Medication Sig Dispense Refill    atorvastatin (LIPITOR) 20 MG Tab TAKE ONE TABLET BY MOUTH EVERY DAY 90 Tablet 0    lisinopril-hydrochlorothiazide (PRINZIDE) 20-12.5 MG per tablet Take one tablet by mouth daily 90 Tablet 0    traMADol (ULTRAM) 50 MG Tab Take 1 Tablet by mouth in the morning, at noon, and in the evening as needed for Moderate-Severe Pain for up to 30 days. 60 Tablet 0    gabapentin (NEURONTIN) 300 MG Cap Take 1 Capsule by mouth 3 times a day for 60 days. 90 Capsule 1    amitriptyline (ELAVIL) 50 MG Tab Take 1 Tablet by mouth every evening. 30 Tablet 5    buPROPion (WELLBUTRIN XL) 300 MG XL tablet Take 1 tablet by mouth every morning. 90 Tablet 2    venlafaxine XR (EFFEXOR XR) 75 MG CAPSULE SR 24 HR Take 1 Capsule by mouth every day. (venlafaxine xr 150 mg + 75 mg = 225 mg daily) 90 Capsule 2    venlafaxine (EFFEXOR-XR) 150 MG extended-release capsule Take 1 Capsule by mouth every day. (venlafaxine xr 150 mg + 75 mg = 225 mg daily) 90 Capsule 2    propranolol (INDERAL) 20 MG Tab Take 1 Tablet by mouth 3 times a day as needed (anxiety and shakes). (Patient not taking: Reported on 8/5/2022) 90 Tablet 5    levothyroxine (SYNTHROID) 50 MCG Tab Take 1 tablet by mouth Every morning on an empty stomach. 90 Tablet 3    metFORMIN ER (GLUCOPHAGE XR) 500 MG TABLET SR 24 HR Take 1 Tab by mouth 2 times a day. 180 Tablet 2    ergocalciferol (DRISDOL) 54970 UNIT capsule Take 1 capsule by mouth every 7 days. 12 capsule 0    ibuprofen (MOTRIN) 200 MG Tab Take 600 mg by mouth every 6 hours as needed for Inflammation.      Ferrous Sulfate (IRON) 325 (65 Fe) MG Tab Take  65 mg by mouth every day at 6 PM.      acetaminophen (TYLENOL) 500 MG Tab Take 500-1,000 mg by mouth 2 times a day as needed for Moderate Pain. (Patient not taking: Reported on 8/5/2022)       No current facility-administered medications for this visit.       MEDICAL HISTORY  Past Medical History:   Diagnosis Date    Anemia     in past    Anginal syndrome (HCC)     had work up and feet r/t anxiety    Anxiety     Arthritis     OA knees    Chronic pain 6/2/2021    Dental disorder 5/24/12    partial upper denture    Diabetes (HCC)     pre diabetic    High cholesterol     HTN (hypertension), benign 3/19/2012    Hypothyroid 3/19/2012    Major depression 3/19/2012    Orbital floor (blow-out) closed fracture (HCC)     left    Other specified symptom associated with female genital organs     post menopausal bleeding    Pain     thoracic spine, Right knee, myofacial pain, and Right shoulder    Pain     recently fell and has bruise left forehead    Pain 02/2018    chronic back pain, right shoulder    Psychiatric problem     depression, anxiety    Unspecified disorder of thyroid     Urinary bladder disorder     stress incont.    Urinary incontinence     Occasional     Past Surgical History:   Procedure Laterality Date    INJ,EPI ANES/STER LUM/SAC ADDL Right 4/7/2021    Procedure: RIGHT lumbar five and sacral one transforaminal epidural;  Surgeon: Volodymyr Herring M.D.;  [facility];  Service: Pain Management    PB TOTAL KNEE ARTHROPLASTY Right 9/16/2019    Procedure: RIGHT ARTHROPLASTY, KNEE, TOTAL;  Surgeon: Rufus Saba M.D.;  Location: [facility];  Service: Orthopedics    KNEE ARTHROSCOPY Right 2/9/2018    Procedure: KNEE ARTHROSCOPY;  Surgeon: Harsh Baltazar M.D.;  Location: [facility];  Service: Orthopedics    MENISCECTOMY, KNEE, MEDIAL Right 2/9/2018    Procedure: MEDIAL AND LATERAL MENISCECTOMY- PARTIAL;  Surgeon: Harsh Baltazar M.D.;  Location:  [facility];  Service: Orthopedics    KNEE ARTHROSCOPY Right 7/12/2017    Procedure: KNEE ARTHROSCOPY- CESPEDES;  Surgeon: Harsh Baltazar M.D.;  Location: [facility];  Service:     MENISCECTOMY, KNEE, MEDIAL Right 7/12/2017    Procedure: PARTIAL MEDIAL AND LATERAL MENISCECTOMY;  Surgeon: Harsh Baltazar M.D.;  Location: [facility];  Service:     SYNOVECTOMY Right 7/12/2017    Procedure: SYNOVECTOMY;  Surgeon: Harsh Baltazar M.D.;  Location: [facility];  Service:     KNEE ARTHROSCOPY  4/21/2015    Performed by Rufus Saba M.D. at [facility]    MENISCECTOMY, KNEE, MEDIAL  4/21/2015    Performed by Rufus Saba M.D. at [facility]    PUMP REVISION  12/10/2012    Performed by Allison Guerra M.D. at [facility]    SPINAL CORD STIMULATOR  5/30/2012    Performed by ALLISON GUERRA at [facility]    BIOPSY GENERAL  5/1/12    endometrial    SPINAL CORD STIMULATOR  7/11/2011    Performed by ALLISON GUERRA at [facility]    BLOCK EPIDURAL STEROID INJECTION  2008    x 3-4    LYMPH NODE EXCISION Left 2007    cervicle    OTHER Right 2001    thoracic discectomy      ARTHROSCOPY, KNEE Left 1999    RIB RESECTION Right 1980    LUMPECTOMY         PAST PSYCHIATRIC HISTORY  Prior psychiatric hospitalization: no  Prior Self harm/suicide attempt: no  Prior Diagnosis: MDD, Anxiety     PAST PSYCHIATRIC MEDICATIONS  Fluoxetine, Paroxetine, Sertraline, Citalopram  Venlafaxine, Duloxetine  Wellbutrin  Amitriptyline   Ativan, propranolol     FAMILY HISTORY  Psychiatric diagnosis: [place] with depression and anxiety  History of suicide attempts: No  Substance abuse history: 1 sister with drug and alcohol abuse history     SUBSTANCE USE HISTORY:  ALCOHOL: no  TOBACCO: no  CANNABIS: no  OPIOIDS: no  PRESCRIPTION MEDICATIONS: no  OTHERS: no  History of inpatient/outpatient rehab treatment: none      SOCIAL HISTORY  Childhood: born in [place] and describes childhood as difficult  Moved a lot due to parents financial concerns  Employment: For [employer]  Relationship: single (never )  Kids: no kids  Current living situation: alone (but strong family support as they all live nearby)  Current/past legal issues: denies  History of emotional/physical/sexual abuse - denies      REVIEW OF SYSTEMS:        Constitutional negative   Eyes negative   Ears/Nose/Mouth/Throat negative   Cardiovascular negative   Respiratory negative   Gastrointestinal negative   Genitourinary negative   Muscular negative   Integumentary negative   Neurological negative   Endocrine negative   Hematologic/Lymphatic negative     PHYSICAL EXAMINAION:  Vital signs: LMP 02/26/2012   Musculoskeletal: sitting in chair  Abnormal movements: none      MENTAL STATUS EXAMINATION      General:   - Grooming and hygiene: Casual,   - Apparent distress: none,   - Behavior: Calm  - Eye Contact:  Good,   - no psychomotor agitation or retardation    - Participation: Active verbal participation  Orientation: Alert and Fully Oriented to person, place and time  Mood: Euthymic  Affect: Flexible and Full range,  Thought Process: Logical and Goal-directed  Thought Content: Denies suicidal or homicidal ideations, intent or plan Within normal limits  Perception: Denies auditory or visual hallucinations. No delusions noted Within normal limits  Attention span and concentration: Intact   Speech:Rate within normal limits and Volume within normal limits  Language: Appropriate   Insight: Good  Judgment: Good  Recent and remote memory: No gross evidence of memory deficits        DEPRESSION SCREENING:  Depression Screen (PHQ-2/PHQ-9) 3/18/2022 5/13/2022 7/6/2022   PHQ-2 Total Score - - -   PHQ-2 Total Score - - -   PHQ-2 Total Score 6 3 3   PHQ-9 Total Score - - -   PHQ-9 Total Score 16 10 9       Interpretation of PHQ-9 Total Score   Score Severity   1-4 No  Depression   5-9 Mild Depression   10-14 Moderate Depression   15-19 Moderately Severe Depression   20-27 Severe Depression    CURRENT RISK:       Suicidal: Low       Homicidal: Low       Self-Harm: Low       Relapse: Low       Crisis Safety Plan Reviewed Not Indicated       If evidence of imminent risk is present, intervention/plan:      MEDICAL RECORDS/LABS/DIAGNOSTIC TESTS REVIEWED:  No new lab since last visit     [facility] records -   Reviewed     PLAN:  (1) Major depressive disorder; (2) TIM; (3) Insomnia  Improving  Continue Effexor XR to 225 mg daily for mood, anxiety and comorbid pain management.   Continue Wellbutrin XL to 300 mg daily for depression augmentation.    Continue Amitriptyline 50 mg at at bedtime as needed for insomnia.    Stop Propanolol   Monitor for serotonin syndrome.  Continue psychotherapy for mood and anxiety management.  Medication options, alternatives (including no medications) and medication risks/benefits/side effects were discussed in detail.  Explained importance of contraceptive measures while on psychotropic medications, educated to let provider know if ever pregnant or wanting to become pregnant. Verbalized understanding.  The patient was advised to call, message provider on [employer], or come in to the clinic if symptoms worsen or if any future questions/issues regarding their medications arise; the patient verbalized understanding and agreement.    The patient was educated to call 911, call the suicide hotline, or go to local ER if having thoughts of suicide or homicide; verbalized understanding.    Billing Coding based on:  33006 based on [facility]    Return to clinic in 3 months or sooner if symptoms worsen.  Next Appointment: instruction provided on how to make the next appointment.     The proposed treatment plan was discussed with the patient who was provided the opportunity to ask questions and make suggestions regarding alternative treatment. Patient verbalized understanding and  expressed agreement with the plan.       Genaro Stevenson M.D.  08/10/22    This note was created using voice recognition software (Dragon). The accuracy of the dictation is limited by the abilities of the software. I have reviewed the note prior to signing, however some errors in grammar and context are still possible. If you have any questions related to this note please do not hesitate to contact our office.

## 2022-08-11 ENCOUNTER — PHARMACY VISIT (OUTPATIENT)
Dept: PHARMACY | Facility: MEDICAL CENTER | Age: 62
End: 2022-08-11
Payer: COMMERCIAL

## 2022-08-17 ENCOUNTER — OFFICE VISIT (OUTPATIENT)
Dept: PHYSICAL MEDICINE AND REHAB | Facility: MEDICAL CENTER | Age: 62
End: 2022-08-17
Payer: COMMERCIAL

## 2022-08-17 VITALS
OXYGEN SATURATION: 95 % | TEMPERATURE: 97.4 F | BODY MASS INDEX: 30.53 KG/M2 | SYSTOLIC BLOOD PRESSURE: 128 MMHG | DIASTOLIC BLOOD PRESSURE: 80 MMHG | HEART RATE: 95 BPM | WEIGHT: 190 LBS | HEIGHT: 66 IN

## 2022-08-17 DIAGNOSIS — M43.17 SPONDYLOLISTHESIS AT L5-S1 LEVEL: ICD-10-CM

## 2022-08-17 DIAGNOSIS — M54.16 LUMBAR RADICULOPATHY: ICD-10-CM

## 2022-08-17 DIAGNOSIS — S93.402A SPRAIN OF LEFT ANKLE, UNSPECIFIED LIGAMENT, INITIAL ENCOUNTER: ICD-10-CM

## 2022-08-17 DIAGNOSIS — M17.12 PRIMARY OSTEOARTHRITIS OF LEFT KNEE: ICD-10-CM

## 2022-08-17 DIAGNOSIS — Z96.651 S/P TKR (TOTAL KNEE REPLACEMENT), RIGHT: ICD-10-CM

## 2022-08-17 PROCEDURE — 99214 OFFICE O/P EST MOD 30 MIN: CPT | Performed by: PHYSICAL MEDICINE & REHABILITATION

## 2022-08-17 RX ORDER — TRAMADOL HYDROCHLORIDE 50 MG/1
50 TABLET ORAL EVERY 4 HOURS PRN
Qty: 60 TABLET | Refills: 0 | Status: SHIPPED | OUTPATIENT
Start: 2022-09-25 | End: 2022-10-14 | Stop reason: SDUPTHER

## 2022-08-17 RX ORDER — TRAMADOL HYDROCHLORIDE 50 MG/1
50 TABLET ORAL EVERY 4 HOURS PRN
Qty: 60 TABLET | Refills: 0 | Status: SHIPPED | OUTPATIENT
Start: 2022-08-26 | End: 2022-09-28

## 2022-08-17 ASSESSMENT — PATIENT HEALTH QUESTIONNAIRE - PHQ9
SUM OF ALL RESPONSES TO PHQ QUESTIONS 1-9: 8
CLINICAL INTERPRETATION OF PHQ2 SCORE: 2
5. POOR APPETITE OR OVEREATING: 0 - NOT AT ALL

## 2022-08-17 ASSESSMENT — PAIN SCALES - GENERAL: PAINLEVEL: 5=MODERATE PAIN

## 2022-08-17 ASSESSMENT — FIBROSIS 4 INDEX: FIB4 SCORE: 0.86

## 2022-08-17 NOTE — PROGRESS NOTES
Follow-up patient note    Physiatry (physical medicine and  Rehabilitation), interventional spine and sports medicine    Date of Service:08/17/2022    Chief complaint:   Chief Complaint   Patient presents with    Follow-Up     Legs pain          HISTORY    HPI: Debby Desai 62 y.o. female who presents today for follow-up evaluation of her pain complaints related to lumbar radiculopathy and pain after right total knee replacement.    Debby returns for follow-up of her chronic musculoskeletal pain and radicular symptoms.    No falls since the last visit.  Her left middle finger has improved.  Less painful, healing.  She has seen MANDO.  Seeing ortho regarding her left ankle.    Knees continue to be aching. This continues to be painful.  Medications help with her radicular symptoms.  Her left knee feels tight at times.  Reports that she has been limping.    PT scheduled for September.    Depression is better.  She is seeing Dr. Stevenson.  Denies suicidality.  She sees Estella at [facility] and is feeling good about this.      Takes tramadol 50mg twice a day, this has been fair to okay.  Taking tramadol and wellbutrin, effexor, elavil.  Taking aleve, one pill every other day.    Denies suicidality.  PHQ-9: 16, now 8     By history:   She reports that she had surgery on 09/16/2019.  This was a right total knee arthroplasty for osteoarthritis with Dr. Saba.    In 2001, she had discectomy.  She reports that she has had a spinal cord stimulator placed, but she has not been using this as much.  She reports that the unit is not currently working.    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  She gets up about once an hour.  She has a sit to stand desk, but it is difficult to stand much.  Currently, she is working from home due to the COVID-19 pandemic.    Medical records review:  ED records from 10/07/2020 and 09/22/2020 reviewed.  ED visit from 10/103520.  Negative  head CT.  She was given instructions about taking ibuprofen and tylenol.  Noted that she was also given a script for valium.     I reviewed the note from the referring provider Loy Miles M.D. dated 01/08/2020: Visits included nausea/epigastric pain, hypertension, chronic knee pain, IGT, dyslipidemia, MDD, hypothyroidism, iron deficiency    Previous treatments:    Physical Therapy: Yes    Medications the patient is tried: tramadol, tylenol (usually for a headache); in the past she took gabapentin 400mg po tid, she was taking vimovo and norco in the past; lyrica caused weight gain    Previous interventions: She had radiofrequency ablation in the past, prior to surgery    Previous surgeries to relieve the above pain:  None other than surgery 09/16/2019      ROS: Unchanged, see HPI  Gen: weight loss  Eyes: vision problems, glasses and contacts  CV: chest pain/anxiety  GI: nausea, ulcers  : urgency  Psych: depression  Endo: thyroid problems  Heme: anemia    Red Flags ROS:   Fever, Chills, Sweats: Denies  Involuntary Weight Loss: Denies  Bladder Incontinence: Denies  Bowel Incontinence: Denies  Saddle Anesthesia: Denies    All other systems reviewed and negative.       PMHx:   Past Medical History:   Diagnosis Date    Anemia     in past    Anginal syndrome (HCC)     had work up and feet r/t anxiety    Anxiety     Arthritis     OA knees    Chronic pain 6/2/2021    Dental disorder 5/24/12    partial upper denture    Diabetes (HCC)     pre diabetic    High cholesterol     HTN (hypertension), benign 3/19/2012    Hypothyroid 3/19/2012    Major depression 3/19/2012    Orbital floor (blow-out) closed fracture (HCC)     left    Other specified symptom associated with female genital organs     post menopausal bleeding    Pain     thoracic spine, Right knee, myofacial pain, and Right shoulder    Pain     recently fell and has bruise left forehead    Pain 02/2018    chronic back pain, right shoulder    Psychiatric problem      depression, anxiety    Unspecified disorder of thyroid     Urinary bladder disorder     stress incont.    Urinary incontinence     Occasional       PSHx:   Past Surgical History:   Procedure Laterality Date    INJ,EPI ANES/STER LUM/SAC ADDL Right 4/7/2021    Procedure: RIGHT lumbar five and sacral one transforaminal epidural;  Surgeon: Volodymyr Herring M.D.;  [facility];  Service: Pain Management    PB TOTAL KNEE ARTHROPLASTY Right 9/16/2019    Procedure: RIGHT ARTHROPLASTY, KNEE, TOTAL;  Surgeon: Rufus Saba M.D.;  Location: [facility];  Service: Orthopedics    KNEE ARTHROSCOPY Right 2/9/2018    Procedure: KNEE ARTHROSCOPY;  Surgeon: Harsh Baltazar M.D.;  Location: [facility];  Service: Orthopedics    MENISCECTOMY, KNEE, MEDIAL Right 2/9/2018    Procedure: MEDIAL AND LATERAL MENISCECTOMY- PARTIAL;  Surgeon: Harsh Baltazar M.D.;  Location: [facility];  Service: Orthopedics    KNEE ARTHROSCOPY Right 7/12/2017    Procedure: KNEE ARTHROSCOPY- CESPEDES;  Surgeon: Harsh Baltazar M.D.;  Location: [facility];  Service:     MENISCECTOMY, KNEE, MEDIAL Right 7/12/2017    Procedure: PARTIAL MEDIAL AND LATERAL MENISCECTOMY;  Surgeon: Harsh Baltazar M.D.;  Location: [facility];  Service:     SYNOVECTOMY Right 7/12/2017    Procedure: SYNOVECTOMY;  Surgeon: Harsh Baltazar M.D.;  Location: [facility];  Service:     KNEE ARTHROSCOPY  4/21/2015    Performed by Rufus Saba M.D. at [facility]    MENISCECTOMY, KNEE, MEDIAL  4/21/2015    Performed by Rufus Saba M.D. at [facility]    PUMP REVISION  12/10/2012    Performed by Allison Guerra M.D. at [facility]    SPINAL CORD STIMULATOR  5/30/2012    Performed by ALLISON GUERRA at [facility]    BIOPSY GENERAL  5/1/12    endometrial    SPINAL CORD STIMULATOR  7/11/2011     "Performed by ALLISON CAM at [facility]    BLOCK EPIDURAL STEROID INJECTION  2008    x 3-4    LYMPH NODE EXCISION Left 2007    cervicle    OTHER Right 2001    thoracic discectomy      ARTHROSCOPY, KNEE Left 1999    RIB RESECTION Right 1980    LUMPECTOMY         Family history   Family History   Problem Relation Age of Onset    Hypertension Father     Diabetes Father     Heart Disease Father     Alcohol abuse Father     Anesthesia Sister         slow to come out (as a child)    Diabetes Other     Heart Disease Other     Cancer Other     Hypertension Other     Stroke Other     Lung Disease Other          Medications:   Current Outpatient Medications   Medication    [START ON 8/26/2022] traMADol (ULTRAM) 50 MG Tab    [START ON 9/25/2022] traMADol (ULTRAM) 50 MG Tab    amitriptyline (ELAVIL) 50 MG Tab    buPROPion (WELLBUTRIN XL) 300 MG XL tablet    venlafaxine (EFFEXOR-XR) 150 MG extended-release capsule    venlafaxine XR (EFFEXOR XR) 75 MG CAPSULE SR 24 HR    atorvastatin (LIPITOR) 20 MG Tab    lisinopril-hydrochlorothiazide (PRINZIDE) 20-12.5 MG per tablet    traMADol (ULTRAM) 50 MG Tab    gabapentin (NEURONTIN) 300 MG Cap    amitriptyline (ELAVIL) 50 MG Tab    buPROPion (WELLBUTRIN XL) 300 MG XL tablet    venlafaxine XR (EFFEXOR XR) 75 MG CAPSULE SR 24 HR    levothyroxine (SYNTHROID) 50 MCG Tab    metFORMIN ER (GLUCOPHAGE XR) 500 MG TABLET SR 24 HR    ergocalciferol (DRISDOL) 68533 UNIT capsule    ibuprofen (MOTRIN) 200 MG Tab    Ferrous Sulfate (IRON) 325 (65 Fe) MG Tab    acetaminophen (TYLENOL) 500 MG Tab     No current facility-administered medications for this visit.       Allergies:   Allergies   Allergen Reactions    Sulfamethoxazole-Trimethoprim Rash and Swelling     Pt states \"My hand swells up and rash all over body\".       Social Hx:   Social History     Socioeconomic History    Marital status: Single     Spouse name: Not on file    Number of children: Not on file    Years of education: " Not on file    Highest education level: Associate degree: academic program   Occupational History    Not on file   Tobacco Use    Smoking status: Never    Smokeless tobacco: Never   Vaping Use    Vaping Use: Never used   Substance and Sexual Activity    Alcohol use: Not Currently     Alcohol/week: 0.0 oz     Comment: 1 glass wine per month    Drug use: No    Sexual activity: Never     Partners: Male   Other Topics Concern     Service No    Blood Transfusions No    Caffeine Concern No    Occupational Exposure No    Hobby Hazards No    Sleep Concern Yes    Stress Concern Yes    Weight Concern Yes    Special Diet No    Back Care No    Exercise No    Bike Helmet No    Seat Belt Yes    Self-Exams No   Social History Narrative    Not on file     Social Determinants of Health     Financial Resource Strain: Low Risk     Difficulty of Paying Living Expenses: Not hard at all   Food Insecurity: No Food Insecurity    Worried About Running Out of Food in the Last Year: Never true    Ran Out of Food in the Last Year: Never true   Transportation Needs: No Transportation Needs    Lack of Transportation (Medical): No    Lack of Transportation (Non-Medical): No   Physical Activity: Inactive    Days of Exercise per Week: 0 days    Minutes of Exercise per Session: 0 min   Stress: Stress Concern Present    Feeling of Stress : Rather much   Social Connections: Socially Isolated    Frequency of Communication with Friends and Family: More than three times a week    Frequency of Social Gatherings with Friends and Family: Once a week    Attends [employer] Services: Never    Active Member of Clubs or Organizations: No    Attends Club or Organization Meetings: Never    Marital Status: Never    Intimate Partner Violence: Not on file   Housing Stability: Low Risk     Unable to Pay for Housing in the Last Year: No    Number of Places Lived in the Last Year: 1    Unstable Housing in the Last Year: No         EXAMINATION     Physical  "Exam:   Vitals: /80 (BP Location: Right arm, Patient Position: Sitting, BP Cuff Size: Adult long)   Pulse 95   Temp 36.3 °C (97.4 °F) (Temporal)   Ht 1.676 m (5' 6\")   Wt 86.2 kg (190 lb)   SpO2 95%     Constitutional:   Body Habitus: Body mass index is 30.67 kg/m².  Cooperation: Fully cooperates with exam  Appearance: Well-groomed, well-nourished, not disheveled no acute distress    Eyes: No scleral icterus, no proptosis     ENT -no obvious auditory deficits, wearing a face mask    Skin -no visible skin lesions    Respiratory-  breathing comfortable on room air, no audible wheezing    Cardiovascular- No lower extremity edema is noted.     Psychiatric- alert and oriented ×3. Normal affect.     Gait -  Antalgic, using a walker.    Neuro/Muscloskeletal  No focal motor deficits in the lower extremities bilaterally.  No sensory deficits in the lower extremities bilaterally    Left knee range of motion within functional limits.  Moderate effusion of right knee.  Mild tenderness over the lateral fibula.  No medial or lateral instability.     Right knee with functional ROM of the right knee, flexion greater than 120 degrees is maintained.   No significant bruising noted, effusion of the right knee.        MEDICAL DECISION MAKING    Medical records review: see under HPI section.     DATA    Labs:     03/14/2020 CRP 0.19  03/14/2020 Sed rate 20  02/25/2020: Tramadol and metabolites  09/21/2021 UDS consistent with tramadol and metabolites    Lab Results   Component Value Date/Time    SODIUM 138 07/08/2022 06:35 AM    POTASSIUM 3.6 07/08/2022 06:35 AM    CHLORIDE 99 07/08/2022 06:35 AM    CO2 27 07/08/2022 06:35 AM    ANION 12.0 07/08/2022 06:35 AM    GLUCOSE 85 07/08/2022 06:35 AM    BUN 26 (H) 07/08/2022 06:35 AM    CREATININE 1.04 07/08/2022 06:35 AM    CALCIUM 9.4 07/08/2022 06:35 AM    ASTSGOT 12 07/08/2022 06:35 AM    ALTSGPT 6 07/08/2022 06:35 AM    TBILIRUBIN 0.2 07/08/2022 06:35 AM    ALBUMIN 4.5 07/08/2022 " 06:35 AM    TOTPROTEIN 7.4 07/08/2022 06:35 AM    GLOBULIN 2.9 07/08/2022 06:35 AM    AGRATIO 1.6 07/08/2022 06:35 AM       Lab Results   Component Value Date/Time    PROTHROMBTM 12.6 10/07/2020 10:45 PM    INR 0.91 10/07/2020 10:45 PM        Lab Results   Component Value Date/Time    WBC 8.2 07/08/2022 06:35 AM    RBC 4.05 (L) 07/08/2022 06:35 AM    HEMOGLOBIN 12.4 07/08/2022 06:35 AM    HEMATOCRIT 38.6 07/08/2022 06:35 AM    MCV 95.3 07/08/2022 06:35 AM    MCH 30.6 07/08/2022 06:35 AM    MCHC 32.1 (L) 07/08/2022 06:35 AM    MPV 11.4 07/08/2022 06:35 AM    NEUTSPOLYS 52.60 07/08/2022 06:35 AM    LYMPHOCYTES 35.00 07/08/2022 06:35 AM    MONOCYTES 8.30 07/08/2022 06:35 AM    EOSINOPHILS 3.20 07/08/2022 06:35 AM    BASOPHILS 0.50 07/08/2022 06:35 AM        Lab Results   Component Value Date/Time    HBA1C 5.8 (H) 06/05/2021 07:42 AM        Imaging: I personally reviewed following images, these are my reads  Xray thoracolumbar spine 07/14/2022  Degenerative changes and note of electrode from T6-T9    Xray right knee 07/14/2022  S/P right total knee without evidence of fracture    CT left knee May 8, 2022  There is note of moderate to large joint effusion with note of nondisplaced fracture at the medial aspect of the proximal fibula.  Moderate osteoarthritis    CT cervical spine 09/22/2020  There is note of cervical spondylosis with multilevel uncovertebral arthropathy    Xray right hip 03/14/2020  There is mild osteoarthritis of the right hip.      Xray lumbar 03/14/2020  There is mild anterolisthesis at L5-S1.  No abnormal motion on flexion and extension  There is DDD and facet arthropathy that is most noted at L5-S1 and less at L4-5    Xray right knee 09/16/2019  There is note of right knee arthroplasty    IMAGING radiology reads. I reviewed the following radiology reads  Xray right knee 07/14/2022  IMPRESSION:     1.  No acute fracture or dislocation of the right knee.    Xray thoracolumbar spine  07/14/2022  IMPRESSION:     1.  No acute or subacute fracture of the thoracolumbar spine.ay       CT left knee May 8, 2022  IMPRESSION:     1.  Nondisplaced fracture of medial aspect of the proximal fibula is identified.     2.  No other fractures identified.     3.  Joint effusion is identified.     4.  Moderate osteoarthritis.    Xray right knee 09/18/2021  Multiple standing views of the right knee show previous right total knee   replacement with hardware in good position without signs of loosening or   dislocation.  No obvious fracture noted.    Xray left shoulder 10/27/2021  X-rays reviewed today of the left shoulder show normal overall bony   alignment she has some AC degenerative change.  She has some osteophyte   formation off the inferior humeral head.  Some mild glenohumeral joint   space narrowing patient.  Type III acromion.    CT cervical spine 09/22/2020  IMPRESSION:  Degenerative change without evidence of cervical spine fracture.    Xray lumbar 03/14/2020  IMPRESSION:  1.  No compression deformity or acute fracture is identified.  2.  Mild anterolisthesis at L5-S1.  3.  Degenerative disc disease and facet arthropathy.  4.  No focal instability noted on flexion extension views.                                                Xray right hip 03/14/2020  IMPRESSION:     1.  No radiographic evidence of acute traumatic injury.  2.  Findings are consistent with mild osteoarthritis.  3.  Probable constipation.                                   Results for orders placed during the hospital encounter of 09/16/19   DX-KNEE 2- RIGHT    Impression Right knee arthroplasty.      Results for orders placed during the hospital encounter of 03/28/19   DX-KNEE 3 VIEWS RIGHT    Impression No evidence of acute fracture or dislocation.    Degenerative changes as above described.                              Diagnosis   Visit Diagnoses     ICD-10-CM   1. Primary osteoarthritis of left knee  M17.12   2. Sprain of left ankle,  unspecified ligament, initial encounter  S93.402A   3. Spondylolisthesis at L5-S1 level  M43.17   4. S/P TKR (total knee replacement), right  Z96.651   5. Lumbar radiculopathy  M54.16             ASSESSMENT:  Debby Desai 62 y.o. female seen for above.     Debby was seen today for follow-up.    Diagnoses and all orders for this visit:    Primary osteoarthritis of left knee  -     Referral to Physical Therapy    Sprain of left ankle, unspecified ligament, initial encounter  -     Referral to Physical Therapy    Spondylolisthesis at L5-S1 level  -     traMADol (ULTRAM) 50 MG Tab; Take 1 Tablet by mouth every four hours as needed for Severe Pain for up to 30 days.  -     traMADol (ULTRAM) 50 MG Tab; Take 1 Tablet by mouth every four hours as needed for Severe Pain for up to 30 days.  -     Consent for Opiate Prescription  -     Controlled Substance Treatment Agreement  -     Pain Management Screen    S/P TKR (total knee replacement), right  -     traMADol (ULTRAM) 50 MG Tab; Take 1 Tablet by mouth every four hours as needed for Severe Pain for up to 30 days.  -     traMADol (ULTRAM) 50 MG Tab; Take 1 Tablet by mouth every four hours as needed for Severe Pain for up to 30 days.  -     Consent for Opiate Prescription  -     Controlled Substance Treatment Agreement  -     Pain Management Screen    Lumbar radiculopathy  -     traMADol (ULTRAM) 50 MG Tab; Take 1 Tablet by mouth every four hours as needed for Severe Pain for up to 30 days.  -     traMADol (ULTRAM) 50 MG Tab; Take 1 Tablet by mouth every four hours as needed for Severe Pain for up to 30 days.  -     Consent for Opiate Prescription  -     Controlled Substance Treatment Agreement  -     Pain Management Screen       Continue with plans for PT.  Discussed left knee and left ankle, attempt PT for both upcoming in Sept.  Discussed treatment optinos for left and right knee.  For now, hold and progress with PT.  Will discuss options with Loy Churchill RN,  [employer].  Note single lead in the spinal canal.  Will discuss options given that battery is past life.   reviewed.  Reviewed .  Plan to continue with tramadol twice a day for now.  Scripts given for 2 months.  UDS ordered.  Symptoms manageable with current medications.  Continue follow-up with Cardiology.  Neurology visit reviewed, not felt neurogenic syncope.  Continue with Dr. Stevenson and psychologist.  Follow-up PCP      Follow-up: Return in about 2 months (around 10/17/2022).      Thank you very much for asking me to participate in Debby Desai's care.  Please contact me with any questions or concerns.      Please note that this dictation was created using voice recognition software. I have made every reasonable attempt to correct obvious errors but there may be errors of grammar and content that I may have overlooked prior to finalization of this note.      Volodymyr Herring MD  Physical Medicine and Rehabilitation  Interventional Spine and Sports Physiatry  [facility]

## 2022-08-18 ENCOUNTER — OFFICE VISIT (OUTPATIENT)
Dept: MEDICAL GROUP | Facility: MEDICAL CENTER | Age: 62
End: 2022-08-18
Payer: COMMERCIAL

## 2022-08-18 VITALS
SYSTOLIC BLOOD PRESSURE: 104 MMHG | OXYGEN SATURATION: 97 % | WEIGHT: 187.8 LBS | HEIGHT: 66 IN | TEMPERATURE: 97.2 F | DIASTOLIC BLOOD PRESSURE: 70 MMHG | BODY MASS INDEX: 30.18 KG/M2

## 2022-08-18 DIAGNOSIS — Z00.00 ANNUAL PHYSICAL EXAM: ICD-10-CM

## 2022-08-18 DIAGNOSIS — E03.9 ACQUIRED HYPOTHYROIDISM: ICD-10-CM

## 2022-08-18 DIAGNOSIS — Z12.31 ENCOUNTER FOR SCREENING MAMMOGRAM FOR BREAST CANCER: ICD-10-CM

## 2022-08-18 DIAGNOSIS — I10 HTN (HYPERTENSION), BENIGN: Chronic | ICD-10-CM

## 2022-08-18 DIAGNOSIS — E78.5 DYSLIPIDEMIA: ICD-10-CM

## 2022-08-18 DIAGNOSIS — G89.4 CHRONIC PAIN SYNDROME: ICD-10-CM

## 2022-08-18 DIAGNOSIS — Z12.11 SCREEN FOR COLON CANCER: ICD-10-CM

## 2022-08-18 DIAGNOSIS — R73.03 PREDIABETES: ICD-10-CM

## 2022-08-18 DIAGNOSIS — F33.41 RECURRENT MAJOR DEPRESSIVE DISORDER, IN PARTIAL REMISSION (HCC): ICD-10-CM

## 2022-08-18 PROBLEM — F34.1 PERSISTENT DEPRESSIVE DISORDER: Status: RESOLVED | Noted: 2021-05-18 | Resolved: 2022-08-18

## 2022-08-18 PROCEDURE — 99396 PREV VISIT EST AGE 40-64: CPT | Performed by: NURSE PRACTITIONER

## 2022-08-18 ASSESSMENT — FIBROSIS 4 INDEX: FIB4 SCORE: 0.86

## 2022-08-18 NOTE — PROGRESS NOTES
Subjective:   Debby Desai is a 62 y.o. female here today for annual    Recurrent major depressive disorder, in partial remission (HCC)  Chronic. Stable on current medications. Currently seeing psychiatry for this. Therapy weekly    Acquired hypothyroidism  Chronic. Stable on levothyroxine 50 mg daily. Due for labs    Chronic pain  Chronic knee and back pain. Seeing physiatry and ortho for this. Currently taking Gabapentin 300 mg TID, tramadol 50 mg PRN.     Duloxetine not effective.     Dyslipidemia  Chronic. Stable on atorvastatin 20 mg daily, due for labs    HTN (hypertension), benign  Chronic. Stable on lisinopril-HCTZ 20-12.5 mg daily. Due for labs.     Prediabetes  Chronic. Stable on metformin  mg BID. Strong family history of diabetes.      Current medicines (including changes today)  Current Outpatient Medications   Medication Sig Dispense Refill    [START ON 8/26/2022] traMADol (ULTRAM) 50 MG Tab Take 1 Tablet by mouth every four hours as needed for Severe Pain for up to 30 days. 60 Tablet 0    [START ON 9/25/2022] traMADol (ULTRAM) 50 MG Tab Take 1 Tablet by mouth every four hours as needed for Severe Pain for up to 30 days. 60 Tablet 0    amitriptyline (ELAVIL) 50 MG Tab Take 1 Tablet by mouth every evening. 30 Tablet 5    buPROPion (WELLBUTRIN XL) 300 MG XL tablet Take 1 tablet by mouth every morning. 90 Tablet 2    venlafaxine (EFFEXOR-XR) 150 MG extended-release capsule Take 1 Capsule by mouth every day. (venlafaxine xr 150 mg + 75 mg = 225 mg daily) 90 Capsule 2    venlafaxine XR (EFFEXOR XR) 75 MG CAPSULE SR 24 HR Take 1 Capsule by mouth every day. (venlafaxine xr 150 mg + 75 mg = 225 mg daily) 90 Capsule 2    atorvastatin (LIPITOR) 20 MG Tab TAKE ONE TABLET BY MOUTH EVERY DAY 90 Tablet 0    lisinopril-hydrochlorothiazide (PRINZIDE) 20-12.5 MG per tablet Take one tablet by mouth daily 90 Tablet 0    traMADol (ULTRAM) 50 MG Tab Take 1 Tablet by mouth in the morning, at noon, and in the  "evening as needed for Moderate-Severe Pain for up to 30 days. 60 Tablet 0    gabapentin (NEURONTIN) 300 MG Cap Take 1 Capsule by mouth 3 times a day for 60 days. 90 Capsule 1    amitriptyline (ELAVIL) 50 MG Tab Take 1 Tablet by mouth every evening. 30 Tablet 5    buPROPion (WELLBUTRIN XL) 300 MG XL tablet Take 1 tablet by mouth every morning. 90 Tablet 2    venlafaxine XR (EFFEXOR XR) 75 MG CAPSULE SR 24 HR Take 1 Capsule by mouth every day. (venlafaxine xr 150 mg + 75 mg = 225 mg daily) 90 Capsule 2    levothyroxine (SYNTHROID) 50 MCG Tab Take 1 tablet by mouth Every morning on an empty stomach. 90 Tablet 3    metFORMIN ER (GLUCOPHAGE XR) 500 MG TABLET SR 24 HR Take 1 Tab by mouth 2 times a day. 180 Tablet 2    ergocalciferol (DRISDOL) 25938 UNIT capsule Take 1 capsule by mouth every 7 days. 12 capsule 0    ibuprofen (MOTRIN) 200 MG Tab Take 600 mg by mouth every 6 hours as needed for Inflammation.      Ferrous Sulfate (IRON) 325 (65 Fe) MG Tab Take 65 mg by mouth every day at 6 PM.       No current facility-administered medications for this visit.     She  has a past medical history of Anemia, Anginal syndrome ([facility]), Anxiety, Arthritis, Chronic pain (6/2/2021), Dental disorder (5/24/12), Diabetes ([facility]), High cholesterol, HTN (hypertension), benign (3/19/2012), Hypothyroid (3/19/2012), Major depression (3/19/2012), Orbital floor (blow-out) closed fracture ([facility]), Other specified symptom associated with female genital organs, Pain, Pain, Pain (02/2018), Psychiatric problem, Unspecified disorder of thyroid, Urinary bladder disorder, and Urinary incontinence.    She has no past medical history of Breast cancer ([facility]).    ROS   No chest pain, no shortness of breath, no abdominal pain  Positive ROS as per HPI.  All other systems reviewed and are negative.     Objective:     /70   Temp 36.2 °C (97.2 °F) (Temporal)   Ht 1.676 m (5' 6\")   Wt 85.2 kg (187 lb 12.8 oz)   SpO2 97%   PF 89 L/min  Body mass index is " 30.31 kg/m².     Physical Exam:  Constitutional: Alert, no distress.  Skin: Warm, dry, good turgor, no rashes in visible areas.  Eye: Equal, round and reactive, conjunctiva clear, lids normal.  ENMT: Lips without lesions, good dentition, oropharynx clear.  Neck: Trachea midline, no masses, no thyromegaly. No cervical or supraclavicular lymphadenopathy  Respiratory: Unlabored respiratory effort, lungs clear to auscultation, no wheezes, no ronchi.  Cardiovascular: Normal S1, S2, no murmur, no edema.  Abdomen: Soft, non-tender, no masses, no hepatosplenomegaly.  Psych: Alert and oriented x3, normal affect and mood.        Assessment and Plan:   The following treatment plan was discussed    1. Annual physical exam  Due for annual labs  Health Maintenance reviewed  - TSH; Future  - FREE THYROXINE; Future  - VITAMIN D,25 HYDROXY; Future  - Lipid Profile; Future  - HEMOGLOBIN A1C; Future  - MICROALBUMIN CREAT RATIO URINE; Future    2. Recurrent major depressive disorder, in partial remission (HCC)  Stable  Continue medication and follow-up per psychiatry    3. Acquired hypothyroidism  Stable  Continue levothyroxine 50 mcg daily  Due for labs  - TSH; Future  - FREE THYROXINE; Future    4. Chronic pain syndrome  Stable  Continue follow-up with physiatry and orthopedics    5. Dyslipidemia  Stable  Continue atorvastatin 20 mg daily  Due for labs  - Lipid Profile; Future    6. HTN (hypertension), benign  Stable  Blood pressure slightly low, having some lightheadedness issues  Advised patient to take half a tablet of her lisinopril-HCTZ and continue to monitor blood pressure  - MICROALBUMIN CREAT RATIO URINE; Future    7. Prediabetes  - HEMOGLOBIN A1C; Future    8. Encounter for screening mammogram for breast cancer  - MA-SCREENING MAMMO BILAT W/TOMOSYNTHESIS W/CAD; Future    9. Screen for colon cancer  - COLOGUARD (FIT DNA)        Followup: Return in about 4 weeks (around 9/15/2022) for Hypertension, Lab Review.    I have  placed the below orders and discussed them with an approved delegating provider. The MA is performing the below orders under the direction of Dr. Tena

## 2022-08-18 NOTE — ASSESSMENT & PLAN NOTE
Chronic knee and back pain. Seeing physiatry and ortho for this. Currently taking Gabapentin 300 mg TID, tramadol 50 mg PRN.     Duloxetine not effective.

## 2022-08-26 PROCEDURE — RXMED WILLOW AMBULATORY MEDICATION CHARGE: Performed by: PHYSICAL MEDICINE & REHABILITATION

## 2022-08-29 ENCOUNTER — PHARMACY VISIT (OUTPATIENT)
Dept: PHARMACY | Facility: MEDICAL CENTER | Age: 62
End: 2022-08-29
Payer: COMMERCIAL

## 2022-09-08 ENCOUNTER — HOSPITAL ENCOUNTER (OUTPATIENT)
Dept: RADIOLOGY | Facility: MEDICAL CENTER | Age: 62
End: 2022-09-08
Attending: NURSE PRACTITIONER
Payer: COMMERCIAL

## 2022-09-08 DIAGNOSIS — Z12.31 ENCOUNTER FOR SCREENING MAMMOGRAM FOR BREAST CANCER: ICD-10-CM

## 2022-09-08 PROCEDURE — 77063 BREAST TOMOSYNTHESIS BI: CPT

## 2022-09-15 ENCOUNTER — APPOINTMENT (OUTPATIENT)
Dept: PHYSICAL THERAPY | Facility: MEDICAL CENTER | Age: 62
End: 2022-09-15
Attending: NURSE PRACTITIONER
Payer: COMMERCIAL

## 2022-09-15 NOTE — OP THERAPY EVALUATION
Outpatient Physical Therapy  INITIAL EVALUATION    [facility] Outpatient Physical Therapy  [address]  [place] [address]  Phone:  [phone]  Fax:  [phone]    Date of Evaluation: 09/15/2022    Patient: Debby Desai  YOB: 1960  MRN: [id number]     Referring Provider: ARELIS Betancur  [address] [place] [address]   Referring Diagnosis Sprain of left ankle, unspecified ligament, initial encounter [S93.402A]     Time Calculation                 Chief Complaint: No chief complaint on file.    Visit Diagnoses     ICD-10-CM   1. Primary osteoarthritis of left knee  M17.12   2. Sprain of unspecified ligament of left ankle, initial encounter  S93.402A       Date of onset of impairment: No data found    Subjective    Past Medical History:   Diagnosis Date    Anemia     in past    Anginal syndrome (HCC)     had work up and feet r/t anxiety    Anxiety     Arthritis     OA knees    Chronic pain 6/2/2021    Dental disorder 5/24/12    partial upper denture    Diabetes (HCC)     pre diabetic    High cholesterol     HTN (hypertension), benign 3/19/2012    Hypothyroid 3/19/2012    Major depression 3/19/2012    Orbital floor (blow-out) closed fracture (HCC)     left    Other specified symptom associated with female genital organs     post menopausal bleeding    Pain     thoracic spine, Right knee, myofacial pain, and Right shoulder    Pain     recently fell and has bruise left forehead    Pain 02/2018    chronic back pain, right shoulder    Psychiatric problem     depression, anxiety    Unspecified disorder of thyroid     Urinary bladder disorder     stress incont.    Urinary incontinence     Occasional     Past Surgical History:   Procedure Laterality Date    INJ,EPI ANES/STER LUM/SAC ADDL Right 4/7/2021    Procedure: RIGHT lumbar five and sacral one transforaminal epidural;  Surgeon: Volodymyr Herring M.D.;  [facility];   Service: Pain Management    PB TOTAL KNEE ARTHROPLASTY Right 9/16/2019    Procedure: RIGHT ARTHROPLASTY, KNEE, TOTAL;  Surgeon: Rufus Saba M.D.;  Location: [facility];  Service: Orthopedics    KNEE ARTHROSCOPY Right 2/9/2018    Procedure: KNEE ARTHROSCOPY;  Surgeon: Harsh Baltazar M.D.;  Location: [facility];  Service: Orthopedics    MENISCECTOMY, KNEE, MEDIAL Right 2/9/2018    Procedure: MEDIAL AND LATERAL MENISCECTOMY- PARTIAL;  Surgeon: Harsh Baltazar M.D.;  Location: [facility];  Service: Orthopedics    KNEE ARTHROSCOPY Right 7/12/2017    Procedure: KNEE ARTHROSCOPY- CESPEDES;  Surgeon: Harsh Baltazar M.D.;  Location: [facility];  Service:     MENISCECTOMY, KNEE, MEDIAL Right 7/12/2017    Procedure: PARTIAL MEDIAL AND LATERAL MENISCECTOMY;  Surgeon: Harsh Baltazar M.D.;  Location: [facility];  Service:     SYNOVECTOMY Right 7/12/2017    Procedure: SYNOVECTOMY;  Surgeon: Harsh Baltazar M.D.;  Location: [facility];  Service:     KNEE ARTHROSCOPY  4/21/2015    Performed by Rufus Saba M.D. at [facility]    MENISCECTOMY, KNEE, MEDIAL  4/21/2015    Performed by Rufus Saba M.D. at [facility]    PUMP REVISION  12/10/2012    Performed by Allison Guerra M.D. at [facility]    SPINAL CORD STIMULATOR  5/30/2012    Performed by ALLISON GUERRA at [facility]    BIOPSY GENERAL  5/1/12    endometrial    SPINAL CORD STIMULATOR  7/11/2011    Performed by ALLISON GUERRA at [facility]    BLOCK EPIDURAL STEROID INJECTION  2008    x 3-4    LYMPH NODE EXCISION Left 2007    cervicle    OTHER Right 2001    thoracic discectomy      ARTHROSCOPY, KNEE Left 1999    RIB RESECTION Right 1980    LUMPECTOMY       Social History     Tobacco Use    Smoking status: Never    Smokeless tobacco: Never   Substance Use Topics    Alcohol use: Not  Currently     Alcohol/week: 0.0 oz     Comment: 1 glass wine per month     Family and Occupational History     Socioeconomic History    Marital status: Single     Spouse name: Not on file    Number of children: Not on file    Years of education: Not on file    Highest education level: Associate degree: academic program   Occupational History    Not on file       Objective    Exercises/Treatment  Time-based treatments/modalities:           Assessment/Response/Plan    Functional Assessment Used        Referring provider co-signature:  I have reviewed this plan of care and my co-signature certifies the need for services.    Certification Period: 09/15/2022 to  {Future Date:24418}    Physician Signature: ________________________________ Date: ______________

## 2022-09-20 DIAGNOSIS — R73.02 IGT (IMPAIRED GLUCOSE TOLERANCE): ICD-10-CM

## 2022-09-20 PROCEDURE — RXMED WILLOW AMBULATORY MEDICATION CHARGE: Performed by: NURSE PRACTITIONER

## 2022-09-21 PROCEDURE — RXMED WILLOW AMBULATORY MEDICATION CHARGE: Performed by: NURSE PRACTITIONER

## 2022-09-21 RX ORDER — METFORMIN HYDROCHLORIDE 500 MG/1
500 TABLET, EXTENDED RELEASE ORAL 2 TIMES DAILY
Qty: 180 TABLET | Refills: 0 | Status: SHIPPED | OUTPATIENT
Start: 2022-09-21 | End: 2023-02-17 | Stop reason: SDUPTHER

## 2022-09-22 ENCOUNTER — PHYSICAL THERAPY (OUTPATIENT)
Dept: PHYSICAL THERAPY | Facility: MEDICAL CENTER | Age: 62
End: 2022-09-22
Attending: NURSE PRACTITIONER
Payer: COMMERCIAL

## 2022-09-22 DIAGNOSIS — M17.12 PRIMARY OSTEOARTHRITIS OF LEFT KNEE: ICD-10-CM

## 2022-09-22 DIAGNOSIS — S93.402A SPRAIN OF UNSPECIFIED LIGAMENT OF LEFT ANKLE, INITIAL ENCOUNTER: ICD-10-CM

## 2022-09-22 PROCEDURE — 97162 PT EVAL MOD COMPLEX 30 MIN: CPT

## 2022-09-22 ASSESSMENT — ENCOUNTER SYMPTOMS
PAIN SCALE AT HIGHEST: 8
PAIN SCALE: 4
PAIN SCALE AT LOWEST: 0

## 2022-09-22 ASSESSMENT — ACTIVITIES OF DAILY LIVING (ADL): POOR_BALANCE: 1

## 2022-09-22 NOTE — OP THERAPY EVALUATION
"  Outpatient Physical Therapy  INITIAL EVALUATION    [facility] Outpatient Physical Therapy  [address]  [place] [address]  Phone:  [phone]  Fax:  [phone]    Date of Evaluation: 09/22/2022    Patient: Debby Desai  YOB: 1960  MRN: [id number]     Referring Provider: ARELIS Betancur  [address] [place] [address]   Referring Diagnosis Sprain of unspecified ligament of left ankle, initial encounter [S93.402A]     Time Calculation  Start time: 0840  Stop time: 0920 Time Calculation (min): 40 minutes         Chief Complaint: Ankle Problem, Difficulty Walking, Loss Of Balance, and Knee Problem    Visit Diagnoses     ICD-10-CM   1. Primary osteoarthritis of left knee  M17.12   2. Sprain of unspecified ligament of left ankle, initial encounter  S93.402A       Date of onset of impairment: 5/6/2022    Subjective:   History of Present Illness:     Date of onset:  5/6/2022    Mechanism of injury:  Patient presents with L knee and ankle pain as a result of GLF in May 2022. Fall resulted in non-displaced fracture of L proximal fibula. She was in a boot initially for a week then taken out of it. She continued to have persistent L ankle pain and weakness and MANDO gave her fabric brace, which she used for 6-8 weeks. She reports using FWW since injury due to L ankle pain/ weakness and leg shaking. She reports new history of frequent falls since 04/2022 with falls precipitated by sensation of tunnel vision, leg \"shaking\" and \"spasms\" which result in legs giving out (she has previous history of falls with attendance to PT at [facility] in [place]). She reports with the fall in May she did lose consciousness but that was the only time. She's consulted with neurologist who recommended she see cardiologist. She has appointment with them on 09/26/2022.       Other relevant medical history: essential hand tremors, HTN, R TKA, chronic R ankle instability, " "depression/ anxiety, hypothyroid, pre-diabetic, myofascial pain syndrome, bilat knee arthroscopy, back surgery - she has spinal stimulator but not in use and may have it removed      Sleep disturbance:  Not disrupted  Pain:     Current pain ratin (L ankle)    At best pain ratin    At worst pain ratin (L ankle)    Pain Comments::  Location: L knee and ankle    Symptoms: L knee - constant \"tight band around knee, stiffness, and swelling - pt reports this is improving; L ankle: lateral ankle pain with walking short distance (ie: from bedroom to kitchen)      Social Support:     Lives in:  One-story house    Lives with: nephew.  Hand dominance:  Right  Diagnostic Tests:     CT scan: abnormal      Diagnostic Tests Comments:  L knee  FINDINGS:  Nondisplaced fracture through the proximal medial aspect of the fibula is identified. The proximal tibia including the tibial plateau appears to be intact. No acute fracture of the distal femur is identified. No fracture of the patella is identified.   Moderate to large joint effusion is identified. There is joint space narrowing and periarticular sclerosis and marginal spurring.  Activities of Daily Living:     Patient reported ADL status: PLOF  Standing during shower, grocery shopping, walking 15 minutes for exercise on regular basis, no AD at baseline    Patient Goals:     Other patient goals:  Increase ankle/ knee range of motion and decrease pain    Past Medical History:   Diagnosis Date    Anemia     in past    Anginal syndrome (HCC)     had work up and feet r/t anxiety    Anxiety     Arthritis     OA knees    Chronic pain 2021    Dental disorder 10    partial upper denture    Diabetes (HCC)     pre diabetic    High cholesterol     HTN (hypertension), benign 3/19/2012    Hypothyroid 3/19/2012    Major depression 3/19/2012    Orbital floor (blow-out) closed fracture (HCC)     left    Other specified symptom associated with female genital organs     post " menopausal bleeding    Pain     thoracic spine, Right knee, myofacial pain, and Right shoulder    Pain     recently fell and has bruise left forehead    Pain 02/2018    chronic back pain, right shoulder    Psychiatric problem     depression, anxiety    Unspecified disorder of thyroid     Urinary bladder disorder     stress incont.    Urinary incontinence     Occasional     Past Surgical History:   Procedure Laterality Date    INJ,EPI ANES/STER LUM/SAC ADDL Right 4/7/2021    Procedure: RIGHT lumbar five and sacral one transforaminal epidural;  Surgeon: Volodymyr Herring M.D.;  [facility];  Service: Pain Management    PB TOTAL KNEE ARTHROPLASTY Right 9/16/2019    Procedure: RIGHT ARTHROPLASTY, KNEE, TOTAL;  Surgeon: Rufus Saba M.D.;  Location: [facility];  Service: Orthopedics    KNEE ARTHROSCOPY Right 2/9/2018    Procedure: KNEE ARTHROSCOPY;  Surgeon: Harsh Baltazar M.D.;  Location: [facility];  Service: Orthopedics    MENISCECTOMY, KNEE, MEDIAL Right 2/9/2018    Procedure: MEDIAL AND LATERAL MENISCECTOMY- PARTIAL;  Surgeon: Harsh Baltazar M.D.;  Location: [facility];  Service: Orthopedics    KNEE ARTHROSCOPY Right 7/12/2017    Procedure: KNEE ARTHROSCOPY- CESPEDES;  Surgeon: Harsh Baltazar M.D.;  Location: [facility];  Service:     MENISCECTOMY, KNEE, MEDIAL Right 7/12/2017    Procedure: PARTIAL MEDIAL AND LATERAL MENISCECTOMY;  Surgeon: Harsh Baltazar M.D.;  Location: [facility];  Service:     SYNOVECTOMY Right 7/12/2017    Procedure: SYNOVECTOMY;  Surgeon: Harsh Baltazar M.D.;  Location: [facility];  Service:     KNEE ARTHROSCOPY  4/21/2015    Performed by Rufus Saba M.D. at [facility]    MENISCECTOMY, KNEE, MEDIAL  4/21/2015    Performed by Rufus Saba M.D. at [facility]    PUMP REVISION  12/10/2012    Performed by Mak Guerra,  M.D. at [facility]    SPINAL CORD STIMULATOR  5/30/2012    Performed by ALLISON CAM at [facility]    BIOPSY GENERAL  5/1/12    endometrial    SPINAL CORD STIMULATOR  7/11/2011    Performed by ALLISON CAM at [facility]    BLOCK EPIDURAL STEROID INJECTION  2008    x 3-4    LYMPH NODE EXCISION Left 2007    cervicle    OTHER Right 2001    thoracic discectomy      ARTHROSCOPY, KNEE Left 1999    RIB RESECTION Right 1980    LUMPECTOMY       Social History     Tobacco Use    Smoking status: Never    Smokeless tobacco: Never   Substance Use Topics    Alcohol use: Not Currently     Alcohol/week: 0.0 oz     Comment: 1 glass wine per month     Family and Occupational History     Socioeconomic History    Marital status: Single     Spouse name: Not on file    Number of children: Not on file    Years of education: Not on file    Highest education level: Associate degree: academic program   Occupational History    Not on file       Objective     Active Range of Motion   Left Ankle/Foot   Dorsiflexion (kf): 2 degrees   Inversion: 25 degrees   Eversion: 20 degrees     Right Ankle/Foot   Dorsiflexion (kf): 9 degrees   Inversion: 25 degrees   Eversion: 22 degrees     Tests   Left Ankle/Foot   Positive for anterior drawer and eversion talar tilt.     Swelling   Left Ankle/Foot   Figure 8: 53.2 cm  Malleoli: 26.7 cm    Right Ankle/Foot   Figure 8: 51.8 cm  Malleoli: 26 cm  Ambulation     Observational Gait   Decreased walking speed, stride length and left stance time.     Additional Observational Gait Details  Using FWW    General Comments     Ankle/Foot Comments   Vitals  BP = 150/86 mmHg (elevated)   HR = 96 bpm (elevated)  O2 = 96%      Aquatic Exercises (CPT 35659):     Aquatic Exercise Summary: Precautions: frequent falls, elevated resting BP and heart rate - assess vitals with rest and activity     HEP: ankle circles and ABCs  Time-based treatments/modalities:            Assessment, Response and Plan:   Impairments: abnormal ADL function, abnormal gait, abnormal or restricted ROM, activity intolerance, impaired balance and pain with function    Assessment details:  Patient is a 62 year old female with history of anxiety, frequent falls, multiple ort who presents to physical therapy with L ankle pain/ instability and L knee stiffness as a result of GLF in 05/2022. She demonstrates impaired knee and ankle ROM, ankle strength/ stability, balance, and gait. She would benefit from skilled therapy to address impairments and decrease risk of future falls.     Barriers to therapy:  None  Prognosis: good    Prognosis details:  Patient is motivated  Goals:   Short Term Goals:   Patient will demonstrate independence with HEP 5x/wk   Patient will demonstrate L ankle dorsiflexion equal to R to enhance gait and balance  Short term goal time span:  2-4 weeks      Long Term Goals:    Patient will demonstrate low fall risk per DGA   Patient will demonstrate low fall risk per TUG   Patient will be able to walk 20 minutes on level surfaces with SPC/ no AD for exercise benefits  Patient will demonstrate 9 point decrease (MCID) in LEFS score  Long term goal time span:  6-8 weeks    Plan:   Therapy options:  Physical therapy treatment to continue  Planned therapy interventions:  Neuromuscular Re-education (CPT 38797), Manual Therapy (CPT 32518), Therapeutic Exercise (CPT 99334) and Therapeutic Activities (CPT 95874)  Frequency:  2x week  Duration in visits:  12  Discussed with:  Patient  Plan details:      Knee and balance not assessed due to extra time to gather Subjective history. Will assess next visit    Functional Assessment Used  Lower Extremity Functional Scale Percentage: 37.5     Referring provider co-signature:  I have reviewed this plan of care and my co-signature certifies the need for services.    Certification Period: 09/22/2022 to  12/21/22    Physician Signature:  ________________________________ Date: ______________

## 2022-09-23 ENCOUNTER — OFFICE VISIT (OUTPATIENT)
Dept: MEDICAL GROUP | Facility: MEDICAL CENTER | Age: 62
End: 2022-09-23
Payer: COMMERCIAL

## 2022-09-23 VITALS
SYSTOLIC BLOOD PRESSURE: 130 MMHG | TEMPERATURE: 98.5 F | BODY MASS INDEX: 29.57 KG/M2 | HEIGHT: 66 IN | HEART RATE: 92 BPM | DIASTOLIC BLOOD PRESSURE: 84 MMHG | WEIGHT: 184 LBS | OXYGEN SATURATION: 97 %

## 2022-09-23 DIAGNOSIS — Z23 NEED FOR VACCINATION: ICD-10-CM

## 2022-09-23 DIAGNOSIS — I10 HTN (HYPERTENSION), BENIGN: Chronic | ICD-10-CM

## 2022-09-23 PROCEDURE — RXMED WILLOW AMBULATORY MEDICATION CHARGE: Performed by: PHYSICAL MEDICINE & REHABILITATION

## 2022-09-23 PROCEDURE — 99214 OFFICE O/P EST MOD 30 MIN: CPT | Performed by: NURSE PRACTITIONER

## 2022-09-23 ASSESSMENT — FIBROSIS 4 INDEX: FIB4 SCORE: 0.86

## 2022-09-23 NOTE — ASSESSMENT & PLAN NOTE
Chronic. Reduced lisinopril-HCTZ 20-12.5 mg to half tablet daily due to lower blood pressure readings and dizziness, blood pressure has increased to 130s-160s systolic but her dizziness has not improved. Due for labs, plans to do these tomorrow.

## 2022-09-23 NOTE — PROGRESS NOTES
Subjective:   Debby Desai is a 62 y.o. female here today for HTN and lab follow up, she did not do her labs    HTN (hypertension), benign  Chronic. Reduced lisinopril-HCTZ 20-12.5 mg to half tablet daily due to lower blood pressure readings and dizziness, blood pressure has increased to 130s-160s systolic but her dizziness has not improved. Due for labs, plans to do these tomorrow.           Current medicines (including changes today)  Current Outpatient Medications   Medication Sig Dispense Refill    metFORMIN ER (GLUCOPHAGE XR) 500 MG TABLET SR 24 HR Take 1 Tab by mouth 2 times a day. 180 Tablet 0    traMADol (ULTRAM) 50 MG Tab Take 1 Tablet by mouth every four hours as needed for Severe Pain for up to 30 days. 60 Tablet 0    [START ON 9/25/2022] traMADol (ULTRAM) 50 MG Tab Take 1 Tablet by mouth every four hours as needed for Severe Pain for up to 30 days. 60 Tablet 0    amitriptyline (ELAVIL) 50 MG Tab Take 1 Tablet by mouth every evening. 30 Tablet 5    buPROPion (WELLBUTRIN XL) 300 MG XL tablet Take 1 tablet by mouth every morning. 90 Tablet 2    venlafaxine (EFFEXOR-XR) 150 MG extended-release capsule Take 1 Capsule by mouth every day. (venlafaxine xr 150 mg + 75 mg = 225 mg daily) 90 Capsule 2    venlafaxine XR (EFFEXOR XR) 75 MG CAPSULE SR 24 HR Take 1 Capsule by mouth every day. (venlafaxine xr 150 mg + 75 mg = 225 mg daily) 90 Capsule 2    atorvastatin (LIPITOR) 20 MG Tab TAKE ONE TABLET BY MOUTH EVERY DAY 90 Tablet 0    lisinopril-hydrochlorothiazide (PRINZIDE) 20-12.5 MG per tablet Take one tablet by mouth daily 90 Tablet 0    amitriptyline (ELAVIL) 50 MG Tab Take 1 Tablet by mouth every evening. 30 Tablet 5    buPROPion (WELLBUTRIN XL) 300 MG XL tablet Take 1 tablet by mouth every morning. 90 Tablet 2    venlafaxine XR (EFFEXOR XR) 75 MG CAPSULE SR 24 HR Take 1 Capsule by mouth every day. (venlafaxine xr 150 mg + 75 mg = 225 mg daily) 90 Capsule 2    levothyroxine (SYNTHROID) 50 MCG Tab Take 1  "tablet by mouth Every morning on an empty stomach. 90 Tablet 3    ergocalciferol (DRISDOL) 77036 UNIT capsule Take 1 capsule by mouth every 7 days. 12 capsule 0    ibuprofen (MOTRIN) 200 MG Tab Take 600 mg by mouth every 6 hours as needed for Inflammation.      Ferrous Sulfate (IRON) 325 (65 Fe) MG Tab Take 65 mg by mouth every day at 6 PM.       No current facility-administered medications for this visit.     She  has a past medical history of Anemia, Anginal syndrome ([facility]), Anxiety, Arthritis, Chronic pain (6/2/2021), Dental disorder (5/24/12), Diabetes ([facility]), High cholesterol, HTN (hypertension), benign (3/19/2012), Hypothyroid (3/19/2012), Major depression (3/19/2012), Orbital floor (blow-out) closed fracture ([facility]), Other specified symptom associated with female genital organs, Pain, Pain, Pain (02/2018), Psychiatric problem, Unspecified disorder of thyroid, Urinary bladder disorder, and Urinary incontinence.    She has no past medical history of Breast cancer ([facility]).    ROS   No chest pain, no shortness of breath, no abdominal pain  Positive ROS as per HPI.  All other systems reviewed and are negative.     Objective:     /84 (BP Location: Right arm, Patient Position: Sitting, BP Cuff Size: Adult)   Pulse 92   Temp 36.9 °C (98.5 °F) (Temporal)   Ht 1.676 m (5' 6\")   Wt 83.5 kg (184 lb)   SpO2 97%  Body mass index is 29.7 kg/m².   Physical Exam:  Constitutional: Alert, no distress.  Skin: Warm, dry, good turgor, no rashes in visible areas.  Eye: Equal, round and reactive, conjunctiva clear, lids normal.  ENMT: Mask in place  Respiratory: Unlabored respiratory effort  Psych: Alert and oriented x3, normal affect and mood.        Assessment and Plan:   The following treatment plan was discussed    1. HTN (hypertension), benign  Unstable  Dizziness not improved with reduced dose of lisinopril-hctz, blood pressure now elevated 130-160 systolic  Advised her to start taking previous dose of medication and " continue to monitor BP at home, keep BP/HR and symptom log to review with cardiologist next week.     Followup: Return in about 4 weeks (around 10/21/2022) for Pap Smear, Lab Review.    The MA is performing the above orders under the direction of Dr. Tena    Please note that this dictation was created using voice recognition software. I have made every reasonable attempt to correct obvious errors, but I expect that there are errors of grammar and possibly content that I did not discover before finalizing the note.

## 2022-09-26 ENCOUNTER — PHARMACY VISIT (OUTPATIENT)
Dept: PHARMACY | Facility: MEDICAL CENTER | Age: 62
End: 2022-09-26
Payer: COMMERCIAL

## 2022-09-26 ENCOUNTER — HOSPITAL ENCOUNTER (OUTPATIENT)
Dept: LAB | Facility: MEDICAL CENTER | Age: 62
End: 2022-09-26
Attending: NURSE PRACTITIONER
Payer: COMMERCIAL

## 2022-09-26 DIAGNOSIS — Z00.00 ANNUAL PHYSICAL EXAM: ICD-10-CM

## 2022-09-26 DIAGNOSIS — E03.9 ACQUIRED HYPOTHYROIDISM: ICD-10-CM

## 2022-09-26 DIAGNOSIS — I10 HTN (HYPERTENSION), BENIGN: Chronic | ICD-10-CM

## 2022-09-26 DIAGNOSIS — R73.03 PREDIABETES: ICD-10-CM

## 2022-09-26 DIAGNOSIS — E78.5 DYSLIPIDEMIA: ICD-10-CM

## 2022-09-26 LAB
25(OH)D3 SERPL-MCNC: 27 NG/ML (ref 30–100)
ALBUMIN SERPL BCP-MCNC: 4.4 G/DL (ref 3.2–4.9)
ALBUMIN/GLOB SERPL: 1.5 G/DL
ALP SERPL-CCNC: 178 U/L (ref 30–99)
ALT SERPL-CCNC: 13 U/L (ref 2–50)
ANION GAP SERPL CALC-SCNC: 12 MMOL/L (ref 7–16)
AST SERPL-CCNC: 13 U/L (ref 12–45)
BASOPHILS # BLD AUTO: 0.5 % (ref 0–1.8)
BASOPHILS # BLD: 0.04 K/UL (ref 0–0.12)
BILIRUB SERPL-MCNC: 0.2 MG/DL (ref 0.1–1.5)
BUN SERPL-MCNC: 13 MG/DL (ref 8–22)
CALCIUM SERPL-MCNC: 9.4 MG/DL (ref 8.5–10.5)
CHLORIDE SERPL-SCNC: 98 MMOL/L (ref 96–112)
CHOLEST SERPL-MCNC: 162 MG/DL (ref 100–199)
CO2 SERPL-SCNC: 30 MMOL/L (ref 20–33)
CREAT SERPL-MCNC: 0.97 MG/DL (ref 0.5–1.4)
EOSINOPHIL # BLD AUTO: 0.2 K/UL (ref 0–0.51)
EOSINOPHIL NFR BLD: 2.4 % (ref 0–6.9)
ERYTHROCYTE [DISTWIDTH] IN BLOOD BY AUTOMATED COUNT: 48.9 FL (ref 35.9–50)
EST. AVERAGE GLUCOSE BLD GHB EST-MCNC: 114 MG/DL
GFR SERPLBLD CREATININE-BSD FMLA CKD-EPI: 65 ML/MIN/1.73 M 2
GLOBULIN SER CALC-MCNC: 3 G/DL (ref 1.9–3.5)
GLUCOSE SERPL-MCNC: 121 MG/DL (ref 65–99)
HBA1C MFR BLD: 5.6 % (ref 4–5.6)
HCT VFR BLD AUTO: 42.2 % (ref 37–47)
HDLC SERPL-MCNC: 82 MG/DL
HGB BLD-MCNC: 13.7 G/DL (ref 12–16)
IMM GRANULOCYTES # BLD AUTO: 0.03 K/UL (ref 0–0.11)
IMM GRANULOCYTES NFR BLD AUTO: 0.4 % (ref 0–0.9)
LDLC SERPL CALC-MCNC: 62 MG/DL
LYMPHOCYTES # BLD AUTO: 2.51 K/UL (ref 1–4.8)
LYMPHOCYTES NFR BLD: 29.5 % (ref 22–41)
MCH RBC QN AUTO: 31.3 PG (ref 27–33)
MCHC RBC AUTO-ENTMCNC: 32.5 G/DL (ref 33.6–35)
MCV RBC AUTO: 96.3 FL (ref 81.4–97.8)
MONOCYTES # BLD AUTO: 0.58 K/UL (ref 0–0.85)
MONOCYTES NFR BLD AUTO: 6.8 % (ref 0–13.4)
NEUTROPHILS # BLD AUTO: 5.14 K/UL (ref 2–7.15)
NEUTROPHILS NFR BLD: 60.4 % (ref 44–72)
NRBC # BLD AUTO: 0 K/UL
NRBC BLD-RTO: 0 /100 WBC
PLATELET # BLD AUTO: 497 K/UL (ref 164–446)
PMV BLD AUTO: 11.6 FL (ref 9–12.9)
POTASSIUM SERPL-SCNC: 4.3 MMOL/L (ref 3.6–5.5)
PROT SERPL-MCNC: 7.4 G/DL (ref 6–8.2)
RBC # BLD AUTO: 4.38 M/UL (ref 4.2–5.4)
SODIUM SERPL-SCNC: 140 MMOL/L (ref 135–145)
T4 FREE SERPL-MCNC: 1.09 NG/DL (ref 0.93–1.7)
TRIGL SERPL-MCNC: 90 MG/DL (ref 0–149)
TSH SERPL DL<=0.005 MIU/L-ACNC: 3.61 UIU/ML (ref 0.38–5.33)
WBC # BLD AUTO: 8.5 K/UL (ref 4.8–10.8)

## 2022-09-26 PROCEDURE — 80061 LIPID PANEL: CPT

## 2022-09-26 PROCEDURE — 80053 COMPREHEN METABOLIC PANEL: CPT

## 2022-09-26 PROCEDURE — 82306 VITAMIN D 25 HYDROXY: CPT

## 2022-09-26 PROCEDURE — 85025 COMPLETE CBC W/AUTO DIFF WBC: CPT

## 2022-09-26 PROCEDURE — 84443 ASSAY THYROID STIM HORMONE: CPT

## 2022-09-26 PROCEDURE — 83036 HEMOGLOBIN GLYCOSYLATED A1C: CPT

## 2022-09-26 PROCEDURE — 36415 COLL VENOUS BLD VENIPUNCTURE: CPT

## 2022-09-26 PROCEDURE — 84439 ASSAY OF FREE THYROXINE: CPT

## 2022-09-27 ENCOUNTER — PHYSICAL THERAPY (OUTPATIENT)
Dept: PHYSICAL THERAPY | Facility: MEDICAL CENTER | Age: 62
End: 2022-09-27
Attending: NURSE PRACTITIONER
Payer: COMMERCIAL

## 2022-09-27 DIAGNOSIS — M17.12 PRIMARY OSTEOARTHRITIS OF LEFT KNEE: ICD-10-CM

## 2022-09-27 DIAGNOSIS — S93.402A SPRAIN OF UNSPECIFIED LIGAMENT OF LEFT ANKLE, INITIAL ENCOUNTER: ICD-10-CM

## 2022-09-27 PROCEDURE — 97110 THERAPEUTIC EXERCISES: CPT

## 2022-09-27 PROCEDURE — 97140 MANUAL THERAPY 1/> REGIONS: CPT

## 2022-09-27 NOTE — OP THERAPY DAILY TREATMENT
"  Outpatient Physical Therapy  DAILY TREATMENT     [facility] Outpatient Physical Therapy  [address]  [place] [address]  Phone:  [phone]  Fax:  [phone]    Date: 09/27/2022    Patient: Debby Desai  YOB: 1960  MRN: [id number]     Time Calculation    Start time: 0915  Stop time: 1000 Time Calculation (min): 45 minutes         Chief Complaint: Ankle Problem, Knee Problem, and Difficulty Walking    Visit #: 2    SUBJECTIVE: Patient reports she uses FWW out in community and no AD at home.     OBJECTIVE:  Current objective measures:       R knee AROM: -11 - 124 deg  L knee AROM: - 11 - 131 deg (pulling in calf with extension)     TTP R medial knee, pain and lateral knee \"clicking\" with L Javi  Aquatic Exercises (CPT 47099):     2. NuStep, 5 min, L1    3. quad sets, 10x (L), LE over pillow    4. long sit hamstring stretch, 3 x 30 sec B    5. supine clams, 15x 5 sec hold (pink)    6. supine hamstring stretch, 2 x 20 sec (w/ strap)    7. straight leg raise, 2 x 10 (L)    Aquatic Exercise Summary: Precautions: frequent falls, elevated resting BP and heart rate - assess vitals with rest and activity     HEP: ankle circles and ABCs  Access Code: [email or web address]  URL: [email or web address]  Date: 09/27/2022  Prepared by: Neida Norwegian    Exercises  Supine Quad Set - 2 x daily - 2 sets - 10 reps - 5 second hold  Hooklying Clamshell with Resistance - 1 x daily - 2 sets - 10 reps - 5 second hold  Supine Active Straight Leg Raise - 1 x daily - 2 sets - 10 reps  Seated Table Hamstring Stretch - 1 x daily - 3 sets - 30 second hold    Therapeutic Treatments and Modalities:     1. Manual Therapy (CPT 52566)    Therapeutic Treatment and Modalities Summary: Manual: L patellar mobs, PROM knee flexion/ extension // inc medial knee pain with end range extension    Time-based treatments/modalities:    Physical Therapy Timed Treatment Charges  Manual therapy minutes (CPT " 45074): 15 minutes  Therapeutic exercise minutes (CPT 65099): 30 minutes  ASSESSMENT:   Response to treatment: Bilat knee extension is limited and she has hamstring/ gastroc tightness. Added stretches to address this to enhance knee extension for gait. She had pain with L Javi but this could also be due to presence of OA. Continue to progress lower body/ankle strengthening and balance as tolerated    PLAN/RECOMMENDATIONS:   Plan for treatment: therapy treatment to continue next visit.  Planned interventions for next visit: continue with current treatment.

## 2022-09-28 ENCOUNTER — OFFICE VISIT (OUTPATIENT)
Dept: CARDIOLOGY | Facility: MEDICAL CENTER | Age: 62
End: 2022-09-28
Payer: COMMERCIAL

## 2022-09-28 VITALS
OXYGEN SATURATION: 94 % | RESPIRATION RATE: 18 BRPM | HEART RATE: 88 BPM | BODY MASS INDEX: 29.65 KG/M2 | WEIGHT: 184.5 LBS | DIASTOLIC BLOOD PRESSURE: 74 MMHG | HEIGHT: 66 IN | SYSTOLIC BLOOD PRESSURE: 110 MMHG

## 2022-09-28 DIAGNOSIS — R55 SYNCOPE AND COLLAPSE: ICD-10-CM

## 2022-09-28 DIAGNOSIS — R94.31 ABNORMAL ECG: ICD-10-CM

## 2022-09-28 DIAGNOSIS — E78.5 DYSLIPIDEMIA: ICD-10-CM

## 2022-09-28 DIAGNOSIS — I10 ESSENTIAL HYPERTENSION: ICD-10-CM

## 2022-09-28 PROCEDURE — 99244 OFF/OP CNSLTJ NEW/EST MOD 40: CPT | Performed by: INTERNAL MEDICINE

## 2022-09-28 ASSESSMENT — ENCOUNTER SYMPTOMS
COUGH: 0
FEVER: 0
HEADACHES: 0
DIAPHORESIS: 0
TREMORS: 1
PND: 0
BLOATING: 0
WHEEZING: 0
MYALGIAS: 0
IRREGULAR HEARTBEAT: 0
DIZZINESS: 0
CONSTIPATION: 0
WEAKNESS: 0
LOSS OF BALANCE: 0
BLURRED VISION: 0
NUMBNESS: 0
BACK PAIN: 0
DECREASED APPETITE: 0
NEAR-SYNCOPE: 1
DIARRHEA: 0
SLEEP DISTURBANCES DUE TO BREATHING: 0
EXCESSIVE DAYTIME SLEEPINESS: 0
LIGHT-HEADEDNESS: 0
SYNCOPE: 1
ORTHOPNEA: 0
VOMITING: 0
PARESTHESIAS: 0
DYSPNEA ON EXERTION: 0
DOUBLE VISION: 0
NAUSEA: 0
FLANK PAIN: 0
NIGHT SWEATS: 0
FALLS: 1
PALPITATIONS: 0
SHORTNESS OF BREATH: 0
SORE THROAT: 0

## 2022-09-28 ASSESSMENT — FIBROSIS 4 INDEX: FIB4 SCORE: 0.46

## 2022-09-28 NOTE — PROGRESS NOTES
Cardiology Initial Consultation Note    Date of note:    9/28/2022    Primary Care Provider: ARELIS Chris  Referring Provider: Jeanne Silva P.A.-C.     Patient Name: Debby Desai   YOB: 1960  MRN:              [id number]    Chief Complaint   Patient presents with    Hypertension     NP Dx: Essential hypertension    Syncope     NP Dx: Near syncope      Dyslipidemia       History of Present Illness: Ms. Debby Desai is a 62 y.o. female whose current medical problems include HTN and HLD who is here for cardiac consultation for syncope and near syncope.    Patient states that since April 2022 she has been having episodes of near syncope and has passed out.  Usually exacerbated by positional change from sitting to standing position.  Has suffered bone fractures due to that.  During her last episode, she got up and was walking with her walker where she had acute onset lightheadedness, tunnel vision, shaky legs and arms followed by a syncopal event.  Fortunately, she did not suffer any injuries.  Saw neurology, Dr. Prasad, who recommended cardiology evaluation.    Cardiovascular Risk Factors:  1. Smoking status: Never smoker  2. Type II Diabetes Mellitus: no   Lab Results   Component Value Date/Time    HBA1C 5.6 09/26/2022 07:34 AM    HBA1C 5.8 (H) 06/05/2021 07:42 AM     3. Hypertension: Yes  4. Dyslipidemia: Yes   Cholesterol,Tot   Date Value Ref Range Status   09/26/2022 162 100 - 199 mg/dL Final     LDL   Date Value Ref Range Status   09/26/2022 62 <100 mg/dL Final     HDL   Date Value Ref Range Status   09/26/2022 82 >=40 mg/dL Final     Triglycerides   Date Value Ref Range Status   09/26/2022 90 0 - 149 mg/dL Final     5. Family history of early Coronary Artery Disease: Father had a heart attack in his 80s  6.  Obesity and/or Metabolic Syndrome: No  7. Sedentary lifestyle: Yes      Review of Systems   Constitutional: Negative for decreased appetite, diaphoresis, fever,  malaise/fatigue and night sweats.   HENT:  Negative for congestion and sore throat.    Eyes:  Negative for blurred vision and double vision.   Cardiovascular:  Positive for near-syncope and syncope. Negative for chest pain, cyanosis, dyspnea on exertion, irregular heartbeat, leg swelling, orthopnea, palpitations and paroxysmal nocturnal dyspnea.   Respiratory:  Negative for cough, shortness of breath, sleep disturbances due to breathing and wheezing.    Endocrine: Negative for cold intolerance and heat intolerance.   Musculoskeletal:  Positive for falls. Negative for back pain and myalgias.   Gastrointestinal:  Negative for bloating, constipation, diarrhea, nausea and vomiting.   Genitourinary:  Negative for dysuria and flank pain.   Neurological:  Positive for tremors. Negative for excessive daytime sleepiness, dizziness, headaches, light-headedness, loss of balance, numbness, paresthesias and weakness.       Past Medical History:   Diagnosis Date    Anemia     in past    Anginal syndrome (HCC)     had work up and feet r/t anxiety    Anxiety     Arthritis     OA knees    Chronic pain 6/2/2021    Dental disorder 5/24/12    partial upper denture    Diabetes (HCC)     pre diabetic    High cholesterol     HTN (hypertension), benign 3/19/2012    Hypothyroid 3/19/2012    Major depression 3/19/2012    Orbital floor (blow-out) closed fracture (HCC)     left    Other specified symptom associated with female genital organs     post menopausal bleeding    Pain     thoracic spine, Right knee, myofacial pain, and Right shoulder    Pain     recently fell and has bruise left forehead    Pain 02/2018    chronic back pain, right shoulder    Psychiatric problem     depression, anxiety    Unspecified disorder of thyroid     Urinary bladder disorder     stress incont.    Urinary incontinence     Occasional         Past Surgical History:   Procedure Laterality Date    INJ,EPI ANES/STER LUM/SAC ADDL Right 4/7/2021    Procedure: RIGHT  lumbar five and sacral one transforaminal epidural;  Surgeon: Volodymyr Herring M.D.;  [facility];  Service: Pain Management    PB TOTAL KNEE ARTHROPLASTY Right 9/16/2019    Procedure: RIGHT ARTHROPLASTY, KNEE, TOTAL;  Surgeon: Rufus Saba M.D.;  Location: [facility];  Service: Orthopedics    KNEE ARTHROSCOPY Right 2/9/2018    Procedure: KNEE ARTHROSCOPY;  Surgeon: Harsh Baltazar M.D.;  Location: [facility];  Service: Orthopedics    MENISCECTOMY, KNEE, MEDIAL Right 2/9/2018    Procedure: MEDIAL AND LATERAL MENISCECTOMY- PARTIAL;  Surgeon: Harsh Baltazar M.D.;  Location: [facility];  Service: Orthopedics    KNEE ARTHROSCOPY Right 7/12/2017    Procedure: KNEE ARTHROSCOPY- CESPEDES;  Surgeon: Harsh Baltazar M.D.;  Location: [facility];  Service:     MENISCECTOMY, KNEE, MEDIAL Right 7/12/2017    Procedure: PARTIAL MEDIAL AND LATERAL MENISCECTOMY;  Surgeon: Harsh Baltazar M.D.;  Location: [facility];  Service:     SYNOVECTOMY Right 7/12/2017    Procedure: SYNOVECTOMY;  Surgeon: Harsh Baltazar M.D.;  Location: [facility];  Service:     KNEE ARTHROSCOPY  4/21/2015    Performed by Rufus Saba M.D. at [facility]    MENISCECTOMY, KNEE, MEDIAL  4/21/2015    Performed by Rufus Saba M.D. at [facility]    PUMP REVISION  12/10/2012    Performed by Allison Guerra M.D. at [facility]    SPINAL CORD STIMULATOR  5/30/2012    Performed by ALLISON GUERRA at [facility]    BIOPSY GENERAL  5/1/12    endometrial    SPINAL CORD STIMULATOR  7/11/2011    Performed by ALLISON GUERRA at [facility]    BLOCK EPIDURAL STEROID INJECTION  2008    x 3-4    LYMPH NODE EXCISION Left 2007    cervicle    OTHER Right 2001    thoracic discectomy      ARTHROSCOPY, KNEE Left 1999    RIB RESECTION Right 1980    LUMPECTOMY    "        Current Outpatient Medications   Medication Sig Dispense Refill    VITAMIN D PO Take  by mouth every day.      metFORMIN ER (GLUCOPHAGE XR) 500 MG TABLET SR 24 HR Take 1 Tab by mouth 2 times a day. 180 Tablet 0    traMADol (ULTRAM) 50 MG Tab Take 1 Tablet by mouth every four hours as needed for Severe Pain for up to 30 days. (Patient taking differently: Take 50 mg by mouth 2 times daily with meals as needed for Severe Pain.) 60 Tablet 0    venlafaxine (EFFEXOR-XR) 150 MG extended-release capsule Take 1 Capsule by mouth every day. (venlafaxine xr 150 mg + 75 mg = 225 mg daily) 90 Capsule 2    atorvastatin (LIPITOR) 20 MG Tab TAKE ONE TABLET BY MOUTH EVERY DAY 90 Tablet 0    lisinopril-hydrochlorothiazide (PRINZIDE) 20-12.5 MG per tablet Take one tablet by mouth daily 90 Tablet 0    amitriptyline (ELAVIL) 50 MG Tab Take 1 Tablet by mouth every evening. 30 Tablet 5    buPROPion (WELLBUTRIN XL) 300 MG XL tablet Take 1 tablet by mouth every morning. 90 Tablet 2    venlafaxine XR (EFFEXOR XR) 75 MG CAPSULE SR 24 HR Take 1 Capsule by mouth every day. (venlafaxine xr 150 mg + 75 mg = 225 mg daily) 90 Capsule 2    levothyroxine (SYNTHROID) 50 MCG Tab Take 1 tablet by mouth Every morning on an empty stomach. 90 Tablet 3    ibuprofen (MOTRIN) 200 MG Tab Take 600 mg by mouth every 6 hours as needed for Inflammation.      Ferrous Sulfate (IRON) 325 (65 Fe) MG Tab Take 65 mg by mouth every day at 6 PM.       No current facility-administered medications for this visit.         Allergies   Allergen Reactions    Sulfamethoxazole-Trimethoprim Rash and Swelling     Pt states \"My hand swells up and rash all over body\".         Family History   Problem Relation Age of Onset    Hypertension Father     Diabetes Father     Heart Disease Father     Alcohol abuse Father     Anesthesia Sister         slow to come out (as a child)    Diabetes Other     Heart Disease Other     Cancer Other     Hypertension Other     Stroke Other     " Lung Disease Other          Social History     Socioeconomic History    Marital status: Single     Spouse name: Not on file    Number of children: Not on file    Years of education: Not on file    Highest education level: Associate degree: academic program   Occupational History    Not on file   Tobacco Use    Smoking status: Never    Smokeless tobacco: Never   Vaping Use    Vaping Use: Never used   Substance and Sexual Activity    Alcohol use: Not Currently    Drug use: No    Sexual activity: Never     Partners: Male   Other Topics Concern     Service No    Blood Transfusions No    Caffeine Concern No    Occupational Exposure No    Hobby Hazards No    Sleep Concern Yes    Stress Concern Yes    Weight Concern Yes    Special Diet No    Back Care No    Exercise No    Bike Helmet No    Seat Belt Yes    Self-Exams No   Social History Narrative    Not on file     Social Determinants of Health     Financial Resource Strain: Low Risk     Difficulty of Paying Living Expenses: Not hard at all   Food Insecurity: No Food Insecurity    Worried About Running Out of Food in the Last Year: Never true    Ran Out of Food in the Last Year: Never true   Transportation Needs: No Transportation Needs    Lack of Transportation (Medical): No    Lack of Transportation (Non-Medical): No   Physical Activity: Inactive    Days of Exercise per Week: 0 days    Minutes of Exercise per Session: 0 min   Stress: Stress Concern Present    Feeling of Stress : Rather much   Social Connections: Socially Isolated    Frequency of Communication with Friends and Family: More than three times a week    Frequency of Social Gatherings with Friends and Family: Once a week    Attends Oriental orthodox Services: Never    Active Member of Clubs or Organizations: No    Attends Club or Organization Meetings: Never    Marital Status: Never    Intimate Partner Violence: Not on file   Housing Stability: Low Risk     Unable to Pay for Housing in the Last Year: No  "   Number of Places Lived in the Last Year: 1    Unstable Housing in the Last Year: No         Physical Exam:  Ambulatory Vitals  /74 (BP Location: Left arm, Patient Position: Sitting, BP Cuff Size: Adult)   Pulse 88   Resp 18   Ht 1.676 m (5' 6\")   Wt 83.7 kg (184 lb 8 oz)   SpO2 94%    Oxygen Therapy:  Pulse Oximetry: 94 %  BP Readings from Last 4 Encounters:   09/28/22 110/74   09/23/22 130/84   08/18/22 104/70   08/17/22 128/80       Weight/BMI: Body mass index is 29.78 kg/m².  Wt Readings from Last 4 Encounters:   09/28/22 83.7 kg (184 lb 8 oz)   09/23/22 83.5 kg (184 lb)   08/18/22 85.2 kg (187 lb 12.8 oz)   08/17/22 86.2 kg (190 lb)         General: Well appearing and in no apparent distress  Eyes: nl conjunctiva, no icteric sclera  ENT: wearing a mask, normal external appearance of ears  Neck: no visible JVP,  no carotid bruits  Lungs: normal respiratory effort, CTAB  Heart: RRR, systolic murmurs, no rubs or gallops,  no edema bilateral lower extremities. No LV/RV heave on cardiac palpatation. 2+ bilateral radial pulses.  2+ bilateral dp pulses.   Abdomen: soft, non tender, non distended, no masses, normal bowel sounds.  No HSM.  Extremities/MSK: no clubbing, no cyanosis  Neurological: No focal sensory deficits  Psychiatric: Appropriate affect, A/O x 3, intact judgement and insight  Skin: Warm extremities      Lab Data Review:  Lab Results   Component Value Date/Time    CHOLSTRLTOT 162 09/26/2022 07:34 AM    LDL 62 09/26/2022 07:34 AM    HDL 82 09/26/2022 07:34 AM    TRIGLYCERIDE 90 09/26/2022 07:34 AM       Lab Results   Component Value Date/Time    SODIUM 140 09/26/2022 07:34 AM    POTASSIUM 4.3 09/26/2022 07:34 AM    CHLORIDE 98 09/26/2022 07:34 AM    CO2 30 09/26/2022 07:34 AM    GLUCOSE 121 (H) 09/26/2022 07:34 AM    BUN 13 09/26/2022 07:34 AM    CREATININE 0.97 09/26/2022 07:34 AM     Lab Results   Component Value Date/Time    ALKPHOSPHAT 178 (H) 09/26/2022 07:34 AM    ASTSGOT 13 09/26/2022 " 07:34 AM    ALTSGPT 13 09/26/2022 07:34 AM    TBILIRUBIN 0.2 09/26/2022 07:34 AM      Lab Results   Component Value Date/Time    WBC 8.5 09/26/2022 07:34 AM     Lab Results   Component Value Date/Time    HBA1C 5.6 09/26/2022 07:34 AM    HBA1C 5.8 (H) 06/05/2021 07:42 AM         Cardiac Imaging and Procedures Review:    EKG dated 7/8/2021: My personal interpretation is sinus bradycardia    Echo dated 5/25/2016:   Normal echo, no valvular heart disease    Nuclear Perfusion Imaging (9/28/2018):   Negative for ischemia or infarction  Negative stress ECG for ischemia      Radiology test Review:  CTA Chest (7/8/2022):   IMPRESSION:  1.  No CT evidence for pulmonary emboli.  2.  No pneumonia or pneumothorax.      Assessment & Plan     1. Essential hypertension  EC-ECHOCARDIOGRAM COMPLETE W/O CONT      2. Syncope and collapse  EC-ECHOCARDIOGRAM COMPLETE W/O CONT      3. Abnormal ECG  EC-ECHOCARDIOGRAM COMPLETE W/O CONT      4. Dyslipidemia              Shared Medical Decision Making:  We discussed that her ongoing episodes of dizziness/lightheadedness and syncope are likely from orthostatic hypotension versus vasovagal syncope.  We discussed conservative maneuvers of getting of slowly, pumping calves for changing position and/or wearing compression socks.  Is currently on Prinzide 20-12.5 mg daily.  We discussed doing half a tablet twice daily and see if that helps with her ongoing symptoms.    Systolic murmur heard on physical exam today.  Obtain transthoracic echocardiogram to evaluate underlying cardiac structure and function.  Rule out structural or valvular abnormality contributing to her ongoing symptoms.    Lipid panel reviewed with LDL within goal.  Continue Lipitor 20 mg daily.      All of patient's excellent questions were answered to the best of my knowledge and to her satisfaction.  It was a pleasure seeing Ms. Debby Desai in my clinic today. RTC if abnormal test results otherwise as needed. Patient is aware to  call the cardiology clinic with any questions or concerns.      Jose Shin MD  [facility]  [facility], [facility]  [address], [place] [address]  [place] [address]  Phone: [phone]  Fax: [phone]    Please note that this dictation was created using voice recognition software. I have made every reasonable attempt to correct obvious errors, but it is possible there are errors of grammar and possibly content that I did not discover before finalizing the note.

## 2022-09-29 ENCOUNTER — PHYSICAL THERAPY (OUTPATIENT)
Dept: PHYSICAL THERAPY | Facility: MEDICAL CENTER | Age: 62
End: 2022-09-29
Attending: NURSE PRACTITIONER
Payer: COMMERCIAL

## 2022-09-29 DIAGNOSIS — M17.12 PRIMARY OSTEOARTHRITIS OF LEFT KNEE: ICD-10-CM

## 2022-09-29 PROCEDURE — 97110 THERAPEUTIC EXERCISES: CPT

## 2022-09-29 NOTE — OP THERAPY DAILY TREATMENT
Outpatient Physical Therapy  DAILY TREATMENT     [facility] Outpatient Physical Therapy  [address]  [place] [address]  Phone:  [phone]  Fax:  [phone]    Date: 09/29/2022    Patient: Debby Desai  YOB: 1960  MRN: [id number]     Time Calculation    Start time: 0920  Stop time: 1000 Time Calculation (min): 40 minutes         Chief Complaint: Difficulty Walking, Knee Problem, and Ankle Problem    Visit #: 9    SUBJECTIVE: Patient reports 4/10 L medial knee pain on presentation. Saw cardiologist yesterday and per he reports, she was diagnosed with vasovagal syncope. MD advised her to get up slowly when getting up from sitting and to lay down/ do ankle pumps when she has dizziness.     OBJECTIVE:  Current objective measures:   NA  Aquatic Exercises (CPT 25763):     2. NuStep, 6 min, L2, inc resistance today    3. quad sets, 10x (L), LE over pillow    4. long sit hamstring stretch, 3 x 30 sec B    5. supine clams, 15x 5 sec hold (pink)    6. supine hamstring stretch, 2 x 20 sec (w/ strap)    7. straight leg raise, 3 x 5 (L), L quad/ hip fatigue    8. hooklying TVA, 2 x 10 5 sec hold    9. sit to stands, 2 x 10, initially provided cues to increase hip hinge/ fwd trunk lean    10. bridge w/ hip abd, 3 x 5 reps    Aquatic Exercise Summary: Precautions: frequent falls, elevated resting BP and heart rate - assess vitals with rest and activity     HEP: ankle circles and ABCs  Access Code: [email or web address]  URL: [email or web address]  Date: 09/27/2022  Prepared by: Neida Norwegian    Exercises  Supine Quad Set - 2 x daily - 2 sets - 10 reps - 5 second hold  Hooklying Clamshell with Resistance - 1 x daily - 2 sets - 10 reps - 5 second hold  Supine Active Straight Leg Raise - 1 x daily - 3 x 5 reps   Seated Table Hamstring Stretch - 1 x daily - 3 sets - 30 second hold    Access Code: [email or web address]  URL: [email or web address]  Date: 09/29/2022  Prepared by:  Neida Cook Islander    Exercises  Sit to Stand Without Arm Support - 6 x weekly - 2 sets - 10 reps  Supine Bridge with Resistance Band - 6 x weekly - 3 sets - 5 reps - 5 second hold      Time-based treatments/modalities:    Physical Therapy Timed Treatment Charges  Therapeutic exercise minutes (CPT 11896): 40 minutes  ASSESSMENT:   Response to treatment: Impaired L LE strength and core stability addressed today. Added sit to stands to HEP for leg strengthening. Continue to address L LE strength to improve gait and balance. L ankle not addressed this visit.     PLAN/RECOMMENDATIONS:   Plan for treatment: therapy treatment to continue next visit.  Planned interventions for next visit: continue with current treatment.

## 2022-10-04 ENCOUNTER — PHYSICAL THERAPY (OUTPATIENT)
Dept: PHYSICAL THERAPY | Facility: MEDICAL CENTER | Age: 62
End: 2022-10-04
Attending: NURSE PRACTITIONER
Payer: COMMERCIAL

## 2022-10-04 DIAGNOSIS — M17.12 PRIMARY OSTEOARTHRITIS OF LEFT KNEE: ICD-10-CM

## 2022-10-04 DIAGNOSIS — S93.402A SPRAIN OF UNSPECIFIED LIGAMENT OF LEFT ANKLE, INITIAL ENCOUNTER: ICD-10-CM

## 2022-10-04 PROCEDURE — 97110 THERAPEUTIC EXERCISES: CPT

## 2022-10-04 NOTE — OP THERAPY DAILY TREATMENT
Outpatient Physical Therapy  DAILY TREATMENT     [facility] Outpatient Physical Therapy  [address]  [place] [address]  Phone:  [phone]  Fax:  [phone]    Date: 10/04/2022    Patient: Debby Desai  YOB: 1960  MRN: [id number]     Time Calculation    Start time: 0915  Stop time: 1000 Time Calculation (min): 45 minutes         Chief Complaint: Ankle Problem and Knee Problem    Visit #: 10    SUBJECTIVE: Patient reports dizziness with position changes is really disrupting her day. She's also having new symptom of L low/back buttock spasms with sleeping on L side, transitions, and walking.     OBJECTIVE:  Current objective measures:     Vitals   Supine  O2 = 87% on Ra  Hr = 47-75 bpm  BP = 100/ 62 mmHg (R arm) and pt reports feeling tunnel vision symptoms   BP = 110/74 mmHg (after 2 min of supine)     Seated  BP = 90/64 mmHg and pt symptomatic   Bp = 110/72 mmHg (after 2 min of sitting)    Standing  Unable to obtain immediate BP reading w/ manual cuff while pt was symptomatic  111/59 w/ automatic cuff R arm (after 5 min standing)  HR = 107-108 bpm  O2 = 97%       Aquatic Exercises (CPT 61190):     2. NuStep, 7 min, L4    3. quad sets, 10x (L), NT    4. long sit hamstring stretch, 3 x 30 sec B, NT    5. supine clams, 15x 5 sec hold (pink), NT    6. supine hamstring stretch, 2 x 20 sec (w/ strap), NT    7. straight leg raise, 3 x 5 (L), NT    8. hooklying TVA, 2 x 10 5 sec hold, NT    9. sit to stands, 2 x 10, NT    10. bridge w/ hip abd, 3 x 5 reps, NT    11. seated trunk flexion stretch , 2 x 30 sec    Aquatic Exercise Summary: Precautions: frequent falls, elevated resting BP and heart rate - assess vitals with rest and activity     HEP: ankle circles and ABCs  Access Code: [email or web address]  URL: [email or web address]  Date: 09/27/2022  Prepared by: Neida Slovak    Exercises  Supine Quad Set - 2 x daily - 2 sets - 10 reps - 5 second hold  Hooklying  Clamshell with Resistance - 1 x daily - 2 sets - 10 reps - 5 second hold  Supine Active Straight Leg Raise - 1 x daily - 3 x 5 reps   Seated Table Hamstring Stretch - 1 x daily - 3 sets - 30 second hold    Access Code: [email or web address]  URL: [email or web address]  Date: 09/29/2022  Prepared by: Neida Georgian    Exercises  Sit to Stand Without Arm Support - 6 x weekly - 2 sets - 10 reps  Supine Bridge with Resistance Band - 6 x weekly - 3 sets - 5 reps - 5 second hold  Seated trunk flexion stretch 2 x 30 sec daily      Time-based treatments/modalities:    Physical Therapy Timed Treatment Charges  Therapeutic exercise minutes (CPT 27255): 45 minutes  ASSESSMENT:   Response to treatment: Patient had persistent tunnel vision/ woosy symptoms with transitions today. Assessed orthostatic vitals with drop in BP present along with symptoms with immediate transitions from supine <-> sitting. Unable to obtain BP on standing but patient did report symptoms. It is taking her body about 2 min to reach homeostatic BP with transitions, encouraged her to allow extra time for symptoms to subside with movement. Other concerns with vitals this session are 1) significant drop in O2 sats (< 90%) with lying down,  2) heart rate variability with supine (varying from 45-75 bpm while in this position), and 3) increase in heart rate to 107-108 bpm with she was standing. Provided her also with sugary snack at end of session just in case she was having drop in blood sugar. Recommend holding therapy until her symptoms/ vitals stabilize.     PLAN/RECOMMENDATIONS:   Plan for treatment: therapy treatment to continue next visit.  Planned interventions for next visit: continue with current treatment.

## 2022-10-06 ENCOUNTER — APPOINTMENT (OUTPATIENT)
Dept: PHYSICAL THERAPY | Facility: MEDICAL CENTER | Age: 62
End: 2022-10-06
Attending: NURSE PRACTITIONER
Payer: COMMERCIAL

## 2022-10-11 ENCOUNTER — APPOINTMENT (OUTPATIENT)
Dept: PHYSICAL THERAPY | Facility: MEDICAL CENTER | Age: 62
End: 2022-10-11
Attending: NURSE PRACTITIONER
Payer: COMMERCIAL

## 2022-10-12 ENCOUNTER — PATIENT MESSAGE (OUTPATIENT)
Dept: HEALTH INFORMATION MANAGEMENT | Facility: OTHER | Age: 62
End: 2022-10-12

## 2022-10-13 ENCOUNTER — APPOINTMENT (OUTPATIENT)
Dept: PHYSICAL THERAPY | Facility: MEDICAL CENTER | Age: 62
End: 2022-10-13
Attending: NURSE PRACTITIONER
Payer: COMMERCIAL

## 2022-10-14 ENCOUNTER — OFFICE VISIT (OUTPATIENT)
Dept: PHYSICAL MEDICINE AND REHAB | Facility: MEDICAL CENTER | Age: 62
End: 2022-10-14
Payer: COMMERCIAL

## 2022-10-14 VITALS
SYSTOLIC BLOOD PRESSURE: 108 MMHG | HEIGHT: 67 IN | HEART RATE: 83 BPM | TEMPERATURE: 98.1 F | BODY MASS INDEX: 27.89 KG/M2 | OXYGEN SATURATION: 95 % | DIASTOLIC BLOOD PRESSURE: 64 MMHG | WEIGHT: 177.7 LBS | RESPIRATION RATE: 16 BRPM

## 2022-10-14 DIAGNOSIS — M43.17 SPONDYLOLISTHESIS AT L5-S1 LEVEL: ICD-10-CM

## 2022-10-14 DIAGNOSIS — M54.16 LUMBAR RADICULOPATHY: ICD-10-CM

## 2022-10-14 DIAGNOSIS — Z96.651 S/P TKR (TOTAL KNEE REPLACEMENT), RIGHT: ICD-10-CM

## 2022-10-14 DIAGNOSIS — Z91.81 RISK FOR FALLS: ICD-10-CM

## 2022-10-14 DIAGNOSIS — M17.12 PRIMARY OSTEOARTHRITIS OF LEFT KNEE: ICD-10-CM

## 2022-10-14 PROCEDURE — 99214 OFFICE O/P EST MOD 30 MIN: CPT | Performed by: PHYSICAL MEDICINE & REHABILITATION

## 2022-10-14 PROCEDURE — RXMED WILLOW AMBULATORY MEDICATION CHARGE: Performed by: PHYSICAL MEDICINE & REHABILITATION

## 2022-10-14 RX ORDER — TRAMADOL HYDROCHLORIDE 50 MG/1
50 TABLET ORAL 2 TIMES DAILY PRN
Qty: 60 TABLET | Refills: 0 | Status: SHIPPED | OUTPATIENT
Start: 2022-11-22 | End: 2022-12-14

## 2022-10-14 RX ORDER — TRAMADOL HYDROCHLORIDE 50 MG/1
50 TABLET ORAL 2 TIMES DAILY PRN
Qty: 60 TABLET | Refills: 0 | Status: SHIPPED | OUTPATIENT
Start: 2022-10-23 | End: 2022-12-14 | Stop reason: SDUPTHER

## 2022-10-14 ASSESSMENT — FIBROSIS 4 INDEX: FIB4 SCORE: 0.46

## 2022-10-14 ASSESSMENT — PATIENT HEALTH QUESTIONNAIRE - PHQ9
SUM OF ALL RESPONSES TO PHQ QUESTIONS 1-9: 7
5. POOR APPETITE OR OVEREATING: 0 - NOT AT ALL
CLINICAL INTERPRETATION OF PHQ2 SCORE: 3

## 2022-10-14 ASSESSMENT — PAIN SCALES - GENERAL: PAINLEVEL: 5=MODERATE PAIN

## 2022-10-14 NOTE — Clinical Note
Wondering about elavil and her orthostatic hypotension.  Could this be titrated down?  Unfortunately, she feels that this has been very beneficial.  Wondering if desipramine or nortriptyline could be an alternative?  Less anticholinergic.  Thank you for your consideration.

## 2022-10-14 NOTE — PROGRESS NOTES
Follow-up patient note    Physiatry (physical medicine and  Rehabilitation), interventional spine and sports medicine    Date of Service: October 14, 2022    Chief complaint:   Chief Complaint   Patient presents with    Follow-Up     Primary osteoarthritis of left knee          HISTORY    HPI: Debby Desai 62 y.o. female who presents today for follow-up evaluation of her pain complaints related to lumbar radiculopathy and pain after right total knee replacement.    From what she reports, she has been healing from her falls.  Reportedly saw Cardiology.  She is now taking lisinopril at night, having vasovagal syncope vs. Orthostatic hypotension episodes according to Cardiology.    Reports that she has pain 5/10 on the NRS bilaterally that is primarily aching in her knees bilaterally today.    Physical therapy has been helpful, but this has been on hold due to her hypotension.  She has been doing her home program and feels like she is doing pretty well.    Depression is stable, she has been working with her counselor.  She is seeing Dr. Stevenson.  Denies suicidality.  She sees Estella at [facility].    Takes tramadol 50mg twice a day, this has been fair to okay.  Taking tramadol and wellbutrin, effexor, elavil.  Taking aleve, one pill every other day.    Denies suicidality.  PHQ-9: 16, now 7     By history:   She reports that she had surgery on 09/16/2019.  This was a right total knee arthroplasty for osteoarthritis with Dr. Saba.    In 2001, she had discectomy.  She reports that she has had a spinal cord stimulator placed, but she has not been using this as much.  She reports that the unit is not currently working.    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  She gets up about once an hour.  She has a sit to stand desk, but it is difficult to stand much.  Currently, she is working from home due to the COVID-19 pandemic.    Medical records review:  ED  records from 10/07/2020 and 09/22/2020 reviewed.  ED visit from [id number].  Negative head CT.  She was given instructions about taking ibuprofen and tylenol.  Noted that she was also given a script for valium.     I reviewed the note from the referring provider Loy Miles M.D. dated 01/08/2020: Visits included nausea/epigastric pain, hypertension, chronic knee pain, IGT, dyslipidemia, MDD, hypothyroidism, iron deficiency    Previous treatments:    Physical Therapy: Yes    Medications the patient is tried: tramadol, tylenol (usually for a headache); in the past she took gabapentin 400mg po tid, she was taking vimovo and norco in the past; lyrica caused weight gain    Previous interventions: She had radiofrequency ablation in the past, prior to surgery    Previous surgeries to relieve the above pain:  None other than surgery 09/16/2019      ROS: Unchanged, see HPI  Gen: weight loss  Eyes: vision problems, glasses and contacts  CV: chest pain/anxiety  GI: nausea, ulcers  : urgency  Psych: depression  Endo: thyroid problems  Heme: anemia    Red Flags ROS:   Fever, Chills, Sweats: Denies  Involuntary Weight Loss: Denies  Bladder Incontinence: Denies  Bowel Incontinence: Denies  Saddle Anesthesia: Denies    All other systems reviewed and negative.       PMHx:   Past Medical History:   Diagnosis Date    Anemia     in past    Anginal syndrome (HCC)     had work up and feet r/t anxiety    Anxiety     Arthritis     OA knees    Chronic pain 6/2/2021    Dental disorder 5/24/12    partial upper denture    Diabetes (HCC)     pre diabetic    High cholesterol     HTN (hypertension), benign 3/19/2012    Hypothyroid 3/19/2012    Major depression 3/19/2012    Orbital floor (blow-out) closed fracture (HCC)     left    Other specified symptom associated with female genital organs     post menopausal bleeding    Pain     thoracic spine, Right knee, myofacial pain, and Right shoulder    Pain     recently fell and has bruise left  forehead    Pain 02/2018    chronic back pain, right shoulder    Psychiatric problem     depression, anxiety    Unspecified disorder of thyroid     Urinary bladder disorder     stress incont.    Urinary incontinence     Occasional       PSHx:   Past Surgical History:   Procedure Laterality Date    INJ,EPI ANES/STER LUM/SAC ADDL Right 4/7/2021    Procedure: RIGHT lumbar five and sacral one transforaminal epidural;  Surgeon: Volodymyr Herring M.D.;  [facility];  Service: Pain Management    PB TOTAL KNEE ARTHROPLASTY Right 9/16/2019    Procedure: RIGHT ARTHROPLASTY, KNEE, TOTAL;  Surgeon: Rufus Saba M.D.;  Location: [facility];  Service: Orthopedics    KNEE ARTHROSCOPY Right 2/9/2018    Procedure: KNEE ARTHROSCOPY;  Surgeon: Harsh Baltazar M.D.;  Location: [facility];  Service: Orthopedics    MENISCECTOMY, KNEE, MEDIAL Right 2/9/2018    Procedure: MEDIAL AND LATERAL MENISCECTOMY- PARTIAL;  Surgeon: Harsh Baltazar M.D.;  Location: [facility];  Service: Orthopedics    KNEE ARTHROSCOPY Right 7/12/2017    Procedure: KNEE ARTHROSCOPY- CESPEDES;  Surgeon: Harsh Baltazar M.D.;  Location: [facility];  Service:     MENISCECTOMY, KNEE, MEDIAL Right 7/12/2017    Procedure: PARTIAL MEDIAL AND LATERAL MENISCECTOMY;  Surgeon: Harsh Baltazar M.D.;  Location: [facility];  Service:     SYNOVECTOMY Right 7/12/2017    Procedure: SYNOVECTOMY;  Surgeon: Harsh Baltazar M.D.;  Location: [facility];  Service:     KNEE ARTHROSCOPY  4/21/2015    Performed by Rufus Saba M.D. at [facility]    MENISCECTOMY, KNEE, MEDIAL  4/21/2015    Performed by Rufus Saba M.D. at [facility]    PUMP REVISION  12/10/2012    Performed by Allison Guerra M.D. at [facility]    SPINAL CORD STIMULATOR  5/30/2012    Performed by ALLISON GUERRA at [facility]  "   BIOPSY GENERAL  5/1/12    endometrial    SPINAL CORD STIMULATOR  7/11/2011    Performed by ALLISON CAM at [facility]    BLOCK EPIDURAL STEROID INJECTION  2008    x 3-4    LYMPH NODE EXCISION Left 2007    cervicle    OTHER Right 2001    thoracic discectomy      ARTHROSCOPY, KNEE Left 1999    RIB RESECTION Right 1980    LUMPECTOMY         Family history   Family History   Problem Relation Age of Onset    Hypertension Father     Diabetes Father     Heart Disease Father     Alcohol abuse Father     Anesthesia Sister         slow to come out (as a child)    Diabetes Other     Heart Disease Other     Cancer Other     Hypertension Other     Stroke Other     Lung Disease Other          Medications:   Current Outpatient Medications   Medication    [START ON 11/22/2022] traMADol (ULTRAM) 50 MG Tab    [START ON 10/23/2022] traMADol (ULTRAM) 50 MG Tab    VITAMIN D PO    metFORMIN ER (GLUCOPHAGE XR) 500 MG TABLET SR 24 HR    venlafaxine (EFFEXOR-XR) 150 MG extended-release capsule    atorvastatin (LIPITOR) 20 MG Tab    lisinopril-hydrochlorothiazide (PRINZIDE) 20-12.5 MG per tablet    amitriptyline (ELAVIL) 50 MG Tab    buPROPion (WELLBUTRIN XL) 300 MG XL tablet    venlafaxine XR (EFFEXOR XR) 75 MG CAPSULE SR 24 HR    levothyroxine (SYNTHROID) 50 MCG Tab    ibuprofen (MOTRIN) 200 MG Tab    Ferrous Sulfate (IRON) 325 (65 Fe) MG Tab     No current facility-administered medications for this visit.       Allergies:   Allergies   Allergen Reactions    Sulfamethoxazole-Trimethoprim Rash and Swelling     Pt states \"My hand swells up and rash all over body\".       Social Hx:   Social History     Socioeconomic History    Marital status: Single     Spouse name: Not on file    Number of children: Not on file    Years of education: Not on file    Highest education level: Associate degree: academic program   Occupational History    Not on file   Tobacco Use    Smoking status: Never    Smokeless tobacco: Never   Vaping " "Use    Vaping Use: Never used   Substance and Sexual Activity    Alcohol use: Not Currently    Drug use: No    Sexual activity: Never     Partners: Male   Other Topics Concern     Service No    Blood Transfusions No    Caffeine Concern No    Occupational Exposure No    Hobby Hazards No    Sleep Concern Yes    Stress Concern Yes    Weight Concern Yes    Special Diet No    Back Care No    Exercise No    Bike Helmet No    Seat Belt Yes    Self-Exams No   Social History Narrative    Not on file     Social Determinants of Health     Financial Resource Strain: Low Risk     Difficulty of Paying Living Expenses: Not hard at all   Food Insecurity: No Food Insecurity    Worried About Running Out of Food in the Last Year: Never true    Ran Out of Food in the Last Year: Never true   Transportation Needs: No Transportation Needs    Lack of Transportation (Medical): No    Lack of Transportation (Non-Medical): No   Physical Activity: Inactive    Days of Exercise per Week: 0 days    Minutes of Exercise per Session: 0 min   Stress: Stress Concern Present    Feeling of Stress : Rather much   Social Connections: Socially Isolated    Frequency of Communication with Friends and Family: More than three times a week    Frequency of Social Gatherings with Friends and Family: Once a week    Attends [employer] Services: Never    Active Member of Clubs or Organizations: No    Attends Club or Organization Meetings: Never    Marital Status: Never    Intimate Partner Violence: Not on file   Housing Stability: Low Risk     Unable to Pay for Housing in the Last Year: No    Number of Places Lived in the Last Year: 1    Unstable Housing in the Last Year: No         EXAMINATION     Physical Exam:   Vitals: /64 (BP Location: Right arm, Patient Position: Sitting)   Pulse 83   Temp 36.7 °C (98.1 °F) (Temporal)   Resp 16   Ht 1.689 m (5' 6.5\")   Wt 80.6 kg (177 lb 11.2 oz)   SpO2 95%     Constitutional:   Body Habitus: Body mass " index is 28.25 kg/m².  Cooperation: Fully cooperates with exam  Appearance: Well-groomed, well-nourished, not disheveled no acute distress    Eyes: No scleral icterus, no proptosis     ENT -no obvious auditory deficits, wearing a face mask    Skin -no visible skin lesions    Respiratory-  breathing comfortable on room air, no audible wheezing    Cardiovascular- No lower extremity edema is noted.     Psychiatric- alert and oriented ×3. Normal affect.     Gait -  Antalgic, using a walker.    Neuro/Muscloskeletal  No focal motor deficits in the lower extremities bilaterally.  No sensory deficits in the lower extremities bilaterally    Left knee range of motion within functional limits.  Moderate effusion of right knee.  Mild tenderness over the lateral fibula.  No medial or lateral instability.     Right knee with functional ROM of the right knee, flexion greater than 120 degrees is maintained.          MEDICAL DECISION MAKING    Medical records review: see under HPI section.     DATA    Labs:     03/14/2020 CRP 0.19  03/14/2020 Sed rate 20  02/25/2020: Tramadol and metabolites  09/21/2021 UDS consistent with tramadol and metabolites  08/17/2022 UDS consistent with tramadol and metaboiltes    Lab Results   Component Value Date/Time    SODIUM 140 09/26/2022 07:34 AM    POTASSIUM 4.3 09/26/2022 07:34 AM    CHLORIDE 98 09/26/2022 07:34 AM    CO2 30 09/26/2022 07:34 AM    ANION 12.0 09/26/2022 07:34 AM    GLUCOSE 121 (H) 09/26/2022 07:34 AM    BUN 13 09/26/2022 07:34 AM    CREATININE 0.97 09/26/2022 07:34 AM    CALCIUM 9.4 09/26/2022 07:34 AM    ASTSGOT 13 09/26/2022 07:34 AM    ALTSGPT 13 09/26/2022 07:34 AM    TBILIRUBIN 0.2 09/26/2022 07:34 AM    ALBUMIN 4.4 09/26/2022 07:34 AM    TOTPROTEIN 7.4 09/26/2022 07:34 AM    GLOBULIN 3.0 09/26/2022 07:34 AM    AGRATIO 1.5 09/26/2022 07:34 AM       Lab Results   Component Value Date/Time    PROTHROMBTM 12.6 10/07/2020 10:45 PM    INR 0.91 10/07/2020 10:45 PM        Lab Results    Component Value Date/Time    WBC 8.5 09/26/2022 07:34 AM    RBC 4.38 09/26/2022 07:34 AM    HEMOGLOBIN 13.7 09/26/2022 07:34 AM    HEMATOCRIT 42.2 09/26/2022 07:34 AM    MCV 96.3 09/26/2022 07:34 AM    MCH 31.3 09/26/2022 07:34 AM    MCHC 32.5 (L) 09/26/2022 07:34 AM    MPV 11.6 09/26/2022 07:34 AM    NEUTSPOLYS 60.40 09/26/2022 07:34 AM    LYMPHOCYTES 29.50 09/26/2022 07:34 AM    MONOCYTES 6.80 09/26/2022 07:34 AM    EOSINOPHILS 2.40 09/26/2022 07:34 AM    BASOPHILS 0.50 09/26/2022 07:34 AM        Lab Results   Component Value Date/Time    HBA1C 5.6 09/26/2022 07:34 AM        Imaging: I personally reviewed following images, these are my reads  Xray thoracolumbar spine 07/14/2022  Degenerative changes and note of electrode from T6-T9    Xray right knee 07/14/2022  S/P right total knee without evidence of fracture    CT left knee May 8, 2022  There is note of moderate to large joint effusion with note of nondisplaced fracture at the medial aspect of the proximal fibula.  Moderate osteoarthritis    CT cervical spine 09/22/2020  There is note of cervical spondylosis with multilevel uncovertebral arthropathy    Xray right hip 03/14/2020  There is mild osteoarthritis of the right hip.      Xray lumbar 03/14/2020  There is mild anterolisthesis at L5-S1.  No abnormal motion on flexion and extension  There is DDD and facet arthropathy that is most noted at L5-S1 and less at L4-5    Xray right knee 09/16/2019  There is note of right knee arthroplasty    IMAGING radiology reads. I reviewed the following radiology reads  Xray right knee 07/14/2022  IMPRESSION:     1.  No acute fracture or dislocation of the right knee.    Xray thoracolumbar spine 07/14/2022  IMPRESSION:  1.  No acute or subacute fracture of the thoracolumbar spine.ay       CT left knee May 8, 2022  IMPRESSION:     1.  Nondisplaced fracture of medial aspect of the proximal fibula is identified.     2.  No other fractures identified.     3.  Joint effusion is  identified.     4.  Moderate osteoarthritis.    Xray right knee 09/18/2021  Multiple standing views of the right knee show previous right total knee   replacement with hardware in good position without signs of loosening or   dislocation.  No obvious fracture noted.    Xray left shoulder 10/27/2021  X-rays reviewed today of the left shoulder show normal overall bony   alignment she has some AC degenerative change.  She has some osteophyte   formation off the inferior humeral head.  Some mild glenohumeral joint   space narrowing patient.  Type III acromion.    CT cervical spine 09/22/2020  IMPRESSION:  Degenerative change without evidence of cervical spine fracture.    Xray lumbar 03/14/2020  IMPRESSION:  1.  No compression deformity or acute fracture is identified.  2.  Mild anterolisthesis at L5-S1.  3.  Degenerative disc disease and facet arthropathy.  4.  No focal instability noted on flexion extension views.                                                Xray right hip 03/14/2020  IMPRESSION:     1.  No radiographic evidence of acute traumatic injury.  2.  Findings are consistent with mild osteoarthritis.  3.  Probable constipation.                                   Results for orders placed during the hospital encounter of 09/16/19   DX-KNEE 2- RIGHT    Impression Right knee arthroplasty.      Results for orders placed during the hospital encounter of 03/28/19   DX-KNEE 3 VIEWS RIGHT    Impression No evidence of acute fracture or dislocation.    Degenerative changes as above described.                              Diagnosis   Visit Diagnoses     ICD-10-CM   1. Primary osteoarthritis of left knee  M17.12   2. Spondylolisthesis at L5-S1 level  M43.17   3. S/P TKR (total knee replacement), right  Z96.651   4. Risk for falls  Z91.81   5. Lumbar radiculopathy  M54.16               ASSESSMENT:  Debby Rubioan 62 y.o. female seen for above.     Debby was seen today for follow-up.    Diagnoses and all orders for this  visit:    Primary osteoarthritis of left knee    Spondylolisthesis at L5-S1 level  -     traMADol (ULTRAM) 50 MG Tab; Take 1 Tablet by mouth 2 times a day as needed for Severe Pain for up to 30 days.  -     traMADol (ULTRAM) 50 MG Tab; Take 1 Tablet by mouth 2 times a day as needed for Severe Pain for up to 30 days.  -     Consent for Opiate Prescription  -     Controlled Substance Treatment Agreement    S/P TKR (total knee replacement), right  -     traMADol (ULTRAM) 50 MG Tab; Take 1 Tablet by mouth 2 times a day as needed for Severe Pain for up to 30 days.  -     traMADol (ULTRAM) 50 MG Tab; Take 1 Tablet by mouth 2 times a day as needed for Severe Pain for up to 30 days.  -     Consent for Opiate Prescription  -     Controlled Substance Treatment Agreement    Risk for falls    Lumbar radiculopathy  -     traMADol (ULTRAM) 50 MG Tab; Take 1 Tablet by mouth 2 times a day as needed for Severe Pain for up to 30 days.  -     traMADol (ULTRAM) 50 MG Tab; Take 1 Tablet by mouth 2 times a day as needed for Severe Pain for up to 30 days.  -     Consent for Opiate Prescription  -     Controlled Substance Treatment Agreement         Discussed that she will continue with her home program from PT.  Doing pretty well with this.  Reviewed use of tramadol.  She has been stable, taking twice a day.  Right knee pain is most helped by this.   reviewed.  Most recent UDS reviewed.  Regarding SCS, place this on hold for now given current orthostatic hypotension/syncope.  Continue follow-up with Cardiology.  Neurology visit reviewed, not felt neurogenic syncope.  Continue with Dr. Stevenson and psychologist. Discussed that she will follow-up with Dr. Stevenson as amitriptyline and possibility that this could be contributing to orthostatic hypotension.  While she had taken TCA in the past, this was restarted around March 2022.  Could they consider a less anticholinergic TCA as an option.  Follow-up PCP      Follow-up: Return in about 2  months (around 12/14/2022), or if symptoms worsen or fail to improve.      Thank you very much for asking me to participate in Debby Onieljuanita Desai's care.  Please contact me with any questions or concerns.      Please note that this dictation was created using voice recognition software. I have made every reasonable attempt to correct obvious errors but there may be errors of grammar and content that I may have overlooked prior to finalization of this note.      Volodymyr Herring MD  Physical Medicine and Rehabilitation  Interventional Spine and Sports Physiatry  [facility]

## 2022-10-18 ENCOUNTER — APPOINTMENT (OUTPATIENT)
Dept: PHYSICAL THERAPY | Facility: MEDICAL CENTER | Age: 62
End: 2022-10-18
Attending: NURSE PRACTITIONER
Payer: COMMERCIAL

## 2022-10-19 ENCOUNTER — PHARMACY VISIT (OUTPATIENT)
Dept: PHARMACY | Facility: MEDICAL CENTER | Age: 62
End: 2022-10-19
Payer: COMMERCIAL

## 2022-10-21 ENCOUNTER — TELEPHONE (OUTPATIENT)
Dept: MEDICAL GROUP | Facility: MEDICAL CENTER | Age: 62
End: 2022-10-21
Payer: COMMERCIAL

## 2022-10-21 NOTE — TELEPHONE ENCOUNTER
Please schedule patient for follow up appointment regarding blood pressure, dizziness.     JOSHUA Chris.

## 2022-10-21 NOTE — TELEPHONE ENCOUNTER
----- Message from Dee Santoyo PT sent at 10/17/2022  8:26 AM PDT -----  Thanks Lara! To be safe, we will continue to hold therapy until her vitals/ symptoms have stabilized!    Have a great week,  Neida Santoyo PT, DPT  ----- Message -----  From: ARELIS Chris  Sent: 10/14/2022   2:47 PM PDT  To: Dee Santoyo PT, Jose Shin M.D.    Thanks for the updates! I'll get her in for follow up on this ASAP.    ARELIS Chris    ----- Message -----  From: Jose Shin M.D.  Sent: 10/4/2022   2:04 PM PDT  To: ARELIS Chris, Dee Santoyo PT    We discussed doing half a tablet of Prinzide but might be beneficial to completely discontinue it and monitor blood pressure.    Will defer to PCP.    Jose Shin M.D.    ----- Message -----  From: Dee Santoyo PT  Sent: 10/4/2022   1:45 PM PDT  To: ARELIS Chris Danish Atwal, M.D.    Good Afternoon,     I am reaching out to you because we are seeing the above patient for balance, L knee and ankle pain secondary to falls. Since we initiated therapy she is having persistent tunnel vision symptoms. During today's physical therapy session we assessed orthostatic vitals with the following results:    Vitals   Supine  O2 = 87% on room air  HR = varied between 47-75 bpm  BP = 100/ 62 mmHg (R arm) and pt reports feeling tunnel vision symptoms   BP = 110/74 mmHg (after 2 min of supine)      Seated  BP = 90/64 mmHg and pt symptomatic   Bp = 110/72 mmHg (after 2 min of sitting)     Standing  Unable to obtain immediate BP reading w/ manual cuff while pt was symptomatic  111/59 w/ automatic cuff R arm (after 5 min standing)  HR = 107-108 bpm  O2 = 97%     Because of her symptoms she is unable to fully participate in therapy. We will hold therapy at this time and recommended she follow up with you both.    Please let me know if you have any questions, concerns, or suggestions.    Sincerely,  Neida Santoyo, PT, DPT

## 2022-10-25 ENCOUNTER — APPOINTMENT (OUTPATIENT)
Dept: PHYSICAL THERAPY | Facility: MEDICAL CENTER | Age: 62
End: 2022-10-25
Attending: NURSE PRACTITIONER
Payer: COMMERCIAL

## 2022-10-27 ENCOUNTER — APPOINTMENT (OUTPATIENT)
Dept: PHYSICAL THERAPY | Facility: MEDICAL CENTER | Age: 62
End: 2022-10-27
Attending: NURSE PRACTITIONER
Payer: COMMERCIAL

## 2022-11-01 ENCOUNTER — APPOINTMENT (OUTPATIENT)
Dept: PHYSICAL THERAPY | Facility: MEDICAL CENTER | Age: 62
End: 2022-11-01
Attending: ORTHOPAEDIC SURGERY

## 2022-11-03 ENCOUNTER — APPOINTMENT (OUTPATIENT)
Dept: PHYSICAL THERAPY | Facility: MEDICAL CENTER | Age: 62
End: 2022-11-03
Attending: ORTHOPAEDIC SURGERY

## 2022-11-10 ENCOUNTER — TELEMEDICINE (OUTPATIENT)
Dept: BEHAVIORAL HEALTH | Facility: CLINIC | Age: 62
End: 2022-11-10
Payer: COMMERCIAL

## 2022-11-10 DIAGNOSIS — G47.09 OTHER INSOMNIA: ICD-10-CM

## 2022-11-10 DIAGNOSIS — F33.2 SEVERE EPISODE OF RECURRENT MAJOR DEPRESSIVE DISORDER, WITHOUT PSYCHOTIC FEATURES (HCC): ICD-10-CM

## 2022-11-10 DIAGNOSIS — F41.1 GAD (GENERALIZED ANXIETY DISORDER): ICD-10-CM

## 2022-11-10 PROCEDURE — 99214 OFFICE O/P EST MOD 30 MIN: CPT | Mod: 95 | Performed by: PSYCHIATRY & NEUROLOGY

## 2022-11-10 PROCEDURE — RXMED WILLOW AMBULATORY MEDICATION CHARGE: Performed by: PSYCHIATRY & NEUROLOGY

## 2022-11-10 PROCEDURE — 90833 PSYTX W PT W E/M 30 MIN: CPT | Mod: 95 | Performed by: PSYCHIATRY & NEUROLOGY

## 2022-11-10 RX ORDER — VENLAFAXINE HYDROCHLORIDE 75 MG/1
75 CAPSULE, EXTENDED RELEASE ORAL DAILY
Qty: 90 CAPSULE | Refills: 2 | Status: SHIPPED | OUTPATIENT
Start: 2022-11-10 | End: 2023-01-31 | Stop reason: SDUPTHER

## 2022-11-10 RX ORDER — BUPROPION HYDROCHLORIDE 300 MG/1
300 TABLET ORAL EVERY MORNING
Qty: 90 TABLET | Refills: 2 | Status: SHIPPED | OUTPATIENT
Start: 2022-11-10 | End: 2023-01-31 | Stop reason: SDUPTHER

## 2022-11-10 RX ORDER — VENLAFAXINE HYDROCHLORIDE 150 MG/1
150 CAPSULE, EXTENDED RELEASE ORAL DAILY
Qty: 90 CAPSULE | Refills: 2 | Status: SHIPPED | OUTPATIENT
Start: 2022-11-10 | End: 2023-01-31 | Stop reason: SDUPTHER

## 2022-11-10 RX ORDER — AMITRIPTYLINE HYDROCHLORIDE 50 MG/1
50 TABLET, FILM COATED ORAL NIGHTLY
Qty: 30 TABLET | Refills: 5 | Status: SHIPPED | OUTPATIENT
Start: 2022-11-10 | End: 2023-01-31 | Stop reason: SDUPTHER

## 2022-11-10 NOTE — PROGRESS NOTES
This evaluation was conducted via Zoom using secure and encrypted videoconferencing technology. The patient was in their home in the [facility].    The patient's identity was confirmed and verbal consent was obtained for this virtual visit.      PSYCHIATRY FOLLOW-UP NOTE      Name: Debby Desai  MRN: [id number]  : 1960  Age: 62 y.o.  Date of assessment: 11/10/2022  PCP: ARELIS Chris  Persons in attendance: Patient    Patient seen from 10: 20 am to 10:44 am (24 min)    REASON FOR VISIT/CHIEF COMPLAINT (as stated by Patient):  Debby Desai is a 62 y.o., White female, attending follow-up appointment for mood and anxiety management.      HISTORY OF PRESENT ILLNESS:  Debby Desai is a 62 y.o. old female with MDD, TIM and insomnia comes in today for follow up. Patient was last seen 3 months ago, and following treatment planning recommendations were done:  Continue Effexor XR to 225 mg daily for mood, anxiety and comorbid pain management.   Continue Wellbutrin XL to 300 mg daily for depression augmentation.    Continue Amitriptyline 50 mg at at bedtime as needed for insomnia.    Stop Propanolol   Monitor for serotonin syndrome.  Continue psychotherapy for mood and anxiety management.    Patient is compliant with medications with no side effects and describes stable mood, anxiety and sleep.  She is able to deal with stressors effectively and engaged in psychotherapy effectively as well.  Patient continues to struggle with orthostatic hypotension and had evaluation with cardiology and neurology with no specific diagnosis found.  Patient has an upcoming appointment with family medicine provider and is planning to consider dose reduction of antihypertensive.  Also discussed the risk of hypotension with amitriptyline and patient will self reduced the dose of amitriptyline to half tablet at bedtime to assess if dizziness improves with no disruption of sleep.    PSYCHOTHERAPY ASPECT OF SESSION  (16 MIN):  Most part of the session was dedicated to active listening and implementing supportive psychotherapy skills.  Importance of behavioral activation activities were discussed and motivated to continue implementing them especially with the winter weather.  Importance of giving self credit for the progress she has achieved was emphasized.      CURRENT MEDICATIONS:  Current Outpatient Medications   Medication Sig Dispense Refill    [START ON 11/22/2022] traMADol (ULTRAM) 50 MG Tab Take 1 Tablet by mouth 2 times a day as needed for Severe Pain for up to 30 days. 60 Tablet 0    traMADol (ULTRAM) 50 MG Tab Take 1 Tablet by mouth 2 times a day as needed for Severe Pain for up to 30 days. 60 Tablet 0    VITAMIN D PO Take  by mouth every day.      metFORMIN ER (GLUCOPHAGE XR) 500 MG TABLET SR 24 HR Take 1 Tab by mouth 2 times a day. 180 Tablet 0    venlafaxine (EFFEXOR-XR) 150 MG extended-release capsule Take 1 Capsule by mouth every day. (venlafaxine xr 150 mg + 75 mg = 225 mg daily) 90 Capsule 2    atorvastatin (LIPITOR) 20 MG Tab TAKE ONE TABLET BY MOUTH EVERY DAY 90 Tablet 0    lisinopril-hydrochlorothiazide (PRINZIDE) 20-12.5 MG per tablet Take one tablet by mouth daily 90 Tablet 0    amitriptyline (ELAVIL) 50 MG Tab Take 1 Tablet by mouth every evening. 30 Tablet 5    buPROPion (WELLBUTRIN XL) 300 MG XL tablet Take 1 tablet by mouth every morning. 90 Tablet 2    venlafaxine XR (EFFEXOR XR) 75 MG CAPSULE SR 24 HR Take 1 Capsule by mouth every day. (venlafaxine xr 150 mg + 75 mg = 225 mg daily) 90 Capsule 2    levothyroxine (SYNTHROID) 50 MCG Tab Take 1 tablet by mouth Every morning on an empty stomach. 90 Tablet 3    ibuprofen (MOTRIN) 200 MG Tab Take 600 mg by mouth every 6 hours as needed for Inflammation.      Ferrous Sulfate (IRON) 325 (65 Fe) MG Tab Take 65 mg by mouth every day at 6 PM.       No current facility-administered medications for this visit.       MEDICAL HISTORY  Past Medical History:    Diagnosis Date    Anemia     in past    Anginal syndrome (HCC)     had work up and feet r/t anxiety    Anxiety     Arthritis     OA knees    Chronic pain 6/2/2021    Dental disorder 5/24/12    partial upper denture    Diabetes (HCC)     pre diabetic    High cholesterol     HTN (hypertension), benign 3/19/2012    Hypothyroid 3/19/2012    Major depression 3/19/2012    Orbital floor (blow-out) closed fracture (HCC)     left    Other specified symptom associated with female genital organs     post menopausal bleeding    Pain     thoracic spine, Right knee, myofacial pain, and Right shoulder    Pain     recently fell and has bruise left forehead    Pain 02/2018    chronic back pain, right shoulder    Psychiatric problem     depression, anxiety    Unspecified disorder of thyroid     Urinary bladder disorder     stress incont.    Urinary incontinence     Occasional     Past Surgical History:   Procedure Laterality Date    INJ,EPI ANES/STER LUM/SAC ADDL Right 4/7/2021    Procedure: RIGHT lumbar five and sacral one transforaminal epidural;  Surgeon: Volodymyr Herring M.D.;  [facility];  Service: Pain Management    PB TOTAL KNEE ARTHROPLASTY Right 9/16/2019    Procedure: RIGHT ARTHROPLASTY, KNEE, TOTAL;  Surgeon: Rufus Saba M.D.;  Location: [facility];  Service: Orthopedics    KNEE ARTHROSCOPY Right 2/9/2018    Procedure: KNEE ARTHROSCOPY;  Surgeon: Harsh Baltazar M.D.;  Location: [facility];  Service: Orthopedics    MENISCECTOMY, KNEE, MEDIAL Right 2/9/2018    Procedure: MEDIAL AND LATERAL MENISCECTOMY- PARTIAL;  Surgeon: Harsh Baltazar M.D.;  Location: [facility];  Service: Orthopedics    KNEE ARTHROSCOPY Right 7/12/2017    Procedure: KNEE ARTHROSCOPY- CESPEDES;  Surgeon: Harsh Baltazar M.D.;  Location: [facility];  Service:     MENISCECTOMY, KNEE, MEDIAL Right 7/12/2017    Procedure: PARTIAL MEDIAL AND LATERAL  MENISCECTOMY;  Surgeon: Harsh Baltazar M.D.;  Location: SUR[facility];  Service:     SYNOVECTOMY Right 7/12/2017    Procedure: SYNOVECTOMY;  Surgeon: Harsh Baltazar M.D.;  Location: [facility];  Service:     KNEE ARTHROSCOPY  4/21/2015    Performed by Rufus Saba M.D. at SURGERY [facility]    MENISCECTOMY, KNEE, MEDIAL  4/21/2015    Performed by Rufus Saba M.D. at SURGERY [facility]    PUMP REVISION  12/10/2012    Performed by Allison Guerra M.D. at SURGERY [facility]    SPINAL CORD STIMULATOR  5/30/2012    Performed by ALLISON GUERRA at [facility]    BIOPSY GENERAL  5/1/12    endometrial    SPINAL CORD STIMULATOR  7/11/2011    Performed by ALLISON GUERRA at [facility]    BLOCK EPIDURAL STEROID INJECTION  2008    x 3-4    LYMPH NODE EXCISION Left 2007    cervicle    OTHER Right 2001    thoracic discectomy      ARTHROSCOPY, KNEE Left 1999    RIB RESECTION Right 1980    LUMPECTOMY         PAST PSYCHIATRIC HISTORY  Prior psychiatric hospitalization: no  Prior Self harm/suicide attempt: no  Prior Diagnosis: MDD, Anxiety     PAST PSYCHIATRIC MEDICATIONS  Fluoxetine, Paroxetine, Sertraline, Citalopram  Venlafaxine, Duloxetine  Wellbutrin  Amitriptyline   Ativan, propranolol     FAMILY HISTORY  Psychiatric diagnosis: [place] with depression and anxiety  History of suicide attempts: No  Substance abuse history: 1 sister with drug and alcohol abuse history     SUBSTANCE USE HISTORY:  ALCOHOL: no  TOBACCO: no  CANNABIS: no  OPIOIDS: no  PRESCRIPTION MEDICATIONS: no  OTHERS: no  History of inpatient/outpatient rehab treatment: none     SOCIAL HISTORY  Childhood: born in [place] and describes childhood as difficult  Moved a lot due to parents financial concerns  Employment: For [employer]  Relationship: single (never )  Kids: no kids  Current living situation: alone (but strong family support as they all live  nearby)  Current/past legal issues: denies  History of emotional/physical/sexual abuse - denies      REVIEW OF SYSTEMS:        Constitutional negative   Eyes negative   Ears/Nose/Mouth/Throat negative   Cardiovascular negative   Respiratory negative   Gastrointestinal negative   Genitourinary negative   Muscular negative   Integumentary negative   Neurological negative   Endocrine negative   Hematologic/Lymphatic negative     PHYSICAL EXAMINAION:  Vital signs: LMP 2012   Musculoskeletal: Normal gait.   Abnormal movements: none      MENTAL STATUS EXAMINATION      General:   - Grooming and hygiene: Casual,   - Apparent distress: none,   - Behavior: Calm  - Eye Contact:  Good,   - no psychomotor agitation or retardation    - Participation: Active verbal participation  Orientation: Alert and Fully Oriented to person, place and time  Mood: Euthymic  Affect: Flexible and Full range,  Thought Process: Logical and Goal-directed  Thought Content: Denies suicidal or homicidal ideations, intent or plan   Perception: Denies auditory or visual hallucinations. No delusions noted   Attention span and concentration: Intact   Speech:Rate within normal limits and Volume within normal limits  Language: Appropriate   Insight: Good  Judgment: Good  Recent and remote memory: No gross evidence of memory deficits        DEPRESSION SCREENIN/6/2022     7:40 AM 2022     7:40 AM 10/14/2022    10:30 AM   Depression Screen (PHQ-2/PHQ-9)   PHQ-2 Total Score 3 2 3   PHQ-9 Total Score 9 8 7       Interpretation of PHQ-9 Total Score   Score Severity   1-4 No Depression   5-9 Mild Depression   10-14 Moderate Depression   15-19 Moderately Severe Depression   20-27 Severe Depression    CURRENT RISK:       Suicidal: Low       Homicidal: Low       Self-Harm: Low       Relapse: Low       Crisis Safety Plan Reviewed Not Indicated       If evidence of imminent risk is present, intervention/plan:      MEDICAL RECORDS/LABS/DIAGNOSTIC TESTS  REVIEWED:  No new lab since last visit     [place] [facility] records -   Reviewed     PLAN:  (1) Major depressive disorder; (2) TIM; (3) Insomnia  Improving  Continue Effexor XR to 225 mg daily for mood, anxiety and comorbid pain management.   Continue Wellbutrin XL to 300 mg daily for depression augmentation.    Continue Amitriptyline 50 mg at at bedtime as needed for insomnia.    Monitor for serotonin syndrome.  Continue psychotherapy for mood and anxiety management.  Medication options, alternatives (including no medications) and medication risks/benefits/side effects were discussed in detail.  Explained importance of contraceptive measures while on psychotropic medications, educated to let provider know if ever pregnant or wanting to become pregnant. Verbalized understanding.  The patient was advised to call, message provider on [employer], or come in to the clinic if symptoms worsen or if any future questions/issues regarding their medications arise; the patient verbalized understanding and agreement.    The patient was educated to call 911, call the suicide hotline, or go to local ER if having thoughts of suicide or homicide; verbalized understanding.    Billing Coding based on:  57495 based on [facility]  59097: based on psychotherapy timing    Return to clinic in 3 months or sooner if symptoms worsen.  Next Appointment: instruction provided on how to make the next appointment.     The proposed treatment plan was discussed with the patient who was provided the opportunity to ask questions and make suggestions regarding alternative treatment. Patient verbalized understanding and expressed agreement with the plan.       Genaro Stevenson M.D.  11/10/22    This note was created using voice recognition software (Dragon). The accuracy of the dictation is limited by the abilities of the software. I have reviewed the note prior to signing, however some errors in grammar and context are still possible. If you have any questions related to  this note please do not hesitate to contact our office.

## 2022-11-16 ENCOUNTER — OFFICE VISIT (OUTPATIENT)
Dept: MEDICAL GROUP | Facility: MEDICAL CENTER | Age: 62
End: 2022-11-16
Payer: COMMERCIAL

## 2022-11-16 VITALS
HEART RATE: 88 BPM | DIASTOLIC BLOOD PRESSURE: 98 MMHG | HEIGHT: 66 IN | OXYGEN SATURATION: 95 % | SYSTOLIC BLOOD PRESSURE: 150 MMHG | BODY MASS INDEX: 29.57 KG/M2 | WEIGHT: 184 LBS | TEMPERATURE: 98.5 F

## 2022-11-16 DIAGNOSIS — I10 HTN (HYPERTENSION), BENIGN: Chronic | ICD-10-CM

## 2022-11-16 DIAGNOSIS — Z23 NEED FOR VACCINATION: ICD-10-CM

## 2022-11-16 PROCEDURE — RXMED WILLOW AMBULATORY MEDICATION CHARGE: Performed by: NURSE PRACTITIONER

## 2022-11-16 PROCEDURE — 90471 IMMUNIZATION ADMIN: CPT | Performed by: NURSE PRACTITIONER

## 2022-11-16 PROCEDURE — 99214 OFFICE O/P EST MOD 30 MIN: CPT | Mod: 25 | Performed by: NURSE PRACTITIONER

## 2022-11-16 PROCEDURE — 90686 IIV4 VACC NO PRSV 0.5 ML IM: CPT | Performed by: NURSE PRACTITIONER

## 2022-11-16 RX ORDER — LISINOPRIL 30 MG/1
30 TABLET ORAL DAILY
Qty: 90 TABLET | Refills: 1 | Status: SHIPPED | OUTPATIENT
Start: 2022-11-16 | End: 2022-12-07

## 2022-11-16 ASSESSMENT — FIBROSIS 4 INDEX: FIB4 SCORE: 0.46

## 2022-11-16 NOTE — ASSESSMENT & PLAN NOTE
Chronic. Dose of lisinopril-HCTZ 20-12.5 mg previously reduced to half tablet daily due to lower blood pressure readings and near syncopal and syncopal episodes. After reducing dose her blood pressure increased to 130s-160s systolic but near syncopal episodes had not improved. Advised increase to prior dosage.     She was seen by Cardiology 2 months ago who recommended taking 1/2 tablet twice daily and ordered echo which is scheduled for Feb 2023. She is unable to cut tablet in half but was also advised to just take at night.     She did fall the other day. Still getting symptoms with leg shaking, dizziness.     BP monitor is not working properly. Did get a reading of 140/90. Today in office 150/98.     She is drinking about 32 oz water per day.   Drinks 1-2 cans soda (caffeinated) per day.   Drinks 8-12 oz coffee three times per week.   16 oz caffeinated tea per day.

## 2022-11-17 DIAGNOSIS — E78.5 DYSLIPIDEMIA: ICD-10-CM

## 2022-11-17 NOTE — PROGRESS NOTES
Subjective:   Debby Desai is a 62 y.o. female here today for hypertension follow-up    HTN (hypertension), benign  Chronic. Dose of lisinopril-HCTZ 20-12.5 mg previously reduced to half tablet daily due to lower blood pressure readings and near syncopal and syncopal episodes. After reducing dose her blood pressure increased to 130s-160s systolic but near syncopal episodes had not improved. Advised increase to prior dosage.     She was seen by Cardiology 2 months ago who recommended taking 1/2 tablet twice daily and ordered echo which is scheduled for Feb 2023. She is unable to cut tablet in half but was also advised to just take at night.     She did fall the other day. Still getting symptoms with leg shaking, dizziness.     BP monitor is not working properly. Did get a reading of 140/90. Today in office 150/98.     She is drinking about 32 oz water per day.   Drinks 1-2 cans soda (caffeinated) per day.   Drinks 8-12 oz coffee three times per week.   16 oz caffeinated tea per day.         Current medicines (including changes today)  Current Outpatient Medications   Medication Sig Dispense Refill    lisinopril (PRINIVIL) 30 MG tablet Take 1 Tablet by mouth every day. 90 Tablet 1    venlafaxine XR (EFFEXOR XR) 75 MG CAPSULE SR 24 HR Take 1 Capsule by mouth every day. (venlafaxine xr 150 mg + 75 mg = 225 mg daily) 90 Capsule 2    venlafaxine (EFFEXOR-XR) 150 MG extended-release capsule Take 1 Capsule by mouth every day. (venlafaxine xr 150 mg + 75 mg = 225 mg daily) 90 Capsule 2    buPROPion (WELLBUTRIN XL) 300 MG XL tablet Take 1 tablet by mouth every morning. 90 Tablet 2    amitriptyline (ELAVIL) 50 MG Tab Take 1 Tablet by mouth every evening. 30 Tablet 5    [START ON 11/22/2022] traMADol (ULTRAM) 50 MG Tab Take 1 Tablet by mouth 2 times a day as needed for Severe Pain for up to 30 days. 60 Tablet 0    traMADol (ULTRAM) 50 MG Tab Take 1 Tablet by mouth 2 times a day as needed for Severe Pain for up to 30 days.  "60 Tablet 0    VITAMIN D PO Take  by mouth every day.      metFORMIN ER (GLUCOPHAGE XR) 500 MG TABLET SR 24 HR Take 1 Tab by mouth 2 times a day. 180 Tablet 0    atorvastatin (LIPITOR) 20 MG Tab TAKE ONE TABLET BY MOUTH EVERY DAY 90 Tablet 0    levothyroxine (SYNTHROID) 50 MCG Tab Take 1 tablet by mouth Every morning on an empty stomach. 90 Tablet 3    ibuprofen (MOTRIN) 200 MG Tab Take 600 mg by mouth every 6 hours as needed for Inflammation.      Ferrous Sulfate (IRON) 325 (65 Fe) MG Tab Take 65 mg by mouth every day at 6 PM.       No current facility-administered medications for this visit.     She  has a past medical history of Anemia, Anginal syndrome ([facility]), Anxiety, Arthritis, Chronic pain (6/2/2021), Dental disorder (5/24/12), Diabetes ([facility]), High cholesterol, HTN (hypertension), benign (3/19/2012), Hypothyroid (3/19/2012), Major depression (3/19/2012), Orbital floor (blow-out) closed fracture ([facility]), Other specified symptom associated with female genital organs, Pain, Pain, Pain (02/2018), Psychiatric problem, Unspecified disorder of thyroid, Urinary bladder disorder, and Urinary incontinence.    She has no past medical history of Breast cancer ([facility]).    ROS   No chest pain, no shortness of breath, no abdominal pain  Positive ROS as per HPI.  All other systems reviewed and are negative.     Objective:     BP (!) 150/98 (BP Location: Right arm, Patient Position: Sitting, BP Cuff Size: Adult)   Pulse 88   Temp 36.9 °C (98.5 °F) (Temporal)   Ht 1.676 m (5' 6\")   Wt 83.5 kg (184 lb)   SpO2 95%  Body mass index is 29.7 kg/m².     Physical Exam:  Constitutional: Alert, no distress.  Skin: Warm, dry, good turgor, no rashes in visible areas.  Eye: Equal, round and reactive, conjunctiva clear, lids normal.  ENMT: Mask in place  Respiratory: Unlabored respiratory effort  Psych: Alert and oriented x3, normal affect and mood.        Assessment and Plan:   The following treatment plan was discussed    1. HTN " (hypertension), benign  Unstable  Dizziness and near syncopal events are likely due to her chronic dehydration  Significant discussion regarding increasing water intake and significantly reducing caffeine intake  Increase lisinopril to 30 mg daily, stop hydrochlorothiazide as this is likely exacerbating her dehydration and orthostatic hypotension  - lisinopril (PRINIVIL) 30 MG tablet; Take 1 Tablet by mouth every day.  Dispense: 90 Tablet; Refill: 1    2. Need for vaccination  - INFLUENZA VACCINE QUAD INJ (PF)      Followup: Return in about 4 weeks (around 12/14/2022) for Hypertension.    The MA is performing the above orders under the direction of Dr. Tena    Please note that this dictation was created using voice recognition software. I have made every reasonable attempt to correct obvious errors, but I expect that there are errors of grammar and possibly content that I did not discover before finalizing the note.

## 2022-11-19 NOTE — TELEPHONE ENCOUNTER
Received request via: Pharmacy    Was the patient seen in the last year in this department? Yes    Does the patient have an active prescription (recently filled or refills available) for medication(s) requested? No    Does the patient have snf Plus and need 100 day supply (blood pressure, diabetes and cholesterol meds only)? Patient does not have SCP

## 2022-11-21 ENCOUNTER — PHARMACY VISIT (OUTPATIENT)
Dept: PHARMACY | Facility: MEDICAL CENTER | Age: 62
End: 2022-11-21
Payer: COMMERCIAL

## 2022-11-21 PROCEDURE — RXMED WILLOW AMBULATORY MEDICATION CHARGE: Performed by: PHYSICAL MEDICINE & REHABILITATION

## 2022-11-23 ENCOUNTER — PHARMACY VISIT (OUTPATIENT)
Dept: PHARMACY | Facility: MEDICAL CENTER | Age: 62
End: 2022-11-23
Payer: COMMERCIAL

## 2022-11-28 PROCEDURE — RXMED WILLOW AMBULATORY MEDICATION CHARGE: Performed by: NURSE PRACTITIONER

## 2022-11-28 RX ORDER — ATORVASTATIN CALCIUM 20 MG/1
20 TABLET, FILM COATED ORAL
Qty: 90 TABLET | Refills: 3 | Status: SHIPPED | OUTPATIENT
Start: 2022-11-28 | End: 2023-11-08 | Stop reason: SDUPTHER

## 2022-12-07 ENCOUNTER — OFFICE VISIT (OUTPATIENT)
Dept: MEDICAL GROUP | Facility: MEDICAL CENTER | Age: 62
End: 2022-12-07
Payer: COMMERCIAL

## 2022-12-07 VITALS
OXYGEN SATURATION: 95 % | BODY MASS INDEX: 29.73 KG/M2 | HEIGHT: 66 IN | TEMPERATURE: 98.5 F | DIASTOLIC BLOOD PRESSURE: 90 MMHG | HEART RATE: 87 BPM | RESPIRATION RATE: 16 BRPM | WEIGHT: 185 LBS | SYSTOLIC BLOOD PRESSURE: 150 MMHG

## 2022-12-07 DIAGNOSIS — E03.9 ACQUIRED HYPOTHYROIDISM: ICD-10-CM

## 2022-12-07 DIAGNOSIS — I10 HTN (HYPERTENSION), BENIGN: Chronic | ICD-10-CM

## 2022-12-07 DIAGNOSIS — Z78.0 POSTMENOPAUSAL: ICD-10-CM

## 2022-12-07 DIAGNOSIS — R74.8 ELEVATED ALKALINE PHOSPHATASE LEVEL: ICD-10-CM

## 2022-12-07 PROCEDURE — 99214 OFFICE O/P EST MOD 30 MIN: CPT | Performed by: NURSE PRACTITIONER

## 2022-12-07 PROCEDURE — RXMED WILLOW AMBULATORY MEDICATION CHARGE: Performed by: NURSE PRACTITIONER

## 2022-12-07 RX ORDER — LISINOPRIL 40 MG/1
40 TABLET ORAL DAILY
Qty: 90 TABLET | Refills: 3 | Status: ON HOLD | OUTPATIENT
Start: 2022-12-07 | End: 2023-05-24

## 2022-12-07 ASSESSMENT — FIBROSIS 4 INDEX: FIB4 SCORE: 0.46

## 2022-12-07 NOTE — ASSESSMENT & PLAN NOTE
Chronic. Medications changed at last visit to lisinopril 30 mg daily, HCTZ stopped due to dehydration.      BP running high at home 120s-170s/70s-100s. Only taking BP before bed. Thinks that her BP cuff may be too small.     She had one fall since last visit, which is reduced from prior. Leg shaking and dizziness have almost completely resolved.     BP monitor is not working properly. Did get a reading of 140/90. Today in office 150/98.     Drinking 60 oz water  Decaf tea 42 oz   Coffee only twice in the past month  Soda down to 1 per day.

## 2022-12-08 NOTE — PROGRESS NOTES
Subjective:   Debby Desai is a 62 y.o. female here today for HTN follow up    HTN (hypertension), benign  Chronic. Medications changed at last visit to lisinopril 30 mg daily, HCTZ stopped due to dehydration.      BP running high at home 120s-170s/70s-100s. Only taking BP before bed. Thinks that her BP cuff may be too small.     She had one fall since last visit, which is reduced from prior. Leg shaking and dizziness have almost completely resolved.     BP monitor is not working properly. Did get a reading of 140/90. Today in office 150/98.     Drinking 60 oz water  Decaf tea 42 oz   Coffee only twice in the past month  Soda down to 1 per day.        Current medicines (including changes today)  Current Outpatient Medications   Medication Sig Dispense Refill    lisinopril (PRINIVIL) 40 MG tablet Take 1 Tablet by mouth every day. 90 Tablet 3    atorvastatin (LIPITOR) 20 MG Tab TAKE ONE TABLET BY MOUTH EVERY DAY 90 Tablet 3    venlafaxine XR (EFFEXOR XR) 75 MG CAPSULE SR 24 HR Take 1 Capsule by mouth every day. (venlafaxine xr 150 mg + 75 mg = 225 mg daily) 90 Capsule 2    venlafaxine (EFFEXOR-XR) 150 MG extended-release capsule Take 1 Capsule by mouth every day. (venlafaxine xr 150 mg + 75 mg = 225 mg daily) 90 Capsule 2    buPROPion (WELLBUTRIN XL) 300 MG XL tablet Take 1 tablet by mouth every morning. 90 Tablet 2    amitriptyline (ELAVIL) 50 MG Tab Take 1 Tablet by mouth every evening. 30 Tablet 5    traMADol (ULTRAM) 50 MG Tab Take 1 Tablet by mouth 2 times a day as needed for Severe Pain for up to 30 days. 60 Tablet 0    traMADol (ULTRAM) 50 MG Tab Take 1 Tablet by mouth 2 times a day as needed for Severe Pain for up to 30 days. 60 Tablet 0    VITAMIN D PO Take  by mouth every day.      metFORMIN ER (GLUCOPHAGE XR) 500 MG TABLET SR 24 HR Take 1 Tab by mouth 2 times a day. 180 Tablet 0    levothyroxine (SYNTHROID) 50 MCG Tab Take 1 tablet by mouth Every morning on an empty stomach. 90 Tablet 3    ibuprofen  "(MOTRIN) 200 MG Tab Take 600 mg by mouth every 6 hours as needed for Inflammation.      Ferrous Sulfate (IRON) 325 (65 Fe) MG Tab Take 65 mg by mouth every day at 6 PM.       No current facility-administered medications for this visit.     She  has a past medical history of Anemia, Anginal syndrome ([facility]), Anxiety, Arthritis, Chronic pain (6/2/2021), Dental disorder (5/24/12), Diabetes ([facility]), High cholesterol, HTN (hypertension), benign (3/19/2012), Hypothyroid (3/19/2012), Major depression (3/19/2012), Orbital floor (blow-out) closed fracture ([facility]), Other specified symptom associated with female genital organs, Pain, Pain, Pain (02/2018), Psychiatric problem, Unspecified disorder of thyroid, Urinary bladder disorder, and Urinary incontinence.    She has no past medical history of Breast cancer ([facility]).    ROS   No chest pain, no shortness of breath, no abdominal pain  Positive ROS as per HPI.  All other systems reviewed and are negative.     Objective:     BP (!) 150/90   Pulse 87   Temp 36.9 °C (98.5 °F) (Temporal)   Resp 16   Ht 1.676 m (5' 6\")   Wt 83.9 kg (185 lb)   SpO2 95%  Body mass index is 29.86 kg/m².     Physical Exam:  Constitutional: Alert, no distress.  Skin: Warm, dry, good turgor, no rashes in visible areas.  Eye: Equal, round and reactive, conjunctiva clear, lids normal.  ENMT: mask in place  Respiratory: Unlabored respiratory effort  Psych: Alert and oriented x3, normal affect and mood.        Assessment and Plan:   The following treatment plan was discussed    1. HTN (hypertension), benign  Unstable  Increase lisinopril to 40 mg daily  Continue with adequate hydration  May need second agent, will add amlodipine if needed  - lisinopril (PRINIVIL) 40 MG tablet; Take 1 Tablet by mouth every day.  Dispense: 90 Tablet; Refill: 3    2. Elevated alkaline phosphatase level  Unstable  Check Isoenzymes  - ALKALINE PHOSPHATASE ISOENZYMES; Future    3. Acquired hypothyroidism  - DS-BONE DENSITY STUDY " (DEXA); Future    4. Postmenopausal  - DS-BONE DENSITY STUDY (DEXA); Future      Followup: Return in about 4 weeks (around 1/4/2023) for Hypertension.    The MA is performing the above orders under the direction of Dr. Tena    Please note that this dictation was created using voice recognition software. I have made every reasonable attempt to correct obvious errors, but I expect that there are errors of grammar and possibly content that I did not discover before finalizing the note.

## 2022-12-13 ENCOUNTER — PHARMACY VISIT (OUTPATIENT)
Dept: PHARMACY | Facility: MEDICAL CENTER | Age: 62
End: 2022-12-13
Payer: COMMERCIAL

## 2022-12-14 ENCOUNTER — OFFICE VISIT (OUTPATIENT)
Dept: PHYSICAL MEDICINE AND REHAB | Facility: MEDICAL CENTER | Age: 62
End: 2022-12-14
Payer: COMMERCIAL

## 2022-12-14 ENCOUNTER — TELEPHONE (OUTPATIENT)
Dept: PAIN MANAGEMENT | Facility: REHABILITATION | Age: 62
End: 2022-12-14

## 2022-12-14 VITALS
SYSTOLIC BLOOD PRESSURE: 128 MMHG | HEART RATE: 96 BPM | OXYGEN SATURATION: 95 % | DIASTOLIC BLOOD PRESSURE: 80 MMHG | HEIGHT: 66 IN | WEIGHT: 177.2 LBS | BODY MASS INDEX: 28.48 KG/M2 | TEMPERATURE: 97.6 F

## 2022-12-14 DIAGNOSIS — M54.16 LUMBAR RADICULOPATHY: ICD-10-CM

## 2022-12-14 DIAGNOSIS — G89.29 CHRONIC PAIN OF RIGHT KNEE: ICD-10-CM

## 2022-12-14 DIAGNOSIS — Z96.651 S/P TKR (TOTAL KNEE REPLACEMENT), RIGHT: ICD-10-CM

## 2022-12-14 DIAGNOSIS — Z91.81 RISK FOR FALLS: ICD-10-CM

## 2022-12-14 DIAGNOSIS — M25.561 CHRONIC PAIN OF RIGHT KNEE: ICD-10-CM

## 2022-12-14 DIAGNOSIS — M43.17 SPONDYLOLISTHESIS AT L5-S1 LEVEL: ICD-10-CM

## 2022-12-14 PROCEDURE — 99214 OFFICE O/P EST MOD 30 MIN: CPT | Performed by: PHYSICAL MEDICINE & REHABILITATION

## 2022-12-14 RX ORDER — TRAMADOL HYDROCHLORIDE 50 MG/1
50 TABLET ORAL 2 TIMES DAILY PRN
Qty: 60 TABLET | Refills: 1 | Status: SHIPPED | OUTPATIENT
Start: 2022-12-21 | End: 2023-02-15 | Stop reason: SDUPTHER

## 2022-12-14 ASSESSMENT — FIBROSIS 4 INDEX: FIB4 SCORE: 0.46

## 2022-12-14 ASSESSMENT — PATIENT HEALTH QUESTIONNAIRE - PHQ9
5. POOR APPETITE OR OVEREATING: 0 - NOT AT ALL
SUM OF ALL RESPONSES TO PHQ QUESTIONS 1-9: 7
CLINICAL INTERPRETATION OF PHQ2 SCORE: 3

## 2022-12-14 ASSESSMENT — PAIN SCALES - GENERAL: PAINLEVEL: 6=MODERATE PAIN

## 2022-12-14 NOTE — PROGRESS NOTES
Follow-up patient note    Physiatry (physical medicine and  Rehabilitation), interventional spine and sports medicine    Date of Service: 12/14/2022    Chief complaint:   Chief Complaint   Patient presents with    Follow-Up     Right knee pain          HISTORY    HPI: Debby Desai 62 y.o. female who presents today for follow-up evaluation of her pain complaints related to lumbar radiculopathy and pain after right total knee replacement.    Since the last visit, Debby has had fewer episodes of orthostatic hypotension.  She has had some changes in her blood pressure medications, but Dr. Stevenson did not feel that she needed to change her TCA.  She is drinking more water.  Not using an assist device.  She still falls.  Her last fall was about a month ago.    From what she reports, she has difficulty getting up off of the ground.  Using a couch or chair to get up as she is not able to put pressure on her knees.    Knee pain continues, right is greatest.  Pain is 6/10 on the NRS.    Depression is stable, she has been working with her counselor.  She is seeing Dr. Stevenson for Psychiatry.  Denies suicidality.  She sees Estella at [email or web address].    Takes tramadol 50mg twice a day, this has been fair to okay.  Some days takes one, some days two. Taking tramadol and wellbutrin, effexor, elavil.  Taking aleve, one pill less than once a day now.    Denies suicidality.  PHQ-9: 16, now 7     By history:   She reports that she had surgery on 09/16/2019.  This was a right total knee arthroplasty for osteoarthritis with Dr. Saba.    In 2001, she had discectomy.  She reports that she has had a spinal cord stimulator placed, but she has not been using this as much.  She reports that the unit is not currently working.    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  She gets up about once an hour.  She has a sit to stand desk, but it is difficult to stand much.  Currently, she is  working from home due to the COVID-19 pandemic.    Medical records review:  ED records from 10/07/2020 and 09/22/2020 reviewed.  ED visit from [id number].  Negative head CT.  She was given instructions about taking ibuprofen and tylenol.  Noted that she was also given a script for valium.     I reviewed the note from the referring provider Loy Miles M.D. dated 01/08/2020: Visits included nausea/epigastric pain, hypertension, chronic knee pain, IGT, dyslipidemia, MDD, hypothyroidism, iron deficiency    Previous treatments:    Physical Therapy: Yes    Medications the patient is tried: tramadol, tylenol (usually for a headache); in the past she took gabapentin 400mg po tid, she was taking vimovo and norco in the past; lyrica caused weight gain    Previous interventions: She had radiofrequency ablation in the past, prior to surgery    Previous surgeries to relieve the above pain:  None other than surgery 09/16/2019      ROS: Unchanged, see HPI  Gen: weight loss  Eyes: vision problems, glasses and contacts  CV: chest pain/anxiety  GI: nausea, ulcers  : urgency  Psych: depression  Endo: thyroid problems  Heme: anemia    Red Flags ROS:   Fever, Chills, Sweats: Denies  Involuntary Weight Loss: Denies  Bladder Incontinence: Denies  Bowel Incontinence: Denies  Saddle Anesthesia: Denies    All other systems reviewed and negative.       PMHx:   Past Medical History:   Diagnosis Date    Anemia     in past    Anginal syndrome (HCC)     had work up and feet r/t anxiety    Anxiety     Arthritis     OA knees    Chronic pain 6/2/2021    Dental disorder 5/24/12    partial upper denture    Diabetes (HCC)     pre diabetic    High cholesterol     HTN (hypertension), benign 3/19/2012    Hypothyroid 3/19/2012    Major depression 3/19/2012    Orbital floor (blow-out) closed fracture (HCC)     left    Other specified symptom associated with female genital organs     post menopausal bleeding    Pain     thoracic spine, Right knee,  myofacial pain, and Right shoulder    Pain     recently fell and has bruise left forehead    Pain 02/2018    chronic back pain, right shoulder    Psychiatric problem     depression, anxiety    Unspecified disorder of thyroid     Urinary bladder disorder     stress incont.    Urinary incontinence     Occasional       PSHx:   Past Surgical History:   Procedure Laterality Date    INJ,EPI ANES/STER LUM/SAC ADDL Right 4/7/2021    Procedure: RIGHT lumbar five and sacral one transforaminal epidural;  Surgeon: Volodymyr Herring M.D.;  [facility];  Service: Pain Management    PB TOTAL KNEE ARTHROPLASTY Right 9/16/2019    Procedure: RIGHT ARTHROPLASTY, KNEE, TOTAL;  Surgeon: Rufus Saba M.D.;  Location: [facility];  Service: Orthopedics    KNEE ARTHROSCOPY Right 2/9/2018    Procedure: KNEE ARTHROSCOPY;  Surgeon: Harsh Baltazar M.D.;  Location: [facility];  Service: Orthopedics    MENISCECTOMY, KNEE, MEDIAL Right 2/9/2018    Procedure: MEDIAL AND LATERAL MENISCECTOMY- PARTIAL;  Surgeon: Harsh Baltazar M.D.;  Location: [facility];  Service: Orthopedics    KNEE ARTHROSCOPY Right 7/12/2017    Procedure: KNEE ARTHROSCOPY- CESPEDES;  Surgeon: Harsh Baltazar M.D.;  Location: [facility];  Service:     MENISCECTOMY, KNEE, MEDIAL Right 7/12/2017    Procedure: PARTIAL MEDIAL AND LATERAL MENISCECTOMY;  Surgeon: Harsh Baltazar M.D.;  Location: [facility];  Service:     SYNOVECTOMY Right 7/12/2017    Procedure: SYNOVECTOMY;  Surgeon: Harsh Baltazar M.D.;  Location: [facility];  Service:     KNEE ARTHROSCOPY  4/21/2015    Performed by Rufus Saba M.D. at [facility]    MENISCECTOMY, KNEE, MEDIAL  4/21/2015    Performed by Rufus Saba M.D. at [facility]    PUMP REVISION  12/10/2012    Performed by Mak Guerra M.D. at [facility]    SPINAL CORD  "STIMULATOR  5/30/2012    Performed by ALLISON CAM at SURGERY [facility]    BIOPSY GENERAL  5/1/12    endometrial    SPINAL CORD STIMULATOR  7/11/2011    Performed by ALLISON CAM at SURGERY [facility]    BLOCK EPIDURAL STEROID INJECTION  2008    x 3-4    LYMPH NODE EXCISION Left 2007    cervicle    OTHER Right 2001    thoracic discectomy      ARTHROSCOPY, KNEE Left 1999    RIB RESECTION Right 1980    LUMPECTOMY         Family history   Family History   Problem Relation Age of Onset    Hypertension Father     Diabetes Father     Heart Disease Father     Alcohol abuse Father     Anesthesia Sister         slow to come out (as a child)    Diabetes Other     Heart Disease Other     Cancer Other     Hypertension Other     Stroke Other     Lung Disease Other          Medications:   Current Outpatient Medications   Medication    [START ON 12/21/2022] traMADol (ULTRAM) 50 MG Tab    lisinopril (PRINIVIL) 40 MG tablet    atorvastatin (LIPITOR) 20 MG Tab    venlafaxine XR (EFFEXOR XR) 75 MG CAPSULE SR 24 HR    venlafaxine (EFFEXOR-XR) 150 MG extended-release capsule    buPROPion (WELLBUTRIN XL) 300 MG XL tablet    amitriptyline (ELAVIL) 50 MG Tab    VITAMIN D PO    metFORMIN ER (GLUCOPHAGE XR) 500 MG TABLET SR 24 HR    levothyroxine (SYNTHROID) 50 MCG Tab    ibuprofen (MOTRIN) 200 MG Tab    Ferrous Sulfate (IRON) 325 (65 Fe) MG Tab     No current facility-administered medications for this visit.       Allergies:   Allergies   Allergen Reactions    Sulfamethoxazole-Trimethoprim Rash and Swelling     Pt states \"My hand swells up and rash all over body\".       Social Hx:   Social History     Socioeconomic History    Marital status: Single     Spouse name: Not on file    Number of children: Not on file    Years of education: Not on file    Highest education level: Associate degree: academic program   Occupational History    Not on file   Tobacco Use    Smoking status: Never    Smokeless tobacco: Never " "  Vaping Use    Vaping Use: Never used   Substance and Sexual Activity    Alcohol use: Not Currently    Drug use: No    Sexual activity: Never     Partners: Male   Other Topics Concern     Service No    Blood Transfusions No    Caffeine Concern No    Occupational Exposure No    Hobby Hazards No    Sleep Concern Yes    Stress Concern Yes    Weight Concern Yes    Special Diet No    Back Care No    Exercise No    Bike Helmet No    Seat Belt Yes    Self-Exams No   Social History Narrative    Not on file     Social Determinants of Health     Financial Resource Strain: Low Risk     Difficulty of Paying Living Expenses: Not hard at all   Food Insecurity: No Food Insecurity    Worried About Running Out of Food in the Last Year: Never true    Ran Out of Food in the Last Year: Never true   Transportation Needs: No Transportation Needs    Lack of Transportation (Medical): No    Lack of Transportation (Non-Medical): No   Physical Activity: Inactive    Days of Exercise per Week: 0 days    Minutes of Exercise per Session: 0 min   Stress: Stress Concern Present    Feeling of Stress : Rather much   Social Connections: Socially Isolated    Frequency of Communication with Friends and Family: More than three times a week    Frequency of Social Gatherings with Friends and Family: Once a week    Attends [employer] Services: Never    Active Member of Clubs or Organizations: No    Attends Club or Organization Meetings: Never    Marital Status: Never    Intimate Partner Violence: Not on file   Housing Stability: Low Risk     Unable to Pay for Housing in the Last Year: No    Number of Places Lived in the Last Year: 1    Unstable Housing in the Last Year: No         EXAMINATION     Physical Exam:   Vitals: /80 (BP Location: Left arm, Patient Position: Sitting, BP Cuff Size: Adult long)   Pulse 96   Temp 36.4 °C (97.6 °F) (Temporal)   Ht 1.676 m (5' 6\")   Wt 80.4 kg (177 lb 3.2 oz)   SpO2 95%     Constitutional: "   Body Habitus: Body mass index is 28.6 kg/m².  Cooperation: Fully cooperates with exam  Appearance: Well-groomed, well-nourished, not disheveled no acute distress    Eyes: No scleral icterus, no proptosis     ENT -no obvious auditory deficits, wearing a face mask    Skin -no visible skin lesions    Respiratory-  breathing comfortable on room air, no audible wheezing    Cardiovascular- No lower extremity edema is noted.     Psychiatric- alert and oriented ×3. Normal affect.     Gait -  Antalgic, no assist device, no loss of balance    Neuro/Muscloskeletal  No focal motor deficits in the lower extremities bilaterally.  No sensory deficits in the lower extremities bilaterally    Moderate effusion of right knee.  Mild tenderness medial and lateral joint line, medial greater.  Full ROM of the right knee with greater than 120 degrees of flexion.        MEDICAL DECISION MAKING    Medical records review: see under HPI section.     DATA    Labs:     03/14/2020 CRP 0.19  03/14/2020 Sed rate 20  02/25/2020: Tramadol and metabolites  09/21/2021 UDS consistent with tramadol and metabolites  08/17/2022 UDS consistent with tramadol and metaboiltes    Lab Results   Component Value Date/Time    SODIUM 140 09/26/2022 07:34 AM    POTASSIUM 4.3 09/26/2022 07:34 AM    CHLORIDE 98 09/26/2022 07:34 AM    CO2 30 09/26/2022 07:34 AM    ANION 12.0 09/26/2022 07:34 AM    GLUCOSE 121 (H) 09/26/2022 07:34 AM    BUN 13 09/26/2022 07:34 AM    CREATININE 0.97 09/26/2022 07:34 AM    CALCIUM 9.4 09/26/2022 07:34 AM    ASTSGOT 13 09/26/2022 07:34 AM    ALTSGPT 13 09/26/2022 07:34 AM    TBILIRUBIN 0.2 09/26/2022 07:34 AM    ALBUMIN 4.4 09/26/2022 07:34 AM    TOTPROTEIN 7.4 09/26/2022 07:34 AM    GLOBULIN 3.0 09/26/2022 07:34 AM    AGRATIO 1.5 09/26/2022 07:34 AM       Lab Results   Component Value Date/Time    PROTHROMBTM 12.6 10/07/2020 10:45 PM    INR 0.91 10/07/2020 10:45 PM        Lab Results   Component Value Date/Time    WBC 8.5 09/26/2022  07:34 AM    RBC 4.38 09/26/2022 07:34 AM    HEMOGLOBIN 13.7 09/26/2022 07:34 AM    HEMATOCRIT 42.2 09/26/2022 07:34 AM    MCV 96.3 09/26/2022 07:34 AM    MCH 31.3 09/26/2022 07:34 AM    MCHC 32.5 (L) 09/26/2022 07:34 AM    MPV 11.6 09/26/2022 07:34 AM    NEUTSPOLYS 60.40 09/26/2022 07:34 AM    LYMPHOCYTES 29.50 09/26/2022 07:34 AM    MONOCYTES 6.80 09/26/2022 07:34 AM    EOSINOPHILS 2.40 09/26/2022 07:34 AM    BASOPHILS 0.50 09/26/2022 07:34 AM        Lab Results   Component Value Date/Time    HBA1C 5.6 09/26/2022 07:34 AM        Imaging: I personally reviewed following images, these are my reads  Xray thoracolumbar spine 07/14/2022  Degenerative changes and note of electrode from T6-T9    Xray right knee 07/14/2022  S/P right total knee without evidence of fracture    CT left knee May 8, 2022  There is note of moderate to large joint effusion with note of nondisplaced fracture at the medial aspect of the proximal fibula.  Moderate osteoarthritis    CT cervical spine 09/22/2020  There is note of cervical spondylosis with multilevel uncovertebral arthropathy    Xray right hip 03/14/2020  There is mild osteoarthritis of the right hip.      Xray lumbar 03/14/2020  There is mild anterolisthesis at L5-S1.  No abnormal motion on flexion and extension  There is DDD and facet arthropathy that is most noted at L5-S1 and less at L4-5    Xray right knee 09/16/2019  There is note of right knee arthroplasty    IMAGING radiology reads. I reviewed the following radiology reads  Xray right knee 07/14/2022  IMPRESSION:  1.  No acute fracture or dislocation of the right knee.    Xray thoracolumbar spine 07/14/2022  IMPRESSION:  1.  No acute or subacute fracture of the thoracolumbar spine.ay     CT left knee May 8, 2022  IMPRESSION:     1.  Nondisplaced fracture of medial aspect of the proximal fibula is identified.  2.  No other fractures identified.  3.  Joint effusion is identified.   4.  Moderate osteoarthritis.    Xray right knee  09/18/2021  Multiple standing views of the right knee show previous right total knee   replacement with hardware in good position without signs of loosening or   dislocation.  No obvious fracture noted.    Xray left shoulder 10/27/2021  X-rays reviewed today of the left shoulder show normal overall bony   alignment she has some AC degenerative change.  She has some osteophyte   formation off the inferior humeral head.  Some mild glenohumeral joint   space narrowing patient.  Type III acromion.    CT cervical spine 09/22/2020  IMPRESSION:  Degenerative change without evidence of cervical spine fracture.    Xray lumbar 03/14/2020  IMPRESSION:  1.  No compression deformity or acute fracture is identified.  2.  Mild anterolisthesis at L5-S1.  3.  Degenerative disc disease and facet arthropathy.  4.  No focal instability noted on flexion extension views.                                                Xray right hip 03/14/2020  IMPRESSION:     1.  No radiographic evidence of acute traumatic injury.  2.  Findings are consistent with mild osteoarthritis.  3.  Probable constipation.                                   Results for orders placed during the hospital encounter of 09/16/19   DX-KNEE 2- RIGHT    Impression Right knee arthroplasty.      Results for orders placed during the hospital encounter of 03/28/19   DX-KNEE 3 VIEWS RIGHT    Impression No evidence of acute fracture or dislocation.    Degenerative changes as above described.                              Diagnosis   Visit Diagnoses     ICD-10-CM   1. Chronic pain of right knee  M25.561    G89.29   2. S/P TKR (total knee replacement), right  Z96.651   3. Spondylolisthesis at L5-S1 level  M43.17   4. Lumbar radiculopathy  M54.16   5. Risk for falls  Z91.81                 ASSESSMENT:  Debby Engle Lloyd 62 y.o. female seen for above.     Debby was seen today for follow-up.    Diagnoses and all orders for this visit:    Chronic pain of right knee  -     Referral to  "Pain Clinic    S/P TKR (total knee replacement), right  -     Referral to Pain Clinic  -     traMADol (ULTRAM) 50 MG Tab; Take 1 Tablet by mouth 2 times a day as needed for Severe Pain for up to 60 days.  -     Consent for Opiate Prescription  -     Controlled Substance Treatment Agreement    Spondylolisthesis at L5-S1 level  -     traMADol (ULTRAM) 50 MG Tab; Take 1 Tablet by mouth 2 times a day as needed for Severe Pain for up to 60 days.  -     Consent for Opiate Prescription  -     Controlled Substance Treatment Agreement    Lumbar radiculopathy  -     traMADol (ULTRAM) 50 MG Tab; Take 1 Tablet by mouth 2 times a day as needed for Severe Pain for up to 60 days.  -     Consent for Opiate Prescription  -     Controlled Substance Treatment Agreement    Risk for falls  -     Patient identified as fall risk.  Appropriate orders and counseling given.      Encouraged her to use a cane.  She has stopped using her walker, but is still having falls.  She has been working with her PCP about hypertension.    Continue working with psychology and Dr. Stevenson  Continue tramadol, taking up to twice a day.  This helps manage her pain.  She continues to use activity modification and nonpharmacologic techniques, using ice and sometimes heat.   reviewed. Most recent UDS reviewed from 08/17/2022 consistent.  Plan to refill today.  Battery for SCS is \"dead\" but she reports that she had good relief of thoracic pain and ribs.  This pain is better.  Hold on further treatment for now, pending PCP evaluation.  Radicular symptoms are improved.  Discussed right knee pain that is ongoing after total knee replacement.  Discussed right genicular nerve blocks.  The risks benefits and alternatives to this procedure were discussed and the patient wishes to proceed with the procedure. Risks include but are not limited to damage to surrounding structures, infection, bleeding, worsening of pain which can be permanent, weakness which can be " permanent. Benefits include pain relief, improved function. Alternatives includes not doing the procedure.   She would like to proceed.        Follow-up: Return for Hospital injection.      Thank you very much for asking me to participate in Debby Desai's care.  Please contact me with any questions or concerns.      Please note that this dictation was created using voice recognition software. I have made every reasonable attempt to correct obvious errors but there may be errors of grammar and content that I may have overlooked prior to finalization of this note.      Volodymyr Herring MD  Physical Medicine and Rehabilitation  Interventional Spine and Sports Physiatry  [facility]

## 2022-12-14 NOTE — PATIENT INSTRUCTIONS
Your procedure will be at the [facility] special procedure suite.    [facility] [place] [address]       PRE-PROCEDURE INSTRUCTIONS  You may take your regular medications except:   No Anti-inflammatories 5 days prior to your procedure. Anti-inflammatories include medicines such as  ibuprofen (Motrin, Advil), Excedrin, Naproxen (Aleve, Anaprox, Naprelan, Naprosyn), Celecoxib (Celebrex), Diclofenac (Voltaren-XR tab), and Meloxicam (Mobic).   No Glucophage or Metformin 24 hours before your procedure. You may resume next day after your procedure.  Call the physiatry office if you are taking or prescribed anti-biotics within five days of procedure.  Please ask provider if you are taking any new diabetes medication.  CONTINUE TAKING BLOOD PRESSURE MEDICATIONS AS PRESCRIBED.  Pain medications will not be prescribed on the procedure day. Procedural pain medication may be used by your provider   Call your doctor's office performing the procedure if you have a fever, chills, rash or new illness prior to your procedure    Anticoagulation/antiplatelet medications  No Blood thinning medications such as Coumadin or Plavix 5 days prior to procedure unless your doctor said to continue these medications. Call your doctor if a new medication is prescribed in this class.     Restrictions for eating before procedure:   If you are getting procedural sedation, then do not eat to for 8 hours prior to procedure appointment time. Do not drink fluids for four hours prior to your procedure time.   If you are not having procedural sedation, then Skip the meal prior to your procedure. If you have a morning procedure then skip breakfast. If you have an afternoon procedure then skip lunch.   You may drink clear liquids up to 2 hours prior to your procedure  You must have a  the day of procedure to accompany you home.      POST PROCEDURE INSTRUCTIONS   No heavy lifting, strenuous bending or strenuous exercise for 3 days  after your procedure.  No hot tubs, baths, swimming for 3 days after your procedure  You can remove the bandage the day after the procedure.  IF YOU RECEIVED A STEROID INJECTION. PLEASE NOTE THAT THERE MAY BE A DELAY FOR THE INJECTION TO START WORKING, THE DELAY MAY BE UP TO TWO WEEKS. IF YOU HAVE DIABETES, PLEASE NOTE THAT YOUR SUGAR LEVELS MAY BE ELEVATED FOR 1-2 DAYS AFTER A STEROID INJECTION.  THE STEROID MAY CAUSE TEMPORARY SYMPTOMS WHICH USUALLY RESOLVE ON THEIR OWN WITHIN 1 TO 2 DAYS INCLUDING FACIAL FLUSHING OR A FEELING OF WARMTH ON THE FACE, TEMPORARY INCREASES IN BLOOD SUGAR, INSOMNIA, INCREASED HUNGER  IF YOU RECEIVED A DIAGNOSTIC PROCEDURE (SUCH AS A MEDIAL BRANCH BLOCK), PLEASE NOTE THAT WE DO EXPECT THIS INJECTION TO WEAR OFF.  IT IS IMPORTANT TO COMPLETE THE PAIN DIARY AND LIST THE PAIN SCORE ONLY FOR THE REGION WHERE THE PROCEDURE WAS AND BRING THIS TO YOUR FOLLOW UP VISIT.  IF YOU RECEIVED A RADIOFREQUENCY PROCEDURE, THERE MAY BE SOME SORENESS AFTER THE PROCEDURE.  THIS IS NORMAL.  IF YOU EXPERIENCE PROLONGED WEAKNESS LONGER THAN ONE DAY, BOWEL OR BLADDER INCONTINENCE THEN PLEASE CALL THE PHYSIATRY OFFICE.  Your leg may feel heavy, weak and numb for up to 1-2 days. Be very careful walking.    You may resume normal activities 3 days after procedure.

## 2022-12-15 ENCOUNTER — HOSPITAL ENCOUNTER (OUTPATIENT)
Facility: REHABILITATION | Age: 62
End: 2022-12-15
Attending: PHYSICAL MEDICINE & REHABILITATION | Admitting: PHYSICAL MEDICINE & REHABILITATION
Payer: COMMERCIAL

## 2022-12-19 PROCEDURE — RXMED WILLOW AMBULATORY MEDICATION CHARGE: Performed by: PHYSICAL MEDICINE & REHABILITATION

## 2022-12-21 PROCEDURE — RXMED WILLOW AMBULATORY MEDICATION CHARGE: Performed by: NURSE PRACTITIONER

## 2022-12-21 PROCEDURE — RXMED WILLOW AMBULATORY MEDICATION CHARGE: Performed by: PSYCHIATRY & NEUROLOGY

## 2022-12-21 RX ORDER — LEVOTHYROXINE SODIUM 0.05 MG/1
50 TABLET ORAL
Qty: 90 TABLET | Refills: 2 | Status: ON HOLD | OUTPATIENT
Start: 2022-12-21 | End: 2023-05-24

## 2022-12-23 ENCOUNTER — PHARMACY VISIT (OUTPATIENT)
Dept: PHARMACY | Facility: MEDICAL CENTER | Age: 62
End: 2022-12-23
Payer: COMMERCIAL

## 2023-01-05 ENCOUNTER — APPOINTMENT (OUTPATIENT)
Dept: RADIOLOGY | Facility: MEDICAL CENTER | Age: 63
End: 2023-01-05
Attending: NURSE PRACTITIONER
Payer: COMMERCIAL

## 2023-01-12 ENCOUNTER — OFFICE VISIT (OUTPATIENT)
Dept: MEDICAL GROUP | Facility: MEDICAL CENTER | Age: 63
End: 2023-01-12
Payer: COMMERCIAL

## 2023-01-12 VITALS
WEIGHT: 175 LBS | BODY MASS INDEX: 28.12 KG/M2 | HEART RATE: 100 BPM | TEMPERATURE: 98 F | OXYGEN SATURATION: 98 % | SYSTOLIC BLOOD PRESSURE: 118 MMHG | DIASTOLIC BLOOD PRESSURE: 90 MMHG | HEIGHT: 66 IN

## 2023-01-12 DIAGNOSIS — I10 HTN (HYPERTENSION), BENIGN: Chronic | ICD-10-CM

## 2023-01-12 PROCEDURE — 99214 OFFICE O/P EST MOD 30 MIN: CPT | Performed by: NURSE PRACTITIONER

## 2023-01-12 PROCEDURE — RXMED WILLOW AMBULATORY MEDICATION CHARGE: Performed by: NURSE PRACTITIONER

## 2023-01-12 RX ORDER — AMLODIPINE BESYLATE 5 MG/1
5 TABLET ORAL DAILY
Qty: 90 TABLET | Refills: 3 | Status: SHIPPED | OUTPATIENT
Start: 2023-01-12 | End: 2023-02-16 | Stop reason: SDUPTHER

## 2023-01-12 ASSESSMENT — FIBROSIS 4 INDEX: FIB4 SCORE: 0.46

## 2023-01-12 ASSESSMENT — PATIENT HEALTH QUESTIONNAIRE - PHQ9
SUM OF ALL RESPONSES TO PHQ QUESTIONS 1-9: 9
5. POOR APPETITE OR OVEREATING: 0 - NOT AT ALL
CLINICAL INTERPRETATION OF PHQ2 SCORE: 4

## 2023-01-12 NOTE — PROGRESS NOTES
Subjective:   Debby Desai is a 62 y.o. female here today for HTN follow up    HTN (hypertension), benign  Chronic. Currently taking lisinopril 40 mg daily, HCTZ stopped due to dehydration.      BP running high at home 120s-190s/70s-100s.    She has been under high stress as her sister is currently dying from cancer and she has been trying to assist in caring for her.  Patient also has severe depression and anxiety for which she is seeing psychiatry and therapy which has been helping.    She had one fall since last visit, which is reduced from prior. Leg shaking and dizziness had almost completely resolved but she had another episode recently when walking causing her to fall.     Drinking 60 oz water  Decaf tea 42 oz   Coffee only twice in the past month  Soda down to 1 per day.     /80 with manual, 138/83 with home monitor which she just purchased.        Current medicines (including changes today)  Current Outpatient Medications   Medication Sig Dispense Refill    amLODIPine (NORVASC) 5 MG Tab Take 1 Tablet by mouth every day. 90 Tablet 3    levothyroxine (SYNTHROID) 50 MCG Tab Take 1 tablet by mouth Every morning on an empty stomach. 90 Tablet 2    traMADol (ULTRAM) 50 MG Tab Take 1 Tablet by mouth 2 times a day as needed for Severe Pain for up to 30 days. 60 Tablet 1    lisinopril (PRINIVIL) 40 MG tablet Take 1 Tablet by mouth every day. 90 Tablet 3    atorvastatin (LIPITOR) 20 MG Tab TAKE ONE TABLET BY MOUTH EVERY DAY 90 Tablet 3    venlafaxine XR (EFFEXOR XR) 75 MG CAPSULE SR 24 HR Take 1 Capsule by mouth every day. (venlafaxine xr 150 mg + 75 mg = 225 mg daily) 90 Capsule 2    venlafaxine (EFFEXOR-XR) 150 MG extended-release capsule Take 1 Capsule by mouth every day. (venlafaxine xr 150 mg + 75 mg = 225 mg daily) 90 Capsule 2    buPROPion (WELLBUTRIN XL) 300 MG XL tablet Take 1 tablet by mouth every morning. 90 Tablet 2    amitriptyline (ELAVIL) 50 MG Tab Take 1 Tablet by mouth every evening. 30  "Tablet 5    VITAMIN D PO Take  by mouth every day.      metFORMIN ER (GLUCOPHAGE XR) 500 MG TABLET SR 24 HR Take 1 Tab by mouth 2 times a day. 180 Tablet 0    Ferrous Sulfate (IRON) 325 (65 Fe) MG Tab Take 65 mg by mouth every day at 6 PM.       No current facility-administered medications for this visit.     She  has a past medical history of Anemia, Anginal syndrome ([facility]), Anxiety, Arthritis, Chronic pain (6/2/2021), Dental disorder (5/24/12), Diabetes ([facility]), High cholesterol, HTN (hypertension), benign (3/19/2012), Hypothyroid (3/19/2012), Major depression (3/19/2012), Orbital floor (blow-out) closed fracture ([facility]), Other specified symptom associated with female genital organs, Pain, Pain, Pain (02/2018), Psychiatric problem, Unspecified disorder of thyroid, Urinary bladder disorder, and Urinary incontinence.    She has no past medical history of Breast cancer ([facility]).    ROS  No chest pain, no shortness of breath, no abdominal pain  Positive ROS as per HPI.  All other systems reviewed and are negative.     Objective:     BP (!) 118/90 (BP Location: Right arm, Patient Position: Sitting, BP Cuff Size: Adult)   Pulse 100   Temp 36.7 °C (98 °F) (Temporal)   Ht 1.676 m (5' 6\")   Wt 79.4 kg (175 lb)   SpO2 98%  Body mass index is 28.25 kg/m².   Physical Exam:  Constitutional: Alert, no distress.  Skin: Warm, dry, good turgor, no rashes in visible areas.  Eye: Mask in place  Respiratory: Unlabored respiratory effort  Psych: Alert and oriented x3, normal affect and mood.      Assessment and Plan:   The following treatment plan was discussed    1. HTN (hypertension), benign  Unstable  Continue lisinopril 40 mg daily  Add amlodipine 5 mg daily.  If blood pressure is still elevated greater than 130s systolic after 1 to 2 weeks, increase amlodipine to 10 mg daily.  Continue monitoring blood pressure at least once daily  - amLODIPine (NORVASC) 5 MG Tab; Take 1 Tablet by mouth every day.  Dispense: 90 Tablet; Refill: " 3      Followup: Return in about 4 weeks (around 2/9/2023) for Hypertension.    The MA is performing the above orders under the direction of Dr. Tnea    Please note that this dictation was created using voice recognition software. I have made every reasonable attempt to correct obvious errors, but I expect that there are errors of grammar and possibly content that I did not discover before finalizing the note.

## 2023-01-12 NOTE — ASSESSMENT & PLAN NOTE
Chronic. Currently taking lisinopril 40 mg daily, HCTZ stopped due to dehydration.      BP running high at home 120s-190s/70s-100s.    She has been under high stress as her sister is currently dying from cancer and she has been trying to assist in caring for her.  Patient also has severe depression and anxiety for which she is seeing psychiatry and therapy which has been helping.    She had one fall since last visit, which is reduced from prior. Leg shaking and dizziness had almost completely resolved but she had another episode recently when walking causing her to fall.     Drinking 60 oz water  Decaf tea 42 oz   Coffee only twice in the past month  Soda down to 1 per day.     /80 with manual, 138/83 with home monitor which she just purchased.

## 2023-01-20 ENCOUNTER — PHARMACY VISIT (OUTPATIENT)
Dept: PHARMACY | Facility: MEDICAL CENTER | Age: 63
End: 2023-01-20
Payer: COMMERCIAL

## 2023-01-20 PROCEDURE — RXMED WILLOW AMBULATORY MEDICATION CHARGE: Performed by: PSYCHIATRY & NEUROLOGY

## 2023-01-20 PROCEDURE — RXMED WILLOW AMBULATORY MEDICATION CHARGE: Performed by: PHYSICAL MEDICINE & REHABILITATION

## 2023-01-25 ENCOUNTER — PHARMACY VISIT (OUTPATIENT)
Dept: PHARMACY | Facility: MEDICAL CENTER | Age: 63
End: 2023-01-25
Payer: COMMERCIAL

## 2023-01-31 ENCOUNTER — TELEMEDICINE (OUTPATIENT)
Dept: BEHAVIORAL HEALTH | Facility: CLINIC | Age: 63
End: 2023-01-31
Payer: COMMERCIAL

## 2023-01-31 DIAGNOSIS — F33.2 SEVERE EPISODE OF RECURRENT MAJOR DEPRESSIVE DISORDER, WITHOUT PSYCHOTIC FEATURES (HCC): ICD-10-CM

## 2023-01-31 DIAGNOSIS — F33.42 RECURRENT MAJOR DEPRESSIVE DISORDER, IN FULL REMISSION (HCC): ICD-10-CM

## 2023-01-31 DIAGNOSIS — G47.09 OTHER INSOMNIA: ICD-10-CM

## 2023-01-31 DIAGNOSIS — F41.1 GAD (GENERALIZED ANXIETY DISORDER): ICD-10-CM

## 2023-01-31 PROCEDURE — 99214 OFFICE O/P EST MOD 30 MIN: CPT | Mod: 95 | Performed by: PSYCHIATRY & NEUROLOGY

## 2023-01-31 PROCEDURE — RXMED WILLOW AMBULATORY MEDICATION CHARGE: Performed by: PSYCHIATRY & NEUROLOGY

## 2023-01-31 RX ORDER — AMITRIPTYLINE HYDROCHLORIDE 50 MG/1
50 TABLET, FILM COATED ORAL NIGHTLY
Qty: 30 TABLET | Refills: 5 | Status: SHIPPED | OUTPATIENT
Start: 2023-01-31 | End: 2023-04-18 | Stop reason: SDUPTHER

## 2023-01-31 RX ORDER — VENLAFAXINE HYDROCHLORIDE 75 MG/1
75 CAPSULE, EXTENDED RELEASE ORAL DAILY
Qty: 90 CAPSULE | Refills: 2 | Status: SHIPPED | OUTPATIENT
Start: 2023-01-31 | End: 2023-04-18 | Stop reason: SDUPTHER

## 2023-01-31 RX ORDER — VENLAFAXINE HYDROCHLORIDE 150 MG/1
150 CAPSULE, EXTENDED RELEASE ORAL DAILY
Qty: 90 CAPSULE | Refills: 2 | Status: ON HOLD | OUTPATIENT
Start: 2023-01-31 | End: 2023-06-09 | Stop reason: SDUPTHER

## 2023-01-31 RX ORDER — BUPROPION HYDROCHLORIDE 300 MG/1
300 TABLET ORAL EVERY MORNING
Qty: 90 TABLET | Refills: 2 | Status: SHIPPED | OUTPATIENT
Start: 2023-01-31 | End: 2023-04-18

## 2023-01-31 NOTE — PROGRESS NOTES
This evaluation was conducted via Zoom using secure and encrypted videoconferencing technology. The patient was in their home in the [facility].    The patient's identity was confirmed and verbal consent was obtained for this virtual visit.      PSYCHIATRY FOLLOW-UP NOTE      Name: Debby Desai  MRN: [id number]  : 1960  Age: 62 y.o.  Date of assessment: 2023  PCP: ARELIS Chris  Persons in attendance: Patient      REASON FOR VISIT/CHIEF COMPLAINT (as stated by Patient):  Debby Desai is a 62 y.o., White female, attending follow-up appointment for mood and anxiety management.      HISTORY OF PRESENT ILLNESS:  Debby Desai is a 62 y.o. old female with MDD, TIM and insomnia comes in today for follow up. Patient was last seen 2.5 months ago, and following treatment planning recommendations were done:  Continue Effexor XR to 225 mg daily for mood, anxiety and comorbid pain management.   Continue Wellbutrin XL to 300 mg daily for depression augmentation.    Continue Amitriptyline 50 mg at at bedtime as needed for insomnia.    Monitor for serotonin syndrome.  Continue psychotherapy for mood and anxiety management.      Patient is compliant with medications.  No side effects and describes stable mood, anxiety and sleep.  She is able to deal with stressors more effectively and agreed with not titrating medications further.  Patient is struggling with dizziness and have reduced episodes of falling.  She is working with primary care physician but again educated to cut down amitriptyline to half tablet and assess if we can keep her on lowest effective dosage of medication and patient is in agreement with this planning.      CURRENT MEDICATIONS:  Current Outpatient Medications   Medication Sig Dispense Refill    amLODIPine (NORVASC) 5 MG Tab Take 1 tablet by mouth every day. 90 Tablet 3    levothyroxine (SYNTHROID) 50 MCG Tab Take 1 tablet by mouth Every morning on an empty stomach. 90  Tablet 2    traMADol (ULTRAM) 50 MG Tab Take 1 Tablet by mouth 2 times a day as needed for Severe Pain for up to 30 days. 60 Tablet 1    lisinopril (PRINIVIL) 40 MG tablet Take 1 Tablet by mouth every day. 90 Tablet 3    atorvastatin (LIPITOR) 20 MG Tab TAKE ONE TABLET BY MOUTH EVERY DAY 90 Tablet 3    venlafaxine XR (EFFEXOR XR) 75 MG CAPSULE SR 24 HR Take 1 Capsule by mouth every day. (venlafaxine xr 150 mg + 75 mg = 225 mg daily) 90 Capsule 2    venlafaxine (EFFEXOR-XR) 150 MG extended-release capsule Take 1 Capsule by mouth every day. (venlafaxine xr 150 mg + 75 mg = 225 mg daily) 90 Capsule 2    buPROPion (WELLBUTRIN XL) 300 MG XL tablet Take 1 tablet by mouth every morning. 90 Tablet 2    amitriptyline (ELAVIL) 50 MG Tab Take 1 Tablet by mouth every evening. 30 Tablet 5    VITAMIN D PO Take  by mouth every day.      metFORMIN ER (GLUCOPHAGE XR) 500 MG TABLET SR 24 HR Take 1 Tab by mouth 2 times a day. 180 Tablet 0    Ferrous Sulfate (IRON) 325 (65 Fe) MG Tab Take 65 mg by mouth every day at 6 PM.       No current facility-administered medications for this visit.       MEDICAL HISTORY  Past Medical History:   Diagnosis Date    Anemia     in past    Anginal syndrome ([facility])     had work up and feet r/t anxiety    Anxiety     Arthritis     OA knees    Chronic pain 6/2/2021    Dental disorder 5/24/12    partial upper denture    Diabetes ([facility])     pre diabetic    High cholesterol     HTN (hypertension), benign 3/19/2012    Hypothyroid 3/19/2012    Major depression 3/19/2012    Orbital floor (blow-out) closed fracture ([facility])     left    Other specified symptom associated with female genital organs     post menopausal bleeding    Pain     thoracic spine, Right knee, myofacial pain, and Right shoulder    Pain     recently fell and has bruise left forehead    Pain 02/2018    chronic back pain, right shoulder    Psychiatric problem     depression, anxiety    Unspecified disorder of thyroid     Urinary bladder disorder      stress incont.    Urinary incontinence     Occasional     Past Surgical History:   Procedure Laterality Date    INJ,EPI ANES/STER LUM/SAC ADDL Right 4/7/2021    Procedure: RIGHT lumbar five and sacral one transforaminal epidural;  Surgeon: Volodymyr Herring M.D.;  [facility];  Service: Pain Management    PB TOTAL KNEE ARTHROPLASTY Right 9/16/2019    Procedure: RIGHT ARTHROPLASTY, KNEE, TOTAL;  Surgeon: Rufus Saba M.D.;  Location: [facility];  Service: Orthopedics    KNEE ARTHROSCOPY Right 2/9/2018    Procedure: KNEE ARTHROSCOPY;  Surgeon: Harsh Baltazar M.D.;  Location: [facility];  Service: Orthopedics    MENISCECTOMY, KNEE, MEDIAL Right 2/9/2018    Procedure: MEDIAL AND LATERAL MENISCECTOMY- PARTIAL;  Surgeon: Harsh Baltazar M.D.;  Location: [facility];  Service: Orthopedics    KNEE ARTHROSCOPY Right 7/12/2017    Procedure: KNEE ARTHROSCOPY- CESPEDES;  Surgeon: Harsh Baltazar M.D.;  Location: [facility];  Service:     MENISCECTOMY, KNEE, MEDIAL Right 7/12/2017    Procedure: PARTIAL MEDIAL AND LATERAL MENISCECTOMY;  Surgeon: Harsh Baltazar M.D.;  Location: [facility];  Service:     SYNOVECTOMY Right 7/12/2017    Procedure: SYNOVECTOMY;  Surgeon: Harsh Baltazar M.D.;  Location: [facility];  Service:     KNEE ARTHROSCOPY  4/21/2015    Performed by Rufus Saba M.D. at [facility]    MENISCECTOMY, KNEE, MEDIAL  4/21/2015    Performed by Rufus Saba M.D. at [facility]    PUMP REVISION  12/10/2012    Performed by Allison Guerra M.D. at [facility]    SPINAL CORD STIMULATOR  5/30/2012    Performed by ALLISON GUERRA at [facility]    BIOPSY GENERAL  5/1/12    endometrial    SPINAL CORD STIMULATOR  7/11/2011    Performed by ALLISON GUERRA at [facility]    BLOCK EPIDURAL STEROID INJECTION  2008    x  3-4    LYMPH NODE EXCISION Left 2007    cervicle    OTHER Right 2001    thoracic discectomy      ARTHROSCOPY, KNEE Left 1999    RIB RESECTION Right 1980    LUMPECTOMY         PAST PSYCHIATRIC HISTORY  Prior psychiatric hospitalization: no  Prior Self harm/suicide attempt: no  Prior Diagnosis: MDD, Anxiety     PAST PSYCHIATRIC MEDICATIONS  Fluoxetine, Paroxetine, Sertraline, Citalopram  Venlafaxine, Duloxetine  Wellbutrin  Amitriptyline   Ativan, propranolol     FAMILY HISTORY  Psychiatric diagnosis: [place] with depression and anxiety  History of suicide attempts: No  Substance abuse history: 1 sister with drug and alcohol abuse history     SUBSTANCE USE HISTORY:  ALCOHOL: no  TOBACCO: no  CANNABIS: no  OPIOIDS: no  PRESCRIPTION MEDICATIONS: no  OTHERS: no  History of inpatient/outpatient rehab treatment: none     SOCIAL HISTORY  Childhood: born in [place] and describes childhood as difficult  Moved a lot due to parents financial concerns  Employment: [employer]  Relationship: single (never )  Kids: no kids  Current living situation: alone (but strong family support as they all live nearby)  Current/past legal issues: denies  History of emotional/physical/sexual abuse - denies      REVIEW OF SYSTEMS:        Constitutional negative   Eyes negative   Ears/Nose/Mouth/Throat negative   Cardiovascular negative   Respiratory negative   Gastrointestinal negative   Genitourinary negative   Muscular negative   Integumentary negative   Neurological negative   Endocrine negative   Hematologic/Lymphatic negative     PHYSICAL EXAMINAION:  Vital signs: LMP 02/26/2012   Musculoskeletal: Normal gait.   Abnormal movements: none      MENTAL STATUS EXAMINATION      General:   - Grooming and hygiene: Casual,   - Apparent distress: none,   - Behavior: Calm  - Eye Contact:  Good,   - no psychomotor agitation or retardation    - Participation: Active verbal participation  Orientation: Alert and Fully Oriented to person,  place and time  Mood: Euthymic  Affect: Flexible and Full range,  Thought Process: Logical and Goal-directed  Thought Content: Denies suicidal or homicidal ideations, intent or plan   Perception: Denies auditory or visual hallucinations. No delusions noted   Attention span and concentration: Intact   Speech:Rate within normal limits and Volume within normal limits  Language: Appropriate   Insight: Good  Judgment: Good  Recent and remote memory: No gross evidence of memory deficits        DEPRESSION SCREENING:      10/14/2022 12/14/2022 1/12/2023   Depression Screen (PHQ-2/PHQ-9)   PHQ-2 Total Score 3 3 4   PHQ-9 Total Score 7 7 9       Multiple values from one day are sorted in reverse-chronological order       Interpretation of PHQ-9 Total Score   Score Severity   1-4 No Depression   5-9 Mild Depression   10-14 Moderate Depression   15-19 Moderately Severe Depression   20-27 Severe Depression    CURRENT RISK:       Suicidal: Low       Homicidal: Low       Self-Harm: Low       Relapse: Low       Crisis Safety Plan Reviewed Not Indicated       If evidence of imminent risk is present, intervention/plan:      MEDICAL RECORDS/LABS/DIAGNOSTIC TESTS REVIEWED:  No new lab since last visit     [place]  records -   Reviewed     PLAN:  (1) Major depressive disorder; (2) TIM; (3) Insomnia  Improving  Continue Effexor XR to 225 mg daily for mood, anxiety and comorbid pain management.   Continue Wellbutrin XL to 300 mg daily for depression augmentation.    Continue Amitriptyline 50 mg at at bedtime as needed for insomnia.    Monitor for serotonin syndrome.  Continue psychotherapy for mood and anxiety management.  Medication options, alternatives (including no medications) and medication risks/benefits/side effects were discussed in detail.  Explained importance of contraceptive measures while on psychotropic medications, educated to let provider know if ever pregnant or wanting to become pregnant. Verbalized understanding.  The  patient was advised to call, message provider on [employer], or come in to the clinic if symptoms worsen or if any future questions/issues regarding their medications arise; the patient verbalized understanding and agreement.    The patient was educated to call 911, call the suicide hotline, or go to local ER if having thoughts of suicide or homicide; verbalized understanding.    Billing Coding based on:  61262 based on MDM    Return to clinic in 3 months or sooner if symptoms worsen.  Next Appointment: instruction provided on how to make the next appointment.     The proposed treatment plan was discussed with the patient who was provided the opportunity to ask questions and make suggestions regarding alternative treatment. Patient verbalized understanding and expressed agreement with the plan.       Genrao Stevenson M.D.  01/31/23    This note was created using voice recognition software (Dragon). The accuracy of the dictation is limited by the abilities of the software. I have reviewed the note prior to signing, however some errors in grammar and context are still possible. If you have any questions related to this note please do not hesitate to contact our office.

## 2023-02-13 PROCEDURE — RXMED WILLOW AMBULATORY MEDICATION CHARGE: Performed by: PSYCHIATRY & NEUROLOGY

## 2023-02-14 ENCOUNTER — HOSPITAL ENCOUNTER (OUTPATIENT)
Dept: CARDIOLOGY | Facility: MEDICAL CENTER | Age: 63
End: 2023-02-14
Attending: INTERNAL MEDICINE
Payer: COMMERCIAL

## 2023-02-14 DIAGNOSIS — R94.31 ABNORMAL ECG: ICD-10-CM

## 2023-02-14 DIAGNOSIS — R55 SYNCOPE AND COLLAPSE: ICD-10-CM

## 2023-02-14 DIAGNOSIS — I10 ESSENTIAL HYPERTENSION: ICD-10-CM

## 2023-02-14 PROCEDURE — 93306 TTE W/DOPPLER COMPLETE: CPT

## 2023-02-15 ENCOUNTER — OFFICE VISIT (OUTPATIENT)
Dept: PHYSICAL MEDICINE AND REHAB | Facility: MEDICAL CENTER | Age: 63
End: 2023-02-15
Payer: COMMERCIAL

## 2023-02-15 VITALS
BODY MASS INDEX: 28.51 KG/M2 | TEMPERATURE: 96.7 F | HEART RATE: 93 BPM | SYSTOLIC BLOOD PRESSURE: 130 MMHG | OXYGEN SATURATION: 93 % | DIASTOLIC BLOOD PRESSURE: 62 MMHG | WEIGHT: 177.4 LBS | HEIGHT: 66 IN

## 2023-02-15 DIAGNOSIS — Z96.651 S/P TKR (TOTAL KNEE REPLACEMENT), RIGHT: ICD-10-CM

## 2023-02-15 DIAGNOSIS — Z96.89 SPINAL CORD STIMULATOR STATUS: ICD-10-CM

## 2023-02-15 DIAGNOSIS — M25.561 CHRONIC PAIN OF RIGHT KNEE: ICD-10-CM

## 2023-02-15 DIAGNOSIS — G89.29 CHRONIC PAIN OF RIGHT KNEE: ICD-10-CM

## 2023-02-15 DIAGNOSIS — Z91.81 RISK FOR FALLS: ICD-10-CM

## 2023-02-15 DIAGNOSIS — M43.17 SPONDYLOLISTHESIS AT L5-S1 LEVEL: ICD-10-CM

## 2023-02-15 LAB
LV EJECT FRACT  99904: 55
LV EJECT FRACT MOD 2C 99903: 45.11
LV EJECT FRACT MOD 4C 99902: 55.18
LV EJECT FRACT MOD BP 99901: 51.8

## 2023-02-15 PROCEDURE — 99214 OFFICE O/P EST MOD 30 MIN: CPT | Performed by: PHYSICAL MEDICINE & REHABILITATION

## 2023-02-15 PROCEDURE — 93306 TTE W/DOPPLER COMPLETE: CPT | Mod: 26 | Performed by: INTERNAL MEDICINE

## 2023-02-15 RX ORDER — TRAMADOL HYDROCHLORIDE 50 MG/1
50 TABLET ORAL 2 TIMES DAILY PRN
Qty: 60 TABLET | Refills: 1 | Status: SHIPPED | OUTPATIENT
Start: 2023-02-22 | End: 2023-04-14 | Stop reason: SDUPTHER

## 2023-02-15 ASSESSMENT — FIBROSIS 4 INDEX: FIB4 SCORE: 0.46

## 2023-02-15 ASSESSMENT — PATIENT HEALTH QUESTIONNAIRE - PHQ9
SUM OF ALL RESPONSES TO PHQ QUESTIONS 1-9: 9
5. POOR APPETITE OR OVEREATING: 0 - NOT AT ALL
CLINICAL INTERPRETATION OF PHQ2 SCORE: 3

## 2023-02-15 ASSESSMENT — PAIN SCALES - GENERAL: PAINLEVEL: 7=MODERATE-SEVERE PAIN

## 2023-02-15 NOTE — PROGRESS NOTES
Follow-up patient note    Physiatry (physical medicine and  Rehabilitation), interventional spine and sports medicine    Date of Service: 02/15/2023    Chief complaint:   Chief Complaint   Patient presents with    Follow-Up     Right knee pain          HISTORY    HPI: Debby Desai 62 y.o. female who presents today for follow-up evaluation of her pain complaints related to lumbar radiculopathy and pain after right total knee replacement.    Debby reports that she has had one fall.  Needed help getting off the floor from 911.  No injuries.  She has been having some tremoring in her legs that have made it difficult for her to get off of the ground.  Her SCS is not an MRI safe device.    Right knee pain continues.  Pain is 7/10 on the NRS.    Work-up underway by Cardiologist.    Mood is somewhat more down, but talking with Psychiatrist about this.  They talked last week and no changes made.  She is seeing Dr. Stevenson for Psychiatry.  Denies suicidality.  She sees Estella at meets.    Takes tramadol 50mg twice a day, this does help with pain.  Some days takes one, some days two. Taking tramadol and wellbutrin, effexor, elavil.  Taking aleve, one pill less than once a day now.    Denies suicidality.  PHQ-9: 16, now 9     By history:   She reports that she had surgery on 09/16/2019.  This was a right total knee arthroplasty for osteoarthritis with Dr. Saba.    In 2001, she had discectomy.  She reports that she has had a spinal cord stimulator placed, but she has not been using this as much.  She reports that the unit is not currently working.    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  She gets up about once an hour.  She has a sit to stand desk, but it is difficult to stand much.  Currently, she is working from home due to the COVID-19 pandemic.    Medical records review:  ED records from 10/07/2020 and 09/22/2020 reviewed.  ED visit from [id number].   Negative head CT.  She was given instructions about taking ibuprofen and tylenol.  Noted that she was also given a script for valium.     I reviewed the note from the referring provider Loy Miles M.D. dated 01/08/2020: Visits included nausea/epigastric pain, hypertension, chronic knee pain, IGT, dyslipidemia, MDD, hypothyroidism, iron deficiency    Previous treatments:    Physical Therapy: Yes    Medications the patient is tried: tramadol, tylenol (usually for a headache); in the past she took gabapentin 400mg po tid, she was taking vimovo and norco in the past; lyrica caused weight gain    Previous interventions: She had radiofrequency ablation in the past, prior to surgery    Previous surgeries to relieve the above pain:  None other than surgery 09/16/2019      ROS: Unchanged, see HPI  Gen: weight loss  Eyes: vision problems, glasses and contacts  CV: chest pain/anxiety  GI: nausea, ulcers  : urgency  Psych: depression  Endo: thyroid problems  Heme: anemia    Red Flags ROS:   Fever, Chills, Sweats: Denies  Involuntary Weight Loss: Denies  Bladder Incontinence: Denies  Bowel Incontinence: Denies  Saddle Anesthesia: Denies    All other systems reviewed and negative.       PMHx:   Past Medical History:   Diagnosis Date    Anemia     in past    Anginal syndrome (HCC)     had work up and feet r/t anxiety    Anxiety     Arthritis     OA knees    Chronic pain 6/2/2021    Dental disorder 5/24/12    partial upper denture    Diabetes (HCC)     pre diabetic    High cholesterol     HTN (hypertension), benign 3/19/2012    Hypothyroid 3/19/2012    Major depression 3/19/2012    Orbital floor (blow-out) closed fracture (HCC)     left    Other specified symptom associated with female genital organs     post menopausal bleeding    Pain     thoracic spine, Right knee, myofacial pain, and Right shoulder    Pain     recently fell and has bruise left forehead    Pain 02/2018    chronic back pain, right shoulder    Psychiatric  problem     depression, anxiety    Unspecified disorder of thyroid     Urinary bladder disorder     stress incont.    Urinary incontinence     Occasional       PSHx:   Past Surgical History:   Procedure Laterality Date    INJ,EPI ANES/STER LUM/SAC ADDL Right 4/7/2021    Procedure: RIGHT lumbar five and sacral one transforaminal epidural;  Surgeon: Volodymyr Herring M.D.;  [facility];  Service: Pain Management    PB TOTAL KNEE ARTHROPLASTY Right 9/16/2019    Procedure: RIGHT ARTHROPLASTY, KNEE, TOTAL;  Surgeon: Rufus Sbaa M.D.;  Location: [facility];  Service: Orthopedics    KNEE ARTHROSCOPY Right 2/9/2018    Procedure: KNEE ARTHROSCOPY;  Surgeon: Harsh Baltazar M.D.;  Location: [facility];  Service: Orthopedics    MENISCECTOMY, KNEE, MEDIAL Right 2/9/2018    Procedure: MEDIAL AND LATERAL MENISCECTOMY- PARTIAL;  Surgeon: Harsh Baltazar M.D.;  Location: [facility];  Service: Orthopedics    KNEE ARTHROSCOPY Right 7/12/2017    Procedure: KNEE ARTHROSCOPY- CESPEDES;  Surgeon: Harsh Baltazar M.D.;  Location: [facility];  Service:     MENISCECTOMY, KNEE, MEDIAL Right 7/12/2017    Procedure: PARTIAL MEDIAL AND LATERAL MENISCECTOMY;  Surgeon: Harsh Baltazar M.D.;  Location: [facility];  Service:     SYNOVECTOMY Right 7/12/2017    Procedure: SYNOVECTOMY;  Surgeon: Harsh Baltazar M.D.;  Location: [facility];  Service:     KNEE ARTHROSCOPY  4/21/2015    Performed by Rufus Saba M.D. at [facility]    MENISCECTOMY, KNEE, MEDIAL  4/21/2015    Performed by Rufus Saba M.D. at [facility]    PUMP REVISION  12/10/2012    Performed by Allison Guerra M.D. at [facility]    SPINAL CORD STIMULATOR  5/30/2012    Performed by ALLISON GUERRA at [facility]    BIOPSY GENERAL  5/1/12    endometrial    SPINAL CORD STIMULATOR  7/11/2011  "   Performed by ALLISON CAM at [facility]    BLOCK EPIDURAL STEROID INJECTION  2008    x 3-4    LYMPH NODE EXCISION Left 2007    cervicle    OTHER Right 2001    thoracic discectomy      ARTHROSCOPY, KNEE Left 1999    RIB RESECTION Right 1980    LUMPECTOMY         Family history   Family History   Problem Relation Age of Onset    Hypertension Father     Diabetes Father     Heart Disease Father     Alcohol abuse Father     Anesthesia Sister         slow to come out (as a child)    Diabetes Other     Heart Disease Other     Cancer Other     Hypertension Other     Stroke Other     Lung Disease Other          Medications:   Current Outpatient Medications   Medication    [START ON 2/22/2023] traMADol (ULTRAM) 50 MG Tab    amitriptyline (ELAVIL) 50 MG Tab    buPROPion (WELLBUTRIN XL) 300 MG XL tablet    venlafaxine (EFFEXOR-XR) 150 MG extended-release capsule    venlafaxine XR (EFFEXOR XR) 75 MG CAPSULE SR 24 HR    amLODIPine (NORVASC) 5 MG Tab    levothyroxine (SYNTHROID) 50 MCG Tab    lisinopril (PRINIVIL) 40 MG tablet    atorvastatin (LIPITOR) 20 MG Tab    VITAMIN D PO    metFORMIN ER (GLUCOPHAGE XR) 500 MG TABLET SR 24 HR    Ferrous Sulfate (IRON) 325 (65 Fe) MG Tab     No current facility-administered medications for this visit.       Allergies:   Allergies   Allergen Reactions    Sulfamethoxazole-Trimethoprim Rash and Swelling     Pt states \"My hand swells up and rash all over body\".       Social Hx:   Social History     Socioeconomic History    Marital status: Single     Spouse name: Not on file    Number of children: Not on file    Years of education: Not on file    Highest education level: Associate degree: academic program   Occupational History    Not on file   Tobacco Use    Smoking status: Never    Smokeless tobacco: Never   Vaping Use    Vaping Use: Never used   Substance and Sexual Activity    Alcohol use: Not Currently    Drug use: No    Sexual activity: Never     Partners: Male   Other " "Topics Concern     Service No    Blood Transfusions No    Caffeine Concern No    Occupational Exposure No    Hobby Hazards No    Sleep Concern Yes    Stress Concern Yes    Weight Concern Yes    Special Diet No    Back Care No    Exercise No    Bike Helmet No    Seat Belt Yes    Self-Exams No   Social History Narrative    Not on file     Social Determinants of Health     Financial Resource Strain: Low Risk     Difficulty of Paying Living Expenses: Not hard at all   Food Insecurity: No Food Insecurity    Worried About Running Out of Food in the Last Year: Never true    Ran Out of Food in the Last Year: Never true   Transportation Needs: No Transportation Needs    Lack of Transportation (Medical): No    Lack of Transportation (Non-Medical): No   Physical Activity: Inactive    Days of Exercise per Week: 0 days    Minutes of Exercise per Session: 0 min   Stress: Stress Concern Present    Feeling of Stress : Rather much   Social Connections: Socially Isolated    Frequency of Communication with Friends and Family: More than three times a week    Frequency of Social Gatherings with Friends and Family: Once a week    Attends [employer] Services: Never    Active Member of Clubs or Organizations: No    Attends Club or Organization Meetings: Never    Marital Status: Never    Intimate Partner Violence: Not on file   Housing Stability: Low Risk     Unable to Pay for Housing in the Last Year: No    Number of Places Lived in the Last Year: 1    Unstable Housing in the Last Year: No         EXAMINATION     Physical Exam:   Vitals: /62 (BP Location: Right arm, Patient Position: Sitting, BP Cuff Size: Adult long)   Pulse 93   Temp 35.9 °C (96.7 °F) (Temporal)   Ht 1.676 m (5' 6\")   Wt 80.5 kg (177 lb 6.4 oz)   SpO2 93%     Constitutional:   Body Habitus: Body mass index is 28.63 kg/m².  Cooperation: Fully cooperates with exam  Appearance: Well-groomed, well-nourished, not disheveled no acute distress    Eyes: " No scleral icterus, no proptosis     ENT -no obvious auditory deficits, wearing a face mask    Skin -no visible skin lesions    Respiratory-  breathing comfortable on room air, no audible wheezing    Cardiovascular- No lower extremity edema is noted.     Psychiatric- alert and oriented ×3. Normal affect.     Gait -  Antalgic, no assist device, no loss of balance    Neuro/Muscloskeletal  No focal motor deficits in the lower extremities bilaterally.  No sensory deficits in the lower extremities are noted bilaterally    Reflexes: 2+ biceps, triceps, achilles bilaterally.  Britton's is negative bilaterally.  No clonus bilaterally    Moderate effusion of right knee.  Mild tenderness medial and lateral joint line, medial greater.  Full ROM of the right knee with greater than 120 degrees of flexion.        MEDICAL DECISION MAKING    Medical records review: see under HPI section.     DATA    Labs:     03/14/2020 CRP 0.19  03/14/2020 Sed rate 20  02/25/2020: Tramadol and metabolites  09/21/2021 UDS consistent with tramadol and metabolites  08/17/2022 UDS consistent with tramadol and metaboiltes    Lab Results   Component Value Date/Time    SODIUM 140 09/26/2022 07:34 AM    POTASSIUM 4.3 09/26/2022 07:34 AM    CHLORIDE 98 09/26/2022 07:34 AM    CO2 30 09/26/2022 07:34 AM    ANION 12.0 09/26/2022 07:34 AM    GLUCOSE 121 (H) 09/26/2022 07:34 AM    BUN 13 09/26/2022 07:34 AM    CREATININE 0.97 09/26/2022 07:34 AM    CALCIUM 9.4 09/26/2022 07:34 AM    ASTSGOT 13 09/26/2022 07:34 AM    ALTSGPT 13 09/26/2022 07:34 AM    TBILIRUBIN 0.2 09/26/2022 07:34 AM    ALBUMIN 4.4 09/26/2022 07:34 AM    TOTPROTEIN 7.4 09/26/2022 07:34 AM    GLOBULIN 3.0 09/26/2022 07:34 AM    AGRATIO 1.5 09/26/2022 07:34 AM       Lab Results   Component Value Date/Time    PROTHROMBTM 12.6 10/07/2020 10:45 PM    INR 0.91 10/07/2020 10:45 PM        Lab Results   Component Value Date/Time    WBC 8.5 09/26/2022 07:34 AM    RBC 4.38 09/26/2022 07:34 AM     HEMOGLOBIN 13.7 09/26/2022 07:34 AM    HEMATOCRIT 42.2 09/26/2022 07:34 AM    MCV 96.3 09/26/2022 07:34 AM    MCH 31.3 09/26/2022 07:34 AM    MCHC 32.5 (L) 09/26/2022 07:34 AM    MPV 11.6 09/26/2022 07:34 AM    NEUTSPOLYS 60.40 09/26/2022 07:34 AM    LYMPHOCYTES 29.50 09/26/2022 07:34 AM    MONOCYTES 6.80 09/26/2022 07:34 AM    EOSINOPHILS 2.40 09/26/2022 07:34 AM    BASOPHILS 0.50 09/26/2022 07:34 AM        Lab Results   Component Value Date/Time    HBA1C 5.6 09/26/2022 07:34 AM        Imaging: I personally reviewed following images, these are my reads  Xray thoracolumbar spine 07/14/2022  Degenerative changes and note of electrode from T6-T9    Xray right knee 07/14/2022  S/P right total knee without evidence of fracture    CT left knee May 8, 2022  There is note of moderate to large joint effusion with note of nondisplaced fracture at the medial aspect of the proximal fibula.  Moderate osteoarthritis    CT cervical spine 09/22/2020  There is note of cervical spondylosis with multilevel uncovertebral arthropathy    Xray right hip 03/14/2020  There is mild osteoarthritis of the right hip.      Xray lumbar 03/14/2020  There is mild anterolisthesis at L5-S1.  No abnormal motion on flexion and extension  There is DDD and facet arthropathy that is most noted at L5-S1 and less at L4-5    Xray right knee 09/16/2019  There is note of right knee arthroplasty    IMAGING radiology reads. I reviewed the following radiology reads  Xray right knee 07/14/2022  IMPRESSION:  1.  No acute fracture or dislocation of the right knee.    Xray thoracolumbar spine 07/14/2022  IMPRESSION:  1.  No acute or subacute fracture of the thoracolumbar spine.ay     CT left knee May 8, 2022  IMPRESSION:     1.  Nondisplaced fracture of medial aspect of the proximal fibula is identified.  2.  No other fractures identified.  3.  Joint effusion is identified.   4.  Moderate osteoarthritis.    Xray right knee 09/18/2021  Multiple standing views of the  right knee show previous right total knee   replacement with hardware in good position without signs of loosening or   dislocation.  No obvious fracture noted.    Xray left shoulder 10/27/2021  X-rays reviewed today of the left shoulder show normal overall bony   alignment she has some AC degenerative change.  She has some osteophyte   formation off the inferior humeral head.  Some mild glenohumeral joint   space narrowing patient.  Type III acromion.    CT cervical spine 09/22/2020  IMPRESSION:  Degenerative change without evidence of cervical spine fracture.    Xray lumbar 03/14/2020  IMPRESSION:  1.  No compression deformity or acute fracture is identified.  2.  Mild anterolisthesis at L5-S1.  3.  Degenerative disc disease and facet arthropathy.  4.  No focal instability noted on flexion extension views.                                                Xray right hip 03/14/2020  IMPRESSION:     1.  No radiographic evidence of acute traumatic injury.  2.  Findings are consistent with mild osteoarthritis.  3.  Probable constipation.                                   Results for orders placed during the hospital encounter of 09/16/19   DX-KNEE 2- RIGHT    Impression Right knee arthroplasty.      Results for orders placed during the hospital encounter of 03/28/19   DX-KNEE 3 VIEWS RIGHT    Impression No evidence of acute fracture or dislocation.    Degenerative changes as above described.                              Diagnosis   Visit Diagnoses     ICD-10-CM   1. Chronic pain of right knee  M25.561    G89.29   2. S/P TKR (total knee replacement), right  Z96.651   3. Spondylolisthesis at L5-S1 level  M43.17   4. Risk for falls  Z91.81   5. Spinal cord stimulator status  Z96.89                   ASSESSMENT:  Debby Desai 62 y.o. female seen for above.     Debby was seen today for follow-up.    Diagnoses and all orders for this visit:    Chronic pain of right knee    S/P TKR (total knee replacement), right  -      traMADol (ULTRAM) 50 MG Tab; Take 1 Tablet by mouth 2 times a day as needed for Severe Pain for up to 60 days.  -     Pain Management Screen  -     Consent for Opiate Prescription  -     Controlled Substance Treatment Agreement    Spondylolisthesis at L5-S1 level  -     traMADol (ULTRAM) 50 MG Tab; Take 1 Tablet by mouth 2 times a day as needed for Severe Pain for up to 60 days.  -     Pain Management Screen  -     Consent for Opiate Prescription  -     Controlled Substance Treatment Agreement    Risk for falls  -     Patient identified as fall risk.  Appropriate orders and counseling given.    Spinal cord stimulator status          Discussed that her tremors have been more limiting.  She describes that it was difficult for her to get off of the floor even with 911 help due to tremors in her legs.  She does not have upper motor neuron signs on exam today.  Hold on removal of SCS for now, but this would limit imaging.  Discussed continuing follow-up with PCP, Cardiology, psychiatry and psychology.  She will follow-up with Dr. Prasad for Neurology.  Encouraged active as tolerated.  Right knee genicular nerve blocks were denied.  She would like to hold off on me trying to reorder this for now.    Discussed continuing tramadol 50mg twice a day.   reviewed.  Last script filled 1/23/2023.          Follow-up: Return in about 2 months (around 4/15/2023).      Thank you very much for asking me to participate in Debby Desai's care.  Please contact me with any questions or concerns.      Please note that this dictation was created using voice recognition software. I have made every reasonable attempt to correct obvious errors but there may be errors of grammar and content that I may have overlooked prior to finalization of this note.      Volodymyr Herring MD  Physical Medicine and Rehabilitation  Interventional Spine and Sports Physiatry  [facility]

## 2023-02-16 ENCOUNTER — OFFICE VISIT (OUTPATIENT)
Dept: MEDICAL GROUP | Facility: MEDICAL CENTER | Age: 63
End: 2023-02-16
Payer: COMMERCIAL

## 2023-02-16 VITALS
HEIGHT: 66 IN | DIASTOLIC BLOOD PRESSURE: 92 MMHG | SYSTOLIC BLOOD PRESSURE: 138 MMHG | TEMPERATURE: 97.6 F | BODY MASS INDEX: 28.12 KG/M2 | OXYGEN SATURATION: 97 % | WEIGHT: 175 LBS

## 2023-02-16 DIAGNOSIS — I10 HTN (HYPERTENSION), BENIGN: Chronic | ICD-10-CM

## 2023-02-16 DIAGNOSIS — R41.89 BRAIN FOG: ICD-10-CM

## 2023-02-16 PROCEDURE — RXMED WILLOW AMBULATORY MEDICATION CHARGE: Performed by: NURSE PRACTITIONER

## 2023-02-16 PROCEDURE — 99214 OFFICE O/P EST MOD 30 MIN: CPT | Performed by: NURSE PRACTITIONER

## 2023-02-16 RX ORDER — AMLODIPINE BESYLATE 10 MG/1
10 TABLET ORAL DAILY
Qty: 90 TABLET | Refills: 3 | Status: ON HOLD | OUTPATIENT
Start: 2023-02-16 | End: 2023-05-24

## 2023-02-16 ASSESSMENT — FIBROSIS 4 INDEX: FIB4 SCORE: 0.46

## 2023-02-16 NOTE — ASSESSMENT & PLAN NOTE
Chronic. Taking lisinopril 40 mg daily and amlodipine 10 mg daily. She took 5 mg of amlodipine for about a week with readings remaining SBP 130s. The highest reading was 138/80. She increased to 10 mg of amlodipine daily. BP has been reading in the 120s/60-70s. Lowest reading 112/60.      Denies any worsening dizziness or new symptoms. No falls since last visit.      Staying hydrated, mindful of diet, eating Hello Fresh. Increase in meat intake since nephew has moved in with her. One soda per day. Water 16-18 oz (4). Tea (weak) 2 glasses. Has not made coffee in a week.      Life high stress continues. States sister's illness is worsening and she is worried about her. All family has medical issues--cancer and surgeries. Frustrated with chronic pain and was feeling like a burden to the family. As her blood pressure improves, this feeling has improved. Continues to follow psychiatry and therapist every 1-2 week. Does use distraction with reading and coloring.

## 2023-02-16 NOTE — ASSESSMENT & PLAN NOTE
"States \"brain has been foggy last couple of days.\" Took 10 minutes to get out of parking garage as she could not figure out how to exit, forgot pin number, forget about doctor's appt, and got lost coming to appt today. Relates to been overly tired. Yesterday after her appointment she went home and slept for 4 hours. Feeling better today.   "

## 2023-02-16 NOTE — NON-PROVIDER
"  Chief Complaint   Patient presents with    Hypertension     Blood pressure check medication status        HISTORY OF PRESENT ILLNESS: Debby Desai is a 62 y.o. female established patient who presents today to discuss:    Hypertension, benign   Prescribed lisinopril 40 mg PO daily and amlodipine 5-10 mg PO daily. She 5 mg of the amlodipine for about a week with readings remaining SBP 130s. The highest reading was 138/80. She increased to the 10 mg of amlodipine daily. BP has been reading in the 120s/60-70s. Lowest reading 112/60.     Denies any worsening dizziness or new symptoms. No falls since last visit.     Staying hydrated, mindful of diet, eating Hello Fresh. Increase in meat intake since nephew has moved in with her. One soda per day. Water 16-18 oz (4). Tea (weak) 2 glasses. Has not made coffee in a week.     Life high stress continues. States sister's illness is worsening and she is worried about her. All family has medical issues--cancer and surgeries. Frustrated with chronic pain and was feeling like a burden to the family. As her blood pressure improves, this feeling has improved. Continues to follow psychiatry and therapist every 1-2 week. Does use distraction with reading and coloring.     Brain fog  States \"brain has been foggy last couple of days.\" Took 10 minutes to get out of parking garage as she could not figure out how to exit, forgot pin number, forget about doctor's appt, and got lost coming to appt today. Relates to been overly tired. Yesterday after her appointment she went home and slept for 4 hours. Feeling better today.     Allergies:Sulfamethoxazole-trimethoprim    Current Outpatient Medications   Medication Sig Dispense Refill    amLODIPine (NORVASC) 10 MG Tab Take 1 Tablet by mouth every day. 90 Tablet 3    [START ON 2/22/2023] traMADol (ULTRAM) 50 MG Tab Take 1 Tablet by mouth 2 times a day as needed for Severe Pain for up to 60 days. 60 Tablet 1    amitriptyline (ELAVIL) 50 MG " Tab Take 1 Tablet by mouth every evening. 30 Tablet 5    buPROPion (WELLBUTRIN XL) 300 MG XL tablet Take 1 tablet by mouth every morning. 90 Tablet 2    venlafaxine (EFFEXOR-XR) 150 MG extended-release capsule Take 1 Capsule by mouth every day. (venlafaxine xr 150 mg + 75 mg = 225 mg daily) 90 Capsule 2    venlafaxine XR (EFFEXOR XR) 75 MG CAPSULE SR 24 HR Take 1 Capsule by mouth every day. (venlafaxine xr 150 mg + 75 mg = 225 mg daily) 90 Capsule 2    levothyroxine (SYNTHROID) 50 MCG Tab Take 1 tablet by mouth Every morning on an empty stomach. 90 Tablet 2    lisinopril (PRINIVIL) 40 MG tablet Take 1 Tablet by mouth every day. 90 Tablet 3    atorvastatin (LIPITOR) 20 MG Tab TAKE ONE TABLET BY MOUTH EVERY DAY 90 Tablet 3    VITAMIN D PO Take  by mouth every day.      metFORMIN ER (GLUCOPHAGE XR) 500 MG TABLET SR 24 HR Take 1 Tab by mouth 2 times a day. 180 Tablet 0    Ferrous Sulfate (IRON) 325 (65 Fe) MG Tab Take 65 mg by mouth every day at 6 PM.       No current facility-administered medications for this visit.       Health Maintenance: deferred    Review of Systems as per HPI  All other systems reviewed and are negative.     Constitutional: Alert, no distress.  Skin: Warm, dry, good turgor, no rashes in visible areas.  Eye: Equal, round and reactive, conjunctiva clear, lids normal.  ENMT: Mask in place.  Neck: Trachea midline  Respiratory: Unlabored respiratory effort, lungs clear to auscultation, no wheezes, no ronchi.  Cardiovascular: Normal S1, S2, no murmur, no edema.  Psych: Alert and oriented x3, normal affect and mood.      Assessment/Plan:  1. HTN (hypertension), benign  Stable  Continue amlodipine 10 mg PO daily and lisinopril 40 mg PO daily. Continue lifestyle modification including hydration and well balanced diet. Continue to follow with psychiatry and therapy.    - amLODIPine (NORVASC) 10 MG Tab; Take 1 Tablet by mouth every day.  Dispense: 90 Tablet; Refill: 3    2. Brain fog  New, unstable    Symptoms have resolved. She is planning to follow up with Dr. Prasad (neurology) for tremors and dizziness. Encourage to address this as well. Will follow up sooner if symptoms return or persist.       Follow up: 2 months    Educated in proper administration of medication(s) ordered today including safety, possible SE, risks, benefits, rationale and alternatives to therapy.       Please note that this dictation was created using voice recognition software. I have made every reasonable attempt to correct obvious errors, but I expect that there are errors of grammar and possibly content that I did not discover before finalizing the note.

## 2023-02-16 NOTE — PROGRESS NOTES
"Subjective:   Debby Desai is a 62 y.o. female here today for blood pressure follow up    HTN (hypertension), benign  Chronic. Taking lisinopril 40 mg daily and amlodipine 10 mg daily. She took 5 mg of amlodipine for about a week with readings remaining SBP 130s. The highest reading was 138/80. She increased to 10 mg of amlodipine daily. BP has been reading in the 120s/60-70s. Lowest reading 112/60.      Denies any worsening dizziness or new symptoms. No falls since last visit.      Staying hydrated, mindful of diet, eating Hello Fresh. Increase in meat intake since nephew has moved in with her. One soda per day. Water 16-18 oz (4). Tea (weak) 2 glasses. Has not made coffee in a week.      Life high stress continues. States sister's illness is worsening and she is worried about her. All family has medical issues--cancer and surgeries. Frustrated with chronic pain and was feeling like a burden to the family. As her blood pressure improves, this feeling has improved. Continues to follow psychiatry and therapist every 1-2 week. Does use distraction with reading and coloring.     Brain fog  States \"brain has been foggy last couple of days.\" Took 10 minutes to get out of parking garage as she could not figure out how to exit, forgot pin number, forget about doctor's appt, and got lost coming to appt today. Relates to been overly tired. Yesterday after her appointment she went home and slept for 4 hours. Feeling better today.        Current medicines (including changes today)  Current Outpatient Medications   Medication Sig Dispense Refill    amLODIPine (NORVASC) 10 MG Tab Take 1 Tablet by mouth every day. 90 Tablet 3    [START ON 2/22/2023] traMADol (ULTRAM) 50 MG Tab Take 1 Tablet by mouth 2 times a day as needed for Severe Pain for up to 60 days. 60 Tablet 1    amitriptyline (ELAVIL) 50 MG Tab Take 1 Tablet by mouth every evening. 30 Tablet 5    buPROPion (WELLBUTRIN XL) 300 MG XL tablet Take 1 tablet by mouth " "every morning. 90 Tablet 2    venlafaxine (EFFEXOR-XR) 150 MG extended-release capsule Take 1 Capsule by mouth every day. (venlafaxine xr 150 mg + 75 mg = 225 mg daily) 90 Capsule 2    venlafaxine XR (EFFEXOR XR) 75 MG CAPSULE SR 24 HR Take 1 Capsule by mouth every day. (venlafaxine xr 150 mg + 75 mg = 225 mg daily) 90 Capsule 2    levothyroxine (SYNTHROID) 50 MCG Tab Take 1 tablet by mouth Every morning on an empty stomach. 90 Tablet 2    lisinopril (PRINIVIL) 40 MG tablet Take 1 Tablet by mouth every day. 90 Tablet 3    atorvastatin (LIPITOR) 20 MG Tab TAKE ONE TABLET BY MOUTH EVERY DAY 90 Tablet 3    VITAMIN D PO Take  by mouth every day.      metFORMIN ER (GLUCOPHAGE XR) 500 MG TABLET SR 24 HR Take 1 Tab by mouth 2 times a day. 180 Tablet 0    Ferrous Sulfate (IRON) 325 (65 Fe) MG Tab Take 65 mg by mouth every day at 6 PM.       No current facility-administered medications for this visit.     She  has a past medical history of Anemia, Anginal syndrome ([facility]), Anxiety, Arthritis, Chronic pain (6/2/2021), Dental disorder (5/24/12), Diabetes ([facility]), High cholesterol, HTN (hypertension), benign (3/19/2012), Hypothyroid (3/19/2012), Major depression (3/19/2012), Orbital floor (blow-out) closed fracture ([facility]), Other specified symptom associated with female genital organs, Pain, Pain, Pain (02/2018), Psychiatric problem, Unspecified disorder of thyroid, Urinary bladder disorder, and Urinary incontinence.    She has no past medical history of Breast cancer ([facility]).    ROS   No chest pain, no shortness of breath, no abdominal pain  Positive ROS as per HPI.  All other systems reviewed and are negative.     Objective:     BP (!) 138/92 (BP Location: Left arm, Patient Position: Sitting, BP Cuff Size: Adult)   Temp 36.4 °C (97.6 °F) (Temporal)   Ht 1.676 m (5' 6\")   Wt 79.4 kg (175 lb)   SpO2 97%  Body mass index is 28.25 kg/m².     Physical Exam:  Constitutional: Alert, no distress.  Skin: Warm, dry, good turgor, no rashes " in visible areas.  Eye: Equal, round and reactive, conjunctiva clear, lids normal.  ENMT: Mask in place  Respiratory: Unlabored respiratory effort  Psych: Alert and oriented x3, normal affect and mood.      Assessment and Plan:   The following treatment plan was discussed    1. HTN (hypertension), benign  Stable  Continue amlodipine 10 mg PO daily and lisinopril 40 mg PO daily. Continue lifestyle modification including hydration and well balanced diet. Continue to follow with psychiatry and therapy.    - amLODIPine (NORVASC) 10 MG Tab; Take 1 Tablet by mouth every day.  Dispense: 90 Tablet; Refill: 3     2. Brain fog  New, unstable   Symptoms have resolved. She is planning to follow up with Dr. Prasad (neurology) for tremors and dizziness. Encourage to address this as well. Will follow up sooner if symptoms return or persist.        Followup: Return in about 2 months (around 4/16/2023).    The MA is performing the above orders under the direction of Dr. Tena    Please note that this dictation was created using voice recognition software. I have made every reasonable attempt to correct obvious errors, but I expect that there are errors of grammar and possibly content that I did not discover before finalizing the note.

## 2023-02-17 ENCOUNTER — PHARMACY VISIT (OUTPATIENT)
Dept: PHARMACY | Facility: MEDICAL CENTER | Age: 63
End: 2023-02-17
Payer: COMMERCIAL

## 2023-02-17 DIAGNOSIS — R73.02 IGT (IMPAIRED GLUCOSE TOLERANCE): ICD-10-CM

## 2023-02-22 PROCEDURE — RXMED WILLOW AMBULATORY MEDICATION CHARGE: Performed by: NURSE PRACTITIONER

## 2023-02-22 RX ORDER — METFORMIN HYDROCHLORIDE 500 MG/1
500 TABLET, EXTENDED RELEASE ORAL 2 TIMES DAILY
Qty: 180 TABLET | Refills: 1 | Status: SHIPPED | OUTPATIENT
Start: 2023-02-22 | End: 2023-09-28 | Stop reason: SDUPTHER

## 2023-02-24 ENCOUNTER — PHARMACY VISIT (OUTPATIENT)
Dept: PHARMACY | Facility: MEDICAL CENTER | Age: 63
End: 2023-02-24
Payer: COMMERCIAL

## 2023-02-24 PROCEDURE — RXMED WILLOW AMBULATORY MEDICATION CHARGE: Performed by: PHYSICAL MEDICINE & REHABILITATION

## 2023-03-23 PROCEDURE — RXMED WILLOW AMBULATORY MEDICATION CHARGE: Performed by: PHYSICAL MEDICINE & REHABILITATION

## 2023-03-23 PROCEDURE — RXMED WILLOW AMBULATORY MEDICATION CHARGE: Performed by: PSYCHIATRY & NEUROLOGY

## 2023-03-23 PROCEDURE — RXMED WILLOW AMBULATORY MEDICATION CHARGE: Performed by: NURSE PRACTITIONER

## 2023-03-24 ENCOUNTER — PHARMACY VISIT (OUTPATIENT)
Dept: PHARMACY | Facility: MEDICAL CENTER | Age: 63
End: 2023-03-24
Payer: COMMERCIAL

## 2023-04-14 ENCOUNTER — OFFICE VISIT (OUTPATIENT)
Dept: PHYSICAL MEDICINE AND REHAB | Facility: MEDICAL CENTER | Age: 63
End: 2023-04-14
Payer: COMMERCIAL

## 2023-04-14 VITALS
BODY MASS INDEX: 28.61 KG/M2 | TEMPERATURE: 97.8 F | OXYGEN SATURATION: 96 % | SYSTOLIC BLOOD PRESSURE: 112 MMHG | RESPIRATION RATE: 15 BRPM | DIASTOLIC BLOOD PRESSURE: 68 MMHG | HEART RATE: 87 BPM | HEIGHT: 66 IN | WEIGHT: 178 LBS

## 2023-04-14 DIAGNOSIS — M43.17 SPONDYLOLISTHESIS AT L5-S1 LEVEL: ICD-10-CM

## 2023-04-14 DIAGNOSIS — M25.561 CHRONIC PAIN OF RIGHT KNEE: ICD-10-CM

## 2023-04-14 DIAGNOSIS — M54.16 LUMBAR RADICULOPATHY: ICD-10-CM

## 2023-04-14 DIAGNOSIS — G89.29 CHRONIC PAIN OF RIGHT KNEE: ICD-10-CM

## 2023-04-14 DIAGNOSIS — Z96.651 S/P TKR (TOTAL KNEE REPLACEMENT), RIGHT: ICD-10-CM

## 2023-04-14 DIAGNOSIS — M48.061 SPINAL STENOSIS OF LUMBAR REGION, UNSPECIFIED WHETHER NEUROGENIC CLAUDICATION PRESENT: ICD-10-CM

## 2023-04-14 PROCEDURE — 99215 OFFICE O/P EST HI 40 MIN: CPT | Performed by: PHYSICAL MEDICINE & REHABILITATION

## 2023-04-14 RX ORDER — TRAMADOL HYDROCHLORIDE 50 MG/1
50 TABLET ORAL EVERY 8 HOURS PRN
Qty: 90 TABLET | Refills: 1 | Status: SHIPPED | OUTPATIENT
Start: 2023-04-22 | End: 2023-06-14 | Stop reason: SDUPTHER

## 2023-04-14 ASSESSMENT — FIBROSIS 4 INDEX: FIB4 SCORE: 0.47

## 2023-04-14 ASSESSMENT — PATIENT HEALTH QUESTIONNAIRE - PHQ9
CLINICAL INTERPRETATION OF PHQ2 SCORE: 4
5. POOR APPETITE OR OVEREATING: 0 - NOT AT ALL
SUM OF ALL RESPONSES TO PHQ QUESTIONS 1-9: 10

## 2023-04-14 NOTE — PROGRESS NOTES
Follow-up patient note    Physiatry (physical medicine and  Rehabilitation), interventional spine and sports medicine    Date of Service: 04/14/2023    Chief complaint:   Chief Complaint   Patient presents with    Follow-Up     Chronic pain of right knee          HISTORY    HPI: Debby Desai 63 y.o. female who presents today for follow-up evaluation of her pain complaints related to lumbar radiculopathy and pain after right total knee replacement.    Debby is struggling with her pain lately.  Reports that she has been having more pain in the low back and right leg.  Right calf pain.  Right knee pain ongoing.  Pain is 6/10 on the NRS.  Current medications help.    She is not able to use her SCS as the battery is no longer functioning.  This is not MRI safe.    Mood is somewhat more down, ongoing care with Dr. Stevenson, her psychiatrist.  They are considering medication changes.  More stresses right now. Denies suicidality.  She sees Estella at [facility].    Takes tramadol 50mg twice a day, no side effects. Taking tramadol and wellbutrin, effexor, elavil.  Taking aleve, one pill less than once a day now.    Denies suicidality.  PHQ-9: 16, now 9     By history:   She reports that she had surgery on 09/16/2019.  This was a right total knee arthroplasty for osteoarthritis with Dr. Saba.    In 2001, she had discectomy.  She reports that she has had a spinal cord stimulator placed, but she has not been using this as much.  She reports that the unit is not currently working.    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  She gets up about once an hour.  She has a sit to stand desk, but it is difficult to stand much.  Currently, she is working from home due to the COVID-19 pandemic.    Medical records review:  ED records from 10/07/2020 and 09/22/2020 reviewed.  ED visit from [id number].  Negative head CT.  She was given instructions about taking ibuprofen and  tylenol.  Noted that she was also given a script for valium.     I reviewed the note from the referring provider Loy Miles M.D. dated 01/08/2020: Visits included nausea/epigastric pain, hypertension, chronic knee pain, IGT, dyslipidemia, MDD, hypothyroidism, iron deficiency    Previous treatments:    Physical Therapy: Yes    Medications the patient is tried: tramadol, tylenol (usually for a headache); in the past she took gabapentin 400mg po tid, she was taking vimovo and norco in the past; lyrica caused weight gain    Previous interventions: She had radiofrequency ablation in the past, prior to surgery    Previous surgeries to relieve the above pain:  None other than surgery 09/16/2019      ROS: Unchanged, see HPI  Gen: weight loss  Eyes: vision problems, glasses and contacts  CV: chest pain/anxiety  GI: nausea, ulcers  : urgency  Psych: depression  Endo: thyroid problems  Heme: anemia    Red Flags ROS:   Fever, Chills, Sweats: Denies  Involuntary Weight Loss: Denies  Bladder Incontinence: Denies  Bowel Incontinence: Denies  Saddle Anesthesia: Denies    All other systems reviewed and negative.       PMHx:   Past Medical History:   Diagnosis Date    Anemia     in past    Anginal syndrome ([facility])     had work up and feet r/t anxiety    Anxiety     Arthritis     OA knees    Chronic pain 6/2/2021    Dental disorder 5/24/12    partial upper denture    Diabetes ([facility])     pre diabetic    High cholesterol     HTN (hypertension), benign 3/19/2012    Hypothyroid 3/19/2012    Major depression 3/19/2012    Orbital floor (blow-out) closed fracture ([facility])     left    Other specified symptom associated with female genital organs     post menopausal bleeding    Pain     thoracic spine, Right knee, myofacial pain, and Right shoulder    Pain     recently fell and has bruise left forehead    Pain 02/2018    chronic back pain, right shoulder    Psychiatric problem     depression, anxiety    Unspecified disorder of thyroid      Urinary bladder disorder     stress incont.    Urinary incontinence     Occasional       PSHx:   Past Surgical History:   Procedure Laterality Date    INJ,EPI ANES/STER LUM/SAC ADDL Right 4/7/2021    Procedure: RIGHT lumbar five and sacral one transforaminal epidural;  Surgeon: Volodymyr Herring M.D.;  [facility];  Service: Pain Management    PB TOTAL KNEE ARTHROPLASTY Right 9/16/2019    Procedure: RIGHT ARTHROPLASTY, KNEE, TOTAL;  Surgeon: Rufus Saba M.D.;  Location: [facility];  Service: Orthopedics    KNEE ARTHROSCOPY Right 2/9/2018    Procedure: KNEE ARTHROSCOPY;  Surgeon: Harsh Baltazar M.D.;  Location: [facility];  Service: Orthopedics    MENISCECTOMY, KNEE, MEDIAL Right 2/9/2018    Procedure: MEDIAL AND LATERAL MENISCECTOMY- PARTIAL;  Surgeon: Harsh Baltazar M.D.;  Location: [facility];  Service: Orthopedics    KNEE ARTHROSCOPY Right 7/12/2017    Procedure: KNEE ARTHROSCOPY- CESPEDES;  Surgeon: Harsh Baltazar M.D.;  Location: [facility];  Service:     MENISCECTOMY, KNEE, MEDIAL Right 7/12/2017    Procedure: PARTIAL MEDIAL AND LATERAL MENISCECTOMY;  Surgeon: Harsh Baltazar M.D.;  Location: [facility];  Service:     SYNOVECTOMY Right 7/12/2017    Procedure: SYNOVECTOMY;  Surgeon: Harsh Baltazar M.D.;  Location: [facility];  Service:     KNEE ARTHROSCOPY  4/21/2015    Performed by Rufus Saba M.D. at [facility]    MENISCECTOMY, KNEE, MEDIAL  4/21/2015    Performed by Rufus Saba M.D. at [facility]    PUMP REVISION  12/10/2012    Performed by Allison Guerra M.D. at [facility]    SPINAL CORD STIMULATOR  5/30/2012    Performed by ALLISON GUERRA at [facility]    BIOPSY GENERAL  5/1/12    endometrial    SPINAL CORD STIMULATOR  7/11/2011    Performed by ALLISON GUERRA at [facility]    BLOCK  "EPIDURAL STEROID INJECTION  2008    x 3-4    LYMPH NODE EXCISION Left 2007    cervicle    OTHER Right 2001    thoracic discectomy      ARTHROSCOPY, KNEE Left 1999    RIB RESECTION Right 1980    LUMPECTOMY         Family history   Family History   Problem Relation Age of Onset    Hypertension Father     Diabetes Father     Heart Disease Father     Alcohol abuse Father     Anesthesia Sister         slow to come out (as a child)    Diabetes Other     Heart Disease Other     Cancer Other     Hypertension Other     Stroke Other     Lung Disease Other          Medications:   Current Outpatient Medications   Medication    [START ON 4/22/2023] traMADol (ULTRAM) 50 MG Tab    metFORMIN ER (GLUCOPHAGE XR) 500 MG TABLET SR 24 HR    amLODIPine (NORVASC) 10 MG Tab    amitriptyline (ELAVIL) 50 MG Tab    buPROPion (WELLBUTRIN XL) 300 MG XL tablet    venlafaxine (EFFEXOR-XR) 150 MG extended-release capsule    venlafaxine XR (EFFEXOR XR) 75 MG CAPSULE SR 24 HR    levothyroxine (SYNTHROID) 50 MCG Tab    lisinopril (PRINIVIL) 40 MG tablet    atorvastatin (LIPITOR) 20 MG Tab    VITAMIN D PO    Ferrous Sulfate (IRON) 325 (65 Fe) MG Tab     No current facility-administered medications for this visit.       Allergies:   Allergies   Allergen Reactions    Sulfamethoxazole-Trimethoprim Rash and Swelling     Pt states \"My hand swells up and rash all over body\".       Social Hx:   Social History     Socioeconomic History    Marital status: Single     Spouse name: Not on file    Number of children: Not on file    Years of education: Not on file    Highest education level: Associate degree: academic program   Occupational History    Not on file   Tobacco Use    Smoking status: Never    Smokeless tobacco: Never   Vaping Use    Vaping Use: Never used   Substance and Sexual Activity    Alcohol use: Not Currently    Drug use: No    Sexual activity: Never     Partners: Male   Other Topics Concern     Service No    Blood Transfusions No    " "Caffeine Concern No    Occupational Exposure No    Hobby Hazards No    Sleep Concern Yes    Stress Concern Yes    Weight Concern Yes    Special Diet No    Back Care No    Exercise No    Bike Helmet No    Seat Belt Yes    Self-Exams No   Social History Narrative    Not on file     Social Determinants of Health     Financial Resource Strain: Low Risk     Difficulty of Paying Living Expenses: Not hard at all   Food Insecurity: No Food Insecurity    Worried About Running Out of Food in the Last Year: Never true    Ran Out of Food in the Last Year: Never true   Transportation Needs: No Transportation Needs    Lack of Transportation (Medical): No    Lack of Transportation (Non-Medical): No   Physical Activity: Inactive    Days of Exercise per Week: 0 days    Minutes of Exercise per Session: 0 min   Stress: Stress Concern Present    Feeling of Stress : Rather much   Social Connections: Socially Isolated    Frequency of Communication with Friends and Family: More than three times a week    Frequency of Social Gatherings with Friends and Family: Once a week    Attends [employer] Services: Never    Active Member of Clubs or Organizations: No    Attends Club or Organization Meetings: Never    Marital Status: Never    Intimate Partner Violence: Not on file   Housing Stability: Low Risk     Unable to Pay for Housing in the Last Year: No    Number of Places Lived in the Last Year: 1    Unstable Housing in the Last Year: No         EXAMINATION     Physical Exam:   Vitals: /68 (BP Location: Right arm, Patient Position: Sitting, BP Cuff Size: Adult)   Pulse 87   Temp 36.6 °C (97.8 °F) (Temporal)   Resp 15   Ht 1.676 m (5' 5.98\")   Wt 80.7 kg (178 lb)   SpO2 96%     Constitutional:   Body Habitus: Body mass index is 28.74 kg/m².  Cooperation: Fully cooperates with exam  Appearance: Well-groomed, well-nourished, not disheveled no acute distress    Eyes: No scleral icterus, no proptosis     ENT -no obvious auditory " deficits, poor dentition    Skin -no visible skin lesions    Respiratory-  breathing comfortable on room air, no audible wheezing    Cardiovascular- No lower extremity edema is noted.     Psychiatric- alert and oriented ×3. Normal affect.     Gait -  Antalgic, no assist device, no loss of balance    Neuro/Muscloskeletal  No focal motor deficits in the lower extremities bilaterally.  No sensory deficits in the lower extremities are noted bilaterally    Reflexes: 2+ biceps, triceps, achilles bilaterally.  Britton's is negative bilaterally.  No clonus bilaterally    Moderate effusion of right knee.  Mild tenderness medial and lateral joint line, medial joint pain is greater.         MEDICAL DECISION MAKING    Medical records review: see under HPI section.     DATA    Labs:     03/14/2020 CRP 0.19  03/14/2020 Sed rate 20  02/25/2020: Tramadol and metabolites  09/21/2021 UDS consistent with tramadol and metabolites  08/17/2022 UDS consistent with tramadol and metaboiltes  02/15/2023 UDS consistent with tramadol and metabolites    Lab Results   Component Value Date/Time    SODIUM 140 09/26/2022 07:34 AM    POTASSIUM 4.3 09/26/2022 07:34 AM    CHLORIDE 98 09/26/2022 07:34 AM    CO2 30 09/26/2022 07:34 AM    ANION 12.0 09/26/2022 07:34 AM    GLUCOSE 121 (H) 09/26/2022 07:34 AM    BUN 13 09/26/2022 07:34 AM    CREATININE 0.97 09/26/2022 07:34 AM    CALCIUM 9.4 09/26/2022 07:34 AM    ASTSGOT 13 09/26/2022 07:34 AM    ALTSGPT 13 09/26/2022 07:34 AM    TBILIRUBIN 0.2 09/26/2022 07:34 AM    ALBUMIN 4.4 09/26/2022 07:34 AM    TOTPROTEIN 7.4 09/26/2022 07:34 AM    GLOBULIN 3.0 09/26/2022 07:34 AM    AGRATIO 1.5 09/26/2022 07:34 AM       Lab Results   Component Value Date/Time    PROTHROMBTM 12.6 10/07/2020 10:45 PM    INR 0.91 10/07/2020 10:45 PM        Lab Results   Component Value Date/Time    WBC 8.5 09/26/2022 07:34 AM    RBC 4.38 09/26/2022 07:34 AM    HEMOGLOBIN 13.7 09/26/2022 07:34 AM    HEMATOCRIT 42.2 09/26/2022 07:34 AM     MCV 96.3 09/26/2022 07:34 AM    MCH 31.3 09/26/2022 07:34 AM    MCHC 32.5 (L) 09/26/2022 07:34 AM    MPV 11.6 09/26/2022 07:34 AM    NEUTSPOLYS 60.40 09/26/2022 07:34 AM    LYMPHOCYTES 29.50 09/26/2022 07:34 AM    MONOCYTES 6.80 09/26/2022 07:34 AM    EOSINOPHILS 2.40 09/26/2022 07:34 AM    BASOPHILS 0.50 09/26/2022 07:34 AM        Lab Results   Component Value Date/Time    HBA1C 5.6 09/26/2022 07:34 AM        Imaging: I personally reviewed following images, these are my reads  Xray thoracolumbar spine 07/14/2022  Degenerative changes and note of electrode from T6-T9    Xray right knee 07/14/2022  S/P right total knee without evidence of fracture    CT left knee May 8, 2022  There is note of moderate to large joint effusion with note of nondisplaced fracture at the medial aspect of the proximal fibula.  Moderate osteoarthritis    CT cervical spine 09/22/2020  There is note of cervical spondylosis with multilevel uncovertebral arthropathy    Xray right hip 03/14/2020  There is mild osteoarthritis of the right hip.      Xray lumbar 03/14/2020  There is mild anterolisthesis at L5-S1.  No abnormal motion on flexion and extension  There is DDD and facet arthropathy that is most noted at L5-S1 and less at L4-5    Xray right knee 09/16/2019  There is note of right knee arthroplasty    IMAGING radiology reads. I reviewed the following radiology reads  Xray right knee 07/14/2022  IMPRESSION:  1.  No acute fracture or dislocation of the right knee.    Xray thoracolumbar spine 07/14/2022  IMPRESSION:  1.  No acute or subacute fracture of the thoracolumbar spine.ay     CT left knee May 8, 2022  IMPRESSION:     1.  Nondisplaced fracture of medial aspect of the proximal fibula is identified.  2.  No other fractures identified.  3.  Joint effusion is identified.   4.  Moderate osteoarthritis.    Xray right knee 09/18/2021  Multiple standing views of the right knee show previous right total knee   replacement with hardware in  good position without signs of loosening or   dislocation.  No obvious fracture noted.    Xray left shoulder 10/27/2021  X-rays reviewed today of the left shoulder show normal overall bony   alignment she has some AC degenerative change.  She has some osteophyte   formation off the inferior humeral head.  Some mild glenohumeral joint   space narrowing patient.  Type III acromion.    CT cervical spine 09/22/2020  IMPRESSION:  Degenerative change without evidence of cervical spine fracture.    Xray lumbar 03/14/2020  IMPRESSION:  1.  No compression deformity or acute fracture is identified.  2.  Mild anterolisthesis at L5-S1.  3.  Degenerative disc disease and facet arthropathy.  4.  No focal instability noted on flexion extension views.                                                Xray right hip 03/14/2020  IMPRESSION:     1.  No radiographic evidence of acute traumatic injury.  2.  Findings are consistent with mild osteoarthritis.  3.  Probable constipation.                                   Results for orders placed during the hospital encounter of 09/16/19   DX-KNEE 2- RIGHT    Impression Right knee arthroplasty.      Results for orders placed during the hospital encounter of 03/28/19   DX-KNEE 3 VIEWS RIGHT    Impression No evidence of acute fracture or dislocation.    Degenerative changes as above described.                              Diagnosis   Visit Diagnoses     ICD-10-CM   1. Spondylolisthesis at L5-S1 level  M43.17   2. Spinal stenosis of lumbar region, unspecified whether neurogenic claudication present  M48.061   3. Lumbar radiculopathy  M54.16   4. Chronic pain of right knee  M25.561    G89.29   5. S/P TKR (total knee replacement), right  Z96.651               ASSESSMENT:  Debby Desai 63 y.o. female seen for above.     Debby was seen today for follow-up.    Diagnoses and all orders for this visit:    Spondylolisthesis at L5-S1 level  -     traMADol (ULTRAM) 50 MG Tab; Take 1 Tablet by mouth  every 8 hours as needed for Severe Pain for up to 30 days.  -     Consent for Opiate Prescription  -     Controlled Substance Treatment Agreement    Spinal stenosis of lumbar region, unspecified whether neurogenic claudication present    Lumbar radiculopathy    Chronic pain of right knee  -     traMADol (ULTRAM) 50 MG Tab; Take 1 Tablet by mouth every 8 hours as needed for Severe Pain for up to 30 days.  -     Consent for Opiate Prescription  -     Controlled Substance Treatment Agreement    S/P TKR (total knee replacement), right  -     traMADol (ULTRAM) 50 MG Tab; Take 1 Tablet by mouth every 8 hours as needed for Severe Pain for up to 30 days.  -     Consent for Opiate Prescription  -     Controlled Substance Treatment Agreement        Discussed possible SCS battery replacement.  Reviewed location of epidural lead that could cover right leg pain, if functional.  Discussed with patient and with [employer] rep, Loy Churchill RN.    Continue tramadol.   reviewed.  No change to medication dose at this time.  Encouraged her to continue to work with Dr. Stevenson and counseling.  Continue home program and activity as tolerated        Follow-up: Return in about 2 months (around 6/14/2023), or if symptoms worsen or fail to improve.      My total time spent caring for the patient on the day of the encounter was 42 minutes.   This does not include time spent on separately billable procedures/tests.      Thank you very much for asking me to participate in Debby Desai's care.  Please contact me with any questions or concerns.      Please note that this dictation was created using voice recognition software. I have made every reasonable attempt to correct obvious errors but there may be errors of grammar and content that I may have overlooked prior to finalization of this note.      Volodymyr Herring MD  Physical Medicine and Rehabilitation  Interventional Spine and Sports Physiatry  [facility]

## 2023-04-17 ENCOUNTER — OFFICE VISIT (OUTPATIENT)
Dept: MEDICAL GROUP | Facility: MEDICAL CENTER | Age: 63
End: 2023-04-17
Payer: COMMERCIAL

## 2023-04-17 VITALS
WEIGHT: 179 LBS | SYSTOLIC BLOOD PRESSURE: 128 MMHG | HEART RATE: 85 BPM | BODY MASS INDEX: 28.09 KG/M2 | TEMPERATURE: 99.1 F | RESPIRATION RATE: 16 BRPM | OXYGEN SATURATION: 95 % | HEIGHT: 67 IN | DIASTOLIC BLOOD PRESSURE: 78 MMHG

## 2023-04-17 DIAGNOSIS — Z23 NEED FOR VACCINATION: ICD-10-CM

## 2023-04-17 DIAGNOSIS — I10 HTN (HYPERTENSION), BENIGN: Chronic | ICD-10-CM

## 2023-04-17 PROCEDURE — 90471 IMMUNIZATION ADMIN: CPT | Performed by: NURSE PRACTITIONER

## 2023-04-17 PROCEDURE — 90677 PCV20 VACCINE IM: CPT | Performed by: NURSE PRACTITIONER

## 2023-04-17 PROCEDURE — 99214 OFFICE O/P EST MOD 30 MIN: CPT | Mod: 25 | Performed by: NURSE PRACTITIONER

## 2023-04-17 ASSESSMENT — FIBROSIS 4 INDEX: FIB4 SCORE: 0.47

## 2023-04-17 NOTE — ASSESSMENT & PLAN NOTE
Chronic. Taking lisinopril 40 mg daily and amlodipine 10 mg daily. BP running 110s-120s. Did have 3 episodes of SBP in the 80s.      Denies any worsening dizziness or new symptoms. No falls since last visit.      Staying hydrated, mindful of diet, eating Hello Fresh. Increase in meat intake since nephew has moved in with her. One soda per day. Water 16-18 oz (4). Tea (weak) 2 glasses. Has not made coffee in a week.      Life high stress continues. States sister's illness is worsening and she is worried about her. All family has medical issues--cancer and surgeries. Frustrated with chronic pain and was feeling like a burden to the family. As her blood pressure improves, this feeling has improved. Continues to follow psychiatry and therapist every 1-2 week. Does use distraction with reading and coloring.

## 2023-04-17 NOTE — PROGRESS NOTES
Subjective:   Debby Desai is a 63 y.o. female here today for HTN follow up    HTN (hypertension), benign  Chronic. Taking lisinopril 40 mg daily and amlodipine 10 mg daily. BP running 110s-120s. Did have 3 episodes of SBP in the 80s.      Denies any worsening dizziness or new symptoms. No falls since last visit.      Staying hydrated, mindful of diet, eating Hello Fresh. Increase in meat intake since nephew has moved in with her. One soda per day. Water 16-18 oz (4). Tea (weak) 2 glasses. Has not made coffee in a week.      Life high stress continues. States sister's illness is worsening and she is worried about her. All family has medical issues--cancer and surgeries. Frustrated with chronic pain and was feeling like a burden to the family. As her blood pressure improves, this feeling has improved. Continues to follow psychiatry and therapist every 1-2 week. Does use distraction with reading and coloring.        Current medicines (including changes today)  Current Outpatient Medications   Medication Sig Dispense Refill    [START ON 4/22/2023] traMADol (ULTRAM) 50 MG Tab Take 1 Tablet by mouth every 8 hours as needed for Severe Pain for up to 30 days. 90 Tablet 1    metFORMIN ER (GLUCOPHAGE XR) 500 MG TABLET SR 24 HR Take 1 Tab by mouth 2 times a day. 180 Tablet 1    amLODIPine (NORVASC) 10 MG Tab Take 1 Tablet by mouth every day. 90 Tablet 3    amitriptyline (ELAVIL) 50 MG Tab Take 1 Tablet by mouth every evening. 30 Tablet 5    buPROPion (WELLBUTRIN XL) 300 MG XL tablet Take 1 tablet by mouth every morning. 90 Tablet 2    venlafaxine (EFFEXOR-XR) 150 MG extended-release capsule Take 1 Capsule by mouth every day. (venlafaxine xr 150 mg + 75 mg = 225 mg daily) 90 Capsule 2    venlafaxine XR (EFFEXOR XR) 75 MG CAPSULE SR 24 HR Take 1 Capsule by mouth every day. (venlafaxine xr 150 mg + 75 mg = 225 mg daily) 90 Capsule 2    levothyroxine (SYNTHROID) 50 MCG Tab Take 1 tablet by mouth Every morning on an empty  "stomach. 90 Tablet 2    lisinopril (PRINIVIL) 40 MG tablet Take 1 Tablet by mouth every day. 90 Tablet 3    atorvastatin (LIPITOR) 20 MG Tab TAKE ONE TABLET BY MOUTH EVERY DAY 90 Tablet 3    VITAMIN D PO Take  by mouth every day.      Ferrous Sulfate (IRON) 325 (65 Fe) MG Tab Take 65 mg by mouth every day at 6 PM.       No current facility-administered medications for this visit.     She  has a past medical history of Anemia, Anginal syndrome ([facility]), Anxiety, Arthritis, Chronic pain (6/2/2021), Dental disorder (5/24/12), Diabetes ([facility]), High cholesterol, HTN (hypertension), benign (3/19/2012), Hypothyroid (3/19/2012), Major depression (3/19/2012), Orbital floor (blow-out) closed fracture ([facility]), Other specified symptom associated with female genital organs, Pain, Pain, Pain (02/2018), Psychiatric problem, Unspecified disorder of thyroid, Urinary bladder disorder, and Urinary incontinence.    She has no past medical history of Breast cancer ([facility]).    ROS   No chest pain, no shortness of breath, no abdominal pain  Positive ROS as per HPI.  All other systems reviewed and are negative.     Objective:     /78 (BP Location: Right arm, Patient Position: Sitting, BP Cuff Size: Adult)   Pulse 85   Temp 37.3 °C (99.1 °F) (Temporal)   Resp 16   Ht 1.702 m (5' 7\")   Wt 81.2 kg (179 lb)   SpO2 95%  Body mass index is 28.04 kg/m².     Physical Exam:  Constitutional: Alert, no distress.  Skin: Warm, dry, good turgor, no rashes in visible areas.  Eye: Equal, round and reactive, conjunctiva clear, lids normal.  ENMT: Lips without lesions, poor dentition  Respiratory: Unlabored respiratory effort  Psych: Alert and oriented x3, normal affect and mood.        Assessment and Plan:   The following treatment plan was discussed    1. HTN (hypertension), benign  Stable on current medications  Continue monitoring BP at home, she will report ASAP if she is experiencing more hypotensive episodes, dizziness, or fainting spells.  "     2. Need for vaccination  - Pneumococcal Conjugate Vaccine 20-Valent (19 yrs+)      Followup: Return in about 2 months (around 6/17/2023) for Hypertension, Lab Review.    The MA is performing the above orders under the direction of Dr. Tena    Please note that this dictation was created using voice recognition software. I have made every reasonable attempt to correct obvious errors, but I expect that there are errors of grammar and possibly content that I did not discover before finalizing the note.

## 2023-04-18 ENCOUNTER — TELEMEDICINE (OUTPATIENT)
Dept: BEHAVIORAL HEALTH | Facility: CLINIC | Age: 63
End: 2023-04-18
Payer: COMMERCIAL

## 2023-04-18 DIAGNOSIS — F33.2 SEVERE EPISODE OF RECURRENT MAJOR DEPRESSIVE DISORDER, WITHOUT PSYCHOTIC FEATURES (HCC): ICD-10-CM

## 2023-04-18 DIAGNOSIS — G47.09 OTHER INSOMNIA: ICD-10-CM

## 2023-04-18 DIAGNOSIS — F41.1 GAD (GENERALIZED ANXIETY DISORDER): ICD-10-CM

## 2023-04-18 PROCEDURE — 99214 OFFICE O/P EST MOD 30 MIN: CPT | Mod: 95 | Performed by: PSYCHIATRY & NEUROLOGY

## 2023-04-18 PROCEDURE — RXMED WILLOW AMBULATORY MEDICATION CHARGE: Performed by: PSYCHIATRY & NEUROLOGY

## 2023-04-18 RX ORDER — VENLAFAXINE HYDROCHLORIDE 75 MG/1
75 CAPSULE, EXTENDED RELEASE ORAL DAILY
Qty: 90 CAPSULE | Refills: 2 | Status: SHIPPED | OUTPATIENT
Start: 2023-04-18 | End: 2023-06-09 | Stop reason: SDUPTHER

## 2023-04-18 RX ORDER — AMITRIPTYLINE HYDROCHLORIDE 50 MG/1
50 TABLET, FILM COATED ORAL NIGHTLY
Qty: 30 TABLET | Refills: 5 | Status: ON HOLD | OUTPATIENT
Start: 2023-04-18 | End: 2023-05-24

## 2023-04-18 RX ORDER — BUPROPION HYDROCHLORIDE 200 MG/1
200 TABLET, EXTENDED RELEASE ORAL 2 TIMES DAILY
Qty: 60 TABLET | Refills: 1 | Status: SHIPPED | OUTPATIENT
Start: 2023-04-18 | End: 2023-06-09 | Stop reason: SDUPTHER

## 2023-04-18 NOTE — PROGRESS NOTES
This evaluation was conducted via Zoom using secure and encrypted videoconferencing technology. The patient was in their home in the [facility].    The patient's identity was confirmed and verbal consent was obtained for this virtual visit.      PSYCHIATRY FOLLOW-UP NOTE      Name: Debby Desai  MRN: [id number]  : 1960  Age: 63 y.o.  Date of assessment: 2023  PCP: ARELIS Chris  Persons in attendance: Patient      REASON FOR VISIT/CHIEF COMPLAINT (as stated by Patient):  Debby Desai is a 63 y.o., White female, attending follow-up appointment for mood and anxiety management.      HISTORY OF PRESENT ILLNESS:  Debby Desai is a 63 y.o. old female with MDD, TIM and insomnia comes in today for follow up. Patient was last seen 3 months ago, and following treatment planning recommendations were done:  Continue Effexor XR to 225 mg daily for mood, anxiety and comorbid pain management.   Continue Wellbutrin XL to 300 mg daily for depression augmentation.    Continue Amitriptyline 50 mg at at bedtime as needed for insomnia.    Monitor for serotonin syndrome.  Continue psychotherapy for mood and anxiety management.    Patient is compliant with medications with no side effects but over the last 2 months she has seen symptoms of depression with reduced energy, low motivation and reduced interest.  She attributes this to her younger sister going on hospice due to glioblastoma and she is one of the family member taking care of her.  Discussed the negative impact of emotional loss on mood and anxiety as well and importance of  appropriate from intrusive emotional reactivity.  She is engaged in weekly psychotherapy but still having depression symptoms.  Discussed how she is on the max dose of both medication but agreed with short course of switching Wellbutrin  mg to  mg twice daily to help with mood symptoms with close monitoring for any negative effects on  anxiety.      CURRENT MEDICATIONS:  Current Outpatient Medications   Medication Sig Dispense Refill    [START ON 4/22/2023] traMADol (ULTRAM) 50 MG Tab Take 1 Tablet by mouth every 8 hours as needed for Severe Pain for up to 30 days. 90 Tablet 1    metFORMIN ER (GLUCOPHAGE XR) 500 MG TABLET SR 24 HR Take 1 Tab by mouth 2 times a day. 180 Tablet 1    amLODIPine (NORVASC) 10 MG Tab Take 1 Tablet by mouth every day. 90 Tablet 3    amitriptyline (ELAVIL) 50 MG Tab Take 1 Tablet by mouth every evening. 30 Tablet 5    buPROPion (WELLBUTRIN XL) 300 MG XL tablet Take 1 tablet by mouth every morning. 90 Tablet 2    venlafaxine (EFFEXOR-XR) 150 MG extended-release capsule Take 1 Capsule by mouth every day. (venlafaxine xr 150 mg + 75 mg = 225 mg daily) 90 Capsule 2    venlafaxine XR (EFFEXOR XR) 75 MG CAPSULE SR 24 HR Take 1 Capsule by mouth every day. (venlafaxine xr 150 mg + 75 mg = 225 mg daily) 90 Capsule 2    levothyroxine (SYNTHROID) 50 MCG Tab Take 1 tablet by mouth Every morning on an empty stomach. 90 Tablet 2    lisinopril (PRINIVIL) 40 MG tablet Take 1 Tablet by mouth every day. 90 Tablet 3    atorvastatin (LIPITOR) 20 MG Tab TAKE ONE TABLET BY MOUTH EVERY DAY 90 Tablet 3    VITAMIN D PO Take  by mouth every day.      Ferrous Sulfate (IRON) 325 (65 Fe) MG Tab Take 65 mg by mouth every day at 6 PM.       No current facility-administered medications for this visit.       MEDICAL HISTORY  Past Medical History:   Diagnosis Date    Anemia     in past    Anginal syndrome (HCC)     had work up and feet r/t anxiety    Anxiety     Arthritis     OA knees    Chronic pain 6/2/2021    Dental disorder 5/24/12    partial upper denture    Diabetes (HCC)     pre diabetic    High cholesterol     HTN (hypertension), benign 3/19/2012    Hypothyroid 3/19/2012    Major depression 3/19/2012    Orbital floor (blow-out) closed fracture (HCC)     left    Other specified symptom associated with female genital organs     post menopausal  bleeding    Pain     thoracic spine, Right knee, myofacial pain, and Right shoulder    Pain     recently fell and has bruise left forehead    Pain 02/2018    chronic back pain, right shoulder    Psychiatric problem     depression, anxiety    Unspecified disorder of thyroid     Urinary bladder disorder     stress incont.    Urinary incontinence     Occasional     Past Surgical History:   Procedure Laterality Date    INJ,EPI ANES/STER LUM/SAC ADDL Right 4/7/2021    Procedure: RIGHT lumbar five and sacral one transforaminal epidural;  Surgeon: Volodymyr Herring M.D.;  [facility];  Service: Pain Management    PB TOTAL KNEE ARTHROPLASTY Right 9/16/2019    Procedure: RIGHT ARTHROPLASTY, KNEE, TOTAL;  Surgeon: Rufus Saba M.D.;  Location: [facility];  Service: Orthopedics    KNEE ARTHROSCOPY Right 2/9/2018    Procedure: KNEE ARTHROSCOPY;  Surgeon: Harsh Baltazar M.D.;  Location: [facility];  Service: Orthopedics    MENISCECTOMY, KNEE, MEDIAL Right 2/9/2018    Procedure: MEDIAL AND LATERAL MENISCECTOMY- PARTIAL;  Surgeon: Harsh Baltazar M.D.;  Location: [facility];  Service: Orthopedics    KNEE ARTHROSCOPY Right 7/12/2017    Procedure: KNEE ARTHROSCOPY- CESPEDES;  Surgeon: Harsh Baltazar M.D.;  Location: [facility];  Service:     MENISCECTOMY, KNEE, MEDIAL Right 7/12/2017    Procedure: PARTIAL MEDIAL AND LATERAL MENISCECTOMY;  Surgeon: Harsh Baltazar M.D.;  Location: [facility];  Service:     SYNOVECTOMY Right 7/12/2017    Procedure: SYNOVECTOMY;  Surgeon: Harsh Baltazar M.D.;  Location: [facility];  Service:     KNEE ARTHROSCOPY  4/21/2015    Performed by Rufus Saba M.D. at [facility]    MENISCECTOMY, KNEE, MEDIAL  4/21/2015    Performed by Rufus Saba M.D. at [facility]    PUMP REVISION  12/10/2012    Performed by Mak Guerra M.D. at  SURGERY [facility]    SPINAL CORD STIMULATOR  5/30/2012    Performed by ALLISON CAM at SUR[facility]ERY [facility]    BIOPSY GENERAL  5/1/12    endometrial    SPINAL CORD STIMULATOR  7/11/2011    Performed by ALLISON CAM at SURGERY [facility]    BLOCK EPIDURAL STEROID INJECTION  2008    x 3-4    LYMPH NODE EXCISION Left 2007    cervicle    OTHER Right 2001    thoracic discectomy      ARTHROSCOPY, KNEE Left 1999    RIB RESECTION Right 1980    LUMPECTOMY         PAST PSYCHIATRIC HISTORY  Prior psychiatric hospitalization: no  Prior Self harm/suicide attempt: no  Prior Diagnosis: MDD, Anxiety     PAST PSYCHIATRIC MEDICATIONS  Fluoxetine, Paroxetine, Sertraline, Citalopram  Venlafaxine, Duloxetine  Wellbutrin  Amitriptyline   Ativan, propranolol     FAMILY HISTORY  Psychiatric diagnosis: [place]ie[place] with depression and anxiety  History of suicide attempts: No  Substance abuse history: 1 sister with drug and alcohol abuse history     SUBSTANCE USE HISTORY:  ALCOHOL: no  TOBACCO: no  CANNABIS: no  OPIOIDS: no  PRESCRIPTION MEDICATIONS: no  OTHERS: no  History of inpatient/outpatient rehab treatment: none     SOCIAL HISTORY  Childhood: born in [place] and describes childhood as difficult  Moved a lot due to parents financial concerns  Employment: For [employer]  Relationship: single (never )  Kids: no kids  Current living situation: alone (but strong family support as they all live nearby)  Current/past legal issues: denies  History of emotional/physical/sexual abuse - denies      REVIEW OF SYSTEMS:        Constitutional negative   Eyes negative   Ears/Nose/Mouth/Throat negative   Cardiovascular negative   Respiratory negative   Gastrointestinal negative   Genitourinary negative   Muscular negative   Integumentary negative   Neurological negative   Endocrine negative   Hematologic/Lymphatic negative     PHYSICAL EXAMINAION:  Vital signs: LMP 02/26/2012   Musculoskeletal: Normal gait.   Abnormal  movements: none      MENTAL STATUS EXAMINATION      General:   - Grooming and hygiene: Casual,   - Apparent distress: tense,   - Behavior: Tense  - Eye Contact:  Good,   - no psychomotor agitation or retardation    - Participation: Active verbal participation  Orientation: Alert and Fully Oriented to person, place and time  Mood: Depressed  Affect: Constricted,  Thought Process: Logical and Goal-directed  Thought Content: Denies suicidal or homicidal ideations, intent or plan   Perception: Denies auditory or visual hallucinations. No delusions noted   Attention span and concentration: Intact   Speech:Rate within normal limits and Volume within normal limits  Language: Appropriate   Insight: Good  Judgment: Good  Recent and remote memory: No gross evidence of memory deficits        DEPRESSION SCREENIN/31/2023     9:30 AM 2/15/2023     7:40 AM 2023     1:20 PM   Depression Screen (PHQ-2/PHQ-9)   PHQ-2 Total Score 4 3 4   PHQ-9 Total Score 9 9 10       Interpretation of PHQ-9 Total Score   Score Severity   1-4 No Depression   5-9 Mild Depression   10-14 Moderate Depression   15-19 Moderately Severe Depression   20-27 Severe Depression    CURRENT RISK:       Suicidal: Low       Homicidal: Low       Self-Harm: Low       Relapse: Low       Crisis Safety Plan Reviewed Not Indicated       If evidence of imminent risk is present, intervention/plan:      MEDICAL RECORDS/LABS/DIAGNOSTIC TESTS REVIEWED:  No new lab since last visit     [facility] records -   Reviewed     PLAN:  (1) Major depressive disorder; (2) TIM; (3) Insomnia  Depression X 2 months (sister on hospice)  Continue Effexor XR to 225 mg daily for mood, anxiety and comorbid pain management.   Switch Wellbutrin  mg -->  mg BID daily for depression augmentation.    Continue Amitriptyline 50 mg at at bedtime as needed for insomnia.    Monitor for serotonin syndrome.  Continue psychotherapy for mood and anxiety management.  Medication options,  alternatives (including no medications) and medication risks/benefits/side effects were discussed in detail.  Explained importance of contraceptive measures while on psychotropic medications, educated to let provider know if ever pregnant or wanting to become pregnant. Verbalized understanding.  The patient was advised to call, message provider on MyChart, or come in to the clinic if symptoms worsen or if any future questions/issues regarding their medications arise; the patient verbalized understanding and agreement.    The patient was educated to call 911, call the suicide hotline, or go to local ER if having thoughts of suicide or homicide; verbalized understanding.    Billing Coding based on:  24924 based on MDM    Return to clinic in 1 month or sooner if symptoms worsen.  Next Appointment: instruction provided on how to make the next appointment.     The proposed treatment plan was discussed with the patient who was provided the opportunity to ask questions and make suggestions regarding alternative treatment. Patient verbalized understanding and expressed agreement with the plan.       Genaro Stevenson M.D.  04/18/23    This note was created using voice recognition software (Dragon). The accuracy of the dictation is limited by the abilities of the software. I have reviewed the note prior to signing, however some errors in grammar and context are still possible. If you have any questions related to this note please do not hesitate to contact our office.

## 2023-04-19 PROCEDURE — RXMED WILLOW AMBULATORY MEDICATION CHARGE: Performed by: PHYSICAL MEDICINE & REHABILITATION

## 2023-04-24 ENCOUNTER — PHARMACY VISIT (OUTPATIENT)
Dept: PHARMACY | Facility: MEDICAL CENTER | Age: 63
End: 2023-04-24
Payer: COMMERCIAL

## 2023-05-03 ENCOUNTER — PHARMACY VISIT (OUTPATIENT)
Dept: PHARMACY | Facility: MEDICAL CENTER | Age: 63
End: 2023-05-03
Payer: COMMERCIAL

## 2023-05-22 ENCOUNTER — HOSPITAL ENCOUNTER (INPATIENT)
Facility: MEDICAL CENTER | Age: 63
LOS: 2 days | DRG: 312 | End: 2023-05-24
Attending: EMERGENCY MEDICINE | Admitting: INTERNAL MEDICINE
Payer: COMMERCIAL

## 2023-05-22 ENCOUNTER — APPOINTMENT (OUTPATIENT)
Dept: RADIOLOGY | Facility: MEDICAL CENTER | Age: 63
DRG: 312 | End: 2023-05-22
Attending: EMERGENCY MEDICINE
Payer: COMMERCIAL

## 2023-05-22 DIAGNOSIS — F51.04 PSYCHOPHYSIOLOGICAL INSOMNIA: ICD-10-CM

## 2023-05-22 DIAGNOSIS — R55 NEAR SYNCOPE: ICD-10-CM

## 2023-05-22 DIAGNOSIS — E86.0 DEHYDRATION: ICD-10-CM

## 2023-05-22 DIAGNOSIS — I95.9 HYPOTENSION, UNSPECIFIED HYPOTENSION TYPE: ICD-10-CM

## 2023-05-22 DIAGNOSIS — E03.9 ACQUIRED HYPOTHYROIDISM: ICD-10-CM

## 2023-05-22 DIAGNOSIS — N30.00 ACUTE CYSTITIS WITHOUT HEMATURIA: ICD-10-CM

## 2023-05-22 LAB
ALBUMIN SERPL BCP-MCNC: 3.8 G/DL (ref 3.2–4.9)
ALBUMIN/GLOB SERPL: 1.4 G/DL
ALP SERPL-CCNC: 127 U/L (ref 30–99)
ALT SERPL-CCNC: 13 U/L (ref 2–50)
ANION GAP SERPL CALC-SCNC: 15 MMOL/L (ref 7–16)
APPEARANCE UR: ABNORMAL
AST SERPL-CCNC: 20 U/L (ref 12–45)
BACTERIA #/AREA URNS HPF: ABNORMAL /HPF
BASOPHILS # BLD AUTO: 0.3 % (ref 0–1.8)
BASOPHILS # BLD: 0.05 K/UL (ref 0–0.12)
BILIRUB SERPL-MCNC: 0.3 MG/DL (ref 0.1–1.5)
BILIRUB UR QL STRIP.AUTO: ABNORMAL
BUN SERPL-MCNC: 14 MG/DL (ref 8–22)
CALCIUM ALBUM COR SERPL-MCNC: 8.7 MG/DL (ref 8.5–10.5)
CALCIUM SERPL-MCNC: 8.5 MG/DL (ref 8.5–10.5)
CHLORIDE SERPL-SCNC: 108 MMOL/L (ref 96–112)
CO2 SERPL-SCNC: 19 MMOL/L (ref 20–33)
COLOR UR: ABNORMAL
CREAT SERPL-MCNC: 1.22 MG/DL (ref 0.5–1.4)
EKG IMPRESSION: NORMAL
EOSINOPHIL # BLD AUTO: 0.21 K/UL (ref 0–0.51)
EOSINOPHIL NFR BLD: 1.3 % (ref 0–6.9)
EPI CELLS #/AREA URNS HPF: ABNORMAL /HPF
ERYTHROCYTE [DISTWIDTH] IN BLOOD BY AUTOMATED COUNT: 51.7 FL (ref 35.9–50)
GFR SERPLBLD CREATININE-BSD FMLA CKD-EPI: 49 ML/MIN/1.73 M 2
GLOBULIN SER CALC-MCNC: 2.7 G/DL (ref 1.9–3.5)
GLUCOSE SERPL-MCNC: 134 MG/DL (ref 65–99)
GLUCOSE UR STRIP.AUTO-MCNC: NEGATIVE MG/DL
HCT VFR BLD AUTO: 39.1 % (ref 37–47)
HGB BLD-MCNC: 12.3 G/DL (ref 12–16)
HYALINE CASTS #/AREA URNS LPF: >20 /LPF
IMM GRANULOCYTES # BLD AUTO: 0.07 K/UL (ref 0–0.11)
IMM GRANULOCYTES NFR BLD AUTO: 0.4 % (ref 0–0.9)
KETONES UR STRIP.AUTO-MCNC: 15 MG/DL
LACTATE SERPL-SCNC: 2.6 MMOL/L (ref 0.5–2)
LEUKOCYTE ESTERASE UR QL STRIP.AUTO: ABNORMAL
LYMPHOCYTES # BLD AUTO: 2.22 K/UL (ref 1–4.8)
LYMPHOCYTES NFR BLD: 14.2 % (ref 22–41)
MAGNESIUM SERPL-MCNC: 1.3 MG/DL (ref 1.5–2.5)
MCH RBC QN AUTO: 29.9 PG (ref 27–33)
MCHC RBC AUTO-ENTMCNC: 31.5 G/DL (ref 32.2–35.5)
MCV RBC AUTO: 94.9 FL (ref 81.4–97.8)
MICRO URNS: ABNORMAL
MONOCYTES # BLD AUTO: 0.83 K/UL (ref 0–0.85)
MONOCYTES NFR BLD AUTO: 5.3 % (ref 0–13.4)
MUCOUS THREADS #/AREA URNS HPF: ABNORMAL /HPF
NEUTROPHILS # BLD AUTO: 12.28 K/UL (ref 1.82–7.42)
NEUTROPHILS NFR BLD: 78.5 % (ref 44–72)
NITRITE UR QL STRIP.AUTO: NEGATIVE
NRBC # BLD AUTO: 0 K/UL
NRBC BLD-RTO: 0 /100 WBC (ref 0–0.2)
PH UR STRIP.AUTO: 5 [PH] (ref 5–8)
PLATELET # BLD AUTO: 401 K/UL (ref 164–446)
PMV BLD AUTO: 11.6 FL (ref 9–12.9)
POTASSIUM SERPL-SCNC: 4.3 MMOL/L (ref 3.6–5.5)
PROT SERPL-MCNC: 6.5 G/DL (ref 6–8.2)
PROT UR QL STRIP: 30 MG/DL
RBC # BLD AUTO: 4.12 M/UL (ref 4.2–5.4)
RBC # URNS HPF: ABNORMAL /HPF
RBC UR QL AUTO: NEGATIVE
SODIUM SERPL-SCNC: 142 MMOL/L (ref 135–145)
SP GR UR STRIP.AUTO: 1.02
TRANS CELLS #/AREA URNS HPF: ABNORMAL /HPF
TROPONIN T SERPL-MCNC: 17 NG/L (ref 6–19)
UROBILINOGEN UR STRIP.AUTO-MCNC: 1 MG/DL
WBC # BLD AUTO: 15.7 K/UL (ref 4.8–10.8)
WBC #/AREA URNS HPF: ABNORMAL /HPF

## 2023-05-22 PROCEDURE — 99285 EMERGENCY DEPT VISIT HI MDM: CPT

## 2023-05-22 PROCEDURE — 87086 URINE CULTURE/COLONY COUNT: CPT

## 2023-05-22 PROCEDURE — 80053 COMPREHEN METABOLIC PANEL: CPT

## 2023-05-22 PROCEDURE — 770020 HCHG ROOM/CARE - TELE (206)

## 2023-05-22 PROCEDURE — 36415 COLL VENOUS BLD VENIPUNCTURE: CPT

## 2023-05-22 PROCEDURE — 84484 ASSAY OF TROPONIN QUANT: CPT

## 2023-05-22 PROCEDURE — 71045 X-RAY EXAM CHEST 1 VIEW: CPT

## 2023-05-22 PROCEDURE — 83735 ASSAY OF MAGNESIUM: CPT

## 2023-05-22 PROCEDURE — 700105 HCHG RX REV CODE 258: Performed by: INTERNAL MEDICINE

## 2023-05-22 PROCEDURE — 93005 ELECTROCARDIOGRAM TRACING: CPT | Performed by: EMERGENCY MEDICINE

## 2023-05-22 PROCEDURE — A9270 NON-COVERED ITEM OR SERVICE: HCPCS | Performed by: INTERNAL MEDICINE

## 2023-05-22 PROCEDURE — 700102 HCHG RX REV CODE 250 W/ 637 OVERRIDE(OP): Performed by: INTERNAL MEDICINE

## 2023-05-22 PROCEDURE — 700105 HCHG RX REV CODE 258: Performed by: EMERGENCY MEDICINE

## 2023-05-22 PROCEDURE — 85025 COMPLETE CBC W/AUTO DIFF WBC: CPT

## 2023-05-22 PROCEDURE — 83605 ASSAY OF LACTIC ACID: CPT

## 2023-05-22 PROCEDURE — 99222 1ST HOSP IP/OBS MODERATE 55: CPT | Performed by: INTERNAL MEDICINE

## 2023-05-22 PROCEDURE — 93005 ELECTROCARDIOGRAM TRACING: CPT

## 2023-05-22 PROCEDURE — 81001 URINALYSIS AUTO W/SCOPE: CPT

## 2023-05-22 RX ORDER — SODIUM CHLORIDE 9 MG/ML
1000 INJECTION, SOLUTION INTRAVENOUS ONCE
Status: COMPLETED | OUTPATIENT
Start: 2023-05-22 | End: 2023-05-22

## 2023-05-22 RX ORDER — BISACODYL 10 MG
10 SUPPOSITORY, RECTAL RECTAL
Status: DISCONTINUED | OUTPATIENT
Start: 2023-05-22 | End: 2023-05-24 | Stop reason: HOSPADM

## 2023-05-22 RX ORDER — LEVOTHYROXINE SODIUM 0.05 MG/1
50 TABLET ORAL
Status: DISCONTINUED | OUTPATIENT
Start: 2023-05-23 | End: 2023-05-24 | Stop reason: HOSPADM

## 2023-05-22 RX ORDER — SODIUM CHLORIDE, SODIUM LACTATE, POTASSIUM CHLORIDE, CALCIUM CHLORIDE 600; 310; 30; 20 MG/100ML; MG/100ML; MG/100ML; MG/100ML
INJECTION, SOLUTION INTRAVENOUS CONTINUOUS
Status: DISCONTINUED | OUTPATIENT
Start: 2023-05-22 | End: 2023-05-24 | Stop reason: HOSPADM

## 2023-05-22 RX ORDER — ENOXAPARIN SODIUM 100 MG/ML
40 INJECTION SUBCUTANEOUS DAILY
Status: DISCONTINUED | OUTPATIENT
Start: 2023-05-23 | End: 2023-05-24 | Stop reason: HOSPADM

## 2023-05-22 RX ORDER — AMITRIPTYLINE HYDROCHLORIDE 50 MG/1
50 TABLET, FILM COATED ORAL NIGHTLY
Status: DISCONTINUED | OUTPATIENT
Start: 2023-05-22 | End: 2023-05-23

## 2023-05-22 RX ORDER — FERROUS SULFATE 325(65) MG
325 TABLET ORAL DAILY
Status: DISCONTINUED | OUTPATIENT
Start: 2023-05-22 | End: 2023-05-24 | Stop reason: HOSPADM

## 2023-05-22 RX ORDER — ATORVASTATIN CALCIUM 20 MG/1
20 TABLET, FILM COATED ORAL
Status: DISCONTINUED | OUTPATIENT
Start: 2023-05-23 | End: 2023-05-24 | Stop reason: HOSPADM

## 2023-05-22 RX ORDER — POLYETHYLENE GLYCOL 3350 17 G/17G
1 POWDER, FOR SOLUTION ORAL
Status: DISCONTINUED | OUTPATIENT
Start: 2023-05-22 | End: 2023-05-24 | Stop reason: HOSPADM

## 2023-05-22 RX ORDER — AMOXICILLIN 250 MG
2 CAPSULE ORAL 2 TIMES DAILY
Status: DISCONTINUED | OUTPATIENT
Start: 2023-05-23 | End: 2023-05-24 | Stop reason: HOSPADM

## 2023-05-22 RX ORDER — ACETAMINOPHEN 325 MG/1
650 TABLET ORAL EVERY 6 HOURS PRN
Status: DISCONTINUED | OUTPATIENT
Start: 2023-05-22 | End: 2023-05-24 | Stop reason: HOSPADM

## 2023-05-22 RX ADMIN — AMITRIPTYLINE HYDROCHLORIDE 50 MG: 50 TABLET, FILM COATED ORAL at 23:42

## 2023-05-22 RX ADMIN — SODIUM CHLORIDE, POTASSIUM CHLORIDE, SODIUM LACTATE AND CALCIUM CHLORIDE: 600; 310; 30; 20 INJECTION, SOLUTION INTRAVENOUS at 21:53

## 2023-05-22 RX ADMIN — SODIUM CHLORIDE 1000 ML: 9 INJECTION, SOLUTION INTRAVENOUS at 19:01

## 2023-05-22 RX ADMIN — FERROUS SULFATE TAB 325 MG (65 MG ELEMENTAL FE) 325 MG: 325 (65 FE) TAB at 23:42

## 2023-05-22 ASSESSMENT — LIFESTYLE VARIABLES
TOTAL SCORE: 0
EVER FELT BAD OR GUILTY ABOUT YOUR DRINKING: NO
HAVE YOU EVER FELT YOU SHOULD CUT DOWN ON YOUR DRINKING: NO
HOW MANY TIMES IN THE PAST YEAR HAVE YOU HAD 5 OR MORE DRINKS IN A DAY: 0
DOES PATIENT WANT TO STOP DRINKING: CANNOT ASSESS
EVER HAD A DRINK FIRST THING IN THE MORNING TO STEADY YOUR NERVES TO GET RID OF A HANGOVER: NO
TOTAL SCORE: 0
CONSUMPTION TOTAL: NEGATIVE
ALCOHOL_USE: NO
TOTAL SCORE: 0
ON A TYPICAL DAY WHEN YOU DRINK ALCOHOL HOW MANY DRINKS DO YOU HAVE: 0
HAVE PEOPLE ANNOYED YOU BY CRITICIZING YOUR DRINKING: NO
AVERAGE NUMBER OF DAYS PER WEEK YOU HAVE A DRINK CONTAINING ALCOHOL: 0

## 2023-05-22 ASSESSMENT — PATIENT HEALTH QUESTIONNAIRE - PHQ9
SUM OF ALL RESPONSES TO PHQ9 QUESTIONS 1 AND 2: 0
1. LITTLE INTEREST OR PLEASURE IN DOING THINGS: NOT AT ALL
2. FEELING DOWN, DEPRESSED, IRRITABLE, OR HOPELESS: NOT AT ALL

## 2023-05-22 ASSESSMENT — FIBROSIS 4 INDEX
FIB4 SCORE: 0.47
FIB4 SCORE: 0.9

## 2023-05-22 ASSESSMENT — PAIN DESCRIPTION - PAIN TYPE: TYPE: ACUTE PAIN

## 2023-05-23 PROBLEM — F51.04 PSYCHOPHYSIOLOGICAL INSOMNIA: Status: ACTIVE | Noted: 2023-05-23

## 2023-05-23 PROBLEM — R94.31 PROLONGED Q-T INTERVAL ON ECG: Status: ACTIVE | Noted: 2023-05-23

## 2023-05-23 PROBLEM — I95.2 HYPOTENSION DUE TO DRUGS: Status: ACTIVE | Noted: 2023-05-22

## 2023-05-23 PROBLEM — E11.65 TYPE 2 DIABETES MELLITUS WITH HYPERGLYCEMIA (HCC): Status: ACTIVE | Noted: 2023-05-23

## 2023-05-23 LAB
ALBUMIN SERPL BCP-MCNC: 3.3 G/DL (ref 3.2–4.9)
BUN SERPL-MCNC: 18 MG/DL (ref 8–22)
CALCIUM ALBUM COR SERPL-MCNC: 8.9 MG/DL (ref 8.5–10.5)
CALCIUM SERPL-MCNC: 8.3 MG/DL (ref 8.5–10.5)
CHLORIDE SERPL-SCNC: 108 MMOL/L (ref 96–112)
CO2 SERPL-SCNC: 22 MMOL/L (ref 20–33)
CORTIS SERPL-MCNC: 6.8 UG/DL (ref 0–23)
CREAT SERPL-MCNC: 1.03 MG/DL (ref 0.5–1.4)
ERYTHROCYTE [DISTWIDTH] IN BLOOD BY AUTOMATED COUNT: 52 FL (ref 35.9–50)
GFR SERPLBLD CREATININE-BSD FMLA CKD-EPI: 60 ML/MIN/1.73 M 2
GLUCOSE BLD STRIP.AUTO-MCNC: 85 MG/DL (ref 65–99)
GLUCOSE BLD STRIP.AUTO-MCNC: 91 MG/DL (ref 65–99)
GLUCOSE BLD STRIP.AUTO-MCNC: 93 MG/DL (ref 65–99)
GLUCOSE SERPL-MCNC: 95 MG/DL (ref 65–99)
HCT VFR BLD AUTO: 33.2 % (ref 37–47)
HGB BLD-MCNC: 10.4 G/DL (ref 12–16)
INR PPP: 1 (ref 0.87–1.13)
LACTATE SERPL-SCNC: 1 MMOL/L (ref 0.5–2)
MCH RBC QN AUTO: 29.6 PG (ref 27–33)
MCHC RBC AUTO-ENTMCNC: 31.3 G/DL (ref 32.2–35.5)
MCV RBC AUTO: 94.6 FL (ref 81.4–97.8)
PHOSPHATE SERPL-MCNC: 3.5 MG/DL (ref 2.5–4.5)
PLATELET # BLD AUTO: 304 K/UL (ref 164–446)
PMV BLD AUTO: 11.1 FL (ref 9–12.9)
POTASSIUM SERPL-SCNC: 4.1 MMOL/L (ref 3.6–5.5)
PROCALCITONIN SERPL-MCNC: <0.05 NG/ML
PROTHROMBIN TIME: 13.1 SEC (ref 12–14.6)
RBC # BLD AUTO: 3.51 M/UL (ref 4.2–5.4)
SODIUM SERPL-SCNC: 141 MMOL/L (ref 135–145)
T4 FREE SERPL-MCNC: 1.22 NG/DL (ref 0.93–1.7)
TSH SERPL DL<=0.005 MIU/L-ACNC: 1.17 UIU/ML (ref 0.38–5.33)
WBC # BLD AUTO: 11.9 K/UL (ref 4.8–10.8)

## 2023-05-23 PROCEDURE — 82533 TOTAL CORTISOL: CPT

## 2023-05-23 PROCEDURE — 83605 ASSAY OF LACTIC ACID: CPT

## 2023-05-23 PROCEDURE — A9270 NON-COVERED ITEM OR SERVICE: HCPCS | Performed by: STUDENT IN AN ORGANIZED HEALTH CARE EDUCATION/TRAINING PROGRAM

## 2023-05-23 PROCEDURE — 82962 GLUCOSE BLOOD TEST: CPT

## 2023-05-23 PROCEDURE — 770020 HCHG ROOM/CARE - TELE (206)

## 2023-05-23 PROCEDURE — 87040 BLOOD CULTURE FOR BACTERIA: CPT | Mod: 91

## 2023-05-23 PROCEDURE — 93005 ELECTROCARDIOGRAM TRACING: CPT | Performed by: STUDENT IN AN ORGANIZED HEALTH CARE EDUCATION/TRAINING PROGRAM

## 2023-05-23 PROCEDURE — 84145 PROCALCITONIN (PCT): CPT

## 2023-05-23 PROCEDURE — 700111 HCHG RX REV CODE 636 W/ 250 OVERRIDE (IP): Performed by: INTERNAL MEDICINE

## 2023-05-23 PROCEDURE — 85610 PROTHROMBIN TIME: CPT

## 2023-05-23 PROCEDURE — A9270 NON-COVERED ITEM OR SERVICE: HCPCS | Performed by: INTERNAL MEDICINE

## 2023-05-23 PROCEDURE — 700102 HCHG RX REV CODE 250 W/ 637 OVERRIDE(OP): Performed by: STUDENT IN AN ORGANIZED HEALTH CARE EDUCATION/TRAINING PROGRAM

## 2023-05-23 PROCEDURE — 700102 HCHG RX REV CODE 250 W/ 637 OVERRIDE(OP): Performed by: INTERNAL MEDICINE

## 2023-05-23 PROCEDURE — 80069 RENAL FUNCTION PANEL: CPT

## 2023-05-23 PROCEDURE — 700105 HCHG RX REV CODE 258: Performed by: STUDENT IN AN ORGANIZED HEALTH CARE EDUCATION/TRAINING PROGRAM

## 2023-05-23 PROCEDURE — 84443 ASSAY THYROID STIM HORMONE: CPT

## 2023-05-23 PROCEDURE — 700111 HCHG RX REV CODE 636 W/ 250 OVERRIDE (IP): Performed by: STUDENT IN AN ORGANIZED HEALTH CARE EDUCATION/TRAINING PROGRAM

## 2023-05-23 PROCEDURE — 84439 ASSAY OF FREE THYROXINE: CPT

## 2023-05-23 PROCEDURE — 85027 COMPLETE CBC AUTOMATED: CPT

## 2023-05-23 RX ORDER — MIRTAZAPINE 15 MG/1
7.5 TABLET, FILM COATED ORAL
Status: DISCONTINUED | OUTPATIENT
Start: 2023-05-23 | End: 2023-05-24 | Stop reason: HOSPADM

## 2023-05-23 RX ORDER — SODIUM CHLORIDE, SODIUM LACTATE, POTASSIUM CHLORIDE, AND CALCIUM CHLORIDE .6; .31; .03; .02 G/100ML; G/100ML; G/100ML; G/100ML
500 INJECTION, SOLUTION INTRAVENOUS
Status: DISCONTINUED | OUTPATIENT
Start: 2023-05-23 | End: 2023-05-24 | Stop reason: HOSPADM

## 2023-05-23 RX ORDER — MIRTAZAPINE 15 MG/1
15 TABLET, FILM COATED ORAL
Status: DISCONTINUED | OUTPATIENT
Start: 2023-05-23 | End: 2023-05-23

## 2023-05-23 RX ORDER — MAGNESIUM SULFATE HEPTAHYDRATE 40 MG/ML
4 INJECTION, SOLUTION INTRAVENOUS ONCE
Status: COMPLETED | OUTPATIENT
Start: 2023-05-23 | End: 2023-05-23

## 2023-05-23 RX ADMIN — LEVOTHYROXINE SODIUM 50 MCG: 0.05 TABLET ORAL at 05:16

## 2023-05-23 RX ADMIN — SENNOSIDES AND DOCUSATE SODIUM 2 TABLET: 50; 8.6 TABLET ORAL at 05:15

## 2023-05-23 RX ADMIN — FERROUS SULFATE TAB 325 MG (65 MG ELEMENTAL FE) 325 MG: 325 (65 FE) TAB at 17:18

## 2023-05-23 RX ADMIN — ENOXAPARIN SODIUM 40 MG: 100 INJECTION SUBCUTANEOUS at 17:17

## 2023-05-23 RX ADMIN — SODIUM CHLORIDE, POTASSIUM CHLORIDE, SODIUM LACTATE AND CALCIUM CHLORIDE: 600; 310; 30; 20 INJECTION, SOLUTION INTRAVENOUS at 19:15

## 2023-05-23 RX ADMIN — MAGNESIUM SULFATE HEPTAHYDRATE 4 G: 40 INJECTION, SOLUTION INTRAVENOUS at 12:32

## 2023-05-23 RX ADMIN — ATORVASTATIN CALCIUM 20 MG: 20 TABLET, FILM COATED ORAL at 17:18

## 2023-05-23 RX ADMIN — CEFTRIAXONE SODIUM 1000 MG: 10 INJECTION, POWDER, FOR SOLUTION INTRAVENOUS at 02:49

## 2023-05-23 RX ADMIN — MIRTAZAPINE 7.5 MG: 15 TABLET, FILM COATED ORAL at 21:22

## 2023-05-23 RX ADMIN — SENNOSIDES AND DOCUSATE SODIUM 2 TABLET: 50; 8.6 TABLET ORAL at 17:18

## 2023-05-23 ASSESSMENT — ENCOUNTER SYMPTOMS
HEADACHES: 1
CHILLS: 0
INSOMNIA: 0
FEVER: 0
LOSS OF CONSCIOUSNESS: 0
DIARRHEA: 0
BLOOD IN STOOL: 0
EYE REDNESS: 0
PALPITATIONS: 0
HEADACHES: 0
NERVOUS/ANXIOUS: 0
WEAKNESS: 0
SHORTNESS OF BREATH: 0
NAUSEA: 0
EYE PAIN: 0
DEPRESSION: 1
DIZZINESS: 1
MYALGIAS: 0
CONSTIPATION: 0
SEIZURES: 0
FALLS: 0
ABDOMINAL PAIN: 0
INSOMNIA: 1
COUGH: 0
WHEEZING: 0
TREMORS: 0
FOCAL WEAKNESS: 0
VOMITING: 0
HEMOPTYSIS: 0

## 2023-05-23 ASSESSMENT — COGNITIVE AND FUNCTIONAL STATUS - GENERAL
MOBILITY SCORE: 19
MOVING TO AND FROM BED TO CHAIR: A LITTLE
WALKING IN HOSPITAL ROOM: A LITTLE
DRESSING REGULAR UPPER BODY CLOTHING: A LITTLE
DRESSING REGULAR LOWER BODY CLOTHING: A LITTLE
CLIMB 3 TO 5 STEPS WITH RAILING: A LITTLE
MOVING FROM LYING ON BACK TO SITTING ON SIDE OF FLAT BED: A LITTLE
STANDING UP FROM CHAIR USING ARMS: A LITTLE
SUGGESTED CMS G CODE MODIFIER DAILY ACTIVITY: CJ
SUGGESTED CMS G CODE MODIFIER MOBILITY: CK
DAILY ACTIVITIY SCORE: 22

## 2023-05-23 ASSESSMENT — FIBROSIS 4 INDEX: FIB4 SCORE: 1.19

## 2023-05-23 ASSESSMENT — PAIN DESCRIPTION - PAIN TYPE
TYPE: ACUTE PAIN
TYPE: ACUTE PAIN

## 2023-05-23 NOTE — CARE PLAN
The patient is Stable - Low risk of patient condition declining or worsening    Shift Goals  Clinical Goals: Monitor BP, monitor for diziness or lightheadedness  Patient Goals: Rest  Family Goals: REY    Progress made toward(s) clinical / shift goals:      Problem: Knowledge Deficit - Standard  Goal: Patient and family/care givers will demonstrate understanding of plan of care, disease process/condition, diagnostic tests and medications  Description: Target End Date:  1-3 days or as soon as patient condition allows    Document in Patient Education    1.  Patient and family/caregiver oriented to unit, equipment, visitation policy and means for communicating concern  2.  Complete/review Learning Assessment  3.  Assess knowledge level of disease process/condition, treatment plan, diagnostic tests and medications  4.  Explain disease process/condition, treatment plan, diagnostic tests and medications  Outcome: Progressing     Problem: Fall Risk  Goal: Patient will remain free from falls  Description: Target End Date:  Prior to discharge or change in level of care    Document interventions on the Lizsandra Hinojosa Fall Risk Assessment    1.  Assess for fall risk factors  2.  Implement fall precautions  Outcome: Progressing     Problem: Hemodynamics  Goal: Patient's hemodynamics, fluid balance and neurologic status will be stable or improve  Description: Target End Date:  Prior to discharge or change in level of care    Document on Assessment and I/O flowsheet templates    1.  Monitor vital signs, pulse oximetry and cardiac monitor per provider order and/or policy  2.  Maintain blood pressure per provider order  3.  Hemodynamic monitoring per provider order  4.  Manage IV fluids and IV infusions  5.  Monitor intake and output  6.  Daily weights per unit policy or provider order  7.  Assess peripheral pulses and capillary refill  8.  Assess color and body temperature  9.  Position patient for maximum circulation/cardiac  output  10. Monitor for signs/symptoms of excessive bleeding  11. Assess mental status, restlessness and changes in level of consciousness  12. Monitor temperature and report fever or hypothermia to provider immediately. Consideration of targeted temperature management.  Outcome: Progressing     Problem: Respiratory  Goal: Patient will achieve/maintain optimum respiratory ventilation and gas exchange  Description: Target End Date:  Prior to discharge or change in level of care    Document on Assessment flowsheet    1.  Assess and monitor rate, rhythm, depth and effort of respiration  2.  Breath sounds assessed qshift and/or as needed  3.  Assess O2 saturation, administer/titrate oxygen as ordered  4.  Position patient for maximum ventilatory efficiency  5.  Turn, cough, and deep breath with splinting to improve effectiveness  6.  Collaborate with RT to administer medication/treatments per order  7.  Encourage use of incentive spirometer and encourage patient to cough after use and utilize splinting techniques if applicable  8.  Airway suctioning  9.  Monitor sputum production for changes in color, consistency and frequency  10. Perform frequent oral hygiene  11. Alternate physical activity with rest periods  Outcome: Progressing     Problem: Fluid Volume  Goal: Fluid volume balance will be maintained  Description: Target End Date:  Prior to discharge or change in level of care    Document on I/O flowsheet    1.  Monitor intake and output as ordered  2.  Promote oral intake as appropriate  3.  Report inadequate intake or output to physician  4.  Administer IV therapy as ordered  5.  Weights per provider order  6.  Assess for signs and symptoms of bleeding  7.  Monitor for signs of fluid overload (respiratory changes, edema, weight gain, increased abdominal girth)  8.  Monitor of signs for inadequate fluid volume (poor skin turgor, dry mucous membranes)  9.  Instruct patient on adherence to fluid  restrictions  Outcome: Progressing     Problem: Urinary - Renal Perfusion  Goal: Ability to achieve and maintain adequate renal perfusion and functioning will improve  Description: Target End Date:  Prior to discharge or change in level of care    Document on I/O and Assessment flowsheet    1.  Urine output will remain greater than 0.5ml/Kg/HR  2.  Monitor amount and/or characteristics of urine per order/policy. Specific gravity per order/policy  3.  Assess signs and symptoms of renal dysfunction  Outcome: Progressing     Problem: Mechanical Ventilation  Goal: Safe management of artificial airway and ventilation  Description: Target End Date:  when vent discontinued    Document on VAP flowsheet and Airway LDA    1.  Daily awakening trials per provider order/policy  2.  Suctioning and care of ET/Trach tube (document on LDA)  3.  Collaborate with RT to administer medications/treatments  4.  Ambu bag at bedside and available for transport  5.  Trach patient - replacement trach at bedside  6.  Provide communication tools if applicable  Outcome: Progressing  Goal: Successful weaning off mechanical ventilator, spontaneously maintains adequate gas exchange  Description: Target End Date:  when vent discontinued    1.  Follow universal weaning protocol for patients on mechanical ventilation per order  2.  Review contraindication list.  Obtain provider order to wean in presence of contraindication.  3.  Obtain Bev Sedation-Agitation Score  4.  Bev Score 1-2:  sedation vacation  5.  Bev Score 3-4:  Collaborate with provider and/or RT to determine readiness for trial  6.  Begin 2 hour trial of weaning following protocol  7.  Evaluate for fatigue parameters per protocol  8.  Fatigue parameters triggered:  Stop wean and return to previous ASV% setting or increase % minute volume to offset work or breathing  Outcome: Progressing  Goal: Patient will be able to express needs and understand communication  Description: Target  End Date:  when vent discontinued    1.  Assess ability to communicate and understand  2.  Provide communication tools  3.  Collaborate with Speech Therapy for [facility]  Outcome: Progressing     Problem: Physical Regulation  Goal: Diagnostic test results will improve  Description: Target End Date:  Prior to discharge or change in level of care    1.  Monitor lactic acid levels  2.  Monitor ABG's  3.  Monitor diagnostic test results  Outcome: Progressing  Goal: Signs and symptoms of infection will decrease  Description: Target End Date:  Prior to discharge or change in level of care    1.  Remove potential routes of infection, such as central lines and urinary catheter  2.  Follow facility protocol for changing IV tubing and sites  3.  Collaborate with Infectious Disease  4.  Antibiotic therapy per provider order  5.  Note drug effects and monitor for antibiotic toxicity  Outcome: Progressing

## 2023-05-23 NOTE — ASSESSMENT & PLAN NOTE
Patient presenting with persistent hypotension with SBP down to 89.  I discussed with the clinical pharmacist, who states that the patient's amitriptyline can cause hypotension.    I have discontinued patient's amitriptyline and changed to low-dose mirtazapine as per patient's request.

## 2023-05-23 NOTE — H&P
"Hospital Medicine History & Physical Note    Date of Service  5/22/2023    Primary Care Physician  ARELIS Chris    Consultants      Code Status  Full Code    Chief Complaint  Chief Complaint   Patient presents with    Syncope     Pt had a syncopal episode while walking to the restroom    T-5000 GLF     Pt reports \"sliding on the the floor\" after her syncopal event       History of Presenting Illness  Debby Desai is a 63 y.o. female who presented 5/22/2023 with Syncope (Pt had a syncopal episode while walking to the restroom) and T-5000 GLF (Pt reports \"sliding on the the floor\" after her syncopal event)  She has a history of hypertension, hypothyroidism, diabetes who presented with near syncope. Occurred when she was walking at her sister's house felt dizzy and was at the couch, slipping down diaphoretic. She did drink soda and ate breakfast but after careful questioning she made little urine and felt dehydrated. She did not lose consciousness or fall. She said she is compliant with BP meds and did not take more of them nor did she have new or recently changed doses of them.  EMS reported she was hypotensive.and received IV fluids and intermittent levophed.   By the time she wass at the ED< her BP was normal range off levophed. She was afebrile and HR normal.   CXR on my review looked clear.  EKG on my review showed long QT.   Leukocytosis noted. Cr- normal.  When I saw her no acute distress. She did say she had trouble urinating.    I discussed the plan of care with patient and bedside RN.  Told ed magnesium  Review of Systems  Review of Systems   Constitutional:  Negative for chills and fever.   HENT:  Negative for congestion, hearing loss and nosebleeds.    Eyes:  Negative for pain and redness.   Respiratory:  Negative for cough, hemoptysis, shortness of breath and wheezing.    Cardiovascular:  Negative for chest pain and palpitations.   Gastrointestinal:  Negative for abdominal pain, blood in " stool, constipation, diarrhea, nausea and vomiting.   Genitourinary:  Positive for urgency. Negative for dysuria, frequency and hematuria.   Musculoskeletal:  Negative for falls, joint pain and myalgias.   Skin:  Negative for rash.   Neurological:  Positive for dizziness. Negative for tremors, focal weakness, seizures, loss of consciousness, weakness and headaches.   Psychiatric/Behavioral:  The patient is not nervous/anxious and does not have insomnia.    All other systems reviewed and are negative.      Past Medical History   has a past medical history of Anemia, Anginal syndrome ([facility]), Anxiety, Arthritis, Chronic pain (6/2/2021), Dental disorder (5/24/12), Diabetes ([facility]), High cholesterol, HTN (hypertension), benign (3/19/2012), Hypothyroid (3/19/2012), Major depression (3/19/2012), Orbital floor (blow-out) closed fracture ([facility]), Other specified symptom associated with female genital organs, Pain, Pain, Pain (02/2018), Psychiatric problem, Unspecified disorder of thyroid, Urinary bladder disorder, and Urinary incontinence.    Surgical History   has a past surgical history that includes lymph node excision (Left, 2007); other (Right, 2001); arthroscopy, knee (Left, 1999); rib resection (Right, 1980); block epidural steroid injection (2008); spinal cord stimulator (7/11/2011); biopsy general (5/1/12); spinal cord stimulator (5/30/2012); pump revision (12/10/2012); knee arthroscopy (4/21/2015); meniscectomy, knee, medial (4/21/2015); knee arthroscopy (Right, 7/12/2017); meniscectomy, knee, medial (Right, 7/12/2017); synovectomy (Right, 7/12/2017); knee arthroscopy (Right, 2/9/2018); meniscectomy, knee, medial (Right, 2/9/2018); pr total knee arthroplasty (Right, 9/16/2019); lumpectomy; and inj,epi anes/ster lum/sac addl (Right, 4/7/2021).     Family History  family history includes Alcohol abuse in her father; Anesthesia in her sister; Cancer in an other family member; Diabetes in her father and another family  "member; Heart Disease in her father and another family member; Hypertension in her father and another family member; Lung Disease in an other family member; Stroke in an other family member.   Family history reviewed with patient. There is no family history that is pertinent to the chief complaint.     Social History   reports that she has never smoked. She has never used smokeless tobacco. She reports that she does not currently use alcohol. She reports that she does not use drugs.    Allergies  Allergies   Allergen Reactions    Sulfamethoxazole-Trimethoprim Rash and Swelling     Pt states \"My hand swells up and rash all over body\".       Medications  Prior to Admission Medications   Prescriptions Last Dose Informant Patient Reported? Taking?   Ferrous Sulfate (IRON) 325 (65 Fe) MG Tab  Patient Yes No   Sig: Take 65 mg by mouth every day at 6 PM.   VITAMIN D PO   Yes No   Sig: Take  by mouth every day.   amLODIPine (NORVASC) 10 MG Tab   No No   Sig: Take 1 Tablet by mouth every day.   amitriptyline (ELAVIL) 50 MG Tab   No No   Sig: Take 1 Tablet by mouth every evening.   atorvastatin (LIPITOR) 20 MG Tab   No No   Sig: TAKE ONE TABLET BY MOUTH EVERY DAY   buPROPion (WELLBUTRIN SR) 200 MG SR tablet   No No   Sig: Take 1 Tablet by mouth 2 times a day.   levothyroxine (SYNTHROID) 50 MCG Tab   No No   Sig: Take 1 tablet by mouth Every morning on an empty stomach.   lisinopril (PRINIVIL) 40 MG tablet   No No   Sig: Take 1 Tablet by mouth every day.   metFORMIN ER (GLUCOPHAGE XR) 500 MG TABLET SR 24 HR   No No   Sig: Take 1 Tab by mouth 2 times a day.   traMADol (ULTRAM) 50 MG Tab   No No   Sig: Take 1 Tablet by mouth every 8 hours as needed for Severe Pain for up to 30 days.   venlafaxine (EFFEXOR-XR) 150 MG extended-release capsule   No No   Sig: Take 1 Capsule by mouth every day. (venlafaxine xr 150 mg + 75 mg = 225 mg daily)   venlafaxine XR (EFFEXOR XR) 75 MG CAPSULE SR 24 HR   No No   Sig: Take 1 Capsule by mouth " every day. (venlafaxine xr 150 mg + 75 mg = 225 mg daily)      Facility-Administered Medications: None       Physical Exam  Temp:  [36.6 °C (97.8 °F)] 36.6 °C (97.8 °F)  Pulse:  [66-75] 70  Resp:  [18-20] 18  BP: ()/(51-59) 104/59  SpO2:  [84 %-99 %] 98 %  Blood Pressure : 104/59   Temperature: 36.6 °C (97.8 °F)   Pulse: 70   Respiration: 18   Pulse Oximetry: 98 %       Physical Exam  Vitals and nursing note reviewed.   Constitutional:       General: She is not in acute distress.  HENT:      Head: Normocephalic and atraumatic.      Right Ear: External ear normal.      Left Ear: External ear normal.      Nose: Nose normal.      Mouth/Throat:      Mouth: Mucous membranes are moist.   Eyes:      General: No scleral icterus.     Conjunctiva/sclera: Conjunctivae normal.   Cardiovascular:      Rate and Rhythm: Normal rate and regular rhythm.      Pulses: Normal pulses.      Heart sounds: No murmur heard.     No friction rub. No gallop.   Pulmonary:      Effort: Pulmonary effort is normal. No respiratory distress.      Breath sounds: Normal breath sounds. No stridor. No wheezing, rhonchi or rales.   Chest:      Chest wall: No tenderness.   Abdominal:      General: Abdomen is flat. Bowel sounds are normal. There is no distension.      Palpations: Abdomen is soft.      Tenderness: There is no abdominal tenderness (mild pelvicc fullness). There is no guarding or rebound.   Musculoskeletal:         General: No swelling, tenderness or deformity. Normal range of motion.      Cervical back: Normal range of motion and neck supple. No rigidity.   Skin:     General: Skin is warm.      Coloration: Skin is not jaundiced.   Neurological:      General: No focal deficit present.      Mental Status: She is alert and oriented to person, place, and time. Mental status is at baseline.   Psychiatric:         Mood and Affect: Mood normal.         Behavior: Behavior normal.         Thought Content: Thought content normal.          Judgment: Judgment normal.         Laboratory:  Recent Labs     05/22/23  1724   WBC 15.7*   RBC 4.12*   HEMOGLOBIN 12.3   HEMATOCRIT 39.1   MCV 94.9   MCH 29.9   MCHC 31.5*   RDW 51.7*   PLATELETCT 401   MPV 11.6     Recent Labs     05/22/23  1724   SODIUM 142   POTASSIUM 4.3   CHLORIDE 108   CO2 19*   GLUCOSE 134*   BUN 14   CREATININE 1.22   CALCIUM 8.5     Recent Labs     05/22/23  1724   ALTSGPT 13   ASTSGOT 20   ALKPHOSPHAT 127*   TBILIRUBIN 0.3   GLUCOSE 134*         No results for input(s): NTPROBNP in the last 72 hours.      Recent Labs     05/22/23  1724   TROPONINT 17       Imaging:  DX-CHEST-PORTABLE (1 VIEW)   Final Result      Mild cardiomegaly.            Assessment/Plan:  Justification for Admission Status  I anticipate this patient will require at least two midnights for appropriate medical management, necessitating inpatient admission because eval syncope, has long QT and UTI/leukocytosis      * Near syncope, hypotension, long QT, UTI  Assessment & Plan  Hold all blood pressure medications, give IV fluids  I told EDP to order magnesium level which is pending  Feb 2023 echo normal EF  Ordered TSH  Hold Effexor, tramadol and avoid any QT prolonging drugs  Repeat EKG in the morning  Leukocytosis and U/A leuk est slightly positive, ordered antibiotics.    Type 2 diabetes mellitus with hyperglycemia (HCC)  Assessment & Plan  Hold oral hypoglycemic  Ordered SS insulin    Hypotension- (present on admission)  Assessment & Plan  Resolved    Dyslipidemia- (present on admission)  Assessment & Plan  Continue statin    Acquired hypothyroidism- (present on admission)  Assessment & Plan  COntinue Synthroid    Primary hypertension  Assessment & Plan  Hold antihypertensives and reintroduce when she is no longer dizzy and there si blood pressure room check orthostats        VTE prophylaxis: enoxaparin ppx

## 2023-05-23 NOTE — PROGRESS NOTES
"Hospital Medicine Daily Progress Note    Date of Service  5/23/2023    Chief Complaint  Debby Desai is a 63 y.o. female admitted 5/22/2023 with Syncope and ground-level fall.    Hospital Course  Debby Desai is a 63 y.o. female who presented 5/22/2023 with syncope (Pt had a syncopal episode while walking to the restroom) and T-5000 GLF (Pt reports \"sliding on the the floor\" after her syncopal event).  This is a pleasant woman with a history of hypertension, hypothyroidism, prediabetes, presented with near syncope.  She has a history of hypertension, hypothyroidism, diabetes who presented with near syncope. Occurred when she was walking at her sister's house felt dizzy and was at the couch, slipping down diaphoretic. She did drink soda and ate breakfast but after careful questioning she made little urine and felt dehydrated. She did not lose consciousness or fall. She said she is compliant with BP meds and did not take more of them nor did she have new or recently changed doses of them.  EMS reported she was hypotensive.and received IV fluids and intermittent levophed.   By the time she wass at the ED< her BP was normal range off levophed. She was afebrile and HR normal.   CXR on my review looked clear.  EKG on my review showed long QT.   Leukocytosis noted. Cr- normal.  When I saw her no acute distress. She did say she had trouble urinating.    Interval Problem Update  5/23: No significant events overnight.  Further history gathered from the patient.  States that his blood pressure reading was elevated during visit recently to PCP 1 month ago and also at home.  He was placed on amitriptyline to help with his sleep 1.5 years ago.  Lisinopril was recently increased to 40 mg daily.  He was taking lisinopril 20 mg daily in February.  I discussed the patient's case with the clinical pharmacist, who stated that the amlodipine can cause hypotension.  Patient was agreeable to switching to mirtazapine low-dose.  He " continues on telemetry monitoring for his hypotension.  Blood pressure stabilizing in the 100s to 120s.    I have discussed this patient's plan of care and discharge plan at IDT rounds today with Case Management, Nursing, Nursing leadership, and other members of the IDT team.    Consultants/Specialty  none    Code Status  Full Code    Disposition  The patient is not medically cleared for discharge to home or a post-acute facility.  Anticipate discharge to: home with close outpatient follow-up    I have placed the appropriate orders for post-discharge needs.    Review of Systems  Review of Systems   Constitutional:  Positive for malaise/fatigue. Negative for chills and fever.   Respiratory:  Negative for cough and shortness of breath.    Cardiovascular:  Negative for chest pain and palpitations.   Gastrointestinal:  Negative for abdominal pain, nausea and vomiting.   Genitourinary:  Negative for dysuria and hematuria.   Musculoskeletal:  Negative for joint pain and myalgias.   Neurological:  Positive for dizziness and headaches.   Psychiatric/Behavioral:  Positive for depression. The patient has insomnia. The patient is not nervous/anxious.         Physical Exam  Temp:  [36.8 °C (98.2 °F)-37.1 °C (98.7 °F)] 36.8 °C (98.2 °F)  Pulse:  [68-88] 88  Resp:  [16-20] 16  BP: (101-123)/(55-70) 123/70  SpO2:  [90 %-100 %] 91 %    Physical Exam  Vitals and nursing note reviewed.   Constitutional:       Appearance: Normal appearance. She is normal weight. She is not ill-appearing or diaphoretic.   HENT:      Head: Normocephalic and atraumatic.      Mouth/Throat:      Mouth: Mucous membranes are moist.      Pharynx: Oropharynx is clear. No oropharyngeal exudate.   Eyes:      General:         Right eye: No discharge.         Left eye: No discharge.      Conjunctiva/sclera: Conjunctivae normal.      Pupils: Pupils are equal, round, and reactive to light.   Cardiovascular:      Rate and Rhythm: Normal rate and regular rhythm.       Pulses: Normal pulses.      Heart sounds: Murmur heard.      Systolic murmur is present with a grade of 2/6.   Pulmonary:      Effort: Pulmonary effort is normal. No respiratory distress.      Breath sounds: Normal breath sounds.   Abdominal:      General: Abdomen is flat. Bowel sounds are normal. There is no distension.      Palpations: Abdomen is soft.      Tenderness: There is no abdominal tenderness.   Musculoskeletal:      Cervical back: Neck supple. No tenderness.      Right lower leg: No edema.      Left lower leg: No edema.   Neurological:      Mental Status: She is alert and oriented to person, place, and time.      Motor: No weakness.         Fluids    Intake/Output Summary (Last 24 hours) at 5/23/2023 1856  Last data filed at 5/23/2023 1132  Gross per 24 hour   Intake no documentation   Output 1900 ml   Net -1900 ml       Laboratory  Recent Labs     05/22/23  1724 05/23/23  0139   WBC 15.7* 11.9*   RBC 4.12* 3.51*   HEMOGLOBIN 12.3 10.4*   HEMATOCRIT 39.1 33.2*   MCV 94.9 94.6   MCH 29.9 29.6   MCHC 31.5* 31.3*   RDW 51.7* 52.0*   PLATELETCT 401 304   MPV 11.6 11.1     Recent Labs     05/22/23  1724 05/23/23  0139   SODIUM 142 141   POTASSIUM 4.3 4.1   CHLORIDE 108 108   CO2 19* 22   GLUCOSE 134* 95   BUN 14 18   CREATININE 1.22 1.03   CALCIUM 8.5 8.3*     Recent Labs     05/23/23  0139   INR 1.00               Imaging  DX-CHEST-PORTABLE (1 VIEW)   Final Result      Mild cardiomegaly.       I have personally reviewed the patient's chest x-ray.  Per my read, clear lung volumes bilaterally with no significant interstitial or focal infiltrates.  Sharp costophrenic angles bilaterally.      I personally reviewed the patient's EKG performed on 5/22/2023.  Per my read normal sinus rhythm with heart rate of 70, QTc prolonged at 570, no significant ST elevation or depression.    Assessment/Plan  * Near syncope  Assessment & Plan  I suspect this is due to drug-related hypotension in setting of high-dose blood  pressure medications as well as amitriptyline, which could exacerbate hypotension.  Patient reports tunnel vision prior to almost passing out.  Feb 2023 echo normal EF  TSH of 1.17, which is within normal limits.      Hold Effexor, tramadol and avoid any QT prolonging drugs  Discontinue amitriptyline  I have ordered repeat EKG  Continue telemetry cardiac monitoring    Hypotension due to drugs- (present on admission)  Assessment & Plan  Patient presenting with persistent hypotension with SBP down to 89.  I discussed with the clinical pharmacist, who states that the patient's amitriptyline can cause hypotension.    I have discontinued patient's amitriptyline and changed to low-dose mirtazapine as per patient's request.    Psychophysiological insomnia  Assessment & Plan  Patient was started on amitriptyline for insomnia 1.5 years ago.  This can be contributing to hypotension.  Patient is agreeable to changing to low-dose mirtazapine.    Prolonged Q-T interval on ECG- (present on admission)  Assessment & Plan  QTc prolonged at 570.    Avoid QTc prolonging medications  Continuous telemetry monitoring    Type 2 diabetes mellitus with hyperglycemia (HCC)  Assessment & Plan  Hold oral hypoglycemic  Ordered SS insulin    Dyslipidemia- (present on admission)  Assessment & Plan  Continue statin    Acquired hypothyroidism- (present on admission)  Assessment & Plan  COntinue Synthroid    Primary hypertension- (present on admission)  Assessment & Plan  Currently overly controlled.  Continue to hold on lisinopril 40 mg daily and amlodipine 10 mg daily           VTE prophylaxis: SCDs/TEDs and enoxaparin ppx    I have performed a physical exam and reviewed and updated ROS and Plan today (5/23/2023). In review of yesterday's note (5/22/2023), there are no changes except as documented above.

## 2023-05-23 NOTE — ASSESSMENT & PLAN NOTE
I suspect this is due to drug-related hypotension in setting of high-dose blood pressure medications as well as amitriptyline, which could exacerbate hypotension.  Patient reports tunnel vision prior to almost passing out.  Feb 2023 echo normal EF  TSH of 1.17, which is within normal limits.      Hold Effexor, tramadol and avoid any QT prolonging drugs  Discontinue amitriptyline  I have ordered repeat EKG  Continue telemetry cardiac monitoring

## 2023-05-23 NOTE — ASSESSMENT & PLAN NOTE
Currently overly controlled.  Continue to hold on lisinopril 40 mg daily and amlodipine 10 mg daily

## 2023-05-23 NOTE — ED TRIAGE NOTES
"Chief Complaint   Patient presents with    Syncope     Pt had a syncopal episode while walking to the restroom    T-5000 GLF     Pt reports \"sliding on the the floor\" after her syncopal event     Pt BIB [facility] for above complaints. Per pt, pt had four episodes of near syncope today.  Pt reports she we would become dizzy  whenever she stood up from a sitting position.  Pt was hypotensive @ 57/39 for EMS.  Pt was given 1.4 L NS and a levophed drip was initiated by EMS.  Pt is alert and oriented, speaking in full sentences, follows commands and responds appropriately to questions. NAD. Resp are even and unlabored.  Pt placed in gown and on monitor. Chart up for ERP  "

## 2023-05-23 NOTE — CARE PLAN
Problem: Knowledge Deficit - Standard  Goal: Patient and family/care givers will demonstrate understanding of plan of care, disease process/condition, diagnostic tests and medications  Outcome: Progressing     Problem: Fall Risk  Goal: Patient will remain free from falls  Outcome: Progressing     Problem: Hemodynamics  Goal: Patient's hemodynamics, fluid balance and neurologic status will be stable or improve  Outcome: Progressing     Problem: Respiratory  Goal: Patient will achieve/maintain optimum respiratory ventilation and gas exchange  Outcome: Progressing   The patient is Watcher - Medium risk of patient condition declining or worsening    Shift Goals  Clinical Goals: monitor BP, fall precautions    Progress made toward(s) clinical / shift goals:  Progressing    Patient is not progressing towards the following goals:

## 2023-05-23 NOTE — PROGRESS NOTES
4 Eyes Skin Assessment Completed by ZORAIDA Gonzalez and ZORAIDA Baez.    Head Bruising  Ears WDL  Nose WDL  Mouth WDL  Neck WDL  Breast/Chest WDL  Shoulder Blades WDL  Spine WDL  (R) Arm/Elbow/Hand Bruising  (L) Arm/Elbow/Hand Bruising  Abdomen WDL  Groin WDL  Scrotum/Coccyx/Buttocks Redness and Blanching  (R) Leg Bruising  (L) Leg Bruising  (R) Heel/Foot/Toe Boggy  (L) Heel/Foot/Toe Boggy          Devices In Places Tele Box, Blood Pressure Cuff, Pulse Ox, and Nasal Cannula      Interventions In Place Sacral Mepilex, Pillows, Low Air Loss Mattress, and Barrier Cream    Possible Skin Injury No    Pictures Uploaded Into Epic N/A  Wound Consult Placed N/A  RN Wound Prevention Protocol Ordered No

## 2023-05-23 NOTE — ED PROVIDER NOTES
"ED Provider Note    CHIEF COMPLAINT  Chief Complaint   Patient presents with    Syncope     Pt had a syncopal episode while walking to the restroom    T-5000 GLF     Pt reports \"sliding on the the floor\" after her syncopal event     EXTERNAL RECORDS REVIEWED  Records were reviewed which showed that the patient had an Echo in February with an EF of 55%. Patient has a history of hypothyroidism, dyslipidemia, and hypertension.  Last PCP visit 4/17/23.  Patient having some episodes of hypotension at that time.    HPI/ROS  LIMITATION TO HISTORY   None  OUTSIDE HISTORIAN(S):  None    Debby Desai is a 63 y.o. female who presents to the Emergency Department secondary to near syncope onset today. She reports that she has a history of dizziness with episodes being a daily occurrence. She states that while at her sister's house she was going to go to the bathroom which took four times to stand up and was given a walker by her sister to ambulate to the bathroom. When she got there she slid off the toilet and couldn't get up. Afterwards she noticed that she was quite sweaty, not from the temperature. She reports associated shortness of breath but denies any chest pain, fever, cough, vomiting, diarrhea, or dysuria. She is not normally on supplemental oxygen and reports taking lisinopril and amlodipine. She denies any cardiac history.  Blood pressure was in the 50s on EMS arrival.  She received fluids and norepinephrine drip was started temporarily.    PAST MEDICAL HISTORY  Past Medical History:   Diagnosis Date    Anemia     in past    Anginal syndrome (HCC)     had work up and feet r/t anxiety    Anxiety     Arthritis     OA knees    Chronic pain 6/2/2021    Dental disorder 5/24/12    partial upper denture    Diabetes (HCC)     pre diabetic    High cholesterol     HTN (hypertension), benign 3/19/2012    Hypothyroid 3/19/2012    Major depression 3/19/2012    Orbital floor (blow-out) closed fracture (HCC)     left    Other " specified symptom associated with female genital organs     post menopausal bleeding    Pain     thoracic spine, Right knee, myofacial pain, and Right shoulder    Pain     recently fell and has bruise left forehead    Pain 02/2018    chronic back pain, right shoulder    Psychiatric problem     depression, anxiety    Unspecified disorder of thyroid     Urinary bladder disorder     stress incont.    Urinary incontinence     Occasional      SURGICAL HISTORY  Past Surgical History:   Procedure Laterality Date    INJ,EPI ANES/STER LUM/SAC ADDL Right 4/7/2021    Procedure: RIGHT lumbar five and sacral one transforaminal epidural;  Surgeon: Volodymyr Herring M.D.;  [facility];  Service: Pain Management    PB TOTAL KNEE ARTHROPLASTY Right 9/16/2019    Procedure: RIGHT ARTHROPLASTY, KNEE, TOTAL;  Surgeon: Rufus Saba M.D.;  Location: [facility];  Service: Orthopedics    KNEE ARTHROSCOPY Right 2/9/2018    Procedure: KNEE ARTHROSCOPY;  Surgeon: Harsh Baltazar M.D.;  Location: [facility];  Service: Orthopedics    MENISCECTOMY, KNEE, MEDIAL Right 2/9/2018    Procedure: MEDIAL AND LATERAL MENISCECTOMY- PARTIAL;  Surgeon: Harsh Baltazar M.D.;  Location: [facility];  Service: Orthopedics    KNEE ARTHROSCOPY Right 7/12/2017    Procedure: KNEE ARTHROSCOPY- CESPEDES;  Surgeon: Harsh Baltazar M.D.;  Location: [facility];  Service:     MENISCECTOMY, KNEE, MEDIAL Right 7/12/2017    Procedure: PARTIAL MEDIAL AND LATERAL MENISCECTOMY;  Surgeon: Harsh Baltazar M.D.;  Location: [facility];  Service:     SYNOVECTOMY Right 7/12/2017    Procedure: SYNOVECTOMY;  Surgeon: Harsh Baltazar M.D.;  Location: [facility];  Service:     KNEE ARTHROSCOPY  4/21/2015    Performed by Rufus Saba M.D. at [facility]    MENISCECTOMY, KNEE, MEDIAL  4/21/2015    [facility]    PUMP REVISION  12/10/2012    Performed by Mak Guerra M.D. at [facility]    SPINAL CORD STIMULATOR  5/30/2012    Performed by AMK GUERRA at SURGERY [facility]    BIOPSY GENERAL  5/1/12    endometrial    SPINAL CORD STIMULATOR  7/11/2011    Performed by MAK GUERRA at SURGERY [facility]    BLOCK EPIDURAL STEROID INJECTION  2008    x 3-4    LYMPH NODE EXCISION Left 2007    cervicle    OTHER Right 2001    thoracic discectomy      ARTHROSCOPY, KNEE Left 1999    RIB RESECTION Right 1980    LUMPECTOMY        FAMILY HISTORY  Family History   Problem Relation Age of Onset    Hypertension Father     Diabetes Father     Heart Disease Father     Alcohol abuse Father     Anesthesia Sister         slow to come out (as a child)    Diabetes Other     Heart Disease Other     Cancer Other     Hypertension Other     Stroke Other     Lung Disease Other      SOCIAL HISTORY   reports that she has never smoked. She has never used smokeless tobacco. She reports that she does not currently use alcohol. She reports that she does not use drugs.    CURRENT MEDICATIONS  Previous Medications    AMITRIPTYLINE (ELAVIL) 50 MG TAB    Take 1 Tablet by mouth every evening.    AMLODIPINE (NORVASC) 10 MG TAB    Take 1 Tablet by mouth every day.    ATORVASTATIN (LIPITOR) 20 MG TAB    TAKE ONE TABLET BY MOUTH EVERY DAY    BUPROPION (WELLBUTRIN SR) 200 MG SR TABLET    Take 1 Tablet by mouth 2 times a day.    FERROUS SULFATE (IRON) 325 (65 FE) MG TAB    Take 65 mg by mouth every day at 6 PM.    LEVOTHYROXINE (SYNTHROID) 50 MCG TAB    Take 1 tablet by mouth Every morning on an empty stomach.    LISINOPRIL (PRINIVIL) 40 MG TABLET    Take 1 Tablet by mouth every day.    METFORMIN ER (GLUCOPHAGE XR) 500 MG TABLET SR 24 HR    Take 1 Tab by mouth 2 times a day.    TRAMADOL (ULTRAM) 50 MG TAB    Take 1 Tablet by mouth every 8 hours as needed for Severe Pain for up to 30 days.    VENLAFAXINE  "(EFFEXOR-XR) 150 MG EXTENDED-RELEASE CAPSULE    Take 1 Capsule by mouth every day. (venlafaxine xr 150 mg + 75 mg = 225 mg daily)    VENLAFAXINE XR (EFFEXOR XR) 75 MG CAPSULE SR 24 HR    Take 1 Capsule by mouth every day. (venlafaxine xr 150 mg + 75 mg = 225 mg daily)    VITAMIN D PO    Take  by mouth every day.     ALLERGIES  Sulfamethoxazole-trimethoprim    PHYSICAL EXAM  BP (!) 89/53   Pulse 70   Temp 36.6 °C (97.8 °F) (Temporal)   Resp 20   Ht 1.702 m (5' 7\")   Wt 81.6 kg (180 lb)   SpO2 98%      Constitutional: Nontoxic appearing. Alert in no apparent distress.  HENT: Normocephalic, Atraumatic. Bilateral external ears normal. Nose normal.  Dry mucous membranes.  Oropharynx clear.  Eyes: Pupils are equal and reactive. Conjunctiva normal.   Neck: Supple, full range of motion  Heart: Regular rate and rhythm.  No murmurs.    Lungs: No respiratory distress, normal work of breathing. Lungs clear to auscultation bilaterally.  Abdomen Soft, no distention.  No tenderness to palpation.  Musculoskeletal: Atraumatic. No obvious deformities noted.  No lower extremity edema.  Skin: Warm, Dry.  No erythema, No rash.   Neurologic: Alert and oriented x3. Moving all extremities spontaneously without focal deficits.  Psychiatric: Affect normal, Mood normal, Appears appropriate and not intoxicated.     DIAGNOSTIC STUDIES / PROCEDURES    EKG  I have independently interpreted this EKG  Results for orders placed or performed during the hospital encounter of 23   EKG   Result Value Ref Range    Report       [facility] Emergency Dept.    Test Date:  2023  Pt Name:    CONNOR PAULA                  Department: ER  MRN:        [id number]                      Room:       RD 05  Gender:     Female                       Technician: [id number]  :        [id number]                   Requested By:ER TRIAGE PROTOCOL  Order #:    [id number]                    Reading MD: Lise No MD    Measurements  Intervals  "                               Axis  Rate:       70                           P:          51  UT:         151                          QRS:        37  QRSD:       90                           T:          85  QT:         528  QTc:        570    Interpretive Statements  Sinus rhythm  Abnormal R-wave progression, early transition  Borderline repolarization abnormality  Prolonged QT interval  Diffuse ST depressions  Compared to ECG 07/08/2022 06:51:26  Prolonged QT interval now present  Sinus bradycardia no longer present  Electronically Signed On 5- 20:05:38 PDT by LUIS No MD        LABS  Labs Reviewed   CBC WITH DIFFERENTIAL - Abnormal; Notable for the following components:       Result Value    WBC 15.7 (*)     RBC 4.12 (*)     MCHC 31.5 (*)     RDW 51.7 (*)     Neutrophils-Polys 78.50 (*)     Lymphocytes 14.20 (*)     Neutrophils (Absolute) 12.28 (*)     All other components within normal limits    Narrative:     Biotin intake of greater than 5 mg per day may interfere with  troponin levels, causing false low values.   COMP METABOLIC PANEL - Abnormal; Notable for the following components:    Co2 19 (*)     Glucose 134 (*)     Alkaline Phosphatase 127 (*)     All other components within normal limits    Narrative:     Biotin intake of greater than 5 mg per day may interfere with  troponin levels, causing false low values.   LACTIC ACID - Abnormal; Notable for the following components:    Lactic Acid 2.6 (*)     All other components within normal limits   ESTIMATED GFR - Abnormal; Notable for the following components:    GFR (CKD-EPI) 49 (*)     All other components within normal limits    Narrative:     Biotin intake of greater than 5 mg per day may interfere with  troponin levels, causing false low values.   URINALYSIS,CULTURE IF INDICATED - Abnormal; Notable for the following components:    Character Cloudy (*)     Ketones 15 (*)     Protein 30 (*)     Bilirubin Moderate (*)     Leukocyte Esterase  Trace (*)     All other components within normal limits    Narrative:     Indication for culture:->Patient WITHOUT an indwelling Jasmine  catheter in place with new onset of Dysuria, Frequency,  Urgency, and/or Suprapubic pain   URINE MICROSCOPIC (W/UA) - Abnormal; Notable for the following components:    WBC 5-10 (*)     Bacteria Moderate (*)     Hyaline Cast >20 (*)     All other components within normal limits    Narrative:     Indication for culture:->Patient WITHOUT an indwelling Jasmine  catheter in place with new onset of Dysuria, Frequency,  Urgency, and/or Suprapubic pain   TROPONIN    Narrative:     Biotin intake of greater than 5 mg per day may interfere with  troponin levels, causing false low values.   CORRECTED CALCIUM    Narrative:     Biotin intake of greater than 5 mg per day may interfere with  troponin levels, causing false low values.   MAGNESIUM      RADIOLOGY  I have independently interpreted the diagnostic imaging associated with this visit and am waiting the final reading from the radiologist.   My preliminary interpretation is a follows: no infiltrate  Radiologist interpretation:  DX-CHEST-PORTABLE (1 VIEW)   Final Result      Mild cardiomegaly.         COURSE & MEDICAL DECISION MAKING    6:23 PM - Patient was seen and evaluated at bedside. Discussed plan to administer fluids along with serum studies and imaging. Possibility of hospitalization was discussed with the patient. Patient verbalizes understanding and agreement to this plan of care.     ED Observation Status? Yes; I am placing the patient in to an observation status due to a diagnostic uncertainty as well as therapeutic intensity. Patient placed in observation status at 6:41 PM, 5/22/2023.     Observation plan is as follows: Ordered labs: EKG, Complete Metabolic Panel, CBC with differentials, and Troponin and imaging: DX-Chest to evaluate their symptoms.     Upon Reevaluation, the patient's condition has: not improved; and will be  escalated to hospitalization.    Patient discharged from ED Observation status at 8:07 PM 5/22/2023.      INITIAL ASSESSMENT, COURSE AND PLAN  Care Narrative: Patient presents following near syncopal episode with associated extremely low blood pressures.  Vital signs improved on arrival without acute abnormality.  EKG without evidence of ischemia or arrhythmia. She does have a prolonged QT interval. Troponin normal, no symptoms concerning for ACS.  Labs shows leukocytosis and mildly elevated lactate however symptoms of infectious process.  Chest xray and urine without evidence of infection.  May be related to dehydration/reactive.  No evidence of anemia, electrolyte abnormality, renal dysfunction.      ADDITIONAL PROBLEM LIST  Problem #1: Near syncope - related to hypotension of unclear etiology, plan to admit for further workup and monitoring    Problem #2:  Hypotension - resolved, may need adjusting of hypertensive medications    Problem #3: Dehydration - given fluids with some improvement    DISPOSITION AND DISCUSSIONS  I have discussed management of the patient with the following physicians and JAVIER's:      8:55 PM I discussed the patient's case and the above findings with Dr. Amaya (Hospitalist) who agreed to admit the patient.       Decision tools and prescription drugs considered including, but not limited to: Antibiotics no evidence of bacterial infection .    DISPOSITION:  Patient will be hospitalized by Dr. Amaya (Hospitalist) in guarded condition.     FINAL DIAGNOSIS  1. Near syncope    2. Hypotension, unspecified hypotension type      The note accurately reflects work and decisions made by me.  Lise No M.D.  5/23/2023  1:44 AM     Ben DAVIS (Safia), am scribing for, and in the presence of, Lise No M.D..    Electronically signed by: Ben Kovacs), 5/22/2023    Lise DAVIS M.D. personally performed the services described in this documentation, as scribed by Ben Valle  in my presence, and it is both accurate and complete.

## 2023-05-24 ENCOUNTER — PHARMACY VISIT (OUTPATIENT)
Dept: PHARMACY | Facility: MEDICAL CENTER | Age: 63
End: 2023-05-24
Payer: COMMERCIAL

## 2023-05-24 VITALS
SYSTOLIC BLOOD PRESSURE: 136 MMHG | OXYGEN SATURATION: 92 % | TEMPERATURE: 97.9 F | RESPIRATION RATE: 16 BRPM | DIASTOLIC BLOOD PRESSURE: 80 MMHG | BODY MASS INDEX: 27.99 KG/M2 | HEART RATE: 78 BPM | WEIGHT: 178.35 LBS | HEIGHT: 67 IN

## 2023-05-24 PROBLEM — D50.8 IRON DEFICIENCY ANEMIA SECONDARY TO INADEQUATE DIETARY IRON INTAKE: Status: ACTIVE | Noted: 2023-05-24

## 2023-05-24 PROBLEM — R55 NEAR SYNCOPE: Status: RESOLVED | Noted: 2022-07-07 | Resolved: 2023-05-24

## 2023-05-24 PROBLEM — I95.2 HYPOTENSION DUE TO DRUGS: Status: RESOLVED | Noted: 2023-05-22 | Resolved: 2023-05-24

## 2023-05-24 LAB
ALBUMIN SERPL BCP-MCNC: 3.6 G/DL (ref 3.2–4.9)
ALBUMIN/GLOB SERPL: 1.3 G/DL
ALP SERPL-CCNC: 129 U/L (ref 30–99)
ALT SERPL-CCNC: 13 U/L (ref 2–50)
ANION GAP SERPL CALC-SCNC: 11 MMOL/L (ref 7–16)
AST SERPL-CCNC: 10 U/L (ref 12–45)
BASOPHILS # BLD AUTO: 0.5 % (ref 0–1.8)
BASOPHILS # BLD: 0.03 K/UL (ref 0–0.12)
BILIRUB SERPL-MCNC: 0.3 MG/DL (ref 0.1–1.5)
BUN SERPL-MCNC: 10 MG/DL (ref 8–22)
CALCIUM ALBUM COR SERPL-MCNC: 9.4 MG/DL (ref 8.5–10.5)
CALCIUM SERPL-MCNC: 9.1 MG/DL (ref 8.5–10.5)
CHLORIDE SERPL-SCNC: 106 MMOL/L (ref 96–112)
CO2 SERPL-SCNC: 27 MMOL/L (ref 20–33)
CREAT SERPL-MCNC: 0.86 MG/DL (ref 0.5–1.4)
EOSINOPHIL # BLD AUTO: 0.21 K/UL (ref 0–0.51)
EOSINOPHIL NFR BLD: 3.2 % (ref 0–6.9)
ERYTHROCYTE [DISTWIDTH] IN BLOOD BY AUTOMATED COUNT: 50.9 FL (ref 35.9–50)
GFR SERPLBLD CREATININE-BSD FMLA CKD-EPI: 75 ML/MIN/1.73 M 2
GLOBULIN SER CALC-MCNC: 2.8 G/DL (ref 1.9–3.5)
GLUCOSE SERPL-MCNC: 94 MG/DL (ref 65–99)
HCT VFR BLD AUTO: 34 % (ref 37–47)
HGB BLD-MCNC: 11 G/DL (ref 12–16)
IMM GRANULOCYTES # BLD AUTO: 0.02 K/UL (ref 0–0.11)
IMM GRANULOCYTES NFR BLD AUTO: 0.3 % (ref 0–0.9)
LYMPHOCYTES # BLD AUTO: 2.29 K/UL (ref 1–4.8)
LYMPHOCYTES NFR BLD: 35.4 % (ref 22–41)
MAGNESIUM SERPL-MCNC: 2 MG/DL (ref 1.5–2.5)
MCH RBC QN AUTO: 30 PG (ref 27–33)
MCHC RBC AUTO-ENTMCNC: 32.4 G/DL (ref 32.2–35.5)
MCV RBC AUTO: 92.6 FL (ref 81.4–97.8)
MONOCYTES # BLD AUTO: 0.53 K/UL (ref 0–0.85)
MONOCYTES NFR BLD AUTO: 8.2 % (ref 0–13.4)
NEUTROPHILS # BLD AUTO: 3.39 K/UL (ref 1.82–7.42)
NEUTROPHILS NFR BLD: 52.4 % (ref 44–72)
NRBC # BLD AUTO: 0 K/UL
NRBC BLD-RTO: 0 /100 WBC (ref 0–0.2)
PLATELET # BLD AUTO: 296 K/UL (ref 164–446)
PMV BLD AUTO: 11.3 FL (ref 9–12.9)
POTASSIUM SERPL-SCNC: 3.9 MMOL/L (ref 3.6–5.5)
PROT SERPL-MCNC: 6.4 G/DL (ref 6–8.2)
RBC # BLD AUTO: 3.67 M/UL (ref 4.2–5.4)
SODIUM SERPL-SCNC: 144 MMOL/L (ref 135–145)
WBC # BLD AUTO: 6.5 K/UL (ref 4.8–10.8)

## 2023-05-24 PROCEDURE — A9270 NON-COVERED ITEM OR SERVICE: HCPCS | Performed by: INTERNAL MEDICINE

## 2023-05-24 PROCEDURE — 85025 COMPLETE CBC W/AUTO DIFF WBC: CPT

## 2023-05-24 PROCEDURE — 83735 ASSAY OF MAGNESIUM: CPT

## 2023-05-24 PROCEDURE — 700105 HCHG RX REV CODE 258: Performed by: STUDENT IN AN ORGANIZED HEALTH CARE EDUCATION/TRAINING PROGRAM

## 2023-05-24 PROCEDURE — 700102 HCHG RX REV CODE 250 W/ 637 OVERRIDE(OP): Performed by: INTERNAL MEDICINE

## 2023-05-24 PROCEDURE — RXMED WILLOW AMBULATORY MEDICATION CHARGE: Performed by: STUDENT IN AN ORGANIZED HEALTH CARE EDUCATION/TRAINING PROGRAM

## 2023-05-24 PROCEDURE — 700111 HCHG RX REV CODE 636 W/ 250 OVERRIDE (IP): Performed by: INTERNAL MEDICINE

## 2023-05-24 PROCEDURE — 80053 COMPREHEN METABOLIC PANEL: CPT

## 2023-05-24 RX ORDER — CEFDINIR 300 MG/1
300 CAPSULE ORAL 2 TIMES DAILY
Qty: 2 CAPSULE | Refills: 0 | Status: ACTIVE | OUTPATIENT
Start: 2023-05-25 | End: 2023-05-26

## 2023-05-24 RX ORDER — CEFDINIR 300 MG/1
300 CAPSULE ORAL 2 TIMES DAILY
Qty: 4 CAPSULE | Refills: 0 | Status: SHIPPED | OUTPATIENT
Start: 2023-05-24 | End: 2023-05-24 | Stop reason: SDUPTHER

## 2023-05-24 RX ORDER — LEVOTHYROXINE SODIUM 0.03 MG/1
25 TABLET ORAL
Qty: 30 TABLET | Refills: 1 | Status: SHIPPED | OUTPATIENT
Start: 2023-05-24 | End: 2023-07-10 | Stop reason: SDUPTHER

## 2023-05-24 RX ORDER — MIRTAZAPINE 7.5 MG/1
7.5 TABLET, FILM COATED ORAL
Qty: 30 TABLET | Refills: 1 | Status: SHIPPED | OUTPATIENT
Start: 2023-05-24 | End: 2023-06-09 | Stop reason: SDUPTHER

## 2023-05-24 RX ADMIN — LEVOTHYROXINE SODIUM 50 MCG: 0.05 TABLET ORAL at 04:19

## 2023-05-24 RX ADMIN — CEFTRIAXONE SODIUM 1000 MG: 10 INJECTION, POWDER, FOR SOLUTION INTRAVENOUS at 04:19

## 2023-05-24 RX ADMIN — SODIUM CHLORIDE, POTASSIUM CHLORIDE, SODIUM LACTATE AND CALCIUM CHLORIDE: 600; 310; 30; 20 INJECTION, SOLUTION INTRAVENOUS at 04:22

## 2023-05-24 ASSESSMENT — PAIN DESCRIPTION - PAIN TYPE: TYPE: ACUTE PAIN

## 2023-05-24 NOTE — HOSPITAL COURSE
"Debby Desai is a 63 y.o. female who presented 5/22/2023 with syncope (Pt had a syncopal episode while walking to the restroom) and T-5000 GLF (Pt reports \"sliding on the the floor\" after her syncopal event).  This is a pleasant woman with a history of hypertension, hypothyroidism, prediabetes, presented with near syncope.  She has a history of hypertension, hypothyroidism, diabetes who presented with near syncope. Occurred when she was walking at her sister's house felt dizzy and was at the couch, slipping down diaphoretic. She did drink soda and ate breakfast but after careful questioning she made little urine and felt dehydrated. She did not lose consciousness or fall. She said she is compliant with BP meds and did not take more of them nor did she have new or recently changed doses of them.  EMS reported she was hypotensive.and received IV fluids and intermittent levophed.   By the time she wass at the ED< her BP was normal range off levophed. She was afebrile and HR normal.   CXR on my review looked clear.  EKG on my review showed long QT.   Leukocytosis noted. Cr- normal.  When I saw her no acute distress. She did say she had trouble urinating.  "

## 2023-05-24 NOTE — PROGRESS NOTES
0715 - Report received from Nestor CONWAY at patient's bedside. Patient resting in bed quietly with no complaints at this time. Telemetry monitor intact et functioning. Call light and belongings within reach, safety measures intact, white board updated.     0800 - Patient able to get to side of bed and ambulate in room without dizziness or light headedness. Patient states she feels much better.     0900 - IV's removed with immediate hemostasis. Telemetry box removed. All belongings packed to be sent with patient.     0930 - DC orders and medications reviewed with patient, who verbalized understanding. Patient will go to DC lounge to await brother, who is providing ride home.

## 2023-05-24 NOTE — DISCHARGE SUMMARY
"Discharge Summary    CHIEF COMPLAINT ON ADMISSION  Chief Complaint   Patient presents with    Syncope     Pt had a syncopal episode while walking to the restroom    T-5000 GLF     Pt reports \"sliding on the the floor\" after her syncopal event       Reason for Admission  Presyncope with hypotension      Admission Date  5/22/2023    CODE STATUS  Full Code    HPI & HOSPITAL COURSE  Debby Desai is a 63 y.o. female who presented 5/22/2023 with presyncope and T-5000 GLF.  This is a pleasant woman with a history of hypertension, hypothyroidism, and prediabetes. Patient reported having a syncopal episode while walking to the restroom as well as sliding on the floor following her syncopal event resulting in a fall.  She reported prodrome of lightheadedness as well as tunnel vision only prior to her syncopal episode.  She reported that she was on the couch and slipped down and became diaphoretic.  She drank some soda and had her breakfast after which she noticed decreased urine output and felt dehydrated.  She denied losing consciousness or having a fall.  She reported being compliant with blood pressure medications and did not take more than nor changed her doses.    Per EMS, patient was noted to be hypotensive and received IV fluids as well as intermittent norepinephrine.  By the time she arrived in the emergency room, her blood pressure was within normal range and she was off of norepinephrine support.  Chest x-ray was unremarkable.  EKG showed prolonged QTc of 570 without ischemic changes.    On admission, patient's blood pressure medications were held.  Per review with the clinical pharmacist, the patient's amitriptyline, which had been started by her PCP 1.5 years prior due to her insomnia was noted to have hypotension as a possible side effect.  Therefore, the amitriptyline was also discontinued.  Patient also noted that her lisinopril was recently increased to 40 mg daily due to hypertension during her clinical " visits.  Despite being off of patient's blood pressure medications, patient's blood pressures remained stable in the range of 100s to 130s/50s to 80s.  Her amitriptyline was replaced with low-dose mirtazapine, which will work for her for sleep.  Per record review, it was noted that the patient had also lost some weight from a year prior.  During a office visit on 8/18/2022, patient was noted to weight 187 pounds.  She currently weighed 178 pounds.  It was felt that the patient's weight loss may have helped with the control of her blood pressures, and the patient started to become overtreated with her blood pressure medications, which was discontinued upon discharge.    Patient did not have any significant dysuria.  However, she did have trace leukocyte esterase with moderate bacteria with nitrite negativity concerning for urinary tract infection, for which the admitting physician started intravenous ceftriaxone.  She was discharged with a short course of cefdinir.  Urine cultures were negative by the day of discharge.  Blood cultures were negative as well.    Patient's TSH was also noted to be lower range of 1.17, and her levothyroxine was appropriately decreased in dose to 25 mcg daily.  Patient continued to remain hemodynamically stable and was discharged as per her request.        Therefore, she is discharged in good and stable condition to home with close outpatient follow-up.    The patient met 2-midnight criteria for an inpatient stay at the time of discharge.    Discharge Date  5/24/2023    FOLLOW UP ITEMS POST DISCHARGE  -Follow-up with PCP in 3 to 5 days.  -Check TSH level in 6-8 weeks.    DISCHARGE DIAGNOSES  Principal Problem (Resolved):    Near syncope (POA: Unknown)  Active Problems:    Primary hypertension (POA: Yes)    Acquired hypothyroidism (POA: Yes)    Dyslipidemia (POA: Yes)    Type 2 diabetes mellitus with hyperglycemia (HCC) (POA: Yes)    Prolonged Q-T interval on ECG (POA: Yes)     Psychophysiological insomnia (POA: Yes)    Iron deficiency anemia secondary to inadequate dietary iron intake (POA: Yes)  Resolved Problems:    Hypotension due to drugs (POA: Yes)      FOLLOW UP  Future Appointments   Date Time Provider Department Center   6/9/2023  8:00 AM Genaro Stevenson M.D. [facility] [address]   6/14/2023  1:20 PM Volodymyr Herring M.D. [facility] None   6/21/2023  8:40 AM LIZZIE ChrisP.[facility] LEONARD AndersonLIZZIE [facility]P.[facility]  [address]  [address]  [facility] [id number] [phone]    Follow up  As needed, If symptoms worsen      MEDICATIONS ON DISCHARGE     Medication List        Start taking these medications        Instructions   cefdinir 300 MG Caps  Commonly known as: OMNICEF   Take 1 Capsule by mouth 2 times a day for 2 days.  Dose: 300 mg     mirtazapine 7.5 MG tablet  Commonly known as: Remeron  Next Dose Due: Take tonight    Take 1 Tablet by mouth at bedtime.  Dose: 7.5 mg            Change how you take these medications        Instructions   levothyroxine 25 MCG Tabs  What changed:   medication strength  how much to take  Commonly known as: SYNTHROID  Next Dose Due: Take tomorrow   Take 1 Tablet by mouth every morning on an empty stomach.  Dose: 25 mcg     venlafaxine XR 75 MG Cp24  What changed: Another medication with the same name was removed. Continue taking this medication, and follow the directions you see here.  Commonly known as: EFFEXOR XR  Next Dose Due: Take tomorrow    Doctor's comments: (venlafaxine xr 150 mg + 75 mg = 225 mg daily)  Take 1 Capsule by mouth every day. (venlafaxine xr 150 mg + 75 mg = 225 mg daily)  Dose: 75 mg            Continue taking these medications        Instructions   atorvastatin 20 MG Tabs  Commonly known as: LIPITOR  Next Dose Due: Take tomorrow    TAKE ONE TABLET BY MOUTH EVERY DAY  Dose: 20 mg     buPROPion 200 MG SR tablet  Commonly known as: WELLBUTRIN SR  Next Dose Due: Take tonight    Take 1 Tablet by mouth 2 times a  "day.  Dose: 200 mg     Iron 325 (65 Fe) MG Tabs  Next Dose Due: Take this evening    Take 65 mg by mouth every day at 6 PM.  Dose: 65 mg     metFORMIN  MG Tb24  Commonly known as: GLUCOPHAGE XR  Next Dose Due: Take tomorrow    Take 1 Tab by mouth 2 times a day.  Dose: 500 mg     traMADol 50 MG Tabs  Commonly known as: Ultram   Doctor's comments: 30 days with 1 refill  Take 1 Tablet by mouth every 8 hours as needed for Severe Pain for up to 30 days.  Dose: 50 mg     VITAMIN D PO  Next Dose Due: Take tomorrow    Take  by mouth every day.            Stop taking these medications      amitriptyline 50 MG Tabs  Commonly known as: ELAVIL     amLODIPine 10 MG Tabs  Commonly known as: NORVASC     lisinopril 40 MG tablet  Commonly known as: PRINIVIL              Allergies  Allergies   Allergen Reactions    Sulfamethoxazole-Trimethoprim Rash and Swelling     Pt states \"My hand swells up and rash all over body\".       DIET  Orders Placed This Encounter   Procedures    Diet Order Diet: Consistent CHO (Diabetic)     Standing Status:   Standing     Number of Occurrences:   1     Order Specific Question:   Diet:     Answer:   Consistent CHO (Diabetic) [4]       ACTIVITY  As tolerated.  Weight bearing as tolerated    CONSULTATIONS  None    PROCEDURES  None    LABORATORY  Lab Results   Component Value Date    SODIUM 144 05/24/2023    POTASSIUM 3.9 05/24/2023    CHLORIDE 106 05/24/2023    CO2 27 05/24/2023    GLUCOSE 94 05/24/2023    BUN 10 05/24/2023    CREATININE 0.86 05/24/2023        Lab Results   Component Value Date    WBC 6.5 05/24/2023    HEMOGLOBIN 11.0 (L) 05/24/2023    HEMATOCRIT 34.0 (L) 05/24/2023    PLATELETCT 296 05/24/2023        Total time of the discharge process exceeds 37 minutes.  "

## 2023-05-24 NOTE — DISCHARGE PLANNING
Case Management Discharge Planning    Admission Date: 5/22/2023  GMLOS: 2.7  ALOS: 2    6-Clicks ADL Score: 22  6-Clicks Mobility Score: 19      Anticipated Discharge Dispo: Discharge Disposition: Discharged to home/self care (01)    DME Needed: No    Action(s) Taken: Updated Provider/Nurse on Discharge Plan  Discussed during morning rounds. Per chart review, pt is AAOx4, on room air, able to mobilize with no assistance required. Per Dr. Kapoor, plan for discharge home today, no discharge needs.     Escalations Completed: None    Medically Clear: No    Next Steps:   Follow up with treatment team.    Barriers to Discharge: Medical clearance    Is the patient up for discharge tomorrow: No

## 2023-05-24 NOTE — ASSESSMENT & PLAN NOTE
Patient was started on amitriptyline for insomnia 1.5 years ago.  This can be contributing to hypotension.  Patient is agreeable to changing to low-dose mirtazapine.

## 2023-05-24 NOTE — PROGRESS NOTES
Assumed care of patient at bedside report from day RN. Updated on POC. Patient currently A & O x 4; on  room air; up standby; without complaints of acute pain. Assessment completed Call light within reach. Whiteboard updated. Fall precautions in place. Bed locked and in lowest position. All questions answered. No other needs indicated at this time.

## 2023-05-24 NOTE — CARE PLAN
The patient is Stable - Low risk of patient condition declining or worsening    Shift Goals  Clinical Goals: Monitor BP, monitor for diziness or lightheadedness  Patient Goals: Rest  Family Goals: REY    Progress made toward(s) clinical / shift goals:  To DC today       Problem: Knowledge Deficit - Standard  Goal: Patient and family/care givers will demonstrate understanding of plan of care, disease process/condition, diagnostic tests and medications  Outcome: Met     Problem: Fall Risk  Goal: Patient will remain free from falls  Outcome: Met     Problem: Hemodynamics  Goal: Patient's hemodynamics, fluid balance and neurologic status will be stable or improve  Outcome: Met     Problem: Respiratory  Goal: Patient will achieve/maintain optimum respiratory ventilation and gas exchange  Outcome: Met     Problem: Fluid Volume  Goal: Fluid volume balance will be maintained  Outcome: Met     Problem: Urinary - Renal Perfusion  Goal: Ability to achieve and maintain adequate renal perfusion and functioning will improve  Outcome: Met     Problem: Mechanical Ventilation  Goal: Safe management of artificial airway and ventilation  Outcome: Met  Goal: Successful weaning off mechanical ventilator, spontaneously maintains adequate gas exchange  Outcome: Met  Goal: Patient will be able to express needs and understand communication  Outcome: Met     Problem: Physical Regulation  Goal: Diagnostic test results will improve  Outcome: Met  Goal: Signs and symptoms of infection will decrease  Outcome: Met       Patient is not progressing towards the following goals:

## 2023-05-24 NOTE — DISCHARGE INSTRUCTIONS
Diet    Resume your normal diet as tolerated.  A diet low in cholesterol, fat, and sodium is recommended for good health.     Activity    Resume Your Normal Activity    You may resume your normal activity as tolerated.  Rest as needed.

## 2023-05-24 NOTE — CARE PLAN
The patient is Watcher - Medium risk of patient condition declining or worsening    Shift Goals Monitor BP, collect urine sample for culture.   Clinical Goals: Monitor BP, monitor for diziness or lightheadedness  Patient Goals: Rest  Family Goals: REY    Progress made toward(s) clinical / shift goals:    Problem: Fall Risk  Goal: Patient will remain free from falls  Outcome: Progressing   Liz syd fall scale utilized. Bed alarm in place. Pt is a standby assist, hand held as needed. Educated pt and family to call for appropriate assistance prior to ambulating.     Patient is not progressing towards the following goals:

## 2023-05-25 LAB
BACTERIA UR CULT: NORMAL
EKG IMPRESSION: NORMAL
SIGNIFICANT IND 70042: NORMAL
SITE SITE: NORMAL
SOURCE SOURCE: NORMAL

## 2023-05-25 PROCEDURE — 93010 ELECTROCARDIOGRAM REPORT: CPT | Performed by: INTERNAL MEDICINE

## 2023-05-28 LAB
BACTERIA BLD CULT: NORMAL
BACTERIA BLD CULT: NORMAL
SIGNIFICANT IND 70042: NORMAL
SIGNIFICANT IND 70042: NORMAL
SITE SITE: NORMAL
SITE SITE: NORMAL
SOURCE SOURCE: NORMAL
SOURCE SOURCE: NORMAL

## 2023-05-30 PROCEDURE — RXMED WILLOW AMBULATORY MEDICATION CHARGE: Performed by: PHYSICAL MEDICINE & REHABILITATION

## 2023-05-30 PROCEDURE — RXMED WILLOW AMBULATORY MEDICATION CHARGE: Performed by: PSYCHIATRY & NEUROLOGY

## 2023-05-31 ENCOUNTER — PHARMACY VISIT (OUTPATIENT)
Dept: PHARMACY | Facility: MEDICAL CENTER | Age: 63
End: 2023-05-31
Payer: COMMERCIAL

## 2023-06-09 ENCOUNTER — TELEMEDICINE (OUTPATIENT)
Dept: BEHAVIORAL HEALTH | Facility: CLINIC | Age: 63
End: 2023-06-09
Payer: COMMERCIAL

## 2023-06-09 DIAGNOSIS — F41.1 GAD (GENERALIZED ANXIETY DISORDER): ICD-10-CM

## 2023-06-09 DIAGNOSIS — F33.2 SEVERE EPISODE OF RECURRENT MAJOR DEPRESSIVE DISORDER, WITHOUT PSYCHOTIC FEATURES (HCC): ICD-10-CM

## 2023-06-09 DIAGNOSIS — F33.42 RECURRENT MAJOR DEPRESSIVE DISORDER, IN FULL REMISSION (HCC): ICD-10-CM

## 2023-06-09 DIAGNOSIS — F51.04 PSYCHOPHYSIOLOGICAL INSOMNIA: ICD-10-CM

## 2023-06-09 PROCEDURE — 99214 OFFICE O/P EST MOD 30 MIN: CPT | Mod: 95 | Performed by: PSYCHIATRY & NEUROLOGY

## 2023-06-09 PROCEDURE — 90833 PSYTX W PT W E/M 30 MIN: CPT | Mod: 95 | Performed by: PSYCHIATRY & NEUROLOGY

## 2023-06-09 RX ORDER — VENLAFAXINE HYDROCHLORIDE 75 MG/1
75 CAPSULE, EXTENDED RELEASE ORAL DAILY
Qty: 90 CAPSULE | Refills: 2 | Status: SHIPPED | OUTPATIENT
Start: 2023-06-09 | End: 2023-07-26 | Stop reason: SDUPTHER

## 2023-06-09 RX ORDER — BUPROPION HYDROCHLORIDE 200 MG/1
200 TABLET, EXTENDED RELEASE ORAL 2 TIMES DAILY
Qty: 60 TABLET | Refills: 1 | Status: SHIPPED | OUTPATIENT
Start: 2023-06-09 | End: 2023-07-26 | Stop reason: SDUPTHER

## 2023-06-09 RX ORDER — MIRTAZAPINE 7.5 MG/1
7.5 TABLET, FILM COATED ORAL
Qty: 30 TABLET | Refills: 1 | Status: SHIPPED | OUTPATIENT
Start: 2023-06-09 | End: 2023-07-26

## 2023-06-09 RX ORDER — VENLAFAXINE HYDROCHLORIDE 150 MG/1
150 CAPSULE, EXTENDED RELEASE ORAL DAILY
Qty: 90 CAPSULE | Refills: 2 | Status: SHIPPED | OUTPATIENT
Start: 2023-06-09 | End: 2023-07-26 | Stop reason: SDUPTHER

## 2023-06-09 NOTE — PROGRESS NOTES
This evaluation was conducted via Zoom using secure and encrypted videoconferencing technology. The patient was in their home in the [facility].    The patient's identity was confirmed and verbal consent was obtained for this virtual visit.      PSYCHIATRY FOLLOW-UP NOTE      Name: Debby Desai  MRN: [id number]  : 1960  Age: 63 y.o.  Date of assessment: 2023  PCP: ARELIS Chris  Persons in attendance: Patient      REASON FOR VISIT/CHIEF COMPLAINT (as stated by Patient):  Debby Desai is a 63 y.o., White female, attending follow-up appointment for mood and anxiety management.      HISTORY OF PRESENT ILLNESS:  Debby Desai is a 63 y.o. old female with MDD, TIM and insomnia comes in today for follow up. Patient was last seen 6 weeks ago, and following treatment planning recommendations were done:  Continue Effexor XR to 225 mg daily for mood, anxiety and comorbid pain management.   Switch Wellbutrin  mg -->  mg BID daily for depression augmentation.    Continue Amitriptyline 50 mg at at bedtime as needed for insomnia.    Monitor for serotonin syndrome.  Continue psychotherapy for mood and anxiety management.    Patient is compliant with medication but recently she was admitted to Hospital for dehydration related syncopal episodes.  Her amitriptyline was switched to mirtazapine 7.5 mg at bedtime dose.  Patient denies any dizziness on this and denies any increase in appetite or weight gain but understood the side effect risk with mirtazapine.  Patient lost her sister a few days ago and describes this combination in weekly psychotherapy is helpful.  No signs of serotonin syndrome noted and agreed with continuing current medications.      PSYCHOTHERAPY ASPECT OF SESSION (16 MIN):  Initial session was dedicated to letting patient express her feelings related to death of her sister, whom she was taking care of.  Discussed various emotions and timeline associated with grief.   Importance of having an emotional outlet with family, friends and psychotherapy was emphasized.  Patient does psychotherapy on a weekly basis with good response.  Discussed her caregiver role and how that can impact poor self-care with recent dehydration from poor water intake as she was taking care of her late sister.  Importance of keeping self care a priority was emphasized.  Later part of the session was dedicated to active listening and implementing supportive psychotherapy skills.      CURRENT MEDICATIONS:  Current Outpatient Medications   Medication Sig Dispense Refill    levothyroxine (SYNTHROID) 25 MCG Tab Take 1 tablet by mouth every morning on an empty stomach. 30 Tablet 1    mirtazapine (REMERON) 7.5 MG tablet Take 1 tablet by mouth at bedtime. 30 Tablet 1    venlafaxine XR (EFFEXOR XR) 75 MG CAPSULE SR 24 HR Take 1 Capsule by mouth every day. (venlafaxine xr 150 mg + 75 mg = 225 mg daily) 90 Capsule 2    buPROPion (WELLBUTRIN SR) 200 MG SR tablet Take 1 Tablet by mouth 2 times a day. 60 Tablet 1    traMADol (ULTRAM) 50 MG Tab Take 1 Tablet by mouth every 8 hours as needed for Severe Pain for up to 30 days. 90 Tablet 1    metFORMIN ER (GLUCOPHAGE XR) 500 MG TABLET SR 24 HR Take 1 Tab by mouth 2 times a day. 180 Tablet 1    venlafaxine (EFFEXOR-XR) 150 MG extended-release capsule Take 1 Capsule by mouth every day. (venlafaxine xr 150 mg + 75 mg = 225 mg daily) 90 Capsule 2    atorvastatin (LIPITOR) 20 MG Tab TAKE ONE TABLET BY MOUTH EVERY DAY 90 Tablet 3    VITAMIN D PO Take  by mouth every day.      Ferrous Sulfate (IRON) 325 (65 Fe) MG Tab Take 65 mg by mouth every day at 6 PM.       No current facility-administered medications for this visit.       MEDICAL HISTORY  Past Medical History:   Diagnosis Date    Anemia     in past    Anginal syndrome (HCC)     had work up and feet r/t anxiety    Anxiety     Arthritis     OA knees    Chronic pain 6/2/2021    Dental disorder 5/24/12    partial upper denture     Diabetes (HCC)     pre diabetic    High cholesterol     HTN (hypertension), benign 3/19/2012    Hypothyroid 3/19/2012    Major depression 3/19/2012    Orbital floor (blow-out) closed fracture (HCC)     left    Other specified symptom associated with female genital organs     post menopausal bleeding    Pain     thoracic spine, Right knee, myofacial pain, and Right shoulder    Pain     recently fell and has bruise left forehead    Pain 02/2018    chronic back pain, right shoulder    Psychiatric problem     depression, anxiety    Unspecified disorder of thyroid     Urinary bladder disorder     stress incont.    Urinary incontinence     Occasional     Past Surgical History:   Procedure Laterality Date    INJ,EPI ANES/STER LUM/SAC ADDL Right 4/7/2021    Procedure: RIGHT lumbar five and sacral one transforaminal epidural;  Surgeon: Volodymyr Herring M.D.;  [facility];  Service: Pain Management    PB TOTAL KNEE ARTHROPLASTY Right 9/16/2019    Procedure: RIGHT ARTHROPLASTY, KNEE, TOTAL;  Surgeon: Rufus Saba M.D.;  Location: [facility];  Service: Orthopedics    KNEE ARTHROSCOPY Right 2/9/2018    Procedure: KNEE ARTHROSCOPY;  Surgeon: Harsh Baltazar M.D.;  Location: [facility];  Service: Orthopedics    MENISCECTOMY, KNEE, MEDIAL Right 2/9/2018    Procedure: MEDIAL AND LATERAL MENISCECTOMY- PARTIAL;  Surgeon: Harsh Baltazar M.D.;  Location: [facility];  Service: Orthopedics    KNEE ARTHROSCOPY Right 7/12/2017    Procedure: KNEE ARTHROSCOPY- CESPEDES;  Surgeon: Harsh Baltazar M.D.;  Location: [facility];  Service:     MENISCECTOMY, KNEE, MEDIAL Right 7/12/2017    Procedure: PARTIAL MEDIAL AND LATERAL MENISCECTOMY;  Surgeon: Harsh Baltazar M.D.;  Location: [facility];  Service:     SYNOVECTOMY Right 7/12/2017    Procedure: SYNOVECTOMY;  Surgeon: Harsh Baltazar M.D.;  Location: [facility];  Service:     KNEE ARTHROSCOPY  4/21/2015    Performed by Rufus Saba M.D. at SURGERY [facility]    MENISCECTOMY, KNEE, MEDIAL  4/21/2015    Performed by Rufus Saba M.D. at SURGERY [facility]    PUMP REVISION  12/10/2012    Performed by Allison Guerra M.D. at SURGERY [facility]    SPINAL CORD STIMULATOR  5/30/2012    Performed by ALLISON GUERRA at SURGERY [facility]    BIOPSY GENERAL  5/1/12    endometrial    SPINAL CORD STIMULATOR  7/11/2011    Performed by ALLISON GUERRA at SURGERY [facility]    BLOCK EPIDURAL STEROID INJECTION  2008    x 3-4    LYMPH NODE EXCISION Left 2007    cervicle    OTHER Right 2001    thoracic discectomy      ARTHROSCOPY, KNEE Left 1999    RIB RESECTION Right 1980    LUMPECTOMY         PAST PSYCHIATRIC HISTORY  Prior psychiatric hospitalization: no  Prior Self harm/suicide attempt: no  Prior Diagnosis: MDD, Anxiety     PAST PSYCHIATRIC MEDICATIONS  Fluoxetine, Paroxetine, Sertraline, Citalopram  Venlafaxine, Duloxetine  Wellbutrin  Mirtazapine  Amitriptyline (switched to Mirtazapine during admission for dehydration related syncope)   Ativan, propranolol     FAMILY HISTORY  Psychiatric diagnosis: Nieces with depression and anxiety  History of suicide attempts: No  Substance abuse history: 1 sister with drug and alcohol abuse history     SUBSTANCE USE HISTORY:  ALCOHOL: no  TOBACCO: no  CANNABIS: no  OPIOIDS: no  PRESCRIPTION MEDICATIONS: no  OTHERS: no  History of inpatient/outpatient rehab treatment: none     SOCIAL HISTORY  Childhood: born in [place] and describes childhood as difficult  Moved a lot due to parents financial concerns  Employment: [employer]  Relationship: single (never )  Kids: no kids  Current living situation: alone (but strong family support as they all live nearby)  Current/past legal issues: denies  History of emotional/physical/sexual abuse - denies      REVIEW OF SYSTEMS:        Constitutional  negative   Eyes negative   Ears/Nose/Mouth/Throat negative   Cardiovascular negative   Respiratory negative   Gastrointestinal negative   Genitourinary negative   Muscular negative   Integumentary negative   Neurological negative   Endocrine negative   Hematologic/Lymphatic negative     PHYSICAL EXAMINAION:  Vital signs: LMP 2012   Musculoskeletal: Normal gait.   Abnormal movements: none      MENTAL STATUS EXAMINATION      General:   - Grooming and hygiene: Casual,   - Apparent distress: none,   - Behavior: Calm  - Eye Contact:  Good,   - no psychomotor agitation or retardation    - Participation: Active verbal participation  Orientation: Alert and Fully Oriented to person, place and time  Mood: Anxious  Affect: Flexible,  Thought Process: Logical and Goal-directed  Thought Content: Denies suicidal or homicidal ideations, intent or plan   Perception: Denies auditory or visual hallucinations. No delusions noted   Attention span and concentration: Intact   Speech:Rate within normal limits and Volume within normal limits  Language: Appropriate   Insight: Good  Judgment: Adequate  Recent and remote memory: No gross evidence of memory deficits        DEPRESSION SCREENIN/15/2023     7:40 AM 2023     1:20 PM 2023    11:06 PM   Depression Screen (PHQ-2/PHQ-9)   PHQ-2 Total Score   0   PHQ-2 Total Score 3 4    PHQ-9 Total Score 9 10        Interpretation of PHQ-9 Total Score   Score Severity   1-4 No Depression   5-9 Mild Depression   10-14 Moderate Depression   15-19 Moderately Severe Depression   20-27 Severe Depression    CURRENT RISK:       Suicidal: Low       Homicidal: Low       Self-Harm: Low       Relapse: Low       Crisis Safety Plan Reviewed Not Indicated       If evidence of imminent risk is present, intervention/plan:      MEDICAL RECORDS/LABS/DIAGNOSTIC TESTS REVIEWED:  No new lab since last visit     [place]  records -   Reviewed     PLAN:  (1) Major depressive disorder; (2) TIM; (3)  Insomnia  Slow improvement  Continue Effexor XR to 225 mg daily for mood, anxiety and comorbid pain management.   Continue Wellbutrin  mg BID daily for depression augmentation.    Continue Mirtazapine 7.5 mg HS PRN for insomnia.    Monitor for serotonin syndrome.  Continue psychotherapy for mood and anxiety management.  Medication options, alternatives (including no medications) and medication risks/benefits/side effects were discussed in detail.  Explained importance of contraceptive measures while on psychotropic medications, educated to let provider know if ever pregnant or wanting to become pregnant. Verbalized understanding.  The patient was advised to call, message provider on [employer], or come in to the clinic if symptoms worsen or if any future questions/issues regarding their medications arise; the patient verbalized understanding and agreement.    The patient was educated to call 911, call the suicide hotline, or go to local ER if having thoughts of suicide or homicide; verbalized understanding.    Billing Coding based on:  02030 based on [facility]  [id number]: based on psychotherapy timing    Return to clinic in 6 weeks or sooner if symptoms worsen.  Next Appointment: instruction provided on how to make the next appointment.     The proposed treatment plan was discussed with the patient who was provided the opportunity to ask questions and make suggestions regarding alternative treatment. Patient verbalized understanding and expressed agreement with the plan.       Genaro Stevenson M.D.  06/09/23    This note was created using voice recognition software (Dragon). The accuracy of the dictation is limited by the abilities of the software. I have reviewed the note prior to signing, however some errors in grammar and context are still possible. If you have any questions related to this note please do not hesitate to contact our office.

## 2023-06-12 PROCEDURE — RXMED WILLOW AMBULATORY MEDICATION CHARGE: Performed by: NURSE PRACTITIONER

## 2023-06-14 ENCOUNTER — OFFICE VISIT (OUTPATIENT)
Dept: PHYSICAL MEDICINE AND REHAB | Facility: MEDICAL CENTER | Age: 63
End: 2023-06-14
Payer: COMMERCIAL

## 2023-06-14 VITALS
WEIGHT: 171.2 LBS | HEART RATE: 95 BPM | TEMPERATURE: 95.9 F | DIASTOLIC BLOOD PRESSURE: 78 MMHG | SYSTOLIC BLOOD PRESSURE: 124 MMHG | BODY MASS INDEX: 26.87 KG/M2 | OXYGEN SATURATION: 96 % | HEIGHT: 67 IN

## 2023-06-14 DIAGNOSIS — G89.29 CHRONIC PAIN OF RIGHT KNEE: ICD-10-CM

## 2023-06-14 DIAGNOSIS — Z96.651 S/P TKR (TOTAL KNEE REPLACEMENT), RIGHT: ICD-10-CM

## 2023-06-14 DIAGNOSIS — M25.561 CHRONIC PAIN OF RIGHT KNEE: ICD-10-CM

## 2023-06-14 DIAGNOSIS — M43.17 SPONDYLOLISTHESIS AT L5-S1 LEVEL: ICD-10-CM

## 2023-06-14 PROCEDURE — 3074F SYST BP LT 130 MM HG: CPT | Performed by: PHYSICAL MEDICINE & REHABILITATION

## 2023-06-14 PROCEDURE — 1125F AMNT PAIN NOTED PAIN PRSNT: CPT | Performed by: PHYSICAL MEDICINE & REHABILITATION

## 2023-06-14 PROCEDURE — 3078F DIAST BP <80 MM HG: CPT | Performed by: PHYSICAL MEDICINE & REHABILITATION

## 2023-06-14 PROCEDURE — 99214 OFFICE O/P EST MOD 30 MIN: CPT | Performed by: PHYSICAL MEDICINE & REHABILITATION

## 2023-06-14 PROCEDURE — 1170F FXNL STATUS ASSESSED: CPT | Performed by: PHYSICAL MEDICINE & REHABILITATION

## 2023-06-14 RX ORDER — TRAMADOL HYDROCHLORIDE 50 MG/1
50 TABLET ORAL EVERY 8 HOURS PRN
Qty: 90 TABLET | Refills: 2 | Status: SHIPPED | OUTPATIENT
Start: 2023-06-29 | End: 2023-09-20 | Stop reason: SDUPTHER

## 2023-06-14 ASSESSMENT — FIBROSIS 4 INDEX: FIB4 SCORE: 0.61

## 2023-06-14 ASSESSMENT — PATIENT HEALTH QUESTIONNAIRE - PHQ9
SUM OF ALL RESPONSES TO PHQ QUESTIONS 1-9: 4
5. POOR APPETITE OR OVEREATING: 0 - NOT AT ALL
CLINICAL INTERPRETATION OF PHQ2 SCORE: 2

## 2023-06-14 ASSESSMENT — PAIN SCALES - GENERAL: PAINLEVEL: 7=MODERATE-SEVERE PAIN

## 2023-06-14 NOTE — PATIENT INSTRUCTIONS
Your procedure will be at the [facility] special procedure suite.    [facility] [place] [address]       PRE-PROCEDURE INSTRUCTIONS  You may take your regular medications except:   No Anti-inflammatories 5 days prior to your procedure. Anti-inflammatories include medicines such as  ibuprofen (Motrin, Advil), Excedrin, Naproxen (Aleve, Anaprox, Naprelan, Naprosyn), Celecoxib (Celebrex), Diclofenac (Voltaren-XR tab), and Meloxicam (Mobic).   No Glucophage or Metformin 24 hours before your procedure. You may resume next day after your procedure.  Call the physiatry office if you are taking or prescribed anti-biotics within five days of procedure.  Please ask provider if you are taking any new diabetes medication.  CONTINUE TAKING BLOOD PRESSURE MEDICATIONS AS PRESCRIBED.  Pain medications will not be prescribed on the procedure day. Procedural pain medication may be used by your provider   Call your doctor's office performing the procedure if you have a fever, chills, rash or new illness prior to your procedure    Anticoagulation/antiplatelet medications  No Blood thinning medications such as Coumadin or Plavix 5 days prior to procedure unless your doctor said to continue these medications. Call your doctor if a new medication is prescribed in this class.     Restrictions for eating before procedure:   If you are getting procedural sedation, then do not eat to for 8 hours prior to procedure appointment time. Do not drink fluids for four hours prior to your procedure time.   If you are not having procedural sedation, then Skip the meal prior to your procedure. If you have a morning procedure then skip breakfast. If you have an afternoon procedure then skip lunch.   You may drink clear liquids up to 2 hours prior to your procedure  You must have a  the day of procedure to accompany you home.      POST PROCEDURE INSTRUCTIONS   No heavy lifting, strenuous bending or strenuous exercise for 3 days  after your procedure.  No hot tubs, baths, swimming for 3 days after your procedure  You can remove the bandage the day after the procedure.  IF YOU RECEIVED A STEROID INJECTION. PLEASE NOTE THAT THERE MAY BE A DELAY FOR THE INJECTION TO START WORKING, THE DELAY MAY BE UP TO TWO WEEKS. IF YOU HAVE DIABETES, PLEASE NOTE THAT YOUR SUGAR LEVELS MAY BE ELEVATED FOR 1-2 DAYS AFTER A STEROID INJECTION.  THE STEROID MAY CAUSE TEMPORARY SYMPTOMS WHICH USUALLY RESOLVE ON THEIR OWN WITHIN 1 TO 2 DAYS INCLUDING FACIAL FLUSHING OR A FEELING OF WARMTH ON THE FACE, TEMPORARY INCREASES IN BLOOD SUGAR, INSOMNIA, INCREASED HUNGER  IF YOU RECEIVED A DIAGNOSTIC PROCEDURE (SUCH AS A MEDIAL BRANCH BLOCK), PLEASE NOTE THAT WE DO EXPECT THIS INJECTION TO WEAR OFF.  IT IS IMPORTANT TO COMPLETE THE PAIN DIARY AND LIST THE PAIN SCORE ONLY FOR THE REGION WHERE THE PROCEDURE WAS AND BRING THIS TO YOUR FOLLOW UP VISIT.  IF YOU RECEIVED A RADIOFREQUENCY PROCEDURE, THERE MAY BE SOME SORENESS AFTER THE PROCEDURE.  THIS IS NORMAL.  IF YOU EXPERIENCE PROLONGED WEAKNESS LONGER THAN ONE DAY, BOWEL OR BLADDER INCONTINENCE THEN PLEASE CALL THE PHYSIATRY OFFICE.  Your leg may feel heavy, weak and numb for up to 1-2 days. Be very careful walking.    You may resume normal activities 3 days after procedure.

## 2023-06-14 NOTE — PROGRESS NOTES
Follow-up patient note    Physiatry (physical medicine and  Rehabilitation), interventional spine and sports medicine    Date of Service: 2023    Chief complaint:   Chief Complaint   Patient presents with    Follow-Up     Medication follow up           HISTORY    HPI: Debby Desai 63 y.o. female who presents today for follow-up evaluation of her pain complaints related to lumbar radiculopathy and pain after right total knee replacement.    Debby has been having ongoing knee pain.  Her sister just recently  and this was a difficult stress.  Walking made her symptoms worse.  She has been using ice and pain is still worse in her knees.    Pain is 7/10 on the NRS.    Reports that she was hospitalized for presyncope and was hypotensive and was given levophed in the ambulance. Dr. Stevenson, her psychiatrist.  They are considering medication changes.  More stresses right now. Denies suicidality.  She sees Estella at "[facility]    She has since had changes to her medications.  Stopped amitriptyline and is now on mirtazepine.    She will follow-up with her PCP regarding adjustments to her blood pressure medication.    She is not able to use her SCS as the battery is no longer functioning.  This is not MRI safe.    Takes tramadol 50mg twice a day, no side effects. Taking aleve, one pill less than once a day now.    Denies suicidality.  PHQ-9: 16, now 9     By history:   She reports that she had surgery on 2019.  This was a right total knee arthroplasty for osteoarthritis with Dr. Saba.    In , she had discectomy.  She reports that she has had a spinal cord stimulator placed, but she has not been using this as much.  She reports that the unit is not currently working.    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  She gets up about once an hour.  She has a sit to stand desk, but it is difficult to stand much.  Currently, she is working from home  due to the COVID-19 pandemic.    Medical records review:  ED records from 10/07/2020 and 09/22/2020 reviewed.  ED visit from [id number].  Negative head CT.  She was given instructions about taking ibuprofen and tylenol.  Noted that she was also given a script for valium.     I reviewed the note from the referring provider Loy Miles M.D. dated 01/08/2020: Visits included nausea/epigastric pain, hypertension, chronic knee pain, IGT, dyslipidemia, MDD, hypothyroidism, iron deficiency    Previous treatments:    Physical Therapy: Yes    Medications the patient is tried: tramadol, tylenol (usually for a headache); in the past she took gabapentin 400mg po tid, she was taking vimovo and norco in the past; lyrica caused weight gain    Previous interventions: She had radiofrequency ablation in the past, prior to surgery    Previous surgeries to relieve the above pain:  None other than surgery 09/16/2019      ROS: Unchanged, see HPI  Gen: weight loss  Eyes: vision problems, glasses and contacts  CV: chest pain/anxiety  GI: nausea, ulcers  : urgency  Psych: depression  Endo: thyroid problems  Heme: anemia    Red Flags ROS:   Fever, Chills, Sweats: Denies  Involuntary Weight Loss: Denies  Bladder Incontinence: Denies  Bowel Incontinence: Denies  Saddle Anesthesia: Denies    All other systems reviewed and negative.       PMHx:   Past Medical History:   Diagnosis Date    Anemia     in past    Anginal syndrome (HCC)     had work up and feet r/t anxiety    Anxiety     Arthritis     OA knees    Chronic pain 6/2/2021    Dental disorder 5/24/12    partial upper denture    Diabetes (HCC)     pre diabetic    High cholesterol     HTN (hypertension), benign 3/19/2012    Hypothyroid 3/19/2012    Major depression 3/19/2012    Orbital floor (blow-out) closed fracture (HCC)     left    Other specified symptom associated with female genital organs     post menopausal bleeding    Pain     thoracic spine, Right knee, myofacial pain, and  Right shoulder    Pain     recently fell and has bruise left forehead    Pain 02/2018    chronic back pain, right shoulder    Psychiatric problem     depression, anxiety    Unspecified disorder of thyroid     Urinary bladder disorder     stress incont.    Urinary incontinence     Occasional       PSHx:   Past Surgical History:   Procedure Laterality Date    INJ,EPI ANES/STER LUM/SAC ADDL Right 4/7/2021    Procedure: RIGHT lumbar five and sacral one transforaminal epidural;  Surgeon: Volodymyr Herring M.D.;  [facility];  Service: Pain Management    PB TOTAL KNEE ARTHROPLASTY Right 9/16/2019    Procedure: RIGHT ARTHROPLASTY, KNEE, TOTAL;  Surgeon: Rufus Saba M.D.;  Location: [facility];  Service: Orthopedics    KNEE ARTHROSCOPY Right 2/9/2018    Procedure: KNEE ARTHROSCOPY;  Surgeon: Harsh Baltazar M.D.;  Location: [facility];  Service: Orthopedics    MENISCECTOMY, KNEE, MEDIAL Right 2/9/2018    Procedure: MEDIAL AND LATERAL MENISCECTOMY- PARTIAL;  Surgeon: Harsh Baltazar M.D.;  Location: [facility];  Service: Orthopedics    KNEE ARTHROSCOPY Right 7/12/2017    Procedure: KNEE ARTHROSCOPY- CESPEDES;  Surgeon: Harsh Baltazar M.D.;  Location: [facility];  Service:     MENISCECTOMY, KNEE, MEDIAL Right 7/12/2017    Procedure: PARTIAL MEDIAL AND LATERAL MENISCECTOMY;  Surgeon: Harsh Baltazar M.D.;  Location: [facility];  Service:     SYNOVECTOMY Right 7/12/2017    Procedure: SYNOVECTOMY;  Surgeon: Harsh Baltazar M.D.;  Location: [facility];  Service:     KNEE ARTHROSCOPY  4/21/2015    Performed by Rufus Saba M.D. at [facility]    MENISCECTOMY, KNEE, MEDIAL  4/21/2015    Performed by Rufus Saba M.D. at [facility]    PUMP REVISION  12/10/2012    Performed by Mak Guerra M.D. at [facility]    SPINAL CORD STIMULATOR  5/30/2012  "   Performed by ALLISON CAM at [facility]    BIOPSY GENERAL  5/1/12    endometrial    SPINAL CORD STIMULATOR  7/11/2011    Performed by ALLISON CAM at [facility]    BLOCK EPIDURAL STEROID INJECTION  2008    x 3-4    LYMPH NODE EXCISION Left 2007    cervicle    OTHER Right 2001    thoracic discectomy      ARTHROSCOPY, KNEE Left 1999    RIB RESECTION Right 1980    LUMPECTOMY         Family history   Family History   Problem Relation Age of Onset    Hypertension Father     Diabetes Father     Heart Disease Father     Alcohol abuse Father     Anesthesia Sister         slow to come out (as a child)    Diabetes Other     Heart Disease Other     Cancer Other     Hypertension Other     Stroke Other     Lung Disease Other          Medications:   Current Outpatient Medications   Medication    [START ON 6/29/2023] traMADol (ULTRAM) 50 MG Tab    buPROPion (WELLBUTRIN SR) 200 MG SR tablet    mirtazapine (REMERON) 7.5 MG tablet    venlafaxine XR (EFFEXOR XR) 75 MG CAPSULE SR 24 HR    venlafaxine (EFFEXOR-XR) 150 MG extended-release capsule    levothyroxine (SYNTHROID) 25 MCG Tab    metFORMIN ER (GLUCOPHAGE XR) 500 MG TABLET SR 24 HR    atorvastatin (LIPITOR) 20 MG Tab    VITAMIN D PO    Ferrous Sulfate (IRON) 325 (65 Fe) MG Tab     No current facility-administered medications for this visit.       Allergies:   Allergies   Allergen Reactions    Sulfamethoxazole-Trimethoprim Rash and Swelling     Pt states \"My hand swells up and rash all over body\".       Social Hx:   Social History     Socioeconomic History    Marital status: Single     Spouse name: Not on file    Number of children: Not on file    Years of education: Not on file    Highest education level: Associate degree: academic program   Occupational History    Not on file   Tobacco Use    Smoking status: Never    Smokeless tobacco: Never   Vaping Use    Vaping Use: Never used   Substance and Sexual Activity    Alcohol use: Not " Currently    Drug use: No    Sexual activity: Never     Partners: Male   Other Topics Concern     Service No    Blood Transfusions No    Caffeine Concern No    Occupational Exposure No    Hobby Hazards No    Sleep Concern Yes    Stress Concern Yes    Weight Concern Yes    Special Diet No    Back Care No    Exercise No    Bike Helmet No    Seat Belt Yes    Self-Exams No   Social History Narrative    Not on file     Social Determinants of Health     Financial Resource Strain: Low Risk  (5/11/2022)    Overall Financial Resource Strain (CARDIA)     Difficulty of Paying Living Expenses: Not hard at all   Food Insecurity: No Food Insecurity (5/11/2022)    Hunger Vital Sign     Worried About Running Out of Food in the Last Year: Never true     Ran Out of Food in the Last Year: Never true   Transportation Needs: No Transportation Needs (5/11/2022)    PRAPARE - Transportation     Lack of Transportation (Medical): No     Lack of Transportation (Non-Medical): No   Physical Activity: Inactive (5/11/2022)    Exercise Vital Sign     Days of Exercise per Week: 0 days     Minutes of Exercise per Session: 0 min   Stress: Stress Concern Present (5/11/2022)    [employer] [place] [employer] - Occupational Stress Questionnaire     Feeling of Stress : Rather much   Social Connections: Socially Isolated (5/11/2022)    Social Connection and Isolation Panel [NHANES]     Frequency of Communication with Friends and Family: More than three times a week     Frequency of Social Gatherings with Friends and Family: Once a week     Attends Christian Services: Never     Active Member of Clubs or Organizations: No     Attends Club or Organization Meetings: Never     Marital Status: Never    Intimate Partner Violence: Not on file   Housing Stability: Low Risk  (5/11/2022)    Housing Stability Vital Sign     Unable to Pay for Housing in the Last Year: No     Number of Places Lived in the Last Year: 1     Unstable Housing in  "the Last Year: No         EXAMINATION     Physical Exam:   Vitals: /78 (BP Location: Right arm, Patient Position: Sitting, BP Cuff Size: Adult)   Pulse 95   Temp (!) 35.5 °C (95.9 °F) (Temporal)   Ht 1.702 m (5' 7\")   Wt 77.7 kg (171 lb 3.2 oz)   SpO2 96%     Constitutional:   Body Habitus: Body mass index is 26.81 kg/m².  Cooperation: Fully cooperates with exam  Appearance: Well-groomed, well-nourished, not disheveled no acute distress    Eyes: No scleral icterus, no proptosis     ENT -no obvious auditory deficits, poor dentition    Skin -no visible skin lesions     Respiratory-  breathing comfortable on room air, no audible wheezing    Cardiovascular- No lower extremity edema is noted.     Psychiatric- alert and oriented ×3. Normal affect.     Gait -  Antalgic, no assist device, no loss of balance    Neuro/Muscloskeletal  No focal motor deficits in the lower extremities bilaterally.  No sensory deficits in the lower extremities are noted bilaterally    Reflexes: 2+ biceps, triceps, achilles bilaterally.  Britton's is negative bilaterally.  No clonus bilaterally    Mild-Moderate effusion of right knee.  Mild tenderness medial and lateral joint line, medial joint pain is greater.         MEDICAL DECISION MAKING    Medical records review: see under HPI section.     DATA    Labs:     03/14/2020 CRP 0.19  03/14/2020 Sed rate 20  02/25/2020: Tramadol and metabolites  09/21/2021 UDS consistent with tramadol and metabolites  08/17/2022 UDS consistent with tramadol and metaboiltes  02/15/2023 UDS consistent with tramadol and metabolites    Lab Results   Component Value Date/Time    SODIUM 144 05/24/2023 12:49 AM    POTASSIUM 3.9 05/24/2023 12:49 AM    CHLORIDE 106 05/24/2023 12:49 AM    CO2 27 05/24/2023 12:49 AM    ANION 11.0 05/24/2023 12:49 AM    GLUCOSE 94 05/24/2023 12:49 AM    BUN 10 05/24/2023 12:49 AM    CREATININE 0.86 05/24/2023 12:49 AM    CALCIUM 9.1 05/24/2023 12:49 AM    ASTSGOT 10 (L) 05/24/2023 " 12:49 AM    ALTSGPT 13 05/24/2023 12:49 AM    TBILIRUBIN 0.3 05/24/2023 12:49 AM    ALBUMIN 3.6 05/24/2023 12:49 AM    TOTPROTEIN 6.4 05/24/2023 12:49 AM    GLOBULIN 2.8 05/24/2023 12:49 AM    AGRATIO 1.3 05/24/2023 12:49 AM       Lab Results   Component Value Date/Time    PROTHROMBTM 13.1 05/23/2023 01:39 AM    INR 1.00 05/23/2023 01:39 AM        Lab Results   Component Value Date/Time    WBC 6.5 05/24/2023 12:49 AM    RBC 3.67 (L) 05/24/2023 12:49 AM    HEMOGLOBIN 11.0 (L) 05/24/2023 12:49 AM    HEMATOCRIT 34.0 (L) 05/24/2023 12:49 AM    MCV 92.6 05/24/2023 12:49 AM    MCH 30.0 05/24/2023 12:49 AM    MCHC 32.4 05/24/2023 12:49 AM    MPV 11.3 05/24/2023 12:49 AM    NEUTSPOLYS 52.40 05/24/2023 12:49 AM    LYMPHOCYTES 35.40 05/24/2023 12:49 AM    MONOCYTES 8.20 05/24/2023 12:49 AM    EOSINOPHILS 3.20 05/24/2023 12:49 AM    BASOPHILS 0.50 05/24/2023 12:49 AM        Lab Results   Component Value Date/Time    HBA1C 5.6 09/26/2022 07:34 AM        Imaging: I personally reviewed following images, these are my reads  Xray thoracolumbar spine 07/14/2022  Degenerative changes and note of electrode from T6-T9    Xray right knee 07/14/2022  S/P right total knee without evidence of fracture    CT left knee May 8, 2022  There is note of moderate to large joint effusion with note of nondisplaced fracture at the medial aspect of the proximal fibula.  Moderate osteoarthritis    CT cervical spine 09/22/2020  There is note of cervical spondylosis with multilevel uncovertebral arthropathy    Xray right hip 03/14/2020  There is mild osteoarthritis of the right hip.      Xray lumbar 03/14/2020  There is mild anterolisthesis at L5-S1.  No abnormal motion on flexion and extension  There is DDD and facet arthropathy that is most noted at L5-S1 and less at L4-5    Xray right knee 09/16/2019  There is note of right knee arthroplasty    IMAGING radiology reads. I reviewed the following radiology reads  Xray right knee  07/14/2022  IMPRESSION:  1.  No acute fracture or dislocation of the right knee.    Xray thoracolumbar spine 07/14/2022  IMPRESSION:  1.  No acute or subacute fracture of the thoracolumbar spine.ay     CT left knee May 8, 2022  IMPRESSION:     1.  Nondisplaced fracture of medial aspect of the proximal fibula is identified.  2.  No other fractures identified.  3.  Joint effusion is identified.   4.  Moderate osteoarthritis.    Xray right knee 09/18/2021  Multiple standing views of the right knee show previous right total knee   replacement with hardware in good position without signs of loosening or   dislocation.  No obvious fracture noted.    Xray left shoulder 10/27/2021  X-rays reviewed today of the left shoulder show normal overall bony   alignment she has some AC degenerative change.  She has some osteophyte   formation off the inferior humeral head.  Some mild glenohumeral joint   space narrowing patient.  Type III acromion.    CT cervical spine 09/22/2020  IMPRESSION:  Degenerative change without evidence of cervical spine fracture.    Xray lumbar 03/14/2020  IMPRESSION:  1.  No compression deformity or acute fracture is identified.  2.  Mild anterolisthesis at L5-S1.  3.  Degenerative disc disease and facet arthropathy.  4.  No focal instability noted on flexion extension views.                                                Xray right hip 03/14/2020  IMPRESSION:     1.  No radiographic evidence of acute traumatic injury.  2.  Findings are consistent with mild osteoarthritis.  3.  Probable constipation.                                   Results for orders placed during the hospital encounter of 09/16/19   DX-KNEE 2- RIGHT    Impression Right knee arthroplasty.      Results for orders placed during the hospital encounter of 03/28/19   DX-KNEE 3 VIEWS RIGHT    Impression No evidence of acute fracture or dislocation.    Degenerative changes as above described.                              Diagnosis   Visit  Diagnoses     ICD-10-CM   1. S/P TKR (total knee replacement), right  Z96.651   2. Chronic pain of right knee  M25.561    G89.29   3. Spondylolisthesis at L5-S1 level  M43.17                 ASSESSMENT:  Debby Desai 63 y.o. female seen for above.     Debby was seen today for follow-up.    Diagnoses and all orders for this visit:    S/P TKR (total knee replacement), right  -     traMADol (ULTRAM) 50 MG Tab; Take 1 Tablet by mouth every 8 hours as needed for Severe Pain for up to 30 days.  -     Referral to Pain Clinic  -     Consent for Opiate Prescription  -     Controlled Substance Treatment Agreement    Chronic pain of right knee  -     traMADol (ULTRAM) 50 MG Tab; Take 1 Tablet by mouth every 8 hours as needed for Severe Pain for up to 30 days.  -     Referral to Pain Clinic  -     Consent for Opiate Prescription  -     Controlled Substance Treatment Agreement    Spondylolisthesis at L5-S1 level  -     traMADol (ULTRAM) 50 MG Tab; Take 1 Tablet by mouth every 8 hours as needed for Severe Pain for up to 30 days.  -     Consent for Opiate Prescription  -     Controlled Substance Treatment Agreement        Discussed plan for her to address her dentition prior to SCS battery replacement.  Location of epidural lead could cover her right leg pain if functional, but if not, would require addition lead placement.  Continue tramadol.  reviewed.  Continue current dose for now.  Consider gradual wean in the future.  Scripts given for the next three months.  Pain in the right knee is still ongoing.  Discussed trial of right genicular nerve blocks.  The risks benefits and alternatives to this procedure were discussed and the patient wishes to proceed with the procedure. Risks include but are not limited to damage to surrounding structures, infection, bleeding, worsening of pain which can be permanent, weakness which can be permanent. Benefits include pain relief, improved function. Alternatives includes not doing the  procedure.    Encouraged her to continue to work with Dr. Stevenson and counseling.  Follow-up with PCP for general medical care and current dose of tramadol can be managed by her PCP in the future.  Continue home program and activity as tolerated        Follow-up: Return for Hospital injection.        Thank you very much for asking me to participate in Debby Desai's care.  Please contact me with any questions or concerns.      Please note that this dictation was created using voice recognition software. I have made every reasonable attempt to correct obvious errors but there may be errors of grammar and content that I may have overlooked prior to finalization of this note.      Volodymyr Herring MD  Physical Medicine and Rehabilitation  Interventional Spine and Sports Physiatry  [facility]

## 2023-06-14 NOTE — H&P (VIEW-ONLY)
Follow-up patient note    Physiatry (physical medicine and  Rehabilitation), interventional spine and sports medicine    Date of Service: 2023    Chief complaint:   Chief Complaint   Patient presents with    Follow-Up     Medication follow up           HISTORY    HPI: Debby Desai 63 y.o. female who presents today for follow-up evaluation of her pain complaints related to lumbar radiculopathy and pain after right total knee replacement.    Debby has been having ongoing knee pain.  Her sister just recently  and this was a difficult stress.  Walking made her symptoms worse.  She has been using ice and pain is still worse in her knees.    Pain is 7/10 on the NRS.    Reports that she was hospitalized for presyncope and was hypotensive and was given levophed in the ambulance. Dr. Stevenson, her psychiatrist.  They are considering medication changes.  More stresses right now. Denies suicidality.  She sees Estella at [facility].    She has since had changes to her medications.  Stopped amitriptyline and is now on mirtazepine.    She will follow-up with her PCP regarding adjustments to her blood pressure medication.    She is not able to use her SCS as the battery is no longer functioning.  This is not MRI safe.    Takes tramadol 50mg twice a day, no side effects. Taking aleve, one pill less than once a day now.    Denies suicidality.  PHQ-9: 16, now 9     By history:   She reports that she had surgery on 2019.  This was a right total knee arthroplasty for osteoarthritis with Dr. Saba.    In , she had discectomy.  She reports that she has had a spinal cord stimulator placed, but she has not been using this as much.  She reports that the unit is not currently working.    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  She gets up about once an hour.  She has a sit to stand desk, but it is difficult to stand much.  Currently, she is working from home  due to the COVID-19 pandemic.    Medical records review:  ED records from 10/07/2020 and 09/22/2020 reviewed.  ED visit from [id number].  Negative head CT.  She was given instructions about taking ibuprofen and tylenol.  Noted that she was also given a script for valium.     I reviewed the note from the referring provider Loy Miles M.D. dated 01/08/2020: Visits included nausea/epigastric pain, hypertension, chronic knee pain, IGT, dyslipidemia, MDD, hypothyroidism, iron deficiency    Previous treatments:    Physical Therapy: Yes    Medications the patient is tried: tramadol, tylenol (usually for a headache); in the past she took gabapentin 400mg po tid, she was taking vimovo and norco in the past; lyrica caused weight gain    Previous interventions: She had radiofrequency ablation in the past, prior to surgery    Previous surgeries to relieve the above pain:  None other than surgery 09/16/2019      ROS: Unchanged, see HPI  Gen: weight loss  Eyes: vision problems, glasses and contacts  CV: chest pain/anxiety  GI: nausea, ulcers  : urgency  Psych: depression  Endo: thyroid problems  Heme: anemia    Red Flags ROS:   Fever, Chills, Sweats: Denies  Involuntary Weight Loss: Denies  Bladder Incontinence: Denies  Bowel Incontinence: Denies  Saddle Anesthesia: Denies    All other systems reviewed and negative.       PMHx:   Past Medical History:   Diagnosis Date    Anemia     in past    Anginal syndrome (HCC)     had work up and feet r/t anxiety    Anxiety     Arthritis     OA knees    Chronic pain 6/2/2021    Dental disorder 5/24/12    partial upper denture    Diabetes (HCC)     pre diabetic    High cholesterol     HTN (hypertension), benign 3/19/2012    Hypothyroid 3/19/2012    Major depression 3/19/2012    Orbital floor (blow-out) closed fracture (HCC)     left    Other specified symptom associated with female genital organs     post menopausal bleeding    Pain     thoracic spine, Right knee, myofacial pain, and  Right shoulder    Pain     recently fell and has bruise left forehead    Pain 02/2018    chronic back pain, right shoulder    Psychiatric problem     depression, anxiety    Unspecified disorder of thyroid     Urinary bladder disorder     stress incont.    Urinary incontinence     Occasional       PSHx:   Past Surgical History:   Procedure Laterality Date    INJ,EPI ANES/STER LUM/SAC ADDL Right 4/7/2021    Procedure: RIGHT lumbar five and sacral one transforaminal epidural;  Surgeon: Volodymyr Herring M.D.;  [facility];  Service: Pain Management    PB TOTAL KNEE ARTHROPLASTY Right 9/16/2019    Procedure: RIGHT ARTHROPLASTY, KNEE, TOTAL;  Surgeon: Rufus Saba M.D.;  Location: [facility];  Service: Orthopedics    KNEE ARTHROSCOPY Right 2/9/2018    Procedure: KNEE ARTHROSCOPY;  Surgeon: Harsh Baltazar M.D.;  Location: [facility];  Service: Orthopedics    MENISCECTOMY, KNEE, MEDIAL Right 2/9/2018    Procedure: MEDIAL AND LATERAL MENISCECTOMY- PARTIAL;  Surgeon: Harsh Baltazar M.D.;  Location: [facility];  Service: Orthopedics    KNEE ARTHROSCOPY Right 7/12/2017    Procedure: KNEE ARTHROSCOPY- CESPEDES;  Surgeon: Harsh Baltazar M.D.;  Location: [facility];  Service:     MENISCECTOMY, KNEE, MEDIAL Right 7/12/2017    Procedure: PARTIAL MEDIAL AND LATERAL MENISCECTOMY;  Surgeon: Harsh Baltazar M.D.;  Location: [facility];  Service:     SYNOVECTOMY Right 7/12/2017    Procedure: SYNOVECTOMY;  Surgeon: Harsh Baltazar M.D.;  Location: [facility];  Service:     KNEE ARTHROSCOPY  4/21/2015    Performed by Rufus Saba M.D. at [facility]    MENISCECTOMY, KNEE, MEDIAL  4/21/2015    Performed by Rufus Saba M.D. at [facility]    PUMP REVISION  12/10/2012    Performed by Mak Guerra M.D. at [facility]    SPINAL CORD STIMULATOR  5/30/2012  "   Performed by ALLISON CAM at [facility]    BIOPSY GENERAL  5/1/12    endometrial    SPINAL CORD STIMULATOR  7/11/2011    Performed by ALLISON CAM at [facility]    BLOCK EPIDURAL STEROID INJECTION  2008    x 3-4    LYMPH NODE EXCISION Left 2007    cervicle    OTHER Right 2001    thoracic discectomy      ARTHROSCOPY, KNEE Left 1999    RIB RESECTION Right 1980    LUMPECTOMY         Family history   Family History   Problem Relation Age of Onset    Hypertension Father     Diabetes Father     Heart Disease Father     Alcohol abuse Father     Anesthesia Sister         slow to come out (as a child)    Diabetes Other     Heart Disease Other     Cancer Other     Hypertension Other     Stroke Other     Lung Disease Other          Medications:   Current Outpatient Medications   Medication    [START ON 6/29/2023] traMADol (ULTRAM) 50 MG Tab    buPROPion (WELLBUTRIN SR) 200 MG SR tablet    mirtazapine (REMERON) 7.5 MG tablet    venlafaxine XR (EFFEXOR XR) 75 MG CAPSULE SR 24 HR    venlafaxine (EFFEXOR-XR) 150 MG extended-release capsule    levothyroxine (SYNTHROID) 25 MCG Tab    metFORMIN ER (GLUCOPHAGE XR) 500 MG TABLET SR 24 HR    atorvastatin (LIPITOR) 20 MG Tab    VITAMIN D PO    Ferrous Sulfate (IRON) 325 (65 Fe) MG Tab     No current facility-administered medications for this visit.       Allergies:   Allergies   Allergen Reactions    Sulfamethoxazole-Trimethoprim Rash and Swelling     Pt states \"My hand swells up and rash all over body\".       Social Hx:   Social History     Socioeconomic History    Marital status: Single     Spouse name: Not on file    Number of children: Not on file    Years of education: Not on file    Highest education level: Associate degree: academic program   Occupational History    Not on file   Tobacco Use    Smoking status: Never    Smokeless tobacco: Never   Vaping Use    Vaping Use: Never used   Substance and Sexual Activity    Alcohol use: Not " Currently    Drug use: No    Sexual activity: Never     Partners: Male   Other Topics Concern     Service No    Blood Transfusions No    Caffeine Concern No    Occupational Exposure No    Hobby Hazards No    Sleep Concern Yes    Stress Concern Yes    Weight Concern Yes    Special Diet No    Back Care No    Exercise No    Bike Helmet No    Seat Belt Yes    Self-Exams No   Social History Narrative    Not on file     Social Determinants of Health     Financial Resource Strain: Low Risk  (5/11/2022)    Overall Financial Resource Strain (CARDIA)     Difficulty of Paying Living Expenses: Not hard at all   Food Insecurity: No Food Insecurity (5/11/2022)    Hunger Vital Sign     Worried About Running Out of Food in the Last Year: Never true     Ran Out of Food in the Last Year: Never true   Transportation Needs: No Transportation Needs (5/11/2022)    PRAPARE - Transportation     Lack of Transportation (Medical): No     Lack of Transportation (Non-Medical): No   Physical Activity: Inactive (5/11/2022)    Exercise Vital Sign     Days of Exercise per Week: 0 days     Minutes of Exercise per Session: 0 min   Stress: Stress Concern Present (5/11/2022)    [employer] [place] [employer] - Occupational Stress Questionnaire     Feeling of Stress : Rather much   Social Connections: Socially Isolated (5/11/2022)    Social Connection and Isolation Panel [NHANES]     Frequency of Communication with Friends and Family: More than three times a week     Frequency of Social Gatherings with Friends and Family: Once a week     Attends Jain Services: Never     Active Member of Clubs or Organizations: No     Attends Club or Organization Meetings: Never     Marital Status: Never    Intimate Partner Violence: Not on file   Housing Stability: Low Risk  (5/11/2022)    Housing Stability Vital Sign     Unable to Pay for Housing in the Last Year: No     Number of Places Lived in the Last Year: 1     Unstable Housing in  "the Last Year: No         EXAMINATION     Physical Exam:   Vitals: /78 (BP Location: Right arm, Patient Position: Sitting, BP Cuff Size: Adult)   Pulse 95   Temp (!) 35.5 °C (95.9 °F) (Temporal)   Ht 1.702 m (5' 7\")   Wt 77.7 kg (171 lb 3.2 oz)   SpO2 96%     Constitutional:   Body Habitus: Body mass index is 26.81 kg/m².  Cooperation: Fully cooperates with exam  Appearance: Well-groomed, well-nourished, not disheveled no acute distress    Eyes: No scleral icterus, no proptosis     ENT -no obvious auditory deficits, poor dentition    Skin -no visible skin lesions     Respiratory-  breathing comfortable on room air, no audible wheezing    Cardiovascular- No lower extremity edema is noted.     Psychiatric- alert and oriented ×3. Normal affect.     Gait -  Antalgic, no assist device, no loss of balance    Neuro/Muscloskeletal  No focal motor deficits in the lower extremities bilaterally.  No sensory deficits in the lower extremities are noted bilaterally    Reflexes: 2+ biceps, triceps, achilles bilaterally.  Britton's is negative bilaterally.  No clonus bilaterally    Mild-Moderate effusion of right knee.  Mild tenderness medial and lateral joint line, medial joint pain is greater.         MEDICAL DECISION MAKING    Medical records review: see under HPI section.     DATA    Labs:     03/14/2020 CRP 0.19  03/14/2020 Sed rate 20  02/25/2020: Tramadol and metabolites  09/21/2021 UDS consistent with tramadol and metabolites  08/17/2022 UDS consistent with tramadol and metaboiltes  02/15/2023 UDS consistent with tramadol and metabolites    Lab Results   Component Value Date/Time    SODIUM 144 05/24/2023 12:49 AM    POTASSIUM 3.9 05/24/2023 12:49 AM    CHLORIDE 106 05/24/2023 12:49 AM    CO2 27 05/24/2023 12:49 AM    ANION 11.0 05/24/2023 12:49 AM    GLUCOSE 94 05/24/2023 12:49 AM    BUN 10 05/24/2023 12:49 AM    CREATININE 0.86 05/24/2023 12:49 AM    CALCIUM 9.1 05/24/2023 12:49 AM    ASTSGOT 10 (L) 05/24/2023 " 12:49 AM    ALTSGPT 13 05/24/2023 12:49 AM    TBILIRUBIN 0.3 05/24/2023 12:49 AM    ALBUMIN 3.6 05/24/2023 12:49 AM    TOTPROTEIN 6.4 05/24/2023 12:49 AM    GLOBULIN 2.8 05/24/2023 12:49 AM    AGRATIO 1.3 05/24/2023 12:49 AM       Lab Results   Component Value Date/Time    PROTHROMBTM 13.1 05/23/2023 01:39 AM    INR 1.00 05/23/2023 01:39 AM        Lab Results   Component Value Date/Time    WBC 6.5 05/24/2023 12:49 AM    RBC 3.67 (L) 05/24/2023 12:49 AM    HEMOGLOBIN 11.0 (L) 05/24/2023 12:49 AM    HEMATOCRIT 34.0 (L) 05/24/2023 12:49 AM    MCV 92.6 05/24/2023 12:49 AM    MCH 30.0 05/24/2023 12:49 AM    MCHC 32.4 05/24/2023 12:49 AM    MPV 11.3 05/24/2023 12:49 AM    NEUTSPOLYS 52.40 05/24/2023 12:49 AM    LYMPHOCYTES 35.40 05/24/2023 12:49 AM    MONOCYTES 8.20 05/24/2023 12:49 AM    EOSINOPHILS 3.20 05/24/2023 12:49 AM    BASOPHILS 0.50 05/24/2023 12:49 AM        Lab Results   Component Value Date/Time    HBA1C 5.6 09/26/2022 07:34 AM        Imaging: I personally reviewed following images, these are my reads  Xray thoracolumbar spine 07/14/2022  Degenerative changes and note of electrode from T6-T9    Xray right knee 07/14/2022  S/P right total knee without evidence of fracture    CT left knee May 8, 2022  There is note of moderate to large joint effusion with note of nondisplaced fracture at the medial aspect of the proximal fibula.  Moderate osteoarthritis    CT cervical spine 09/22/2020  There is note of cervical spondylosis with multilevel uncovertebral arthropathy    Xray right hip 03/14/2020  There is mild osteoarthritis of the right hip.      Xray lumbar 03/14/2020  There is mild anterolisthesis at L5-S1.  No abnormal motion on flexion and extension  There is DDD and facet arthropathy that is most noted at L5-S1 and less at L4-5    Xray right knee 09/16/2019  There is note of right knee arthroplasty    IMAGING radiology reads. I reviewed the following radiology reads  Xray right knee  07/14/2022  IMPRESSION:  1.  No acute fracture or dislocation of the right knee.    Xray thoracolumbar spine 07/14/2022  IMPRESSION:  1.  No acute or subacute fracture of the thoracolumbar spine.ay     CT left knee May 8, 2022  IMPRESSION:     1.  Nondisplaced fracture of medial aspect of the proximal fibula is identified.  2.  No other fractures identified.  3.  Joint effusion is identified.   4.  Moderate osteoarthritis.    Xray right knee 09/18/2021  Multiple standing views of the right knee show previous right total knee   replacement with hardware in good position without signs of loosening or   dislocation.  No obvious fracture noted.    Xray left shoulder 10/27/2021  X-rays reviewed today of the left shoulder show normal overall bony   alignment she has some AC degenerative change.  She has some osteophyte   formation off the inferior humeral head.  Some mild glenohumeral joint   space narrowing patient.  Type III acromion.    CT cervical spine 09/22/2020  IMPRESSION:  Degenerative change without evidence of cervical spine fracture.    Xray lumbar 03/14/2020  IMPRESSION:  1.  No compression deformity or acute fracture is identified.  2.  Mild anterolisthesis at L5-S1.  3.  Degenerative disc disease and facet arthropathy.  4.  No focal instability noted on flexion extension views.                                                Xray right hip 03/14/2020  IMPRESSION:     1.  No radiographic evidence of acute traumatic injury.  2.  Findings are consistent with mild osteoarthritis.  3.  Probable constipation.                                   Results for orders placed during the hospital encounter of 09/16/19   DX-KNEE 2- RIGHT    Impression Right knee arthroplasty.      Results for orders placed during the hospital encounter of 03/28/19   DX-KNEE 3 VIEWS RIGHT    Impression No evidence of acute fracture or dislocation.    Degenerative changes as above described.                              Diagnosis   Visit  Diagnoses     ICD-10-CM   1. S/P TKR (total knee replacement), right  Z96.651   2. Chronic pain of right knee  M25.561    G89.29   3. Spondylolisthesis at L5-S1 level  M43.17                 ASSESSMENT:  Debby Desai 63 y.o. female seen for above.     Debby was seen today for follow-up.    Diagnoses and all orders for this visit:    S/P TKR (total knee replacement), right  -     traMADol (ULTRAM) 50 MG Tab; Take 1 Tablet by mouth every 8 hours as needed for Severe Pain for up to 30 days.  -     Referral to Pain Clinic  -     Consent for Opiate Prescription  -     Controlled Substance Treatment Agreement    Chronic pain of right knee  -     traMADol (ULTRAM) 50 MG Tab; Take 1 Tablet by mouth every 8 hours as needed for Severe Pain for up to 30 days.  -     Referral to Pain Clinic  -     Consent for Opiate Prescription  -     Controlled Substance Treatment Agreement    Spondylolisthesis at L5-S1 level  -     traMADol (ULTRAM) 50 MG Tab; Take 1 Tablet by mouth every 8 hours as needed for Severe Pain for up to 30 days.  -     Consent for Opiate Prescription  -     Controlled Substance Treatment Agreement        Discussed plan for her to address her dentition prior to SCS battery replacement.  Location of epidural lead could cover her right leg pain if functional, but if not, would require addition lead placement.  Continue tramadol.  reviewed.  Continue current dose for now.  Consider gradual wean in the future.  Scripts given for the next three months.  Pain in the right knee is still ongoing.  Discussed trial of right genicular nerve blocks.  The risks benefits and alternatives to this procedure were discussed and the patient wishes to proceed with the procedure. Risks include but are not limited to damage to surrounding structures, infection, bleeding, worsening of pain which can be permanent, weakness which can be permanent. Benefits include pain relief, improved function. Alternatives includes not doing the  procedure.    Encouraged her to continue to work with Dr. Stevenson and counseling.  Follow-up with PCP for general medical care and current dose of tramadol can be managed by her PCP in the future.  Continue home program and activity as tolerated        Follow-up: Return for Hospital injection.        Thank you very much for asking me to participate in Debby Desai's care.  Please contact me with any questions or concerns.      Please note that this dictation was created using voice recognition software. I have made every reasonable attempt to correct obvious errors but there may be errors of grammar and content that I may have overlooked prior to finalization of this note.      Volodymyr Herring MD  Physical Medicine and Rehabilitation  Interventional Spine and Sports Physiatry  [facility]

## 2023-06-21 ENCOUNTER — OFFICE VISIT (OUTPATIENT)
Dept: MEDICAL GROUP | Facility: MEDICAL CENTER | Age: 63
End: 2023-06-21
Payer: COMMERCIAL

## 2023-06-21 VITALS
SYSTOLIC BLOOD PRESSURE: 122 MMHG | BODY MASS INDEX: 26.68 KG/M2 | TEMPERATURE: 97 F | OXYGEN SATURATION: 98 % | WEIGHT: 170 LBS | DIASTOLIC BLOOD PRESSURE: 60 MMHG | HEART RATE: 71 BPM | HEIGHT: 67 IN

## 2023-06-21 DIAGNOSIS — E03.9 ACQUIRED HYPOTHYROIDISM: ICD-10-CM

## 2023-06-21 DIAGNOSIS — I10 PRIMARY HYPERTENSION: ICD-10-CM

## 2023-06-21 DIAGNOSIS — F33.42 RECURRENT MAJOR DEPRESSIVE DISORDER, IN FULL REMISSION (HCC): ICD-10-CM

## 2023-06-21 PROCEDURE — 1170F FXNL STATUS ASSESSED: CPT | Performed by: NURSE PRACTITIONER

## 2023-06-21 PROCEDURE — 99214 OFFICE O/P EST MOD 30 MIN: CPT | Performed by: NURSE PRACTITIONER

## 2023-06-21 PROCEDURE — 3074F SYST BP LT 130 MM HG: CPT | Performed by: NURSE PRACTITIONER

## 2023-06-21 PROCEDURE — 3078F DIAST BP <80 MM HG: CPT | Performed by: NURSE PRACTITIONER

## 2023-06-21 ASSESSMENT — FIBROSIS 4 INDEX: FIB4 SCORE: 0.61

## 2023-06-21 NOTE — ASSESSMENT & PLAN NOTE
Chronic. Previously taking lisinopril 40 mg daily and amlodipine 10 mg daily.     All medications were stopped after ER visit for fall and hypotension. She has restarted both medications as her BP was getting up into the 140s.       Staying hydrated, mindful of diet, eating Hello Fresh. Increase in meat intake since nephew has moved in with her. One soda per day. Water 16-18 oz (4). Tea (weak) 2 glasses. Has not made coffee in a week.      Life high stress continues. States sister's illness is worsening and she is worried about her. All family has medical issues--cancer and surgeries. Frustrated with chronic pain and was feeling like a burden to the family. As her blood pressure improves, this feeling has improved. Continues to follow psychiatry and therapist every 1-2 week. Does use distraction with reading and coloring.

## 2023-06-21 NOTE — PROGRESS NOTES
Subjective:   Debby Desai is a 63 y.o. female here today for hospital discharge follow up    Acquired hypothyroidism  Chronic. Previously stable on levothyroxine 50 mg daily, recently was in ER and ERP believed she was over treated with TSH 1.17 and reduced her levothyroxine to 25 mcg daily.     Primary hypertension  Chronic. Previously taking lisinopril 40 mg daily and amlodipine 10 mg daily.     All medications were stopped after ER visit for fall and hypotension. She has restarted both medications as her BP was getting up into the 140s.       Staying hydrated, mindful of diet, eating Hello [employer]. Increase in meat intake since nephew has moved in with her. One soda per day. Water 16-18 oz (4). Tea (weak) 2 glasses. Has not made coffee in a week.      Life high stress continues. States sister's illness is worsening and she is worried about her. All family has medical issues--cancer and surgeries. Frustrated with chronic pain and was feeling like a burden to the family. As her blood pressure improves, this feeling has improved. Continues to follow psychiatry and therapist every 1-2 week. Does use distraction with reading and coloring.     Recurrent major depressive disorder, in full remission (HCC)  Chronic. Established with psychiatry and therapy. Has had more situational depression lately due to the recent passing of her younger sister after a long cannon with cancer.        Current medicines (including changes today)  Current Outpatient Medications   Medication Sig Dispense Refill    [START ON 6/29/2023] traMADol (ULTRAM) 50 MG Tab Take 1 Tablet by mouth every 8 hours as needed for Severe Pain for up to 30 days. 90 Tablet 2    buPROPion (WELLBUTRIN SR) 200 MG SR tablet Take 1 Tablet by mouth 2 times a day. 60 Tablet 1    mirtazapine (REMERON) 7.5 MG tablet Take 1 tablet by mouth at bedtime. 30 Tablet 1    venlafaxine XR (EFFEXOR XR) 75 MG CAPSULE SR 24 HR Take 1 Capsule by mouth every day. (venlafaxine xr  "150 mg + 75 mg = 225 mg daily) 90 Capsule 2    venlafaxine (EFFEXOR-XR) 150 MG extended-release capsule Take 1 Capsule by mouth every day. (venlafaxine xr 150 mg + 75 mg = 225 mg daily) 90 Capsule 2    levothyroxine (SYNTHROID) 25 MCG Tab Take 1 tablet by mouth every morning on an empty stomach. 30 Tablet 1    metFORMIN ER (GLUCOPHAGE XR) 500 MG TABLET SR 24 HR Take 1 Tab by mouth 2 times a day. 180 Tablet 1    atorvastatin (LIPITOR) 20 MG Tab TAKE ONE TABLET BY MOUTH EVERY DAY 90 Tablet 3    VITAMIN D PO Take  by mouth every day.      Ferrous Sulfate (IRON) 325 (65 Fe) MG Tab Take 65 mg by mouth every day at 6 PM.       No current facility-administered medications for this visit.     She  has a past medical history of Anemia, Anginal syndrome ([facility]), Anxiety, Arthritis, Chronic pain (6/2/2021), Dental disorder (5/24/12), Diabetes ([facility]), High cholesterol, HTN (hypertension), benign (3/19/2012), Hypothyroid (3/19/2012), Major depression (3/19/2012), Orbital floor (blow-out) closed fracture ([facility]), Other specified symptom associated with female genital organs, Pain, Pain, Pain (02/2018), Psychiatric problem, Unspecified disorder of thyroid, Urinary bladder disorder, and Urinary incontinence.    She has no past medical history of Breast cancer ([facility]).    ROS   No chest pain, no shortness of breath, no abdominal pain  Positive ROS as per HPI.  All other systems reviewed and are negative.     Objective:     /60   Pulse 71   Temp 36.1 °C (97 °F) (Temporal)   Ht 1.702 m (5' 7\")   Wt 77.1 kg (170 lb)   SpO2 98%  Body mass index is 26.63 kg/m².     Physical Exam:  Constitutional: Alert, no distress.  Skin: Warm, dry, good turgor, no rashes in visible areas.  Eye: Equal, round and reactive, conjunctiva clear, lids normal.  ENMT: Lips without lesions, good dentition  Respiratory: Unlabored respiratory effort  Psych: Alert and oriented x3, normal affect and mood.        Assessment and Plan:   The following treatment " plan was discussed    1. Acquired hypothyroidism  Stable  Upon review of thyroid function tests done in the ER, I feel that her thyroid function was within normal range and did not not need to have levothyroxine adjusted.  Advised patient to return to prior 50 mcg daily dosing.    2. Primary hypertension  Stable on current medications  Maintain adequate hydration and calorie intake    3. Recurrent major depressive disorder, in full remission (HCC)  Stable.  Having more breakthrough situational depression which is expected with recent loss of her sister.  She is coping well.  She reports she is unable to cry which has been frustrating, she believes likely due to her antidepressant medications.  Continue medication and follow-up per psychiatry      Followup: Return in about 4 weeks (around 7/19/2023) for Hypertension, dizziness.    The MA is performing the above orders under the direction of Dr. Tena    Please note that this dictation was created using voice recognition software. I have made every reasonable attempt to correct obvious errors, but I expect that there are errors of grammar and possibly content that I did not discover before finalizing the note.

## 2023-06-21 NOTE — ASSESSMENT & PLAN NOTE
Chronic. Previously stable on levothyroxine 50 mg daily, recently was in ER and ERP believed she was over treated with TSH 1.17 and reduced her levothyroxine to 25 mcg daily.

## 2023-06-21 NOTE — ASSESSMENT & PLAN NOTE
Chronic. Established with psychiatry and therapy. Has had more situational depression lately due to the recent passing of her younger sister after a long cannon with cancer.

## 2023-06-23 ENCOUNTER — PHARMACY VISIT (OUTPATIENT)
Dept: PHARMACY | Facility: MEDICAL CENTER | Age: 63
End: 2023-06-23
Payer: COMMERCIAL

## 2023-06-23 PROCEDURE — RXMED WILLOW AMBULATORY MEDICATION CHARGE: Performed by: PSYCHIATRY & NEUROLOGY

## 2023-06-25 PROCEDURE — RXMED WILLOW AMBULATORY MEDICATION CHARGE: Performed by: PSYCHIATRY & NEUROLOGY

## 2023-06-26 PROCEDURE — RXMED WILLOW AMBULATORY MEDICATION CHARGE: Performed by: PHYSICAL MEDICINE & REHABILITATION

## 2023-06-27 ENCOUNTER — PHARMACY VISIT (OUTPATIENT)
Dept: PHARMACY | Facility: MEDICAL CENTER | Age: 63
End: 2023-06-27
Payer: COMMERCIAL

## 2023-06-29 ENCOUNTER — PHARMACY VISIT (OUTPATIENT)
Dept: PHARMACY | Facility: MEDICAL CENTER | Age: 63
End: 2023-06-29
Payer: COMMERCIAL

## 2023-07-06 ENCOUNTER — PATIENT MESSAGE (OUTPATIENT)
Dept: MEDICAL GROUP | Facility: MEDICAL CENTER | Age: 63
End: 2023-07-06
Payer: MEDICARE

## 2023-07-06 DIAGNOSIS — I10 HTN (HYPERTENSION), BENIGN: Chronic | ICD-10-CM

## 2023-07-06 DIAGNOSIS — E03.9 ACQUIRED HYPOTHYROIDISM: ICD-10-CM

## 2023-07-07 ENCOUNTER — APPOINTMENT (OUTPATIENT)
Dept: RADIOLOGY | Facility: REHABILITATION | Age: 63
End: 2023-07-07
Attending: PHYSICAL MEDICINE & REHABILITATION
Payer: MEDICARE

## 2023-07-07 ENCOUNTER — HOSPITAL ENCOUNTER (OUTPATIENT)
Facility: REHABILITATION | Age: 63
End: 2023-07-07
Attending: PHYSICAL MEDICINE & REHABILITATION | Admitting: PHYSICAL MEDICINE & REHABILITATION
Payer: MEDICARE

## 2023-07-07 VITALS
DIASTOLIC BLOOD PRESSURE: 74 MMHG | HEART RATE: 70 BPM | WEIGHT: 169.75 LBS | TEMPERATURE: 98.1 F | OXYGEN SATURATION: 96 % | SYSTOLIC BLOOD PRESSURE: 125 MMHG | RESPIRATION RATE: 16 BRPM | BODY MASS INDEX: 26.59 KG/M2

## 2023-07-07 PROCEDURE — 700117 HCHG RX CONTRAST REV CODE 255

## 2023-07-07 PROCEDURE — 700111 HCHG RX REV CODE 636 W/ 250 OVERRIDE (IP)

## 2023-07-07 PROCEDURE — 99152 MOD SED SAME PHYS/QHP 5/>YRS: CPT

## 2023-07-07 PROCEDURE — 64454 NJX AA&/STRD GNCLR NRV BRNCH: CPT

## 2023-07-07 PROCEDURE — 700101 HCHG RX REV CODE 250

## 2023-07-07 RX ORDER — BUPIVACAINE HYDROCHLORIDE 5 MG/ML
INJECTION, SOLUTION EPIDURAL; INTRACAUDAL
Status: COMPLETED
Start: 2023-07-07 | End: 2023-07-07

## 2023-07-07 RX ORDER — LIDOCAINE HYDROCHLORIDE 20 MG/ML
INJECTION, SOLUTION EPIDURAL; INFILTRATION; INTRACAUDAL; PERINEURAL
Status: COMPLETED
Start: 2023-07-07 | End: 2023-07-07

## 2023-07-07 RX ORDER — AMLODIPINE BESYLATE 10 MG/1
TABLET ORAL
Qty: 30 TABLET | Status: CANCELLED
Start: 2023-07-07

## 2023-07-07 RX ORDER — AMLODIPINE BESYLATE 10 MG/1
TABLET ORAL
COMMUNITY
End: 2023-07-10

## 2023-07-07 RX ORDER — MIDAZOLAM HYDROCHLORIDE 1 MG/ML
INJECTION INTRAMUSCULAR; INTRAVENOUS
Status: COMPLETED
Start: 2023-07-07 | End: 2023-07-07

## 2023-07-07 RX ADMIN — IOHEXOL 5 ML: 240 INJECTION, SOLUTION INTRATHECAL; INTRAVASCULAR; INTRAVENOUS; ORAL at 08:07

## 2023-07-07 RX ADMIN — MIDAZOLAM 1 MG: 1 INJECTION, SOLUTION INTRAMUSCULAR; INTRAVENOUS at 08:00

## 2023-07-07 RX ADMIN — BUPIVACAINE HYDROCHLORIDE 5 ML: 5 INJECTION, SOLUTION EPIDURAL; INTRACAUDAL at 08:08

## 2023-07-07 ASSESSMENT — PAIN DESCRIPTION - PAIN TYPE
TYPE: CHRONIC PAIN
TYPE: CHRONIC PAIN

## 2023-07-07 ASSESSMENT — FIBROSIS 4 INDEX: FIB4 SCORE: 0.61

## 2023-07-07 NOTE — OP REPORT
Date of Service: 7/7/2023    Physician/s: Volodymyr Herring MD    Pre-operative Diagnosis:    Z96.651 (ICD-10-CM) - S/P TKR (total knee replacement), right   M25.561,G89.29 (ICD-10-CM) - Chronic pain of right knee       Post-operative Diagnosis:  Z96.651 (ICD-10-CM) - S/P TKR (total knee replacement), right   M25.561,G89.29 (ICD-10-CM) - Chronic pain of right knee       Procedure: Right Knee Genicular Nerve Blocks 1    Description of procedure:  The risks, benefits, and alternatives of the procedure were reviewed and discussed with the patient.  Written informed consent was freely obtained. A pre-procedural time-out was conducted by the physician verifying patient’s identity, procedure to be performed, procedure site and side, and allergy verification. Appropriate equipment was determined to be in place for the procedure.     Moderation sedation was achieved with Versed (1mg). Monitoring of the patients vital signs and respiratory status was provided by trained independent registered nurse during the entire course of the procedures and under my supervision and recoded in the patient’s medical record. The duration of sedation was from 08:00-08:12AM.    In the fluoroscopy suite the patient was placed in a supine position, the knee was flexed with a pillow/towels underneath for support, and the skin was prepped and draped in the usual sterile fashion. The fluoroscope was placed over the right knee at an true AP position, and three targets for injection were marked. A 25g needle was placed into each of the markings at the genicular nerve targets, and approx 1cc of 1% Lidocaine was injected subcutaneously into the epidermal and dermal layers. The needle was removed. A 25g 3.5 inch spinal needle was then advanced into the superior lateral, superior medial, inferior and medial target points for the genicular nerves. The needle tips were then verified by AP and lateral views. In the AP view, contrast dye was used to highlight  the genicular nerve branches while the fluoroscope was running live. Following negative aspiration, approx 1cc of 0.5% Marcaine was then injected at the above levels, and the needles were removed intact after restyleted. The patient's back was covered with a 4x4 gauze, the area was cleansed with sterile normal saline, and a dressing was applied.     There were no complications noted, was hemodynamically stable, and tolerated the procedure well.  The patient had pain that was 5/10 on the NRS prior to the procedure and was 4/10 on the NRS immediately after the procedure.  She did note that she was able to extend her knee further and more comfortably.    Volodymyr Herring MD  PM&R/Pain Mgmt    CPT: 21004, 55500

## 2023-07-07 NOTE — INTERVAL H&P NOTE
H&P reviewed. The patient was examined and there are no changes to the H&P      /86   Pulse 82   Temp 36.7 °C (98.1 °F) (Temporal)   Resp 16   Wt 77 kg (169 lb 12.1 oz)   SpO2 92%     CV: RRR, Normal S1, S2  RESP: Clear to auscultation bilaterally    Continue with procedure as planned.    Volodymyr Herring MD  Physical Medicine and Rehabilitation  Interventional Spine and Sports Physiatry  [facility]

## 2023-07-10 PROCEDURE — RXMED WILLOW AMBULATORY MEDICATION CHARGE: Performed by: NURSE PRACTITIONER

## 2023-07-10 RX ORDER — AMLODIPINE BESYLATE 10 MG/1
10 TABLET ORAL DAILY
Qty: 90 TABLET | Refills: 3 | Status: SHIPPED | OUTPATIENT
Start: 2023-07-10

## 2023-07-10 RX ORDER — LEVOTHYROXINE SODIUM 0.05 MG/1
50 TABLET ORAL
Qty: 90 TABLET | Refills: 3 | Status: SHIPPED | OUTPATIENT
Start: 2023-07-10

## 2023-07-10 RX ORDER — LISINOPRIL 40 MG/1
40 TABLET ORAL DAILY
Qty: 90 TABLET | Refills: 3 | Status: SHIPPED | OUTPATIENT
Start: 2023-07-10

## 2023-07-11 PROCEDURE — RXMED WILLOW AMBULATORY MEDICATION CHARGE: Performed by: NURSE PRACTITIONER

## 2023-07-12 ENCOUNTER — PHARMACY VISIT (OUTPATIENT)
Dept: PHARMACY | Facility: MEDICAL CENTER | Age: 63
End: 2023-07-12
Payer: COMMERCIAL

## 2023-07-19 ENCOUNTER — HOSPITAL ENCOUNTER (OUTPATIENT)
Dept: RADIOLOGY | Facility: MEDICAL CENTER | Age: 63
End: 2023-07-19
Attending: NURSE PRACTITIONER
Payer: MEDICARE

## 2023-07-19 DIAGNOSIS — Z78.0 POSTMENOPAUSAL: ICD-10-CM

## 2023-07-19 DIAGNOSIS — E03.9 ACQUIRED HYPOTHYROIDISM: ICD-10-CM

## 2023-07-19 PROCEDURE — 77080 DXA BONE DENSITY AXIAL: CPT

## 2023-07-21 ENCOUNTER — OFFICE VISIT (OUTPATIENT)
Dept: PHYSICAL MEDICINE AND REHAB | Facility: MEDICAL CENTER | Age: 63
End: 2023-07-21
Payer: MEDICARE

## 2023-07-21 VITALS
HEIGHT: 67 IN | HEART RATE: 89 BPM | OXYGEN SATURATION: 93 % | SYSTOLIC BLOOD PRESSURE: 126 MMHG | WEIGHT: 168.4 LBS | TEMPERATURE: 96.5 F | DIASTOLIC BLOOD PRESSURE: 60 MMHG | BODY MASS INDEX: 26.43 KG/M2

## 2023-07-21 DIAGNOSIS — M48.061 SPINAL STENOSIS OF LUMBAR REGION, UNSPECIFIED WHETHER NEUROGENIC CLAUDICATION PRESENT: ICD-10-CM

## 2023-07-21 DIAGNOSIS — M25.561 CHRONIC PAIN OF RIGHT KNEE: ICD-10-CM

## 2023-07-21 DIAGNOSIS — Z96.89 SPINAL CORD STIMULATOR STATUS: ICD-10-CM

## 2023-07-21 DIAGNOSIS — M43.17 SPONDYLOLISTHESIS AT L5-S1 LEVEL: ICD-10-CM

## 2023-07-21 DIAGNOSIS — Z96.651 S/P TKR (TOTAL KNEE REPLACEMENT), RIGHT: ICD-10-CM

## 2023-07-21 DIAGNOSIS — G89.29 CHRONIC PAIN OF RIGHT KNEE: ICD-10-CM

## 2023-07-21 PROCEDURE — 3078F DIAST BP <80 MM HG: CPT | Performed by: PHYSICAL MEDICINE & REHABILITATION

## 2023-07-21 PROCEDURE — 99214 OFFICE O/P EST MOD 30 MIN: CPT | Performed by: PHYSICAL MEDICINE & REHABILITATION

## 2023-07-21 PROCEDURE — 1170F FXNL STATUS ASSESSED: CPT | Performed by: PHYSICAL MEDICINE & REHABILITATION

## 2023-07-21 PROCEDURE — 3074F SYST BP LT 130 MM HG: CPT | Performed by: PHYSICAL MEDICINE & REHABILITATION

## 2023-07-21 ASSESSMENT — PATIENT HEALTH QUESTIONNAIRE - PHQ9
CLINICAL INTERPRETATION OF PHQ2 SCORE: 2
5. POOR APPETITE OR OVEREATING: 0 - NOT AT ALL
SUM OF ALL RESPONSES TO PHQ QUESTIONS 1-9: 4

## 2023-07-21 ASSESSMENT — FIBROSIS 4 INDEX: FIB4 SCORE: 0.61

## 2023-07-21 NOTE — PROGRESS NOTES
Follow-up patient note    Physiatry (physical medicine and  Rehabilitation), interventional spine and sports medicine    Date of Service: 07/21/2023    Chief complaint:   Chief Complaint   Patient presents with    Follow-Up          HISTORY    HPI: Debby Desai 63 y.o. female who presents today for follow-up evaluation of her pain complaints related to lumbar radiculopathy and pain after right total knee replacement.    Debby reports that her pain was significantly reduced during the diagnostic right genicular blocks on 07/07/2023.  She had reduction in pain from 5/10 on the NRS to 0/10 on the NRS during the anesthetic phase.  Pain reduction was associated with improved walking and decreased pain with usual activities.    Pain is has returned to baseline.  Today, her pain is 5/10 on the NRS.    No falls.    Tramadol 50mg po tid.  Stable at current dose of three times a day.    Leg spasms are improved.    No additional presyncope or hypotension.      She sees Estella at [facility] for behavioral health care.  Psychiatrist is Dr. Stevenson..    She is not able to use her SCS as the battery is no longer functioning.  This is not MRI safe.    Denies suicidality.  PHQ-9: 16, now 4     By history:   She reports that she had surgery on 09/16/2019.  This was a right total knee arthroplasty for osteoarthritis with Dr. Saba.    In 2001, she had discectomy.  She reports that she has had a spinal cord stimulator placed, but she has not been using this as much.  She reports that the unit is not currently working.    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  She gets up about once an hour.  She has a sit to stand desk, but it is difficult to stand much.  Currently, she is working from home due to the COVID-19 pandemic.    Medical records review:  ED records from 10/07/2020 and 09/22/2020 reviewed.  ED visit from [id number].  Negative head CT.  She was given instructions  about taking ibuprofen and tylenol.  Noted that she was also given a script for valium.     I reviewed the note from the referring provider Loy Miles M.D. dated 01/08/2020: Visits included nausea/epigastric pain, hypertension, chronic knee pain, IGT, dyslipidemia, MDD, hypothyroidism, iron deficiency    Previous treatments:    Physical Therapy: Yes    Medications the patient is tried: tramadol, tylenol (usually for a headache); in the past she took gabapentin 400mg po tid, she was taking vimovo and norco in the past; lyrica caused weight gain    Previous interventions: She had radiofrequency ablation in the past, prior to surgery    Previous surgeries to relieve the above pain:  None other than surgery 09/16/2019      ROS: Unchanged, see HPI  Gen: weight loss  Eyes: vision problems, glasses and contacts  CV: chest pain/anxiety  GI: nausea, ulcers  : urgency  Psych: depression  Endo: thyroid problems  Heme: anemia    Red Flags ROS:   Fever, Chills, Sweats: Denies  Involuntary Weight Loss: Denies  Bladder Incontinence: Denies  Bowel Incontinence: Denies  Saddle Anesthesia: Denies    All other systems reviewed and negative.       PMHx:   Past Medical History:   Diagnosis Date    Anemia     in past    Anginal syndrome (HCC)     had work up and feet r/t anxiety    Anxiety     Arthritis     OA knees    Chronic pain 6/2/2021    Dental disorder 5/24/12    partial upper denture    Diabetes (HCC)     pre diabetic    High cholesterol     HTN (hypertension), benign 3/19/2012    Hypothyroid 3/19/2012    Major depression 3/19/2012    Orbital floor (blow-out) closed fracture (HCC)     left    Other specified symptom associated with female genital organs     post menopausal bleeding    Pain     thoracic spine, Right knee, myofacial pain, and Right shoulder    Pain     recently fell and has bruise left forehead    Pain 02/2018    chronic back pain, right shoulder    Psychiatric problem     depression, anxiety    Unspecified  disorder of thyroid     Urinary bladder disorder     stress incont.    Urinary incontinence     Occasional       PSHx:   Past Surgical History:   Procedure Laterality Date    WI FLUOROSCOPIC GUIDANCE NEEDLE PLACEMENT Right 7/7/2023    Procedure: RIGHT genicular nerve diagnostic blocks;  Surgeon: Volodymyr Herring M.D.;  [facility];  Service: Pain Management    INJ,EPI ANES/STER LUM/SAC ADDL Right 4/7/2021    Procedure: RIGHT lumbar five and sacral one transforaminal epidural;  Surgeon: Volodymyr Herring M.D.;  Location[facility];  Service: Pain Management    PB TOTAL KNEE ARTHROPLASTY Right 9/16/2019    Procedure: RIGHT ARTHROPLASTY, KNEE, TOTAL;  Surgeon: Rufus Saba M.D.;  Location: [facility];  Service: Orthopedics    KNEE ARTHROSCOPY Right 2/9/2018    Procedure: KNEE ARTHROSCOPY;  Surgeon: Harsh Baltazar M.D.;  Location: [facility];  Service: Orthopedics    MENISCECTOMY, KNEE, MEDIAL Right 2/9/2018    Procedure: MEDIAL AND LATERAL MENISCECTOMY- PARTIAL;  Surgeon: Harsh Baltazar M.D.;  Location: [facility];  Service: Orthopedics    KNEE ARTHROSCOPY Right 7/12/2017    Procedure: KNEE ARTHROSCOPY- CESPEDES;  Surgeon: Harsh Baltazar M.D.;  Location: [facility];  Service:     MENISCECTOMY, KNEE, MEDIAL Right 7/12/2017    Procedure: PARTIAL MEDIAL AND LATERAL MENISCECTOMY;  Surgeon: Harsh Baltazar M.D.;  Location: [facility];  Service:     SYNOVECTOMY Right 7/12/2017    Procedure: SYNOVECTOMY;  Surgeon: Harsh Baltazar M.D.;  Location: [facility];  Service:     KNEE ARTHROSCOPY  4/21/2015    Performed by Rufus Saba M.D. at [facility]    MENISCECTOMY, KNEE, MEDIAL  4/21/2015    Performed by Rufus Saba M.D. at [facility]    PUMP REVISION  12/10/2012    Performed by Mak Guerra M.D. at [facility]     "SPINAL CORD STIMULATOR  5/30/2012    Performed by ALLISON CAM at [facility]    BIOPSY GENERAL  5/1/12    endometrial    SPINAL CORD STIMULATOR  7/11/2011    Performed by ALLISON CAM at [facility]    BLOCK EPIDURAL STEROID INJECTION  2008    x 3-4    LYMPH NODE EXCISION Left 2007    cervicle    OTHER Right 2001    thoracic discectomy      ARTHROSCOPY, KNEE Left 1999    RIB RESECTION Right 1980    LUMPECTOMY         Family history   Family History   Problem Relation Age of Onset    Hypertension Father     Diabetes Father     Heart Disease Father     Alcohol abuse Father     Anesthesia Sister         slow to come out (as a child)    Diabetes Other     Heart Disease Other     Cancer Other     Hypertension Other     Stroke Other     Lung Disease Other          Medications:   Current Outpatient Medications   Medication    amLODIPine (NORVASC) 10 MG Tab    lisinopril (PRINIVIL) 40 MG tablet    levothyroxine (SYNTHROID) 50 MCG Tab    traMADol (ULTRAM) 50 MG Tab    buPROPion (WELLBUTRIN SR) 200 MG SR tablet    mirtazapine (REMERON) 7.5 MG tablet    venlafaxine XR (EFFEXOR XR) 75 MG CAPSULE SR 24 HR    venlafaxine (EFFEXOR-XR) 150 MG extended-release capsule    metFORMIN ER (GLUCOPHAGE XR) 500 MG TABLET SR 24 HR    atorvastatin (LIPITOR) 20 MG Tab    VITAMIN D PO    Ferrous Sulfate (IRON) 325 (65 Fe) MG Tab     No current facility-administered medications for this visit.       Allergies:   Allergies   Allergen Reactions    Sulfamethoxazole-Trimethoprim Rash and Swelling     Pt states \"My hand swells up and rash all over body\".       Social Hx:   Social History     Socioeconomic History    Marital status: Single     Spouse name: Not on file    Number of children: Not on file    Years of education: Not on file    Highest education level: Associate degree: academic program   Occupational History    Not on file   Tobacco Use    Smoking status: Never    Smokeless tobacco: Never   Vaping Use "    Vaping Use: Never used   Substance and Sexual Activity    Alcohol use: Not Currently    Drug use: No    Sexual activity: Never     Partners: Male   Other Topics Concern     Service No    Blood Transfusions No    Caffeine Concern No    Occupational Exposure No    Hobby Hazards No    Sleep Concern Yes    Stress Concern Yes    Weight Concern Yes    Special Diet No    Back Care No    Exercise No    Bike Helmet No    Seat Belt Yes    Self-Exams No   Social History Narrative    Not on file     Social Determinants of Health     Financial Resource Strain: Low Risk  (5/11/2022)    Overall Financial Resource Strain (CARDIA)     Difficulty of Paying Living Expenses: Not hard at all   Food Insecurity: No Food Insecurity (5/11/2022)    Hunger Vital Sign     Worried About Running Out of Food in the Last Year: Never true     Ran Out of Food in the Last Year: Never true   Transportation Needs: No Transportation Needs (5/11/2022)    PRAPARE - Transportation     Lack of Transportation (Medical): No     Lack of Transportation (Non-Medical): No   Physical Activity: Inactive (5/11/2022)    Exercise Vital Sign     Days of Exercise per Week: 0 days     Minutes of Exercise per Session: 0 min   Stress: Stress Concern Present (5/11/2022)    [employer] [place] [employer] - Occupational Stress Questionnaire     Feeling of Stress : Rather much   Social Connections: Socially Isolated (5/11/2022)    Social Connection and Isolation Panel [NHANES]     Frequency of Communication with Friends and Family: More than three times a week     Frequency of Social Gatherings with Friends and Family: Once a week     Attends Congregational Services: Never     Active Member of Clubs or Organizations: No     Attends Club or Organization Meetings: Never     Marital Status: Never    Intimate Partner Violence: Not on file   Housing Stability: Low Risk  (5/11/2022)    Housing Stability Vital Sign     Unable to Pay for Housing in the Last  "Year: No     Number of Places Lived in the Last Year: 1     Unstable Housing in the Last Year: No         EXAMINATION     Physical Exam:   Vitals: /60 (BP Location: Right arm, Patient Position: Sitting, BP Cuff Size: Large adult)   Pulse 89   Temp 35.8 °C (96.5 °F) (Temporal)   Ht 1.702 m (5' 7\")   Wt 76.4 kg (168 lb 6.4 oz)   SpO2 93%     Constitutional:   Body Habitus: Body mass index is 26.38 kg/m².  Cooperation: Fully cooperates with exam  Appearance: Well-groomed, well-nourished, not disheveled no acute distress    Eyes: No scleral icterus, no proptosis     ENT -no obvious auditory deficits, poor dentition    Skin -no visible skin lesions, no warmth or erythema was noted at the injection site    Respiratory-  breathing comfortable on room air, no audible wheezing    Cardiovascular- no lower extremity edema is noted.     Psychiatric- alert and oriented ×3. Normal affect.     Gait -  Antalgic, no assist device, no loss of balance    Neuro/Muscloskeletal  No focal motor deficits in the lower extremities bilaterally.  No sensory deficits in the lower extremities are noted bilaterally    Mild-Moderate effusion of right knee.  Mild tenderness medial and lateral joint line, medial joint pain is greater.         MEDICAL DECISION MAKING    Medical records review: see under HPI section.     DATA    Labs:     03/14/2020 CRP 0.19  03/14/2020 Sed rate 20  02/25/2020: Tramadol and metabolites  09/21/2021 UDS consistent with tramadol and metabolites  08/17/2022 UDS consistent with tramadol and metaboiltes  02/15/2023 UDS consistent with tramadol and metabolites    Lab Results   Component Value Date/Time    SODIUM 144 05/24/2023 12:49 AM    POTASSIUM 3.9 05/24/2023 12:49 AM    CHLORIDE 106 05/24/2023 12:49 AM    CO2 27 05/24/2023 12:49 AM    ANION 11.0 05/24/2023 12:49 AM    GLUCOSE 94 05/24/2023 12:49 AM    BUN 10 05/24/2023 12:49 AM    CREATININE 0.86 05/24/2023 12:49 AM    CALCIUM 9.1 05/24/2023 12:49 AM    " ASTSGOT 10 (L) 05/24/2023 12:49 AM    ALTSGPT 13 05/24/2023 12:49 AM    TBILIRUBIN 0.3 05/24/2023 12:49 AM    ALBUMIN 3.6 05/24/2023 12:49 AM    TOTPROTEIN 6.4 05/24/2023 12:49 AM    GLOBULIN 2.8 05/24/2023 12:49 AM    AGRATIO 1.3 05/24/2023 12:49 AM       Lab Results   Component Value Date/Time    PROTHROMBTM 13.1 05/23/2023 01:39 AM    INR 1.00 05/23/2023 01:39 AM        Lab Results   Component Value Date/Time    WBC 6.5 05/24/2023 12:49 AM    RBC 3.67 (L) 05/24/2023 12:49 AM    HEMOGLOBIN 11.0 (L) 05/24/2023 12:49 AM    HEMATOCRIT 34.0 (L) 05/24/2023 12:49 AM    MCV 92.6 05/24/2023 12:49 AM    MCH 30.0 05/24/2023 12:49 AM    MCHC 32.4 05/24/2023 12:49 AM    MPV 11.3 05/24/2023 12:49 AM    NEUTSPOLYS 52.40 05/24/2023 12:49 AM    LYMPHOCYTES 35.40 05/24/2023 12:49 AM    MONOCYTES 8.20 05/24/2023 12:49 AM    EOSINOPHILS 3.20 05/24/2023 12:49 AM    BASOPHILS 0.50 05/24/2023 12:49 AM        Lab Results   Component Value Date/Time    HBA1C 5.6 09/26/2022 07:34 AM        Imaging: I personally reviewed following images, these are my reads  Xray thoracolumbar spine 07/14/2022  Degenerative changes and note of electrode from T6-T9    Xray right knee 07/14/2022  S/P right total knee without evidence of fracture    CT left knee May 8, 2022  There is note of moderate to large joint effusion with note of nondisplaced fracture at the medial aspect of the proximal fibula.  Moderate osteoarthritis    CT cervical spine 09/22/2020  There is note of cervical spondylosis with multilevel uncovertebral arthropathy    Xray right hip 03/14/2020  There is mild osteoarthritis of the right hip.      Xray lumbar 03/14/2020  There is mild anterolisthesis at L5-S1.  No abnormal motion on flexion and extension  There is DDD and facet arthropathy that is most noted at L5-S1 and less at L4-5    Xray right knee 09/16/2019  There is note of right knee arthroplasty    IMAGING radiology reads. I reviewed the following radiology reads  Xray right knee  07/14/2022  IMPRESSION:  1.  No acute fracture or dislocation of the right knee.    Xray thoracolumbar spine 07/14/2022  IMPRESSION:  1.  No acute or subacute fracture of the thoracolumbar spine.ay     CT left knee May 8, 2022  IMPRESSION:     1.  Nondisplaced fracture of medial aspect of the proximal fibula is identified.  2.  No other fractures identified.  3.  Joint effusion is identified.   4.  Moderate osteoarthritis.    Xray right knee 09/18/2021  Multiple standing views of the right knee show previous right total knee   replacement with hardware in good position without signs of loosening or   dislocation.  No obvious fracture noted.    Xray left shoulder 10/27/2021  X-rays reviewed today of the left shoulder show normal overall bony   alignment she has some AC degenerative change.  She has some osteophyte   formation off the inferior humeral head.  Some mild glenohumeral joint   space narrowing patient.  Type III acromion.    CT cervical spine 09/22/2020  IMPRESSION:  Degenerative change without evidence of cervical spine fracture.    Xray lumbar 03/14/2020  IMPRESSION:  1.  No compression deformity or acute fracture is identified.  2.  Mild anterolisthesis at L5-S1.  3.  Degenerative disc disease and facet arthropathy.  4.  No focal instability noted on flexion extension views.                                                Xray right hip 03/14/2020  IMPRESSION:     1.  No radiographic evidence of acute traumatic injury.  2.  Findings are consistent with mild osteoarthritis.  3.  Probable constipation.                                   Results for orders placed during the hospital encounter of 09/16/19   DX-KNEE 2- RIGHT    Impression Right knee arthroplasty.      Results for orders placed during the hospital encounter of 03/28/19   DX-KNEE 3 VIEWS RIGHT    Impression No evidence of acute fracture or dislocation.    Degenerative changes as above described.                              Diagnosis   Visit  Diagnoses     ICD-10-CM   1. S/P TKR (total knee replacement), right  Z96.651   2. Chronic pain of right knee  M25.561    G89.29   3. Spondylolisthesis at L5-S1 level  M43.17   4. Spinal stenosis of lumbar region, unspecified whether neurogenic claudication present  M48.061   5. Spinal cord stimulator status  Z96.89                   ASSESSMENT:  Debby Desai 63 y.o. female seen for above.     Debby was seen today for follow-up.    Diagnoses and all orders for this visit:    S/P TKR (total knee replacement), right  -     Referral to Pain Clinic    Chronic pain of right knee  -     Referral to Pain Clinic    Spondylolisthesis at L5-S1 level    Spinal stenosis of lumbar region, unspecified whether neurogenic claudication present    Spinal cord stimulator status      Discussed that she had a positive diagnostic right genicular nerve block.  Discussed plan to proceed with right genicular nerve radiofrequency ablation. The risks benefits and alternatives to this procedure were discussed and the patient wishes to proceed with the procedure. Risks include but are not limited to damage to surrounding structures, infection, bleeding, worsening of pain which can be permanent, weakness which can be permanent. Benefits include pain relief, improved function. Alternatives includes not doing the procedure.    Hold on SCS battery replacement and possible lead revision.  This was previously placed for proximal thoracic/upper lumbar region pain.  Unclear if current lead placement would help with leg pain related to spine pathology.  Previously discussed plan to address dentition prior to surgical plans.   reviewed.  UDS consistent.  Continue tramadol 50mg po tid.  Scripts given for the last three months.  Encouraged her to continue to work with Dr. Stevenson and counseling.  Follow-up with PCP for general medical care and current dose of tramadol can be managed by her PCP in the future.  Continue home program and activity as  tolerated        Follow-up: Return for Hospital injection.        Thank you very much for asking me to participate in Debby Desai's care.  Please contact me with any questions or concerns.      Please note that this dictation was created using voice recognition software. I have made every reasonable attempt to correct obvious errors but there may be errors of grammar and content that I may have overlooked prior to finalization of this note.      Volodymyr Herring MD  Physical Medicine and Rehabilitation  Interventional Spine and Sports Physiatry  [facility]

## 2023-07-21 NOTE — H&P (VIEW-ONLY)
Follow-up patient note    Physiatry (physical medicine and  Rehabilitation), interventional spine and sports medicine    Date of Service: 07/21/2023    Chief complaint:   Chief Complaint   Patient presents with    Follow-Up          HISTORY    HPI: Debby Desai 63 y.o. female who presents today for follow-up evaluation of her pain complaints related to lumbar radiculopathy and pain after right total knee replacement.    Debby reports that her pain was significantly reduced during the diagnostic right genicular blocks on 07/07/2023.  She had reduction in pain from 5/10 on the NRS to 0/10 on the NRS during the anesthetic phase.  Pain reduction was associated with improved walking and decreased pain with usual activities.    Pain is has returned to baseline.  Today, her pain is 5/10 on the NRS.    No falls.    Tramadol 50mg po tid.  Stable at current dose of three times a day.    Leg spasms are improved.    No additional presyncope or hypotension.      She sees Estella at [facility] for behavioral health care.  Psychiatrist is Dr. Stevenson..    She is not able to use her SCS as the battery is no longer functioning.  This is not MRI safe.    Denies suicidality.  PHQ-9: 16, now 4     By history:   She reports that she had surgery on 09/16/2019.  This was a right total knee arthroplasty for osteoarthritis with Dr. Saba.    In 2001, she had discectomy.  She reports that she has had a spinal cord stimulator placed, but she has not been using this as much.  She reports that the unit is not currently working.    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  She gets up about once an hour.  She has a sit to stand desk, but it is difficult to stand much.  Currently, she is working from home due to the COVID-19 pandemic.    Medical records review:  ED records from 10/07/2020 and 09/22/2020 reviewed.  ED visit from [id number].  Negative head CT.  She was given instructions  about taking ibuprofen and tylenol.  Noted that she was also given a script for valium.     I reviewed the note from the referring provider Loy Miles M.D. dated 01/08/2020: Visits included nausea/epigastric pain, hypertension, chronic knee pain, IGT, dyslipidemia, MDD, hypothyroidism, iron deficiency    Previous treatments:    Physical Therapy: Yes    Medications the patient is tried: tramadol, tylenol (usually for a headache); in the past she took gabapentin 400mg po tid, she was taking vimovo and norco in the past; lyrica caused weight gain    Previous interventions: She had radiofrequency ablation in the past, prior to surgery    Previous surgeries to relieve the above pain:  None other than surgery 09/16/2019      ROS: Unchanged, see HPI  Gen: weight loss  Eyes: vision problems, glasses and contacts  CV: chest pain/anxiety  GI: nausea, ulcers  : urgency  Psych: depression  Endo: thyroid problems  Heme: anemia    Red Flags ROS:   Fever, Chills, Sweats: Denies  Involuntary Weight Loss: Denies  Bladder Incontinence: Denies  Bowel Incontinence: Denies  Saddle Anesthesia: Denies    All other systems reviewed and negative.       PMHx:   Past Medical History:   Diagnosis Date    Anemia     in past    Anginal syndrome (HCC)     had work up and feet r/t anxiety    Anxiety     Arthritis     OA knees    Chronic pain 6/2/2021    Dental disorder 5/24/12    partial upper denture    Diabetes (HCC)     pre diabetic    High cholesterol     HTN (hypertension), benign 3/19/2012    Hypothyroid 3/19/2012    Major depression 3/19/2012    Orbital floor (blow-out) closed fracture (HCC)     left    Other specified symptom associated with female genital organs     post menopausal bleeding    Pain     thoracic spine, Right knee, myofacial pain, and Right shoulder    Pain     recently fell and has bruise left forehead    Pain 02/2018    chronic back pain, right shoulder    Psychiatric problem     depression, anxiety    Unspecified  disorder of thyroid     Urinary bladder disorder     stress incont.    Urinary incontinence     Occasional       PSHx:   Past Surgical History:   Procedure Laterality Date    MT FLUOROSCOPIC GUIDANCE NEEDLE PLACEMENT Right 7/7/2023    Procedure: RIGHT genicular nerve diagnostic blocks;  Surgeon: Volodymyr Herring M.D.;  [facility];  Service: Pain Management    INJ,EPI ANES/STER LUM/SAC ADDL Right 4/7/2021    Procedure: RIGHT lumbar five and sacral one transforaminal epidural;  Surgeon: Volodymyr Herring M.D.;  Location[facility];  Service: Pain Management    PB TOTAL KNEE ARTHROPLASTY Right 9/16/2019    Procedure: RIGHT ARTHROPLASTY, KNEE, TOTAL;  Surgeon: Rufus Saba M.D.;  Location: [facility];  Service: Orthopedics    KNEE ARTHROSCOPY Right 2/9/2018    Procedure: KNEE ARTHROSCOPY;  Surgeon: Harsh Baltazar M.D.;  Location: [facility];  Service: Orthopedics    MENISCECTOMY, KNEE, MEDIAL Right 2/9/2018    Procedure: MEDIAL AND LATERAL MENISCECTOMY- PARTIAL;  Surgeon: Harsh Baltazar M.D.;  Location: [facility];  Service: Orthopedics    KNEE ARTHROSCOPY Right 7/12/2017    Procedure: KNEE ARTHROSCOPY- CESPEDES;  Surgeon: Harsh Baltazar M.D.;  Location: [facility];  Service:     MENISCECTOMY, KNEE, MEDIAL Right 7/12/2017    Procedure: PARTIAL MEDIAL AND LATERAL MENISCECTOMY;  Surgeon: Harsh Baltazar M.D.;  Location: [facility];  Service:     SYNOVECTOMY Right 7/12/2017    Procedure: SYNOVECTOMY;  Surgeon: Harsh Baltazar M.D.;  Location: [facility];  Service:     KNEE ARTHROSCOPY  4/21/2015    Performed by Rufus Saba M.D. at [facility]    MENISCECTOMY, KNEE, MEDIAL  4/21/2015    Performed by Rufus Saba M.D. at [facility]    PUMP REVISION  12/10/2012    Performed by Mak Guerra M.D. at [facility]     "SPINAL CORD STIMULATOR  5/30/2012    Performed by ALLISON CAM at SUR[facility]ERY [facility]    BIOPSY GENERAL  5/1/12    endometrial    SPINAL CORD STIMULATOR  7/11/2011    Performed by ALLISON CAM at SUR[facility]ERY [facility]    BLOCK EPIDURAL STEROID INJECTION  2008    x 3-4    LYMPH NODE EXCISION Left 2007    cervicle    OTHER Right 2001    thoracic discectomy      ARTHROSCOPY, KNEE Left 1999    RIB RESECTION Right 1980    LUMPECTOMY         Family history   Family History   Problem Relation Age of Onset    Hypertension Father     Diabetes Father     Heart Disease Father     Alcohol abuse Father     Anesthesia Sister         slow to come out (as a child)    Diabetes Other     Heart Disease Other     Cancer Other     Hypertension Other     Stroke Other     Lung Disease Other          Medications:   Current Outpatient Medications   Medication    amLODIPine (NORVASC) 10 MG Tab    lisinopril (PRINIVIL) 40 MG tablet    levothyroxine (SYNTHROID) 50 MCG Tab    traMADol (ULTRAM) 50 MG Tab    buPROPion (WELLBUTRIN SR) 200 MG SR tablet    mirtazapine (REMERON) 7.5 MG tablet    venlafaxine XR (EFFEXOR XR) 75 MG CAPSULE SR 24 HR    venlafaxine (EFFEXOR-XR) 150 MG extended-release capsule    metFORMIN ER (GLUCOPHAGE XR) 500 MG TABLET SR 24 HR    atorvastatin (LIPITOR) 20 MG Tab    VITAMIN D PO    Ferrous Sulfate (IRON) 325 (65 Fe) MG Tab     No current facility-administered medications for this visit.       Allergies:   Allergies   Allergen Reactions    Sulfamethoxazole-Trimethoprim Rash and Swelling     Pt states \"My hand swells up and rash all over body\".       Social Hx:   Social History     Socioeconomic History    Marital status: Single     Spouse name: Not on file    Number of children: Not on file    Years of education: Not on file    Highest education level: Associate degree: academic program   Occupational History    Not on file   Tobacco Use    Smoking status: Never    Smokeless tobacco: Never   Vaping Use "    Vaping Use: Never used   Substance and Sexual Activity    Alcohol use: Not Currently    Drug use: No    Sexual activity: Never     Partners: Male   Other Topics Concern     Service No    Blood Transfusions No    Caffeine Concern No    Occupational Exposure No    Hobby Hazards No    Sleep Concern Yes    Stress Concern Yes    Weight Concern Yes    Special Diet No    Back Care No    Exercise No    Bike Helmet No    Seat Belt Yes    Self-Exams No   Social History Narrative    Not on file     Social Determinants of Health     Financial Resource Strain: Low Risk  (5/11/2022)    Overall Financial Resource Strain (CARDIA)     Difficulty of Paying Living Expenses: Not hard at all   Food Insecurity: No Food Insecurity (5/11/2022)    Hunger Vital Sign     Worried About Running Out of Food in the Last Year: Never true     Ran Out of Food in the Last Year: Never true   Transportation Needs: No Transportation Needs (5/11/2022)    PRAPARE - Transportation     Lack of Transportation (Medical): No     Lack of Transportation (Non-Medical): No   Physical Activity: Inactive (5/11/2022)    Exercise Vital Sign     Days of Exercise per Week: 0 days     Minutes of Exercise per Session: 0 min   Stress: Stress Concern Present (5/11/2022)    [place] [employer] - Occupational Stress Questionnaire     Feeling of Stress : Rather much   Social Connections: Socially Isolated (5/11/2022)    Social Connection and Isolation Panel [NHANES]     Frequency of Communication with Friends and Family: More than three times a week     Frequency of Social Gatherings with Friends and Family: Once a week     Attends Voodoo Services: Never     Active Member of Clubs or Organizations: No     Attends Club or Organization Meetings: Never     Marital Status: Never    Intimate Partner Violence: Not on file   Housing Stability: Low Risk  (5/11/2022)    Housing Stability Vital Sign     Unable to Pay for Housing in the Last  "Year: No     Number of Places Lived in the Last Year: 1     Unstable Housing in the Last Year: No         EXAMINATION     Physical Exam:   Vitals: /60 (BP Location: Right arm, Patient Position: Sitting, BP Cuff Size: Large adult)   Pulse 89   Temp 35.8 °C (96.5 °F) (Temporal)   Ht 1.702 m (5' 7\")   Wt 76.4 kg (168 lb 6.4 oz)   SpO2 93%     Constitutional:   Body Habitus: Body mass index is 26.38 kg/m².  Cooperation: Fully cooperates with exam  Appearance: Well-groomed, well-nourished, not disheveled no acute distress    Eyes: No scleral icterus, no proptosis     ENT -no obvious auditory deficits, poor dentition    Skin -no visible skin lesions, no warmth or erythema was noted at the injection site    Respiratory-  breathing comfortable on room air, no audible wheezing    Cardiovascular- no lower extremity edema is noted.     Psychiatric- alert and oriented ×3. Normal affect.     Gait -  Antalgic, no assist device, no loss of balance    Neuro/Muscloskeletal  No focal motor deficits in the lower extremities bilaterally.  No sensory deficits in the lower extremities are noted bilaterally    Mild-Moderate effusion of right knee.  Mild tenderness medial and lateral joint line, medial joint pain is greater.         MEDICAL DECISION MAKING    Medical records review: see under HPI section.     DATA    Labs:     03/14/2020 CRP 0.19  03/14/2020 Sed rate 20  02/25/2020: Tramadol and metabolites  09/21/2021 UDS consistent with tramadol and metabolites  08/17/2022 UDS consistent with tramadol and metaboiltes  02/15/2023 UDS consistent with tramadol and metabolites    Lab Results   Component Value Date/Time    SODIUM 144 05/24/2023 12:49 AM    POTASSIUM 3.9 05/24/2023 12:49 AM    CHLORIDE 106 05/24/2023 12:49 AM    CO2 27 05/24/2023 12:49 AM    ANION 11.0 05/24/2023 12:49 AM    GLUCOSE 94 05/24/2023 12:49 AM    BUN 10 05/24/2023 12:49 AM    CREATININE 0.86 05/24/2023 12:49 AM    CALCIUM 9.1 05/24/2023 12:49 AM    " ASTSGOT 10 (L) 05/24/2023 12:49 AM    ALTSGPT 13 05/24/2023 12:49 AM    TBILIRUBIN 0.3 05/24/2023 12:49 AM    ALBUMIN 3.6 05/24/2023 12:49 AM    TOTPROTEIN 6.4 05/24/2023 12:49 AM    GLOBULIN 2.8 05/24/2023 12:49 AM    AGRATIO 1.3 05/24/2023 12:49 AM       Lab Results   Component Value Date/Time    PROTHROMBTM 13.1 05/23/2023 01:39 AM    INR 1.00 05/23/2023 01:39 AM        Lab Results   Component Value Date/Time    WBC 6.5 05/24/2023 12:49 AM    RBC 3.67 (L) 05/24/2023 12:49 AM    HEMOGLOBIN 11.0 (L) 05/24/2023 12:49 AM    HEMATOCRIT 34.0 (L) 05/24/2023 12:49 AM    MCV 92.6 05/24/2023 12:49 AM    MCH 30.0 05/24/2023 12:49 AM    MCHC 32.4 05/24/2023 12:49 AM    MPV 11.3 05/24/2023 12:49 AM    NEUTSPOLYS 52.40 05/24/2023 12:49 AM    LYMPHOCYTES 35.40 05/24/2023 12:49 AM    MONOCYTES 8.20 05/24/2023 12:49 AM    EOSINOPHILS 3.20 05/24/2023 12:49 AM    BASOPHILS 0.50 05/24/2023 12:49 AM        Lab Results   Component Value Date/Time    HBA1C 5.6 09/26/2022 07:34 AM        Imaging: I personally reviewed following images, these are my reads  Xray thoracolumbar spine 07/14/2022  Degenerative changes and note of electrode from T6-T9    Xray right knee 07/14/2022  S/P right total knee without evidence of fracture    CT left knee May 8, 2022  There is note of moderate to large joint effusion with note of nondisplaced fracture at the medial aspect of the proximal fibula.  Moderate osteoarthritis    CT cervical spine 09/22/2020  There is note of cervical spondylosis with multilevel uncovertebral arthropathy    Xray right hip 03/14/2020  There is mild osteoarthritis of the right hip.      Xray lumbar 03/14/2020  There is mild anterolisthesis at L5-S1.  No abnormal motion on flexion and extension  There is DDD and facet arthropathy that is most noted at L5-S1 and less at L4-5    Xray right knee 09/16/2019  There is note of right knee arthroplasty    IMAGING radiology reads. I reviewed the following radiology reads  Xray right knee  07/14/2022  IMPRESSION:  1.  No acute fracture or dislocation of the right knee.    Xray thoracolumbar spine 07/14/2022  IMPRESSION:  1.  No acute or subacute fracture of the thoracolumbar spine.ay     CT left knee May 8, 2022  IMPRESSION:     1.  Nondisplaced fracture of medial aspect of the proximal fibula is identified.  2.  No other fractures identified.  3.  Joint effusion is identified.   4.  Moderate osteoarthritis.    Xray right knee 09/18/2021  Multiple standing views of the right knee show previous right total knee   replacement with hardware in good position without signs of loosening or   dislocation.  No obvious fracture noted.    Xray left shoulder 10/27/2021  X-rays reviewed today of the left shoulder show normal overall bony   alignment she has some AC degenerative change.  She has some osteophyte   formation off the inferior humeral head.  Some mild glenohumeral joint   space narrowing patient.  Type III acromion.    CT cervical spine 09/22/2020  IMPRESSION:  Degenerative change without evidence of cervical spine fracture.    Xray lumbar 03/14/2020  IMPRESSION:  1.  No compression deformity or acute fracture is identified.  2.  Mild anterolisthesis at L5-S1.  3.  Degenerative disc disease and facet arthropathy.  4.  No focal instability noted on flexion extension views.                                                Xray right hip 03/14/2020  IMPRESSION:     1.  No radiographic evidence of acute traumatic injury.  2.  Findings are consistent with mild osteoarthritis.  3.  Probable constipation.                                   Results for orders placed during the hospital encounter of 09/16/19   DX-KNEE 2- RIGHT    Impression Right knee arthroplasty.      Results for orders placed during the hospital encounter of 03/28/19   DX-KNEE 3 VIEWS RIGHT    Impression No evidence of acute fracture or dislocation.    Degenerative changes as above described.                              Diagnosis   Visit  Diagnoses     ICD-10-CM   1. S/P TKR (total knee replacement), right  Z96.651   2. Chronic pain of right knee  M25.561    G89.29   3. Spondylolisthesis at L5-S1 level  M43.17   4. Spinal stenosis of lumbar region, unspecified whether neurogenic claudication present  M48.061   5. Spinal cord stimulator status  Z96.89                   ASSESSMENT:  Debby Desai 63 y.o. female seen for above.     Debby was seen today for follow-up.    Diagnoses and all orders for this visit:    S/P TKR (total knee replacement), right  -     Referral to Pain Clinic    Chronic pain of right knee  -     Referral to Pain Clinic    Spondylolisthesis at L5-S1 level    Spinal stenosis of lumbar region, unspecified whether neurogenic claudication present    Spinal cord stimulator status      Discussed that she had a positive diagnostic right genicular nerve block.  Discussed plan to proceed with right genicular nerve radiofrequency ablation. The risks benefits and alternatives to this procedure were discussed and the patient wishes to proceed with the procedure. Risks include but are not limited to damage to surrounding structures, infection, bleeding, worsening of pain which can be permanent, weakness which can be permanent. Benefits include pain relief, improved function. Alternatives includes not doing the procedure.    Hold on SCS battery replacement and possible lead revision.  This was previously placed for proximal thoracic/upper lumbar region pain.  Unclear if current lead placement would help with leg pain related to spine pathology.  Previously discussed plan to address dentition prior to surgical plans.   reviewed.  UDS consistent.  Continue tramadol 50mg po tid.  Scripts given for the last three months.  Encouraged her to continue to work with Dr. Stevenson and counseling.  Follow-up with PCP for general medical care and current dose of tramadol can be managed by her PCP in the future.  Continue home program and activity as  tolerated        Follow-up: Return for Hospital injection.        Thank you very much for asking me to participate in Debby Desai's care.  Please contact me with any questions or concerns.      Please note that this dictation was created using voice recognition software. I have made every reasonable attempt to correct obvious errors but there may be errors of grammar and content that I may have overlooked prior to finalization of this note.      Volodymyr Herring MD  Physical Medicine and Rehabilitation  Interventional Spine and Sports Physiatry  [facility]

## 2023-07-26 ENCOUNTER — TELEMEDICINE (OUTPATIENT)
Dept: BEHAVIORAL HEALTH | Facility: CLINIC | Age: 63
End: 2023-07-26
Payer: MEDICARE

## 2023-07-26 DIAGNOSIS — F33.42 RECURRENT MAJOR DEPRESSIVE DISORDER, IN FULL REMISSION (HCC): ICD-10-CM

## 2023-07-26 DIAGNOSIS — F41.1 GAD (GENERALIZED ANXIETY DISORDER): ICD-10-CM

## 2023-07-26 DIAGNOSIS — F51.04 PSYCHOPHYSIOLOGICAL INSOMNIA: ICD-10-CM

## 2023-07-26 PROCEDURE — RXMED WILLOW AMBULATORY MEDICATION CHARGE: Performed by: PSYCHIATRY & NEUROLOGY

## 2023-07-26 PROCEDURE — 99214 OFFICE O/P EST MOD 30 MIN: CPT | Mod: 95 | Performed by: PSYCHIATRY & NEUROLOGY

## 2023-07-26 RX ORDER — VENLAFAXINE HYDROCHLORIDE 150 MG/1
150 CAPSULE, EXTENDED RELEASE ORAL DAILY
Qty: 90 CAPSULE | Refills: 2 | Status: SHIPPED | OUTPATIENT
Start: 2023-07-26 | End: 2023-09-28 | Stop reason: SDUPTHER

## 2023-07-26 RX ORDER — TRAZODONE HYDROCHLORIDE 50 MG/1
25 TABLET ORAL NIGHTLY PRN
Qty: 90 TABLET | Refills: 3 | Status: SHIPPED | OUTPATIENT
Start: 2023-07-26 | End: 2023-09-28 | Stop reason: SDUPTHER

## 2023-07-26 RX ORDER — BUPROPION HYDROCHLORIDE 200 MG/1
200 TABLET, EXTENDED RELEASE ORAL 2 TIMES DAILY
Qty: 180 TABLET | Refills: 1 | Status: SHIPPED | OUTPATIENT
Start: 2023-07-26 | End: 2023-09-28 | Stop reason: SDUPTHER

## 2023-07-26 RX ORDER — VENLAFAXINE HYDROCHLORIDE 75 MG/1
75 CAPSULE, EXTENDED RELEASE ORAL DAILY
Qty: 90 CAPSULE | Refills: 2 | Status: SHIPPED | OUTPATIENT
Start: 2023-07-26 | End: 2023-09-28 | Stop reason: SDUPTHER

## 2023-07-26 NOTE — PROGRESS NOTES
This evaluation was conducted via Zoom using secure and encrypted videoconferencing technology. The patient was in their home in the [facility].    The patient's identity was confirmed and verbal consent was obtained for this virtual visit.      PSYCHIATRY FOLLOW-UP NOTE      Name: Debby Desai  MRN: [id number]  : 1960  Age: 63 y.o.  Date of assessment: 2023  PCP: ARELIS Chris  Persons in attendance: Patient      REASON FOR VISIT/CHIEF COMPLAINT (as stated by Patient):  Debby Desai is a 63 y.o., White female, attending follow-up appointment for mood and anxiety management.      HISTORY OF PRESENT ILLNESS:  Debby Desai is a 63 y.o. old female with MDD, TIM and insomnia comes in today for follow up. Patient was last seen 6 weeks ago, and following treatment planning recommendations were done:  Continue Effexor XR to 225 mg daily for mood, anxiety and comorbid pain management.   Continue Wellbutrin  mg BID daily for depression augmentation.    Continue Mirtazapine 7.5 mg HS PRN for insomnia.    Monitor for serotonin syndrome.  Continue psychotherapy for mood and anxiety management.    Patient is compliant with medications with no side effects.  Has noticed slow improvement in mood and anxiety but reports mirtazapine is no longer helpful for sleep.  Agreed with switching to trazodone.  Agreed with future plan of titrating other medications if indicated.      CURRENT MEDICATIONS:  Current Outpatient Medications   Medication Sig Dispense Refill    amLODIPine (NORVASC) 10 MG Tab Take 1 tablet by mouth every day. 90 Tablet 3    lisinopril (PRINIVIL) 40 MG tablet Take 1 Tablet by mouth every day. 90 Tablet 3    levothyroxine (SYNTHROID) 50 MCG Tab Take 1 tablet by mouth every morning on an empty stomach. 90 Tablet 3    traMADol (ULTRAM) 50 MG Tab Take 1 Tablet by mouth every 8 hours as needed for Severe Pain for up to 30 days. 90 Tablet 2    buPROPion (WELLBUTRIN SR) 200 MG SR  tablet Take 1 Tablet by mouth 2 times a day. 60 Tablet 1    mirtazapine (REMERON) 7.5 MG tablet Take 1 tablet by mouth at bedtime. 30 Tablet 1    venlafaxine XR (EFFEXOR XR) 75 MG CAPSULE SR 24 HR Take 1 Capsule by mouth every day. (venlafaxine xr 150 mg + 75 mg = 225 mg daily) 90 Capsule 2    venlafaxine (EFFEXOR-XR) 150 MG extended-release capsule Take 1 Capsule by mouth every day. (venlafaxine xr 150 mg + 75 mg = 225 mg daily) 90 Capsule 2    metFORMIN ER (GLUCOPHAGE XR) 500 MG TABLET SR 24 HR Take 1 Tab by mouth 2 times a day. 180 Tablet 1    atorvastatin (LIPITOR) 20 MG Tab TAKE ONE TABLET BY MOUTH EVERY DAY 90 Tablet 3    VITAMIN D PO Take  by mouth every day.      Ferrous Sulfate (IRON) 325 (65 Fe) MG Tab Take 65 mg by mouth every day at 6 PM.       No current facility-administered medications for this visit.       MEDICAL HISTORY  Past Medical History:   Diagnosis Date    Anemia     in past    Anginal syndrome (HCC)     had work up and feet r/t anxiety    Anxiety     Arthritis     OA knees    Chronic pain 6/2/2021    Dental disorder 5/24/12    partial upper denture    Diabetes (HCC)     pre diabetic    High cholesterol     HTN (hypertension), benign 3/19/2012    Hypothyroid 3/19/2012    Major depression 3/19/2012    Orbital floor (blow-out) closed fracture (HCC)     left    Other specified symptom associated with female genital organs     post menopausal bleeding    Pain     thoracic spine, Right knee, myofacial pain, and Right shoulder    Pain     recently fell and has bruise left forehead    Pain 02/2018    chronic back pain, right shoulder    Psychiatric problem     depression, anxiety    Unspecified disorder of thyroid     Urinary bladder disorder     stress incont.    Urinary incontinence     Occasional     Past Surgical History:   Procedure Laterality Date    AK FLUOROSCOPIC GUIDANCE NEEDLE PLACEMENT Right 7/7/2023    Procedure: RIGHT genicular nerve diagnostic blocks;  Surgeon: Volodymyr Herring M.D.;   [facility];  Service: Pain Management    INJ,EPI ANES/STER LUM/SAC ADDL Right 4/7/2021    Procedure: RIGHT lumbar five and sacral one transforaminal epidural;  Surgeon: Volodymyr Herring M.D.;  Location[facility];  Service: Pain Management    PB TOTAL KNEE ARTHROPLASTY Right 9/16/2019    Procedure: RIGHT ARTHROPLASTY, KNEE, TOTAL;  Surgeon: Rufus Saba M.D.;  Location: [facility];  Service: Orthopedics    KNEE ARTHROSCOPY Right 2/9/2018    Procedure: KNEE ARTHROSCOPY;  Surgeon: Harsh Baltazar M.D.;  Location: [facility];  Service: Orthopedics    MENISCECTOMY, KNEE, MEDIAL Right 2/9/2018    Procedure: MEDIAL AND LATERAL MENISCECTOMY- PARTIAL;  Surgeon: Harsh Baltazar M.D.;  Location: [facility];  Service: Orthopedics    KNEE ARTHROSCOPY Right 7/12/2017    Procedure: KNEE ARTHROSCOPY- CESPEDES;  Surgeon: Harsh Baltazar M.D.;  Location: [facility];  Service:     MENISCECTOMY, KNEE, MEDIAL Right 7/12/2017    Procedure: PARTIAL MEDIAL AND LATERAL MENISCECTOMY;  Surgeon: Harsh Baltazar M.D.;  Location: [facility];  Service:     SYNOVECTOMY Right 7/12/2017    Procedure: SYNOVECTOMY;  Surgeon: Harsh Baltazar M.D.;  Location: [facility];  Service:     KNEE ARTHROSCOPY  4/21/2015    Performed by Rufus Saba M.D. at [facility]    MENISCECTOMY, KNEE, MEDIAL  4/21/2015    Performed by Rufus Saba M.D. at [facility]    PUMP REVISION  12/10/2012    Performed by Allison Guerra M.D. at [facility]    SPINAL CORD STIMULATOR  5/30/2012    Performed by ALLISON GUERRA at [facility]    BIOPSY GENERAL  5/1/12    endometrial    SPINAL CORD STIMULATOR  7/11/2011    Performed by ALLISON GUERRA at [facility]    BLOCK EPIDURAL STEROID INJECTION  2008    x 3-4    LYMPH NODE EXCISION Left 2007     cervicle    OTHER Right 2001    thoracic discectomy      ARTHROSCOPY, KNEE Left 1999    RIB RESECTION Right 1980    LUMPECTOMY         PAST PSYCHIATRIC HISTORY  Prior psychiatric hospitalization: no  Prior Self harm/suicide attempt: no  Prior Diagnosis: MDD, Anxiety     PAST PSYCHIATRIC MEDICATIONS  Fluoxetine, Paroxetine, Sertraline, Citalopram  Venlafaxine, Duloxetine  Wellbutrin  Mirtazapine  Amitriptyline (switched to Mirtazapine during admission for dehydration related syncope)   Ativan, propranolol     FAMILY HISTORY  Psychiatric diagnosis: [place] with depression and anxiety  History of suicide attempts: No  Substance abuse history: 1 sister with drug and alcohol abuse history     SUBSTANCE USE HISTORY:  ALCOHOL: no  TOBACCO: no  CANNABIS: no  OPIOIDS: no  PRESCRIPTION MEDICATIONS: no  OTHERS: no  History of inpatient/outpatient rehab treatment: none     SOCIAL HISTORY  Childhood: born in [place] and describes childhood as difficult  Moved a lot due to parents financial concerns  Employment: For [employer]  Relationship: single (never )  Kids: no kids  Current living situation: alone (but strong family support as they all live nearby)  Current/past legal issues: denies  History of emotional/physical/sexual abuse - denies      REVIEW OF SYSTEMS:        Constitutional negative   Eyes negative   Ears/Nose/Mouth/Throat negative   Cardiovascular negative   Respiratory negative   Gastrointestinal negative   Genitourinary negative   Muscular negative   Integumentary negative   Neurological negative   Endocrine negative   Hematologic/Lymphatic negative     PHYSICAL EXAMINAION:  Vital signs: LMP 02/26/2012   Musculoskeletal: sitting in chair  Abnormal movements: none      MENTAL STATUS EXAMINATION      General:   - Grooming and hygiene: Casual,   - Apparent distress: none,   - Behavior: Calm  - Eye Contact:  Good,   - no psychomotor agitation or retardation    - Participation: Active verbal  participation  Orientation: Alert and Fully Oriented to person, place and time  Mood: Anxious  Affect: Flexible,  Thought Process: Logical and Goal-directed  Thought Content: Denies suicidal or homicidal ideations, intent or plan   Perception: Denies auditory or visual hallucinations. No delusions noted   Attention span and concentration: Intact   Speech:Rate within normal limits and Volume within normal limits  Language: Appropriate   Insight: Good  Judgment: Good  Recent and remote memory: No gross evidence of memory deficits        DEPRESSION SCREENIN/22/2023    11:06 PM 2023     1:20 PM 2023     2:00 PM   Depression Screen (PHQ-2/PHQ-9)   PHQ-2 Total Score 0     PHQ-2 Total Score  2 2   PHQ-9 Total Score  4 4       Interpretation of PHQ-9 Total Score   Score Severity   1-4 No Depression   5-9 Mild Depression   10-14 Moderate Depression   15-19 Moderately Severe Depression   20-27 Severe Depression    CURRENT RISK:       Suicidal: Low       Homicidal: Low       Self-Harm: Low       Relapse: Low       Crisis Safety Plan Reviewed Not Indicated       If evidence of imminent risk is present, intervention/plan:      MEDICAL RECORDS/LABS/DIAGNOSTIC TESTS REVIEWED:  No new lab since last visit     [place]  records -   Reviewed     PLAN:  (1) MDD; (2) TIM; (3) Insomnia  Slow improvement  Continue Effexor XR to 225 mg daily for mood, anxiety and comorbid pain management.   Continue Wellbutrin  mg BID daily for depression augmentation.    Switch Mirtazapine 7.5 mg --> Trazodone 25-50 mg HS PRN for insomnia.    Monitor for serotonin syndrome.  Continue psychotherapy for mood and anxiety management.  Medication options, alternatives (including no medications) and medication risks/benefits/side effects were discussed in detail.  Explained importance of contraceptive measures while on psychotropic medications, educated to let provider know if ever pregnant or wanting to become pregnant. Verbalized  understanding.  The patient was advised to call, message provider on [facility], or come in to the clinic if symptoms worsen or if any future questions/issues regarding their medications arise; the patient verbalized understanding and agreement.    The patient was educated to call 911, call the suicide hotline, or go to local ER if having thoughts of suicide or homicide; verbalized understanding.    Billing Coding based on:  88984 based on [facility]  50519: based on psychotherapy timing    Return to clinic in 6 weeks or sooner if symptoms worsen.  Next Appointment: instruction provided on how to make the next appointment.     The proposed treatment plan was discussed with the patient who was provided the opportunity to ask questions and make suggestions regarding alternative treatment. Patient verbalized understanding and expressed agreement with the plan.       Genaro Stevenson M.D.  07/26/23    This note was created using voice recognition software (Dragon). The accuracy of the dictation is limited by the abilities of the software. I have reviewed the note prior to signing, however some errors in grammar and context are still possible. If you have any questions related to this note please do not hesitate to contact our office.

## 2023-07-28 ENCOUNTER — HOSPITAL ENCOUNTER (OUTPATIENT)
Facility: REHABILITATION | Age: 63
End: 2023-07-28
Attending: PHYSICAL MEDICINE & REHABILITATION | Admitting: PHYSICAL MEDICINE & REHABILITATION
Payer: MEDICARE

## 2023-07-28 ENCOUNTER — APPOINTMENT (OUTPATIENT)
Dept: RADIOLOGY | Facility: REHABILITATION | Age: 63
End: 2023-07-28
Attending: PHYSICAL MEDICINE & REHABILITATION
Payer: MEDICARE

## 2023-07-28 ENCOUNTER — APPOINTMENT (OUTPATIENT)
Dept: MEDICAL GROUP | Facility: MEDICAL CENTER | Age: 63
End: 2023-07-28
Payer: MEDICARE

## 2023-07-28 VITALS
TEMPERATURE: 99.3 F | WEIGHT: 170.64 LBS | HEIGHT: 67 IN | DIASTOLIC BLOOD PRESSURE: 80 MMHG | SYSTOLIC BLOOD PRESSURE: 128 MMHG | OXYGEN SATURATION: 96 % | RESPIRATION RATE: 16 BRPM | BODY MASS INDEX: 26.78 KG/M2 | HEART RATE: 68 BPM

## 2023-07-28 PROCEDURE — 99152 MOD SED SAME PHYS/QHP 5/>YRS: CPT

## 2023-07-28 PROCEDURE — 700117 HCHG RX CONTRAST REV CODE 255

## 2023-07-28 PROCEDURE — 700101 HCHG RX REV CODE 250

## 2023-07-28 PROCEDURE — 700111 HCHG RX REV CODE 636 W/ 250 OVERRIDE (IP): Mod: JZ

## 2023-07-28 PROCEDURE — 99153 MOD SED SAME PHYS/QHP EA: CPT

## 2023-07-28 PROCEDURE — 64624 DSTRJ NULYT AGT GNCLR NRV: CPT

## 2023-07-28 RX ORDER — MIDAZOLAM HYDROCHLORIDE 1 MG/ML
INJECTION INTRAMUSCULAR; INTRAVENOUS
Status: COMPLETED
Start: 2023-07-28 | End: 2023-07-28

## 2023-07-28 RX ORDER — LIDOCAINE HYDROCHLORIDE 20 MG/ML
INJECTION, SOLUTION EPIDURAL; INFILTRATION; INTRACAUDAL; PERINEURAL
Status: COMPLETED
Start: 2023-07-28 | End: 2023-07-28

## 2023-07-28 RX ADMIN — FENTANYL CITRATE 12.5 MCG: 50 INJECTION, SOLUTION INTRAMUSCULAR; INTRAVENOUS at 08:11

## 2023-07-28 RX ADMIN — MIDAZOLAM 1 MG: 1 INJECTION, SOLUTION INTRAMUSCULAR; INTRAVENOUS at 07:50

## 2023-07-28 RX ADMIN — FENTANYL CITRATE 25 MCG: 50 INJECTION, SOLUTION INTRAMUSCULAR; INTRAVENOUS at 07:50

## 2023-07-28 RX ADMIN — LIDOCAINE HYDROCHLORIDE 5 ML: 20 INJECTION, SOLUTION EPIDURAL; INFILTRATION; INTRACAUDAL at 08:00

## 2023-07-28 RX ADMIN — IOHEXOL 7 ML: 240 INJECTION, SOLUTION INTRATHECAL; INTRAVASCULAR; INTRAVENOUS; ORAL at 08:00

## 2023-07-28 ASSESSMENT — PAIN DESCRIPTION - PAIN TYPE: TYPE: CHRONIC PAIN

## 2023-07-28 ASSESSMENT — FIBROSIS 4 INDEX: FIB4 SCORE: 0.61

## 2023-07-28 NOTE — OP REPORT
Date of Service: 07/28/2023     Physician/s: Volodymyr Herring MD     Pre-operative Diagnosis:   Z96.651 (ICD-10-CM) - S/P TKR (total knee replacement), right   M25.561,G89.29 (ICD-10-CM) - Chronic pain of right knee        Post-operative Diagnosis:   Z96.651 (ICD-10-CM) - S/P TKR (total knee replacement), right   M25.561,G89.29 (ICD-10-CM) - Chronic pain of right knee        Procedure: Right knee genicular nerve radiofrequency of superiormedial, superior, superiorlateral and inferiormedial genicular nerves     Description of procedure:  The risks, benefits, and alternatives of the procedure were reviewed and discussed with the patient.  Written informed consent was freely obtained. A pre-procedural time-out was conducted by the physician verifying patient’s identity, procedure to be performed, procedure site and side, and allergy verification. Appropriate equipment was determined to be in place for the procedure.      The patient's vital signs were carefully monitored before, throughout, and after the procedure. Moderation sedation was achieved with Versed (1mg) and Fentanyl (37.5mcg). Monitoring of the patients vital signs and respiratory status was provided by trained independent registered nurse during the entire course of the procedures and under my supervision and recoded in the patient’s medical record. The duration of sedation was from 07:50-08:19AM.    In the fluoroscopy suite the patient was placed in a supine position, the knee was flexed with a pillow/towels underneath for support, and the skin was prepped and draped in the usual sterile fashion. The fluoroscope was placed over the left knee at an true AP position, and three targets for injection were marked. A 25g needle was placed into each of the markings at the genicular nerve targets, and approx 2cc of 1% Lidocaine was injected subcutaneously into the epidermal and dermal layers. The needle was removed. A 17g RF needle was then advanced into the  superior lateral target point for the genicular nerve.  This process was then repeated for the superior, superior medial, and inferior medial target points for the genicular nerves. The needle tips were then verified by AP and lateral views. In the AP view, contrast dye was used to highlight the genicular nerve branches while the fluoroscope was running live. Motor testing was performed and no concern for motor stimulation was identified.  Following negative aspiration, approx 1.0cc of 2% Lidocaine without epinephrine was then injected at the above levels. The RF probes were placed into each of the three needles after placement as described above.  The RF probe was applied to each of the nerves for 90 seconds at 80C.  [place] were repositioned and a second lesion was performed as above. The needles were removed intact after restyleted. The patient's back was covered with a 4x4 gauze, the area was cleansed with sterile normal saline, and a dressing was applied.      There were no complications noted, was hemodynamically stable, and tolerated the procedure well.      Volodymyr Herring MD  Physical Medicine and Rehabilitation  Interventional Spine and Sports Physiatry  [facility]    CPT: [id number], [id number], [id number]

## 2023-07-28 NOTE — PROGRESS NOTES
0722 Pt arrived to pre-procedure area. Procedure & plan for recovery reviewed, site confirmed, & consent signed. VSS. Allergies & current medications verified. Appointed  to be called for DC. Printed discharge instructions discussed & signed. Pt denies taking NSAIDS or anticoagulants in the last 5 days. MD to bedside prior to procedure.     0810 Pt to recovery area s/p genicular nerve RFA & updates received from procedure RN. VSS. Pt reports relief in pain at this time. Ice pack applied to affected area. Dressing CDI, no swelling noted. Pt tolerating PO fluids & snacks. Dr. Herring to bedside for post-procedure evaluation.      5582 Pt's sister notified of expected DC time.    0915 Pt's appointed  arrived. Pt ambulates without difficulty & meets DC criteria. PIV removed. RN assisted pt off unit.

## 2023-07-28 NOTE — INTERVAL H&P NOTE
H&P reviewed. The patient was examined and there are no changes to the H&P      Vitals were reviewed in patient chart.    CV: RRR, Normal S1, S2  RESP: Clear to auscultation bilaterally    Continue with procedure as planned.    Volodymyr Herring MD  Physical Medicine and Rehabilitation  Interventional Spine and Sports Physiatry  [facility]

## 2023-08-02 PROCEDURE — RXMED WILLOW AMBULATORY MEDICATION CHARGE: Performed by: NURSE PRACTITIONER

## 2023-08-02 PROCEDURE — RXMED WILLOW AMBULATORY MEDICATION CHARGE: Performed by: PHYSICAL MEDICINE & REHABILITATION

## 2023-08-03 ENCOUNTER — PHARMACY VISIT (OUTPATIENT)
Dept: PHARMACY | Facility: MEDICAL CENTER | Age: 63
End: 2023-08-03
Payer: COMMERCIAL

## 2023-08-04 ENCOUNTER — OFFICE VISIT (OUTPATIENT)
Dept: PHYSICAL MEDICINE AND REHAB | Facility: MEDICAL CENTER | Age: 63
End: 2023-08-04
Payer: COMMERCIAL

## 2023-08-04 VITALS
SYSTOLIC BLOOD PRESSURE: 122 MMHG | BODY MASS INDEX: 26.37 KG/M2 | OXYGEN SATURATION: 95 % | HEART RATE: 75 BPM | HEIGHT: 67 IN | DIASTOLIC BLOOD PRESSURE: 80 MMHG | WEIGHT: 168 LBS | TEMPERATURE: 97.3 F

## 2023-08-04 DIAGNOSIS — G89.29 CHRONIC PAIN OF RIGHT KNEE: ICD-10-CM

## 2023-08-04 DIAGNOSIS — M51.36 DDD (DEGENERATIVE DISC DISEASE), LUMBAR: ICD-10-CM

## 2023-08-04 DIAGNOSIS — M25.561 CHRONIC PAIN OF RIGHT KNEE: ICD-10-CM

## 2023-08-04 DIAGNOSIS — Z96.651 S/P TKR (TOTAL KNEE REPLACEMENT), RIGHT: ICD-10-CM

## 2023-08-04 DIAGNOSIS — M43.17 SPONDYLOLISTHESIS AT L5-S1 LEVEL: ICD-10-CM

## 2023-08-04 DIAGNOSIS — Z96.89 SPINAL CORD STIMULATOR STATUS: ICD-10-CM

## 2023-08-04 DIAGNOSIS — M47.816 LUMBAR SPONDYLOSIS: ICD-10-CM

## 2023-08-04 PROCEDURE — 1125F AMNT PAIN NOTED PAIN PRSNT: CPT | Performed by: PHYSICAL MEDICINE & REHABILITATION

## 2023-08-04 PROCEDURE — 1170F FXNL STATUS ASSESSED: CPT | Performed by: PHYSICAL MEDICINE & REHABILITATION

## 2023-08-04 PROCEDURE — 3074F SYST BP LT 130 MM HG: CPT | Performed by: PHYSICAL MEDICINE & REHABILITATION

## 2023-08-04 PROCEDURE — 99024 POSTOP FOLLOW-UP VISIT: CPT | Performed by: PHYSICAL MEDICINE & REHABILITATION

## 2023-08-04 PROCEDURE — 3079F DIAST BP 80-89 MM HG: CPT | Performed by: PHYSICAL MEDICINE & REHABILITATION

## 2023-08-04 ASSESSMENT — PAIN SCALES - GENERAL: PAINLEVEL: 3=SLIGHT PAIN

## 2023-08-04 ASSESSMENT — FIBROSIS 4 INDEX: FIB4 SCORE: 0.61

## 2023-08-04 ASSESSMENT — PATIENT HEALTH QUESTIONNAIRE - PHQ9
5. POOR APPETITE OR OVEREATING: 0 - NOT AT ALL
SUM OF ALL RESPONSES TO PHQ QUESTIONS 1-9: 6
CLINICAL INTERPRETATION OF PHQ2 SCORE: 2

## 2023-08-04 NOTE — PROGRESS NOTES
Follow-up patient note    Physiatry (physical medicine and  Rehabilitation), interventional spine and sports medicine    Date of Service: 08/04/2023    Chief complaint:   Chief Complaint   Patient presents with    Follow-Up     2wk post sp          HISTORY    HPI: Debby Desai 63 y.o. female who presents today for follow-up evaluation of her pain complaints related to lumbar radiculopathy and pain after right total knee replacement.    Debby reports that she has had good improvement after right genicular radiofrequency ablation on 07/28/2023.  She reports that her pain is decreased.  Pain is 0-3/10 on the NRS and she notes that the pain is worse after significant increase in activity.  Pain is at least 50% better, she feels.  She has been able to stand and go to an exhibit for an hour.  This is more than she has been able to do in the past.  Taking stairs, which she has not been able to do before.    Some back soreness, but this is manageable.  She has been more active.  No falls recently.  No recent dizziness or leg spasms.    Tramadol 50mg po tid.  Stable at current dose of three times a day.    She sees Estella at [facility] for behavioral health care.  Psychiatrist is Dr. Stevenson.. Making adjustments to medications.  Sleep is difficult.    She is not able to use her SCS as the battery is no longer functioning.  This is not MRI safe.  Holding off on revision until she has had dental work done.    Denies suicidality.  PHQ-9: 16, now 6     By history:   She reports that she had surgery on 09/16/2019.  This was a right total knee arthroplasty for osteoarthritis with Dr. Saba.    In 2001, she had discectomy.  She reports that she has had a spinal cord stimulator placed, but she has not been using this as much.  She reports that the unit is not currently working.    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  She gets up about once an hour.  She has a sit  to stand desk, but it is difficult to stand much.  Currently, she is working from home due to the COVID-19 pandemic.    Medical records review:  ED records from 10/07/2020 and 09/22/2020 reviewed.  ED visit from [id number].  Negative head CT.  She was given instructions about taking ibuprofen and tylenol.  Noted that she was also given a script for valium.     I reviewed the note from the referring provider Loy Miles M.D. dated 01/08/2020: Visits included nausea/epigastric pain, hypertension, chronic knee pain, IGT, dyslipidemia, MDD, hypothyroidism, iron deficiency    Previous treatments:    Physical Therapy: Yes    Medications the patient is tried: tramadol, tylenol (usually for a headache); in the past she took gabapentin 400mg po tid, she was taking vimovo and norco in the past; lyrica caused weight gain    Previous interventions: She had radiofrequency ablation in the past, prior to surgery    Previous surgeries to relieve the above pain:  None other than surgery 09/16/2019      ROS: Unchanged, see HPI  Gen: weight loss  Eyes: vision problems, glasses and contacts  CV: chest pain/anxiety  GI: nausea, ulcers  : urgency  Psych: depression  Endo: thyroid problems  Heme: anemia    Red Flags ROS:   Fever, Chills, Sweats: Denies  Involuntary Weight Loss: Denies  Bladder Incontinence: Denies  Bowel Incontinence: Denies  Saddle Anesthesia: Denies    All other systems reviewed and negative.       PMHx:   Past Medical History:   Diagnosis Date    Anemia     in past    Anginal syndrome (HCC)     had work up and feet r/t anxiety    Anxiety     Arthritis     OA knees    Chronic pain 6/2/2021    Dental disorder 5/24/12    partial upper denture    Diabetes (HCC)     pre diabetic    High cholesterol     HTN (hypertension), benign 3/19/2012    Hypothyroid 3/19/2012    Major depression 3/19/2012    Orbital floor (blow-out) closed fracture (HCC)     left    Other specified symptom associated with female genital organs      post menopausal bleeding    Pain     thoracic spine, Right knee, myofacial pain, and Right shoulder    Pain     recently fell and has bruise left forehead    Pain 02/2018    chronic back pain, right shoulder    Psychiatric problem     depression, anxiety    Unspecified disorder of thyroid     Urinary bladder disorder     stress incont.    Urinary incontinence     Occasional       PSHx:   Past Surgical History:   Procedure Laterality Date    ID NEUROLYTIC DEST GENICULAR NERVE Right 7/28/2023    Procedure: RIGHT genicular nerve radiofrequency ablation;  Surgeon: Volodymyr Herring M.D.;  [facility];  Service: Pain Management    ID FLUOROSCOPIC GUIDANCE NEEDLE PLACEMENT Right 7/7/2023    Procedure: RIGHT genicular nerve diagnostic blocks;  Surgeon: Volodymyr Herring M.D.;  Location: [facility];  Service: Pain Management    INJ,EPI ANES/STER LUM/SAC ADDL Right 4/7/2021    Procedure: RIGHT lumbar five and sacral one transforaminal epidural;  Surgeon: Volodymyr Herring M.D.;  Location: [facility];  Service: Pain Management    PB TOTAL KNEE ARTHROPLASTY Right 9/16/2019    Procedure: RIGHT ARTHROPLASTY, KNEE, TOTAL;  Surgeon: Rufus Saba M.D.;  Location: [facility];  Service: Orthopedics    KNEE ARTHROSCOPY Right 2/9/2018    Procedure: KNEE ARTHROSCOPY;  Surgeon: Harsh Baltazar M.D.;  Location: [facility];  Service: Orthopedics    MENISCECTOMY, KNEE, MEDIAL Right 2/9/2018    Procedure: MEDIAL AND LATERAL MENISCECTOMY- PARTIAL;  Surgeon: Harsh Baltazar M.D.;  Location: [facility];  Service: Orthopedics    KNEE ARTHROSCOPY Right 7/12/2017    Procedure: KNEE ARTHROSCOPY- CESPEDES;  Surgeon: Harsh Baltazar M.D.;  Location: [facility];  Service:     MENISCECTOMY, KNEE, MEDIAL Right 7/12/2017    Procedure: PARTIAL MEDIAL AND LATERAL MENISCECTOMY;  Surgeon: Harsh Baltazar M.D.;  Location:  SURGERY [facility];  Service:     SYNOVECTOMY Right 7/12/2017    Procedure: SYNOVECTOMY;  Surgeon: Harsh Baltazar M.D.;  Location: [facility];  Service:     KNEE ARTHROSCOPY  4/21/2015    Performed by Rufus Saba M.D. at SURGERY [facility]    MENISCECTOMY, KNEE, MEDIAL  4/21/2015    Performed by Rufus Saba M.D. at SURGERY [facility]    PUMP REVISION  12/10/2012    Performed by Allison Guerra M.D. at SURGERY [facility]    SPINAL CORD STIMULATOR  5/30/2012    Performed by ALLISON GUERRA at [facility]    BIOPSY GENERAL  5/1/12    endometrial    SPINAL CORD STIMULATOR  7/11/2011    Performed by ALLISON GUERRA at SURGERY [facility]    BLOCK EPIDURAL STEROID INJECTION  2008    x 3-4    LYMPH NODE EXCISION Left 2007    cervicle    OTHER Right 2001    thoracic discectomy      ARTHROSCOPY, KNEE Left 1999    RIB RESECTION Right 1980    LUMPECTOMY         Family history   Family History   Problem Relation Age of Onset    Hypertension Father     Diabetes Father     Heart Disease Father     Alcohol abuse Father     Anesthesia Sister         slow to come out (as a child)    Diabetes Other     Heart Disease Other     Cancer Other     Hypertension Other     Stroke Other     Lung Disease Other          Medications:   Current Outpatient Medications   Medication    venlafaxine (EFFEXOR-XR) 150 MG extended-release capsule    venlafaxine XR (EFFEXOR XR) 75 MG CAPSULE SR 24 HR    buPROPion (WELLBUTRIN SR) 200 MG SR tablet    traZODone (DESYREL) 50 MG Tab    amLODIPine (NORVASC) 10 MG Tab    lisinopril (PRINIVIL) 40 MG tablet    levothyroxine (SYNTHROID) 50 MCG Tab    traMADol (ULTRAM) 50 MG Tab    metFORMIN ER (GLUCOPHAGE XR) 500 MG TABLET SR 24 HR    atorvastatin (LIPITOR) 20 MG Tab    VITAMIN D PO    Ferrous Sulfate (IRON) 325 (65 Fe) MG Tab     No current facility-administered medications for this visit.       Allergies:   Allergies   Allergen Reactions  "   Sulfamethoxazole-Trimethoprim Rash and Swelling     Pt states \"My hand swells up and rash all over body\".       Social Hx:   Social History     Socioeconomic History    Marital status: Single     Spouse name: Not on file    Number of children: Not on file    Years of education: Not on file    Highest education level: Associate degree: academic program   Occupational History    Not on file   Tobacco Use    Smoking status: Never    Smokeless tobacco: Never   Vaping Use    Vaping Use: Never used   Substance and Sexual Activity    Alcohol use: Not Currently    Drug use: No    Sexual activity: Never     Partners: Male   Other Topics Concern     Service No    Blood Transfusions No    Caffeine Concern No    Occupational Exposure No    Hobby Hazards No    Sleep Concern Yes    Stress Concern Yes    Weight Concern Yes    Special Diet No    Back Care No    Exercise No    Bike Helmet No    Seat Belt Yes    Self-Exams No   Social History Narrative    Not on file     Social Determinants of Health     Financial Resource Strain: Low Risk  (5/11/2022)    Overall Financial Resource Strain (CARDIA)     Difficulty of Paying Living Expenses: Not hard at all   Food Insecurity: No Food Insecurity (5/11/2022)    Hunger Vital Sign     Worried About Running Out of Food in the Last Year: Never true     Ran Out of Food in the Last Year: Never true   Transportation Needs: No Transportation Needs (5/11/2022)    PRAPARE - Transportation     Lack of Transportation (Medical): No     Lack of Transportation (Non-Medical): No   Physical Activity: Inactive (5/11/2022)    Exercise Vital Sign     Days of Exercise per Week: 0 days     Minutes of Exercise per Session: 0 min   Stress: Stress Concern Present (5/11/2022)    [employer] [place] [employer] - Occupational Stress Questionnaire     Feeling of Stress : Rather much   Social Connections: Socially Isolated (5/11/2022)    Social Connection and Isolation Panel [NHANES]     Frequency " "of Communication with Friends and Family: More than three times a week     Frequency of Social Gatherings with Friends and Family: Once a week     Attends Temple Services: Never     Active Member of Clubs or Organizations: No     Attends Club or Organization Meetings: Never     Marital Status: Never    Intimate Partner Violence: Not on file   Housing Stability: Low Risk  (5/11/2022)    Housing Stability Vital Sign     Unable to Pay for Housing in the Last Year: No     Number of Places Lived in the Last Year: 1     Unstable Housing in the Last Year: No         EXAMINATION     Physical Exam:   Vitals: /80 (BP Location: Right arm, Patient Position: Sitting, BP Cuff Size: Large adult)   Pulse 75   Temp 36.3 °C (97.3 °F) (Temporal)   Ht 1.702 m (5' 7\")   Wt 76.2 kg (168 lb)   SpO2 95%     Constitutional:   Body Habitus: Body mass index is 26.31 kg/m².  Cooperation: Fully cooperates with exam  Appearance: Well-groomed, well-nourished, not disheveled no acute distress    Eyes: No scleral icterus, no proptosis     ENT -no obvious auditory deficits, poor dentition    Skin -no visible skin lesions, no warmth or erythema was noted at the injection sites    Respiratory-  breathing comfortable on room air, no audible wheezing    Cardiovascular- no lower extremity edema is noted.     Psychiatric- alert and oriented ×3. Normal affect.     Gait -  Antalgic, no assist device, no loss of balance    Neuro/Muscloskeletal  No focal motor deficits in the lower extremities bilaterally.  No sensory deficits in the lower extremities are noted bilaterally    Mild effusion of right knee.  Mild tenderness medial and lateral joint line, medial joint pain is greater.         MEDICAL DECISION MAKING    Medical records review: see under HPI section.     DATA    Labs:     03/14/2020 CRP 0.19  03/14/2020 Sed rate 20  02/25/2020: Tramadol and metabolites  09/21/2021 UDS consistent with tramadol and metabolites  08/17/2022 UDS " consistent with tramadol and metaboiltes  02/15/2023 UDS consistent with tramadol and metabolites    Lab Results   Component Value Date/Time    SODIUM 144 05/24/2023 12:49 AM    POTASSIUM 3.9 05/24/2023 12:49 AM    CHLORIDE 106 05/24/2023 12:49 AM    CO2 27 05/24/2023 12:49 AM    ANION 11.0 05/24/2023 12:49 AM    GLUCOSE 94 05/24/2023 12:49 AM    BUN 10 05/24/2023 12:49 AM    CREATININE 0.86 05/24/2023 12:49 AM    CALCIUM 9.1 05/24/2023 12:49 AM    ASTSGOT 10 (L) 05/24/2023 12:49 AM    ALTSGPT 13 05/24/2023 12:49 AM    TBILIRUBIN 0.3 05/24/2023 12:49 AM    ALBUMIN 3.6 05/24/2023 12:49 AM    TOTPROTEIN 6.4 05/24/2023 12:49 AM    GLOBULIN 2.8 05/24/2023 12:49 AM    AGRATIO 1.3 05/24/2023 12:49 AM       Lab Results   Component Value Date/Time    PROTHROMBTM 13.1 05/23/2023 01:39 AM    INR 1.00 05/23/2023 01:39 AM        Lab Results   Component Value Date/Time    WBC 6.5 05/24/2023 12:49 AM    RBC 3.67 (L) 05/24/2023 12:49 AM    HEMOGLOBIN 11.0 (L) 05/24/2023 12:49 AM    HEMATOCRIT 34.0 (L) 05/24/2023 12:49 AM    MCV 92.6 05/24/2023 12:49 AM    MCH 30.0 05/24/2023 12:49 AM    MCHC 32.4 05/24/2023 12:49 AM    MPV 11.3 05/24/2023 12:49 AM    NEUTSPOLYS 52.40 05/24/2023 12:49 AM    LYMPHOCYTES 35.40 05/24/2023 12:49 AM    MONOCYTES 8.20 05/24/2023 12:49 AM    EOSINOPHILS 3.20 05/24/2023 12:49 AM    BASOPHILS 0.50 05/24/2023 12:49 AM        Lab Results   Component Value Date/Time    HBA1C 5.6 09/26/2022 07:34 AM        Imaging: I personally reviewed following images, these are my reads  Xray thoracolumbar spine 07/14/2022  Degenerative changes and note of electrode from T6-T9    Xray right knee 07/14/2022  S/P right total knee without evidence of fracture    CT left knee May 8, 2022  There is note of moderate to large joint effusion with note of nondisplaced fracture at the medial aspect of the proximal fibula.  Moderate osteoarthritis    CT cervical spine 09/22/2020  There is note of cervical spondylosis with multilevel  uncovertebral arthropathy    Xray right hip 03/14/2020  There is mild osteoarthritis of the right hip.      Xray lumbar 03/14/2020  There is mild anterolisthesis at L5-S1.  No abnormal motion on flexion and extension  There is DDD and facet arthropathy that is most noted at L5-S1 and less at L4-5    Xray right knee 09/16/2019  There is note of right knee arthroplasty    IMAGING radiology reads. I reviewed the following radiology reads  Xray right knee 07/14/2022  IMPRESSION:  1.  No acute fracture or dislocation of the right knee.    Xray thoracolumbar spine 07/14/2022  IMPRESSION:  1.  No acute or subacute fracture of the thoracolumbar spine.ay     CT left knee May 8, 2022  IMPRESSION:     1.  Nondisplaced fracture of medial aspect of the proximal fibula is identified.  2.  No other fractures identified.  3.  Joint effusion is identified.   4.  Moderate osteoarthritis.    Xray right knee 09/18/2021  Multiple standing views of the right knee show previous right total knee   replacement with hardware in good position without signs of loosening or   dislocation.  No obvious fracture noted.    Xray left shoulder 10/27/2021  X-rays reviewed today of the left shoulder show normal overall bony   alignment she has some AC degenerative change.  She has some osteophyte   formation off the inferior humeral head.  Some mild glenohumeral joint   space narrowing patient.  Type III acromion.    CT cervical spine 09/22/2020  IMPRESSION:  Degenerative change without evidence of cervical spine fracture.    Xray lumbar 03/14/2020  IMPRESSION:  1.  No compression deformity or acute fracture is identified.  2.  Mild anterolisthesis at L5-S1.  3.  Degenerative disc disease and facet arthropathy.  4.  No focal instability noted on flexion extension views.                                                Xray right hip 03/14/2020  IMPRESSION:     1.  No radiographic evidence of acute traumatic injury.  2.  Findings are consistent with mild  osteoarthritis.  3.  Probable constipation.                                   Results for orders placed during the hospital encounter of 09/16/19   DX-KNEE 2- RIGHT    Impression Right knee arthroplasty.      Results for orders placed during the hospital encounter of 03/28/19   DX-KNEE 3 VIEWS RIGHT    Impression No evidence of acute fracture or dislocation.    Degenerative changes as above described.                              Diagnosis   Visit Diagnoses     ICD-10-CM   1. S/P TKR (total knee replacement), right  Z96.651   2. Chronic pain of right knee  M25.561    G89.29   3. Spondylolisthesis at L5-S1 level  M43.17   4. DDD (degenerative disc disease), lumbar  M51.36   5. Lumbar spondylosis  M47.816   6. Spinal cord stimulator status  Z96.89                     ASSESSMENT:  Debby Desai 63 y.o. female seen for above.     Debby was seen today for follow-up.    Diagnoses and all orders for this visit:    S/P TKR (total knee replacement), right    Chronic pain of right knee    Spondylolisthesis at L5-S1 level    DDD (degenerative disc disease), lumbar    Lumbar spondylosis    Spinal cord stimulator status        Back pain is stable and right knee pain is now significantly reduced, at least 50% reduced peak and more time that she is not having pain in the right knee at all.  Discussed that she has been able to reduce her tramadol to twice a day more than three a day since the right genicular nerve radiofrequency ablation.  Refills on file for tramadol 50 mg po tid.  This has been pretty stable, okay to gradually reduce to #75 per month as tolerated and then #60/month if tolerated.   reviewed.  Most recent UDS consistent.  This will be written by her PCP in the future.  Hold on SCS battery replacement and possible lead revision.  This was previously placed for proximal thoracic/upper lumbar region pain.  Unclear if current lead placement would help with leg pain related to spine pathology.  Previously  discussed plan to address dentition prior to surgical plans.  Encouraged her to continue to work with Dr. Stevenson and counseling.  Follow-up with PCP for general medical care and current dose of tramadol can be managed by her PCP in the future.  Continue home program and activity as tolerated        Follow-up: Return discussed follow-up with Dr. Pena or Dr. Yang if symptoms worsen.        Thank you very much for asking me to participate in Debby Desai's care.  Please contact me with any questions or concerns.      Please note that this dictation was created using voice recognition software. I have made every reasonable attempt to correct obvious errors but there may be errors of grammar and content that I may have overlooked prior to finalization of this note.      Volodymyr Herring MD  Physical Medicine and Rehabilitation  Interventional Spine and Sports Physiatry  [facility]    CC: HUBER Goodwin

## 2023-08-24 ENCOUNTER — TELEPHONE (OUTPATIENT)
Dept: MEDICAL GROUP | Facility: MEDICAL CENTER | Age: 63
End: 2023-08-24
Payer: MEDICARE

## 2023-09-01 PROCEDURE — RXMED WILLOW AMBULATORY MEDICATION CHARGE: Performed by: PHYSICAL MEDICINE & REHABILITATION

## 2023-09-01 PROCEDURE — RXMED WILLOW AMBULATORY MEDICATION CHARGE: Performed by: PSYCHIATRY & NEUROLOGY

## 2023-09-05 ENCOUNTER — PHARMACY VISIT (OUTPATIENT)
Dept: PHARMACY | Facility: MEDICAL CENTER | Age: 63
End: 2023-09-05
Payer: COMMERCIAL

## 2023-09-08 NOTE — OP THERAPY DAILY TREATMENT
Outpatient Physical Therapy  DAILY TREATMENT     [facility] [place]  [address], [address]  [place] [address]  Phone:  [phone]  Fax:  [phone]    Date: 02/22/2022    Patient: Debby Desai  YOB: 1960  MRN: [id number]     Time Calculation    Start time: 1030  Stop time: 1115 Time Calculation (min): 45 minutes         Chief Complaint: Knee Problem    Visit #: 11    SUBJECTIVE:  Patient reports that she is meeting her goal of one activity a day. She states that she is walking more and did laundry for the first time in years.     OBJECTIVE:  Current objective measures:   PT Functional Assessment Tool Used: LEFS  PT Functional Assessment Score: 39   2-120deg AROM knee     Therapeutic Exercises (CPT 16160):     1. Nu step, x10min L1    2. SLR, x20    3. SAQ, x20, 4lbs each    4. HS stretch, [id number]    5. Backwards walking in //bars, x5min    6. Ball hs curls, 20x    7. Shuttle, 4c x5min    8. TKE stretch on roller, x2min    9. TKE BALL ON WALL, 2 X 10    10. Walking with FWW focus on knee ext, x5min    Therapeutic Treatments and Modalities:     1. Manual Therapy (CPT 41356), STM tool assited to quad; hamstring, and adductor; tib femoral a-p glides grade I-III    Time-based treatments/modalities:    Physical Therapy Timed Treatment Charges  Manual therapy minutes (CPT 14708): 10 minutes  Therapeutic exercise minutes (CPT 73088): 35 minutes      Pain rating (1-10) before treatment:  3  Pain rating (1-10) after treatment:  2    ASSESSMENT:   Response to treatment: Patient is responding well but slowly to therapy. Patient demonstrated improved knee extension.     PLAN/RECOMMENDATIONS:   Plan for treatment: therapy treatment to continue next visit.  Planned interventions for next visit: continue with current treatment.       
Universal Safety Interventions

## 2023-09-13 PROCEDURE — RXMED WILLOW AMBULATORY MEDICATION CHARGE: Performed by: PHYSICAL MEDICINE & REHABILITATION

## 2023-09-14 ENCOUNTER — PHARMACY VISIT (OUTPATIENT)
Dept: PHARMACY | Facility: MEDICAL CENTER | Age: 63
End: 2023-09-14
Payer: COMMERCIAL

## 2023-09-20 ENCOUNTER — OFFICE VISIT (OUTPATIENT)
Dept: MEDICAL GROUP | Facility: MEDICAL CENTER | Age: 63
End: 2023-09-20
Payer: MEDICARE

## 2023-09-20 VITALS
BODY MASS INDEX: 27 KG/M2 | SYSTOLIC BLOOD PRESSURE: 122 MMHG | HEIGHT: 66 IN | DIASTOLIC BLOOD PRESSURE: 80 MMHG | HEART RATE: 78 BPM | TEMPERATURE: 97 F | OXYGEN SATURATION: 98 % | WEIGHT: 168 LBS

## 2023-09-20 DIAGNOSIS — M25.512 ACUTE PAIN OF LEFT SHOULDER: ICD-10-CM

## 2023-09-20 DIAGNOSIS — M43.17 SPONDYLOLISTHESIS AT L5-S1 LEVEL: ICD-10-CM

## 2023-09-20 DIAGNOSIS — G89.29 CHRONIC PAIN OF RIGHT KNEE: ICD-10-CM

## 2023-09-20 DIAGNOSIS — Z96.651 S/P TKR (TOTAL KNEE REPLACEMENT), RIGHT: ICD-10-CM

## 2023-09-20 DIAGNOSIS — Z23 NEED FOR VACCINATION: ICD-10-CM

## 2023-09-20 DIAGNOSIS — F11.20 OPIOID DEPENDENCE WITH CURRENT USE (HCC): ICD-10-CM

## 2023-09-20 DIAGNOSIS — M25.561 CHRONIC PAIN OF RIGHT KNEE: ICD-10-CM

## 2023-09-20 PROCEDURE — 99214 OFFICE O/P EST MOD 30 MIN: CPT | Mod: 25 | Performed by: NURSE PRACTITIONER

## 2023-09-20 PROCEDURE — 90662 IIV NO PRSV INCREASED AG IM: CPT | Performed by: NURSE PRACTITIONER

## 2023-09-20 PROCEDURE — 1170F FXNL STATUS ASSESSED: CPT | Performed by: NURSE PRACTITIONER

## 2023-09-20 PROCEDURE — 3079F DIAST BP 80-89 MM HG: CPT | Performed by: NURSE PRACTITIONER

## 2023-09-20 PROCEDURE — RXMED WILLOW AMBULATORY MEDICATION CHARGE: Performed by: NURSE PRACTITIONER

## 2023-09-20 PROCEDURE — 3074F SYST BP LT 130 MM HG: CPT | Performed by: NURSE PRACTITIONER

## 2023-09-20 PROCEDURE — G0008 ADMIN INFLUENZA VIRUS VAC: HCPCS | Performed by: NURSE PRACTITIONER

## 2023-09-20 RX ORDER — TRAMADOL HYDROCHLORIDE 50 MG/1
50 TABLET ORAL EVERY 8 HOURS PRN
Qty: 90 TABLET | Refills: 2 | Status: SHIPPED | OUTPATIENT
Start: 2023-10-06 | End: 2024-01-06

## 2023-09-20 RX ORDER — DICLOFENAC SODIUM 75 MG/1
75 TABLET, DELAYED RELEASE ORAL 2 TIMES DAILY
Qty: 28 TABLET | Refills: 1 | Status: SHIPPED | OUTPATIENT
Start: 2023-09-20 | End: 2024-01-19

## 2023-09-20 ASSESSMENT — FIBROSIS 4 INDEX: FIB4 SCORE: 0.61

## 2023-09-20 NOTE — ASSESSMENT & PLAN NOTE
Chronic problem, intermittent, flared up 4 weeks ago and has been causing pain daily. Radiates down arm, up into neck, into left scapula    Taking ibuprofen/acetaminophen as needed and tramadol which isn't helping with pain. Also has been applying ice and heat    Has had multiple falls over the past few years, has fallen on shoulder in the past. Had Xrays done in 2021 which showed mild arthritis, possible chronic rotator cuff tear.     Unsure if there was a triggering event recently, no new falls or trauma.

## 2023-09-21 PROBLEM — M43.17 SPONDYLOLISTHESIS AT L5-S1 LEVEL: Status: ACTIVE | Noted: 2023-09-21

## 2023-09-21 NOTE — PROGRESS NOTES
Subjective:   Debby Desai is a 63 y.o. female here today for left shoulder pain, tramadol refills    Acute pain of left shoulder  Chronic problem, intermittent, flared up 4 weeks ago and has been causing pain daily. Radiates down arm, up into neck, into left scapula    Taking ibuprofen/acetaminophen as needed and tramadol which isn't helping with pain. Also has been applying ice and heat    Has had multiple falls over the past few years, has fallen on shoulder in the past. Had Xrays done in 2021 which showed mild arthritis, possible chronic rotator cuff tear.     Unsure if there was a triggering event recently, no new falls or trauma.     Chronic knee pain  Chronic problem.  Has been established with physiatry, has undergone multiple interventional pain procedures including injections, nerve ablations which have provided some relief of pain.  She is status post right total knee replacement.  Currently taking tramadol 50 mg as needed, generally takes between 1 to 3 tablets/day.    Her physiatrist has left the practice, she is requesting that I take over her tramadol prescription until she is able to get established with new provider.     Current medicines (including changes today)  Current Outpatient Medications   Medication Sig Dispense Refill    diclofenac DR (VOLTAREN) 75 MG Tablet Delayed Response Take 1 Tablet by mouth 2 times a day. 28 Tablet 1    [START ON 10/6/2023] traMADol (ULTRAM) 50 MG Tab Take 1 Tablet by mouth every 8 hours as needed for Severe Pain for up to 90 days. 90 Tablet 2    venlafaxine (EFFEXOR-XR) 150 MG extended-release capsule Take 1 Capsule by mouth every day. (venlafaxine xr 150 mg + 75 mg = 225 mg daily) 90 Capsule 2    venlafaxine XR (EFFEXOR XR) 75 MG CAPSULE SR 24 HR Take 1 Capsule by mouth every day. (venlafaxine xr 150 mg + 75 mg = 225 mg daily) 90 Capsule 2    buPROPion (WELLBUTRIN SR) 200 MG SR tablet Take 1 tablet by mouth 2 times a day. 180 Tablet 1    traZODone (DESYREL) 50  "MG Tab Take 0.5 tablets by mouth at bedtime as needed for sleep (as needed for sleep). (Can increase to 1 tab if needed for sleep) 90 Tablet 3    amLODIPine (NORVASC) 10 MG Tab Take 1 tablet by mouth every day. 90 Tablet 3    lisinopril (PRINIVIL) 40 MG tablet Take 1 Tablet by mouth every day. 90 Tablet 3    levothyroxine (SYNTHROID) 50 MCG Tab Take 1 tablet by mouth every morning on an empty stomach. 90 Tablet 3    metFORMIN ER (GLUCOPHAGE XR) 500 MG TABLET SR 24 HR Take 1 Tab by mouth 2 times a day. 180 Tablet 1    atorvastatin (LIPITOR) 20 MG Tab TAKE ONE TABLET BY MOUTH EVERY DAY 90 Tablet 3    VITAMIN D PO Take  by mouth every day.      Ferrous Sulfate (IRON) 325 (65 Fe) MG Tab Take 65 mg by mouth every day at 6 PM.       No current facility-administered medications for this visit.     She  has a past medical history of Anemia, Anginal syndrome ([facility]), Anxiety, Arthritis, Chronic pain (6/2/2021), Dental disorder (5/24/12), Diabetes ([facility]), High cholesterol, HTN (hypertension), benign (3/19/2012), Hypothyroid (3/19/2012), Major depression (3/19/2012), Orbital floor (blow-out) closed fracture ([facility]), Other specified symptom associated with female genital organs, Pain, Pain, Pain (02/2018), Psychiatric problem, Unspecified disorder of thyroid, Urinary bladder disorder, and Urinary incontinence.    She has no past medical history of Breast cancer ([facility]).    ROS   No chest pain, no shortness of breath, no abdominal pain  Positive ROS as per HPI.  All other systems reviewed and are negative.     Objective:     /80   Pulse 78   Temp 36.1 °C (97 °F) (Temporal)   Ht 1.676 m (5' 6\")   Wt 76.2 kg (168 lb)   SpO2 98%  Body mass index is 27.12 kg/m².     Physical Exam:  Constitutional: Alert, no distress.  Skin: Warm, dry, good turgor, no rashes in visible areas.  Eye: Equal, round and reactive, conjunctiva clear, lids normal.  ENMT: Lips without lesions, good dentition  Respiratory: Unlabored respiratory " effort  Psych: Alert and oriented x3, normal affect and mood.  Shoulder exam: Left shoulder with reduced active ROM, 90 degree abduction, 90 degree flexion, limited by pain, left shoulder joint visibly sitting lower in socket than right      Assessment and Plan:   The following treatment plan was discussed    1. Acute pain of left shoulder  Unstable  Due to history of multiple falls I am concerned that she has significant chronic damage to shoulder joint and supportive structures  Trial diclofenac twice daily as needed for pain  May continue to use tramadol as needed for severe pain  Referral placed to orthopedics for further evaluation and management  - Referral to Orthopedics  - diclofenac  (VOLTAREN) 75 MG Tablet Delayed Response; Take 1 Tablet by mouth 2 times a day.  Dispense: 28 Tablet; Refill: 1    2. Chronic pain of right knee  Stable on current regimen  Advised patient to schedule follow-up with other physiatrist in the office to discuss other interventional pain options  Continue tramadol as needed  Encouraged regular activity and strengthening  - traMADol (ULTRAM) 50 MG Tab; Take 1 Tablet by mouth every 8 hours as needed for Severe Pain for up to 90 days.  Dispense: 90 Tablet; Refill: 2  - Consent for Opiate Prescription  - Controlled Substance Treatment Agreement    3. S/P TKR (total knee replacement), right  - traMADol (ULTRAM) 50 MG Tab; Take 1 Tablet by mouth every 8 hours as needed for Severe Pain for up to 90 days.  Dispense: 90 Tablet; Refill: 2  - Consent for Opiate Prescription  - Controlled Substance Treatment Agreement    4. Spondylolisthesis at L5-S1 level  Stable on current pain regiment, schedule appointment with new physiatrist  - traMADol (ULTRAM) 50 MG Tab; Take 1 Tablet by mouth every 8 hours as needed for Severe Pain for up to 90 days.  Dispense: 90 Tablet; Refill: 2  - Consent for Opiate Prescription  - Controlled Substance Treatment Agreement    5. Opioid dependence with current use  (HCC)  Stable, patient attempting to use as little as possible of her tramadol, currently using more due to acute shoulder pain   reviewed and consistent  UDS reviewed and consistent with prescribed medications, no concerns  Controlled substance agreement obtained today    6. Need for vaccination  - INFLUENZA VACCINE, HIGH DOSE (65+ ONLY)      Followup: Return in about 4 weeks (around 10/18/2023) for Diabetes.    The MA is performing the above orders under the direction of Dr. Tena    Please note that this dictation was created using voice recognition software. I have made every reasonable attempt to correct obvious errors, but I expect that there are errors of grammar and possibly content that I did not discover before finalizing the note.

## 2023-09-21 NOTE — ASSESSMENT & PLAN NOTE
Chronic problem.  Has been established with physiatry, has undergone multiple interventional pain procedures including injections, nerve ablations which have provided some relief of pain.  She is status post right total knee replacement.  Currently taking tramadol 50 mg as needed, generally takes between 1 to 3 tablets/day.    Her physiatrist has left the practice, she is requesting that I take over her tramadol prescription until she is able to get established with new provider.

## 2023-09-22 ENCOUNTER — PHARMACY VISIT (OUTPATIENT)
Dept: PHARMACY | Facility: MEDICAL CENTER | Age: 63
End: 2023-09-22
Payer: COMMERCIAL

## 2023-09-28 ENCOUNTER — TELEMEDICINE (OUTPATIENT)
Dept: BEHAVIORAL HEALTH | Facility: CLINIC | Age: 63
End: 2023-09-28
Payer: MEDICARE

## 2023-09-28 DIAGNOSIS — F33.42 RECURRENT MAJOR DEPRESSIVE DISORDER, IN FULL REMISSION (HCC): ICD-10-CM

## 2023-09-28 DIAGNOSIS — F33.2 SEVERE EPISODE OF RECURRENT MAJOR DEPRESSIVE DISORDER, WITHOUT PSYCHOTIC FEATURES (HCC): ICD-10-CM

## 2023-09-28 DIAGNOSIS — F41.1 GAD (GENERALIZED ANXIETY DISORDER): ICD-10-CM

## 2023-09-28 DIAGNOSIS — F51.04 PSYCHOPHYSIOLOGICAL INSOMNIA: ICD-10-CM

## 2023-09-28 DIAGNOSIS — R73.02 IGT (IMPAIRED GLUCOSE TOLERANCE): ICD-10-CM

## 2023-09-28 PROCEDURE — 99215 OFFICE O/P EST HI 40 MIN: CPT | Mod: 95 | Performed by: PSYCHIATRY & NEUROLOGY

## 2023-09-28 PROCEDURE — RXMED WILLOW AMBULATORY MEDICATION CHARGE: Performed by: PSYCHIATRY & NEUROLOGY

## 2023-09-28 RX ORDER — VENLAFAXINE HYDROCHLORIDE 150 MG/1
150 CAPSULE, EXTENDED RELEASE ORAL DAILY
Qty: 90 CAPSULE | Refills: 2 | Status: SHIPPED | OUTPATIENT
Start: 2023-09-28 | End: 2023-12-08 | Stop reason: SDUPTHER

## 2023-09-28 RX ORDER — VENLAFAXINE HYDROCHLORIDE 75 MG/1
75 CAPSULE, EXTENDED RELEASE ORAL DAILY
Qty: 90 CAPSULE | Refills: 2 | Status: SHIPPED | OUTPATIENT
Start: 2023-09-28 | End: 2023-12-08 | Stop reason: SDUPTHER

## 2023-09-28 RX ORDER — TRAZODONE HYDROCHLORIDE 50 MG/1
25 TABLET ORAL NIGHTLY PRN
Qty: 90 TABLET | Refills: 3 | Status: SHIPPED | OUTPATIENT
Start: 2023-09-28 | End: 2023-12-08 | Stop reason: SDUPTHER

## 2023-09-28 RX ORDER — BUSPIRONE HYDROCHLORIDE 10 MG/1
TABLET ORAL
Qty: 45 TABLET | Refills: 0 | Status: SHIPPED | OUTPATIENT
Start: 2023-09-28 | End: 2023-11-06 | Stop reason: SDUPTHER

## 2023-09-28 RX ORDER — BUPROPION HYDROCHLORIDE 200 MG/1
200 TABLET, EXTENDED RELEASE ORAL 2 TIMES DAILY
Qty: 180 TABLET | Refills: 1 | Status: SHIPPED | OUTPATIENT
Start: 2023-09-28 | End: 2023-12-08 | Stop reason: SDUPTHER

## 2023-09-28 ASSESSMENT — ANXIETY QUESTIONNAIRES
6. BECOMING EASILY ANNOYED OR IRRITABLE: MORE THAN HALF THE DAYS
1. FEELING NERVOUS, ANXIOUS, OR ON EDGE: NEARLY EVERY DAY
5. BEING SO RESTLESS THAT IT IS HARD TO SIT STILL: SEVERAL DAYS
GAD7 TOTAL SCORE: 12
4. TROUBLE RELAXING: NEARLY EVERY DAY
2. NOT BEING ABLE TO STOP OR CONTROL WORRYING: SEVERAL DAYS
7. FEELING AFRAID AS IF SOMETHING AWFUL MIGHT HAPPEN: NOT AT ALL
3. WORRYING TOO MUCH ABOUT DIFFERENT THINGS: MORE THAN HALF THE DAYS

## 2023-09-28 ASSESSMENT — PATIENT HEALTH QUESTIONNAIRE - PHQ9
5. POOR APPETITE OR OVEREATING: 0 - NOT AT ALL
CLINICAL INTERPRETATION OF PHQ2 SCORE: 6
SUM OF ALL RESPONSES TO PHQ QUESTIONS 1-9: 15

## 2023-09-28 NOTE — PROGRESS NOTES
This evaluation was conducted via Zoom using secure and encrypted videoconferencing technology. The patient was in their home in the [facility].    The patient's identity was confirmed and verbal consent was obtained for this virtual visit.      PSYCHIATRY FOLLOW-UP NOTE      Name: Debby Desai  MRN: [id number]  : 1960  Age: 63 y.o.  Date of assessment: 2023  PCP: ARELIS Chris  Persons in attendance: Patient      REASON FOR VISIT/CHIEF COMPLAINT (as stated by Patient):  Debby Desai is a 63 y.o., White female, attending follow-up appointment for mood and anxiety management.      HISTORY OF PRESENT ILLNESS:  Debby Desai is a 63 y.o. old female with MDD, TIM and insomnia comes in today for follow up. Patient was last seen 2 months ago, and following treatment planning recommendations were done:  Continue Effexor XR to 225 mg daily for mood, anxiety and comorbid pain management.   Continue Wellbutrin  mg BID daily for depression augmentation.    Switch Mirtazapine 7.5 mg --> Trazodone 25-50 mg HS PRN for insomnia.    Monitor for serotonin syndrome.  Continue psychotherapy for mood and anxiety management.    Compliant with medications.  Cycling of mood noted: depression noted, lasting longer, reduced social interaction, reduced ability to enjoy activities (including ability to enjoy the marriages).   Depression: 3 weeks ago.  PHQ9: 15; GAD7: 12    Not able to find the likely reason for decline other than poor response to pain treatment recently.  Discussed the option to consider buspar addition for anxiety management.   Discussed TMS versus IOP.  Patient interested in IOP first for higher level of care.  Most session dedicated to active listening and implementing supportive psychotherapy.      CURRENT MEDICATIONS:  Current Outpatient Medications   Medication Sig Dispense Refill    diclofenac DR (VOLTAREN) 75 MG Tablet Delayed Response Take 1 Tablet by mouth 2 times a day. 28  Tablet 1    [START ON 10/6/2023] traMADol (ULTRAM) 50 MG Tab Take 1 Tablet by mouth every 8 hours as needed for Severe Pain for up to 90 days. 90 Tablet 2    venlafaxine (EFFEXOR-XR) 150 MG extended-release capsule Take 1 Capsule by mouth every day. (venlafaxine xr 150 mg + 75 mg = 225 mg daily) 90 Capsule 2    venlafaxine XR (EFFEXOR XR) 75 MG CAPSULE SR 24 HR Take 1 Capsule by mouth every day. (venlafaxine xr 150 mg + 75 mg = 225 mg daily) 90 Capsule 2    buPROPion (WELLBUTRIN SR) 200 MG SR tablet Take 1 tablet by mouth 2 times a day. 180 Tablet 1    traZODone (DESYREL) 50 MG Tab Take 0.5 tablets by mouth at bedtime as needed for sleep (as needed for sleep). (Can increase to 1 tab if needed for sleep) 90 Tablet 3    amLODIPine (NORVASC) 10 MG Tab Take 1 tablet by mouth every day. 90 Tablet 3    lisinopril (PRINIVIL) 40 MG tablet Take 1 Tablet by mouth every day. 90 Tablet 3    levothyroxine (SYNTHROID) 50 MCG Tab Take 1 tablet by mouth every morning on an empty stomach. 90 Tablet 3    metFORMIN ER (GLUCOPHAGE XR) 500 MG TABLET SR 24 HR Take 1 Tab by mouth 2 times a day. 180 Tablet 1    atorvastatin (LIPITOR) 20 MG Tab TAKE ONE TABLET BY MOUTH EVERY DAY 90 Tablet 3    VITAMIN D PO Take  by mouth every day.      Ferrous Sulfate (IRON) 325 (65 Fe) MG Tab Take 65 mg by mouth every day at 6 PM.       No current facility-administered medications for this visit.       MEDICAL HISTORY  Past Medical History:   Diagnosis Date    Anemia     in past    Anginal syndrome (HCC)     had work up and feet r/t anxiety    Anxiety     Arthritis     OA knees    Chronic pain 6/2/2021    Dental disorder 5/24/12    partial upper denture    Diabetes (HCC)     pre diabetic    High cholesterol     HTN (hypertension), benign 3/19/2012    Hypothyroid 3/19/2012    Major depression 3/19/2012    Orbital floor (blow-out) closed fracture (HCC)     left    Other specified symptom associated with female genital organs     post menopausal bleeding     Pain     thoracic spine, Right knee, myofacial pain, and Right shoulder    Pain     recently fell and has bruise left forehead    Pain 02/2018    chronic back pain, right shoulder    Psychiatric problem     depression, anxiety    Unspecified disorder of thyroid     Urinary bladder disorder     stress incont.    Urinary incontinence     Occasional     Past Surgical History:   Procedure Laterality Date    KY NEUROLYTIC DEST GENICULAR NERVE Right 7/28/2023    Procedure: RIGHT genicular nerve radiofrequency ablation;  Surgeon: Volodymyr Herring M.D.;  [facility];  Service: Pain Management    KY FLUOROSCOPIC GUIDANCE NEEDLE PLACEMENT Right 7/7/2023    Procedure: RIGHT genicular nerve diagnostic blocks;  Surgeon: Volodymyr Herring M.D.;  Location: [facility];  Service: Pain Management    INJ,EPI ANES/STER LUM/SAC ADDL Right 4/7/2021    Procedure: RIGHT lumbar five and sacral one transforaminal epidural;  Surgeon: Volodymyr Herring M.D.;  Location: [facility];  Service: Pain Management    PB TOTAL KNEE ARTHROPLASTY Right 9/16/2019    Procedure: RIGHT ARTHROPLASTY, KNEE, TOTAL;  Surgeon: Rufus Saba M.D.;  Location: [facility];  Service: Orthopedics    KNEE ARTHROSCOPY Right 2/9/2018    Procedure: KNEE ARTHROSCOPY;  Surgeon: Harsh Baltazar M.D.;  Location: [facility];  Service: Orthopedics    MENISCECTOMY, KNEE, MEDIAL Right 2/9/2018    Procedure: MEDIAL AND LATERAL MENISCECTOMY- PARTIAL;  Surgeon: Harsh Baltazar M.D.;  Location: [facility];  Service: Orthopedics    KNEE ARTHROSCOPY Right 7/12/2017    Procedure: KNEE ARTHROSCOPY- CESPEDES;  Surgeon: Harsh Baltazar M.D.;  Location: [facility];  Service:     MENISCECTOMY, KNEE, MEDIAL Right 7/12/2017    Procedure: PARTIAL MEDIAL AND LATERAL MENISCECTOMY;  Surgeon: Harsh Baltazar M.D.;  Location: [facility];  Service:      SYNOVECTOMY Right 7/12/2017    Procedure: SYNOVECTOMY;  Surgeon: Harsh Baltazar M.D.;  Location: [facility];  Service:     KNEE ARTHROSCOPY  4/21/2015    Performed by Rufus Saba M.D. at SURGERY [facility]    MENISCECTOMY, KNEE, MEDIAL  4/21/2015    Performed by Rufus Saba M.D. at SURGERY [facility]    PUMP REVISION  12/10/2012    Performed by Allison Guerra M.D. at SURGERY [facility]    SPINAL CORD STIMULATOR  5/30/2012    Performed by ALLISON GUERRA at SURGERY [facility]    BIOPSY GENERAL  5/1/12    endometrial    SPINAL CORD STIMULATOR  7/11/2011    Performed by ALLISON GUERRA at SURGERY [facility]    BLOCK EPIDURAL STEROID INJECTION  2008    x 3-4    LYMPH NODE EXCISION Left 2007    cervicle    OTHER Right 2001    thoracic discectomy      ARTHROSCOPY, KNEE Left 1999    RIB RESECTION Right 1980    LUMPECTOMY         PAST PSYCHIATRIC MEDICATIONS  Fluoxetine, Paroxetine, Sertraline, Citalopram  Venlafaxine, Duloxetine  Wellbutrin  Mirtazapine  Amitriptyline (switched to Mirtazapine during admission for dehydration related syncope)   Ativan, propranolol      REVIEW OF SYSTEMS:        Constitutional negative   Eyes negative   Ears/Nose/Mouth/Throat negative   Cardiovascular negative   Respiratory negative   Gastrointestinal negative   Genitourinary negative   Muscular negative   Integumentary negative   Neurological negative   Endocrine negative   Hematologic/Lymphatic negative     PHYSICAL EXAMINAION:  Vital signs: LMP 02/26/2012   Musculoskeletal: Normal gait.   Abnormal movements: none      MENTAL STATUS EXAMINATION      General:   - Grooming and hygiene: Casual,   - Apparent distress: tense,   - Behavior: Tense  - Eye Contact:  Good,   - no psychomotor agitation or retardation    - Participation: Active verbal participation  Orientation: Alert and Fully Oriented to person, place and time  Mood: Depressed and Anxious  Affect:  Constricted,  Thought Process: Logical and Goal-directed  Thought Content: Denies suicidal or homicidal ideations, intent or plan   Perception: Denies auditory or visual hallucinations. No delusions noted   Attention span and concentration: fair   Speech:Rate within normal limits and Volume within normal limits  Language: Appropriate   Insight: Good  Judgment: Good  Recent and remote memory: No gross evidence of memory deficits        DEPRESSION SCREENIN/14/2023     1:20 PM 2023     2:00 PM 2023     7:40 AM   Depression Screen (PHQ-2/PHQ-9)   PHQ-2 Total Score 2 2 2   PHQ-9 Total Score 4 4 6       Interpretation of PHQ-9 Total Score   Score Severity   1-4 No Depression   5-9 Mild Depression   10-14 Moderate Depression   15-19 Moderately Severe Depression   20-27 Severe Depression    CURRENT RISK:       Suicidal: Low       Homicidal: Low       Self-Harm: Low       Relapse: Low       Crisis Safety Plan Reviewed Not Indicated       If evidence of imminent risk is present, intervention/plan:      MEDICAL RECORDS/LABS/DIAGNOSTIC TESTS REVIEWED:  No new lab since last visit     [place]  records -   Reviewed     PLAN:  (1) MDD; (2) TIM; (3) Insomnia  Worsening depression and anxiety. PHQ9: 15; GAD7: 12  Continue Effexor XR to 225 mg daily for mood, anxiety and comorbid pain management.   Continue Wellbutrin  mg BID daily for depression augmentation.    Add Buspar 5 mg BID X 2 weeks --> 10 mg BID for anxiety.  Continue Trazodone 25-50 mg HS PRN for insomnia.    Monitor for serotonin syndrome.  Referral to IOP for depression and anxiety.  Continue psychotherapy for mood and anxiety management.  Medication options, alternatives (including no medications) and medication risks/benefits/side effects were discussed in detail.  Explained importance of contraceptive measures while on psychotropic medications, educated to let provider know if ever pregnant or wanting to become pregnant. Verbalized  understanding.  The patient was advised to call, message provider on [employer], or come in to the clinic if symptoms worsen or if any future questions/issues regarding their medications arise; the patient verbalized understanding and agreement.    The patient was educated to call 911, call the suicide hotline, or go to local ER if having thoughts of suicide or homicide; verbalized understanding.    Billing Coding based on:  64359: based on referral to higher level of care to [facility].     Return to clinic in 1 month or sooner if symptoms worsen.  Next Appointment: instruction provided on how to make the next appointment.     The proposed treatment plan was discussed with the patient who was provided the opportunity to ask questions and make suggestions regarding alternative treatment. Patient verbalized understanding and expressed agreement with the plan.       Genaro Stevenson M.D.  09/28/23    This note was created using voice recognition software (Dragon). The accuracy of the dictation is limited by the abilities of the software. I have reviewed the note prior to signing, however some errors in grammar and context are still possible. If you have any questions related to this note please do not hesitate to contact our office.

## 2023-09-29 PROCEDURE — RXMED WILLOW AMBULATORY MEDICATION CHARGE: Performed by: PSYCHIATRY & NEUROLOGY

## 2023-10-02 ENCOUNTER — HOSPITAL ENCOUNTER (OUTPATIENT)
Dept: BEHAVIORAL HEALTH | Facility: MEDICAL CENTER | Age: 63
End: 2023-10-02
Attending: PSYCHIATRY & NEUROLOGY
Payer: MEDICARE

## 2023-10-02 ENCOUNTER — PHARMACY VISIT (OUTPATIENT)
Dept: PHARMACY | Facility: MEDICAL CENTER | Age: 63
End: 2023-10-02
Payer: COMMERCIAL

## 2023-10-02 DIAGNOSIS — F33.2 SEVERE EPISODE OF RECURRENT MAJOR DEPRESSIVE DISORDER, WITHOUT PSYCHOTIC FEATURES (HCC): ICD-10-CM

## 2023-10-02 DIAGNOSIS — F41.1 GAD (GENERALIZED ANXIETY DISORDER): ICD-10-CM

## 2023-10-02 PROCEDURE — 90791 PSYCH DIAGNOSTIC EVALUATION: CPT | Performed by: MARRIAGE & FAMILY THERAPIST

## 2023-10-02 ASSESSMENT — ANXIETY QUESTIONNAIRES
7. FEELING AFRAID AS IF SOMETHING AWFUL MIGHT HAPPEN: NOT AT ALL
6. BECOMING EASILY ANNOYED OR IRRITABLE: NEARLY EVERY DAY
3. WORRYING TOO MUCH ABOUT DIFFERENT THINGS: MORE THAN HALF THE DAYS
GAD7 TOTAL SCORE: 10
2. NOT BEING ABLE TO STOP OR CONTROL WORRYING: MORE THAN HALF THE DAYS
IF YOU CHECKED OFF ANY PROBLEMS ON THIS QUESTIONNAIRE, HOW DIFFICULT HAVE THESE PROBLEMS MADE IT FOR YOU TO DO YOUR WORK, TAKE CARE OF THINGS AT HOME, OR GET ALONG WITH OTHER PEOPLE: VERY DIFFICULT
4. TROUBLE RELAXING: NOT AT ALL
1. FEELING NERVOUS, ANXIOUS, OR ON EDGE: NEARLY EVERY DAY
5. BEING SO RESTLESS THAT IT IS HARD TO SIT STILL: NOT AT ALL

## 2023-10-02 ASSESSMENT — PATIENT HEALTH QUESTIONNAIRE - PHQ9
SUM OF ALL RESPONSES TO PHQ QUESTIONS 1-9: 17
CLINICAL INTERPRETATION OF PHQ2 SCORE: 6
5. POOR APPETITE OR OVEREATING: 1 - SEVERAL DAYS
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
## 2023-10-03 PROCEDURE — RXMED WILLOW AMBULATORY MEDICATION CHARGE: Performed by: NURSE PRACTITIONER

## 2023-10-04 ENCOUNTER — HOSPITAL ENCOUNTER (OUTPATIENT)
Dept: BEHAVIORAL HEALTH | Facility: MEDICAL CENTER | Age: 63
End: 2023-10-04
Attending: PSYCHIATRY & NEUROLOGY
Payer: MEDICARE

## 2023-10-04 ENCOUNTER — DOCUMENTATION (OUTPATIENT)
Dept: BEHAVIORAL HEALTH | Facility: CLINIC | Age: 63
End: 2023-10-04

## 2023-10-04 DIAGNOSIS — F41.1 GAD (GENERALIZED ANXIETY DISORDER): ICD-10-CM

## 2023-10-04 DIAGNOSIS — F33.2 SEVERE EPISODE OF RECURRENT MAJOR DEPRESSIVE DISORDER, WITHOUT PSYCHOTIC FEATURES (HCC): ICD-10-CM

## 2023-10-04 PROCEDURE — 90853 GROUP PSYCHOTHERAPY: CPT | Performed by: MARRIAGE & FAMILY THERAPIST

## 2023-10-04 NOTE — GROUP NOTE
Group Appointment Information    Date: 10/04/23   Attendance Duration: 60 minutes  Number of Participants: 7 participants  Program / Group: IOP - Intensive Outpatient Program  Topics Covered: Anger      Group Therapy Start Time:  9:00 AM    Attendance: Attended  Participation: Active verbal participation    Affect/Mood Range: Normal range  Affect/Mood Display: CWC - Congruent w/Content  Cognition: Alert and Oriented    Evidence of imminent suicide risk: No   Evidence of imminent homicide risk: No     Therapeutic Interventions: Psychoeducation and Emotion clarification  Progress Toward Treatment Goal: Moderate improvement; pt. Demonstrates curiosity and willingness to engage in group activities.

## 2023-10-05 PROCEDURE — RXMED WILLOW AMBULATORY MEDICATION CHARGE: Performed by: NURSE PRACTITIONER

## 2023-10-05 RX ORDER — METFORMIN HYDROCHLORIDE 500 MG/1
500 TABLET, EXTENDED RELEASE ORAL 2 TIMES DAILY
Qty: 200 TABLET | Refills: 1 | Status: SHIPPED | OUTPATIENT
Start: 2023-10-05 | End: 2024-03-23 | Stop reason: SDUPTHER

## 2023-10-05 NOTE — GROUP NOTE
Group Appointment Information    Date: 10/04/23   Attendance Duration: 60 minutes  Number of Participants: 8 participants  Program / Group: IOP - Intensive Outpatient Program  Topics Covered: Anger      Group Therapy Start Time: 11:00 AM    Attendance: Attended  Participation: Active verbal participation    Affect/Mood Range: Normal range  Affect/Mood Display: CWC - Congruent w/Content  Cognition: Alert    Evidence of imminent suicide risk: No   Evidence of imminent homicide risk: No     Therapeutic Interventions: Emotion clarification and Supportive psychotherapy  Progress Toward Treatment Goal: Moderate improvement; pt. Continued to process emotions.

## 2023-10-05 NOTE — GROUP NOTE
Group Appointment Information    Date: 10/04/23   Attendance Duration: 60 minutes  Number of Participants: 8 participants  Program / Group: IOP - Intensive Outpatient Program  Topics Covered: Anger      Group Therapy Start Time: 10:00 AM    Attendance: Attended  Participation: Active verbal participation    Affect/Mood Range: Normal range  Affect/Mood Display: CWC - Congruent w/Content  Cognition: Alert and Oriented    Evidence of imminent suicide risk: No   Evidence of imminent homicide risk: No     Therapeutic Interventions: Emotion clarification and Supportive psychotherapy  Progress Toward Treatment Goal: Moderate improvement; pt. Is open and shared out some of her current stressors including her adult nephew who is living with her.  She was well supported by the group.

## 2023-10-06 ENCOUNTER — PHARMACY VISIT (OUTPATIENT)
Dept: PHARMACY | Facility: MEDICAL CENTER | Age: 63
End: 2023-10-06
Payer: COMMERCIAL

## 2023-10-06 ENCOUNTER — HOSPITAL ENCOUNTER (OUTPATIENT)
Dept: BEHAVIORAL HEALTH | Facility: MEDICAL CENTER | Age: 63
End: 2023-10-06
Attending: PSYCHIATRY & NEUROLOGY
Payer: MEDICARE

## 2023-10-06 DIAGNOSIS — F33.2 SEVERE EPISODE OF RECURRENT MAJOR DEPRESSIVE DISORDER, WITHOUT PSYCHOTIC FEATURES (HCC): ICD-10-CM

## 2023-10-06 DIAGNOSIS — F41.1 GAD (GENERALIZED ANXIETY DISORDER): ICD-10-CM

## 2023-10-06 PROCEDURE — RXMED WILLOW AMBULATORY MEDICATION CHARGE: Performed by: NURSE PRACTITIONER

## 2023-10-06 PROCEDURE — 90853 GROUP PSYCHOTHERAPY: CPT | Performed by: MARRIAGE & FAMILY THERAPIST

## 2023-10-06 NOTE — GROUP NOTE
Group Appointment Information    Date: 10/06/23   Attendance Duration: 45 minutes  Number of Participants: 6 participants  Program / Group: IOP - Intensive Outpatient Program  Topics Covered: Regulating emotions      Group Therapy Start Time: 11:00 AM    Attendance: Attended  Participation: Active verbal participation    Affect/Mood Range: Normal range  Affect/Mood Display: CWC - Congruent w/Content  Cognition: Alert and Oriented    Evidence of imminent suicide risk: No   Evidence of imminent homicide risk: No     Therapeutic Interventions: Emotion clarification and Supportive psychotherapy  Progress Toward Treatment Goal: Moderate improvement; Pt continued to process emotions

## 2023-10-06 NOTE — GROUP NOTE
Group Appointment Information    Date: 10/06/23   Attendance Duration: 60 minutes  Number of Participants: 6 participants  Program / Group: IOP - Intensive Outpatient Program  Topics Covered: Self Affirmation      Group Therapy Start Time: 10:00 AM    Attendance: Attended  Participation: Active verbal participation    Affect/Mood Range: Normal range  Affect/Mood Display: CWC - Congruent w/Content  Cognition: Alert and Oriented    Evidence of imminent suicide risk: No   Evidence of imminent homicide risk: No     Therapeutic Interventions: Emotion clarification and Supportive psychotherapy  Progress Toward Treatment Goal: Moderate improvement; pt. Shared out what she is trying to do to help with her chronic pain.  The group shared in the decussion.

## 2023-10-06 NOTE — GROUP NOTE
Group Appointment Information    Date: 10/06/23   Attendance Duration: 60 minutes  Number of Participants: 6 participants  Program / Group: IOP - Intensive Outpatient Program  Topics Covered: Boundaries      Group Therapy Start Time:  9:00 AM    Attendance: Attended  Participation: Active verbal participation    Affect/Mood Range: Normal range  Affect/Mood Display: CWC - Congruent w/Content  Cognition: Alert and Oriented    Evidence of imminent suicide risk: No   Evidence of imminent homicide risk: No     Therapeutic Interventions: Cognitive clarification and Supportive psychotherapy  Progress Toward Treatment Goal: Moderate improvement pt. Shared out about having chronic pain and how she is trying to manage it.

## 2023-10-09 ENCOUNTER — HOSPITAL ENCOUNTER (OUTPATIENT)
Dept: BEHAVIORAL HEALTH | Facility: MEDICAL CENTER | Age: 63
End: 2023-10-09
Attending: PSYCHIATRY & NEUROLOGY
Payer: MEDICARE

## 2023-10-09 DIAGNOSIS — F41.1 GENERALIZED ANXIETY DISORDER: ICD-10-CM

## 2023-10-09 DIAGNOSIS — F33.2 SEVERE RECURRENT MAJOR DEPRESSION WITHOUT PSYCHOTIC FEATURES (HCC): ICD-10-CM

## 2023-10-09 PROCEDURE — 90853 GROUP PSYCHOTHERAPY: CPT | Performed by: MARRIAGE & FAMILY THERAPIST

## 2023-10-09 NOTE — GROUP NOTE
Group Appointment Information    Date: 10/09/23   Attendance Duration: 45 minutes  Number of Participants: 7 participants  Program / Group: IOP - Intensive Outpatient Program  Topics Covered: Values based action      Group Therapy Start Time: 11:00 AM    Attendance: Attended  Participation: Active verbal participation    Affect/Mood Range: Normal range  Affect/Mood Display: CWC - Congruent w/Content  Cognition: Alert and Oriented    Evidence of imminent suicide risk: No   Evidence of imminent homicide risk: No     Therapeutic Interventions: Emotion clarification and Supportive psychotherapy  Progress Toward Treatment Goal: Moderate improvement; Pt. Continued to process emotions.

## 2023-10-09 NOTE — GROUP NOTE
Group Appointment Information    Date: 10/09/23   Attendance Duration: 60 minutes  Number of Participants: 7 participants  Program / Group: IOP - Intensive Outpatient Program  Topics Covered: Values based action      Group Therapy Start Time: 10:00 AM    Attendance: Attended  Participation: Active verbal participation    Affect/Mood Range: Normal range  Affect/Mood Display: CWC - Congruent w/Content  Cognition: Alert and Oriented    Evidence of imminent suicide risk: No   Evidence of imminent homicide risk: No     Therapeutic Interventions: Emotion clarification and Supportive psychotherapy  Progress Toward Treatment Goal: Moderate improvement; pt. Said she wasn't feeling well today. She thinks she might have bee too active yesterday.

## 2023-10-09 NOTE — GROUP NOTE
Group Appointment Information    Date: 10/09/23   Attendance Duration: 60 minutes  Number of Participants: 6 participants  Program / Group: IOP - Intensive Outpatient Program  Topics Covered: Values based action      Group Therapy Start Time:  9:00 AM    Attendance: Attended  Participation: Active verbal participation    Affect/Mood Range: Normal range  Affect/Mood Display: CWC - Congruent w/Content  Cognition: Alert and Oriented    Evidence of imminent suicide risk: No   Evidence of imminent homicide risk: No     Therapeutic Interventions: Psychoeducation and Cognitive clarification  Progress Toward Treatment Goal: Moderate improvement

## 2023-10-11 ENCOUNTER — HOSPITAL ENCOUNTER (OUTPATIENT)
Dept: BEHAVIORAL HEALTH | Facility: MEDICAL CENTER | Age: 63
End: 2023-10-11
Attending: PSYCHIATRY & NEUROLOGY
Payer: MEDICARE

## 2023-10-11 DIAGNOSIS — F41.1 GAD (GENERALIZED ANXIETY DISORDER): ICD-10-CM

## 2023-10-11 DIAGNOSIS — F33.2 SEVERE EPISODE OF RECURRENT MAJOR DEPRESSIVE DISORDER, WITHOUT PSYCHOTIC FEATURES (HCC): ICD-10-CM

## 2023-10-11 PROCEDURE — 90853 GROUP PSYCHOTHERAPY: CPT | Performed by: MARRIAGE & FAMILY THERAPIST

## 2023-10-11 PROCEDURE — 90832 PSYTX W PT 30 MINUTES: CPT | Mod: XU | Performed by: MARRIAGE & FAMILY THERAPIST

## 2023-10-11 NOTE — GROUP NOTE
Group Appointment Information    Date: 10/11/23   Attendance Duration: 45 minutes  Number of Participants: 6 participants  Program / Group: IOP - Intensive Outpatient Program  Topics Covered: Other (Comment):Process Cognitive Distortions      Group Therapy Start Time: 11:00 AM    Attendance: Attended  Participation: Active verbal participation    Affect/Mood Range: Normal range  Affect/Mood Display: CWC - Congruent w/Content  Cognition: Alert and Oriented    Evidence of imminent suicide risk: No   Evidence of imminent homicide risk: No     Therapeutic Interventions: Emotion clarification and Supportive psychotherapy  Progress Toward Treatment Goal: Moderate improvement; pt continued to process emotions.

## 2023-10-11 NOTE — GROUP NOTE
Group Appointment Information    Date: 10/11/23   Attendance Duration: 60 minutes  Number of Participants: 7 participants  Program / Group: IOP - Intensive Outpatient Program  Topics Covered: Cognitive distortions      Group Therapy Start Time: 10:00 AM    Attendance: Attended  Participation: Active verbal participation    Affect/Mood Range: Normal range  Affect/Mood Display: CWC - Congruent w/Content  Cognition: Alert and Oriented    Evidence of imminent suicide risk: No   Evidence of imminent homicide risk: No     Therapeutic Interventions: Emotion clarification and Supportive psychotherapy  Progress Toward Treatment Goal: Moderate improvement; pt. Shared some of her stressors, such as receiving notice that she had to clean her yard.  She said her family did rally to help her out.

## 2023-10-11 NOTE — GROUP NOTE
Group Appointment Information    Date: 10/11/23   Attendance Duration: 60 minutes  Number of Participants: 7 participants  Program / Group: IOP - Intensive Outpatient Program  Topics Covered: Stress Management      Group Therapy Start Time:  9:00 AM    Attendance: Attended  Participation: Active verbal participation    Affect/Mood Range: Normal range  Affect/Mood Display: CWC - Congruent w/Content  Cognition: Alert and Oriented    Evidence of imminent suicide risk: No   Evidence of imminent homicide risk: No     Therapeutic Interventions: Psychoeducation and Cognitive clarification  Progress Toward Treatment Goal: Moderate improvement; pt. Noted that she, like many of us tends to predict the worst.

## 2023-10-12 NOTE — PROGRESS NOTES
" [facility]  Therapy Progress Note    Patient Name: Debby Desai  Patient MRN: [id number]  Today's Date: 10/11/2023     Type of session:Individual psychotherapy  Length of session: 30 minutes  Persons in attendance:Patient    Subjective/New Info: Pt shared that her pain level feel 8 out of 10 with 10 representing the most felt pain.  She reports the pain is on the \"right side. I get so tired throughout the day.\" She reports to \"feel exhausted every day between 1:30 and 2:30.\"  She sleeps then for a few hours.  \"I am overwhelmed.  Things aren't going the way I planned.  I don't have a touchstone anymore and haven't for ten years.\"  She had a good friend and they have lost touch.  She took her nephew in to her home after his mother .  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  She has a home of her own; \"I never really call it my home.\"  She fells that her real home is where her mother and father lived.  Today pt reports to feel 9 out of  10 on a scale for depression with 10 representing the most depressed.      Objective/Observations:   Participation: Active verbal participation   Grooming: Casual   Cognition: Alert and Fully Oriented   Eye contact: Good   Mood: Depressed   Affect: Constricted   Thought process: Logical   Speech: Rate within normal limits and Volume within normal limits   Other:     Diagnoses:   1. Severe episode of recurrent major depressive disorder, without psychotic features (HCC)    2. TIM (generalized anxiety disorder)         Current risk:   SUICIDE: Low   Homicide: Low   Self-harm: Low   Relapse: Low   Other:    Safety Plan reviewed? Not Indicated   If evidence of imminent risk is present, intervention/plan:     Therapeutic Intervention(s): Problem-solving, Self-care skills, Stressors assessed, and Supportive psychotherapy    Treatment Goal(s)/Objective(s) addressed: Pt. Will benefit from more attention to self and environmental (home) care.  She will " also benefit from considering new housing options which might include living in a community of same age peers to build affiliations with others.       Progress toward Treatment Goals: Mild improvement    Plan:  - Continue Intensive Outpatient Program    KATT Schaefer  10/11/2023

## 2023-10-13 ENCOUNTER — HOSPITAL ENCOUNTER (OUTPATIENT)
Dept: BEHAVIORAL HEALTH | Facility: MEDICAL CENTER | Age: 63
End: 2023-10-13
Attending: PSYCHIATRY & NEUROLOGY
Payer: MEDICARE

## 2023-10-13 DIAGNOSIS — F33.2 SEVERE EPISODE OF RECURRENT MAJOR DEPRESSIVE DISORDER, WITHOUT PSYCHOTIC FEATURES (HCC): ICD-10-CM

## 2023-10-13 DIAGNOSIS — F41.1 GAD (GENERALIZED ANXIETY DISORDER): ICD-10-CM

## 2023-10-13 PROCEDURE — 90853 GROUP PSYCHOTHERAPY: CPT | Performed by: MARRIAGE & FAMILY THERAPIST

## 2023-10-13 NOTE — GROUP NOTE
Group Appointment Information    Date: 10/13/23   Attendance Duration: 60 minutes  Number of Participants: 7 participants  Program / Group: IOP - Intensive Outpatient Program  Topics Covered: Codependency      Group Therapy Start Time: 10:00 AM    Attendance: Attended  Participation: Active verbal participation    Affect/Mood Range: Normal range  Affect/Mood Display: CWC - Congruent w/Content  Cognition: Alert and Oriented    Evidence of imminent suicide risk: No   Evidence of imminent homicide risk: No     Therapeutic Interventions: Emotion clarification and Supportive psychotherapy  Progress Toward Treatment Goal: Moderate improvement; pt. Shared that she felt her mood was much improved today.

## 2023-10-13 NOTE — GROUP NOTE
Group Appointment Information    Date: 10/13/23   Attendance Duration: 60 minutes  Number of Participants: 7 participants  Program / Group: IOP - Intensive Outpatient Program  Topics Covered: Codependency      Group Therapy Start Time:  9:00 AM    Attendance: Attended  Participation: Active verbal participation    Affect/Mood Range: Normal range  Affect/Mood Display: CWC - Congruent w/Content  Cognition: Alert and Oriented    Evidence of imminent suicide risk: No   Evidence of imminent homicide risk: No     Therapeutic Interventions: Psychoeducation and Cognitive clarification  Progress Toward Treatment Goal: Moderate improvement

## 2023-10-13 NOTE — GROUP NOTE
Group Appointment Information    Date: 10/13/23   Attendance Duration: 45 minutes  Number of Participants: 7 participants  Program / Group: IOP - Intensive Outpatient Program  Topics Covered: Codependency; process group      Group Therapy Start Time: 11:00 AM    Attendance: Attended  Participation: Active verbal participation    Affect/Mood Range: Normal range  Affect/Mood Display: CWC - Congruent w/Content  Cognition: Alert and Oriented    Evidence of imminent suicide risk: No   Evidence of imminent homicide risk: No     Therapeutic Interventions: Emotion clarification and Supportive psychotherapy  Progress Toward Treatment Goal: Moderate improvement; pt. Continued to process emotions.

## 2023-10-16 ENCOUNTER — HOSPITAL ENCOUNTER (OUTPATIENT)
Dept: BEHAVIORAL HEALTH | Facility: MEDICAL CENTER | Age: 63
End: 2023-10-16
Attending: PSYCHIATRY & NEUROLOGY
Payer: MEDICARE

## 2023-10-16 DIAGNOSIS — F33.2 SEVERE EPISODE OF RECURRENT MAJOR DEPRESSIVE DISORDER, WITHOUT PSYCHOTIC FEATURES (HCC): ICD-10-CM

## 2023-10-16 DIAGNOSIS — F41.1 GAD (GENERALIZED ANXIETY DISORDER): ICD-10-CM

## 2023-10-16 PROCEDURE — 90853 GROUP PSYCHOTHERAPY: CPT | Performed by: MARRIAGE & FAMILY THERAPIST

## 2023-10-16 PROCEDURE — 90832 PSYTX W PT 30 MINUTES: CPT | Mod: XU | Performed by: MARRIAGE & FAMILY THERAPIST

## 2023-10-16 NOTE — PROGRESS NOTES
" [facility]  Therapy Progress Note    Patient Name: Debby Desai  Patient MRN: [id number]  Today's Date: 10/16/2023     Type of session:Individual psychotherapy  Length of session: 30 minutes  Persons in attendance:Patient    Subjective/New Info: Pt. Reported that various family members followed through on their offer to help her out with her yard.  She said it was busy weekend.  It \"felt good,\" to get some help and for her family to follow through. She reports to feel that her medication is working for her.  She said she \"was constantly anxious.\"  She reports to not notice if she is \"doing\" anything differently, but she is also worried because she \"has a hard time remembering words.\"  She is using THC and CBD to help with her anxiety.  She also noted that she \"is not as irritable,\" as she had been.      Objective/Observations:   Participation: Active verbal participation   Grooming: Casual   Cognition: Alert and Fully Oriented   Eye contact: Good   Mood: Euthymic   Affect: Flexible and Full range   Thought process: Logical and Goal-directed   Speech: Rate within normal limits and Volume within normal limits   Other:     Diagnoses:   1. Severe episode of recurrent major depressive disorder, without psychotic features (HCC)    2. TIM (generalized anxiety disorder)         Current risk:   SUICIDE: Low   Homicide: Low   Self-harm: Low   Relapse: Low   Other:    Safety Plan reviewed? No   If evidence of imminent risk is present, intervention/plan:     Therapeutic Intervention(s): Positive behavior reinforced, Stressors assessed, and Supportive psychotherapy    Treatment Goal(s)/Objective(s) addressed: Pt. Plans to follow through on some activities she has been putting off.  She will \"start a to-do list, contact a  about her property and call her financial company to see if she can take out some withdrawals     Progress toward Treatment Goals: Significant improvement    Plan:  - Continue " Intensive Outpatient Program    KATT Schaefer  10/16/2023

## 2023-10-16 NOTE — GROUP NOTE
Group Appointment Information    Date: 10/16/23   Attendance Duration: 60 minutes  Number of Participants: 7 participants  Program / Group: IOP - Intensive Outpatient Program  Topics Covered: Self Affirmation      Group Therapy Start Time: 10:00 AM    Attendance: Attended  Participation: Active verbal participation    Affect/Mood Range: Normal range  Affect/Mood Display: CWC - Congruent w/Content  Cognition: Alert and Oriented    Evidence of imminent suicide risk: No   Evidence of imminent homicide risk: No     Therapeutic Interventions: Emotion clarification and Supportive psychotherapy  Progress Toward Treatment Goal: Moderate improvement; pt. Shared out and was sell supported by fellow group members.  She believes her medication regimen is helping her feel a little less depressed.

## 2023-10-16 NOTE — GROUP NOTE
Group Appointment Information    Date: 10/16/23   Attendance Duration: 60 minutes  Number of Participants: 7 participants  Program / Group: IOP - Intensive Outpatient Program  Topics Covered: [place] kindness      Group Therapy Start Time:  9:00 AM    Attendance: Attended  Participation: Active verbal participation    Affect/Mood Range: Normal range  Affect/Mood Display: CWC - Congruent w/Content  Cognition: Alert and Oriented    Evidence of imminent suicide risk: No   Evidence of imminent homicide risk: No     Therapeutic Interventions: Psychoeducation and Cognitive clarification  Progress Toward Treatment Goal: Moderate improvement

## 2023-10-17 NOTE — GROUP NOTE
Group Appointment Information    Date: 10/16/23   Attendance Duration: 45 minutes  Number of Participants: 7 participants  Program / Group: IOP - Intensive Outpatient Program  Topics Covered: Stress Management      Group Therapy Start Time: 11:00 AM    Attendance: Attended  Participation: Active verbal participation    Affect/Mood Range: Normal range  Affect/Mood Display: CWC - Congruent w/Content  Cognition: Alert and Oriented    Evidence of imminent suicide risk: No   Evidence of imminent homicide risk: No     Therapeutic Interventions: Psychoeducation, Emotion clarification, and Supportive psychotherapy  Progress Toward Treatment Goal: Moderate improvement; pt. Continued to process emotions.

## 2023-10-18 ENCOUNTER — APPOINTMENT (OUTPATIENT)
Dept: BEHAVIORAL HEALTH | Facility: MEDICAL CENTER | Age: 63
End: 2023-10-18
Attending: PSYCHIATRY & NEUROLOGY
Payer: MEDICARE

## 2023-10-19 ENCOUNTER — OFFICE VISIT (OUTPATIENT)
Dept: MEDICAL GROUP | Facility: MEDICAL CENTER | Age: 63
End: 2023-10-19
Payer: MEDICARE

## 2023-10-19 VITALS
OXYGEN SATURATION: 98 % | HEIGHT: 66 IN | WEIGHT: 167 LBS | BODY MASS INDEX: 26.84 KG/M2 | HEART RATE: 74 BPM | SYSTOLIC BLOOD PRESSURE: 112 MMHG | TEMPERATURE: 97 F | DIASTOLIC BLOOD PRESSURE: 62 MMHG

## 2023-10-19 DIAGNOSIS — I10 PRIMARY HYPERTENSION: ICD-10-CM

## 2023-10-19 DIAGNOSIS — Z11.4 ENCOUNTER FOR SCREENING FOR HIV: ICD-10-CM

## 2023-10-19 DIAGNOSIS — M25.512 ACUTE PAIN OF LEFT SHOULDER: ICD-10-CM

## 2023-10-19 DIAGNOSIS — E03.9 ACQUIRED HYPOTHYROIDISM: ICD-10-CM

## 2023-10-19 DIAGNOSIS — E78.5 DYSLIPIDEMIA: ICD-10-CM

## 2023-10-19 DIAGNOSIS — R73.03 PREDIABETES: ICD-10-CM

## 2023-10-19 DIAGNOSIS — F33.2 SEVERE EPISODE OF RECURRENT MAJOR DEPRESSIVE DISORDER, WITHOUT PSYCHOTIC FEATURES (HCC): ICD-10-CM

## 2023-10-19 DIAGNOSIS — E55.9 VITAMIN D DEFICIENCY: ICD-10-CM

## 2023-10-19 PROBLEM — E11.65 TYPE 2 DIABETES MELLITUS WITH HYPERGLYCEMIA (HCC): Status: RESOLVED | Noted: 2023-05-23 | Resolved: 2023-10-19

## 2023-10-19 PROCEDURE — 99214 OFFICE O/P EST MOD 30 MIN: CPT | Performed by: NURSE PRACTITIONER

## 2023-10-19 PROCEDURE — 3074F SYST BP LT 130 MM HG: CPT | Performed by: NURSE PRACTITIONER

## 2023-10-19 PROCEDURE — 1170F FXNL STATUS ASSESSED: CPT | Performed by: NURSE PRACTITIONER

## 2023-10-19 PROCEDURE — 3078F DIAST BP <80 MM HG: CPT | Performed by: NURSE PRACTITIONER

## 2023-10-19 ASSESSMENT — FIBROSIS 4 INDEX: FIB4 SCORE: 0.61

## 2023-10-19 NOTE — ASSESSMENT & PLAN NOTE
Chronic. Established with psychiatry and therapy. Has had more situational depression lately due to the recent passing of her younger sister after a long cannon with cancer.     Now doing intensive outpatient therapy three times weekly which has been good.

## 2023-10-19 NOTE — ASSESSMENT & PLAN NOTE
Chronic. Stable on metformin  mg BID. Strong family history of diabetes. Last A1C 5.6, due for labs

## 2023-10-19 NOTE — ASSESSMENT & PLAN NOTE
Chronic problem, intermittent, flared up 8 weeks ago and has been causing pain daily. Radiates down arm, up into neck, into left scapula    Has had multiple falls over the past few years, has fallen on shoulder in the past. Had Xrays done in 2021 which showed mild arthritis, possible chronic rotator cuff tear.     Unsure if there was a triggering event recently, no new falls or trauma.     Referred to ortho for this last month, has appointment in a few weeks. Taking diclofenac twice daily as needed which has been helping.

## 2023-10-20 ENCOUNTER — HOSPITAL ENCOUNTER (OUTPATIENT)
Dept: BEHAVIORAL HEALTH | Facility: MEDICAL CENTER | Age: 63
End: 2023-10-20
Attending: PSYCHIATRY & NEUROLOGY
Payer: MEDICARE

## 2023-10-20 DIAGNOSIS — F41.1 GAD (GENERALIZED ANXIETY DISORDER): ICD-10-CM

## 2023-10-20 DIAGNOSIS — F33.2 SEVERE EPISODE OF RECURRENT MAJOR DEPRESSIVE DISORDER, WITHOUT PSYCHOTIC FEATURES (HCC): ICD-10-CM

## 2023-10-20 PROCEDURE — 90853 GROUP PSYCHOTHERAPY: CPT | Performed by: MARRIAGE & FAMILY THERAPIST

## 2023-10-20 NOTE — GROUP NOTE
Group Appointment Information    Date: 10/20/23   Attendance Duration: 60 minutes  Number of Participants: 10 participants  Program / Group: IOP - Intensive Outpatient Program  Topics Covered: Other (Comment):self compassion      Group Therapy Start Time: 10:00 AM    Attendance: Attended  Participation: Limited verbal participation    Affect/Mood Range: Constricted  Affect/Mood Display: CWC - Congruent w/Content  Cognition: Alert and Oriented    Evidence of imminent suicide risk: No   Evidence of imminent homicide risk: No     Therapeutic Interventions: Emotion clarification and Supportive psychotherapy  Progress Toward Treatment Goal: No change; pt, was relatively quiet today.  She reported to be experiencing pain and was experiencing felt depression.

## 2023-10-20 NOTE — GROUP NOTE
"Group Appointment Information    Date: 10/20/23   Attendance Duration: 60 minutes  Number of Participants: 10 participants  Program / Group: IOP - Intensive Outpatient Program  Topics Covered: Regulating emotions      Group Therapy Start Time: 11:00 AM    Attendance: Attended  Participation: Active verbal participation    Affect/Mood Range: Normal range  Affect/Mood Display: CWC - Congruent w/Content  Cognition: Alert and Oriented    Evidence of imminent suicide risk: No   Evidence of imminent homicide risk: No     Therapeutic Interventions: Emotion clarification and Supportive psychotherapy  Progress Toward Treatment Goal: No change; pt. Appeared to not be feeling well and she was recognized for showing up even  though she was feeling pain and was not reportedly \"doing well.\"    "

## 2023-10-20 NOTE — PROGRESS NOTES
Subjective:   Debby Desai is a 63 y.o. female here today for follow up    Acute pain of left shoulder  Chronic problem, intermittent, flared up 8 weeks ago and has been causing pain daily. Radiates down arm, up into neck, into left scapula    Has had multiple falls over the past few years, has fallen on shoulder in the past. Had Xrays done in 2021 which showed mild arthritis, possible chronic rotator cuff tear.     Unsure if there was a triggering event recently, no new falls or trauma.     Referred to ortho for this last month, has appointment in a few weeks. Taking diclofenac twice daily as needed which has been helping.     Prediabetes  Chronic. Stable on metformin  mg BID. Strong family history of diabetes. Last A1C 5.6, due for labs    Severe episode of recurrent major depressive disorder, without psychotic features (HCC)  Chronic. Established with psychiatry and therapy. Has had more situational depression lately due to the recent passing of her younger sister after a long cannon with cancer.     Now doing intensive outpatient therapy three times weekly which has been good.      Current medicines (including changes today)  Current Outpatient Medications   Medication Sig Dispense Refill    metFORMIN ER (GLUCOPHAGE XR) 500 MG TABLET SR 24 HR Take 1 Tablet by mouth 2 times a day. 200 Tablet 1    buPROPion (WELLBUTRIN SR) 200 MG SR tablet Take 1 tablet by mouth 2 times a day. 180 Tablet 1    traZODone (DESYREL) 50 MG Tab Take 0.5 tablets by mouth at bedtime as needed for sleep (as needed for sleep). (Can increase to 1 tab if needed for sleep) 90 Tablet 3    venlafaxine XR (EFFEXOR XR) 75 MG CAPSULE SR 24 HR Take 1 capsule by mouth every day. (venlafaxine xr 150 mg + 75 mg = 225 mg daily) 90 Capsule 2    venlafaxine (EFFEXOR-XR) 150 MG extended-release capsule Take 1 Capsule by mouth every day. (venlafaxine xr 150 mg + 75 mg = 225 mg daily) 90 Capsule 2    busPIRone (BUSPAR) 10 MG Tab tablet Take 0.5  "tablets by mouth 2 times a day for 15 days, THEN 1 Tablet 2 times a day for 15 days. 45 Tablet 0    diclofenac DR (VOLTAREN) 75 MG Tablet Delayed Response Take 1 Tablet by mouth 2 times a day. 28 Tablet 1    traMADol (ULTRAM) 50 MG Tab Take 1 tablet by mouth every 8 hours as needed for Severe Pain for up to 90 days. 90 Tablet 2    amLODIPine (NORVASC) 10 MG Tab Take 1 tablet by mouth every day. 90 Tablet 3    lisinopril (PRINIVIL) 40 MG tablet Take 1 Tablet by mouth every day. 90 Tablet 3    levothyroxine (SYNTHROID) 50 MCG Tab Take 1 tablet by mouth every morning on an empty stomach. 90 Tablet 3    atorvastatin (LIPITOR) 20 MG Tab TAKE ONE TABLET BY MOUTH EVERY DAY 90 Tablet 3    VITAMIN D PO Take  by mouth every day.      Ferrous Sulfate (IRON) 325 (65 Fe) MG Tab Take 65 mg by mouth every day at 6 PM.       No current facility-administered medications for this visit.     She  has a past medical history of Anemia, Anginal syndrome (HCC), Anxiety, Arthritis, Chronic pain (06/02/2021), Dental disorder (05/24/2012), High cholesterol, HTN (hypertension), benign (03/19/2012), Hypothyroid (03/19/2012), Major depression (03/19/2012), Orbital floor (blow-out) closed fracture (HCC), Other specified symptom associated with female genital organs, Pain, Pain, Pain (02/2018), Psychiatric problem, Unspecified disorder of thyroid, Urinary bladder disorder, and Urinary incontinence.    She has no past medical history of Breast cancer (HCC).    ROS   No chest pain, no shortness of breath, no abdominal pain  Positive ROS as per HPI.  All other systems reviewed and are negative.     Objective:     /62   Pulse 74   Temp 36.1 °C (97 °F) (Temporal)   Ht 1.676 m (5' 6\")   Wt 75.8 kg (167 lb)   SpO2 98%  Body mass index is 26.95 kg/m².     Physical Exam:  Constitutional: Alert, no distress.  Skin: Warm, dry, good turgor, no rashes in visible areas.  Eye: Equal, round and reactive, conjunctiva clear, lids normal.  ENMT: Lips " without lesions, good dentition  Respiratory: Unlabored respiratory effort  Psych: Alert and oriented x3, normal affect and mood.        Assessment and Plan:   The following treatment plan was discussed    1. Acute pain of left shoulder  Unstable, improving  Continue NSAIDs as needed for pain  Follow up with ortho    2. Severe episode of recurrent major depressive disorder, without psychotic features (HCC)  Unstable, improving  Continue follow up with psychiatry  Continue intensive outpatient program    3. Prediabetes  Stable on metformin, due for labs  - HEMOGLOBIN A1C; Future    4. Primary hypertension  Stable on current medications, due for labs  - CBC WITH DIFFERENTIAL; Future  - Comp Metabolic Panel; Future  - MICROALBUMIN CREAT RATIO URINE; Future    5. Dyslipidemia  - Lipid Profile; Future    6. Acquired hypothyroidism  - TSH; Future  - FREE THYROXINE; Future    7. Vitamin D deficiency  - VITAMIN D,25 HYDROXY (DEFICIENCY); Future    8. Encounter for screening for HIV  - HIV AG/AB COMBO ASSAY SCREENING; Future        Followup: Return in about 3 months (around 1/19/2024).    The MA is performing the above orders under the direction of Dr. Tena    Please note that this dictation was created using voice recognition software. I have made every reasonable attempt to correct obvious errors, but I expect that there are errors of grammar and possibly content that I did not discover before finalizing the note.

## 2023-10-20 NOTE — GROUP NOTE
Group Appointment Information    Date: 10/20/23   Attendance Duration: 60 minutes  Number of Participants: 9 participants  Program / Group: IOP - Intensive Outpatient Program  Topics Covered: Other (Comment):self compassion      Group Therapy Start Time:  9:00 AM    Attendance: Attended  Participation: Active verbal participation    Affect/Mood Range: Normal range  Affect/Mood Display: CWC - Congruent w/Content  Cognition: Alert and Oriented    Evidence of imminent suicide risk: No   Evidence of imminent homicide risk: No     Therapeutic Interventions: Psychoeducation and Cognitive clarification  Progress Toward Treatment Goal: Moderate improvement

## 2023-10-23 ENCOUNTER — HOSPITAL ENCOUNTER (OUTPATIENT)
Dept: BEHAVIORAL HEALTH | Facility: MEDICAL CENTER | Age: 63
End: 2023-10-23
Attending: PSYCHIATRY & NEUROLOGY
Payer: MEDICARE

## 2023-10-23 DIAGNOSIS — F33.2 SEVERE EPISODE OF RECURRENT MAJOR DEPRESSIVE DISORDER, WITHOUT PSYCHOTIC FEATURES (HCC): ICD-10-CM

## 2023-10-23 DIAGNOSIS — F41.1 GAD (GENERALIZED ANXIETY DISORDER): ICD-10-CM

## 2023-10-23 PROCEDURE — 90853 GROUP PSYCHOTHERAPY: CPT | Performed by: MARRIAGE & FAMILY THERAPIST

## 2023-10-23 NOTE — GROUP NOTE
Group Appointment Information    Date: 10/23/23   Attendance Duration: 60 minutes  Number of Participants: 10 participants  Program / Group: IOP - Intensive Outpatient Program  Topics Covered: Other (Comment): Emotional Granularity      Group Therapy Start Time:  9:00 AM    Attendance: Attended  Participation: Active verbal participation    Affect/Mood Range: Normal range  Affect/Mood Display: CWC - Congruent w/Content  Cognition: Alert and Oriented    Evidence of imminent suicide risk: No   Evidence of imminent homicide risk: No     Therapeutic Interventions: Psychoeducation and Cognitive clarification  Progress Toward Treatment Goal: Moderate improvement; pt. Said she was feeling better than she had last week. She shared out that she is getting better at setting boundaries.

## 2023-10-23 NOTE — GROUP NOTE
Group Appointment Information    Date: 10/23/23   Attendance Duration: 60 minutes  Number of Participants: 10 participants  Program / Group: IOP - Intensive Outpatient Program  Topics Covered: Regulating emotions      Group Therapy Start Time: 10:00 AM    Attendance: Attended  Participation: Active verbal participation    Affect/Mood Range: Normal range  Affect/Mood Display: CWC - Congruent w/Content  Cognition: Alert and Oriented    Evidence of imminent suicide risk: No   Evidence of imminent homicide risk: No     Therapeutic Interventions: Emotion clarification and Supportive psychotherapy  Progress Toward Treatment Goal: Moderate improvement; pt. Shared that she is feeling a little better

## 2023-10-25 ENCOUNTER — HOSPITAL ENCOUNTER (OUTPATIENT)
Dept: BEHAVIORAL HEALTH | Facility: MEDICAL CENTER | Age: 63
End: 2023-10-25
Attending: PSYCHIATRY & NEUROLOGY
Payer: MEDICARE

## 2023-10-25 DIAGNOSIS — F33.1 MAJOR DEPRESSIVE DISORDER, RECURRENT EPISODE, MODERATE (HCC): ICD-10-CM

## 2023-10-25 DIAGNOSIS — F33.2 SEVERE EPISODE OF RECURRENT MAJOR DEPRESSIVE DISORDER, WITHOUT PSYCHOTIC FEATURES (HCC): ICD-10-CM

## 2023-10-25 DIAGNOSIS — F41.1 GAD (GENERALIZED ANXIETY DISORDER): ICD-10-CM

## 2023-10-25 DIAGNOSIS — F41.1 GENERALIZED ANXIETY DISORDER: ICD-10-CM

## 2023-10-25 PROCEDURE — 90832 PSYTX W PT 30 MINUTES: CPT | Mod: XU | Performed by: MARRIAGE & FAMILY THERAPIST

## 2023-10-25 PROCEDURE — 90853 GROUP PSYCHOTHERAPY: CPT | Performed by: MARRIAGE & FAMILY THERAPIST

## 2023-10-25 NOTE — GROUP NOTE
"Group Appointment Information    Date: 10/25/23   Attendance Duration: 60 minutes  Number of Participants: 10 participants  Program / Group: IOP - Intensive Outpatient Program  Topics Covered: Acceptance      Group Therapy Start Time:  9:00 AM    Attendance: Attended  Participation: Active verbal participation    Affect/Mood Range: Normal range  Affect/Mood Display: CWC - Congruent w/Content  Cognition: Alert and Oriented    Evidence of imminent suicide risk: No   Evidence of imminent homicide risk: No     Therapeutic Interventions: Psychoeducation and Cognitive clarification  Progress Toward Treatment Goal: Moderate improvement; pt. Indicated understanding the \"happiness myths\" in her sharing out with others.    "

## 2023-10-25 NOTE — PROGRESS NOTES
" [facility]  Therapy Progress Note    Patient Name: Debby Desai  Patient MRN: [id number]  Today's Date: 10/25/2023     Type of session:Individual psychotherapy  Length of session: 30 minutes  Persons in attendance:Patient    Subjective/New Info: Pt reports she has \"been trying to work on her yard for a few weeks.\"  She reports to feel discouraged and was tempted to drink wine.  Pt has physical limitations that prohibit her from getting wok around her house done.  Pt reports to be feeling frustrated today as she cannot get enough help to get things around her house done.     Objective/Observations:   Participation: Active verbal participation   Grooming: Casual   Cognition: Alert and Fully Oriented   Eye contact: Good   Mood: Depressed   Affect: Full range   Thought process: Logical and Goal-directed   Speech: Rate within normal limits and Volume within normal limits   Other:     Diagnoses:   1. Severe episode of recurrent major depressive disorder, without psychotic features (HCC)    2. TIM (generalized anxiety disorder)         Current risk:   SUICIDE: Low   Homicide: Low   Self-harm: Low   Relapse: Low   Other:    Safety Plan reviewed? No   If evidence of imminent risk is present, intervention/plan:     Therapeutic Intervention(s): Stressors assessed and Supportive psychotherapy    Treatment Goal(s)/Objective(s) addressed: Pt will benefit from more social interaction.  Pt would also like to \"get out in nature more.\"      Progress toward Treatment Goals: Mild improvement    Plan:  - Continue Intensive Outpatient Program    KATT Schaefer  10/25/2023                                  "

## 2023-10-25 NOTE — GROUP NOTE
Group Appointment Information    Date: 10/25/23   Attendance Duration: 60 minutes  Number of Participants: 10 participants  Program / Group: IOP - Intensive Outpatient Program  Topics Covered: Acceptance      Group Therapy Start Time: 10:00 AM    Attendance: Attended  Participation: Active verbal participation    Affect/Mood Range: Normal range  Affect/Mood Display: CWC - Congruent w/Content  Cognition: Alert and Oriented    Evidence of imminent suicide risk: No   Evidence of imminent homicide risk: No     Therapeutic Interventions: Emotion clarification and Supportive psychotherapy  Progress Toward Treatment Goal: Moderate improvement; pt. Shared out and was well supported by fellow group members.

## 2023-10-26 NOTE — GROUP NOTE
Group Appointment Information    Date: 10/25/23   Attendance Duration: 45 minutes  Number of Participants: 10 participants  Program / Group: IOP - Intensive Outpatient Program  Topics Covered: Stress Management      Group Therapy Start Time: 11:00 AM    Attendance: Attended  Participation: Active verbal participation    Affect/Mood Range: Normal range  Affect/Mood Display: CWC - Congruent w/Content  Cognition: Alert and Oriented    Evidence of imminent suicide risk: No   Evidence of imminent homicide risk: No     Therapeutic Interventions: Emotion clarification and Supportive psychotherapy  Progress Toward Treatment Goal: Moderate improvement; pt. Continued to process emotions and share out.

## 2023-10-27 ENCOUNTER — HOSPITAL ENCOUNTER (OUTPATIENT)
Dept: BEHAVIORAL HEALTH | Facility: MEDICAL CENTER | Age: 63
End: 2023-10-27
Attending: PSYCHIATRY & NEUROLOGY
Payer: MEDICARE

## 2023-10-27 DIAGNOSIS — F33.2 SEVERE RECURRENT MAJOR DEPRESSION WITHOUT PSYCHOTIC FEATURES (HCC): ICD-10-CM

## 2023-10-27 DIAGNOSIS — F41.1 GAD (GENERALIZED ANXIETY DISORDER): ICD-10-CM

## 2023-10-27 PROCEDURE — 90853 GROUP PSYCHOTHERAPY: CPT | Performed by: MARRIAGE & FAMILY THERAPIST

## 2023-10-27 NOTE — GROUP NOTE
Group Appointment Information    Date: 10/27/23   Attendance Duration: 60 minutes  Number of Participants: 10 participants  Program / Group: IOP - Intensive Outpatient Program  Topics Covered: Other (Comment): Assertive communication       Group Therapy Start Time: 10:00 AM    Attendance: Attended  Participation: Active verbal participation    Affect/Mood Range: Normal range  Affect/Mood Display: CWC - Congruent w/Content  Cognition: Alert and Oriented    Evidence of imminent suicide risk: No   Evidence of imminent homicide risk: No     Therapeutic Interventions: Emotion clarification and Supportive psychotherapy  Progress Toward Treatment Goal: Significant improvement; pt. Reported improved  mood and was very supportive of fellow group members.

## 2023-10-27 NOTE — GROUP NOTE
Group Appointment Information    Date: 10/27/23   Attendance Duration: 60 minutes  Number of Participants: 10 participants  Program / Group: IOP - Intensive Outpatient Program  Topics Covered: Other (Comment):Assertive communication      Group Therapy Start Time:  9:00 AM    Attendance: Attended  Participation: Active verbal participation    Affect/Mood Range: Normal range  Affect/Mood Display: CWC - Congruent w/Content  Cognition: Alert and Oriented    Evidence of imminent suicide risk: No   Evidence of imminent homicide risk: No     Therapeutic Interventions: Psychoeducation and Cognitive clarification  Progress Toward Treatment Goal: Moderate improvement; Pt. Evidenced understanding of the benefit of using assertive communication and engaged in an assertive communication activity.

## 2023-10-27 NOTE — GROUP NOTE
Group Appointment Information    Date: 10/27/23   Attendance Duration: 45 minutes  Number of Participants: 10 participants  Program / Group: IOP - Intensive Outpatient Program  Topics Covered: Other (Comment):Assertive communication and emotional granulariy      Group Therapy Start Time: 11:00 AM    Attendance: Attended  Participation: Active verbal participation    Affect/Mood Range: Normal range  Affect/Mood Display: CWC - Congruent w/Content  Cognition: Alert and Oriented    Evidence of imminent suicide risk: No   Evidence of imminent homicide risk: No     Therapeutic Interventions: Emotion clarification and Supportive psychotherapy  Progress Toward Treatment Goal: Moderate improvement

## 2023-10-30 ENCOUNTER — HOSPITAL ENCOUNTER (OUTPATIENT)
Dept: BEHAVIORAL HEALTH | Facility: MEDICAL CENTER | Age: 63
End: 2023-10-30
Attending: PSYCHIATRY & NEUROLOGY
Payer: MEDICARE

## 2023-10-30 DIAGNOSIS — F33.2 SEVERE EPISODE OF RECURRENT MAJOR DEPRESSIVE DISORDER, WITHOUT PSYCHOTIC FEATURES (HCC): ICD-10-CM

## 2023-10-30 DIAGNOSIS — F41.1 GAD (GENERALIZED ANXIETY DISORDER): ICD-10-CM

## 2023-10-30 PROCEDURE — 90853 GROUP PSYCHOTHERAPY: CPT | Performed by: MARRIAGE & FAMILY THERAPIST

## 2023-10-30 PROCEDURE — 90832 PSYTX W PT 30 MINUTES: CPT | Mod: XU | Performed by: MARRIAGE & FAMILY THERAPIST

## 2023-10-30 NOTE — GROUP NOTE
Group Appointment Information    Date: 10/30/23   Attendance Duration: 60 minutes  Number of Participants: 8 participants  Program / Group: IOP - Intensive Outpatient Program  Topics Covered: Care in Relationships      Group Therapy Start Time: 10:00 AM    Attendance: Attended  Participation: Active verbal participation    Affect/Mood Range: Normal range  Affect/Mood Display: CWC - Congruent w/Content  Cognition: Alert and Oriented    Evidence of imminent suicide risk: No   Evidence of imminent homicide risk: No     Therapeutic Interventions: Emotion clarification and Supportive psychotherapy  Progress Toward Treatment Goal: Moderate improvement

## 2023-10-30 NOTE — GROUP NOTE
Group Appointment Information    Date: 10/30/23   Attendance Duration: 60 minutes  Number of Participants: 8 participants  Program / Group: IOP - Intensive Outpatient Program  Topics Covered: Healing Family within      Group Therapy Start Time:  9:00 AM    Attendance: Attended  Participation: Active verbal participation    Affect/Mood Range: Normal range  Affect/Mood Display: CWC - Congruent w/Content  Cognition: Alert and Oriented    Evidence of imminent suicide risk: No   Evidence of imminent homicide risk: No     Therapeutic Interventions: Psychoeducation and Cognitive clarification  Progress Toward Treatment Goal: Moderate improvement; pt. Said she had family over the weekend to help her clean up her property and she was more pt. With her sister than she anticipated.
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
## 2023-10-31 ENCOUNTER — TELEMEDICINE (OUTPATIENT)
Dept: BEHAVIORAL HEALTH | Facility: CLINIC | Age: 63
End: 2023-10-31
Payer: MEDICARE

## 2023-10-31 DIAGNOSIS — F33.2 SEVERE EPISODE OF RECURRENT MAJOR DEPRESSIVE DISORDER, WITHOUT PSYCHOTIC FEATURES (HCC): ICD-10-CM

## 2023-10-31 DIAGNOSIS — F51.04 PSYCHOPHYSIOLOGICAL INSOMNIA: ICD-10-CM

## 2023-10-31 DIAGNOSIS — F41.1 GAD (GENERALIZED ANXIETY DISORDER): ICD-10-CM

## 2023-10-31 PROCEDURE — 99214 OFFICE O/P EST MOD 30 MIN: CPT | Mod: 95 | Performed by: PSYCHIATRY & NEUROLOGY

## 2023-10-31 NOTE — PROGRESS NOTES
"This evaluation was conducted via Zoom using secure and encrypted videoconferencing technology. The patient was in a private location outside of their home in the state of [place].    The patient's identity was confirmed and verbal consent was obtained for this virtual visit.      PSYCHIATRY FOLLOW-UP NOTE      Name: Debby Dseai  MRN: [id number]  : 1960  Age: 63 y.o.  Date of assessment: 10/31/2023  PCP: ARELIS Chris  Persons in attendance: Patient      REASON FOR VISIT/CHIEF COMPLAINT (as stated by Patient):  Debby Desai is a 63 y.o., White female, attending follow-up appointment for mood and anxiety management.      HISTORY OF PRESENT ILLNESS:  Dbeby Desai is a 63 y.o. old female with MDD, TIM and insomnia comes in today for follow up. Patient was last seen 1 month ago, and following treatment planning recommendations were done:  Continue Effexor XR to 225 mg daily for mood, anxiety and comorbid pain management.   Continue Wellbutrin  mg BID daily for depression augmentation.    Add Buspar 5 mg BID X 2 weeks --> 10 mg BID for anxiety.  Continue Trazodone 25-50 mg HS PRN for insomnia.    Monitor for serotonin syndrome.  Referral to [facility] for depression and anxiety.  Continue psychotherapy for mood and anxiety management.    Currently in IOP: reports liking the \"feeling wheel\", which is a new learning for her.  Addition of buspar has helped with anxiety.  Less negative obsessive thinking. More social and communicating more with family.  No major side effects.  Agreed with not titrating medications further.      CURRENT MEDICATIONS:  Current Outpatient Medications   Medication Sig Dispense Refill    metFORMIN ER (GLUCOPHAGE XR) 500 MG TABLET SR 24 HR Take 1 Tablet by mouth 2 times a day. 200 Tablet 1    buPROPion (WELLBUTRIN SR) 200 MG SR tablet Take 1 tablet by mouth 2 times a day. 180 Tablet 1    traZODone (DESYREL) 50 MG Tab Take 0.5 tablets by mouth at bedtime as needed for " sleep (as needed for sleep). (Can increase to 1 tab if needed for sleep) 90 Tablet 3    venlafaxine XR (EFFEXOR XR) 75 MG CAPSULE SR 24 HR Take 1 capsule by mouth every day. (venlafaxine xr 150 mg + 75 mg = 225 mg daily) 90 Capsule 2    venlafaxine (EFFEXOR-XR) 150 MG extended-release capsule Take 1 Capsule by mouth every day. (venlafaxine xr 150 mg + 75 mg = 225 mg daily) 90 Capsule 2    busPIRone (BUSPAR) 10 MG Tab tablet Take 0.5 tablets by mouth 2 times a day for 15 days, THEN 1 Tablet 2 times a day for 15 days. 45 Tablet 0    diclofenac DR (VOLTAREN) 75 MG Tablet Delayed Response Take 1 Tablet by mouth 2 times a day. 28 Tablet 1    traMADol (ULTRAM) 50 MG Tab Take 1 tablet by mouth every 8 hours as needed for Severe Pain for up to 90 days. 90 Tablet 2    amLODIPine (NORVASC) 10 MG Tab Take 1 tablet by mouth every day. 90 Tablet 3    lisinopril (PRINIVIL) 40 MG tablet Take 1 Tablet by mouth every day. 90 Tablet 3    levothyroxine (SYNTHROID) 50 MCG Tab Take 1 tablet by mouth every morning on an empty stomach. 90 Tablet 3    atorvastatin (LIPITOR) 20 MG Tab TAKE ONE TABLET BY MOUTH EVERY DAY 90 Tablet 3    VITAMIN D PO Take  by mouth every day.      Ferrous Sulfate (IRON) 325 (65 Fe) MG Tab Take 65 mg by mouth every day at 6 PM.       No current facility-administered medications for this visit.       MEDICAL HISTORY  Past Medical History:   Diagnosis Date    Anemia     in past    Anginal syndrome (HCC)     had work up and feet r/t anxiety    Anxiety     Arthritis     OA knees    Chronic pain 06/02/2021    Dental disorder 05/24/2012    partial upper denture    High cholesterol     HTN (hypertension), benign 03/19/2012    Hypothyroid 03/19/2012    Major depression 03/19/2012    Orbital floor (blow-out) closed fracture (HCC)     left    Other specified symptom associated with female genital organs     post menopausal bleeding    Pain     thoracic spine, Right knee, myofacial pain, and Right shoulder    Pain      recently fell and has bruise left forehead    Pain 02/2018    chronic back pain, right shoulder    Psychiatric problem     depression, anxiety    Unspecified disorder of thyroid     Urinary bladder disorder     stress incont.    Urinary incontinence     Occasional     Past Surgical History:   Procedure Laterality Date    CO NEUROLYTIC DEST GENICULAR NERVE Right 7/28/2023    Procedure: RIGHT genicular nerve radiofrequency ablation;  Surgeon: Volodymyr Herring M.D.;  [facility];  Service: Pain Management    CO FLUOROSCOPIC GUIDANCE NEEDLE PLACEMENT Right 7/7/2023    Procedure: RIGHT genicular nerve diagnostic blocks;  Surgeon: Volodymyr Herring M.D.;  Location: [facility];  Service: Pain Management    INJ,EPI ANES/STER LUM/SAC ADDL Right 4/7/2021    Procedure: RIGHT lumbar five and sacral one transforaminal epidural;  Surgeon: Volodymyr Herring M.D.;  Location: [facility];  Service: Pain Management    PB TOTAL KNEE ARTHROPLASTY Right 9/16/2019    Procedure: RIGHT ARTHROPLASTY, KNEE, TOTAL;  Surgeon: Rufus Saba M.D.;  Location: [facility];  Service: Orthopedics    KNEE ARTHROSCOPY Right 2/9/2018    Procedure: KNEE ARTHROSCOPY;  Surgeon: Harsh Baltazar M.D.;  Location: [facility];  Service: Orthopedics    MENISCECTOMY, KNEE, MEDIAL Right 2/9/2018    Procedure: MEDIAL AND LATERAL MENISCECTOMY- PARTIAL;  Surgeon: Harsh Baltazar M.D.;  Location: [facility];  Service: Orthopedics    KNEE ARTHROSCOPY Right 7/12/2017    Procedure: KNEE ARTHROSCOPY- CESPEDES;  Surgeon: Harsh Baltazar M.D.;  Location: [facility];  Service:     MENISCECTOMY, KNEE, MEDIAL Right 7/12/2017    Procedure: PARTIAL MEDIAL AND LATERAL MENISCECTOMY;  Surgeon: Harsh Baltazar M.D.;  Location: [facility];  Service:     SYNOVECTOMY Right 7/12/2017    Procedure: SYNOVECTOMY;  Surgeon: Harsh SMITH  MACARENA Baltazar;  Location: SURGERY [facility];  Service:     KNEE ARTHROSCOPY  4/21/2015    Performed by Rufus Saba M.D. at SURGERY [facility]    MENISCECTOMY, KNEE, MEDIAL  4/21/2015    Performed by Rufus Saba M.D. at SURGERY [facility]    PUMP REVISION  12/10/2012    Performed by Allison Guerra M.D. at SURGERY [facility]    SPINAL CORD STIMULATOR  5/30/2012    Performed by ALLISON GUERRA at SURGERY [facility]    BIOPSY GENERAL  5/1/12    endometrial    SPINAL CORD STIMULATOR  7/11/2011    Performed by ALLISON GUERRA at SURGERY [facility]    BLOCK EPIDURAL STEROID INJECTION  2008    x 3-4    LYMPH NODE EXCISION Left 2007    cervicle    OTHER Right 2001    thoracic discectomy      ARTHROSCOPY, KNEE Left 1999    RIB RESECTION Right 1980    LUMPECTOMY         PAST PSYCHIATRIC MEDICATIONS  Fluoxetine, Paroxetine, Sertraline, Citalopram  Venlafaxine, Duloxetine  Wellbutrin  Mirtazapine  Amitriptyline (switched to Mirtazapine during admission for dehydration related syncope)   Ativan, propranolol      REVIEW OF SYSTEMS:        Constitutional negative   Eyes negative   Ears/Nose/Mouth/Throat negative   Cardiovascular negative   Respiratory negative   Gastrointestinal negative   Genitourinary negative   Muscular negative   Integumentary negative   Neurological negative   Endocrine negative   Hematologic/Lymphatic negative     PHYSICAL EXAMINAION:  Vital signs: LMP 02/26/2012   Musculoskeletal: sitting in car  Abnormal movements: none      MENTAL STATUS EXAMINATION      General:   - Grooming and hygiene: Casual,   - Apparent distress: none,   - Behavior: Calm  - Eye Contact:  Good,   - no psychomotor agitation or retardation    - Participation: Active verbal participation  Orientation: Alert and Fully Oriented to person, place and time  Mood: Euthymic  Affect: Flexible and Full range,  Thought Process: Logical and Goal-directed  Thought Content: Denies suicidal or  homicidal ideations, intent or plan   Perception: Denies auditory or visual hallucinations. No delusions noted   Attention span and concentration: Intact   Speech:Rate within normal limits and Volume within normal limits  Language: Appropriate   Insight: Good  Judgment: Good  Recent and remote memory: No gross evidence of memory deficits        DEPRESSION SCREENIN/4/2023     7:40 AM 2023     9:30 AM 10/2/2023     2:52 PM   Depression Screen (PHQ-2/PHQ-9)   PHQ-2 Total Score 2 6 6   PHQ-9 Total Score 6 15 17       Interpretation of PHQ-9 Total Score   Score Severity   1-4 No Depression   5-9 Mild Depression   10-14 Moderate Depression   15-19 Moderately Severe Depression   20-27 Severe Depression    CURRENT RISK:       Suicidal: Low       Homicidal: Low       Self-Harm: Low       Relapse: Low       Crisis Safety Plan Reviewed Not Indicated       If evidence of imminent risk is present, intervention/plan:      MEDICAL RECORDS/LABS/DIAGNOSTIC TESTS REVIEWED:  No new lab since last visit     [place]  records -   Reviewed     PLAN:  (1) MDD; (2) TIM; (3) Insomnia  Improving  Continue Effexor XR to 225 mg daily for mood, anxiety and comorbid pain management.   Continue Wellbutrin  mg BID daily for depression augmentation.    Continue Buspar 10 mg BID for anxiety.  Continue Trazodone 25-50 mg HS PRN for insomnia.    Monitor for serotonin syndrome.  Continue IOP for depression and anxiety.  Continue psychotherapy for mood and anxiety management.  Medication options, alternatives (including no medications) and medication risks/benefits/side effects were discussed in detail.  Explained importance of contraceptive measures while on psychotropic medications, educated to let provider know if ever pregnant or wanting to become pregnant. Verbalized understanding.  The patient was advised to call, message provider on [employer], or come in to the clinic if symptoms worsen or if any future questions/issues regarding  their medications arise; the patient verbalized understanding and agreement.    The patient was educated to call 911, call the suicide hotline, or go to local ER if having thoughts of suicide or homicide; verbalized understanding.    Billing Coding based on:  03881 based on MDM    Return to clinic in 1 month or sooner if symptoms worsen.  Next Appointment: instruction provided on how to make the next appointment.     The proposed treatment plan was discussed with the patient who was provided the opportunity to ask questions and make suggestions regarding alternative treatment. Patient verbalized understanding and expressed agreement with the plan.       Genaro Stevenson M.D.  10/31/23    This note was created using voice recognition software (Dragon). The accuracy of the dictation is limited by the abilities of the software. I have reviewed the note prior to signing, however some errors in grammar and context are still possible. If you have any questions related to this note please do not hesitate to contact our office.

## 2023-10-31 NOTE — GROUP NOTE
Group Appointment Information    Date: 10/30/23   Attendance Duration: 45 minutes  Number of Participants: 8 participants  Program / Group: IOP - Intensive Outpatient Program  Topics Covered: Care in Relationships      Group Therapy Start Time: 11:00 AM    Attendance: Attended  Participation: Active verbal participation    Affect/Mood Range: Normal range  Affect/Mood Display: CWC - Congruent w/Content  Cognition: Alert and Oriented    Evidence of imminent suicide risk: No   Evidence of imminent homicide risk: No     Therapeutic Interventions: Emotion clarification and Supportive psychotherapy  Progress Toward Treatment Goal: Moderate improvement; Pt continued to process emotions.

## 2023-11-01 ENCOUNTER — HOSPITAL ENCOUNTER (OUTPATIENT)
Dept: BEHAVIORAL HEALTH | Facility: MEDICAL CENTER | Age: 63
End: 2023-11-01
Attending: PSYCHIATRY & NEUROLOGY
Payer: MEDICARE

## 2023-11-01 DIAGNOSIS — F33.2 SEVERE EPISODE OF RECURRENT MAJOR DEPRESSIVE DISORDER, WITHOUT PSYCHOTIC FEATURES (HCC): ICD-10-CM

## 2023-11-01 DIAGNOSIS — F41.1 GAD (GENERALIZED ANXIETY DISORDER): ICD-10-CM

## 2023-11-01 PROCEDURE — 90853 GROUP PSYCHOTHERAPY: CPT | Performed by: MARRIAGE & FAMILY THERAPIST

## 2023-11-01 NOTE — GROUP NOTE
Group Appointment Information    Date: 11/01/23   Attendance Duration: 60 minutes  Number of Participants: 8 participants  Program / Group: [facility] Program  Topics Covered: Care in Relationships      Group Therapy Start Time:  9:00 AM    Attendance: Attended  Participation: Active verbal participation    Affect/Mood Range: Normal range  Affect/Mood Display: CWC - Congruent w/Content  Cognition: Alert and Oriented    Evidence of imminent suicide risk: No   Evidence of imminent homicide risk: No     Therapeutic Interventions: Psychoeducation and Cognitive clarification  Progress Toward Treatment Goal: Moderate improvement; pt. Evidences understating of the topic by sharing and discussing the content with fellow pts.

## 2023-11-01 NOTE — GROUP NOTE
Group Appointment Information    Date: 11/01/23   Attendance Duration: 45 minutes  Number of Participants: 8 participants  Program / Group: IOP - Intensive Outpatient Program  Topics Covered: Regulating emotions      Group Therapy Start Time: 11:00 AM    Attendance: Attended  Participation: Active verbal participation    Affect/Mood Range: Normal range  Affect/Mood Display: CWC - Congruent w/Content  Cognition: Alert and Oriented    Evidence of imminent suicide risk: No   Evidence of imminent homicide risk: No     Therapeutic Interventions: Emotion clarification and Supportive psychotherapy  Progress Toward Treatment Goal: Moderate improvement; pt.continued t process emotions.

## 2023-11-01 NOTE — GROUP NOTE
Group Appointment Information    Date: 11/01/23   Attendance Duration: 60 minutes  Number of Participants: 8 participants  Program / Group: IOP - Intensive Outpatient Program  Topics Covered: Care in Relationships      Group Therapy Start Time: 10:00 AM    Attendance: Attended  Participation: Active verbal participation    Affect/Mood Range: Normal range  Affect/Mood Display: CWC - Congruent w/Content  Cognition: Alert and Oriented    Evidence of imminent suicide risk: No   Evidence of imminent homicide risk: No     Therapeutic Interventions: Emotion clarification and Supportive psychotherapy  Progress Toward Treatment Goal: Moderate improvement; pt.shared that her mood has improved and she is having more positive memories of her childhood come to light.

## 2023-11-03 ENCOUNTER — HOSPITAL ENCOUNTER (OUTPATIENT)
Dept: BEHAVIORAL HEALTH | Facility: MEDICAL CENTER | Age: 63
End: 2023-11-03
Attending: PSYCHIATRY & NEUROLOGY
Payer: MEDICARE

## 2023-11-03 DIAGNOSIS — F41.1 GAD (GENERALIZED ANXIETY DISORDER): ICD-10-CM

## 2023-11-03 DIAGNOSIS — F33.2 SEVERE EPISODE OF RECURRENT MAJOR DEPRESSIVE DISORDER, WITHOUT PSYCHOTIC FEATURES (HCC): ICD-10-CM

## 2023-11-03 PROCEDURE — 90853 GROUP PSYCHOTHERAPY: CPT | Performed by: MARRIAGE & FAMILY THERAPIST

## 2023-11-03 NOTE — GROUP NOTE
Group Appointment Information    Date: 11/03/23   Attendance Duration: 60 minutes  Number of Participants: 7 participants  Program / Group: IOP - Intensive Outpatient Program  Topics Covered: Anxiety Mgmt      Group Therapy Start Time:  9:00 AM    Attendance: Attended  Participation: Active verbal participation    Affect/Mood Range: Normal range  Affect/Mood Display: CWC - Congruent w/Content  Cognition: Alert and Oriented    Evidence of imminent suicide risk: No   Evidence of imminent homicide risk: No     Therapeutic Interventions: Psychoeducation and Cognitive clarification  Progress Toward Treatment Goal: Moderate improvement; pt. Shares out and is supportive of fellow group members.

## 2023-11-03 NOTE — GROUP NOTE
Group Appointment Information    Date: 11/03/23   Attendance Duration: 45 minutes  Number of Participants: 7 participants  Program / Group: IOP - Intensive Outpatient Program  Topics Covered: Anxiety Mgmt      Group Therapy Start Time: 11:00 AM    Attendance: Attended  Participation: Active verbal participation    Affect/Mood Range: Normal range  Affect/Mood Display: CWC - Congruent w/Content  Cognition: Alert and Oriented    Evidence of imminent suicide risk: No   Evidence of imminent homicide risk: No     Therapeutic Interventions: Emotion clarification and Supportive psychotherapy  Progress Toward Treatment Goal: Significant improvement; Pt. continued to process emotions

## 2023-11-03 NOTE — GROUP NOTE
Group Appointment Information    Date: 11/03/23   Attendance Duration: 60 minutes  Number of Participants: 8 participants  Program / Group: IOP - Intensive Outpatient Program  Topics Covered: Stress Management      Group Therapy Start Time: 10:00 AM    Attendance: Attended  Participation: Active verbal participation    Affect/Mood Range: Normal range  Affect/Mood Display: CWC - Congruent w/Content  Cognition: Alert and Oriented    Evidence of imminent suicide risk: No   Evidence of imminent homicide risk: No     Therapeutic Interventions: Emotion clarification and Supportive psychotherapy  Progress Toward Treatment Goal: Moderate improvement; pt. Reports to be sustaining a positive mood most of this week.  She notices she is having some remembrances from childhood that have been uplifting.

## 2023-11-06 ENCOUNTER — HOSPITAL ENCOUNTER (OUTPATIENT)
Dept: BEHAVIORAL HEALTH | Facility: MEDICAL CENTER | Age: 63
End: 2023-11-06
Attending: PSYCHIATRY & NEUROLOGY
Payer: MEDICARE

## 2023-11-06 DIAGNOSIS — F41.1 GAD (GENERALIZED ANXIETY DISORDER): ICD-10-CM

## 2023-11-06 DIAGNOSIS — F33.2 SEVERE RECURRENT MAJOR DEPRESSION WITHOUT PSYCHOTIC FEATURES (HCC): ICD-10-CM

## 2023-11-06 DIAGNOSIS — F41.1 GENERALIZED ANXIETY DISORDER: ICD-10-CM

## 2023-11-06 DIAGNOSIS — F33.2 SEVERE EPISODE OF RECURRENT MAJOR DEPRESSIVE DISORDER, WITHOUT PSYCHOTIC FEATURES (HCC): ICD-10-CM

## 2023-11-06 PROCEDURE — 90853 GROUP PSYCHOTHERAPY: CPT | Performed by: MARRIAGE & FAMILY THERAPIST

## 2023-11-06 PROCEDURE — RXMED WILLOW AMBULATORY MEDICATION CHARGE: Performed by: NURSE PRACTITIONER

## 2023-11-06 PROCEDURE — 90834 PSYTX W PT 45 MINUTES: CPT | Mod: XU | Performed by: MARRIAGE & FAMILY THERAPIST

## 2023-11-06 PROCEDURE — RXMED WILLOW AMBULATORY MEDICATION CHARGE: Performed by: PSYCHIATRY & NEUROLOGY

## 2023-11-06 RX ORDER — BUSPIRONE HYDROCHLORIDE 10 MG/1
10 TABLET ORAL 2 TIMES DAILY
Qty: 60 TABLET | Refills: 0 | Status: SHIPPED | OUTPATIENT
Start: 2023-11-06 | End: 2023-12-08 | Stop reason: SDUPTHER

## 2023-11-06 ASSESSMENT — ANXIETY QUESTIONNAIRES
1. FEELING NERVOUS, ANXIOUS, OR ON EDGE: NEARLY EVERY DAY
IF YOU CHECKED OFF ANY PROBLEMS ON THIS QUESTIONNAIRE, HOW DIFFICULT HAVE THESE PROBLEMS MADE IT FOR YOU TO DO YOUR WORK, TAKE CARE OF THINGS AT HOME, OR GET ALONG WITH OTHER PEOPLE: VERY DIFFICULT
7. FEELING AFRAID AS IF SOMETHING AWFUL MIGHT HAPPEN: SEVERAL DAYS
GAD7 TOTAL SCORE: 11
2. NOT BEING ABLE TO STOP OR CONTROL WORRYING: MORE THAN HALF THE DAYS
5. BEING SO RESTLESS THAT IT IS HARD TO SIT STILL: NOT AT ALL
3. WORRYING TOO MUCH ABOUT DIFFERENT THINGS: SEVERAL DAYS
4. TROUBLE RELAXING: MORE THAN HALF THE DAYS
6. BECOMING EASILY ANNOYED OR IRRITABLE: MORE THAN HALF THE DAYS

## 2023-11-06 ASSESSMENT — PATIENT HEALTH QUESTIONNAIRE - PHQ9
5. POOR APPETITE OR OVEREATING: 0 - NOT AT ALL
SUM OF ALL RESPONSES TO PHQ QUESTIONS 1-9: 11
CLINICAL INTERPRETATION OF PHQ2 SCORE: 3

## 2023-11-06 NOTE — GROUP NOTE
Group Appointment Information    Date: 11/06/23   Attendance Duration: 45 minutes  Number of Participants: 7 participants  Program / Group: IOP - Intensive Outpatient Program  Topics Covered: Stress Management      Group Therapy Start Time: 11:00 AM    Attendance: Attended  Participation: Active verbal participation    Affect/Mood Range: Normal range  Affect/Mood Display: CWC - Congruent w/Content  Cognition: Alert and Oriented    Evidence of imminent suicide risk: No   Evidence of imminent homicide risk: No     Therapeutic Interventions: Emotion clarification and Supportive psychotherapy  Progress Toward Treatment Goal: Moderate improvement; pt. Continued to process emotions.

## 2023-11-06 NOTE — GROUP NOTE
Group Appointment Information    Date: 11/06/23   Attendance Duration: 60 minutes  Number of Participants: 7 participants  Program / Group: IOP - Intensive Outpatient Program  Topics Covered: Stress Management      Group Therapy Start Time: 10:00 AM    Attendance: Attended  Participation: Active verbal participation    Affect/Mood Range: Normal range  Affect/Mood Display: CWC - Congruent w/Content  Cognition: Alert and Oriented    Evidence of imminent suicide risk: No   Evidence of imminent homicide risk: No     Therapeutic Interventions: Emotion clarification and Supportive psychotherapy  Progress Toward Treatment Goal: Significant improvement; pt. Shared how she has had some good results in modulating her mood by through identifying what she is feeling in a more granular way.

## 2023-11-06 NOTE — GROUP NOTE
Group Appointment Information    Date: 11/06/23   Attendance Duration: 60 minutes  Number of Participants: 7 participants  Program / Group: IOP - Intensive Outpatient Program  Topics Covered: Acceptance and Regulating emotions;       Group Therapy Start Time:  9:00 AM    Attendance: Attended  Participation: Active verbal participation    Affect/Mood Range: Normal range  Affect/Mood Display: CWC - Congruent w/Content  Cognition: Alert and Oriented    Evidence of imminent suicide risk: No   Evidence of imminent homicide risk: No     Therapeutic Interventions: Emotion clarification and Supportive psychotherapy  Progress Toward Treatment Goal: Moderate improvement; pt. Shared some of the insights she is gaining and also shared that she is having some relief from her symptoms as she is getting more accomplished at naming what she is feeling in a granular way.

## 2023-11-06 NOTE — GROUP NOTE
Group Appointment Information    Date: 11/06/23   Attendance Duration: 60 minutes  Number of Participants: 7 participants  Program / Group: IOP - Intensive Outpatient Program  Topics Covered: ACT concept intro      Group Therapy Start Time:  9:00 AM    Attendance: Attended  Participation: Active verbal participation    Affect/Mood Range: Normal range  Affect/Mood Display: CWC - Congruent w/Content  Cognition: Alert and Oriented    Evidence of imminent suicide risk: No   Evidence of imminent homicide risk: No     Therapeutic Interventions: Psychoeducation and Cognitive clarification  Progress Toward Treatment Goal: Moderate improvement

## 2023-11-06 NOTE — PROGRESS NOTES
" [facility]  Therapy Progress Note    Patient Name: Debby Desai  Patient MRN: [id number]  Today's Date: 11/6/2023     Type of session:Individual psychotherapy  Length of session: 45 minutes  Persons in attendance:Patient    Subjective/New Info: Pt. Reports she used the feelings wheel this writer had given her to to help her identify her feelings in a more granular way she feels \"abandoned;\"  more so than sad.  She reports to feel that she also \"feels she had her eyes opened about her feelings of abandonment and that her mother did not do enough to protect she and her sisters enough.  She discovered through talking to her sisters that her mother was exposed to traumatic events when mother  was growing up.    In speaking with her sisters she discovered that they had more in common than she had believed in the past.  Pt. recollects her mother \"invading her privacy;\" for example opening her mail.. She reports she would feel very angry with her mother.  Pr. Reports she doesn't remember expressing her anger toward her mother.         9/28/2023     9:30 AM 10/2/2023     2:52 PM 11/6/2023     7:43 AM   Depression Screen (PHQ-2/PHQ-9)   PHQ-2 Total Score 6 6 3   PHQ-9 Total Score 15 17 11       Interpretation of PHQ-9 Total Score   Score Severity   1-4 No Depression   5-9 Mild Depression   10-14 Moderate Depression   15-19 Moderately Severe Depression   20-27 Severe Depression         9/28/2023     9:39 AM 10/2/2023     3:38 PM 11/6/2023     8:16 AM   TIM 7   TIM-7 Total Score 12 10 11       Interpretation of TIM 7 Total Score   Score Severity:  0-4 No Anxiety   5-9 Mild Anxiety  10-14 Moderate Anxiety  15-21 Severe Anxiety   Objective/Observations:     Participation: Active verbal participation   Grooming: Casual   Cognition: Alert and Fully Oriented   Eye contact: Good   Mood: Euthymic   Affect: Flexible and Full range   Thought process: Logical and Goal-directed   Speech: Rate within normal limits and " "Volume within normal limits   Other:     Diagnoses:   1. Severe episode of recurrent major depressive disorder, without psychotic features (HCC)    2. TIM (generalized anxiety disorder)         Current risk:   SUICIDE: Low   Homicide: Low   Self-harm: Low   Relapse: Low   Other:    Safety Plan reviewed? Not Indicated   If evidence of imminent risk is present, intervention/plan:     Therapeutic Intervention(s): Maladaptive behavior addressed, Problem-solving, Stressors assessed, and Supportive psychotherapy    Treatment Goal(s)/Objective(s) addressed: Pt will practice continuing to identify what she is feeling, particularly when feeling anxious by asking herself \"what am I feeling,\" and then \"what do I need.?\"      Progress toward Treatment Goals: Significant improvement    Plan:  - Transition toward termination;pt.will continue with her psychiatrist and make an appt.with  an outpatient therapist.  Additionally she will consider a Pain Management program that she is already aware of.      KATT Schaefer  11/6/2023                                  "

## 2023-11-07 ENCOUNTER — PHARMACY VISIT (OUTPATIENT)
Dept: PHARMACY | Facility: MEDICAL CENTER | Age: 63
End: 2023-11-07
Payer: COMMERCIAL

## 2023-11-08 ENCOUNTER — HOSPITAL ENCOUNTER (OUTPATIENT)
Dept: BEHAVIORAL HEALTH | Facility: MEDICAL CENTER | Age: 63
End: 2023-11-08
Attending: PSYCHIATRY & NEUROLOGY
Payer: MEDICARE

## 2023-11-08 DIAGNOSIS — F41.1 GAD (GENERALIZED ANXIETY DISORDER): ICD-10-CM

## 2023-11-08 DIAGNOSIS — F33.2 SEVERE EPISODE OF RECURRENT MAJOR DEPRESSIVE DISORDER, WITHOUT PSYCHOTIC FEATURES (HCC): ICD-10-CM

## 2023-11-08 DIAGNOSIS — E78.5 DYSLIPIDEMIA: ICD-10-CM

## 2023-11-08 PROCEDURE — 90853 GROUP PSYCHOTHERAPY: CPT | Performed by: MARRIAGE & FAMILY THERAPIST

## 2023-11-08 NOTE — TELEPHONE ENCOUNTER
Received request via: Pharmacy    Was the patient seen in the last year in this department? Yes    Does the patient have an active prescription (recently filled or refills available) for medication(s) requested? No    Does the patient have [facility] and need 100 day supply (blood pressure, diabetes and cholesterol meds only)? Yes updated

## 2023-11-09 RX ORDER — ATORVASTATIN CALCIUM 20 MG/1
20 TABLET, FILM COATED ORAL
Qty: 100 TABLET | Refills: 0 | Status: SHIPPED | OUTPATIENT
Start: 2023-11-09 | End: 2024-03-26 | Stop reason: SDUPTHER

## 2023-11-09 NOTE — GROUP NOTE
"Group Appointment Information    Date: 11/08/23   Attendance Duration: 60 minutes  Number of Participants: 6 participants  Program / Group: IOP - Intensive Outpatient Program  Topics Covered: Anger      Group Therapy Start Time: 10:00 AM    Attendance: Attended  Participation: Active verbal participation    Affect/Mood Range: Normal range  Affect/Mood Display: CWC - Congruent w/Content  Cognition: Alert and Oriented    Evidence of imminent suicide risk: No   Evidence of imminent homicide risk: No     Therapeutic Interventions: Emotion clarification and Supportive psychotherapy  Progress Toward Treatment Goal: Significant improvement; pt. Is opening up about her emotional life which she had \"sidelined\" for much of her life.   "
Group Appointment Information    Date: 11/08/23   Attendance Duration: 60 minutes  Number of Participants: 7 participants  Program / Group: IOP - Intensive Outpatient Program  Topics Covered: Anger      Group Therapy Start Time:  9:00 AM    Attendance: Attended  Participation: Active verbal participation    Affect/Mood Range: Normal range  Affect/Mood Display: CWC - Congruent w/Content  Cognition: Alert and Oriented    Evidence of imminent suicide risk: No   Evidence of imminent homicide risk: No     Therapeutic Interventions: Psychoeducation and Cognitive clarification  Progress Toward Treatment Goal: Significant improvement; pt. Evidences improved self-awareness when she shares out and is working through the arduous process of identifying how she is feeling In a granular way.   
Group Appointment Information    Date: 11/08/23   Attendance Duration: 60 minutes  Number of Participants: 7 participants  Program / Group: IOP - Intensive Outpatient Program  Topics Covered: Anger      Group Therapy Start Time: 11:00 AM    Attendance: Attended  Participation: Active verbal participation    Affect/Mood Range: Normal range  Affect/Mood Display: CWC - Congruent w/Content  Cognition: Alert and Oriented    Evidence of imminent suicide risk: No   Evidence of imminent homicide risk: No     Therapeutic Interventions: Emotion clarification and Supportive psychotherapy  Progress Toward Treatment Goal: Significant improvement; pt. Continued to process emotions.  
Satisfactory

## 2023-11-10 ENCOUNTER — HOSPITAL ENCOUNTER (OUTPATIENT)
Dept: BEHAVIORAL HEALTH | Facility: MEDICAL CENTER | Age: 63
End: 2023-11-10
Attending: PSYCHIATRY & NEUROLOGY
Payer: MEDICARE

## 2023-11-10 DIAGNOSIS — F41.1 GAD (GENERALIZED ANXIETY DISORDER): ICD-10-CM

## 2023-11-10 DIAGNOSIS — F33.2 SEVERE RECURRENT MAJOR DEPRESSION WITHOUT PSYCHOTIC FEATURES (HCC): ICD-10-CM

## 2023-11-10 PROCEDURE — 90853 GROUP PSYCHOTHERAPY: CPT | Performed by: MARRIAGE & FAMILY THERAPIST

## 2023-11-10 NOTE — GROUP NOTE
Group Appointment Information    Date: 11/10/23   Attendance Duration: 60 minutes  Number of Participants: 6 participants  Program / Group: IOP - Intensive Outpatient Program  Topics Covered: Other (Comment):Boundaries      Group Therapy Start Time:  9:00 AM    Attendance: Attended  Participation: Active verbal participation    Affect/Mood Range: Normal range  Affect/Mood Display: CWC - Congruent w/Content  Cognition: Alert and Oriented    Evidence of imminent suicide risk: No   Evidence of imminent homicide risk: No     Therapeutic Interventions: Psychoeducation and Cognitive clarification  Progress Toward Treatment Goal: Significant improvement; pt. Shared that she notices that she/we as a society do not learn what authenticity is.

## 2023-11-10 NOTE — GROUP NOTE
Group Appointment Information    Date: 11/10/23   Attendance Duration: 60 minutes  Number of Participants: 7 participants  Program / Group: IOP - Intensive Outpatient Program  Topics Covered: Regulating emotions      Group Therapy Start Time: 10:00 AM    Attendance: Attended  Participation: Active verbal participation    Affect/Mood Range: Normal range  Affect/Mood Display: CWC - Congruent w/Content  Cognition: Alert and Oriented    Evidence of imminent suicide risk: No   Evidence of imminent homicide risk: No     Therapeutic Interventions: Emotion clarification and Supportive psychotherapy  Progress Toward Treatment Goal: Significant improvement; Pt reports to be better managing her felt depression and anxiety.

## 2023-11-10 NOTE — GROUP NOTE
Group Appointment Information    Date: 11/10/23   Attendance Duration: 45 minutes  Number of Participants: 7 participants  Program / Group: IOP - Intensive Outpatient Program  Topics Covered: Anxiety Mgmt      Group Therapy Start Time: 11:00 AM    Attendance: Attended  Participation: Active verbal participation    Affect/Mood Range: Normal range  Affect/Mood Display: CWC - Congruent w/Content  Cognition: Alert and Oriented    Evidence of imminent suicide risk: No   Evidence of imminent homicide risk: No     Therapeutic Interventions: Emotion clarification and Supportive psychotherapy  Progress Toward Treatment Goal: Significant improvement; pt. Reports to feel more hopeful and less anxious

## 2023-11-13 ENCOUNTER — HOSPITAL ENCOUNTER (OUTPATIENT)
Dept: BEHAVIORAL HEALTH | Facility: MEDICAL CENTER | Age: 63
End: 2023-11-13
Attending: PSYCHIATRY & NEUROLOGY
Payer: MEDICARE

## 2023-11-14 NOTE — GROUP NOTE
Group Appointment Information    Date: 11/14/23   Attendance Duration: 45 minutes  Number of Participants: 6 participants  Program / Group: IOP - Intensive Outpatient Program  Topics Covered: Values based action and Belief Systems    Wrap up group of topic from morning groups. Patients went through identifying an event how to process and solve using their belief and value system.      Group Therapy Start Time: 11:00 AM    Attendance: Attended  Participation: Active verbal participation shared an example. Able to walk through process and expressed this was helpful. Had been exposed to topic in previous session.    Affect/Mood Range: Normal range  Affect/Mood Display: CWC - Congruent w/Content  Cognition: Alert    Evidence of imminent suicide risk: No   Evidence of imminent homicide risk: No     Therapeutic Interventions: Emotion clarification, Values clarification, and Supportive psychotherapy  Progress Toward Treatment Goal: Moderate improvement

## 2023-11-14 NOTE — GROUP NOTE
Group Appointment Information    Date: 11/14/23   Attendance Duration: 60 minutes  Number of Participants: 6 participants  Program / Group: IOP - Intensive Outpatient Program  Topics Covered: Values based action    Patients reported on weekend progress. Introduction into value based life through use of videos.      Group Therapy Start Time:  9:00 AM    Attendance: Attended  Participation: Active verbal participation and Supportive to other group members    Affect/Mood Range: Normal range  Affect/Mood Display: CWC - Congruent w/Content  Cognition: Alert and Oriented    Evidence of imminent suicide risk: No   Evidence of imminent homicide risk: No     Therapeutic Interventions: Emotion clarification and Supportive psychotherapy  Progress Toward Treatment Goal: Moderate improvement

## 2023-11-14 NOTE — GROUP NOTE
Group Appointment Information    Date: 11/14/23   Attendance Duration: 60 minutes  Number of Participants: 6 participants  Program / Group: IOP - Intensive Outpatient Program  Topics Covered: Values based action    Continued topic on value based life. Patients completed worksheets on identifying values in themselves.      Group Therapy Start Time: 10:10 AM    Attendance: Attended  Participation: Active verbal participation, Open to feedback, and Supportive to other group members    Affect/Mood Range: Normal range  Affect/Mood Display: CWC - Congruent w/Content  Cognition: Alert    Evidence of imminent suicide risk: No   Evidence of imminent homicide risk: No     Therapeutic Interventions: Values clarification and Supportive psychotherapy  Progress Toward Treatment Goal: Moderate improvement

## 2023-11-15 ENCOUNTER — HOSPITAL ENCOUNTER (OUTPATIENT)
Dept: BEHAVIORAL HEALTH | Facility: MEDICAL CENTER | Age: 63
End: 2023-11-15
Attending: PSYCHIATRY & NEUROLOGY
Payer: MEDICARE

## 2023-11-15 DIAGNOSIS — F41.1 GENERALIZED ANXIETY DISORDER: ICD-10-CM

## 2023-11-15 DIAGNOSIS — F33.2 SEVERE RECURRENT MAJOR DEPRESSION WITHOUT PSYCHOTIC FEATURES (HCC): ICD-10-CM

## 2023-11-15 PROCEDURE — 90853 GROUP PSYCHOTHERAPY: CPT | Performed by: MARRIAGE & FAMILY THERAPIST

## 2023-11-15 NOTE — GROUP NOTE
Group Appointment Information    Date: 11/15/23   Attendance Duration: 8 minutes  Number of Participants: 8 participants  Program / Group: IOP - Intensive Outpatient Program  Topics Covered: Regulating emotions      Group Therapy Start Time:  9:00 AM    Attendance: Attended  Participation: Active verbal participation    Affect/Mood Range: Normal range  Affect/Mood Display: CWC - Congruent w/Content  Cognition: Alert and Oriented    Evidence of imminent suicide risk: No   Evidence of imminent homicide risk: No     Therapeutic Interventions: Psychoeducation and Cognitive clarification  Progress Toward Treatment Goal: Significant improvement; pt evidences significant development of improved self awareness through her shared experiences and insights.

## 2023-11-16 PROCEDURE — RXMED WILLOW AMBULATORY MEDICATION CHARGE: Performed by: PSYCHIATRY & NEUROLOGY

## 2023-11-16 NOTE — GROUP NOTE
Group Appointment Information    Date: 11/15/23   Attendance Duration: 45 minutes  Number of Participants: 8 participants  Program / Group: IOP - Intensive Outpatient Program  Topics Covered: Care in Relationships      Group Therapy Start Time: 11:00 AM    Attendance: Attended  Participation: Active verbal participation    Affect/Mood Range: Normal range  Affect/Mood Display: CWC - Congruent w/Content  Cognition: Alert and Oriented    Evidence of imminent suicide risk: No   Evidence of imminent homicide risk: No     Therapeutic Interventions: Emotion clarification and Supportive psychotherapy  Progress Toward Treatment Goal: Significant improvement; pt. Continued to process emotions.

## 2023-11-16 NOTE — GROUP NOTE
"Group Appointment Information    Date: 11/15/23   Attendance Duration: 60 minutes  Number of Participants: 8 participants  Program / Group: IOP - Intensive Outpatient Program  Topics Covered: Care in Relationships      Group Therapy Start Time: 10:00 AM    Attendance: Attended  Participation: Active verbal participation    Affect/Mood Range: Normal range  Affect/Mood Display: CWC - Congruent w/Content  Cognition: Alert and Oriented    Evidence of imminent suicide risk: No   Evidence of imminent homicide risk: No     Therapeutic Interventions: Emotion clarification and Supportive psychotherapy  Progress Toward Treatment Goal: Significant improvement; pt. Shared how she is leaning into her family a little more and she also reported to be feeling more consistently \"less depressed.\"   "

## 2023-11-20 ENCOUNTER — PHARMACY VISIT (OUTPATIENT)
Dept: PHARMACY | Facility: MEDICAL CENTER | Age: 63
End: 2023-11-20
Payer: COMMERCIAL

## 2023-11-28 ENCOUNTER — TELEPHONE (OUTPATIENT)
Dept: BEHAVIORAL HEALTH | Facility: MEDICAL CENTER | Age: 63
End: 2023-11-28
Payer: MEDICARE

## 2023-11-28 DIAGNOSIS — F41.1 GAD (GENERALIZED ANXIETY DISORDER): ICD-10-CM

## 2023-11-28 DIAGNOSIS — F33.2 SEVERE EPISODE OF RECURRENT MAJOR DEPRESSIVE DISORDER, WITHOUT PSYCHOTIC FEATURES (HCC): ICD-10-CM

## 2023-11-28 PROCEDURE — RXMED WILLOW AMBULATORY MEDICATION CHARGE: Performed by: NURSE PRACTITIONER

## 2023-11-28 NOTE — TELEPHONE ENCOUNTER
[facility]  DISCHARGE SUMMARY FORM    HHPI / SCP: [facility] Other Ins.:      Patient Name: Debby Desai  Admission Date: 10/4/23  Level of Care Attended:  Intens.OP : 1960  Discharge Date: MRN: [id number]  23       SIGNIFICANT FINDINGS/CLINICAL IMPRESSION:   DSM Codes:   IOP    ICD10 Codes:   1. Severe episode of recurrent major depressive disorder, without psychotic features (HCC)    2. TIM (generalized anxiety disorder)        Additional problems identified via assessment: none    Treatment Components in Which Patient Participated (check all that apply):  Education group(s), 1:1 teaching/therapy, Medication Management, and Group Therapy    Summary of Course of Treatment: Pt. Completed with fidelity to the program and reported some improvement in mood and persoanl agency.     Condition at Time of Transfer/Discharge: Met treatment goals.    [] Medications Reviewed with Copy to Patient    Referred to:  community and [facility] provider as appropriate.       Patient is in agreement with discharge plan: yes    ERNIE Schaefer.

## 2023-12-04 PROCEDURE — RXMED WILLOW AMBULATORY MEDICATION CHARGE: Performed by: NURSE PRACTITIONER

## 2023-12-04 PROCEDURE — RXMED WILLOW AMBULATORY MEDICATION CHARGE: Performed by: PSYCHIATRY & NEUROLOGY

## 2023-12-05 ENCOUNTER — PHARMACY VISIT (OUTPATIENT)
Dept: PHARMACY | Facility: MEDICAL CENTER | Age: 63
End: 2023-12-05
Payer: COMMERCIAL

## 2023-12-08 ENCOUNTER — TELEMEDICINE (OUTPATIENT)
Dept: BEHAVIORAL HEALTH | Facility: CLINIC | Age: 63
End: 2023-12-08
Payer: MEDICARE

## 2023-12-08 DIAGNOSIS — F33.42 RECURRENT MAJOR DEPRESSIVE DISORDER, IN FULL REMISSION (HCC): ICD-10-CM

## 2023-12-08 DIAGNOSIS — F41.1 GAD (GENERALIZED ANXIETY DISORDER): ICD-10-CM

## 2023-12-08 DIAGNOSIS — F51.04 PSYCHOPHYSIOLOGICAL INSOMNIA: ICD-10-CM

## 2023-12-08 PROCEDURE — 90833 PSYTX W PT W E/M 30 MIN: CPT | Mod: 95 | Performed by: PSYCHIATRY & NEUROLOGY

## 2023-12-08 PROCEDURE — 99214 OFFICE O/P EST MOD 30 MIN: CPT | Mod: 95 | Performed by: PSYCHIATRY & NEUROLOGY

## 2023-12-08 RX ORDER — VENLAFAXINE HYDROCHLORIDE 75 MG/1
75 CAPSULE, EXTENDED RELEASE ORAL DAILY
Qty: 90 CAPSULE | Refills: 2 | Status: SHIPPED | OUTPATIENT
Start: 2023-12-08

## 2023-12-08 RX ORDER — VENLAFAXINE HYDROCHLORIDE 150 MG/1
150 CAPSULE, EXTENDED RELEASE ORAL DAILY
Qty: 90 CAPSULE | Refills: 2 | Status: SHIPPED | OUTPATIENT
Start: 2023-12-08

## 2023-12-08 RX ORDER — BUPROPION HYDROCHLORIDE 200 MG/1
200 TABLET, EXTENDED RELEASE ORAL 2 TIMES DAILY
Qty: 180 TABLET | Refills: 1 | Status: SHIPPED | OUTPATIENT
Start: 2023-12-08

## 2023-12-08 RX ORDER — TRAZODONE HYDROCHLORIDE 50 MG/1
25 TABLET ORAL NIGHTLY PRN
Qty: 90 TABLET | Refills: 3 | Status: SHIPPED | OUTPATIENT
Start: 2023-12-08

## 2023-12-08 RX ORDER — BUSPIRONE HYDROCHLORIDE 10 MG/1
10 TABLET ORAL 2 TIMES DAILY
Qty: 180 TABLET | Refills: 2 | Status: SHIPPED | OUTPATIENT
Start: 2023-12-08

## 2023-12-08 NOTE — PROGRESS NOTES
This evaluation was conducted via Zoom using secure and encrypted videoconferencing technology. The patient was in their home in the [facility].    The patient's identity was confirmed and verbal consent was obtained for this virtual visit.      PSYCHIATRY FOLLOW-UP NOTE      Name: Debby Desai  MRN: [id number]  : 1960  Age: 63 y.o.  Date of assessment: 2023  PCP: ARELIS Chris  Persons in attendance: Patient      REASON FOR VISIT/CHIEF COMPLAINT (as stated by Patient):  Debby Desai is a 63 y.o., White female, attending follow-up appointment for mood and anxiety management.      HISTORY OF PRESENT ILLNESS:  Debby Desai is a 63 y.o. old female with MDD, TIM and insomnia comes in today for follow up. Patient was last seen 1 month ago, and following treatment planning recommendations were done:  Continue Effexor XR to 225 mg daily for mood, anxiety and comorbid pain management.   Continue Wellbutrin  mg BID daily for depression augmentation.    Continue Buspar 10 mg BID for anxiety.  Continue Trazodone 25-50 mg HS PRN for insomnia.    Monitor for serotonin syndrome.  Continue IOP for depression and anxiety.  Continue psychotherapy for mood and anxiety management.    Finished IOP treatment: did well.   Mood and anxiety doing well overall.  Life is busy but good.   Improved affect seen during the session.  Agreed with not titrating medications further.    PSYCHOTHERAPY ASPECT OF SESSION (16 MIN):  Discussed the learnings from IOP treatment.  Importance of focusing on thinking and working on finding positive and staying thankful was discussed.  Understanding what we have control over and acting accordingly was emphasized.  Debby was allowed to express her feelings related to recent changes and validation was provided for appropriate emotional responses.  Most part of the later session was dedicated to active listening and implementing supportive therapy.      CURRENT  MEDICATIONS:  Current Outpatient Medications   Medication Sig Dispense Refill    atorvastatin (LIPITOR) 20 MG Tab TAKE ONE TABLET BY MOUTH EVERY  Tablet 0    busPIRone (BUSPAR) 10 MG Tab tablet Take 1 Tablet by mouth 2 times a day. 60 Tablet 0    metFORMIN ER (GLUCOPHAGE XR) 500 MG TABLET SR 24 HR Take 1 Tablet by mouth 2 times a day. 200 Tablet 1    buPROPion (WELLBUTRIN SR) 200 MG SR tablet Take 1 tablet by mouth 2 times a day. 180 Tablet 1    traZODone (DESYREL) 50 MG Tab Take 0.5 tablets by mouth at bedtime as needed for sleep (as needed for sleep). (Can increase to 1 tab if needed for sleep) 90 Tablet 3    venlafaxine XR (EFFEXOR XR) 75 MG CAPSULE SR 24 HR Take 1 capsule by mouth every day. (venlafaxine xr 150 mg + 75 mg = 225 mg daily) 90 Capsule 2    venlafaxine (EFFEXOR-XR) 150 MG extended-release capsule Take 1 Capsule by mouth every day. (venlafaxine xr 150 mg + 75 mg = 225 mg daily) 90 Capsule 2    diclofenac DR (VOLTAREN) 75 MG Tablet Delayed Response Take 1 Tablet by mouth 2 times a day. 28 Tablet 1    traMADol (ULTRAM) 50 MG Tab Take 1 tablet by mouth every 8 hours as needed for Severe Pain for up to 90 days. 90 Tablet 2    amLODIPine (NORVASC) 10 MG Tab Take 1 tablet by mouth every day. 90 Tablet 3    lisinopril (PRINIVIL) 40 MG tablet Take 1 Tablet by mouth every day. 90 Tablet 3    levothyroxine (SYNTHROID) 50 MCG Tab Take 1 tablet by mouth every morning on an empty stomach. 90 Tablet 3    VITAMIN D PO Take  by mouth every day.      Ferrous Sulfate (IRON) 325 (65 Fe) MG Tab Take 65 mg by mouth every day at 6 PM.       No current facility-administered medications for this visit.       MEDICAL HISTORY  Past Medical History:   Diagnosis Date    Anemia     in past    Anginal syndrome (HCC)     had work up and feet r/t anxiety    Anxiety     Arthritis     OA knees    Chronic pain 06/02/2021    Dental disorder 05/24/2012    partial upper denture    High cholesterol     HTN (hypertension), benign  03/19/2012    Hypothyroid 03/19/2012    Major depression 03/19/2012    Orbital floor (blow-out) closed fracture (HCC)     left    Other specified symptom associated with female genital organs     post menopausal bleeding    Pain     thoracic spine, Right knee, myofacial pain, and Right shoulder    Pain     recently fell and has bruise left forehead    Pain 02/2018    chronic back pain, right shoulder    Psychiatric problem     depression, anxiety    Unspecified disorder of thyroid     Urinary bladder disorder     stress incont.    Urinary incontinence     Occasional     Past Surgical History:   Procedure Laterality Date    VT NEUROLYTIC DEST GENICULAR NERVE Right 7/28/2023    Procedure: RIGHT genicular nerve radiofrequency ablation;  Surgeon: Volodymyr Herring M.D.;  [facility];  Service: Pain Management    VT FLUOROSCOPIC GUIDANCE NEEDLE PLACEMENT Right 7/7/2023    Procedure: RIGHT genicular nerve diagnostic blocks;  Surgeon: Volodymyr Herring M.D.;  Location[facility];  Service: Pain Management    INJ,EPI ANES/STER LUM/SAC ADDL Right 4/7/2021    Procedure: RIGHT lumbar five and sacral one transforaminal epidural;  Surgeon: Volodymyr Herring M.D.;  Location[facility];  Service: Pain Management    PB TOTAL KNEE ARTHROPLASTY Right 9/16/2019    Procedure: RIGHT ARTHROPLASTY, KNEE, TOTAL;  Surgeon: Rufus Saba M.D.;  Location: [facility];  Service: Orthopedics    KNEE ARTHROSCOPY Right 2/9/2018    Procedure: KNEE ARTHROSCOPY;  Surgeon: Harsh Baltazar M.D.;  Location: [facility];  Service: Orthopedics    MENISCECTOMY, KNEE, MEDIAL Right 2/9/2018    Procedure: MEDIAL AND LATERAL MENISCECTOMY- PARTIAL;  Surgeon: Harsh Baltazar M.D.;  Location: [facility];  Service: Orthopedics    KNEE ARTHROSCOPY Right 7/12/2017    Procedure: KNEE ARTHROSCOPY- CESPEDES;  Surgeon: Harsh Baltazar M.D.;  Location:  SURGERY [facility];  Service:     MENISCECTOMY, KNEE, MEDIAL Right 7/12/2017    Procedure: PARTIAL MEDIAL AND LATERAL MENISCECTOMY;  Surgeon: Harsh Baltazar M.D.;  Location: SURGERY [facility];  Service:     SYNOVECTOMY Right 7/12/2017    Procedure: SYNOVECTOMY;  Surgeon: Harsh Baltazar M.D.;  Location: [facility];  Service:     KNEE ARTHROSCOPY  4/21/2015    Performed by Rufus Saba M.D. at SURGERY [facility]    MENISCECTOMY, KNEE, MEDIAL  4/21/2015    Performed by Rufus Saba M.D. at SURGERY [facility]    PUMP REVISION  12/10/2012    Performed by Allison Guerra M.D. at SURGERY [facility]    SPINAL CORD STIMULATOR  5/30/2012    Performed by ALLISON GUERRA at [facility]    BIOPSY GENERAL  5/1/12    endometrial    SPINAL CORD STIMULATOR  7/11/2011    Performed by ALLISON GUERRA at SURGERY [facility]    BLOCK EPIDURAL STEROID INJECTION  2008    x 3-4    LYMPH NODE EXCISION Left 2007    cervicle    OTHER Right 2001    thoracic discectomy      ARTHROSCOPY, KNEE Left 1999    RIB RESECTION Right 1980    LUMPECTOMY         PAST PSYCHIATRIC MEDICATIONS  Fluoxetine, Paroxetine, Sertraline, Citalopram  Venlafaxine, Duloxetine  Wellbutrin  Mirtazapine  Amitriptyline (switched to Mirtazapine during admission for dehydration related syncope)   Ativan, propranolol      REVIEW OF SYSTEMS:        Constitutional negative   Eyes negative   Ears/Nose/Mouth/Throat negative   Cardiovascular negative   Respiratory negative   Gastrointestinal negative   Genitourinary negative   Muscular negative   Integumentary negative   Neurological negative   Endocrine negative   Hematologic/Lymphatic negative     PHYSICAL EXAMINAION:  Vital signs: LMP 02/26/2012   Musculoskeletal: Normal gait.   Abnormal movements: none      MENTAL STATUS EXAMINATION      General:   - Grooming and hygiene: Casual,   - Apparent distress: none,   - Behavior: Calm  - Eye  Contact:  Good,   - no psychomotor agitation or retardation    - Participation: Active verbal participation  Orientation: Alert and Fully Oriented to person, place and time  Mood: Euthymic  Affect: Flexible and Full range,  Thought Process: Logical and Goal-directed  Thought Content: Denies suicidal or homicidal ideations, intent or plan   Perception: Denies auditory or visual hallucinations. No delusions noted   Attention span and concentration: Intact   Speech:Rate within normal limits and Volume within normal limits  Language: Appropriate   Insight: Good  Judgment: Good  Recent and remote memory: No gross evidence of memory deficits        DEPRESSION SCREENIN/28/2023     9:30 AM 10/2/2023     2:52 PM 2023     7:43 AM   Depression Screen (PHQ-2/PHQ-9)   PHQ-2 Total Score 6 6 3   PHQ-9 Total Score 15 17 11       Interpretation of PHQ-9 Total Score   Score Severity   1-4 No Depression   5-9 Mild Depression   10-14 Moderate Depression   15-19 Moderately Severe Depression   20-27 Severe Depression    CURRENT RISK:       Suicidal: Low       Homicidal: Low       Self-Harm: Low       Relapse: Low       Crisis Safety Plan Reviewed Not Indicated       If evidence of imminent risk is present, intervention/plan:      MEDICAL RECORDS/LABS/DIAGNOSTIC TESTS REVIEWED:  No new lab since last visit     [place]  records -   Reviewed     PLAN:  (1) MDD; (2) TIM; (3) Insomnia  Improving  Continue Effexor XR to 225 mg daily for mood, anxiety and comorbid pain management.   Continue Wellbutrin  mg BID daily for depression augmentation.    Continue Buspar 10 mg BID for anxiety.  Continue Trazodone 25-50 mg HS PRN for insomnia.    Monitor for serotonin syndrome.  Continue psychotherapy for mood and anxiety management.  Medication options, alternatives (including no medications) and medication risks/benefits/side effects were discussed in detail.  Explained importance of contraceptive measures while on psychotropic  medications, educated to let provider know if ever pregnant or wanting to become pregnant. Verbalized understanding.  The patient was advised to call, message provider on MyChart, or come in to the clinic if symptoms worsen or if any future questions/issues regarding their medications arise; the patient verbalized understanding and agreement.    The patient was educated to call 911, call the suicide hotline, or go to local ER if having thoughts of suicide or homicide; verbalized understanding.    Billing Coding based on:  59744 based on [facility]  24164: based on psychotherapy timing    Return to clinic in 3 months or sooner if symptoms worsen.  Next Appointment: instruction provided on how to make the next appointment.     The proposed treatment plan was discussed with the patient who was provided the opportunity to ask questions and make suggestions regarding alternative treatment. Patient verbalized understanding and expressed agreement with the plan.       Genaro Stevenson M.D.  12/08/23    This note was created using voice recognition software (Dragon). The accuracy of the dictation is limited by the abilities of the software. I have reviewed the note prior to signing, however some errors in grammar and context are still possible. If you have any questions related to this note please do not hesitate to contact our office.

## 2023-12-12 PROBLEM — E66.9 OBESITY (BMI 30-39.9): Status: RESOLVED | Noted: 2018-07-09 | Resolved: 2023-12-12

## 2023-12-12 PROCEDURE — RXMED WILLOW AMBULATORY MEDICATION CHARGE: Performed by: PSYCHIATRY & NEUROLOGY

## 2023-12-12 NOTE — TELEPHONE ENCOUNTER
Received request via: Pharmacy    Was the patient seen in the last year in this department? Yes    Does the patient have an active prescription (recently filled or refills available) for medication(s) requested? yes    Does the patient have skilled nursing Plus and need 100 day supply (blood pressure, diabetes and cholesterol meds only)? Yes, quantity updated to 100 days   Requested Prescriptions     Pending Prescriptions Disp Refills    metFORMIN ER (GLUCOPHAGE XR) 500 MG TABLET SR 24  Tablet 1     Sig: Take 1 Tab by mouth 2 times a day.      
Refill done.  JOSHUA Chris.    
no

## 2023-12-18 ENCOUNTER — PHARMACY VISIT (OUTPATIENT)
Dept: PHARMACY | Facility: MEDICAL CENTER | Age: 63
End: 2023-12-18
Payer: COMMERCIAL

## 2023-12-29 ENCOUNTER — OFFICE VISIT (OUTPATIENT)
Dept: BEHAVIORAL HEALTH | Facility: CLINIC | Age: 63
End: 2023-12-29
Payer: MEDICARE

## 2023-12-29 DIAGNOSIS — F41.1 GAD (GENERALIZED ANXIETY DISORDER): ICD-10-CM

## 2023-12-29 DIAGNOSIS — F33.1 MODERATE EPISODE OF RECURRENT MAJOR DEPRESSIVE DISORDER (HCC): ICD-10-CM

## 2023-12-29 PROCEDURE — 90791 PSYCH DIAGNOSTIC EVALUATION: CPT | Performed by: MARRIAGE & FAMILY THERAPIST

## 2023-12-29 PROCEDURE — 1170F FXNL STATUS ASSESSED: CPT | Performed by: MARRIAGE & FAMILY THERAPIST

## 2023-12-29 NOTE — PROGRESS NOTES
[facility]   Initial Assessment    Name: Debby Desai  MRN: [id number]  : 1960  Age: 63 y.o.  Date of assessment: 2023  PCP: ARELIS Chris  Persons in attendance: Patient  Total session time: 55 minutes      CHIEF COMPLAINT AND HISTORY OF PRESENTING PROBLEM:  (as stated by Patient):  Debby Desai is a 63 y.o., White female referred for assessment by No ref. provider found.  Primary presenting issue includes   Chief Complaint   Patient presents with    Initial  Evaluation    Anxiety    Depression   . Working on anxiety and depression. Sister overstimulates patient which increases anxiety. No social friends.      BEHAVIORAL HEALTH TREATMENT HISTORY  Does patient/parent report a history of prior behavioral health treatment for patient? Yes:    Dates Level of Care Facilty/Provider Diagnosis/Problem Medications   Current  [facility] - Dr Stevenson MDD, TIM Effexor, Welbutrin, Trazadone    Past IOP [facility]    10/2023 - 2023 OP Multiple providers                                                        History of untreated behavioral health issues identified? No  Does patient/parent report change in appetite or weight loss/gain?  Losing for several months.  Does patient/parent report physical pain? Yes              Indicate if pain is acute or chronic, and location: Chronic              Pain scale rating:           FAMILY/SOCIAL HISTORY  Current living situation/household members: Lives with 19 y/o grand nephew  Does patient/parent report a family history of behavioral health issues, diagnoses, or treatment?   Family History   Problem Relation Age of Onset    Hypertension Father     Diabetes Father     Heart Disease Father     Alcohol abuse Father     Anesthesia Sister         slow to come out (as a child)    Diabetes Other     Heart Disease Other     Cancer Other     Hypertension Other     Stroke Other     Lung Disease Other           EMPLOYMENT/RESOURCES  Is the patient currently  employed? Yes  Does the patient/parent report adequate financial resources? Yes       HISTORY:  Does patient report current or past enlistment? No               [If yes, complete below items]  Does patient report history of exposure to combat? No      SPIRITUAL/CULTURAL/IDENTITY:  What are the patient's/family's spiritual beliefs or practices? Patient believes in a God      ABUSE/NEGLECT/TRAUMA SCREENING  Does patient report feeling “unsafe” in his/her home, or afraid of anyone? No  Does patient report any history of physical, sexual, or emotional abuse? No  Is there evidence of neglect by self? No  Is there evidence of neglect by a caregiver? No                                                                                                          SAFETY ASSESSMENT - SELF  Does patient acknowledge current or past symptoms of dangerousness to self? No  Recent change in frequency/specificity/intensity of suicidal thoughts or self-harm behavior? No  Current access to firearms, medications, or other identified means of suicide/self-harm? Yes  If yes, willing to restrict access to means of suicide/self-harm? Yes      Current Suicide Risk: Not applicable  Crisis Safety Plan completed and copy given to patient: No      SAFETY ASSESSMENT - OTHERS  Recent change in frequency/specificity/intensity of thoughts or threats to harm others? No  If Yes:  Current access to firearms/other identified means of harm?   If yes, willing to restrict access to weapons/means of harm?     Current Homicide Risk:  Not applicable  Crisis Safety Plan completed and copy given to patient? No  Based on information provided during the current assessment, is a mandated “duty to warn” being exercised? No      SUBSTANCE USE/ADDICTION HISTORY  Patient denies use of any substance/addictive behaviors No    If No:  Is there a family history of substance use/addiction? Yes Alcohol and Father   Does patient acknowledge or parent/significant other  report use of/dependence on substances? No  Last time patient used alcohol: n/a  Within the past week? No  Last time patient used marijuana: n/a  Within the past month? No  Any other street drugs ever tried even once? No  Any use of prescription medications/pills without a prescription, or for reasons others than originally prescribed?  No  Any other addictive behavior reported (gambling, shopping, sex)? No     Drug History:  Amphetamine:  Amphetamine frequency: Never used      Cannibis:  Cannabis frequency: Never used      Cocaine:  Cocaine frequency: Never used      Ecstasy:  Ecstasy frequency: Never used      Hallucinogen:  Hallucinogen frequency: Never used      Inhalant:   Inhalant frequency: Never used      Opiate:  Cannabis frequency: Never used      Other:  Other drug frequency: Never used      Sedative:   Sedative frequency: Never used          MENTAL STATUS/OBSERVATIONS              Participation: Active verbal participation, Attentive, and Engaged  Grooming: Casual  Orientation:Alert and Fully Oriented   Behavior: Calm  Eye contact: Good          Mood:Anxious  Affect:Flexible and Full range  Thought process: Logical and Goal-directed  Thought content:  Within normal limits  Speech: Rate within normal limits and Volume within normal limits  Perception: Within normal limits  Memory: No gross evidence of memory deficits  Insight: Adequate  Judgment:  Adequate  Other:               Family/couple interaction observations: n/a      Patient's motivation/readiness for change: Manage depression    Topics addressed in psychotherapy include:     [employer]    Destiny Keenan personality test  https://[employer]/relationship-quizzes/destiny-dianne-personality-test/    The 5 love languages quiz  https://[employer]/quizzes/love-language    Make your bed  https://www.[employer] and Bookkeepingube.com/watch?v=5nP8eLAeZgk    This is Water  https://www.youUrban Tax Service and Bookkeepingube.com/watch?v=lV9ybhxtDWW    Care plan completed: No  Does patient  express agreement with the above plan? Yes     Diagnosis:  1. Moderate episode of recurrent major depressive disorder (HCC)    2. TIM (generalized anxiety disorder)        Referral appointment(s) scheduled? No       ERNIE Edward.

## 2024-01-02 PROCEDURE — RXMED WILLOW AMBULATORY MEDICATION CHARGE: Performed by: PSYCHIATRY & NEUROLOGY

## 2024-01-04 ENCOUNTER — PHARMACY VISIT (OUTPATIENT)
Dept: PHARMACY | Facility: MEDICAL CENTER | Age: 64
End: 2024-01-04
Payer: COMMERCIAL

## 2024-01-04 ENCOUNTER — PATIENT MESSAGE (OUTPATIENT)
Dept: MEDICAL GROUP | Facility: MEDICAL CENTER | Age: 64
End: 2024-01-04
Payer: MEDICARE

## 2024-01-04 DIAGNOSIS — M25.569 CHRONIC KNEE PAIN, UNSPECIFIED LATERALITY: ICD-10-CM

## 2024-01-04 DIAGNOSIS — G89.29 CHRONIC KNEE PAIN, UNSPECIFIED LATERALITY: ICD-10-CM

## 2024-01-05 ENCOUNTER — PHARMACY VISIT (OUTPATIENT)
Dept: PHARMACY | Facility: MEDICAL CENTER | Age: 64
End: 2024-01-05
Payer: COMMERCIAL

## 2024-01-05 PROCEDURE — RXMED WILLOW AMBULATORY MEDICATION CHARGE: Performed by: NURSE PRACTITIONER

## 2024-01-05 RX ORDER — TRAMADOL HYDROCHLORIDE 50 MG/1
50 TABLET ORAL EVERY 8 HOURS PRN
Qty: 90 TABLET | Refills: 0 | Status: SHIPPED | OUTPATIENT
Start: 2024-01-05 | End: 2024-01-19 | Stop reason: SDUPTHER

## 2024-01-17 PROCEDURE — RXMED WILLOW AMBULATORY MEDICATION CHARGE: Performed by: NURSE PRACTITIONER

## 2024-01-17 SDOH — ECONOMIC STABILITY: TRANSPORTATION INSECURITY
IN THE PAST 12 MONTHS, HAS THE LACK OF TRANSPORTATION KEPT YOU FROM MEDICAL APPOINTMENTS OR FROM GETTING MEDICATIONS?: NO

## 2024-01-17 SDOH — ECONOMIC STABILITY: FOOD INSECURITY: WITHIN THE PAST 12 MONTHS, THE FOOD YOU BOUGHT JUST DIDN'T LAST AND YOU DIDN'T HAVE MONEY TO GET MORE.: NEVER TRUE

## 2024-01-17 SDOH — ECONOMIC STABILITY: TRANSPORTATION INSECURITY
IN THE PAST 12 MONTHS, HAS LACK OF TRANSPORTATION KEPT YOU FROM MEETINGS, WORK, OR FROM GETTING THINGS NEEDED FOR DAILY LIVING?: NO

## 2024-01-17 SDOH — ECONOMIC STABILITY: HOUSING INSECURITY
IN THE LAST 12 MONTHS, WAS THERE A TIME WHEN YOU DID NOT HAVE A STEADY PLACE TO SLEEP OR SLEPT IN A SHELTER (INCLUDING NOW)?: NO

## 2024-01-17 SDOH — ECONOMIC STABILITY: FOOD INSECURITY: WITHIN THE PAST 12 MONTHS, YOU WORRIED THAT YOUR FOOD WOULD RUN OUT BEFORE YOU GOT MONEY TO BUY MORE.: NEVER TRUE

## 2024-01-17 SDOH — ECONOMIC STABILITY: HOUSING INSECURITY: IN THE LAST 12 MONTHS, HOW MANY PLACES HAVE YOU LIVED?: 1

## 2024-01-17 SDOH — ECONOMIC STABILITY: INCOME INSECURITY: HOW HARD IS IT FOR YOU TO PAY FOR THE VERY BASICS LIKE FOOD, HOUSING, MEDICAL CARE, AND HEATING?: NOT HARD AT ALL

## 2024-01-17 SDOH — ECONOMIC STABILITY: INCOME INSECURITY: IN THE LAST 12 MONTHS, WAS THERE A TIME WHEN YOU WERE NOT ABLE TO PAY THE MORTGAGE OR RENT ON TIME?: NO

## 2024-01-17 SDOH — HEALTH STABILITY: MENTAL HEALTH
STRESS IS WHEN SOMEONE FEELS TENSE, NERVOUS, ANXIOUS, OR CAN'T SLEEP AT NIGHT BECAUSE THEIR MIND IS TROUBLED. HOW STRESSED ARE YOU?: RATHER MUCH

## 2024-01-17 SDOH — HEALTH STABILITY: PHYSICAL HEALTH: ON AVERAGE, HOW MANY MINUTES DO YOU ENGAGE IN EXERCISE AT THIS LEVEL?: PATIENT DECLINED

## 2024-01-17 SDOH — HEALTH STABILITY: PHYSICAL HEALTH
ON AVERAGE, HOW MANY DAYS PER WEEK DO YOU ENGAGE IN MODERATE TO STRENUOUS EXERCISE (LIKE A BRISK WALK)?: PATIENT DECLINED

## 2024-01-17 SDOH — ECONOMIC STABILITY: TRANSPORTATION INSECURITY
IN THE PAST 12 MONTHS, HAS LACK OF RELIABLE TRANSPORTATION KEPT YOU FROM MEDICAL APPOINTMENTS, MEETINGS, WORK OR FROM GETTING THINGS NEEDED FOR DAILY LIVING?: NO

## 2024-01-17 ASSESSMENT — SOCIAL DETERMINANTS OF HEALTH (SDOH)
HOW OFTEN DO YOU ATTENT MEETINGS OF THE CLUB OR ORGANIZATION YOU BELONG TO?: PATIENT DECLINED
HOW MANY DRINKS CONTAINING ALCOHOL DO YOU HAVE ON A TYPICAL DAY WHEN YOU ARE DRINKING: PATIENT DOES NOT DRINK
HOW OFTEN DO YOU HAVE SIX OR MORE DRINKS ON ONE OCCASION: NEVER
HOW OFTEN DO YOU GET TOGETHER WITH FRIENDS OR RELATIVES?: ONCE A WEEK
HOW OFTEN DO YOU ATTENT MEETINGS OF THE CLUB OR ORGANIZATION YOU BELONG TO?: PATIENT DECLINED
ARE YOU MARRIED, WIDOWED, DIVORCED, SEPARATED, NEVER MARRIED, OR LIVING WITH A PARTNER?: NEVER MARRIED
IN A TYPICAL WEEK, HOW MANY TIMES DO YOU TALK ON THE PHONE WITH FAMILY, FRIENDS, OR NEIGHBORS?: MORE THAN THREE TIMES A WEEK
IN A TYPICAL WEEK, HOW MANY TIMES DO YOU TALK ON THE PHONE WITH FAMILY, FRIENDS, OR NEIGHBORS?: MORE THAN THREE TIMES A WEEK
HOW OFTEN DO YOU ATTEND CHURCH OR RELIGIOUS SERVICES?: PATIENT DECLINED
DO YOU BELONG TO ANY CLUBS OR ORGANIZATIONS SUCH AS CHURCH GROUPS UNIONS, FRATERNAL OR ATHLETIC GROUPS, OR SCHOOL GROUPS?: NO
DO YOU BELONG TO ANY CLUBS OR ORGANIZATIONS SUCH AS CHURCH GROUPS UNIONS, FRATERNAL OR ATHLETIC GROUPS, OR SCHOOL GROUPS?: NO
HOW OFTEN DO YOU ATTEND CHURCH OR RELIGIOUS SERVICES?: PATIENT DECLINED
WITHIN THE PAST 12 MONTHS, YOU WORRIED THAT YOUR FOOD WOULD RUN OUT BEFORE YOU GOT THE MONEY TO BUY MORE: NEVER TRUE
ARE YOU MARRIED, WIDOWED, DIVORCED, SEPARATED, NEVER MARRIED, OR LIVING WITH A PARTNER?: NEVER MARRIED
HOW OFTEN DO YOU GET TOGETHER WITH FRIENDS OR RELATIVES?: ONCE A WEEK
HOW OFTEN DO YOU HAVE A DRINK CONTAINING ALCOHOL: NEVER
HOW HARD IS IT FOR YOU TO PAY FOR THE VERY BASICS LIKE FOOD, HOUSING, MEDICAL CARE, AND HEATING?: NOT HARD AT ALL

## 2024-01-17 ASSESSMENT — LIFESTYLE VARIABLES
HOW OFTEN DO YOU HAVE SIX OR MORE DRINKS ON ONE OCCASION: NEVER
HOW MANY STANDARD DRINKS CONTAINING ALCOHOL DO YOU HAVE ON A TYPICAL DAY: PATIENT DOES NOT DRINK
SKIP TO QUESTIONS 9-10: 1
HOW OFTEN DO YOU HAVE A DRINK CONTAINING ALCOHOL: NEVER
AUDIT-C TOTAL SCORE: 0

## 2024-01-19 ENCOUNTER — PHARMACY VISIT (OUTPATIENT)
Dept: PHARMACY | Facility: MEDICAL CENTER | Age: 64
End: 2024-01-19
Payer: COMMERCIAL

## 2024-01-19 ENCOUNTER — OFFICE VISIT (OUTPATIENT)
Dept: MEDICAL GROUP | Facility: MEDICAL CENTER | Age: 64
End: 2024-01-19
Payer: MEDICARE

## 2024-01-19 VITALS
BODY MASS INDEX: 24.01 KG/M2 | DIASTOLIC BLOOD PRESSURE: 68 MMHG | WEIGHT: 153 LBS | HEART RATE: 74 BPM | HEIGHT: 67 IN | OXYGEN SATURATION: 95 % | TEMPERATURE: 97 F | SYSTOLIC BLOOD PRESSURE: 120 MMHG

## 2024-01-19 DIAGNOSIS — R73.03 PREDIABETES: ICD-10-CM

## 2024-01-19 DIAGNOSIS — M17.11 PRIMARY OSTEOARTHRITIS OF RIGHT KNEE: ICD-10-CM

## 2024-01-19 DIAGNOSIS — G47.09 OTHER INSOMNIA: ICD-10-CM

## 2024-01-19 DIAGNOSIS — I10 PRIMARY HYPERTENSION: ICD-10-CM

## 2024-01-19 DIAGNOSIS — E78.5 DYSLIPIDEMIA: ICD-10-CM

## 2024-01-19 DIAGNOSIS — G89.29 CHRONIC KNEE PAIN, UNSPECIFIED LATERALITY: ICD-10-CM

## 2024-01-19 DIAGNOSIS — F33.1 MODERATE EPISODE OF RECURRENT MAJOR DEPRESSIVE DISORDER (HCC): ICD-10-CM

## 2024-01-19 DIAGNOSIS — F11.20 OPIOID DEPENDENCE, UNCOMPLICATED (HCC): ICD-10-CM

## 2024-01-19 DIAGNOSIS — D50.8 IRON DEFICIENCY ANEMIA SECONDARY TO INADEQUATE DIETARY IRON INTAKE: ICD-10-CM

## 2024-01-19 DIAGNOSIS — R40.0 UNCONTROLLED DAYTIME SOMNOLENCE: ICD-10-CM

## 2024-01-19 DIAGNOSIS — Z00.00 MEDICARE ANNUAL WELLNESS VISIT, INITIAL: ICD-10-CM

## 2024-01-19 DIAGNOSIS — Z82.0 FAMILY HISTORY OF SLEEP APNEA: ICD-10-CM

## 2024-01-19 DIAGNOSIS — E03.9 ACQUIRED HYPOTHYROIDISM: ICD-10-CM

## 2024-01-19 DIAGNOSIS — Z91.81 RISK FOR FALLS: ICD-10-CM

## 2024-01-19 DIAGNOSIS — M25.569 CHRONIC KNEE PAIN, UNSPECIFIED LATERALITY: ICD-10-CM

## 2024-01-19 DIAGNOSIS — S80.812A ABRASION OF LEFT LOWER LEG, INITIAL ENCOUNTER: ICD-10-CM

## 2024-01-19 PROBLEM — E11.65 TYPE 2 DIABETES MELLITUS WITH HYPERGLYCEMIA, WITHOUT LONG-TERM CURRENT USE OF INSULIN (HCC): Status: RESOLVED | Noted: 2024-01-19 | Resolved: 2024-01-19

## 2024-01-19 PROBLEM — E11.65 TYPE 2 DIABETES MELLITUS WITH HYPERGLYCEMIA, WITHOUT LONG-TERM CURRENT USE OF INSULIN (HCC): Status: ACTIVE | Noted: 2024-01-19

## 2024-01-19 PROCEDURE — 90471 IMMUNIZATION ADMIN: CPT | Performed by: NURSE PRACTITIONER

## 2024-01-19 PROCEDURE — G0438 PPPS, INITIAL VISIT: HCPCS | Mod: 25 | Performed by: NURSE PRACTITIONER

## 2024-01-19 PROCEDURE — 1170F FXNL STATUS ASSESSED: CPT | Performed by: NURSE PRACTITIONER

## 2024-01-19 PROCEDURE — 3078F DIAST BP <80 MM HG: CPT | Performed by: NURSE PRACTITIONER

## 2024-01-19 PROCEDURE — 90715 TDAP VACCINE 7 YRS/> IM: CPT | Performed by: NURSE PRACTITIONER

## 2024-01-19 PROCEDURE — 3074F SYST BP LT 130 MM HG: CPT | Performed by: NURSE PRACTITIONER

## 2024-01-19 RX ORDER — TRAMADOL HYDROCHLORIDE 50 MG/1
50 TABLET ORAL EVERY 8 HOURS PRN
Qty: 90 TABLET | Refills: 3 | Status: SHIPPED | OUTPATIENT
Start: 2024-02-05 | End: 2024-04-05

## 2024-01-19 ASSESSMENT — ENCOUNTER SYMPTOMS: GENERAL WELL-BEING: FAIR

## 2024-01-19 ASSESSMENT — ACTIVITIES OF DAILY LIVING (ADL): BATHING_REQUIRES_ASSISTANCE: 0

## 2024-01-19 ASSESSMENT — PATIENT HEALTH QUESTIONNAIRE - PHQ9
CLINICAL INTERPRETATION OF PHQ2 SCORE: 5
SUM OF ALL RESPONSES TO PHQ QUESTIONS 1-9: 16
5. POOR APPETITE OR OVEREATING: 1 - SEVERAL DAYS

## 2024-01-19 ASSESSMENT — FIBROSIS 4 INDEX: FIB4 SCORE: 0.61

## 2024-01-19 NOTE — ASSESSMENT & PLAN NOTE
Chronic, not improved with improved depression.     Sleep is variable, does have some insomnia, wakes a few times at night to urinate. Sometimes she can't fall asleep after waking.

## 2024-01-19 NOTE — ASSESSMENT & PLAN NOTE
Chronic, prior replacement, pain persisted, did not recover well. Taking tramadol 1-3 times per day as needed. Due for UDS and contract.

## 2024-01-19 NOTE — PROGRESS NOTES
Chief Complaint   Patient presents with    Annual Exam       HPI:  Debby Desai is a 63 y.o. here for Medicare Annual Wellness Visit     Patient Active Problem List    Diagnosis Date Noted    BMI 24.0-24.9, adult 12/12/2023    Spondylolisthesis at L5-S1 level 09/21/2023    Acute pain of left shoulder 09/20/2023    Iron deficiency anemia secondary to inadequate dietary iron intake 05/24/2023    Prolonged Q-T interval on ECG 05/23/2023    Psychophysiological insomnia 05/23/2023    Brain fog 02/16/2023    Tunnel visual field constriction of both eyes 07/07/2022    Risk for falls 07/06/2022    Pain disorder associated with psychological factors 07/20/2021    Hospital discharge follow-up 07/19/2021    Chronic pain 06/02/2021    Other insomnia 05/13/2021    Post concussion syndrome 10/21/2020    Chronic knee pain 01/27/2020    Opioid dependence, uncomplicated (HCC) 09/27/2018    Prediabetes 09/29/2017    Osteoarthritis of right knee 07/12/2017    Moderate episode of recurrent major depressive disorder (HCC) 12/16/2016    TIM (generalized anxiety disorder) 10/24/2016    Osteoarthrosis involving lower leg 04/21/2015    Dyslipidemia 12/02/2014    Primary hypertension 03/19/2012    Acquired hypothyroidism 03/19/2012    Back pain 03/19/2012    Neck pain 03/19/2012       Current Outpatient Medications   Medication Sig Dispense Refill    traMADol (ULTRAM) 50 MG Tab Take 1 Tablet by mouth every 8 hours as needed for Moderate Pain or Severe Pain for up to 30 days. 90 Tablet 0    buPROPion (WELLBUTRIN SR) 200 MG SR tablet Take 1 tablet by mouth 2 times a day. 180 Tablet 1    busPIRone (BUSPAR) 10 MG Tab tablet Take 1 Tablet by mouth 2 times a day. 180 Tablet 2    venlafaxine (EFFEXOR-XR) 150 MG extended-release capsule Take 1 Capsule by mouth every day. (venlafaxine xr 150 mg + 75 mg = 225 mg daily) 90 Capsule 2    venlafaxine XR (EFFEXOR XR) 75 MG CAPSULE SR 24 HR Take 1 capsule by mouth every day. (venlafaxine xr 150 mg + 75  mg = 225 mg daily) 90 Capsule 2    traZODone (DESYREL) 50 MG Tab Take 0.5 tablets by mouth at bedtime as needed for sleep (as needed for sleep). (Can increase to 1 tab if needed for sleep) 90 Tablet 3    atorvastatin (LIPITOR) 20 MG Tab TAKE ONE TABLET BY MOUTH EVERY  Tablet 0    metFORMIN ER (GLUCOPHAGE XR) 500 MG TABLET SR 24 HR Take 1 Tablet by mouth 2 times a day. 200 Tablet 1    diclofenac DR (VOLTAREN) 75 MG Tablet Delayed Response Take 1 Tablet by mouth 2 times a day. (Patient not taking: Reported on 12/29/2023) 28 Tablet 1    amLODIPine (NORVASC) 10 MG Tab Take 1 tablet by mouth every day. 90 Tablet 3    lisinopril (PRINIVIL) 40 MG tablet Take 1 Tablet by mouth every day. 90 Tablet 3    levothyroxine (SYNTHROID) 50 MCG Tab Take 1 tablet by mouth every morning on an empty stomach. 90 Tablet 3    VITAMIN D PO Take  by mouth every day.      Ferrous Sulfate (IRON) 325 (65 Fe) MG Tab Take 65 mg by mouth every day at 6 PM.       No current facility-administered medications for this visit.          Current supplements as per medication list.     Allergies: Sulfamethoxazole-trimethoprim    Current social contact/activities:  Spending time with her sister and niece. Plans to get involved with senior center    She  reports that she has never smoked. She has never used smokeless tobacco. She reports that she does not currently use alcohol. She reports that she does not use drugs.  Counseling given: Not Answered      ROS:    Gait: Uses a walker  Ostomy: No  Other tubes: No  Amputations: No  Chronic oxygen use: No  Last eye exam: 2 year ago   Wears hearing aids: No   : Denies any urinary leakage during the last 6 months    Screening:    Depression Screening  Little interest or pleasure in doing things?  3 - nearly every day  Feeling down, depressed , or hopeless? 2 - more than half the days  Trouble falling or staying asleep, or sleeping too much?  3 - nearly every day  Feeling tired or having little energy?  3  - nearly every day  Poor appetite or overeating?  1 - several days  Feeling bad about yourself - or that you are a failure or have let yourself or your family down? 1 - several days  Trouble concentrating on things, such as reading the newspaper or watching television? 2 - more than half the days  Moving or speaking so slowly that other people could have noticed.  Or the opposite - being so fidgety or restless that you have been moving around a lot more than usual?  1 - several days  Thoughts that you would be better off dead, or of hurting yourself?  0 - not at all  Patient Health Questionnaire Score: 16    If depressive symptoms identified deferred to follow up visit unless specifically addressed in assessment and plan.    Interpretation of PHQ-9 Total Score   Score Severity   1-4 No Depression   5-9 Mild Depression   10-14 Moderate Depression   15-19 Moderately Severe Depression   20-27 Severe Depression    Screening for Cognitive Impairment  Do you or any of your friends or family members have any concern about your memory? Yes  Three Minute Recall (Banana, Sunrise, Chair) 2/3    Aki clock face with all 12 numbers and set the hands to show 20 past 8.  Yes    Cognitive concerns identified deferred for follow up unless specifically addressed in assessment and plan.    Fall Risk Assessment  Has the patient had two or more falls in the last year or any fall with injury in the last year?  Yes    Safety Assessment  Do you always wear your seatbelt?  Yes  Any changes to home needed to function safely? Yes  Difficulty hearing.  No  Patient counseled about all safety risks that were identified.    Functional Assessment ADLs  Are there any barriers preventing you from cooking for yourself or meeting nutritional needs?  No.    Are there any barriers preventing you from driving safely or obtaining transportation?  No.    Are there any barriers preventing you from using a telephone or calling for help?  No    Are there any  barriers preventing you from shopping?  Yes.    Are there any barriers preventing you from taking care of your own finances?  No    Are there any barriers preventing you from managing your medications?  No    Are there any barriers preventing you from showering, bathing or dressing yourself? No    Are there any barriers preventing you from doing housework or laundry? No    Are there any barriers preventing you from using the toilet?No    Are you currently engaging in any exercise or physical activity?   .      Self-Assessment of Health  What is your perception of your health? Fair    Do you sleep more than six hours a night? No    In the past 7 days, how much did pain keep you from doing your normal work? A lot    Do you spend quality time with family or friends (virtually or in person)? Yes    Do you usually eat a heart healthy diet that constists of a variety of fruits, vegetables, whole grains and fiber? Yes    Do you eat foods high in fat and/or Fast Food more than three times per week? No    How concerned are you that your medical conditions are not being well managed? very    Are you worried that in the next 2 months, you may not have stable housing that you own, rent, or stay in as part of a household? No        Advance Care Planning  Do you have an Advance Directive, Living Will, Durable Power of , or POLST? No                 Health Maintenance Summary            Ordered - HIV Screening (Once) Ordered on 10/19/2023      No completion history exists for this topic.              Overdue - Cervical Cancer Screening (Every 3 Years) Overdue since 3/30/2015      03/30/2012  PAP IG, RFX HPV ASCU              Overdue - IMM DTaP/Tdap/Td Vaccine (2 - Td or Tdap) Overdue since 4/25/2023 04/25/2013  Imm Admin: Tdap Vaccine              Overdue - COVID-19 Vaccine (3 - 2023-24 season) Overdue since 9/1/2023 01/30/2021  Imm Admin: MODERNA SARS-COV-2 VACCINE (12+)    12/31/2020  Imm Admin: MODERNA  SARS-COV-2 VACCINE (12+)              Postponed - Zoster (Shingles) Vaccines (1 of 2) Postponed until 10/6/2033      No completion history exists for this topic.              Mammogram (Every 2 Years) Next due on 9/8/2024 09/08/2022  MA-SCREENING MAMMO BILAT W/TOMOSYNTHESIS W/CAD    01/16/2015  MA-SCREENING MAMMOGRAM W/ CAD    12/02/2009  MA-SCREENING DIGITAL MAMMO    12/02/2009  MA-CAD SCREENING-MAMMO    10/30/2008  MA-CAD SCREENING-MAMMO    Only the first 5 history entries have been loaded, but more history exists.              Annual Wellness Visit (Yearly) Next due on 12/12/2024 12/12/2023  Level of Service: INITIAL ANNUAL WELLNESS VISIT-INCLUDES PPPS              Bone Density Scan (Every 5 Years) Next due on 7/19/2028 07/19/2023  DS-BONE DENSITY STUDY (DEXA)              Colorectal Cancer Screening (Colonoscopy - Every 10 Years) Tentatively due on 2/28/2030 02/28/2020  REFERRAL TO GI FOR COLONOSCOPY              Hepatitis C Screening  Tentatively Complete      01/18/2020  Hepatitis C Antibody component of HEP C VIRUS ANTIBODY              Pneumococcal Vaccine: 65+ Years (Series Information) Completed      04/17/2023  Imm Admin: Pneumococcal Conjugate Vaccine (PCV20)    03/19/2008  Imm Admin: Pneumococcal polysaccharide vaccine (PPSV-23)              Influenza Vaccine (Series Information) Completed      09/20/2023  Imm Admin: Influenza Vaccine Adult HD    11/16/2022  Imm Admin: Influenza Vaccine Quad Inj (Pf)    10/21/2020  Imm Admin: Influenza Vaccine Quad Inj (Pf)    09/16/2019  Imm Admin: Influenza Vaccine Quad Inj (Pf)    09/17/2018  Imm Admin: INFLUENZA TIV (IM)    Only the first 5 history entries have been loaded, but more history exists.              Hepatitis A Vaccine (Hep A) (Series Information) Aged Out      No completion history exists for this topic.              Hepatitis B Vaccine (Hep B) (Series Information) Aged Out      No completion history exists for this topic.               HPV Vaccines (Series Information) Aged Out      No completion history exists for this topic.              Polio Vaccine (Inactivated Polio) (Series Information) Aged Out      No completion history exists for this topic.              Meningococcal Immunization (Series Information) Aged Out      No completion history exists for this topic.              Discontinued - A1c Screening  Ordered on 10/19/2023        Frequency changed to Never automatically (Topic No Longer Applies)    09/26/2022  HEMOGLOBIN A1C    06/05/2021  HEMOGLOBIN A1C    01/18/2020  HEMOGLOBIN A1C    09/04/2019  HEMOGLOBIN A1C    Only the first 5 history entries have been loaded, but more history exists.              Discontinued - Fasting Lipid Profile  Ordered on 10/19/2023        Frequency changed to Never automatically (Topic No Longer Applies)    09/26/2022  Lipid Profile    06/05/2021  Lipid Profile    01/18/2020  Lipid Profile    01/05/2019  LIPID PROFILE    Only the first 5 history entries have been loaded, but more history exists.              Discontinued - Diabetes: Urine Protein Screening  Ordered on 10/19/2023        Frequency changed to Never automatically (Topic No Longer Applies)    06/05/2021  MICROALBUMIN CREAT RATIO URINE              Discontinued - SERUM CREATININE  Ordered on 10/19/2023        Frequency changed to Never automatically (Topic No Longer Applies)    05/24/2023  Comp Metabolic Panel    05/23/2023  Renal Function Panel    05/22/2023  COMP METABOLIC PANEL    09/26/2022  Comp Metabolic Panel    Only the first 5 history entries have been loaded, but more history exists.                    Patient Care Team:  ARELIS Chris as PCP - General (Family Medicine)  Kelli Mi, PT, MSPT (Inactive) as Physical Therapist (Physical Therapy)  Nicho Lucero, PT, DPT as Physical Therapist (Physical Therapy)  Nicho Lucero, PT, DPT as Physical Therapist (Physical Therapy)  Jos Torres, PT, DPT as Physical  Therapist (Physical Therapy)  Dee Santoyo PT (Inactive) as Physical Therapist (Physical Therapy)  Dee Santoyo PT (Inactive) as Physical Therapist (Physical Therapy)      Social History     Tobacco Use    Smoking status: Never    Smokeless tobacco: Never   Vaping Use    Vaping Use: Never used   Substance Use Topics    Alcohol use: Not Currently    Drug use: No     Family History   Problem Relation Age of Onset    Hypertension Father     Diabetes Father     Heart Disease Father     Alcohol abuse Father     Anesthesia Sister         slow to come out (as a child)    Diabetes Other     Heart Disease Other     Cancer Other     Hypertension Other     Stroke Other     Lung Disease Other      She  has a past medical history of Anemia, Anginal syndrome (HCC), Anxiety, Arthritis, Chronic pain (06/02/2021), Dental disorder (05/24/2012), High cholesterol, HTN (hypertension), benign (03/19/2012), Hypothyroid (03/19/2012), Major depression (03/19/2012), Obesity (BMI 30-39.9) (07/09/2018), Orbital floor (blow-out) closed fracture (HCC), Other specified symptom associated with female genital organs, Pain, Pain, Pain (02/2018), Psychiatric problem, Unspecified disorder of thyroid, Urinary bladder disorder, and Urinary incontinence.    She has no past medical history of Breast cancer (HCC).   Past Surgical History:   Procedure Laterality Date    OR NEUROLYTIC DEST GENICULAR NERVE Right 7/28/2023    Procedure: RIGHT genicular nerve radiofrequency ablation;  Surgeon: Volodymyr Herring M.D.;  [facility];  Service: Pain Management    OR FLUOROSCOPIC GUIDANCE NEEDLE PLACEMENT Right 7/7/2023    Procedure: RIGHT genicular nerve diagnostic blocks;  Surgeon: Volodymyr Herring M.D.;  Location: [facility];  Service: Pain Management    INJ,EPI ANES/STER LUM/SAC ADDL Right 4/7/2021    Procedure: RIGHT lumbar five and sacral one transforaminal epidural;  Surgeon: Volodymyr Herring M.D.;  Location:  [facility];  Service: Pain Management    PB TOTAL KNEE ARTHROPLASTY Right 9/16/2019    Procedure: RIGHT ARTHROPLASTY, KNEE, TOTAL;  Surgeon: Rufus Saba M.D.;  Location: [facility];  Service: Orthopedics    KNEE ARTHROSCOPY Right 2/9/2018    Procedure: KNEE ARTHROSCOPY;  Surgeon: Harsh Baltazar M.D.;  Location: [facility];  Service: Orthopedics    MENISCECTOMY, KNEE, MEDIAL Right 2/9/2018    Procedure: MEDIAL AND LATERAL MENISCECTOMY- PARTIAL;  Surgeon: Harsh Baltazar M.D.;  Location: [facility];  Service: Orthopedics    KNEE ARTHROSCOPY Right 7/12/2017    Procedure: KNEE ARTHROSCOPY- CESPEDES;  Surgeon: Harsh Baltazar M.D.;  Location: [facility];  Service:     MENISCECTOMY, KNEE, MEDIAL Right 7/12/2017    Procedure: PARTIAL MEDIAL AND LATERAL MENISCECTOMY;  Surgeon: Harsh Baltazar M.D.;  Location: [facility];  Service:     SYNOVECTOMY Right 7/12/2017    Procedure: SYNOVECTOMY;  Surgeon: Harsh Baltazar M.D.;  Location: [facility];  Service:     KNEE ARTHROSCOPY  4/21/2015    Performed by Rufus Saba M.D. at [facility]    MENISCECTOMY, KNEE, MEDIAL  4/21/2015    Performed by Rufus Saba M.D. at [facility]    PUMP REVISION  12/10/2012    Performed by Allison Guerra M.D. at [facility]    SPINAL CORD STIMULATOR  5/30/2012    Performed by ALLISON GUERRA at [facility]    BIOPSY GENERAL  5/1/12    endometrial    SPINAL CORD STIMULATOR  7/11/2011    Performed by ALLISON GUERRA at [facility]    BLOCK EPIDURAL STEROID INJECTION  2008    x 3-4    LYMPH NODE EXCISION Left 2007    cervicle    OTHER Right 2001    thoracic discectomy      ARTHROSCOPY, KNEE Left 1999    RIB RESECTION Right 1980    LUMPECTOMY         Exam:   /68   Pulse 74   Temp 36.1 °C (97 °F) (Temporal)   Ht 1.702 m (5' 7\")   Wt 69.4 " kg (153 lb)   SpO2 95%  Body mass index is 23.96 kg/m².    Hearing excellent.    Dentition poor  Alert, oriented in no acute distress.  Eye contact is good, speech goal directed, affect calm    Assessment and Plan. The following treatment and monitoring plan is recommended:      1. Medicare annual wellness visit, initial  Annual labs and health maintenance reviewed and updated.   Patient and I discussed the importance of lifestyle changes, with particular emphasis on decreasing sugar and carbohydrate intake and increasing plant-based nutrition (for the purposes of weight loss, general health, and prevention of chronic illnesses), as well as regular cardiovascular exercise, proper sleep, and stress management. Patient verbalized understanding.    2. Moderate episode of recurrent major depressive disorder (HCC)  Stable, continue medications and follow up with psychiatry  Did intensive outpatient therapy  Continue therapy  - Patient has been identified as having a positive depression screening. Appropriate orders and counseling have been given.    3. Prediabetes  Stable, diet controlled, due for labs    4. Acquired hypothyroidism  Stable on levothyroxine 50 mg daily, due for labs    5. Dyslipidemia  Stable on atorvastatin 20 mg daily, due for labs    6. Opioid dependence, uncomplicated (HCC)  Stable on tramadol as needed    7. Iron deficiency anemia secondary to inadequate dietary iron intake  Stable on supplement daily    8. Other insomnia  Stable on trazodone nightly as needed    9. Risk for falls  - Patient identified as fall risk.  Appropriate orders and counseling given.      Services suggested: No services needed at this time  Health Care Screening: Age-appropriate preventive services recommended by USPTF and ACIP covered by Medicare were discussed today. Services ordered if indicated and agreed upon by the patient.  Referrals offered: Community-based lifestyle interventions to reduce health risks and promote  self-management and wellness, fall prevention, nutrition, physical activity, tobacco-use cessation, weight loss, and mental health services as per orders if indicated.    Discussion today about general wellness and lifestyle habits:    Prevent falls and reduce trip hazards; Cautioned about securing or removing rugs.  Have a working fire alarm and carbon monoxide detector;   Engage in regular physical activity and social activities     Follow-up: Return in about 6 months (around 7/19/2024).

## 2024-01-22 ENCOUNTER — HOSPITAL ENCOUNTER (OUTPATIENT)
Dept: LAB | Facility: MEDICAL CENTER | Age: 64
End: 2024-01-22
Attending: NURSE PRACTITIONER
Payer: MEDICARE

## 2024-01-22 DIAGNOSIS — R73.03 PREDIABETES: ICD-10-CM

## 2024-01-22 DIAGNOSIS — Z11.4 ENCOUNTER FOR SCREENING FOR HIV: ICD-10-CM

## 2024-01-22 DIAGNOSIS — E55.9 VITAMIN D DEFICIENCY: ICD-10-CM

## 2024-01-22 DIAGNOSIS — I10 PRIMARY HYPERTENSION: ICD-10-CM

## 2024-01-22 DIAGNOSIS — E03.9 ACQUIRED HYPOTHYROIDISM: ICD-10-CM

## 2024-01-22 DIAGNOSIS — E78.5 DYSLIPIDEMIA: ICD-10-CM

## 2024-01-22 LAB
BASOPHILS # BLD AUTO: 0.8 % (ref 0–1.8)
BASOPHILS # BLD: 0.05 K/UL (ref 0–0.12)
CREAT UR-MCNC: 194.75 MG/DL
EOSINOPHIL # BLD AUTO: 0.33 K/UL (ref 0–0.51)
EOSINOPHIL NFR BLD: 5 % (ref 0–6.9)
ERYTHROCYTE [DISTWIDTH] IN BLOOD BY AUTOMATED COUNT: 45.7 FL (ref 35.9–50)
EST. AVERAGE GLUCOSE BLD GHB EST-MCNC: 114 MG/DL
HBA1C MFR BLD: 5.6 % (ref 4–5.6)
HCT VFR BLD AUTO: 40 % (ref 37–47)
HGB BLD-MCNC: 13.1 G/DL (ref 12–16)
IMM GRANULOCYTES # BLD AUTO: 0.02 K/UL (ref 0–0.11)
IMM GRANULOCYTES NFR BLD AUTO: 0.3 % (ref 0–0.9)
LYMPHOCYTES # BLD AUTO: 2.29 K/UL (ref 1–4.8)
LYMPHOCYTES NFR BLD: 34.9 % (ref 22–41)
MCH RBC QN AUTO: 30.7 PG (ref 27–33)
MCHC RBC AUTO-ENTMCNC: 32.8 G/DL (ref 32.2–35.5)
MCV RBC AUTO: 93.7 FL (ref 81.4–97.8)
MICROALBUMIN UR-MCNC: 18.3 MG/DL
MICROALBUMIN/CREAT UR: 94 MG/G (ref 0–30)
MONOCYTES # BLD AUTO: 0.5 K/UL (ref 0–0.85)
MONOCYTES NFR BLD AUTO: 7.6 % (ref 0–13.4)
NEUTROPHILS # BLD AUTO: 3.38 K/UL (ref 1.82–7.42)
NEUTROPHILS NFR BLD: 51.4 % (ref 44–72)
NRBC # BLD AUTO: 0 K/UL
NRBC BLD-RTO: 0 /100 WBC (ref 0–0.2)
PLATELET # BLD AUTO: 369 K/UL (ref 164–446)
PMV BLD AUTO: 11.6 FL (ref 9–12.9)
RBC # BLD AUTO: 4.27 M/UL (ref 4.2–5.4)
WBC # BLD AUTO: 6.6 K/UL (ref 4.8–10.8)

## 2024-01-22 PROCEDURE — 36415 COLL VENOUS BLD VENIPUNCTURE: CPT

## 2024-01-22 PROCEDURE — 85025 COMPLETE CBC W/AUTO DIFF WBC: CPT

## 2024-01-22 PROCEDURE — 84443 ASSAY THYROID STIM HORMONE: CPT

## 2024-01-22 PROCEDURE — 80053 COMPREHEN METABOLIC PANEL: CPT

## 2024-01-22 PROCEDURE — 84439 ASSAY OF FREE THYROXINE: CPT

## 2024-01-22 PROCEDURE — 82043 UR ALBUMIN QUANTITATIVE: CPT

## 2024-01-22 PROCEDURE — 80061 LIPID PANEL: CPT

## 2024-01-22 PROCEDURE — 80307 DRUG TEST PRSMV CHEM ANLYZR: CPT

## 2024-01-22 PROCEDURE — 87389 HIV-1 AG W/HIV-1&-2 AB AG IA: CPT

## 2024-01-22 PROCEDURE — G0480 DRUG TEST DEF 1-7 CLASSES: HCPCS

## 2024-01-22 PROCEDURE — 82306 VITAMIN D 25 HYDROXY: CPT

## 2024-01-22 PROCEDURE — 83036 HEMOGLOBIN GLYCOSYLATED A1C: CPT

## 2024-01-22 PROCEDURE — 82570 ASSAY OF URINE CREATININE: CPT

## 2024-01-23 LAB
25(OH)D3 SERPL-MCNC: 31 NG/ML (ref 30–100)
ALBUMIN SERPL BCP-MCNC: 4.3 G/DL (ref 3.2–4.9)
ALBUMIN/GLOB SERPL: 1.5 G/DL
ALP SERPL-CCNC: 119 U/L (ref 30–99)
ALT SERPL-CCNC: 13 U/L (ref 2–50)
ANION GAP SERPL CALC-SCNC: 16 MMOL/L (ref 7–16)
AST SERPL-CCNC: 17 U/L (ref 12–45)
BILIRUB SERPL-MCNC: 0.3 MG/DL (ref 0.1–1.5)
BUN SERPL-MCNC: 9 MG/DL (ref 8–22)
CALCIUM ALBUM COR SERPL-MCNC: 9.2 MG/DL (ref 8.5–10.5)
CALCIUM SERPL-MCNC: 9.4 MG/DL (ref 8.5–10.5)
CHLORIDE SERPL-SCNC: 104 MMOL/L (ref 96–112)
CHOLEST SERPL-MCNC: 165 MG/DL (ref 100–199)
CO2 SERPL-SCNC: 24 MMOL/L (ref 20–33)
CREAT SERPL-MCNC: 0.73 MG/DL (ref 0.5–1.4)
FASTING STATUS PATIENT QL REPORTED: NORMAL
GFR SERPLBLD CREATININE-BSD FMLA CKD-EPI: 91 ML/MIN/1.73 M 2
GLOBULIN SER CALC-MCNC: 2.9 G/DL (ref 1.9–3.5)
GLUCOSE SERPL-MCNC: 103 MG/DL (ref 65–99)
HDLC SERPL-MCNC: 79 MG/DL
HIV 1+2 AB+HIV1 P24 AG SERPL QL IA: NORMAL
LDLC SERPL CALC-MCNC: 71 MG/DL
POTASSIUM SERPL-SCNC: 3.7 MMOL/L (ref 3.6–5.5)
PROT SERPL-MCNC: 7.2 G/DL (ref 6–8.2)
SODIUM SERPL-SCNC: 144 MMOL/L (ref 135–145)
T4 FREE SERPL-MCNC: 1.17 NG/DL (ref 0.93–1.7)
TRIGL SERPL-MCNC: 76 MG/DL (ref 0–149)
TSH SERPL DL<=0.005 MIU/L-ACNC: 2.04 UIU/ML (ref 0.38–5.33)

## 2024-01-24 LAB
AMPHET CTO UR CFM-MCNC: NORMAL NG/ML
BARBITURATES CTO UR CFM-MCNC: NEGATIVE NG/ML
BENZODIAZ CTO UR CFM-MCNC: NEGATIVE NG/ML
CANNABINOIDS CTO UR CFM-MCNC: NEGATIVE NG/ML
COCAINE CTO UR CFM-MCNC: NEGATIVE NG/ML
CREAT UR-MCNC: 168.4 MG/DL (ref 20–400)
DRUG COMMENT 753798: NORMAL
METHADONE CTO UR CFM-MCNC: NEGATIVE NG/ML
OPIATES CTO UR CFM-MCNC: NEGATIVE NG/ML
PCP CTO UR CFM-MCNC: NEGATIVE NG/ML
PROPOXYPH CTO UR CFM-MCNC: NEGATIVE NG/ML

## 2024-01-26 LAB
AMPHET UR CFM-MCNC: <50 NG/ML
MDA UR CFM-MCNC: <200 NG/ML
MDEA UR CFM-MCNC: <200 NG/ML
MDMA UR CFM-MCNC: <200 NG/ML
METHAMPHET UR CFM-MCNC: <200 NG/ML
PHENTERMINE UR CFM-MCNC: <200 NG/ML

## 2024-02-02 PROCEDURE — RXMED WILLOW AMBULATORY MEDICATION CHARGE: Performed by: NURSE PRACTITIONER

## 2024-02-05 ENCOUNTER — PHARMACY VISIT (OUTPATIENT)
Dept: PHARMACY | Facility: MEDICAL CENTER | Age: 64
End: 2024-02-05
Payer: COMMERCIAL

## 2024-02-15 ENCOUNTER — OFFICE VISIT (OUTPATIENT)
Dept: BEHAVIORAL HEALTH | Facility: CLINIC | Age: 64
End: 2024-02-15
Payer: MEDICARE

## 2024-02-15 DIAGNOSIS — F41.1 GAD (GENERALIZED ANXIETY DISORDER): ICD-10-CM

## 2024-02-15 DIAGNOSIS — F33.1 MODERATE EPISODE OF RECURRENT MAJOR DEPRESSIVE DISORDER (HCC): ICD-10-CM

## 2024-02-15 PROCEDURE — 1170F FXNL STATUS ASSESSED: CPT | Performed by: MARRIAGE & FAMILY THERAPIST

## 2024-02-15 PROCEDURE — 90837 PSYTX W PT 60 MINUTES: CPT | Performed by: MARRIAGE & FAMILY THERAPIST

## 2024-02-15 NOTE — PROGRESS NOTES
[facility]   Therapy Progress Note        Name: Debby Desai  MRN: [id number]  : 1960  Age: 63 y.o.  Date of assessment: 2/15/2024  PCP: ARELIS Chris  Persons in attendance: Patient  Total session time: 56 minutes      Topics addressed in psychotherapy include: Met with the patient in office for an individual counseling session.      Content of Therapy:   The patient discussed patient discussed that she has her father's wedding ring and is not want her mother's.  Patient discuss that she has not been really happy for a long time.  Patient hopes that she will be happy this weekend as she meets with family.  Patient discussed been frustrated with a pain level which is generally around eight and manageable to six with medication.  Patient discuss that she has great plans and poor follow through.      Therapeutic Intervention:   This session, the therapeutic focus, was on supporting the patient in positive behaviors.  Discussed with the patient the difference between being right and effective.  Identified steps clinician will follow up with patient regarding making a list, calling for appointments, making the visits.  Patient is interested in going to a gym or finding activities through a senior center.  This dictation has been created using voice recognition software and/or scribes. The accuracy of the dictation is limited by the abilities of the software and the expertise of the scribes. I expect there may be some errors of grammar and possibly content. I made every attempt to manually correct the errors within my dictation. However, errors related to voice recognition software and/or scribes may still exist and should be interpreted within the appropriate context.    Objective Observations:   Participation:Active verbal participation, Attentive, and Engaged   Grooming:Casual   Cognition:Alert and Fully Oriented   Eye Contact:Good   Mood:Anxious   Affect:Flexible and Full  range   Thought Process:Logical and Goal-directed   Speech:Rate within normal limits and Volume within normal limits    Current Risk:   Suicide: low   Homicide: low   Self-Harm: low   Relapse: low   Safety Plan Reviewed: not applicable    Care Plan Updated: No    Does patient express agreement with the above plan? Yes     Diagnosis:  1. TIM (generalized anxiety disorder)    2. Moderate episode of recurrent major depressive disorder (HCC)        Referral appointment(s) scheduled? No       KATT Edward

## 2024-02-23 ENCOUNTER — HOME STUDY (OUTPATIENT)
Dept: SLEEP MEDICINE | Facility: MEDICAL CENTER | Age: 64
End: 2024-02-23
Attending: NURSE PRACTITIONER
Payer: MEDICARE

## 2024-02-23 DIAGNOSIS — I10 PRIMARY HYPERTENSION: ICD-10-CM

## 2024-02-23 DIAGNOSIS — Z82.0 FAMILY HISTORY OF SLEEP APNEA: ICD-10-CM

## 2024-02-23 DIAGNOSIS — R40.0 UNCONTROLLED DAYTIME SOMNOLENCE: ICD-10-CM

## 2024-02-24 PROCEDURE — 95800 SLP STDY UNATTENDED: CPT | Performed by: STUDENT IN AN ORGANIZED HEALTH CARE EDUCATION/TRAINING PROGRAM

## 2024-02-29 ENCOUNTER — OFFICE VISIT (OUTPATIENT)
Dept: BEHAVIORAL HEALTH | Facility: CLINIC | Age: 64
End: 2024-02-29
Payer: MEDICARE

## 2024-02-29 ENCOUNTER — PHARMACY VISIT (OUTPATIENT)
Dept: PHARMACY | Facility: MEDICAL CENTER | Age: 64
End: 2024-02-29
Payer: COMMERCIAL

## 2024-02-29 DIAGNOSIS — F41.1 GAD (GENERALIZED ANXIETY DISORDER): ICD-10-CM

## 2024-02-29 DIAGNOSIS — F33.1 MODERATE EPISODE OF RECURRENT MAJOR DEPRESSIVE DISORDER (HCC): ICD-10-CM

## 2024-02-29 PROCEDURE — 90837 PSYTX W PT 60 MINUTES: CPT | Performed by: MARRIAGE & FAMILY THERAPIST

## 2024-02-29 PROCEDURE — 1170F FXNL STATUS ASSESSED: CPT | Performed by: MARRIAGE & FAMILY THERAPIST

## 2024-03-01 NOTE — PROGRESS NOTES
[facility]   Therapy Progress Note        Name: Debby Desai  MRN: [id number]  : 1960  Age: 63 y.o.  Date of assessment: 2024  PCP: ARELIS Chris  Persons in attendance: Patient  Total session time: 56 minutes      Topics addressed in psychotherapy include: Met with the patient in office for an individual counseling session.      Content of Therapy:   The patient discussed that she a recently watched her six-year-old grandniece and later had a the night with the family.  Patient reports that this was a very positive experience.  Patient discuss that at 1. She became over stimulated due to loud noises and having a headache.  Patient discussed that she is been going out much more and that she has purchased eight tablet and printer ink.  Patient reported on her homework of completing the five love languages quiz which put her primary love language as “words of affirmation”.  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      Therapeutic Intervention:   This session, the therapeutic focus, was on processing information that she had developed about herself.  Validated the patient's increased activity.  Processed information regarding the homework in and how that information can improve her life.  This dictation has been created using voice recognition software and/or scribes. The accuracy of the dictation is limited by the abilities of the software and the expertise of the scribes. I expect there may be some errors of grammar and possibly content. I made every attempt to manually correct the errors within my dictation. However, errors related to voice recognition software and/or scribes may still exist and should be interpreted within the appropriate context.    Objective Observations:   Participation:Active verbal participation, Attentive, and Engaged   Grooming:Casual   Cognition:Alert and Fully  Oriented   Eye Contact:Good   Mood:Euthymic   Affect:Flexible and Full range   Thought Process:Logical and Goal-directed   Speech:Rate within normal limits and Volume within normal limits    Current Risk:   Suicide: low   Homicide: low   Self-Harm: low   Relapse: low   Safety Plan Reviewed: not applicable    Care Plan Updated: No    Does patient express agreement with the above plan? Yes     Diagnosis:  1. Moderate episode of recurrent major depressive disorder (HCC)    2. TIM (generalized anxiety disorder)        Referral appointment(s) scheduled? No       KATT Edward

## 2024-03-04 NOTE — PROCEDURES
DIAGNOSTIC HOME SLEEP TEST (HST) REPORT WatchPAT      PATIENT ID:  NAME:  Debby Desai  MRN:               [id number]  YOB: 1960  DATE OF STUDY: 02/24/2024      Impression:     This study shows evidence of:      1. Moderate obstructive sleep apnea with 4% PAT apnea hypopnea index(pAHI) of 24.5 per hour.  PAT respiratory disturbance index (pRDI) was 43.2 per hour. These findings are based on 7 channels recording of PAT signal with sleep staging, heart rate, pulse oximetry, actigraphy, body position, snoring and respiratory movement.     2. Oxygenation O2 Sat. mean O2 sat was 91%,  donita was 63%,  and maximum O2 at 97 %. O2 sat was at or  below 88% for 9 min of evaluation time. Oxygen Desaturation (>=4%) Index was 24.2/hr. AVG HR was 58 BPM.      TECHNICAL DESCRIPTION: Patient underwent home sleep apnea testing with peripheral arterial tone signal (WatchPAT™). This is a Type IV portable monitor and device per Medicare. Monitoring was done with 7 channels recording of PAT signal with sleep staging, heart rate, pulse oximetry, actigraphy, body position, snoring and respiratory movement. Prior to using the device, the patient received verbal and written instructions for its application and was provided with the help desk phone number for additional telephonic instruction with 24-hour availability of qualified personnel to answer questions.    Respiratory events:      General sleep summary: . Total recording time is 9 hours and 12 minutes and total Sleep time is 8 hours and 32 minutes. The patient spent 297 minutes in the supine position and 215.5 minutes in the nonsupine position.      Recommendations:    1. CPAP titration study vs Auto CPAP trial.  If starting auto CPAP would recommend minimum pressure 5 cmH2O maximum pressure 15 cmH2O  2.   In general patients with sleep apnea are advised to avoid alcohol and sedatives and to not operate a motor vehicle while drowsy. In some cases alternative  treatment options may prove effective in resolving sleep apnea in these options include upper airway surgery, the use of a dental orthotic or weight loss and positional therapy. Clinical correlation is required.         Rhett Beckett MD

## 2024-03-05 ENCOUNTER — PHARMACY VISIT (OUTPATIENT)
Dept: PHARMACY | Facility: MEDICAL CENTER | Age: 64
End: 2024-03-05
Payer: COMMERCIAL

## 2024-03-05 PROCEDURE — RXMED WILLOW AMBULATORY MEDICATION CHARGE: Performed by: NURSE PRACTITIONER

## 2024-03-05 PROCEDURE — RXMED WILLOW AMBULATORY MEDICATION CHARGE: Performed by: PSYCHIATRY & NEUROLOGY

## 2024-03-12 ENCOUNTER — TELEMEDICINE (OUTPATIENT)
Dept: BEHAVIORAL HEALTH | Facility: CLINIC | Age: 64
End: 2024-03-12
Payer: MEDICARE

## 2024-03-12 DIAGNOSIS — F41.1 GAD (GENERALIZED ANXIETY DISORDER): ICD-10-CM

## 2024-03-12 DIAGNOSIS — F51.04 PSYCHOPHYSIOLOGICAL INSOMNIA: ICD-10-CM

## 2024-03-12 DIAGNOSIS — F33.1 MODERATE EPISODE OF RECURRENT MAJOR DEPRESSIVE DISORDER (HCC): ICD-10-CM

## 2024-03-12 PROCEDURE — 99214 OFFICE O/P EST MOD 30 MIN: CPT | Mod: 95 | Performed by: PSYCHIATRY & NEUROLOGY

## 2024-03-12 NOTE — PROGRESS NOTES
This evaluation was conducted via Zoom using secure and encrypted videoconferencing technology. The patient was in their home in the [facility].    The patient's identity was confirmed and verbal consent was obtained for this virtual visit.      PSYCHIATRY FOLLOW-UP NOTE      Name: Debby Desai  MRN: [id number]  : 1960  Age: 63 y.o.  Date of assessment: 3/12/2024  PCP: ARELIS Chris  Persons in attendance: Patient      REASON FOR VISIT/CHIEF COMPLAINT (as stated by Patient):  Debby Desai is a 63 y.o., White female, attending follow-up appointment for mood and anxiety management.      HISTORY OF PRESENT ILLNESS:  Debby Desai is a 63 y.o. old female with MDD, TIM and insomnia comes in today for follow up. Patient was last seen 3 months ago, and following treatment planning recommendations were done:  Continue Effexor XR to 225 mg daily for mood, anxiety and comorbid pain management.   Continue Wellbutrin  mg BID daily for depression augmentation.    Continue Buspar 10 mg BID for anxiety.  Continue Trazodone 25-50 mg HS PRN for insomnia.    Monitor for serotonin syndrome.  Continue psychotherapy for mood and anxiety management.      Compliant with medications with no major side effects.  Mood and anxiety is doing well.  Able to deal with stressors and changes more effectively.   Sleep is normal.  Engaged in psychotherapy with good response.  She gave me various examples where she did well.  Agreed with continuing current medication and psychotherapy.        CURRENT MEDICATIONS:  Current Outpatient Medications   Medication Sig Dispense Refill    traMADol (ULTRAM) 50 MG Tab Take 1 Tablet by mouth every 8 hours as needed for Moderate Pain or Severe Pain for up to 30 days. 90 Tablet 3    buPROPion (WELLBUTRIN SR) 200 MG SR tablet Take 1 tablet by mouth 2 times a day. 180 Tablet 1    busPIRone (BUSPAR) 10 MG Tab tablet Take 1 Tablet by mouth 2 times a day. 180 Tablet 2    venlafaxine  (EFFEXOR-XR) 150 MG extended-release capsule Take 1 Capsule by mouth every day. (venlafaxine xr 150 mg + 75 mg = 225 mg daily) 90 Capsule 2    venlafaxine XR (EFFEXOR XR) 75 MG CAPSULE SR 24 HR Take 1 capsule by mouth every day. (venlafaxine xr 150 mg + 75 mg = 225 mg daily) 90 Capsule 2    traZODone (DESYREL) 50 MG Tab Take 0.5 tablets by mouth at bedtime as needed for sleep (as needed for sleep). (Can increase to 1 tab if needed for sleep) 90 Tablet 3    atorvastatin (LIPITOR) 20 MG Tab TAKE ONE TABLET BY MOUTH EVERY  Tablet 0    metFORMIN ER (GLUCOPHAGE XR) 500 MG TABLET SR 24 HR Take 1 Tablet by mouth 2 times a day. 200 Tablet 1    amLODIPine (NORVASC) 10 MG Tab Take 1 tablet by mouth every day. 90 Tablet 3    lisinopril (PRINIVIL) 40 MG tablet Take 1 Tablet by mouth every day. 90 Tablet 3    levothyroxine (SYNTHROID) 50 MCG Tab Take 1 tablet by mouth every morning on an empty stomach. 90 Tablet 3    VITAMIN D PO Take  by mouth every day.      Ferrous Sulfate (IRON) 325 (65 Fe) MG Tab Take 65 mg by mouth every day at 6 PM.       No current facility-administered medications for this visit.       MEDICAL HISTORY  Past Medical History:   Diagnosis Date    Anemia     in past    Anginal syndrome (HCC)     had work up and feet r/t anxiety    Anxiety     Arthritis     OA knees    Chronic pain 06/02/2021    Dental disorder 05/24/2012    partial upper denture    High cholesterol     HTN (hypertension), benign 03/19/2012    Hypothyroid 03/19/2012    Major depression 03/19/2012    Obesity (BMI 30-39.9) 07/09/2018    Orbital floor (blow-out) closed fracture (HCC)     left    Other specified symptom associated with female genital organs     post menopausal bleeding    Pain     thoracic spine, Right knee, myofacial pain, and Right shoulder    Pain     recently fell and has bruise left forehead    Pain 02/2018    chronic back pain, right shoulder    Psychiatric problem     depression, anxiety    Unspecified disorder of  thyroid     Urinary bladder disorder     stress incont.    Urinary incontinence     Occasional     Past Surgical History:   Procedure Laterality Date    IN NEUROLYTIC DEST GENICULAR NERVE Right 7/28/2023    Procedure: RIGHT genicular nerve radiofrequency ablation;  Surgeon: Volodymyr Herring M.D.;  [facility];  Service: Pain Management    IN FLUOROSCOPIC GUIDANCE NEEDLE PLACEMENT Right 7/7/2023    Procedure: RIGHT genicular nerve diagnostic blocks;  Surgeon: Volodymyr Herring M.D.;  [facility];  Service: Pain Management    INJ,EPI ANES/STER LUM/SAC ADDL Right 4/7/2021    Procedure: RIGHT lumbar five and sacral one transforaminal epidural;  Surgeon: Volodymyr Herring M.D.;  [facility];  Service: Pain Management    PB TOTAL KNEE ARTHROPLASTY Right 9/16/2019    Procedure: RIGHT ARTHROPLASTY, KNEE, TOTAL;  Surgeon: Rufus Saba M.D.;  Location: [facility];  Service: Orthopedics    KNEE ARTHROSCOPY Right 2/9/2018    Procedure: KNEE ARTHROSCOPY;  Surgeon: Harsh Baltazar M.D.;  Location: [facility];  Service: Orthopedics    MENISCECTOMY, KNEE, MEDIAL Right 2/9/2018    Procedure: MEDIAL AND LATERAL MENISCECTOMY- PARTIAL;  Surgeon: Harsh Baltazar M.D.;  Location: [facility];  Service: Orthopedics    KNEE ARTHROSCOPY Right 7/12/2017    Procedure: KNEE ARTHROSCOPY- CESPEDES;  Surgeon: Harsh Baltazar M.D.;  Location: [facility];  Service:     MENISCECTOMY, KNEE, MEDIAL Right 7/12/2017    Procedure: PARTIAL MEDIAL AND LATERAL MENISCECTOMY;  Surgeon: Harsh Baltazar M.D.;  Location: [facility];  Service:     SYNOVECTOMY Right 7/12/2017    Procedure: SYNOVECTOMY;  Surgeon: Harsh Baltazar M.D.;  Location: [facility];  Service:     KNEE ARTHROSCOPY  4/21/2015    Performed by Rufus Saba M.D. at [facility]     MENISCECTOMY, KNEE, MEDIAL  4/21/2015    Performed by Rufus Saba M.D. at SURGERY [facility]    PUMP REVISION  12/10/2012    Performed by Allison Guerra M.D. at SUR[facility]ERY [facility]    SPINAL CORD STIMULATOR  5/30/2012    Performed by ALLISON GUERRA at SURGERY [facility]    BIOPSY GENERAL  5/1/12    endometrial    SPINAL CORD STIMULATOR  7/11/2011    Performed by ALLISON GUERRA at SURGERY [facility]    BLOCK EPIDURAL STEROID INJECTION  2008    x 3-4    LYMPH NODE EXCISION Left 2007    cervicle    OTHER Right 2001    thoracic discectomy      ARTHROSCOPY, KNEE Left 1999    RIB RESECTION Right 1980    LUMPECTOMY         PAST PSYCHIATRIC MEDICATIONS  Fluoxetine, Paroxetine, Sertraline, Citalopram  Venlafaxine, Duloxetine  Wellbutrin  Mirtazapine  Amitriptyline (switched to Mirtazapine during admission for dehydration related syncope)   Ativan, propranolol      REVIEW OF SYSTEMS:        Constitutional negative   Eyes negative   Ears/Nose/Mouth/Throat negative   Cardiovascular negative   Respiratory negative   Gastrointestinal negative   Genitourinary negative   Muscular negative   Integumentary negative   Neurological negative   Endocrine negative   Hematologic/Lymphatic negative     PHYSICAL EXAMINAION:  Vital signs: LMP 02/26/2012   Musculoskeletal: Normal gait.   Abnormal movements: none      MENTAL STATUS EXAMINATION      General:   - Grooming and hygiene: Casual,   - Apparent distress: none,   - Behavior: Calm  - Eye Contact:  Good,   - no psychomotor agitation or retardation    - Participation: Active verbal participation  Orientation: Alert and Fully Oriented to person, place and time  Mood: Euthymic  Affect: Flexible and Full range,  Thought Process: Logical and Goal-directed  Thought Content: Denies suicidal or homicidal ideations, intent or plan   Perception: Denies auditory or visual hallucinations. No delusions noted   Attention span and concentration: Intact   Speech:Rate  within normal limits and Volume within normal limits  Language: Appropriate   Insight: Good  Judgment: Good  Recent and remote memory: No gross evidence of memory deficits        DEPRESSION SCREENIN/6/2023     7:43 AM 2023     3:00 PM 2024    11:20 AM   Depression Screen (PHQ-2/PHQ-9)   PHQ-2 Total Score 3 6 5   PHQ-9 Total Score 11 19 16       Interpretation of PHQ-9 Total Score   Score Severity   1-4 No Depression   5-9 Mild Depression   10-14 Moderate Depression   15-19 Moderately Severe Depression   20-27 Severe Depression    CURRENT RISK:       Suicidal: Low       Homicidal: Low       Self-Harm: Low       Relapse: Low       Crisis Safety Plan Reviewed Not Indicated       If evidence of imminent risk is present, intervention/plan:      MEDICAL RECORDS/LABS/DIAGNOSTIC TESTS REVIEWED:  No new lab since last visit     [place]  records -   Reviewed     PLAN:  (1) MDD; (2) TIM; (3) Insomnia  Improving  Continue Effexor XR to 225 mg daily for mood, anxiety and comorbid pain management.   Continue Wellbutrin  mg BID daily for depression augmentation.    Continue Buspar 10 mg BID for anxiety.  Continue Trazodone 25-50 mg HS PRN for insomnia.    Monitor for serotonin syndrome.  Continue psychotherapy for mood and anxiety management.  Medication options, alternatives (including no medications) and medication risks/benefits/side effects were discussed in detail.  Explained importance of contraceptive measures while on psychotropic medications, educated to let provider know if ever pregnant or wanting to become pregnant. Verbalized understanding.  The patient was advised to call, message provider on [employer], or come in to the clinic if symptoms worsen or if any future questions/issues regarding their medications arise; the patient verbalized understanding and agreement.    The patient was educated to call 911, call the suicide hotline, or go to local ER if having thoughts of suicide or homicide;  verbalized understanding.    Billing Coding based on:  18439 based on MDM    Return to clinic in 3 months or sooner if symptoms worsen.  Next Appointment: instruction provided on how to make the next appointment.     The proposed treatment plan was discussed with the patient who was provided the opportunity to ask questions and make suggestions regarding alternative treatment. Patient verbalized understanding and expressed agreement with the plan.       Genaro Stevenson M.D.  03/12/24    This note was created using voice recognition software (Dragon). The accuracy of the dictation is limited by the abilities of the software. I have reviewed the note prior to signing, however some errors in grammar and context are still possible. If you have any questions related to this note please do not hesitate to contact our office.

## 2024-03-14 ENCOUNTER — OFFICE VISIT (OUTPATIENT)
Dept: BEHAVIORAL HEALTH | Facility: CLINIC | Age: 64
End: 2024-03-14
Payer: MEDICARE

## 2024-03-14 DIAGNOSIS — F41.1 GAD (GENERALIZED ANXIETY DISORDER): ICD-10-CM

## 2024-03-14 DIAGNOSIS — F33.1 MODERATE EPISODE OF RECURRENT MAJOR DEPRESSIVE DISORDER (HCC): ICD-10-CM

## 2024-03-14 PROCEDURE — 90837 PSYTX W PT 60 MINUTES: CPT | Performed by: MARRIAGE & FAMILY THERAPIST

## 2024-03-14 PROCEDURE — 1170F FXNL STATUS ASSESSED: CPT | Performed by: MARRIAGE & FAMILY THERAPIST

## 2024-03-14 NOTE — PROGRESS NOTES
[facility]   Therapy Progress Note        Name: Debby Desai  MRN: [id number]  : 1960  Age: 63 y.o.  Date of assessment: 3/14/2024  PCP: ARELIS Chris  Persons in attendance: Patient  Total session time: 58 minutes      Topics addressed in psychotherapy include: Met with the patient in office for an individual counseling session.      Content of Therapy:   The patient discussed that she has not made any progress since our last meeting, as she was sick.  Patient discussed her relationship with her mother prior to her mother's death.  Patient reported that she misses her mother despite them not having a good relationship patient discussed other traumas within her life which is caused her to draw back from relationships.  Patient did report that she is going to lunch with a friend who she thought had “written her off”.      Therapeutic Intervention:   This session, the therapeutic focus was on assisting the patient in gaining different perspectives about her value in relationships.  Provided support of psychotherapy to the patient.  Validated the patients and beliefs and feelings regarding her relationship with their mother.    This dictation has been created using voice recognition software and/or scribes. The accuracy of the dictation is limited by the abilities of the software and the expertise of the scribes. I expect there may be some errors of grammar and possibly content. I made every attempt to manually correct the errors within my dictation. However, errors related to voice recognition software and/or scribes may still exist and should be interpreted within the appropriate context.    Objective Observations:   Participation:Active verbal participation, Attentive, and Engaged   Grooming:Casual   Cognition:Alert and Fully Oriented   Eye Contact:Good   Mood:Euthymic and Anxious   Affect:Flexible, Full range, and Congruent with content   Thought Process:Logical and  Goal-directed   Speech:Rate within normal limits and Volume within normal limits    Current Risk:   Suicide: low   Homicide: low   Self-Harm: low   Relapse: low   Safety Plan Reviewed: not applicable    Care Plan Updated: No    Does patient express agreement with the above plan? Yes     Diagnosis:  1. Moderate episode of recurrent major depressive disorder (HCC)    2. TIM (generalized anxiety disorder)        Referral appointment(s) scheduled? No       KATT Edward

## 2024-03-23 DIAGNOSIS — R73.02 IGT (IMPAIRED GLUCOSE TOLERANCE): ICD-10-CM

## 2024-03-25 NOTE — TELEPHONE ENCOUNTER
Received request via: Pharmacy    Was the patient seen in the last year in this department? Yes    Does the patient have an active prescription (recently filled or refills available) for medication(s) requested? No    [facility]    Does the patient have residential Plus and need 100 day supply (blood pressure, diabetes and cholesterol meds only)? Yes, quantity updated to 100 days

## 2024-03-26 ENCOUNTER — OFFICE VISIT (OUTPATIENT)
Dept: URGENT CARE | Facility: PHYSICIAN GROUP | Age: 64
End: 2024-03-26
Payer: MEDICARE

## 2024-03-26 VITALS
TEMPERATURE: 97.4 F | SYSTOLIC BLOOD PRESSURE: 118 MMHG | HEIGHT: 67 IN | RESPIRATION RATE: 20 BRPM | OXYGEN SATURATION: 95 % | WEIGHT: 151 LBS | BODY MASS INDEX: 23.7 KG/M2 | HEART RATE: 92 BPM | DIASTOLIC BLOOD PRESSURE: 68 MMHG

## 2024-03-26 DIAGNOSIS — E78.5 DYSLIPIDEMIA: ICD-10-CM

## 2024-03-26 DIAGNOSIS — H66.002 NON-RECURRENT ACUTE SUPPURATIVE OTITIS MEDIA OF LEFT EAR WITHOUT SPONTANEOUS RUPTURE OF TYMPANIC MEMBRANE: ICD-10-CM

## 2024-03-26 PROCEDURE — 3074F SYST BP LT 130 MM HG: CPT | Performed by: REGISTERED NURSE

## 2024-03-26 PROCEDURE — 1170F FXNL STATUS ASSESSED: CPT | Performed by: REGISTERED NURSE

## 2024-03-26 PROCEDURE — RXMED WILLOW AMBULATORY MEDICATION CHARGE: Performed by: REGISTERED NURSE

## 2024-03-26 PROCEDURE — 99213 OFFICE O/P EST LOW 20 MIN: CPT | Performed by: REGISTERED NURSE

## 2024-03-26 PROCEDURE — 3078F DIAST BP <80 MM HG: CPT | Performed by: REGISTERED NURSE

## 2024-03-26 RX ORDER — METFORMIN HYDROCHLORIDE 500 MG/1
500 TABLET, EXTENDED RELEASE ORAL 2 TIMES DAILY
Qty: 200 TABLET | Refills: 1 | Status: SHIPPED | OUTPATIENT
Start: 2024-03-26

## 2024-03-26 RX ORDER — AMOXICILLIN AND CLAVULANATE POTASSIUM 875; 125 MG/1; MG/1
1 TABLET, FILM COATED ORAL 2 TIMES DAILY
Qty: 14 TABLET | Refills: 0 | Status: SHIPPED | OUTPATIENT
Start: 2024-03-26 | End: 2024-04-04

## 2024-03-26 ASSESSMENT — ENCOUNTER SYMPTOMS
ABDOMINAL PAIN: 0
CHILLS: 0
SHORTNESS OF BREATH: 0
DIZZINESS: 0
FEVER: 0
SORE THROAT: 0

## 2024-03-26 ASSESSMENT — FIBROSIS 4 INDEX: FIB4 SCORE: 0.83

## 2024-03-26 NOTE — TELEPHONE ENCOUNTER
Received request via: Pharmacy    Was the patient seen in the last year in this department? Yes    Does the patient have an active prescription (recently filled or refills available) for medication(s) requested? No    Ph[facility] Name: [facility]     Does the patient have prison Plus and need 100 day supply (blood pressure, diabetes and cholesterol meds only)? Patient does not have SCP

## 2024-03-26 NOTE — PROGRESS NOTES
"Subjective:   Debby Desai is a 63 y.o. female who presents for Ear Fullness (Hearing has decreased in left ear also hears like something is on like a heater noise or something patient states. Right ear keeps popping. )      HPI  Worsening ear pain bilaterally, muffled hearing. Left worse than right.  Occasional popping sound in the right ear.  Did have a cold last week. Hx of ear infections but not for a few few years. Using tylenol for pain.  Immunizations current.  Tolerating p.o.    Review of Systems   Constitutional:  Negative for chills and fever.   HENT:  Positive for ear pain. Negative for congestion, ear discharge, sore throat and tinnitus.    Respiratory:  Negative for shortness of breath.    Cardiovascular:  Negative for chest pain.   Gastrointestinal:  Negative for abdominal pain.   Skin:  Negative for rash.   Neurological:  Negative for dizziness.       Medications, Allergies, and current problem list reviewed today in Epic.     Objective:     /68   Pulse 92   Temp 36.3 °C (97.4 °F) (Temporal)   Resp 20   Ht 1.702 m (5' 7\")   Wt 68.5 kg (151 lb)   SpO2 95%     Physical Exam  Vitals and nursing note reviewed.   Constitutional:       Appearance: Normal appearance. She is not ill-appearing or toxic-appearing.   HENT:      Head: Normocephalic.      Right Ear: No middle ear effusion. Tympanic membrane is not erythematous or bulging.      Left Ear: A middle ear effusion is present. Tympanic membrane is erythematous. Tympanic membrane is not bulging.      Nose: Nose normal.      Mouth/Throat:      Mouth: Mucous membranes are moist.      Pharynx: Oropharynx is clear. Uvula midline. No posterior oropharyngeal erythema or uvula swelling.   Eyes:      General: No scleral icterus.     Pupils: Pupils are equal, round, and reactive to light.   Cardiovascular:      Rate and Rhythm: Normal rate and regular rhythm.   Pulmonary:      Effort: Pulmonary effort is normal. No respiratory distress.      Breath " sounds: Normal breath sounds.   Musculoskeletal:         General: Normal range of motion.      Cervical back: Normal range of motion. No rigidity.   Lymphadenopathy:      Cervical: No cervical adenopathy.   Skin:     General: Skin is warm and dry.      Capillary Refill: Capillary refill takes less than 2 seconds.      Findings: No rash.   Neurological:      General: No focal deficit present.      Mental Status: She is alert and oriented to person, place, and time.   Psychiatric:         Mood and Affect: Mood normal.         Assessment/Plan:     I personally reviewed prior external notes and test results pertinent to today's visit as well as additional imaging and testing completed in clinic today.     1. Non-recurrent acute suppurative otitis media of left ear without spontaneous rupture of tympanic membrane  amoxicillin-clavulanate (AUGMENTIN) 875-125 MG Tab        TESTING: Deferred  Vital Signs: WNL  ABNORMAL EXAM FINDINGS: Left TM is erythemic with purulent middle ear effusion  PLAN/MDM: Non-toxic appearance, there are findings consistent with left-sided ear infection.  Does report some popping in the right ear but normal ear findings could be some eustachian tube dysfunction occurring.  Normal throat findings.  Normal neck range of motion.  Other than the ear overall benign exam    Will start on Augmentin for ASOM  OTC analgesics   OTC nonsedating allergy medication  Warm steam shower  Adequate hydration  Reviewed return precautions and ER indications      Shared decision-making was utilized with patient for treatment plan. Differential Diagnosis, natural history, and supportive care discussed.     Medication discussed included indication for use and the potential benefits and side effects. Education was provided regarding the aforementioned assessments. All of the patient's questions were answered to their satisfaction at the time of discharge. Patient was encouraged to monitor symptoms closely. Those signs and  symptoms which would warrant concern and mandate seeking a higher level of service through the emergency department discussed at length. Patient stated agreement and understanding of this plan of care.     Please note that this dictation was created using voice recognition software. I have made every reasonable attempt to correct obvious errors, but I expect that there are errors of grammar and possibly content that I did not discover before finalizing the note.    This note was electronically signed by ARELIS Estrada

## 2024-03-27 PROCEDURE — RXMED WILLOW AMBULATORY MEDICATION CHARGE: Performed by: NURSE PRACTITIONER

## 2024-03-27 RX ORDER — ATORVASTATIN CALCIUM 20 MG/1
20 TABLET, FILM COATED ORAL
Qty: 100 TABLET | Refills: 2 | Status: SHIPPED | OUTPATIENT
Start: 2024-03-27

## 2024-03-28 ENCOUNTER — PHARMACY VISIT (OUTPATIENT)
Dept: PHARMACY | Facility: MEDICAL CENTER | Age: 64
End: 2024-03-28
Payer: COMMERCIAL

## 2024-03-28 PROCEDURE — RXMED WILLOW AMBULATORY MEDICATION CHARGE: Performed by: PSYCHIATRY & NEUROLOGY

## 2024-04-01 PROCEDURE — RXMED WILLOW AMBULATORY MEDICATION CHARGE: Performed by: NURSE PRACTITIONER

## 2024-04-02 PROCEDURE — RXMED WILLOW AMBULATORY MEDICATION CHARGE: Performed by: PSYCHIATRY & NEUROLOGY

## 2024-04-03 ENCOUNTER — APPOINTMENT (OUTPATIENT)
Dept: MEDICAL GROUP | Facility: MEDICAL CENTER | Age: 64
End: 2024-04-03
Payer: MEDICARE

## 2024-04-03 ENCOUNTER — HOSPITAL ENCOUNTER (OUTPATIENT)
Facility: MEDICAL CENTER | Age: 64
End: 2024-04-03
Attending: NURSE PRACTITIONER
Payer: MEDICARE

## 2024-04-03 ENCOUNTER — TELEPHONE (OUTPATIENT)
Dept: HEALTH INFORMATION MANAGEMENT | Facility: OTHER | Age: 64
End: 2024-04-03

## 2024-04-03 ENCOUNTER — OFFICE VISIT (OUTPATIENT)
Dept: MEDICAL GROUP | Facility: MEDICAL CENTER | Age: 64
End: 2024-04-03
Payer: MEDICARE

## 2024-04-03 VITALS
HEART RATE: 61 BPM | SYSTOLIC BLOOD PRESSURE: 122 MMHG | TEMPERATURE: 97 F | HEIGHT: 67 IN | DIASTOLIC BLOOD PRESSURE: 62 MMHG | WEIGHT: 149 LBS | OXYGEN SATURATION: 91 % | BODY MASS INDEX: 23.39 KG/M2

## 2024-04-03 DIAGNOSIS — M17.11 PRIMARY OSTEOARTHRITIS OF RIGHT KNEE: ICD-10-CM

## 2024-04-03 DIAGNOSIS — F11.20 OPIOID DEPENDENCE, UNCOMPLICATED (HCC): ICD-10-CM

## 2024-04-03 DIAGNOSIS — H66.001 NON-RECURRENT ACUTE SUPPURATIVE OTITIS MEDIA OF RIGHT EAR WITHOUT SPONTANEOUS RUPTURE OF TYMPANIC MEMBRANE: ICD-10-CM

## 2024-04-03 DIAGNOSIS — R80.9 MICROALBUMINURIA: ICD-10-CM

## 2024-04-03 PROBLEM — H66.003 NON-RECURRENT ACUTE SUPPURATIVE OTITIS MEDIA OF BOTH EARS WITHOUT SPONTANEOUS RUPTURE OF TYMPANIC MEMBRANES: Status: ACTIVE | Noted: 2024-04-03

## 2024-04-03 PROCEDURE — 82570 ASSAY OF URINE CREATININE: CPT

## 2024-04-03 PROCEDURE — 3078F DIAST BP <80 MM HG: CPT | Performed by: NURSE PRACTITIONER

## 2024-04-03 PROCEDURE — 1170F FXNL STATUS ASSESSED: CPT | Performed by: NURSE PRACTITIONER

## 2024-04-03 PROCEDURE — 82043 UR ALBUMIN QUANTITATIVE: CPT

## 2024-04-03 PROCEDURE — 3074F SYST BP LT 130 MM HG: CPT | Performed by: NURSE PRACTITIONER

## 2024-04-03 PROCEDURE — 99214 OFFICE O/P EST MOD 30 MIN: CPT | Performed by: NURSE PRACTITIONER

## 2024-04-03 PROCEDURE — RXMED WILLOW AMBULATORY MEDICATION CHARGE: Performed by: NURSE PRACTITIONER

## 2024-04-03 RX ORDER — AMOXICILLIN AND CLAVULANATE POTASSIUM 875; 125 MG/1; MG/1
1 TABLET, FILM COATED ORAL 2 TIMES DAILY
Qty: 6 TABLET | Refills: 0 | Status: SHIPPED | OUTPATIENT
Start: 2024-04-03 | End: 2024-04-07

## 2024-04-03 RX ORDER — TRAMADOL HYDROCHLORIDE 50 MG/1
50 TABLET ORAL EVERY 8 HOURS PRN
Qty: 90 TABLET | Refills: 3 | Status: SHIPPED | OUTPATIENT
Start: 2024-04-03 | End: 2024-05-04

## 2024-04-03 RX ORDER — FLUTICASONE PROPIONATE 50 MCG
1 SPRAY, SUSPENSION (ML) NASAL DAILY
Qty: 16 G | Refills: 0 | Status: SHIPPED | OUTPATIENT
Start: 2024-04-03

## 2024-04-03 RX ORDER — CIPROFLOXACIN AND DEXAMETHASONE 3; 1 MG/ML; MG/ML
4 SUSPENSION/ DROPS AURICULAR (OTIC) 2 TIMES DAILY
Qty: 7.5 ML | Refills: 0 | Status: SHIPPED | OUTPATIENT
Start: 2024-04-03 | End: 2024-04-23

## 2024-04-03 ASSESSMENT — FIBROSIS 4 INDEX: FIB4 SCORE: 0.83

## 2024-04-04 ENCOUNTER — PHARMACY VISIT (OUTPATIENT)
Dept: PHARMACY | Facility: MEDICAL CENTER | Age: 64
End: 2024-04-04
Payer: COMMERCIAL

## 2024-04-04 LAB
CREAT UR-MCNC: 227.58 MG/DL
MICROALBUMIN UR-MCNC: 8 MG/DL
MICROALBUMIN/CREAT UR: 35 MG/G (ref 0–30)

## 2024-04-08 ENCOUNTER — PATIENT MESSAGE (OUTPATIENT)
Dept: HEALTH INFORMATION MANAGEMENT | Facility: OTHER | Age: 64
End: 2024-04-08

## 2024-04-08 ENCOUNTER — PATIENT OUTREACH (OUTPATIENT)
Dept: HEALTH INFORMATION MANAGEMENT | Facility: OTHER | Age: 64
End: 2024-04-08
Payer: MEDICARE

## 2024-04-08 DIAGNOSIS — R73.03 PREDIABETES: ICD-10-CM

## 2024-04-08 DIAGNOSIS — I10 PRIMARY HYPERTENSION: ICD-10-CM

## 2024-04-10 NOTE — PROGRESS NOTES
Subjective:     History of Present Illness  The patient presents for evaluation of multiple medical concerns.    The patient suspects a bilateral ear infection, with the left ear being more affected than the right. Despite being on a 7-day course of Augmentin prescribed by urgent care, her symptoms persist. She reports bilateral ear pain and fatigue, which she attributes to her diagnosed sleep apnea. The infection has exacerbated her fatigue. She has been managing her headaches with Tylenol, although she is uncertain about the presence of fever. Her hearing has diminished in both ears. She does not use Flonase, but she does use an over-the-counter nasal decongestant.    The patient's CBC has improved, and her red blood cells are within the normal range. She is concerned about her A1c level of 5.6 and is currently on metformin. She suspects she may be developing diabetes and has reduced her intake of sweets.    The patient requires a refill of her tramadol prescription for her knee pain. She typically takes three tablets of tramadol on bad days, and two tablets on more mild pain days. Her orthopedic specialist has recommended surgery for her left shoulder, but she has chosen to defer this option. Steroid injections have proven ineffective.   She is allergic to BACTRIM.      Current medicines (including changes today)  Current Outpatient Medications   Medication Sig Dispense Refill    traMADol (ULTRAM) 50 MG Tab Take 1 Tablet by mouth every 8 hours as needed for Moderate Pain or Severe Pain for up to 30 days. 90 Tablet 3    ciprofloxacin/dexamethasone (CIPRODEX) 0.3-0.1 % Suspension Administer 4 Drops into affected ear(s) 2 times a day for 5 days. 7.5 mL 0    fluticasone (FLONASE) 50 MCG/ACT nasal spray Administer 1 Spray into affected nostril(S) every day. 16 g 0    atorvastatin (LIPITOR) 20 MG Tab TAKE ONE TABLET BY MOUTH EVERY  Tablet 2    metFORMIN ER (GLUCOPHAGE XR) 500 MG TABLET SR 24 HR Take 1 Tablet by  mouth 2 times a day. 200 Tablet 1    buPROPion (WELLBUTRIN SR) 200 MG SR tablet Take 1 tablet by mouth 2 times a day. 180 Tablet 1    busPIRone (BUSPAR) 10 MG Tab tablet Take 1 Tablet by mouth 2 times a day. 180 Tablet 2    venlafaxine (EFFEXOR-XR) 150 MG extended-release capsule Take 1 Capsule by mouth every day. (venlafaxine xr 150 mg + 75 mg = 225 mg daily) 90 Capsule 2    venlafaxine XR (EFFEXOR XR) 75 MG CAPSULE SR 24 HR Take 1 capsule by mouth every day. (venlafaxine xr 150 mg + 75 mg = 225 mg daily) 90 Capsule 2    traZODone (DESYREL) 50 MG Tab Take 0.5 tablets by mouth at bedtime as needed for sleep (as needed for sleep). (Can increase to 1 tab if needed for sleep) 90 Tablet 3    amLODIPine (NORVASC) 10 MG Tab Take 1 tablet by mouth every day. 90 Tablet 3    lisinopril (PRINIVIL) 40 MG tablet Take 1 Tablet by mouth every day. 90 Tablet 3    levothyroxine (SYNTHROID) 50 MCG Tab Take 1 tablet by mouth every morning on an empty stomach. 90 Tablet 3    VITAMIN D PO Take  by mouth every day.      Ferrous Sulfate (IRON) 325 (65 Fe) MG Tab Take 65 mg by mouth every day at 6 PM.       No current facility-administered medications for this visit.     She  has a past medical history of Anemia, Anginal syndrome (HCC), Anxiety, Arthritis, Chronic pain (06/02/2021), Dental disorder (05/24/2012), High cholesterol, HTN (hypertension), benign (03/19/2012), Hypothyroid (03/19/2012), Major depression (03/19/2012), Obesity (BMI 30-39.9) (07/09/2018), Orbital floor (blow-out) closed fracture (HCC), Other specified symptom associated with female genital organs, Pain, Pain, Pain (02/2018), Psychiatric problem, Unspecified disorder of thyroid, Urinary bladder disorder, and Urinary incontinence.    She has no past medical history of Breast cancer (HCC).    ROS   No chest pain, no shortness of breath, no abdominal pain  Positive ROS as per HPI.  All other systems reviewed and are negative.     Objective:     /62   Pulse 61    "Temp 36.1 °C (97 °F) (Temporal)   Ht 1.702 m (5' 7\")   Wt 67.6 kg (149 lb)   SpO2 91%  Body mass index is 23.34 kg/m².   Physical Exam  The left ear is slightly bulgy but not red. The right ear is erythematous and inflamed.  Constitutional: Alert, no distress.  Skin: Warm, dry, good turgor, no rashes in visible areas.  Eye: Equal, round and reactive, conjunctiva clear, lids normal.  ENMT: Lips without lesions, poor dentition, oropharynx clear. Left TM with mild bulging of TM without erythema, right TM with erythema, bulging, and injection, intact  Neck: Trachea midline, no masses, no thyromegaly. + bilateral anterior cervical lymphadenopathy  Respiratory: Unlabored respiratory effort  Psych: Alert and oriented x3, normal affect and mood.      Results  Laboratory Studies  Urine protein test slightly elevated. Urine chemistry is elevated. A1c is 5.6.         Assessment and Plan:   The following treatment plan was discussed    Assessment & Plan  1. Non-recurrent acute suppurative otitis media of right ear without spontaneous rupture of tympanic membrane  A prescription for Augmentin for an additional three days has been issued, along with a 5-day course of topical otic drops. Additionally, a prescription for Flonase nasal spray has been issued.    2. Knee pain  Chronic, stable on tramadol as needed for pain  Refills given today   reviewed and consistent  UDS and contract up to date and consistent    3. Health maintenance.  A Pap smear will be conducted next month.      ORDERS:  1. Non-recurrent acute suppurative otitis media of left ear without spontaneous rupture of tympanic membrane  - ciprofloxacin/dexamethasone (CIPRODEX) 0.3-0.1 % Suspension; Administer 4 Drops into affected ear(s) 2 times a day for 5 days.  Dispense: 7.5 mL; Refill: 0  - amoxicillin-clavulanate (AUGMENTIN) 875-125 MG Tab; Take 1 Tablet by mouth 2 times a day for 3 days. For total of 10 days for AOM  Dispense: 6 Tablet; Refill: 0  - " fluticasone (FLONASE) 50 MCG/ACT nasal spray; Administer 1 Spray into affected nostril(S) every day.  Dispense: 16 g; Refill: 0    2. Primary osteoarthritis of right knee  - traMADol (ULTRAM) 50 MG Tab; Take 1 Tablet by mouth every 8 hours as needed for Moderate Pain or Severe Pain for up to 30 days.  Dispense: 90 Tablet; Refill: 3    3. Opioid dependence, uncomplicated (HCC)  - traMADol (ULTRAM) 50 MG Tab; Take 1 Tablet by mouth every 8 hours as needed for Moderate Pain or Severe Pain for up to 30 days.  Dispense: 90 Tablet; Refill: 3    4. Microalbuminuria  - MICROALBUMIN CREAT RATIO URINE; Future          The MA is performing the above orders under the direction of Dr. Tena    Please note that this dictation was created using voice recognition software. I have made every reasonable attempt to correct obvious errors, but I expect that there are errors of grammar and possibly content that I did not discover before finalizing the note.      Attestation      Verbal consent was acquired by the patient to use ChannelE[employer] ambient listening note generation during this visit Yes

## 2024-04-22 DIAGNOSIS — I10 HTN (HYPERTENSION), BENIGN: Chronic | ICD-10-CM

## 2024-04-22 PROCEDURE — RXMED WILLOW AMBULATORY MEDICATION CHARGE: Performed by: NURSE PRACTITIONER

## 2024-04-23 NOTE — TELEPHONE ENCOUNTER
Received request via: Pharmacy    Was the patient seen in the last year in this department? Yes    Does the patient have an active prescription (recently filled or refills available) for medication(s) requested? No    Pharmacy Name: [facility] [place]     Does the patient have FPC Plus and need 100 day supply (blood pressure, diabetes and cholesterol meds only)? Yes, quantity updated to 100 days    can we get a new rx for a 100 day supply

## 2024-04-24 PROCEDURE — RXMED WILLOW AMBULATORY MEDICATION CHARGE: Performed by: NURSE PRACTITIONER

## 2024-04-24 RX ORDER — LISINOPRIL 40 MG/1
40 TABLET ORAL DAILY
Qty: 100 TABLET | Refills: 3 | Status: SHIPPED | OUTPATIENT
Start: 2024-04-24

## 2024-04-25 ENCOUNTER — PHARMACY VISIT (OUTPATIENT)
Dept: PHARMACY | Facility: MEDICAL CENTER | Age: 64
End: 2024-04-25
Payer: COMMERCIAL

## 2024-04-26 ENCOUNTER — TELEPHONE (OUTPATIENT)
Dept: MEDICAL GROUP | Facility: MEDICAL CENTER | Age: 64
End: 2024-04-26

## 2024-04-29 ENCOUNTER — OFFICE VISIT (OUTPATIENT)
Dept: BEHAVIORAL HEALTH | Facility: CLINIC | Age: 64
End: 2024-04-29
Payer: MEDICARE

## 2024-04-29 DIAGNOSIS — F33.1 MODERATE EPISODE OF RECURRENT MAJOR DEPRESSIVE DISORDER (HCC): ICD-10-CM

## 2024-04-29 DIAGNOSIS — F41.1 GAD (GENERALIZED ANXIETY DISORDER): ICD-10-CM

## 2024-05-01 NOTE — PROGRESS NOTES
[facility]   Therapy Progress Note        Name: Debby Desai  MRN: [id number]  : 1960  Age: 64 y.o.  Date of assessment: 2024  PCP: ARELIS Chris  Persons in attendance: Patient  Total session time: 55 minutes      Topics addressed in psychotherapy include: Met with the patient in person for an individual counseling session.  Patient was last seen six weeks ago      Content of Therapy:   The patient reported that she was not doing well and had wanted to get into a session earlier.  Patient discussed remembering and focusing on more negative aspects of her life than positive.  Patient discussed finding her parents and grandparents letters of childhood trauma.  Patient discussed that she never remembers having A protector friend foremost of school.    Therapeutic Intervention:   This session, the therapeutic focus was on exploring the patient's drama and responses.  Validated the patient's trauma and worked on understanding those of memories.    This dictation has been created using voice recognition software and/or scribes. The accuracy of the dictation is limited by the abilities of the software and the expertise of the scribes. I expect there may be some errors of grammar and possibly content. I made every attempt to manually correct the errors within my dictation. However, errors related to voice recognition software and/or scribes may still exist and should be interpreted within the appropriate context.    Objective Observations:   Participation:Active verbal participation, Attentive, and Engaged   Grooming:Casual   Cognition:Alert and Fully Oriented   Eye Contact:Good   Mood:Euthymic, Anxious, and Angry   Affect:Flexible, Full range, and Congruent with content   Thought Process:Logical and Goal-directed   Speech:Rate within normal limits and Volume within normal limits    Current Risk:   Suicide: low   Homicide: low   Self-Harm: low   Relapse: low   Safety Plan Reviewed:  not applicable    Care Plan Updated: No    Does patient express agreement with the above plan? Yes     Diagnosis:  1. Moderate episode of recurrent major depressive disorder (HCC)    2. TIM (generalized anxiety disorder)        Referral appointment(s) scheduled? No       ERNIE Edward.

## 2024-05-07 ENCOUNTER — PATIENT MESSAGE (OUTPATIENT)
Dept: HEALTH INFORMATION MANAGEMENT | Facility: OTHER | Age: 64
End: 2024-05-07

## 2024-05-07 ENCOUNTER — PATIENT OUTREACH (OUTPATIENT)
Dept: HEALTH INFORMATION MANAGEMENT | Facility: OTHER | Age: 64
End: 2024-05-07
Payer: MEDICARE

## 2024-05-07 DIAGNOSIS — I10 PRIMARY HYPERTENSION: ICD-10-CM

## 2024-05-07 DIAGNOSIS — G89.4 CHRONIC PAIN SYNDROME: ICD-10-CM

## 2024-05-07 RX ORDER — TRAMADOL HYDROCHLORIDE 50 MG/1
50 TABLET ORAL EVERY 8 HOURS PRN
COMMUNITY
End: 2024-05-17

## 2024-05-07 RX ORDER — ACETAMINOPHEN 500 MG
500-1000 TABLET ORAL EVERY 6 HOURS PRN
COMMUNITY

## 2024-05-07 SDOH — ECONOMIC STABILITY: FOOD INSECURITY: WITHIN THE PAST 12 MONTHS, THE FOOD YOU BOUGHT JUST DIDN'T LAST AND YOU DIDN'T HAVE MONEY TO GET MORE.: NEVER TRUE

## 2024-05-07 SDOH — ECONOMIC STABILITY: FOOD INSECURITY: WITHIN THE PAST 12 MONTHS, YOU WORRIED THAT YOUR FOOD WOULD RUN OUT BEFORE YOU GOT MONEY TO BUY MORE.: NEVER TRUE

## 2024-05-07 SDOH — HEALTH STABILITY: PHYSICAL HEALTH: ON AVERAGE, HOW MANY MINUTES DO YOU ENGAGE IN EXERCISE AT THIS LEVEL?: 10 MIN

## 2024-05-07 SDOH — ECONOMIC STABILITY: INCOME INSECURITY: IN THE LAST 12 MONTHS, WAS THERE A TIME WHEN YOU WERE NOT ABLE TO PAY THE MORTGAGE OR RENT ON TIME?: NO

## 2024-05-07 SDOH — ECONOMIC STABILITY: HOUSING INSECURITY: IN THE LAST 12 MONTHS, HOW MANY PLACES HAVE YOU LIVED?: 1

## 2024-05-07 SDOH — HEALTH STABILITY: PHYSICAL HEALTH: ON AVERAGE, HOW MANY DAYS PER WEEK DO YOU ENGAGE IN MODERATE TO STRENUOUS EXERCISE (LIKE A BRISK WALK)?: 3 DAYS

## 2024-05-07 ASSESSMENT — PATIENT HEALTH QUESTIONNAIRE - PHQ9
5. POOR APPETITE OR OVEREATING: 1 - SEVERAL DAYS
CLINICAL INTERPRETATION OF PHQ2 SCORE: 6
SUM OF ALL RESPONSES TO PHQ QUESTIONS 1-9: 17

## 2024-05-07 ASSESSMENT — LIFESTYLE VARIABLES
SKIP TO QUESTIONS 9-10: 1
AUDIT-C TOTAL SCORE: 0
HOW OFTEN DO YOU HAVE A DRINK CONTAINING ALCOHOL: NEVER
HOW MANY STANDARD DRINKS CONTAINING ALCOHOL DO YOU HAVE ON A TYPICAL DAY: PATIENT DOES NOT DRINK
HOW OFTEN DO YOU HAVE SIX OR MORE DRINKS ON ONE OCCASION: NEVER

## 2024-05-07 ASSESSMENT — PAIN SCALES - GENERAL: PAINLEVEL: 6=MODERATE PAIN

## 2024-05-07 ASSESSMENT — SOCIAL DETERMINANTS OF HEALTH (SDOH)
WITHIN THE LAST YEAR, HAVE YOU BEEN KICKED, HIT, SLAPPED, OR OTHERWISE PHYSICALLY HURT BY YOUR PARTNER OR EX-PARTNER?: NO
ARE YOU MARRIED, WIDOWED, DIVORCED, SEPARATED, NEVER MARRIED, OR LIVING WITH A PARTNER?: NEVER MARRIED
WITHIN THE LAST YEAR, HAVE TO BEEN RAPED OR FORCED TO HAVE ANY KIND OF SEXUAL ACTIVITY BY YOUR PARTNER OR EX-PARTNER?: NO
WITHIN THE LAST YEAR, HAVE YOU BEEN AFRAID OF YOUR PARTNER OR EX-PARTNER?: NO
DO YOU BELONG TO ANY CLUBS OR ORGANIZATIONS SUCH AS CHURCH GROUPS UNIONS, FRATERNAL OR ATHLETIC GROUPS, OR SCHOOL GROUPS?: NO
HOW HARD IS IT FOR YOU TO PAY FOR THE VERY BASICS LIKE FOOD, HOUSING, MEDICAL CARE, AND HEATING?: NOT HARD AT ALL
IN THE PAST 12 MONTHS, HAS THE ELECTRIC, GAS, OIL, OR WATER COMPANY THREATENED TO SHUT OFF SERVICE IN YOUR HOME?: NO
IN A TYPICAL WEEK, HOW MANY TIMES DO YOU TALK ON THE PHONE WITH FAMILY, FRIENDS, OR NEIGHBORS?: MORE THAN THREE TIMES A WEEK
HOW OFTEN DO YOU ATTEND CHURCH OR RELIGIOUS SERVICES?: NEVER
WITHIN THE LAST YEAR, HAVE YOU BEEN HUMILIATED OR EMOTIONALLY ABUSED IN OTHER WAYS BY YOUR PARTNER OR EX-PARTNER?: NO
HOW OFTEN DO YOU GET TOGETHER WITH FRIENDS OR RELATIVES?: ONCE A WEEK
HOW OFTEN DO YOU ATTENT MEETINGS OF THE CLUB OR ORGANIZATION YOU BELONG TO?: NEVER

## 2024-05-07 NOTE — PROGRESS NOTES
"INITIAL CARE MANAGEMENT CARE PLAN/ASSESSMENT     Debby is a 64 year old that meets all criteria to qualify for the PCM program.  Debby has verbalized her full name and  and has given verbal consent to enroll in the PCM program.  Debby lives in a single level home in [place]. There are two steps to enter the home. House is older, and bathroom is very small. Bathroom is not wheelchair accessible. Patient needs to turn the walker 90 degrees to step into bathroom if using walker. Patient lives with her 22 yo grand-nephew as a roommate. Debby has several sisters and a brother that live locally, and additional extended family in surrounding areas.    Discussion Points:  HTN. Currently well managed on lisinopril and amlodipine. Patient will start taking her blood pressure at least a few days a week, and keeping a logbook for review during the monthly PCM outreach calls. Denies any dizziness or lightheadedness upon standing.  Chronic Pain. 6/10 today. Tramadol up to TID PRN. Tramadol only takes the edge off the pain. C/O pain in left knee (arthritis), right knee (TKA still causes pain), left should (wants to have replacement by end 2024). Has seen pain management in the past, and is considering requesting another referral from PCP.  Sleep. Sleeps very poorly despite medications for sleep. Recent sleep study indicated moderate-severe sleep apnea. Patient believes a C-PAP would be effective for her and will improve her sleep.  HgA1C. Most recent HgA1C 5.6. Down from HgA1C five years ago of 6.4. Started metformin about five years ago. Does not work too hard to control HgA1C through diet. See diet below.  Diet. Receives groceries mostly through delivery. Fruits/vegetables-\"OK\" intake. \"OK\" on protein intake. Patient has been working on cutting down on soda, which she know contains considerable sugar. Requests information on local food dolan-will [employer]AN[employer] message to patient today.  Falls. No falls in past year. Prior to most " "recent year, had several falls:   Fainted due to dehydration. Sore from falling but no injuries.  \"Just fell\". Sore from fall but no injuries noted.  Exercise. Walks about half a block, and back, three times a week. Was able to go up stairs on her own power three months ago while visiting family. A family member was behind patient while going up the stairs, and in front while going down the stairs-\"just in case\".Patient is open to increasing exercise, and has considered the following  Joining a gym  Group exercise classes  Start pool exercises  Patient's healthcare goals, in her own words:  Have decreased pain. Wants to have shoulder replacement surgery by end of 2024, so needs to get pain under control from other pain areas. (Pain management referral)  To get depression better. Patient goes to talk therapy, and is trying to be more outgoing with family  Start C-PAP. Sleep study results showed severe sleep apnea. Needs to discuss with PCP or sleep medicine provider  Increase activities.   Needs to be outside more  Going to go to [employer] for $10 to look into what's available for her to start some exercises.  May consider gym with a pool. Now that patient can walk down steps, she can get into pool.  Open to group exercise classes at [facility] or similar.   Tasks to be completed by PCM RN and CHW:  Send ACP information/documents to patient-Completed 05/29/2024  Resources for food dolan-Completed 05/07/2024  Research group exercise classes in [place], and provide information to patient      Medication Self-Management Goals:     Reviewed medications listed below with patient.      Current Outpatient Medications:     acetaminophen (TYLENOL) 500 MG Tab, Take 500-1,000 mg by mouth every 6 hours as needed., Disp: , Rfl:     multivitamin Tab, Take 1 Tablet by mouth every day., Disp: , Rfl:     traMADol (ULTRAM) 50 MG Tab, Take 50 mg by mouth every 8 hours as needed., Disp: , Rfl:     lisinopril (PRINIVIL) 40 MG tablet, " Take 1 Tablet by mouth every day., Disp: 100 Tablet, Rfl: 3    atorvastatin (LIPITOR) 20 MG Tab, TAKE ONE TABLET BY MOUTH EVERY DAY, Disp: 100 Tablet, Rfl: 2    metFORMIN ER (GLUCOPHAGE XR) 500 MG TABLET SR 24 HR, Take 1 Tablet by mouth 2 times a day., Disp: 200 Tablet, Rfl: 1    buPROPion (WELLBUTRIN SR) 200 MG SR tablet, Take 1 tablet by mouth 2 times a day., Disp: 180 Tablet, Rfl: 1    busPIRone (BUSPAR) 10 MG Tab tablet, Take 1 Tablet by mouth 2 times a day., Disp: 180 Tablet, Rfl: 2    venlafaxine (EFFEXOR-XR) 150 MG extended-release capsule, Take 1 Capsule by mouth every day. (venlafaxine xr 150 mg + 75 mg = 225 mg daily), Disp: 90 Capsule, Rfl: 2    venlafaxine XR (EFFEXOR XR) 75 MG CAPSULE SR 24 HR, Take 1 capsule by mouth every day. (venlafaxine xr 150 mg + 75 mg = 225 mg daily), Disp: 90 Capsule, Rfl: 2    traZODone (DESYREL) 50 MG Tab, Take 0.5 tablets by mouth at bedtime as needed for sleep (as needed for sleep). (Can increase to 1 tab if needed for sleep), Disp: 90 Tablet, Rfl: 3    amLODIPine (NORVASC) 10 MG Tab, Take 1 tablet by mouth every day., Disp: 90 Tablet, Rfl: 3    levothyroxine (SYNTHROID) 50 MCG Tab, Take 1 tablet by mouth every morning on an empty stomach., Disp: 90 Tablet, Rfl: 3    fluticasone (FLONASE) 50 MCG/ACT nasal spray, Administer 1 Spray into affected nostril(S) every day. (Patient not taking: Reported on 5/7/2024), Disp: 16 g, Rfl: 0    VITAMIN D PO, Take  by mouth every day., Disp: , Rfl:     Ferrous Sulfate (IRON) 325 (65 Fe) MG Tab, Take 65 mg by mouth every day at 6 PM., Disp: , Rfl:          Goal:  Maintain medication compliance       Physical/Functional/Environmental Status:     Activities of Daily Living:  Bathing: independent   Dressing: independent  Grooming: independent  Mouth Care: independent  Toileting: independent  Climbing a Flight of Stairs: needs assistance    Independent Activities of Daily Living:  Shopping: needs assistance  Cooking: independent  Managing  Medications: independent  Using the phone and looking up numbers: independent  Driving or using public transportation: needs assistance  Managing Finances: independent        5/7/2024     1:32 PM   STEADI Fall Risk   STEADI Risk for Falling Score 13   One or more falls in the last year Yes   Advised to use a cane or walker to get around safely Yes   Feels unsteady when walking Yes   Steadies self on furniture while walking at home Yes   Worried about falling Yes   Needs to push with hands when rising from a chair Yes   Has trouble stepping up onto a curb / using stairs Yes   Often has to rush to the toilet Yes   Has lost some feeling in feet No   Takes medicine that makes him/her feel lightheaded or more tired than usual Yes   Takes medicine to sleep or improve mood Yes   Often feels sad or depressed Yes         Goal:  No Falls    Social Determinants of Health       Financial Status:      Financial Resource Strain: Low Risk  (5/7/2024)    Overall Financial Resource Strain (CARDIA)     Difficulty of Paying Living Expenses: Not hard at all         Referred to CHW/SW:  NA       Transportation Status:      Transportation Needs: No Transportation Needs (5/7/2024)    PRAPARE - Transportation     Lack of Transportation (Medical): No     Lack of Transportation (Non-Medical): No        Referred to CHW/SW:  NA      Food Insecurity:      Food Insecurity: No Food Insecurity (5/7/2024)    Hunger Vital Sign     Worried About Running Out of Food in the Last Year: Never true     Ran Out of Food in the Last Year: Never true        Referred to CHW/SW:  NA. Sent Food Bank information to patient at her request. She can afford food currently, but may have limitations in the future.       Housing Status:     Housing Stability: Low Risk  (5/7/2024)    Housing Stability Vital Sign     Unable to Pay for Housing in the Last Year: No     Number of Places Lived in the Last Year: 1     Unstable Housing in the Last Year: No        Referred to  CHW/SW:  NA      Social Connections:     Social Connections: Socially Isolated (5/7/2024)    Social Connection and Isolation Panel [NHANES]     Frequency of Communication with Friends and Family: More than three times a week     Frequency of Social Gatherings with Friends and Family: Once a week     Attends Oriental orthodox Services: Never     Active Member of Clubs or Organizations: No     Attends Club or Organization Meetings: Never     Marital Status: Never         Referred to CHW/SW:  NA      Mental/Behavioral/Psychosocial Status:        12/12/2023     3:00 PM 1/19/2024    11:20 AM 5/7/2024     1:31 PM   Depression Screen (PHQ-2/PHQ-9)   PHQ-2 Total Score 6 5 6   PHQ-9 Total Score 19 16 17       Interpretation of PHQ-9 Total Score   Score Severity   1-4 No Depression   5-9 Mild Depression   10-14 Moderate Depression   15-19 Moderately Severe Depression   20-27 Severe Depression       Goal:  Maintain and increase social interactions      Chronic Care Management Care Plan      Hypertension  Goal:  Blood pressure within normal range   Barriers:Disease processes; patient adherence  Interventions: Medication management; BP at least twice weekly, with logbook of readings  Start Date: 05/07/2024  End Date:    Fall Risk  Goal:  No falls  Barriers:Disease processes; patient adherence  Interventions: Use walker as needed for weakness; increase strength in legs through exercise  Start Date: 05/07/2024  End Date:    Exercise  Goal:  Increase exercise tolerance   Barriers:Disease processes; patient adherence  Interventions: Start discussion on gym membership or other physical activities and groups; increase daily walking distance   Start Date: 05/07/2024  End Date:           Next Scheduled patient outreach:   June 2024

## 2024-05-09 PROCEDURE — RXMED WILLOW AMBULATORY MEDICATION CHARGE: Performed by: NURSE PRACTITIONER

## 2024-05-10 ENCOUNTER — PHARMACY VISIT (OUTPATIENT)
Dept: PHARMACY | Facility: MEDICAL CENTER | Age: 64
End: 2024-05-10
Payer: COMMERCIAL

## 2024-05-16 ENCOUNTER — OFFICE VISIT (OUTPATIENT)
Dept: BEHAVIORAL HEALTH | Facility: CLINIC | Age: 64
End: 2024-05-16
Payer: MEDICARE

## 2024-05-16 DIAGNOSIS — F41.1 GAD (GENERALIZED ANXIETY DISORDER): ICD-10-CM

## 2024-05-16 DIAGNOSIS — F33.1 MODERATE EPISODE OF RECURRENT MAJOR DEPRESSIVE DISORDER (HCC): ICD-10-CM

## 2024-05-16 PROCEDURE — 1170F FXNL STATUS ASSESSED: CPT | Performed by: MARRIAGE & FAMILY THERAPIST

## 2024-05-16 PROCEDURE — 90837 PSYTX W PT 60 MINUTES: CPT | Performed by: MARRIAGE & FAMILY THERAPIST

## 2024-05-17 ENCOUNTER — HOSPITAL ENCOUNTER (OUTPATIENT)
Facility: MEDICAL CENTER | Age: 64
End: 2024-05-17
Attending: NURSE PRACTITIONER
Payer: MEDICARE

## 2024-05-17 ENCOUNTER — OFFICE VISIT (OUTPATIENT)
Dept: MEDICAL GROUP | Facility: MEDICAL CENTER | Age: 64
End: 2024-05-17
Payer: MEDICARE

## 2024-05-17 VITALS
HEART RATE: 95 BPM | TEMPERATURE: 97 F | SYSTOLIC BLOOD PRESSURE: 112 MMHG | HEIGHT: 67 IN | DIASTOLIC BLOOD PRESSURE: 72 MMHG | BODY MASS INDEX: 23.7 KG/M2 | OXYGEN SATURATION: 94 % | WEIGHT: 151 LBS

## 2024-05-17 DIAGNOSIS — F33.1 MODERATE EPISODE OF RECURRENT MAJOR DEPRESSIVE DISORDER (HCC): ICD-10-CM

## 2024-05-17 DIAGNOSIS — M25.561 CHRONIC PAIN OF BOTH KNEES: ICD-10-CM

## 2024-05-17 DIAGNOSIS — F41.1 GAD (GENERALIZED ANXIETY DISORDER): ICD-10-CM

## 2024-05-17 DIAGNOSIS — Z12.4 PAP SMEAR FOR CERVICAL CANCER SCREENING: ICD-10-CM

## 2024-05-17 DIAGNOSIS — R40.0 UNCONTROLLED DAYTIME SOMNOLENCE: ICD-10-CM

## 2024-05-17 DIAGNOSIS — G47.33 OSA (OBSTRUCTIVE SLEEP APNEA): ICD-10-CM

## 2024-05-17 DIAGNOSIS — G89.29 CHRONIC PAIN OF BOTH KNEES: ICD-10-CM

## 2024-05-17 DIAGNOSIS — Z01.411 ABNORMAL FEMALE PELVIC EXAM: ICD-10-CM

## 2024-05-17 DIAGNOSIS — N83.8 ENLARGED OVARY: ICD-10-CM

## 2024-05-17 DIAGNOSIS — M25.562 CHRONIC PAIN OF BOTH KNEES: ICD-10-CM

## 2024-05-17 DIAGNOSIS — M25.512 ACUTE PAIN OF LEFT SHOULDER: ICD-10-CM

## 2024-05-17 PROBLEM — H66.003 NON-RECURRENT ACUTE SUPPURATIVE OTITIS MEDIA OF BOTH EARS WITHOUT SPONTANEOUS RUPTURE OF TYMPANIC MEMBRANES: Status: RESOLVED | Noted: 2024-04-03 | Resolved: 2024-05-17

## 2024-05-17 PROCEDURE — 99214 OFFICE O/P EST MOD 30 MIN: CPT | Mod: 25 | Performed by: NURSE PRACTITIONER

## 2024-05-17 PROCEDURE — 3074F SYST BP LT 130 MM HG: CPT | Performed by: NURSE PRACTITIONER

## 2024-05-17 PROCEDURE — 1170F FXNL STATUS ASSESSED: CPT | Performed by: NURSE PRACTITIONER

## 2024-05-17 PROCEDURE — 3078F DIAST BP <80 MM HG: CPT | Performed by: NURSE PRACTITIONER

## 2024-05-17 RX ORDER — CELECOXIB 100 MG/1
100-200 CAPSULE ORAL 2 TIMES DAILY PRN
Qty: 120 CAPSULE | Refills: 3 | Status: SHIPPED | OUTPATIENT
Start: 2024-05-17

## 2024-05-17 ASSESSMENT — FIBROSIS 4 INDEX: FIB4 SCORE: 0.84
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
## 2024-05-18 DIAGNOSIS — Z12.4 PAP SMEAR FOR CERVICAL CANCER SCREENING: ICD-10-CM

## 2024-05-24 LAB
C TRACH RRNA CVX QL NAA+PROBE: NEGATIVE
CYTOLOGIST CVX/VAG CYTO: NORMAL
CYTOLOGY CVX/VAG DOC CYTO: NORMAL
CYTOLOGY CVX/VAG DOC THIN PREP: NORMAL
HPV I/H RISK 4 DNA CVX QL PROBE+SIG AMP: NEGATIVE
N GONORRHOEA RRNA CVX QL NAA+PROBE: NEGATIVE
NOTE NL11727A: NORMAL
OTHER STN SPEC: NORMAL
QC REVIEWED BY NL11722A: NORMAL
STAT OF ADQ CVX/VAG CYTO-IMP: NORMAL

## 2024-05-28 PROCEDURE — RXMED WILLOW AMBULATORY MEDICATION CHARGE: Performed by: PSYCHIATRY & NEUROLOGY

## 2024-05-29 PROBLEM — F51.04 PSYCHOPHYSIOLOGICAL INSOMNIA: Status: ACTIVE | Noted: 2021-05-13

## 2024-05-29 PROBLEM — I70.0 ATHEROSCLEROSIS OF AORTA (HCC): Status: ACTIVE | Noted: 2024-05-29

## 2024-05-29 NOTE — ASSESSMENT & PLAN NOTE
Chronic, stable. Currently taking bupropion 200 mg twice daily and venlafaxine 225 mg daily. Reports that mood is good. Denies SI/HI.

## 2024-05-29 NOTE — ASSESSMENT & PLAN NOTE
Chronic, stable. Currently taking tramadol 50 mg every 8 hours as needed for  Managed by primary care provider.

## 2024-05-29 NOTE — ASSESSMENT & PLAN NOTE
Chronic, stable. Currently taking amlodipine 10 mg and lisinopril 40 mg daily. In-office blood pressure is   Denies chest pain, lightheadedness, and lower extremity edema.

## 2024-05-29 NOTE — ASSESSMENT & PLAN NOTE
Chronic, stable. Currently taking trazodone 25 mg nightly. Reports an average of 6 hours of sleep per night. Denies excessive sedation and amnesia.   Detail Level: Simple Detail Level: Zone

## 2024-05-29 NOTE — ASSESSMENT & PLAN NOTE
Chronic, stable. Reviewed lipid profile from January 2024. Currently taking atorvastatin 20 mg daily. Reports that she is not exercising regularly. Provided [employer] gym resources. Denies chest pain and claudication.

## 2024-05-29 NOTE — ASSESSMENT & PLAN NOTE
Chronic, stable. Reviewed CT of the chest from July 2021. Denies chest pain, pain with ambulation, and weakness of extremities.

## 2024-05-30 NOTE — PROGRESS NOTES
[facility]   Therapy Progress Note        Name: Debby Desai  MRN: [id number]  : 1960  Age: 64 y.o.  Date of assessment: 2024  PCP: ARELIS Chris  Persons in attendance: Patient  Total session time: 56 minutes      Topics addressed in psychotherapy include: Met with the patient in person for an individual counseling session.      Content of Therapy:   The patient discussed that the anniversary of her sister's death is coming up.  She feels that there is some “dysfunctional grieving as quote going on with family members patient feels that she is falling back into perfectionism tendencies.    Therapeutic Intervention:   This session, the therapeutic focus was on supporting the patient in her understanding of grief and grieving.  Worked with the patient on accepting things as they are without the need to make changes.    This dictation has been created using voice recognition software and/or scribes. The accuracy of the dictation is limited by the abilities of the software and the expertise of the scribes. I expect there may be some errors of grammar and possibly content. I made every attempt to manually correct the errors within my dictation. However, errors related to voice recognition software and/or scribes may still exist and should be interpreted within the appropriate context.    Objective Observations:   Participation:Active verbal participation, Attentive, and Engaged   Grooming:Casual   Cognition:Alert and Fully Oriented   Eye Contact:Good   Mood:Depressed   Affect:Flexible, Full range, and Congruent with content   Thought Process:Logical and Goal-directed   Speech:Rate within normal limits and Volume within normal limits    Current Risk:   Suicide: low   Homicide: low   Self-Harm: low   Relapse: low   Safety Plan Reviewed: not applicable    Care Plan Updated: No    Does patient express agreement with the above plan? Yes     Diagnosis:  1. Moderate episode of recurrent  major depressive disorder (HCC)    2. TIM (generalized anxiety disorder)        Referral appointment(s) scheduled? No       ERNIE Edward.

## 2024-05-31 ENCOUNTER — PHARMACY VISIT (OUTPATIENT)
Dept: PHARMACY | Facility: MEDICAL CENTER | Age: 64
End: 2024-05-31
Payer: COMMERCIAL

## 2024-06-02 ASSESSMENT — PATIENT HEALTH QUESTIONNAIRE - PHQ9
1. LITTLE INTEREST OR PLEASURE IN DOING THINGS: SEVERAL DAYS
2. FEELING DOWN, DEPRESSED, IRRITABLE, OR HOPELESS: MORE THAN HALF THE DAYS

## 2024-06-02 ASSESSMENT — ENCOUNTER SYMPTOMS: GENERAL WELL-BEING: POOR

## 2024-06-02 ASSESSMENT — ACTIVITIES OF DAILY LIVING (ADL): BATHING_REQUIRES_ASSISTANCE: 0

## 2024-06-03 ENCOUNTER — APPOINTMENT (OUTPATIENT)
Dept: BEHAVIORAL HEALTH | Facility: CLINIC | Age: 64
End: 2024-06-03
Payer: MEDICARE

## 2024-06-03 ENCOUNTER — APPOINTMENT (OUTPATIENT)
Dept: FAMILY PLANNING/WOMEN'S HEALTH CLINIC | Facility: PHYSICIAN GROUP | Age: 64
End: 2024-06-03
Payer: MEDICARE

## 2024-06-03 VITALS
WEIGHT: 151 LBS | BODY MASS INDEX: 23.7 KG/M2 | HEIGHT: 67 IN | DIASTOLIC BLOOD PRESSURE: 58 MMHG | SYSTOLIC BLOOD PRESSURE: 102 MMHG

## 2024-06-03 DIAGNOSIS — F41.1 GAD (GENERALIZED ANXIETY DISORDER): ICD-10-CM

## 2024-06-03 DIAGNOSIS — Z12.31 SCREENING MAMMOGRAM, ENCOUNTER FOR: ICD-10-CM

## 2024-06-03 DIAGNOSIS — E03.9 ACQUIRED HYPOTHYROIDISM: ICD-10-CM

## 2024-06-03 DIAGNOSIS — E78.5 DYSLIPIDEMIA: ICD-10-CM

## 2024-06-03 DIAGNOSIS — R73.03 PREDIABETES: ICD-10-CM

## 2024-06-03 DIAGNOSIS — I10 PRIMARY HYPERTENSION: ICD-10-CM

## 2024-06-03 DIAGNOSIS — F51.04 PSYCHOPHYSIOLOGICAL INSOMNIA: ICD-10-CM

## 2024-06-03 DIAGNOSIS — I70.0 ATHEROSCLEROSIS OF AORTA (HCC): ICD-10-CM

## 2024-06-03 DIAGNOSIS — F33.1 MODERATE EPISODE OF RECURRENT MAJOR DEPRESSIVE DISORDER (HCC): ICD-10-CM

## 2024-06-03 DIAGNOSIS — F11.20 OPIOID DEPENDENCE, UNCOMPLICATED (HCC): ICD-10-CM

## 2024-06-03 DIAGNOSIS — G47.09 OTHER INSOMNIA: ICD-10-CM

## 2024-06-03 PROCEDURE — 3078F DIAST BP <80 MM HG: CPT

## 2024-06-03 PROCEDURE — G0439 PPPS, SUBSEQ VISIT: HCPCS

## 2024-06-03 PROCEDURE — 1170F FXNL STATUS ASSESSED: CPT | Performed by: MARRIAGE & FAMILY THERAPIST

## 2024-06-03 PROCEDURE — 90837 PSYTX W PT 60 MINUTES: CPT | Performed by: MARRIAGE & FAMILY THERAPIST

## 2024-06-03 PROCEDURE — 1170F FXNL STATUS ASSESSED: CPT

## 2024-06-03 PROCEDURE — 3074F SYST BP LT 130 MM HG: CPT

## 2024-06-03 SDOH — ECONOMIC STABILITY: INCOME INSECURITY: IN THE LAST 12 MONTHS, WAS THERE A TIME WHEN YOU WERE NOT ABLE TO PAY THE MORTGAGE OR RENT ON TIME?: NO

## 2024-06-03 SDOH — ECONOMIC STABILITY: INCOME INSECURITY: HOW HARD IS IT FOR YOU TO PAY FOR THE VERY BASICS LIKE FOOD, HOUSING, MEDICAL CARE, AND HEATING?: NOT VERY HARD

## 2024-06-03 SDOH — ECONOMIC STABILITY: FOOD INSECURITY: WITHIN THE PAST 12 MONTHS, THE FOOD YOU BOUGHT JUST DIDN'T LAST AND YOU DIDN'T HAVE MONEY TO GET MORE.: NEVER TRUE

## 2024-06-03 SDOH — ECONOMIC STABILITY: HOUSING INSECURITY: IN THE LAST 12 MONTHS, HOW MANY PLACES HAVE YOU LIVED?: 1

## 2024-06-03 ASSESSMENT — PATIENT HEALTH QUESTIONNAIRE - PHQ9
SUM OF ALL RESPONSES TO PHQ QUESTIONS 1-9: 13
CLINICAL INTERPRETATION OF PHQ2 SCORE: 3
5. POOR APPETITE OR OVEREATING: 0 - NOT AT ALL

## 2024-06-03 ASSESSMENT — FIBROSIS 4 INDEX: FIB4 SCORE: 0.84

## 2024-06-03 NOTE — ASSESSMENT & PLAN NOTE
Chronic, ongoing. Currently taking trazodone 50 mg nightly. Reports an average of 2-5 hours of sleep per night. Denies excessive sedation and amnesia.

## 2024-06-03 NOTE — ASSESSMENT & PLAN NOTE
Chronic, stable. Most recent hemoglobin A1C was 5.6 in January 2024. Currently taking metformin  mg twice daily. Routinely monitored by primary care provider.

## 2024-06-03 NOTE — PROGRESS NOTES
Comprehensive Health Assessment Program     Debby Desai is a 64 y.o. here for her comprehensive health assessment.    Patient Active Problem List    Diagnosis Date Noted    Atherosclerosis of aorta (HCC) 05/29/2024    Microalbuminuria 04/03/2024    Uncontrolled daytime somnolence 01/19/2024    Spondylolisthesis at L5-S1 level 09/21/2023    Acute pain of left shoulder 09/20/2023    Iron deficiency anemia secondary to inadequate dietary iron intake 05/24/2023    Prolonged Q-T interval on ECG 05/23/2023    Brain fog 02/16/2023    Tunnel visual field constriction of both eyes 07/07/2022    Risk for falls 07/06/2022    Pain disorder associated with psychological factors 07/20/2021    Chronic pain 06/02/2021    Psychophysiological insomnia 05/13/2021    Post concussion syndrome 10/21/2020    Chronic knee pain 01/27/2020    Opioid dependence, uncomplicated (HCC) 09/27/2018    Prediabetes 09/29/2017    Osteoarthritis of right knee 07/12/2017    Moderate episode of recurrent major depressive disorder (HCC) 12/16/2016    TIM (generalized anxiety disorder) 10/24/2016    Osteoarthrosis involving lower leg 04/21/2015    Dyslipidemia 12/02/2014    Primary hypertension 03/19/2012    Acquired hypothyroidism 03/19/2012    Back pain 03/19/2012    Neck pain 03/19/2012       Current Outpatient Medications   Medication Sig Dispense Refill    celecoxib (CELEBREX) 100 MG Cap Take 1-2 Capsules by mouth 2 times a day as needed for Moderate Pain or Inflammation. 120 Capsule 3    acetaminophen (TYLENOL) 500 MG Tab Take 500-1,000 mg by mouth every 6 hours as needed.      multivitamin Tab Take 1 Tablet by mouth every day.      lisinopril (PRINIVIL) 40 MG tablet Take 1 Tablet by mouth every day. 100 Tablet 3    traMADol (ULTRAM) 50 MG Tab Take 1 Tablet by mouth every 8 hours as needed for Moderate Pain or Severe Pain for up to 30 days. 90 Tablet 3    atorvastatin (LIPITOR) 20 MG Tab TAKE ONE TABLET BY MOUTH EVERY  Tablet 2     metFORMIN ER (GLUCOPHAGE XR) 500 MG TABLET SR 24 HR Take 1 Tablet by mouth 2 times a day. 200 Tablet 1    buPROPion (WELLBUTRIN SR) 200 MG SR tablet Take 1 tablet by mouth 2 times a day. 180 Tablet 1    busPIRone (BUSPAR) 10 MG Tab tablet Take 1 Tablet by mouth 2 times a day. 180 Tablet 2    venlafaxine (EFFEXOR-XR) 150 MG extended-release capsule Take 1 Capsule by mouth every day. (venlafaxine xr 150 mg + 75 mg = 225 mg daily) 90 Capsule 2    venlafaxine XR (EFFEXOR XR) 75 MG CAPSULE SR 24 HR Take 1 capsule by mouth every day. (venlafaxine xr 150 mg + 75 mg = 225 mg daily) 90 Capsule 2    traZODone (DESYREL) 50 MG Tab Take 0.5 tablets by mouth at bedtime as needed for sleep (as needed for sleep). (Can increase to 1 tab if needed for sleep) 90 Tablet 3    amLODIPine (NORVASC) 10 MG Tab Take 1 tablet by mouth every day. 90 Tablet 3    levothyroxine (SYNTHROID) 50 MCG Tab Take 1 tablet by mouth every morning on an empty stomach. 90 Tablet 3    VITAMIN D PO Take  by mouth every day.      Ferrous Sulfate (IRON) 325 (65 Fe) MG Tab Take 65 mg by mouth every day at 6 PM.       No current facility-administered medications for this visit.          Current supplements as per medication list.     Allergies:   Sulfamethoxazole-trimethoprim  Social History     Tobacco Use    Smoking status: Never    Smokeless tobacco: Never   Vaping Use    Vaping status: Never Used   Substance Use Topics    Alcohol use: Not Currently    Drug use: No     Family History   Problem Relation Age of Onset    Hypertension Father     Diabetes Father     Heart Disease Father     Alcohol abuse Father     Anesthesia Sister         slow to come out (as a child)    Diabetes Other     Heart Disease Other     Cancer Other     Hypertension Other     Stroke Other     Lung Disease Other      Debby  has a past medical history of Anemia, Anginal syndrome (HCC), Anxiety, Arthritis, Chronic pain (06/02/2021), Dental disorder (05/24/2012), High cholesterol, HTN  (hypertension), benign (03/19/2012), Hypothyroid (03/19/2012), Major depression (03/19/2012), Obesity (BMI 30-39.9) (07/09/2018), Orbital floor (blow-out) closed fracture (HCC), Other specified symptom associated with female genital organs, Pain, Pain, Pain (02/2018), Psychiatric problem, Unspecified disorder of thyroid, Urinary bladder disorder, and Urinary incontinence.    She has no past medical history of Breast cancer (HCC).   Past Surgical History:   Procedure Laterality Date    CO NEUROLYTIC DEST GENICULAR NERVE Right 7/28/2023    Procedure: RIGHT genicular nerve radiofrequency ablation;  Surgeon: Volodymyr Herring M.D.;  [facility];  Service: Pain Management    CO FLUOROSCOPIC GUIDANCE NEEDLE PLACEMENT Right 7/7/2023    Procedure: RIGHT genicular nerve diagnostic blocks;  Surgeon: Volodymyr Herring M.D.;  [facility];  Service: Pain Management    INJ,EPI ANES/STER LUM/SAC ADDL Right 4/7/2021    Procedure: RIGHT lumbar five and sacral one transforaminal epidural;  Surgeon: Volodymyr Herring M.D.;  Location: [facility];  Service: Pain Management    PB TOTAL KNEE ARTHROPLASTY Right 9/16/2019    Procedure: RIGHT ARTHROPLASTY, KNEE, TOTAL;  Surgeon: Rufus Saba M.D.;  Location: [facility];  Service: Orthopedics    KNEE ARTHROSCOPY Right 2/9/2018    Procedure: KNEE ARTHROSCOPY;  Surgeon: Harsh Baltazar M.D.;  Location: [facility];  Service: Orthopedics    MENISCECTOMY, KNEE, MEDIAL Right 2/9/2018    Procedure: MEDIAL AND LATERAL MENISCECTOMY- PARTIAL;  Surgeon: Harsh Baltazar M.D.;  Location: [facility];  Service: Orthopedics    KNEE ARTHROSCOPY Right 7/12/2017    Procedure: KNEE ARTHROSCOPY- CESPEDES;  Surgeon: Harsh Baltazar M.D.;  Location: [facility];  Service:     MENISCECTOMY, KNEE, MEDIAL Right 7/12/2017    Procedure: PARTIAL MEDIAL AND LATERAL MENISCECTOMY;   Surgeon: Harsh Baltazar M.D.;  Location: [facility];  Service:     SYNOVECTOMY Right 7/12/2017    Procedure: SYNOVECTOMY;  Surgeon: Harsh Baltazar M.D.;  Location: SUR[facility]ERY [facility];  Service:     KNEE ARTHROSCOPY  4/21/2015    Performed by Rufus Saba M.D. at SURGERY [facility]    MENISCECTOMY, KNEE, MEDIAL  4/21/2015    Performed by Rufus Saba M.D. at SURGERY [facility]    PUMP REVISION  12/10/2012    Performed by Allison Guerra M.D. at SURGERY [facility]    SPINAL CORD STIMULATOR  5/30/2012    Performed by ALLISON GUERRA at SURGERY [facility]    BIOPSY GENERAL  5/1/12    endometrial    SPINAL CORD STIMULATOR  7/11/2011    Performed by ALLISON GUERRA at SURGERY [facility]    BLOCK EPIDURAL STEROID INJECTION  2008    x 3-4    LYMPH NODE EXCISION Left 2007    cervicle    OTHER Right 2001    thoracic discectomy      ARTHROSCOPY, KNEE Left 1999    RIB RESECTION Right 1980    LUMPECTOMY         Screening:  In the last six months have you experienced any leakage of urine? Yes, ongoing leakage. Reports use of depends daily.    Depression Screening  Little interest or pleasure in doing things?  1 - several days  Feeling down, depressed , or hopeless? 2 - more than half the days  Trouble falling or staying asleep, or sleeping too much?  3 - nearly every day  Feeling tired or having little energy?  3 - nearly every day  Poor appetite or overeating?  0 - not at all  Feeling bad about yourself - or that you are a failure or have let yourself or your family down? 1 - several days  Trouble concentrating on things, such as reading the newspaper or watching television? 1 - several days  Moving or speaking so slowly that other people could have noticed.  Or the opposite - being so fidgety or restless that you have been moving around a lot more than usual?  2 - more than half the days  Thoughts that you would be better off dead, or of hurting yourself?   0 - not at all  Patient Health Questionnaire Score: 13    If depressive symptoms identified deferred to follow up visit unless specifically addressed in assessment and plan.    Interpretation of PHQ-9 Total Score   Score Severity   1-4 No Depression   5-9 Mild Depression   10-14 Moderate Depression   15-19 Moderately Severe Depression   20-27 Severe Depression    Screening for Cognitive Impairment  Do you or any of your friends or family members have any concern about your memory? No  Three Minute Recall (Leader, Season, Table) 3/3    Aki clock face with all 12 numbers and set the hands to show 10 minutes after 11.  Yes 5/5  Cognitive concerns identified deferred for follow up unless specifically addressed in assessment and plan.    Fall Risk Assessment  Has the patient had two or more falls in the last year or any fall with injury in the last year?  No    Safety Assessment  Do you always wear your seatbelt?  Yes  Any changes to home needed to function safely? No  Difficulty hearing.  No  Patient counseled about all safety risks that were identified.    Functional Assessment ADLs  Are there any barriers preventing you from cooking for yourself or meeting nutritional needs?  No.    Are there any barriers preventing you from driving safely or obtaining transportation?  No.    Are there any barriers preventing you from using a telephone or calling for help?  No    Are there any barriers preventing you from shopping?  Yes.    Are there any barriers preventing you from taking care of your own finances?  No    Are there any barriers preventing you from managing your medications?  No    Are there any barriers preventing you from showering, bathing or dressing yourself? No    Are there any barriers preventing you from doing housework or laundry? Yes    Are there any barriers preventing you from using the toilet?No    Are you currently engaging in any exercise or physical activity?  No.      Self-Assessment of Health  What  is your perception of your health? poor    Do you sleep more than six hours a night? No    In the past 7 days, how much did pain keep you from doing your normal work? A lot    Do you spend quality time with family or friends (virtually or in person)? Yes    Do you usually eat a heart healthy diet that constists of a variety of fruits, vegetables, whole grains and fiber? Yes    Do you eat foods high in fat and/or Fast Food more than three times per week? No    How concerned are you that your medical conditions are not being well managed? very    Are you worried that in the next 2 months, you may not have stable housing that you own, rent, or stay in as part of a household? No        Advance Care Planning  Do you have an Advance Directive, Living Will, Durable Power of , or POLST? No  Provided patient with educational brochure regarding Advance Care Planning.                  Health Maintenance Summary            Overdue - COVID-19 Vaccine (3 - 2023-24 season) Overdue since 9/1/2023 01/30/2021  Imm Admin: MODERNA SARS-COV-2 VACCINE (12+)    12/31/2020  Imm Admin: MODERNA SARS-COV-2 VACCINE (12+)              Postponed - Zoster (Shingles) Vaccines (1 of 2) Postponed until 10/6/2033      No completion history exists for this topic.              Ordered - Mammogram (Every 2 Years) Ordered on 6/3/2024      09/08/2022  MA-SCREENING MAMMO BILAT W/TOMOSYNTHESIS W/CAD    01/16/2015  MA-SCREENING MAMMOGRAM W/ CAD    12/02/2009  MA-SCREENING DIGITAL MAMMO    12/02/2009  MA-CAD SCREENING-MAMMO    10/30/2008  MA-CAD SCREENING-MAMMO    Only the first 5 history entries have been loaded, but more history exists.              Annual Wellness Visit (Yearly) Next due on 6/3/2025      06/03/2024  Done - Comprehensive Health Assessment    06/03/2024  Level of Service: MI ANNUAL WELLNESS VISIT-INCLUDES PPPS SUBSEQUE*    05/17/2024  Level of Service: PREVENTIVE VISIT,EST,65 & OVER    01/19/2024  Visit Dx: Medicare annual  wellness visit, initial    12/12/2023  Level of Service: INITIAL ANNUAL WELLNESS VISIT-INCLUDES PPPS    Only the first 5 history entries have been loaded, but more history exists.              Bone Density Scan (Every 5 Years) Next due on 7/19/2028 07/19/2023  DS-BONE DENSITY STUDY (DEXA)              Colorectal Cancer Screening (Colonoscopy - Every 10 Years) Tentatively due on 2/28/2030 02/28/2020  REFERRAL TO GI FOR COLONOSCOPY              IMM DTaP/Tdap/Td Vaccine (3 - Td or Tdap) Next due on 1/19/2034 01/19/2024  Imm Admin: Tdap Vaccine    04/25/2013  Imm Admin: Tdap Vaccine              Hepatitis C Screening  Tentatively Complete      01/18/2020  Hepatitis C Antibody component of HEP C VIRUS ANTIBODY              Pneumococcal Vaccine: 65+ Years (Series Information) Completed      04/17/2023  Imm Admin: Pneumococcal Conjugate Vaccine (PCV20)    03/19/2008  Imm Admin: Pneumococcal polysaccharide vaccine (PPSV-23)              Influenza Vaccine (Series Information) Completed      09/20/2023  Imm Admin: Influenza Vaccine Adult HD    11/16/2022  Imm Admin: Influenza Vaccine Quad Inj (Pf)    10/21/2020  Imm Admin: Influenza Vaccine Quad Inj (Pf)    09/16/2019  Imm Admin: Influenza Vaccine Quad Inj (Pf)    09/17/2018  Imm Admin: INFLUENZA TIV (IM)    Only the first 5 history entries have been loaded, but more history exists.              Hepatitis A Vaccine (Hep A) (Series Information) Aged Out      No completion history exists for this topic.              Hepatitis B Vaccine (Hep B) (Series Information) Aged Out      No completion history exists for this topic.              HPV Vaccines (Series Information) Aged Out      No completion history exists for this topic.              Polio Vaccine (Inactivated Polio) (Series Information) Aged Out      No completion history exists for this topic.              Meningococcal Immunization (Series Information) Aged Out      No completion history exists for this  topic.              Discontinued - A1c Screening  Discontinued        Frequency changed to Never automatically (Topic No Longer Applies)    01/22/2024  HEMOGLOBIN A1C    09/26/2022  HEMOGLOBIN A1C    06/05/2021  HEMOGLOBIN A1C    01/18/2020  HEMOGLOBIN A1C    Only the first 5 history entries have been loaded, but more history exists.              Discontinued - Fasting Lipid Profile  Discontinued        Frequency changed to Never automatically (Topic No Longer Applies)    01/22/2024  Lipid Profile    09/26/2022  Lipid Profile    06/05/2021  Lipid Profile    01/18/2020  Lipid Profile    Only the first 5 history entries have been loaded, but more history exists.              Discontinued - Diabetes: Urine Protein Screening  Discontinued        Frequency changed to Never automatically (Topic No Longer Applies)    04/03/2024  MICROALBUMIN CREAT RATIO URINE    01/22/2024  MICROALBUMIN CREAT RATIO URINE    06/05/2021  MICROALBUMIN CREAT RATIO URINE              Discontinued - SERUM CREATININE  Discontinued        Frequency changed to Never automatically (Topic No Longer Applies)    01/22/2024  Comp Metabolic Panel    05/24/2023  Comp Metabolic Panel    05/23/2023  Renal Function Panel    05/22/2023  COMP METABOLIC PANEL    Only the first 5 history entries have been loaded, but more history exists.              Discontinued - Cervical Cancer Screening  Discontinued        Frequency changed to Never automatically (Topic No Longer Applies)    05/17/2024  THINPREP PAP W/HPV AND CTNG    03/30/2012  PAP IG, RFX HPV ASCU                    Patient Care Team:  ARELIS Chris as PCP - General (Family Medicine)  Kelli Mi, PT, MSPT (Inactive) as Physical Therapist (Physical Therapy)  Nicho Lucero, PT, DPT as Physical Therapist (Physical Therapy)  Nicho Lucero, PT, DPT as Physical Therapist (Physical Therapy)  Jos Torres, PT, DPT as Physical Therapist (Physical Therapy)  Dee Santoyo, PT (Inactive)  "as Physical Therapist (Physical Therapy)  Dee Santoyo PT (Inactive) as Physical Therapist (Physical Therapy)  Atilio Bowling R.N.    Financial Resource Strain: Low Risk  (6/3/2024)    Overall Financial Resource Strain (CARDIA)     Difficulty of Paying Living Expenses: Not very hard      Transportation Needs: No Transportation Needs (6/3/2024)    PRAPARE - Transportation     Lack of Transportation (Medical): No     Lack of Transportation (Non-Medical): No      Food Insecurity: No Food Insecurity (6/3/2024)    Hunger Vital Sign     Worried About Running Out of Food in the Last Year: Never true     Ran Out of Food in the Last Year: Never true        Encounter Vitals  Blood Pressure : 102/58  O2 Delivery Device: None - Room Air  Weight: 68.5 kg (151 lb)  Height: 170.2 cm (5' 7\")  BMI (Calculated): 23.65  DME  O2 Delivery Device: None - Room Air     Alert, oriented in no acute distress.  Eye contact is good, speech goal directed, affect calm.    Assessment and Plan. The following treatment and monitoring plan is recommended:    Acquired hypothyroidism  Chronic, stable. Currently taking levothyroxine 50 mcg daily as prescribed. Denies fatigue, palpitations, hair and skin changes, temperature intolerance, changes in bowel habits, and weight loss or weight gain.      Atherosclerosis of aorta (HCC)  Chronic, stable. Reviewed CT of the chest from July 2021. Continues on statin therapy daily. Denies chest pain and weakness of extremities.    Dyslipidemia  Chronic, stable. Reviewed lipid profile from January 2024. Currently taking atorvastatin 20 mg daily. Reports that she walks for 10-15 minutes almost every day. States that she is interest in starting tanmay chi. Denies chest pain and claudication.    TIM (generalized anxiety disorder)  Moderate episode of recurrent major depressive disorder (HCC)  Chronic, ongoing. Currently taking bupropion 200 mg twice daily, buspirone 10 mg twice daily, and venlafaxine 225 mg daily. " Reports that anxiety has been worsening, but she uses fidget toys which help. She is currently going to cognitive behavioral therapy 1-2 times a month. Reports prior completion of outpatient intensive therapy. Followed by psychiatry, Dr. Stevenson. Denies SI/HI.    Opioid dependence, uncomplicated (HCC)  Chronic, stable. Currently taking tramadol 50 mg every eight hours as needed for bilateral knee pain. Managed by primary care provider.    Prediabetes  Chronic, stable. Most recent hemoglobin A1C was 5.6 in January 2024. Currently taking metformin  mg twice daily. Routinely monitored by primary care provider.    Primary hypertension  Chronic, stable. Currently taking amlodipine 10 mg and lisinopril 40 mg daily. In-office blood pressure is 102/58. Denies chest pain, lightheadedness, and lower extremity edema.     Psychophysiological insomnia  Chronic, ongoing. Currently taking trazodone 25 mg nightly. Reports an average of 2-5 hours of sleep per night. Denies excessive sedation and amnesia.    Screening mammogram, encounter for  Mammogram ordered.      Services suggested: No services needed at this time  Health Care Screening: Age-appropriate preventive services recommended by USPTF and ACIP covered by Medicare were discussed today. Services ordered if indicated and agreed upon by the patient.  Referrals offered: Community-based lifestyle interventions to reduce health risks and promote self-management and wellness, fall prevention, nutrition, physical activity, tobacco-use cessation, weight loss, and mental health services as per orders if indicated.    Discussion today about general wellness and lifestyle habits:    Prevent falls and reduce trip hazards; Cautioned about securing or removing rugs.  Have a working fire alarm and carbon monoxide detector.  Engage in regular physical activity and social activities.    Follow-up: Return for appointment with Primary Care Provider as needed.

## 2024-06-03 NOTE — PROGRESS NOTES
[facility]   Therapy Progress Note        Name: Debby Desai  MRN: [id number]  : 1960  Age: 64 y.o.  Date of assessment: 6/3/2024  PCP: ARELIS Chris  Persons in attendance: Patient  Total session time: 56 minutes      Topics addressed in psychotherapy include: Met with the patient in person for an individual counseling session.      Content of Therapy:   The patient discussed that she has been looking into her family relationships and appears to be at a loss and not knowing family stories.  Patient discussed that some of her relatives had been working on [employer].    Therapeutic Intervention:   This session, the therapeutic focus was on supporting the patient and allowing her room to explore her feelings of importance of the family stories.  Encourage a patient to work with their sister and do some of her own research in [employer].    This dictation has been created using voice recognition software and/or scribes. The accuracy of the dictation is limited by the abilities of the software and the expertise of the scribes. I expect there may be some errors of grammar and possibly content. I made every attempt to manually correct the errors within my dictation. However, errors related to voice recognition software and/or scribes may still exist and should be interpreted within the appropriate context.    Objective Observations:   Participation:Active verbal participation, Attentive, and Engaged   Grooming:Casual   Cognition:Alert and Fully Oriented   Eye Contact:Good   Mood:Euthymic and Anxious   Affect:Flexible, Full range, and Congruent with content   Thought Process:Logical and Goal-directed   Speech:Rate within normal limits and Volume within normal limits    Current Risk:   Suicide: low   Homicide: low   Self-Harm: low   Relapse: low   Safety Plan Reviewed: not applicable    Care Plan Updated: No    Does patient express agreement with the above plan? Yes     Diagnosis:  1.  Moderate episode of recurrent major depressive disorder (HCC)    2. TIM (generalized anxiety disorder)        Referral appointment(s) scheduled? ERNIE Dick.

## 2024-06-04 ENCOUNTER — PATIENT OUTREACH (OUTPATIENT)
Dept: HEALTH INFORMATION MANAGEMENT | Facility: OTHER | Age: 64
End: 2024-06-04

## 2024-06-04 ENCOUNTER — APPOINTMENT (OUTPATIENT)
Dept: RADIOLOGY | Facility: MEDICAL CENTER | Age: 64
End: 2024-06-04
Attending: NURSE PRACTITIONER
Payer: MEDICARE

## 2024-06-04 DIAGNOSIS — I10 PRIMARY HYPERTENSION: ICD-10-CM

## 2024-06-04 DIAGNOSIS — E78.5 DYSLIPIDEMIA: ICD-10-CM

## 2024-06-04 NOTE — PROGRESS NOTES
"Assessment    Monthly PCM outreach call.Patient has not been doing well over the past month, mostly due to pain levels.   Discussion Points:  HTN. BP \"has been good\". Most recent 102/60.  Pain. Pain has been elevated since last outreach call. Chronic osteoarthritis pain: bilateral knees, left shoulder especially. Patient has been taking 600-800 mg ibuprofen up to QID. Awaiting prior authorization from insurance for Celebrex.  Sleep. Usually around 3 hours a night with some naps. Maximum each night is 6 hours. Awaiting sleep medicine consult (appointment not until November) and possible CPAP.  Depression. Depression has been worse recently secondary to physical pain and lack of sleep. Still going to talk therapy.  Exercise. Patient has been unable to do much exercise due to pain. Has not yet explored fitness options I  [place] area. Patient is going to start some Chair Yoga and Chair Shankar Chi in an effort to reduce anxiety and stress levels. Also has expressed interest in Singing Bowls and other frequency healing techniques.  Handicap placard. Recently received handicap placard.       Education    Call the [facility] phone line ([phone]) for any additional resource needs.   Eat food when taking ibuprofen to avoid GI issues.  Look into You Tube videos of low impact/no impact asian medicine exercises such as Shankar Chi and Yoga, and healing frequencies.     Plan of Care and Goals    Continue current plan of care    Barriers:    Disease processes; insurance requirements; provider availability    Progress:    Stable    Next outreach:  July 2024                         "

## 2024-06-06 PROCEDURE — RXMED WILLOW AMBULATORY MEDICATION CHARGE: Performed by: NURSE PRACTITIONER

## 2024-06-06 PROCEDURE — RXMED WILLOW AMBULATORY MEDICATION CHARGE: Performed by: PSYCHIATRY & NEUROLOGY

## 2024-06-07 ENCOUNTER — PHARMACY VISIT (OUTPATIENT)
Dept: PHARMACY | Facility: MEDICAL CENTER | Age: 64
End: 2024-06-07
Payer: COMMERCIAL

## 2024-06-11 ENCOUNTER — OFFICE VISIT (OUTPATIENT)
Dept: BEHAVIORAL HEALTH | Facility: CLINIC | Age: 64
End: 2024-06-11
Payer: MEDICARE

## 2024-06-11 ENCOUNTER — TELEMEDICINE (OUTPATIENT)
Dept: BEHAVIORAL HEALTH | Facility: CLINIC | Age: 64
End: 2024-06-11
Payer: MEDICARE

## 2024-06-11 DIAGNOSIS — F33.1 MODERATE EPISODE OF RECURRENT MAJOR DEPRESSIVE DISORDER (HCC): ICD-10-CM

## 2024-06-11 DIAGNOSIS — F33.42 RECURRENT MAJOR DEPRESSIVE DISORDER, IN FULL REMISSION (HCC): ICD-10-CM

## 2024-06-11 DIAGNOSIS — F51.04 PSYCHOPHYSIOLOGICAL INSOMNIA: ICD-10-CM

## 2024-06-11 DIAGNOSIS — F41.1 GAD (GENERALIZED ANXIETY DISORDER): ICD-10-CM

## 2024-06-11 PROCEDURE — RXMED WILLOW AMBULATORY MEDICATION CHARGE: Performed by: PSYCHIATRY & NEUROLOGY

## 2024-06-11 PROCEDURE — G2211 COMPLEX E/M VISIT ADD ON: HCPCS | Mod: 95 | Performed by: PSYCHIATRY & NEUROLOGY

## 2024-06-11 PROCEDURE — 99214 OFFICE O/P EST MOD 30 MIN: CPT | Mod: 95 | Performed by: PSYCHIATRY & NEUROLOGY

## 2024-06-11 PROCEDURE — 1170F FXNL STATUS ASSESSED: CPT | Performed by: MARRIAGE & FAMILY THERAPIST

## 2024-06-11 PROCEDURE — 90837 PSYTX W PT 60 MINUTES: CPT | Performed by: MARRIAGE & FAMILY THERAPIST

## 2024-06-11 RX ORDER — BUSPIRONE HYDROCHLORIDE 10 MG/1
10 TABLET ORAL 3 TIMES DAILY
Qty: 90 TABLET | Refills: 1 | Status: SHIPPED | OUTPATIENT
Start: 2024-06-11

## 2024-06-11 RX ORDER — TRAZODONE HYDROCHLORIDE 50 MG/1
100 TABLET ORAL NIGHTLY PRN
Qty: 180 TABLET | Refills: 3 | Status: SHIPPED | OUTPATIENT
Start: 2024-06-11

## 2024-06-11 RX ORDER — BUPROPION HYDROCHLORIDE 200 MG/1
200 TABLET, EXTENDED RELEASE ORAL 2 TIMES DAILY
Qty: 180 TABLET | Refills: 1 | Status: SHIPPED | OUTPATIENT
Start: 2024-06-11

## 2024-06-11 RX ORDER — VENLAFAXINE HYDROCHLORIDE 150 MG/1
150 CAPSULE, EXTENDED RELEASE ORAL DAILY
Qty: 90 CAPSULE | Refills: 2 | Status: SHIPPED | OUTPATIENT
Start: 2024-06-11

## 2024-06-11 RX ORDER — VENLAFAXINE HYDROCHLORIDE 75 MG/1
75 CAPSULE, EXTENDED RELEASE ORAL DAILY
Qty: 90 CAPSULE | Refills: 2 | Status: SHIPPED | OUTPATIENT
Start: 2024-06-11

## 2024-06-11 ASSESSMENT — PATIENT HEALTH QUESTIONNAIRE - PHQ9
6. FEELING BAD ABOUT YOURSELF - OR THAT YOU ARE A FAILURE OR HAVE LET YOURSELF OR YOUR FAMILY DOWN: MORE THAN HALF THE DAYS
5. POOR APPETITE OR OVEREATING: NOT AT ALL
5. POOR APPETITE OR OVEREATING: 0 - NOT AT ALL
3. TROUBLE FALLING OR STAYING ASLEEP OR SLEEPING TOO MUCH: NEARLY EVERY DAY
10. IF YOU CHECKED OFF ANY PROBLEMS, HOW DIFFICULT HAVE THESE PROBLEMS MADE IT FOR YOU TO DO YOUR WORK, TAKE CARE OF THINGS AT HOME, OR GET ALONG WITH OTHER PEOPLE: SOMEWHAT DIFFICULT
6. FEELING BAD ABOUT YOURSELF - OR THAT YOU ARE A FAILURE OR HAVE LET YOURSELF OR YOUR FAMILY DOWN: 2
1. LITTLE INTEREST OR PLEASURE IN DOING THINGS: 3
8. MOVING OR SPEAKING SO SLOWLY THAT OTHER PEOPLE COULD HAVE NOTICED. OR THE OPPOSITE, BEING SO FIGETY OR RESTLESS THAT YOU HAVE BEEN MOVING AROUND A LOT MORE THAN USUAL: 2
7. TROUBLE CONCENTRATING ON THINGS, SUCH AS READING THE NEWSPAPER OR WATCHING TELEVISION: NOT AT ALL
8. MOVING OR SPEAKING SO SLOWLY THAT OTHER PEOPLE COULD HAVE NOTICED. OR THE OPPOSITE, BEING SO FIGETY OR RESTLESS THAT YOU HAVE BEEN MOVING AROUND A LOT MORE THAN USUAL: MORE THAN HALF THE DAYS
5. POOR APPETITE OR OVEREATING: 0
SUM OF ALL RESPONSES TO PHQ QUESTIONS 1-9: 16
1. LITTLE INTEREST OR PLEASURE IN DOING THINGS: NEARLY EVERY DAY
SUM OF ALL RESPONSES TO PHQ QUESTIONS 1-9: 16
3. TROUBLE FALLING OR STAYING ASLEEP OR SLEEPING TOO MUCH: 3
7. TROUBLE CONCENTRATING ON THINGS, SUCH AS READING THE NEWSPAPER OR WATCHING TELEVISION: 0
4. FEELING TIRED OR HAVING LITTLE ENERGY: 3
2. FEELING DOWN, DEPRESSED, IRRITABLE, OR HOPELESS: 3
9. THOUGHTS THAT YOU WOULD BE BETTER OFF DEAD, OR OF HURTING YOURSELF: NOT AT ALL
9. THOUGHTS THAT YOU WOULD BE BETTER OFF DEAD, OR OF HURTING YOURSELF: 0
4. FEELING TIRED OR HAVING LITTLE ENERGY: NEARLY EVERY DAY
2. FEELING DOWN, DEPRESSED, IRRITABLE, OR HOPELESS: NEARLY EVERY DAY

## 2024-06-11 ASSESSMENT — ANXIETY QUESTIONNAIRES
7. FEELING AFRAID AS IF SOMETHING AWFUL MIGHT HAPPEN: NOT AT ALL
4. TROUBLE RELAXING: MORE THAN HALF THE DAYS
4. TROUBLE RELAXING: MORE THAN HALF THE DAYS
1. FEELING NERVOUS, ANXIOUS, OR ON EDGE: NEARLY EVERY DAY
7. FEELING AFRAID AS IF SOMETHING AWFUL MIGHT HAPPEN: NOT AT ALL
6. BECOMING EASILY ANNOYED OR IRRITABLE: NOT AT ALL
IF YOU CHECKED OFF ANY PROBLEMS ON THIS QUESTIONNAIRE, HOW DIFFICULT HAVE THESE PROBLEMS MADE IT FOR YOU TO DO YOUR WORK, TAKE CARE OF THINGS AT HOME, OR GET ALONG WITH OTHER PEOPLE: NOT DIFFICULT AT ALL
5. BEING SO RESTLESS THAT IT IS HARD TO SIT STILL: MORE THAN HALF THE DAYS
3. WORRYING TOO MUCH ABOUT DIFFERENT THINGS: SEVERAL DAYS
IF YOU CHECKED OFF ANY PROBLEMS ON THIS QUESTIONNAIRE, HOW DIFFICULT HAVE THESE PROBLEMS MADE IT FOR YOU TO DO YOUR WORK, TAKE CARE OF THINGS AT HOME, OR GET ALONG WITH OTHER PEOPLE: NOT DIFFICULT AT ALL
2. NOT BEING ABLE TO STOP OR CONTROL WORRYING: SEVERAL DAYS
GAD7 TOTAL SCORE: 9
2. NOT BEING ABLE TO STOP OR CONTROL WORRYING: SEVERAL DAYS
5. BEING SO RESTLESS THAT IT IS HARD TO SIT STILL: MORE THAN HALF THE DAYS
1. FEELING NERVOUS, ANXIOUS, OR ON EDGE: NEARLY EVERY DAY
6. BECOMING EASILY ANNOYED OR IRRITABLE: NOT AT ALL
3. WORRYING TOO MUCH ABOUT DIFFERENT THINGS: SEVERAL DAYS

## 2024-06-11 NOTE — PROGRESS NOTES
This evaluation was conducted via Zoom using secure and encrypted videoconferencing technology. The patient was in their home in the [facility].    The patient's identity was confirmed and verbal consent was obtained for this virtual visit.      PSYCHIATRY FOLLOW-UP NOTE      Name: Debby Desai  MRN: [id number]  : 1960  Age: 64 y.o.  Date of assessment: 2024  PCP: ARELIS Chris  Persons in attendance: Patient      REASON FOR VISIT/CHIEF COMPLAINT (as stated by Patient):  Debby Desai is a 64 y.o., White female, attending follow-up appointment for mood and anxiety management.      HISTORY OF PRESENT ILLNESS:  Debby Desai is a 64 y.o. old female with MDD, TIM and insomnia comes in today for follow up. Patient was last seen 3 months ago, and following treatment planning recommendations were done:  Continue Effexor  mg daily for mood, anxiety and comorbid pain management.   Continue Wellbutrin  mg BID daily for depression augmentation.    Continue Buspar 10 mg BID for anxiety.  Continue Trazodone 25-50 mg HS PRN for insomnia.    Monitor for serotonin syndrome.  Continue psychotherapy for mood and anxiety management.    Compliant with medications: no acute side effects reported.   Reports struggling these days with pain which is causing distress. She is working with PCP for pain management: prescribed Celebrex (awaiting prior auth approval)  She feels her medical need not getting met.   Poor sleep with increased anxiety noted with nail picking noted.  Agreed with increasing BuSpar and trazodone as discussed below.  Educated to work on what she has realistic control over including going off to root causes of sleep apnea and pain management.  Motivated to continue psychotherapy with daily medication compliance.        CURRENT MEDICATIONS:  Current Outpatient Medications   Medication Sig Dispense Refill    celecoxib (CELEBREX) 100 MG Cap Take 1-2 Capsules by mouth 2 times a  day as needed for Moderate Pain or Inflammation. 120 Capsule 3    acetaminophen (TYLENOL) 500 MG Tab Take 500-1,000 mg by mouth every 6 hours as needed.      multivitamin Tab Take 1 Tablet by mouth every day.      lisinopril (PRINIVIL) 40 MG tablet Take 1 Tablet by mouth every day. 100 Tablet 3    traMADol (ULTRAM) 50 MG Tab Take 1 Tablet by mouth every 8 hours as needed for Moderate Pain or Severe Pain for up to 30 days. 90 Tablet 3    atorvastatin (LIPITOR) 20 MG Tab TAKE ONE TABLET BY MOUTH EVERY  Tablet 2    metFORMIN ER (GLUCOPHAGE XR) 500 MG TABLET SR 24 HR Take 1 Tablet by mouth 2 times a day. 200 Tablet 1    buPROPion (WELLBUTRIN SR) 200 MG SR tablet Take 1 tablet by mouth 2 times a day. 180 Tablet 1    busPIRone (BUSPAR) 10 MG Tab tablet Take 1 Tablet by mouth 2 times a day. 180 Tablet 2    venlafaxine (EFFEXOR-XR) 150 MG extended-release capsule Take 1 Capsule by mouth every day. (venlafaxine xr 150 mg + 75 mg = 225 mg daily) 90 Capsule 2    venlafaxine XR (EFFEXOR XR) 75 MG CAPSULE SR 24 HR Take 1 capsule by mouth every day. (venlafaxine xr 150 mg + 75 mg = 225 mg daily) 90 Capsule 2    traZODone (DESYREL) 50 MG Tab Take 0.5 tablets by mouth at bedtime as needed for sleep (as needed for sleep). (Can increase to 1 tab if needed for sleep) 90 Tablet 3    amLODIPine (NORVASC) 10 MG Tab Take 1 tablet by mouth every day. 90 Tablet 3    levothyroxine (SYNTHROID) 50 MCG Tab Take 1 tablet by mouth every morning on an empty stomach. 90 Tablet 3    VITAMIN D PO Take  by mouth every day.      Ferrous Sulfate (IRON) 325 (65 Fe) MG Tab Take 65 mg by mouth every day at 6 PM.       No current facility-administered medications for this visit.       MEDICAL HISTORY  Past Medical History:   Diagnosis Date    Anemia     in past    Anginal syndrome (HCC)     had work up and feet r/t anxiety    Anxiety     Arthritis     OA knees    Chronic pain 06/02/2021    Dental disorder 05/24/2012    partial upper denture    High  cholesterol     HTN (hypertension), benign 03/19/2012    Hypothyroid 03/19/2012    Major depression 03/19/2012    Obesity (BMI 30-39.9) 07/09/2018    Orbital floor (blow-out) closed fracture (HCC)     left    Other specified symptom associated with female genital organs     post menopausal bleeding    Pain     thoracic spine, Right knee, myofacial pain, and Right shoulder    Pain     recently fell and has bruise left forehead    Pain 02/2018    chronic back pain, right shoulder    Psychiatric problem     depression, anxiety    Unspecified disorder of thyroid     Urinary bladder disorder     stress incont.    Urinary incontinence     Occasional     Past Surgical History:   Procedure Laterality Date    NV NEUROLYTIC DEST GENICULAR NERVE Right 7/28/2023    Procedure: RIGHT genicular nerve radiofrequency ablation;  Surgeon: Volodymyr Herring M.D.;  [facility];  Service: Pain Management    NV FLUOROSCOPIC GUIDANCE NEEDLE PLACEMENT Right 7/7/2023    Procedure: RIGHT genicular nerve diagnostic blocks;  Surgeon: Volodymyr Herring M.D.;  Location: [facility];  Service: Pain Management    INJ,EPI ANES/STER LUM/SAC ADDL Right 4/7/2021    Procedure: RIGHT lumbar five and sacral one transforaminal epidural;  Surgeon: Volodymyr Herring M.D.;  Location: [facility];  Service: Pain Management    PB TOTAL KNEE ARTHROPLASTY Right 9/16/2019    Procedure: RIGHT ARTHROPLASTY, KNEE, TOTAL;  Surgeon: Rufus Saba M.D.;  Location: [facility];  Service: Orthopedics    KNEE ARTHROSCOPY Right 2/9/2018    Procedure: KNEE ARTHROSCOPY;  Surgeon: Harsh Baltazar M.D.;  Location: [facility];  Service: Orthopedics    MENISCECTOMY, KNEE, MEDIAL Right 2/9/2018    Procedure: MEDIAL AND LATERAL MENISCECTOMY- PARTIAL;  Surgeon: Harsh Baltazar M.D.;  Location: [facility];  Service: Orthopedics    KNEE ARTHROSCOPY Right 7/12/2017     Procedure: KNEE ARTHROSCOPY- CESPEDES;  Surgeon: Harsh Baltazar M.D.;  Location: [facility];  Service:     MENISCECTOMY, KNEE, MEDIAL Right 7/12/2017    Procedure: PARTIAL MEDIAL AND LATERAL MENISCECTOMY;  Surgeon: Harsh Baltazar M.D.;  Location: [facility];  Service:     SYNOVECTOMY Right 7/12/2017    Procedure: SYNOVECTOMY;  Surgeon: Harsh Baltazar M.D.;  Location: [facility];  Service:     KNEE ARTHROSCOPY  4/21/2015    Performed by Rufus Saba M.D. at SURGERY [facility]    MENISCECTOMY, KNEE, MEDIAL  4/21/2015    Performed by Rufus Saba M.D. at [facility]    PUMP REVISION  12/10/2012    Performed by Allison Guerra M.D. at [facility]    SPINAL CORD STIMULATOR  5/30/2012    Performed by ALLISON GUERRA at [facility]    BIOPSY GENERAL  5/1/12    endometrial    SPINAL CORD STIMULATOR  7/11/2011    Performed by ALLISON GUERRA at [facility]    BLOCK EPIDURAL STEROID INJECTION  2008    x 3-4    LYMPH NODE EXCISION Left 2007    cervicle    OTHER Right 2001    thoracic discectomy      ARTHROSCOPY, KNEE Left 1999    RIB RESECTION Right 1980    LUMPECTOMY         PAST PSYCHIATRIC MEDICATIONS  Fluoxetine, Paroxetine, Sertraline, Citalopram  Venlafaxine, Duloxetine  Wellbutrin  Mirtazapine  Amitriptyline (switched to Mirtazapine during admission for dehydration related syncope)   Ativan, propranolol      REVIEW OF SYSTEMS:        Constitutional negative   Eyes negative   Ears/Nose/Mouth/Throat negative   Cardiovascular negative   Respiratory negative   Gastrointestinal negative   Genitourinary negative   Muscular negative   Integumentary negative   Neurological negative   Endocrine negative   Hematologic/Lymphatic negative     PHYSICAL EXAMINAION:  Vital signs: LMP 02/26/2012   Musculoskeletal: Normal gait.   Abnormal movements: none      MENTAL STATUS EXAMINATION      General:   - Grooming  and hygiene: Casual,   - Apparent distress: tense,   - Behavior: Tense  - Eye Contact:  Good,   - no psychomotor agitation or retardation    - Participation: Active verbal participation  Orientation: Alert and Fully Oriented to person, place and time  Mood: Anxious  Affect: Flexible,  Thought Process: Logical and Goal-directed  Thought Content: Denies suicidal or homicidal ideations, intent or plan   Perception: Denies auditory or visual hallucinations. No delusions noted   Attention span and concentration: Intact   Speech:Rate within normal limits and Volume within normal limits  Language: Appropriate   Insight: Good  Judgment: Good  Recent and remote memory: No gross evidence of memory deficits        DEPRESSION SCREENIN/7/2024     1:31 PM 6/3/2024    10:00 AM 2024    11:30 AM   Depression Screen (PHQ-2/PHQ-9)   PHQ-2 Total Score 6 3    PHQ-9 Total Score 17 13 16       Interpretation of PHQ-9 Total Score   Score Severity   1-4 No Depression   5-9 Mild Depression   10-14 Moderate Depression   15-19 Moderately Severe Depression   20-27 Severe Depression    CURRENT RISK:       Suicidal: Low       Homicidal: Low       Self-Harm: Low       Relapse: Low       Crisis Safety Plan Reviewed Not Indicated       If evidence of imminent risk is present, intervention/plan:      MEDICAL RECORDS/LABS/DIAGNOSTIC TESTS REVIEWED:  No new lab since last visit     [place]  records -   Reviewed     PLAN:  (1) MDD; (2) TIM; (3) Insomnia  Improving (pain is limiting factors)  Continue Effexor XR to 225 mg daily for mood, anxiety and comorbid pain management.   Continue Wellbutrin  mg BID daily for depression augmentation.    Continue Buspar 10 mg BID + 10 mg PRN for anxiety.  Increase Trazodone 50- mg HS PRN for insomnia.    Monitor for serotonin syndrome.  Continue psychotherapy for mood and anxiety management.  Medication options, alternatives (including no medications) and medication risks/benefits/side effects  were discussed in detail.  Explained importance of contraceptive measures while on psychotropic medications, educated to let provider know if ever pregnant or wanting to become pregnant. Verbalized understanding.  The patient was advised to call, message provider on MyChart, or come in to the clinic if symptoms worsen or if any future questions/issues regarding their medications arise; the patient verbalized understanding and agreement.    The patient was educated to call 911, call the suicide hotline, or go to local ER if having thoughts of suicide or homicide; verbalized understanding.    Billing Coding based on:  [address]19 based on [facility]  : based on the medical complexity and need for changes in treatment discussed above    Return to clinic in 1 month or sooner if symptoms worsen.  Next Appointment: instruction provided on how to make the next appointment.     The proposed treatment plan was discussed with the patient who was provided the opportunity to ask questions and make suggestions regarding alternative treatment. Patient verbalized understanding and expressed agreement with the plan.       Genaro Stevenson M.D.  06/11/24    This note was created using voice recognition software (Dragon). The accuracy of the dictation is limited by the abilities of the software. I have reviewed the note prior to signing, however some errors in grammar and context are still possible. If you have any questions related to this note please do not hesitate to contact our office.

## 2024-06-11 NOTE — PROGRESS NOTES
[facility]   Therapy Progress Note        Name: Debby Desai  MRN: [id number]  : 1960  Age: 64 y.o.  Date of assessment: 2024  PCP: ARELIS Chris  Persons in attendance: Patient  Total session time: 55 minutes      Topics addressed in psychotherapy include: Met with the patient in person for an individual counseling session.      Content of Therapy:   The patient discussed that she continues to struggle with pain in her shoulder and knee.  Patient discussed that she is frustrated with the time it takes to get through [facility].  Patient discussed that she has a sleep study however not until November.  Patient discussed that she would like to address more than one situation with her primary care Physician.  Patient discussed family relations and that she feels she is the only one that reaches out.  Patient believes she has abandonment issues stemming from when she was [age] years old and no one was home when she came home from school one day.    Therapeutic Intervention:   This session, the therapeutic focus was on validating the patient's feelings and allowing her to process her family relations.  Patient discussed in the past she had put daily affirmations up on her door and she now feels that she should do that again.  Provided supportive psychotherapy.    This dictation has been created using voice recognition software and/or scribes. The accuracy of the dictation is limited by the abilities of the software and the expertise of the scribes. I expect there may be some errors of grammar and possibly content. I made every attempt to manually correct the errors within my dictation. However, errors related to voice recognition software and/or scribes may still exist and should be interpreted within the appropriate context.    Objective Observations:   Participation:Active verbal participation, Attentive, and Engaged   Grooming:Casual   Cognition:Alert   Eye  Contact:Good   Mood:Euthymic   Affect:Flexible, Full range, and Congruent with content   Thought Process:Logical and Goal-directed   Speech:Rate within normal limits and Volume within normal limits    Current Risk:   Suicide: low   Homicide: low   Self-Harm: low   Relapse: low   Safety Plan Reviewed: not applicable    Care Plan Updated: No    Does patient express agreement with the above plan? Yes     Diagnosis:  1. Moderate episode of recurrent major depressive disorder (HCC)        Referral appointment(s) scheduled? No       KATT Edward

## 2024-06-21 ENCOUNTER — HOSPITAL ENCOUNTER (OUTPATIENT)
Dept: RADIOLOGY | Facility: MEDICAL CENTER | Age: 64
End: 2024-06-21
Attending: STUDENT IN AN ORGANIZED HEALTH CARE EDUCATION/TRAINING PROGRAM
Payer: MEDICARE

## 2024-06-21 ENCOUNTER — OFFICE VISIT (OUTPATIENT)
Dept: MEDICAL GROUP | Facility: MEDICAL CENTER | Age: 64
End: 2024-06-21
Payer: MEDICARE

## 2024-06-21 VITALS
HEART RATE: 81 BPM | BODY MASS INDEX: 24.33 KG/M2 | SYSTOLIC BLOOD PRESSURE: 110 MMHG | TEMPERATURE: 98.7 F | HEIGHT: 67 IN | RESPIRATION RATE: 16 BRPM | OXYGEN SATURATION: 94 % | DIASTOLIC BLOOD PRESSURE: 60 MMHG | WEIGHT: 155 LBS

## 2024-06-21 DIAGNOSIS — M17.11 PRIMARY OSTEOARTHRITIS OF RIGHT KNEE: ICD-10-CM

## 2024-06-21 DIAGNOSIS — M19.012 PRIMARY OSTEOARTHRITIS OF LEFT SHOULDER: ICD-10-CM

## 2024-06-21 DIAGNOSIS — F11.20 OPIOID DEPENDENCE, UNCOMPLICATED (HCC): ICD-10-CM

## 2024-06-21 DIAGNOSIS — G47.33 OSA (OBSTRUCTIVE SLEEP APNEA): ICD-10-CM

## 2024-06-21 DIAGNOSIS — R40.0 UNCONTROLLED DAYTIME SOMNOLENCE: ICD-10-CM

## 2024-06-21 PROCEDURE — 1170F FXNL STATUS ASSESSED: CPT | Performed by: NURSE PRACTITIONER

## 2024-06-21 PROCEDURE — 73200 CT UPPER EXTREMITY W/O DYE: CPT | Mod: LT

## 2024-06-21 PROCEDURE — 99214 OFFICE O/P EST MOD 30 MIN: CPT | Performed by: NURSE PRACTITIONER

## 2024-06-21 PROCEDURE — 3078F DIAST BP <80 MM HG: CPT | Performed by: NURSE PRACTITIONER

## 2024-06-21 PROCEDURE — 3074F SYST BP LT 130 MM HG: CPT | Performed by: NURSE PRACTITIONER

## 2024-06-21 RX ORDER — TRAMADOL HYDROCHLORIDE 50 MG/1
50 TABLET ORAL EVERY 8 HOURS PRN
Qty: 90 TABLET | Refills: 3 | Status: SHIPPED | OUTPATIENT
Start: 2024-07-07 | End: 2024-08-06

## 2024-06-21 ASSESSMENT — FIBROSIS 4 INDEX: FIB4 SCORE: 0.84

## 2024-06-21 NOTE — PROGRESS NOTES
Subjective:     History of Present Illness  The patient presents for medication refill.    The patient requires a refill of her Tramadol prescription, with her last refill being administered on 06/08/2023. She was previously prescribed Celebrex, however, prior authorization was required. Her Trazodone dosage was escalated from half a tablet to 2 tablets by Dr. Stevenson due to sleep disturbances. Despite this, she continues to experience early awakenings, albeit less frequently. Her Buspar dosage was increased to thrice daily by Dr. Stevenson, which she reports as beneficial. She has decided to proceed with a left shoulder replacement due to persistent pain. A CT scan has been ordered by Dr. Yovanny Taylor, which is scheduled for today. Her next talk therapy appointment is scheduled for the end of 07/2023. Her depressive symptoms have improved, which she attributes to frustration associated with her pain. She continues to take over-the-counter ibuprofen and has increased her iron supplement dosage.      Current medicines (including changes today)  Current Outpatient Medications   Medication Sig Dispense Refill    [START ON 7/7/2024] traMADol (ULTRAM) 50 MG Tab Take 1 Tablet by mouth every 8 hours as needed for Moderate Pain or Severe Pain for up to 30 days. 90 Tablet 3    buPROPion (WELLBUTRIN SR) 200 MG SR tablet Take 1 tablet by mouth 2 times a day. 180 Tablet 1    busPIRone (BUSPAR) 10 MG Tab tablet Take 1 Tablet by mouth 3 times a day. 90 Tablet 1    traZODone (DESYREL) 50 MG Tab Take 2 Tablets by mouth at bedtime as needed for Sleep (as needed for sleep). 180 Tablet 3    venlafaxine XR (EFFEXOR XR) 75 MG CAPSULE SR 24 HR Take 1 capsule by mouth every day. (venlafaxine xr 150 mg + 75 mg = 225 mg daily) 90 Capsule 2    venlafaxine (EFFEXOR-XR) 150 MG extended-release capsule Take 1 Capsule by mouth every day. (venlafaxine xr 150 mg + 75 mg = 225 mg daily) 90 Capsule 2    celecoxib (CELEBREX) 100 MG Cap Take 1-2 Capsules by  "mouth 2 times a day as needed for Moderate Pain or Inflammation. 120 Capsule 3    acetaminophen (TYLENOL) 500 MG Tab Take 500-1,000 mg by mouth every 6 hours as needed.      multivitamin Tab Take 1 Tablet by mouth every day.      lisinopril (PRINIVIL) 40 MG tablet Take 1 Tablet by mouth every day. 100 Tablet 3    atorvastatin (LIPITOR) 20 MG Tab TAKE ONE TABLET BY MOUTH EVERY  Tablet 2    metFORMIN ER (GLUCOPHAGE XR) 500 MG TABLET SR 24 HR Take 1 Tablet by mouth 2 times a day. 200 Tablet 1    amLODIPine (NORVASC) 10 MG Tab Take 1 tablet by mouth every day. 90 Tablet 3    levothyroxine (SYNTHROID) 50 MCG Tab Take 1 tablet by mouth every morning on an empty stomach. 90 Tablet 3    VITAMIN D PO Take  by mouth every day.      Ferrous Sulfate (IRON) 325 (65 Fe) MG Tab Take 65 mg by mouth every day at 6 PM.       No current facility-administered medications for this visit.     She  has a past medical history of Anemia, Anginal syndrome (HCC), Anxiety, Arthritis, Chronic pain (06/02/2021), Dental disorder (05/24/2012), High cholesterol, HTN (hypertension), benign (03/19/2012), Hypothyroid (03/19/2012), Major depression (03/19/2012), Obesity (BMI 30-39.9) (07/09/2018), Orbital floor (blow-out) closed fracture (HCC), Other specified symptom associated with female genital organs, Pain, Pain, Pain (02/2018), Psychiatric problem, Unspecified disorder of thyroid, Urinary bladder disorder, and Urinary incontinence.    She has no past medical history of Breast cancer (HCC).    ROS   No chest pain, no shortness of breath, no abdominal pain  Positive ROS as per HPI.  All other systems reviewed and are negative.     Objective:     /60 (BP Location: Left arm, Patient Position: Sitting, BP Cuff Size: Large adult)   Pulse 81   Temp 37.1 °C (98.7 °F)   Resp 16   Ht 1.702 m (5' 7\")   Wt 70.3 kg (155 lb)   SpO2 94%  Body mass index is 24.28 kg/m².     Physical Exam    Constitutional: Alert, no distress.  Skin: Warm, dry, " good turgor, no rashes in visible areas.  Eye: Equal, round and reactive, conjunctiva clear, lids normal.  ENMT: Lips without lesions, good dentition  Respiratory: Unlabored respiratory effort  Psych: Alert and oriented x3, normal affect and mood.      Results          Assessment and Plan:   The following treatment plan was discussed    Assessment & Plan  1. Primary osteoarthritis of right knee, unstable.  The patient is advised to persist with the Tramadol 50 mg, to be taken thrice daily as needed, with a refill for a duration of 3 months. The  was reviewed and found to be consistent. The UDS and contract are current.  Need to resubmit prior authorization for Celebrex for as needed use    2. Obstructive sleep apnea.  A referral to sleep medicine with [facility] has been made, however, they are booked until 11/2023. A new referral has been placed to [facility] sleep specialist to expedite an appointment.    Follow-up  A follow-up appointment is scheduled for 3 months for refills.      ORDERS:  1. Primary osteoarthritis of right knee  - traMADol (ULTRAM) 50 MG Tab; Take 1 Tablet by mouth every 8 hours as needed for Moderate Pain or Severe Pain for up to 30 days.  Dispense: 90 Tablet; Refill: 3    2. Opioid dependence, uncomplicated (HCC)  - traMADol (ULTRAM) 50 MG Tab; Take 1 Tablet by mouth every 8 hours as needed for Moderate Pain or Severe Pain for up to 30 days.  Dispense: 90 Tablet; Refill: 3    3. TU (obstructive sleep apnea)  - Referral to Pulmonary and Sleep Medicine    4. Uncontrolled daytime somnolence  - Referral to Pulmonary and Sleep Medicine          The MA is performing the above orders under the direction of Dr. Tena    Please note that this dictation was created using voice recognition software. I have made every reasonable attempt to correct obvious errors, but I expect that there are errors of grammar and possibly content that I did not discover before finalizing the note.       Attestation      Verbal consent was acquired by the patient to use [employer] ambient listening note generation during this visit Yes

## 2024-06-25 ENCOUNTER — PHARMACY VISIT (OUTPATIENT)
Dept: PHARMACY | Facility: MEDICAL CENTER | Age: 64
End: 2024-06-25
Payer: COMMERCIAL

## 2024-06-25 PROCEDURE — RXMED WILLOW AMBULATORY MEDICATION CHARGE: Performed by: NURSE PRACTITIONER

## 2024-06-26 ENCOUNTER — PHARMACY VISIT (OUTPATIENT)
Dept: PHARMACY | Facility: MEDICAL CENTER | Age: 64
End: 2024-06-26
Payer: COMMERCIAL

## 2024-07-02 ENCOUNTER — PATIENT OUTREACH (OUTPATIENT)
Dept: HEALTH INFORMATION MANAGEMENT | Facility: OTHER | Age: 64
End: 2024-07-02
Payer: MEDICARE

## 2024-07-02 DIAGNOSIS — I10 PRIMARY HYPERTENSION: ICD-10-CM

## 2024-07-02 DIAGNOSIS — E78.5 DYSLIPIDEMIA: ICD-10-CM

## 2024-07-02 DIAGNOSIS — G89.4 CHRONIC PAIN SYNDROME: ICD-10-CM

## 2024-07-03 PROCEDURE — RXMED WILLOW AMBULATORY MEDICATION CHARGE: Performed by: NURSE PRACTITIONER

## 2024-07-04 PROCEDURE — RXMED WILLOW AMBULATORY MEDICATION CHARGE: Performed by: NURSE PRACTITIONER

## 2024-07-08 ENCOUNTER — TELEMEDICINE (OUTPATIENT)
Dept: BEHAVIORAL HEALTH | Facility: CLINIC | Age: 64
End: 2024-07-08
Payer: MEDICARE

## 2024-07-08 DIAGNOSIS — F41.1 GAD (GENERALIZED ANXIETY DISORDER): ICD-10-CM

## 2024-07-08 DIAGNOSIS — F33.1 MODERATE EPISODE OF RECURRENT MAJOR DEPRESSIVE DISORDER (HCC): ICD-10-CM

## 2024-07-08 PROCEDURE — 90837 PSYTX W PT 60 MINUTES: CPT | Performed by: MARRIAGE & FAMILY THERAPIST

## 2024-07-11 ENCOUNTER — TELEMEDICINE (OUTPATIENT)
Dept: BEHAVIORAL HEALTH | Facility: CLINIC | Age: 64
End: 2024-07-11
Payer: MEDICARE

## 2024-07-11 DIAGNOSIS — F51.04 PSYCHOPHYSIOLOGICAL INSOMNIA: ICD-10-CM

## 2024-07-11 DIAGNOSIS — F41.1 GAD (GENERALIZED ANXIETY DISORDER): ICD-10-CM

## 2024-07-11 DIAGNOSIS — F33.42 RECURRENT MAJOR DEPRESSIVE DISORDER, IN FULL REMISSION (HCC): ICD-10-CM

## 2024-07-11 PROCEDURE — 99214 OFFICE O/P EST MOD 30 MIN: CPT | Performed by: PSYCHIATRY & NEUROLOGY

## 2024-07-11 PROCEDURE — 90833 PSYTX W PT W E/M 30 MIN: CPT | Performed by: PSYCHIATRY & NEUROLOGY

## 2024-07-11 RX ORDER — VENLAFAXINE HYDROCHLORIDE 150 MG/1
150 CAPSULE, EXTENDED RELEASE ORAL DAILY
Qty: 90 CAPSULE | Refills: 2 | Status: SHIPPED | OUTPATIENT
Start: 2024-07-11

## 2024-07-11 RX ORDER — TRAZODONE HYDROCHLORIDE 50 MG/1
100 TABLET ORAL NIGHTLY PRN
Qty: 180 TABLET | Refills: 3 | Status: SHIPPED | OUTPATIENT
Start: 2024-07-11

## 2024-07-11 RX ORDER — BUPROPION HYDROCHLORIDE 200 MG/1
200 TABLET, EXTENDED RELEASE ORAL 2 TIMES DAILY
Qty: 180 TABLET | Refills: 2 | Status: SHIPPED | OUTPATIENT
Start: 2024-07-11

## 2024-07-11 RX ORDER — BUSPIRONE HYDROCHLORIDE 10 MG/1
10 TABLET ORAL 3 TIMES DAILY
Qty: 90 TABLET | Refills: 2 | Status: SHIPPED | OUTPATIENT
Start: 2024-07-11

## 2024-07-11 RX ORDER — VENLAFAXINE HYDROCHLORIDE 75 MG/1
75 CAPSULE, EXTENDED RELEASE ORAL DAILY
Qty: 90 CAPSULE | Refills: 2 | Status: SHIPPED | OUTPATIENT
Start: 2024-07-11

## 2024-07-12 ENCOUNTER — PHARMACY VISIT (OUTPATIENT)
Dept: PHARMACY | Facility: MEDICAL CENTER | Age: 64
End: 2024-07-12
Payer: COMMERCIAL

## 2024-07-12 PROCEDURE — RXMED WILLOW AMBULATORY MEDICATION CHARGE: Performed by: PSYCHIATRY & NEUROLOGY

## 2024-07-16 DIAGNOSIS — I10 HTN (HYPERTENSION), BENIGN: Chronic | ICD-10-CM

## 2024-07-19 ENCOUNTER — PHARMACY VISIT (OUTPATIENT)
Dept: PHARMACY | Facility: MEDICAL CENTER | Age: 64
End: 2024-07-19
Payer: COMMERCIAL

## 2024-07-19 ENCOUNTER — OFFICE VISIT (OUTPATIENT)
Dept: BEHAVIORAL HEALTH | Facility: CLINIC | Age: 64
End: 2024-07-19
Payer: MEDICARE

## 2024-07-19 DIAGNOSIS — F41.1 GAD (GENERALIZED ANXIETY DISORDER): ICD-10-CM

## 2024-07-19 DIAGNOSIS — F33.1 MODERATE EPISODE OF RECURRENT MAJOR DEPRESSIVE DISORDER (HCC): ICD-10-CM

## 2024-07-19 PROCEDURE — 1170F FXNL STATUS ASSESSED: CPT | Performed by: MARRIAGE & FAMILY THERAPIST

## 2024-07-19 PROCEDURE — 90837 PSYTX W PT 60 MINUTES: CPT | Performed by: MARRIAGE & FAMILY THERAPIST

## 2024-07-19 PROCEDURE — RXMED WILLOW AMBULATORY MEDICATION CHARGE: Performed by: NURSE PRACTITIONER

## 2024-07-19 RX ORDER — AMLODIPINE BESYLATE 10 MG/1
10 TABLET ORAL DAILY
Qty: 90 TABLET | Refills: 3 | Status: SHIPPED | OUTPATIENT
Start: 2024-07-19

## 2024-07-22 ENCOUNTER — PHARMACY VISIT (OUTPATIENT)
Dept: PHARMACY | Facility: MEDICAL CENTER | Age: 64
End: 2024-07-22
Payer: COMMERCIAL

## 2024-07-22 ENCOUNTER — APPOINTMENT (OUTPATIENT)
Dept: ADMISSIONS | Facility: MEDICAL CENTER | Age: 64
End: 2024-07-22
Attending: ORTHOPAEDIC SURGERY
Payer: MEDICARE

## 2024-07-22 ENCOUNTER — HOSPITAL ENCOUNTER (OUTPATIENT)
Facility: MEDICAL CENTER | Age: 64
End: 2024-07-22
Attending: ORTHOPAEDIC SURGERY | Admitting: ORTHOPAEDIC SURGERY
Payer: MEDICARE

## 2024-07-24 ENCOUNTER — APPOINTMENT (OUTPATIENT)
Dept: ADMISSIONS | Facility: MEDICAL CENTER | Age: 64
End: 2024-07-24
Attending: ORTHOPAEDIC SURGERY
Payer: MEDICARE

## 2024-07-24 VITALS — BODY MASS INDEX: 24.33 KG/M2 | HEIGHT: 67 IN | WEIGHT: 155 LBS

## 2024-07-24 PROCEDURE — RXMED WILLOW AMBULATORY MEDICATION CHARGE: Performed by: ORTHOPAEDIC SURGERY

## 2024-07-24 ASSESSMENT — FIBROSIS 4 INDEX: FIB4 SCORE: 0.84

## 2024-07-25 ENCOUNTER — PRE-ADMISSION TESTING (OUTPATIENT)
Dept: ADMISSIONS | Facility: MEDICAL CENTER | Age: 64
End: 2024-07-25
Attending: ORTHOPAEDIC SURGERY
Payer: MEDICARE

## 2024-07-25 DIAGNOSIS — Z01.812 PRE-OPERATIVE LABORATORY EXAMINATION: ICD-10-CM

## 2024-07-25 LAB
ANION GAP SERPL CALC-SCNC: 11 MMOL/L (ref 7–16)
BUN SERPL-MCNC: 8 MG/DL (ref 8–22)
CALCIUM SERPL-MCNC: 9.3 MG/DL (ref 8.4–10.2)
CHLORIDE SERPL-SCNC: 103 MMOL/L (ref 96–112)
CO2 SERPL-SCNC: 28 MMOL/L (ref 20–33)
CREAT SERPL-MCNC: 0.76 MG/DL (ref 0.5–1.4)
EKG IMPRESSION: NORMAL
ERYTHROCYTE [DISTWIDTH] IN BLOOD BY AUTOMATED COUNT: 48.9 FL (ref 35.9–50)
EST. AVERAGE GLUCOSE BLD GHB EST-MCNC: 105 MG/DL
GFR SERPLBLD CREATININE-BSD FMLA CKD-EPI: 86 ML/MIN/1.73 M 2
GLUCOSE SERPL-MCNC: 77 MG/DL (ref 65–99)
HBA1C MFR BLD: 5.3 % (ref 4–5.6)
HCT VFR BLD AUTO: 37.9 % (ref 37–47)
HGB BLD-MCNC: 12 G/DL (ref 12–16)
MCH RBC QN AUTO: 31.6 PG (ref 27–33)
MCHC RBC AUTO-ENTMCNC: 31.7 G/DL (ref 32.2–35.5)
MCV RBC AUTO: 99.7 FL (ref 81.4–97.8)
PLATELET # BLD AUTO: 308 K/UL (ref 164–446)
PMV BLD AUTO: 12.2 FL (ref 9–12.9)
POTASSIUM SERPL-SCNC: 4.6 MMOL/L (ref 3.6–5.5)
RBC # BLD AUTO: 3.8 M/UL (ref 4.2–5.4)
SCCMEC + MECA PNL NOSE NAA+PROBE: NEGATIVE
SCCMEC + MECA PNL NOSE NAA+PROBE: POSITIVE
SODIUM SERPL-SCNC: 142 MMOL/L (ref 135–145)
WBC # BLD AUTO: 5.9 K/UL (ref 4.8–10.8)

## 2024-07-25 PROCEDURE — 87640 STAPH A DNA AMP PROBE: CPT | Mod: XU

## 2024-07-25 PROCEDURE — 36415 COLL VENOUS BLD VENIPUNCTURE: CPT

## 2024-07-25 PROCEDURE — 93010 ELECTROCARDIOGRAM REPORT: CPT | Performed by: INTERNAL MEDICINE

## 2024-07-25 PROCEDURE — 85027 COMPLETE CBC AUTOMATED: CPT

## 2024-07-25 PROCEDURE — 87641 MR-STAPH DNA AMP PROBE: CPT

## 2024-07-25 PROCEDURE — 93005 ELECTROCARDIOGRAM TRACING: CPT

## 2024-07-25 PROCEDURE — 80048 BASIC METABOLIC PNL TOTAL CA: CPT

## 2024-07-25 PROCEDURE — 83036 HEMOGLOBIN GLYCOSYLATED A1C: CPT

## 2024-07-25 NOTE — DISCHARGE PLANNING
DISCHARGE PLANNING NOTE - TOTAL JOINT    Procedure: Procedure(s):  LEFT TOTAL SHOULDER REPLACEMENT, POSSIBLE REVERSE  Procedure Date: 8/1/2024  Insurance: Payor: [facility] SENIOR CARE PLUS / Plan: [facility] PREFERRED  / Product Type: Medicare Advantage /    Equipment currently available at home?  shower chair and ice packs.  Steps into the home? 1 with unilateral railing  Steps within the home? 1  Toilet height? Standard  Type of shower? tub-shower  Home Oxygen? No  Portable tank?    Oxygen Provider:  Who will be with you during your recovery? other: SisterDeborah  Is Outpatient Physical Therapy set up after surgery? Yes  Did you take the Total Joint Class and where? Yes, received NAON book.  Planning same day discharge? States either way.    This writer met with pt and educated to preop showers, nasal mrsa swab which was obtained by this writer, and potential for overnight stay. CHG kit given to pt. Home safety checklist sent home with pt. Pt educated to dc criteria. All questions answered and verbalizes understanding of all instructions. No dc needs identified at this time. Anticipate dc to home without barriers.

## 2024-07-26 ENCOUNTER — PRE-ADMISSION TESTING (OUTPATIENT)
Dept: ADMISSIONS | Facility: MEDICAL CENTER | Age: 64
End: 2024-07-26
Attending: ORTHOPAEDIC SURGERY
Payer: MEDICARE

## 2024-07-26 DIAGNOSIS — Z01.812 PRE-OPERATIVE LABORATORY EXAMINATION: ICD-10-CM

## 2024-07-29 PROCEDURE — RXMED WILLOW AMBULATORY MEDICATION CHARGE: Performed by: STUDENT IN AN ORGANIZED HEALTH CARE EDUCATION/TRAINING PROGRAM

## 2024-07-30 ENCOUNTER — OFFICE VISIT (OUTPATIENT)
Dept: MEDICAL GROUP | Facility: MEDICAL CENTER | Age: 64
End: 2024-07-30
Payer: MEDICARE

## 2024-07-30 ENCOUNTER — PHARMACY VISIT (OUTPATIENT)
Dept: PHARMACY | Facility: MEDICAL CENTER | Age: 64
End: 2024-07-30
Payer: COMMERCIAL

## 2024-07-30 ENCOUNTER — APPOINTMENT (OUTPATIENT)
Dept: BEHAVIORAL HEALTH | Facility: CLINIC | Age: 64
End: 2024-07-30
Payer: MEDICARE

## 2024-07-30 VITALS
BODY MASS INDEX: 23.48 KG/M2 | DIASTOLIC BLOOD PRESSURE: 62 MMHG | HEART RATE: 83 BPM | TEMPERATURE: 99.1 F | OXYGEN SATURATION: 97 % | HEIGHT: 67 IN | SYSTOLIC BLOOD PRESSURE: 104 MMHG | WEIGHT: 149.6 LBS

## 2024-07-30 DIAGNOSIS — M12.812 ROTATOR CUFF ARTHROPATHY OF LEFT SHOULDER: ICD-10-CM

## 2024-07-30 ASSESSMENT — FIBROSIS 4 INDEX: FIB4 SCORE: 1.01

## 2024-07-31 ENCOUNTER — ANESTHESIA EVENT (OUTPATIENT)
Dept: SURGERY | Facility: MEDICAL CENTER | Age: 64
End: 2024-07-31
Payer: MEDICARE

## 2024-08-01 ENCOUNTER — ANESTHESIA (OUTPATIENT)
Dept: SURGERY | Facility: MEDICAL CENTER | Age: 64
End: 2024-08-01
Payer: MEDICARE

## 2024-08-01 ENCOUNTER — PATIENT OUTREACH (OUTPATIENT)
Dept: HEALTH INFORMATION MANAGEMENT | Facility: OTHER | Age: 64
End: 2024-08-01

## 2024-08-01 ENCOUNTER — APPOINTMENT (OUTPATIENT)
Dept: RADIOLOGY | Facility: MEDICAL CENTER | Age: 64
End: 2024-08-01
Attending: ORTHOPAEDIC SURGERY
Payer: MEDICARE

## 2024-08-01 VITALS
DIASTOLIC BLOOD PRESSURE: 52 MMHG | WEIGHT: 148.59 LBS | RESPIRATION RATE: 16 BRPM | SYSTOLIC BLOOD PRESSURE: 99 MMHG | TEMPERATURE: 97.7 F | HEART RATE: 81 BPM | BODY MASS INDEX: 23.27 KG/M2 | OXYGEN SATURATION: 93 %

## 2024-08-01 DIAGNOSIS — I10 PRIMARY HYPERTENSION: ICD-10-CM

## 2024-08-01 DIAGNOSIS — G89.4 CHRONIC PAIN SYNDROME: ICD-10-CM

## 2024-08-01 LAB
GLUCOSE BLD STRIP.AUTO-MCNC: 104 MG/DL (ref 65–99)
GLUCOSE BLD STRIP.AUTO-MCNC: 78 MG/DL (ref 65–99)

## 2024-08-01 PROCEDURE — A9270 NON-COVERED ITEM OR SERVICE: HCPCS | Performed by: ANESTHESIOLOGY

## 2024-08-01 PROCEDURE — C9290 INJ, BUPIVACAINE LIPOSOME: HCPCS | Performed by: ANESTHESIOLOGY

## 2024-08-01 PROCEDURE — 160025 RECOVERY II MINUTES (STATS): Performed by: ORTHOPAEDIC SURGERY

## 2024-08-01 PROCEDURE — 700111 HCHG RX REV CODE 636 W/ 250 OVERRIDE (IP): Mod: JZ | Performed by: ORTHOPAEDIC SURGERY

## 2024-08-01 PROCEDURE — 700111 HCHG RX REV CODE 636 W/ 250 OVERRIDE (IP): Mod: JZ | Performed by: ANESTHESIOLOGY

## 2024-08-01 PROCEDURE — 160041 HCHG SURGERY MINUTES - EA ADDL 1 MIN LEVEL 4: Performed by: ORTHOPAEDIC SURGERY

## 2024-08-01 PROCEDURE — 160009 HCHG ANES TIME/MIN: Performed by: ORTHOPAEDIC SURGERY

## 2024-08-01 PROCEDURE — 97535 SELF CARE MNGMENT TRAINING: CPT

## 2024-08-01 PROCEDURE — 73030 X-RAY EXAM OF SHOULDER: CPT | Mod: LT

## 2024-08-01 PROCEDURE — 160046 HCHG PACU - 1ST 60 MINS PHASE II: Performed by: ORTHOPAEDIC SURGERY

## 2024-08-01 PROCEDURE — 700105 HCHG RX REV CODE 258: Performed by: ORTHOPAEDIC SURGERY

## 2024-08-01 PROCEDURE — 64415 NJX AA&/STRD BRCH PLXS IMG: CPT | Performed by: ORTHOPAEDIC SURGERY

## 2024-08-01 PROCEDURE — 160035 HCHG PACU - 1ST 60 MINS PHASE I: Performed by: ORTHOPAEDIC SURGERY

## 2024-08-01 PROCEDURE — 82962 GLUCOSE BLOOD TEST: CPT

## 2024-08-01 PROCEDURE — 502240 HCHG MISC OR SUPPLY RC 0272: Performed by: ORTHOPAEDIC SURGERY

## 2024-08-01 PROCEDURE — 700102 HCHG RX REV CODE 250 W/ 637 OVERRIDE(OP): Performed by: ANESTHESIOLOGY

## 2024-08-01 PROCEDURE — 700101 HCHG RX REV CODE 250: Performed by: ANESTHESIOLOGY

## 2024-08-01 PROCEDURE — 160036 HCHG PACU - EA ADDL 30 MINS PHASE I: Performed by: ORTHOPAEDIC SURGERY

## 2024-08-01 PROCEDURE — C1713 ANCHOR/SCREW BN/BN,TIS/BN: HCPCS | Performed by: ORTHOPAEDIC SURGERY

## 2024-08-01 PROCEDURE — C1776 JOINT DEVICE (IMPLANTABLE): HCPCS | Performed by: ORTHOPAEDIC SURGERY

## 2024-08-01 PROCEDURE — 160047 HCHG PACU  - EA ADDL 30 MINS PHASE II: Performed by: ORTHOPAEDIC SURGERY

## 2024-08-01 PROCEDURE — 160002 HCHG RECOVERY MINUTES (STAT): Performed by: ORTHOPAEDIC SURGERY

## 2024-08-01 PROCEDURE — 502000 HCHG MISC OR IMPLANTS RC 0278: Performed by: ORTHOPAEDIC SURGERY

## 2024-08-01 PROCEDURE — 160029 HCHG SURGERY MINUTES - 1ST 30 MINS LEVEL 4: Performed by: ORTHOPAEDIC SURGERY

## 2024-08-01 PROCEDURE — 160048 HCHG OR STATISTICAL LEVEL 1-5: Performed by: ORTHOPAEDIC SURGERY

## 2024-08-01 PROCEDURE — 700101 HCHG RX REV CODE 250: Performed by: ORTHOPAEDIC SURGERY

## 2024-08-01 PROCEDURE — 97165 OT EVAL LOW COMPLEX 30 MIN: CPT

## 2024-08-01 DEVICE — IMPLANTABLE DEVICE: Type: IMPLANTABLE DEVICE | Site: SHOULDER | Status: FUNCTIONAL

## 2024-08-01 RX ORDER — OXYCODONE HCL 5 MG/5 ML
10 SOLUTION, ORAL ORAL
Status: COMPLETED | OUTPATIENT
Start: 2024-08-01 | End: 2024-08-01

## 2024-08-01 RX ORDER — HYDROMORPHONE HYDROCHLORIDE 1 MG/ML
0.1 INJECTION, SOLUTION INTRAMUSCULAR; INTRAVENOUS; SUBCUTANEOUS
Status: DISCONTINUED | OUTPATIENT
Start: 2024-08-01 | End: 2024-08-01 | Stop reason: HOSPADM

## 2024-08-01 RX ORDER — HYDRALAZINE HYDROCHLORIDE 20 MG/ML
5 INJECTION INTRAMUSCULAR; INTRAVENOUS
Status: DISCONTINUED | OUTPATIENT
Start: 2024-08-01 | End: 2024-08-01 | Stop reason: HOSPADM

## 2024-08-01 RX ORDER — ACETAMINOPHEN 500 MG
1000 TABLET ORAL ONCE
Status: COMPLETED | OUTPATIENT
Start: 2024-08-01 | End: 2024-08-01

## 2024-08-01 RX ORDER — LABETALOL HYDROCHLORIDE 5 MG/ML
5 INJECTION, SOLUTION INTRAVENOUS
Status: DISCONTINUED | OUTPATIENT
Start: 2024-08-01 | End: 2024-08-01 | Stop reason: HOSPADM

## 2024-08-01 RX ORDER — TRANEXAMIC ACID 100 MG/ML
INJECTION, SOLUTION INTRAVENOUS PRN
Status: DISCONTINUED | OUTPATIENT
Start: 2024-08-01 | End: 2024-08-01 | Stop reason: SURG

## 2024-08-01 RX ORDER — CEFAZOLIN SODIUM 1 G/3ML
2 INJECTION, POWDER, FOR SOLUTION INTRAMUSCULAR; INTRAVENOUS ONCE
Status: DISCONTINUED | OUTPATIENT
Start: 2024-08-01 | End: 2024-08-01 | Stop reason: HOSPADM

## 2024-08-01 RX ORDER — VANCOMYCIN HYDROCHLORIDE 1 G/20ML
INJECTION, POWDER, LYOPHILIZED, FOR SOLUTION INTRAVENOUS
Status: COMPLETED | OUTPATIENT
Start: 2024-08-01 | End: 2024-08-01

## 2024-08-01 RX ORDER — ROCURONIUM BROMIDE 10 MG/ML
INJECTION, SOLUTION INTRAVENOUS PRN
Status: DISCONTINUED | OUTPATIENT
Start: 2024-08-01 | End: 2024-08-01 | Stop reason: SURG

## 2024-08-01 RX ORDER — HYDROMORPHONE HYDROCHLORIDE 1 MG/ML
0.4 INJECTION, SOLUTION INTRAMUSCULAR; INTRAVENOUS; SUBCUTANEOUS
Status: DISCONTINUED | OUTPATIENT
Start: 2024-08-01 | End: 2024-08-01 | Stop reason: HOSPADM

## 2024-08-01 RX ORDER — OXYCODONE HCL 5 MG/5 ML
5 SOLUTION, ORAL ORAL
Status: COMPLETED | OUTPATIENT
Start: 2024-08-01 | End: 2024-08-01

## 2024-08-01 RX ORDER — DIPHENHYDRAMINE HYDROCHLORIDE 50 MG/ML
12.5 INJECTION INTRAMUSCULAR; INTRAVENOUS
Status: DISCONTINUED | OUTPATIENT
Start: 2024-08-01 | End: 2024-08-01 | Stop reason: HOSPADM

## 2024-08-01 RX ORDER — MIDAZOLAM HYDROCHLORIDE 1 MG/ML
INJECTION INTRAMUSCULAR; INTRAVENOUS PRN
Status: DISCONTINUED | OUTPATIENT
Start: 2024-08-01 | End: 2024-08-01 | Stop reason: SURG

## 2024-08-01 RX ORDER — ONDANSETRON 2 MG/ML
INJECTION INTRAMUSCULAR; INTRAVENOUS PRN
Status: DISCONTINUED | OUTPATIENT
Start: 2024-08-01 | End: 2024-08-01 | Stop reason: SURG

## 2024-08-01 RX ORDER — GLYCOPYRROLATE 0.2 MG/ML
INJECTION INTRAMUSCULAR; INTRAVENOUS PRN
Status: DISCONTINUED | OUTPATIENT
Start: 2024-08-01 | End: 2024-08-01 | Stop reason: SURG

## 2024-08-01 RX ORDER — LIDOCAINE HYDROCHLORIDE 40 MG/ML
SOLUTION TOPICAL PRN
Status: DISCONTINUED | OUTPATIENT
Start: 2024-08-01 | End: 2024-08-01 | Stop reason: SURG

## 2024-08-01 RX ORDER — PHENYLEPHRINE HCL IN 0.9% NACL 1 MG/10 ML
SYRINGE (ML) INTRAVENOUS PRN
Status: DISCONTINUED | OUTPATIENT
Start: 2024-08-01 | End: 2024-08-01 | Stop reason: SURG

## 2024-08-01 RX ORDER — SODIUM CHLORIDE, SODIUM LACTATE, POTASSIUM CHLORIDE, CALCIUM CHLORIDE 600; 310; 30; 20 MG/100ML; MG/100ML; MG/100ML; MG/100ML
INJECTION, SOLUTION INTRAVENOUS CONTINUOUS
Status: DISCONTINUED | OUTPATIENT
Start: 2024-08-01 | End: 2024-08-01 | Stop reason: HOSPADM

## 2024-08-01 RX ORDER — CEFAZOLIN SODIUM 1 G/3ML
INJECTION, POWDER, FOR SOLUTION INTRAMUSCULAR; INTRAVENOUS PRN
Status: DISCONTINUED | OUTPATIENT
Start: 2024-08-01 | End: 2024-08-01 | Stop reason: SURG

## 2024-08-01 RX ORDER — ALBUTEROL SULFATE 5 MG/ML
2.5 SOLUTION RESPIRATORY (INHALATION)
Status: DISCONTINUED | OUTPATIENT
Start: 2024-08-01 | End: 2024-08-01 | Stop reason: HOSPADM

## 2024-08-01 RX ORDER — LIDOCAINE HYDROCHLORIDE 20 MG/ML
INJECTION, SOLUTION EPIDURAL; INFILTRATION; INTRACAUDAL; PERINEURAL PRN
Status: DISCONTINUED | OUTPATIENT
Start: 2024-08-01 | End: 2024-08-01 | Stop reason: SURG

## 2024-08-01 RX ORDER — EPHEDRINE SULFATE 50 MG/ML
5 INJECTION, SOLUTION INTRAVENOUS
Status: DISCONTINUED | OUTPATIENT
Start: 2024-08-01 | End: 2024-08-01 | Stop reason: HOSPADM

## 2024-08-01 RX ORDER — ONDANSETRON 2 MG/ML
4 INJECTION INTRAMUSCULAR; INTRAVENOUS
Status: DISCONTINUED | OUTPATIENT
Start: 2024-08-01 | End: 2024-08-01 | Stop reason: HOSPADM

## 2024-08-01 RX ORDER — HALOPERIDOL 5 MG/ML
1 INJECTION INTRAMUSCULAR
Status: DISCONTINUED | OUTPATIENT
Start: 2024-08-01 | End: 2024-08-01 | Stop reason: HOSPADM

## 2024-08-01 RX ORDER — KETOROLAC TROMETHAMINE 15 MG/ML
INJECTION, SOLUTION INTRAMUSCULAR; INTRAVENOUS PRN
Status: DISCONTINUED | OUTPATIENT
Start: 2024-08-01 | End: 2024-08-01 | Stop reason: SURG

## 2024-08-01 RX ORDER — SODIUM CHLORIDE, SODIUM LACTATE, POTASSIUM CHLORIDE, CALCIUM CHLORIDE 600; 310; 30; 20 MG/100ML; MG/100ML; MG/100ML; MG/100ML
INJECTION, SOLUTION INTRAVENOUS CONTINUOUS
Status: ACTIVE | OUTPATIENT
Start: 2024-08-01 | End: 2024-08-01

## 2024-08-01 RX ORDER — HYDROMORPHONE HYDROCHLORIDE 1 MG/ML
0.2 INJECTION, SOLUTION INTRAMUSCULAR; INTRAVENOUS; SUBCUTANEOUS
Status: DISCONTINUED | OUTPATIENT
Start: 2024-08-01 | End: 2024-08-01 | Stop reason: HOSPADM

## 2024-08-01 RX ORDER — VASOPRESSIN 20 U/ML
INJECTION PARENTERAL PRN
Status: DISCONTINUED | OUTPATIENT
Start: 2024-08-01 | End: 2024-08-01 | Stop reason: SURG

## 2024-08-01 RX ORDER — DEXAMETHASONE SODIUM PHOSPHATE 4 MG/ML
INJECTION, SOLUTION INTRA-ARTICULAR; INTRALESIONAL; INTRAMUSCULAR; INTRAVENOUS; SOFT TISSUE PRN
Status: DISCONTINUED | OUTPATIENT
Start: 2024-08-01 | End: 2024-08-01 | Stop reason: SURG

## 2024-08-01 RX ORDER — EPHEDRINE SULFATE 50 MG/ML
INJECTION, SOLUTION INTRAVENOUS PRN
Status: DISCONTINUED | OUTPATIENT
Start: 2024-08-01 | End: 2024-08-01 | Stop reason: SURG

## 2024-08-01 RX ORDER — BUPIVACAINE HYDROCHLORIDE 5 MG/ML
INJECTION, SOLUTION EPIDURAL; INTRACAUDAL PRN
Status: DISCONTINUED | OUTPATIENT
Start: 2024-08-01 | End: 2024-08-01 | Stop reason: SURG

## 2024-08-01 RX ADMIN — Medication 100 MCG: at 12:12

## 2024-08-01 RX ADMIN — KETOROLAC TROMETHAMINE 15 MG: 15 INJECTION, SOLUTION INTRAMUSCULAR; INTRAVENOUS at 12:12

## 2024-08-01 RX ADMIN — SUGAMMADEX 200 MG: 100 INJECTION, SOLUTION INTRAVENOUS at 12:23

## 2024-08-01 RX ADMIN — ACETAMINOPHEN 1000 MG: 500 TABLET ORAL at 10:25

## 2024-08-01 RX ADMIN — SODIUM CHLORIDE, POTASSIUM CHLORIDE, SODIUM LACTATE AND CALCIUM CHLORIDE: 600; 310; 30; 20 INJECTION, SOLUTION INTRAVENOUS at 12:23

## 2024-08-01 RX ADMIN — VASOPRESSIN 2 UNITS: 20 INJECTION INTRAVENOUS at 12:02

## 2024-08-01 RX ADMIN — CEFAZOLIN 2 G: 1 INJECTION, POWDER, FOR SOLUTION INTRAMUSCULAR; INTRAVENOUS at 11:07

## 2024-08-01 RX ADMIN — Medication 100 MCG: at 11:17

## 2024-08-01 RX ADMIN — BUPIVACAINE 10 ML: 13.3 INJECTION, SUSPENSION, LIPOSOMAL INFILTRATION at 10:47

## 2024-08-01 RX ADMIN — SODIUM CHLORIDE, POTASSIUM CHLORIDE, SODIUM LACTATE AND CALCIUM CHLORIDE: 600; 310; 30; 20 INJECTION, SOLUTION INTRAVENOUS at 11:52

## 2024-08-01 RX ADMIN — LIDOCAINE HYDROCHLORIDE 0.5 ML: 10 INJECTION, SOLUTION EPIDURAL; INFILTRATION; INTRACAUDAL; PERINEURAL at 10:51

## 2024-08-01 RX ADMIN — Medication 100 MCG: at 11:20

## 2024-08-01 RX ADMIN — DEXAMETHASONE SODIUM PHOSPHATE 8 MG: 4 INJECTION INTRA-ARTICULAR; INTRALESIONAL; INTRAMUSCULAR; INTRAVENOUS; SOFT TISSUE at 11:22

## 2024-08-01 RX ADMIN — LIDOCAINE HYDROCHLORIDE 4 ML: 40 SOLUTION TOPICAL at 11:09

## 2024-08-01 RX ADMIN — VASOPRESSIN 2 UNITS: 20 INJECTION INTRAVENOUS at 11:26

## 2024-08-01 RX ADMIN — ROCURONIUM BROMIDE 100 MG: 50 INJECTION, SOLUTION INTRAVENOUS at 11:09

## 2024-08-01 RX ADMIN — VASOPRESSIN 2 UNITS: 20 INJECTION INTRAVENOUS at 11:46

## 2024-08-01 RX ADMIN — PROPOFOL 120 MG: 10 INJECTION, EMULSION INTRAVENOUS at 11:09

## 2024-08-01 RX ADMIN — ONDANSETRON 4 MG: 2 INJECTION INTRAMUSCULAR; INTRAVENOUS at 12:12

## 2024-08-01 RX ADMIN — Medication 100 MCG: at 11:36

## 2024-08-01 RX ADMIN — MIDAZOLAM HYDROCHLORIDE 2 MG: 2 INJECTION, SOLUTION INTRAMUSCULAR; INTRAVENOUS at 10:46

## 2024-08-01 RX ADMIN — TRANEXAMIC ACID 1000 MG: 100 INJECTION, SOLUTION INTRAVENOUS at 11:19

## 2024-08-01 RX ADMIN — TRANEXAMIC ACID 1000 MG: 100 INJECTION, SOLUTION INTRAVENOUS at 12:12

## 2024-08-01 RX ADMIN — EPHEDRINE SULFATE 10 MG: 50 INJECTION, SOLUTION INTRAVENOUS at 11:36

## 2024-08-01 RX ADMIN — GLYCOPYRROLATE 0.1 MG: 0.2 INJECTION INTRAMUSCULAR; INTRAVENOUS at 11:24

## 2024-08-01 RX ADMIN — LIDOCAINE HYDROCHLORIDE 3 ML: 20 INJECTION, SOLUTION EPIDURAL; INFILTRATION; INTRACAUDAL at 11:07

## 2024-08-01 RX ADMIN — FENTANYL CITRATE 100 MCG: 50 INJECTION, SOLUTION INTRAMUSCULAR; INTRAVENOUS at 11:08

## 2024-08-01 RX ADMIN — SODIUM CHLORIDE, POTASSIUM CHLORIDE, SODIUM LACTATE AND CALCIUM CHLORIDE: 600; 310; 30; 20 INJECTION, SOLUTION INTRAVENOUS at 10:25

## 2024-08-01 RX ADMIN — EPHEDRINE SULFATE 10 MG: 50 INJECTION, SOLUTION INTRAVENOUS at 11:17

## 2024-08-01 RX ADMIN — OXYCODONE HYDROCHLORIDE 5 MG: 5 SOLUTION ORAL at 13:24

## 2024-08-01 RX ADMIN — FENTANYL CITRATE 25 MCG: 50 INJECTION, SOLUTION INTRAMUSCULAR; INTRAVENOUS at 13:03

## 2024-08-01 RX ADMIN — EPHEDRINE SULFATE 10 MG: 50 INJECTION, SOLUTION INTRAVENOUS at 12:12

## 2024-08-01 RX ADMIN — EPHEDRINE SULFATE 10 MG: 50 INJECTION, SOLUTION INTRAVENOUS at 11:13

## 2024-08-01 RX ADMIN — BUPIVACAINE HYDROCHLORIDE 10 ML: 5 INJECTION, SOLUTION EPIDURAL; INTRACAUDAL at 10:47

## 2024-08-01 ASSESSMENT — FIBROSIS 4 INDEX: FIB4 SCORE: 1.01

## 2024-08-01 ASSESSMENT — COGNITIVE AND FUNCTIONAL STATUS - GENERAL
DAILY ACTIVITIY SCORE: 19
SUGGESTED CMS G CODE MODIFIER DAILY ACTIVITY: CK
HELP NEEDED FOR BATHING: A LITTLE
DRESSING REGULAR LOWER BODY CLOTHING: A LITTLE
TOILETING: A LITTLE
DRESSING REGULAR UPPER BODY CLOTHING: A LOT

## 2024-08-01 ASSESSMENT — PAIN DESCRIPTION - PAIN TYPE
TYPE: SURGICAL PAIN

## 2024-08-01 ASSESSMENT — ACTIVITIES OF DAILY LIVING (ADL): TOILETING: INDEPENDENT

## 2024-08-01 ASSESSMENT — GAIT ASSESSMENTS: DISTANCE (FEET): 25

## 2024-08-01 NOTE — OR NURSING
Pt to preop. Procedure, allergies, medications and NPO status verified. Triple Aim performed. Pt dressed in surgical gown and SCDs applied. IV started, LR infusing. FSBS performed. Pt belongings secured.

## 2024-08-01 NOTE — OP REPORT
OPERATIVE NOTE   Debby Desai   8/1/2024            PRE-OP DIAGNOSIS: Left shoulder osteoarthritis            POST-OP DIAGNOSIS: Left shoulder osteoarthritis            PROCEDURE:   Left reverse total shoulder arthroplasty, biceps tenodesis            SURGEON: Surgeons and Role:     * Scott Lorenzo M.D. - Primary           ASSISTANT:  Yovanny Stephens PA-C (an assistant was necessary for positioning and facilitation of all critical portions of the procedure including retraction and implantation of the prosthesis)     ANESTHESIA: general and interscalene block, Dr Martini            ESTIMATED BLOOD LOSS: 50cc            DRAINS: none            TOTAL IV FLUIDS: see chart            SPECIMENS: * No specimens in log *            IMPLANTS: Seymour Tornier 1B flex stem, 0 high offset tray, 39+6 poly, 25 baseplate, 39 glenosphere, 9.5 x 30 mm center screw, peripheral screws x 4            COMPLICATIONS: none            DISPOSITION: stable            INDICATION:   Patient is a 64-year-old female who had longstanding left shoulder pain.  Diagnosed with shoulder osteoarthritis.  Had failed conservative care including injections.  She was a patient of Dr. Gracia, and then transferred care to me due to him leaving the practice.   We discussed shoulder arthroplasty given her failure of conservative care and chronicity of symptoms.  I discussed the risks of surgery and the recovery process.  We also discussed anatomic versus reverse shoulder arthroplasty.  She could not get an MRI due to a spinal cord stimulator.  She did have rotator cuff dysfunction on exam however, therefore we discussed reverse shoulder arthroplasty.  She understood and wanted to proceed with reverse shoulder arthroplasty.            TECHNIQUE:   Patient was met in the preoperative area and the surgical site was marked.  Once again the procedure and the risks were discussed with with the patient and consent was obtained.  She underwent an interscalene  block by the anesthesia team.  She was then brought to the operating room and underwent general anesthesia.  She received preoperative antibiotics and transexemic acid.  She was placed in a beachchair position and all bony prominences were well-padded.  The head position was checked by the anesthesia team as well as myself.  The left upper extremity and shoulder was then prepped and draped in usual sterile manner.  A timeout was performed confirming the correct patient, correct site, correct procedure.    I then began by making a longitudinal incision over the coracoid process down the deltopectoral groove.  Standard for partial approach was undertaken.  Cephalic vein was identified and protected throughout the case.  I developed the plane between the deltoid and pectoralis major, and then we released the upper 1 cm border the pectoralis major tendon.  I then identified the biceps tendon, and I released this from the groove.  I then tenodesed the biceps to the upper border the pectoralis major using FiberWire suture.  I then released the bicep proximal to the tenodesis site, and followed this up to the shoulder joint.  I then coagulated the 3 sister vessels in the inferior border the subscapularis.  We then performed a subscapularis peel, after I tagged the upper and lower borders of the subscapularis.  As we peeled the subscapularis and the anterior capsule off the proximal humerus, I explained to the shoulder, to dislocate the shoulder.  I did note that the supraspinatus was partially intact, and also partially torn.  This confirmed our decision to do a reverse shoulder arthroplasty.  I did release the remaining supraspinatus fibers.  Appropriate tractors were placed on the proximal humerus, and then we used a reverse cutting guide set at 30 degrees of retroversion.  I then used a saw to perform proximal humeral osteotomy.  Once removed, we then used a sounder, and then we broached to a size 1 stem.  This had good  rotational control.  I then planed the proximal humerus to the implant.  A humeral head protector was then placed.  We then evaluated the subscapularis, and released the [facility] from this.  We identified the axillary nerve which we protected.  I then placed appropriate retractors around the glenoid and performed a circumferential release of the labrum.  At this point we then placed a 25 mm baseplate guide in the inferior portion of the glenoid and drilled our guidepin.  I then used our reamer to get them subchondral bone, and then used our boss drill.  We did measure 30 mm for our central screw.  Copious irrigation was used and then placed the final implant, a 25 mm baseplate with 30 mm central screw.  Unfortunately, the 6 5 threads did not have adequate purchase.  Therefore we exchanged the central screw for 9.5 mm x 30 mm central screw.  This had much better purchase.  We then placed 2 peripheral compression screws which had good purchase, and 2 peripheral locking screws.  At this point we then used our peripheral reamer, and then placed our 39 mm glenosphere.  At this point we turned attention back to the proximal humerus.  Humeral head protector was removed, we trialed a 0 high offset tray with a standard poly.  We reduced the shoulder, and there was good tensioning on the deltoid and conjoined tendon, and there is negative shuck test with good range of motion.  At this point I dislocated the shoulder once again, and then removed our trial proximal humeral implants.  Copious irrigation was used.  The final implant was then on the back table.  We drilled 3 separate bone tunnels through the lesser tuberosity and passed FiberWire suture in a loop fashion for later subscapularis repair.  At this point we then placed our final implant, size 1B flex stem, with 0 high offset tray and standard poly and impacted this into place.  I then reduced the shoulder and once and there was good stability range of motion.   At this point we used a free needle to pass our looped FiberWire suture through the subscapularis x 3.  At this point I then tied 3 Nice knots to repair our subscapularis, with the arm in neutral rotation.  I then rotated the shoulder, and the humeral head and subscapularis moved as 1 unit.  At this point copious irrigation was used.  Betadine soak was used as well as a vancomycin powder.  We closed the deltopectoral layer using 0 Vicryl, deep dermal layers and 2-0 Vicryl, and Monocryl for the skin in running subcuticular fashion.  Sterile dressings were applied as well as a sling, and the patient was then awakened from general anesthesia without complications, and taken to the PACU in stable condition.

## 2024-08-01 NOTE — THERAPY
Occupational Therapy   Initial Evaluation     Patient Name: Debby Desai  Age:  64 y.o., Sex:  female  Medical Record #: [id number]  Today's Date: 8/1/2024     Precautions  Precautions: Non Weight Bearing Left Upper Extremity, Immobilizer Left Upper Extremity  Comments: (P) Noted Left Bicep Tenodesis precautions.    Assessment  Patient is 64 y.o. female with a diagnosis of Left Reverse TSA + Bicep Tenodesis.  Additional factors influencing patient status / progress: Noted Left Reverse TSA, Bicep Tenodesis, NWB LUE, Immobilizer LUE, nerve block LUE. Pt and nephew reside in a [facility] in [place], [place].  Nephew and extensive family in the area are available to assist as needed.  PLOF Mod I to Indep for ADL's, transfers and ambulation w/out a device in home and use of 4WW in the community.  Treatment completed this session after initial evaluation completed:  Pt and family educated in detail on ADL's after Left Total Shoulder surgery + Bicep Tenodesis.   Patient specifically educated on surgeon's post-operative precautions including NWB, no pushing pulling or lifting with surgery arm.    Summary of education completed:  How to adjust sling/immobilizer for best fit.  To keep sling positioned so hand is level or slightly above elbow to reduce pull of gravity on arm and maintain shoulder in neutral positioning.  How to don & doff sling/immobilizer safely and appropriately for dressing and bathing.  How to dress upper body while maintaining post surgical precautions.  How to shower safely.  How to appropriately cover incision for showering if needed prior to removal of post op dressing.    Proper positioning of arm during showering.   Encouraged use of hand held shower (using the non-operative extremity) to keep water away from the incision site, and to not spray water directly under the axilla of the operated side if the bandage is compromised at all.   Safe shower and toilet transfers.  Proper positioning while sleeping either  in bed or recliner  Encouraged patient to support arm with pillows under forearm when sitting to reduce strain on the neck from the weight of the arm and immobilizer.   Proper bed mobility and transfer techniques while maintaining NWB on surgical arm.  Proper positioning of arm for gentle wrist/hand ROM and passive elbow extension and Pendulum Ex's for shoulder.   Pain management education including the use of ice and positioning for optimal comfort.     Pt and family verbalized and demonstrated understanding of education provided.          Plan    DC Equipment Recommendations: (P) None  Discharge Recommendations: (P) Anticipate that the patient will have no further occupational therapy needs after discharge from the hospital     Subjective    Pt was alert and cooperative w/ tx.     Objective       08/01/24 4653    Services   Is patient using  services for this encounter? No   Initial Contact Note    Initial Contact Note Order Received and Verified, Evaluation Only - Patient Does Not Require Further Acute Occupational Therapy at this Time.  However, May Benefit from Post Acute Therapy for Higher Level Functional Deficits.   Prior Living Situation   Prior Services Home-Independent   Housing / Facility [address]   Steps Into Home 1   Steps In Home 0   Bathroom Set up Bathtub / Shower Combination;Shower Curtain;Shower Chair   Equipment Owned Front-Wheel Walker;4-Wheel Walker;Single Point Cane;Crutches;Tub / Shower Seat;Hand Held Shower   Lives with - Patient's Self Care Capacity Related Adult   Comments Pt and nephew reside in a [facility] in [place], [place].  Nephew and extensive family in the area are available to assist as needed.  PLOF Mod I to Indep for ADL's, transfers and ambulation w/out a device in home and use of 4WW in the community.   Prior Level of ADL Function   Self Feeding Independent   Grooming / Hygiene Independent   Bathing Independent   Dressing Independent   Toileting Independent    Prior Level of IADL Function   Medication Management Independent   Laundry Independent   Kitchen Mobility Independent   Finances Independent   Home Management Independent   Shopping Independent   Prior Level Of Mobility Independent With Device in Community;Independent With Steps in Community;Independent Without Device in Home;Independent With Steps in Home   Driving / Transportation Driving Independent   Precautions   Precautions Non Weight Bearing Left Upper Extremity;Immobilizer Left Upper Extremity   Comments Noted Left Bicep Tenodesis precautions.   Pain   Intervention Cold Pack;Rest;Repositioned;Nurse Notified   Pain 0 - 10 Group   Location Shoulder   Location Orientation Left   Pain Rating Scale (NPRS) 5   Description Aching   Comfort Goal Comfort with Movement;Perform Activity   Therapist Pain Assessment Nurse Notified   Non Verbal Descriptors   Non Verbal Scale  Calm;Unlabored Breathing   Cognition    Cognition / Consciousness WDL   Level of Consciousness Alert   Passive ROM Upper Body   Passive ROM Upper Body X   Comments Noted Left Reverse TSA, Bicep Tenodesis, NWB LUE, Immobilizer LUE, nerve block LUE.   Active ROM Upper Body   Active ROM Upper Body  X   Dominant Hand Right   Comments Noted Left Reverse TSA, Bicep Tenodesis, NWB LUE, Immobilizer LUE, nerve block LUE.   Strength Upper Body   Upper Body Strength  X   Comments Noted Left Reverse TSA, Bicep Tenodesis, NWB LUE, Immobilizer LUE, nerve block LUE.   Sensation Upper Body   Upper Extremity Sensation  X   Comments Noted impaired sensation LUE secondary to nerve block.   Upper Body Muscle Tone   Upper Body Muscle Tone  WDL   Coordination Upper Body   Coordination X   Comments Noted Left Reverse TSA, Bicep Tenodesis, NWB LUE, Immobilizer LUE, nerve block LUE.   Balance Assessment   Sitting Balance (Static) Good   Sitting Balance (Dynamic) Good   Standing Balance (Static) Fair +   Standing Balance (Dynamic) Fair   Weight Shift Sitting Good   Weight  Shift Standing Fair   ADL Assessment   Upper Body Dressing Maximal Assist   Lower Body Dressing Minimal Assist   Toileting Minimal Assist   How much help from another person does the patient currently need...   Putting on and taking off regular lower body clothing? 3   Bathing (including washing, rinsing, and drying)? 3   Toileting, which includes using a toilet, bedpan, or urinal? 3   Putting on and taking off regular upper body clothing? 2   Taking care of personal grooming such as brushing teeth? 4   Eating meals? 4   6 Clicks Daily Activity Score 19   Functional Mobility   Sit to Stand Standby Assist   Bed, Chair, Wheelchair Transfer Standby Assist   Toilet Transfers Standby Assist   Transfer Method Stand Step   Mobility SBA ambulation w/out a device   Distance (Feet) 25   # of Times Distance was Traveled 2   Edema / Skin Assessment   Comments Surgical site/dressing clean, dry and intact.   Education Group   Education Provided Joint Protection;Brace Wear and Care;Home Safety;Transfers;Role of Occupational Therapist;Activities of Daily Living   Role of Occupational Therapist Patient Response Patient;Family;Acceptance;Explanation;Verbal Demonstration   Joint Protection Patient Response Patient;Family;Acceptance;Explanation;Demonstration;Handout;Teach Back;Verbal Demonstration;Action Demonstration   Brace Wear & Care Patient Response Patient;Family;Acceptance;Explanation;Demonstration;Handout;Teach Back;Verbal Demonstration;Action Demonstration   Home Safety Patient Response Patient;Family;Acceptance;Explanation;Demonstration;Verbal Demonstration   Transfers Patient Response Patient;Family;Acceptance;Explanation;Demonstration;Handout;Teach Back;Verbal Demonstration;Action Demonstration   ADL Patient Response Patient;Family;Acceptance;Explanation;Demonstration;Handout;Teach Back;Verbal Demonstration;Action Demonstration   Anticipated Discharge Equipment and Recommendations   DC Equipment Recommendations None    Discharge Recommendations Anticipate that the patient will have no further occupational therapy needs after discharge from the hospital

## 2024-08-01 NOTE — OR NURSING
1345: Report received from Cristian PATEL RN. Patient awake, respirations are spontaneous and unlabored. VSS on ropm air. Dressing is CDI, left shoulder immobilizer in place. Cold pack in place. CMS: 2+ radial pulses, brisk cap refill present, warm to touch. Patient denies nausea, states pain 4/10 and tolerable.     1355: No changes. Meets criteria to transfer to Stage 2. Report to ZORAIDA Montaño.

## 2024-08-01 NOTE — OR NURSING
1245: Patient arrived from OR via [facility].  Surgical dressing to left shoulder CDI with immobilizer in place; radial pulse +2. Cold pack in place, no complaints of pain or nausea at this time, + LUE block.    Sedation/Resp Status: Eye opening to verbal.  Respirations spontaneous and non-labored.    HR 80SR; VSS on 6L 02 via simple mask.    1300: Surgical dressing CDI, no complaints of nausea at this time, VSS. Complains of 2 out of 10 pain to the left shoulder and head.    1303: Medicated per MAR order for pain.       1315: Surgical dressing CDI, no complaints of nausea at this time, VSS. Complains of 3 out of 10 pain to the left shoulder and head.    1324: Medicated per MAR order for pain.     1330: Surgical dressing CDI, no complaints of nausea at this time, VSS. Complains of tolerable 4 out of 10 pain to the left shoulder and head.    1345: Report to Cristian MCCORD RN.

## 2024-08-01 NOTE — DISCHARGE INSTRUCTIONS
ACTIVITY: Rest and take it easy for the first 24 hours.  A responsible adult is recommended to remain with you during that time.  It is normal to feel sleepy.  We encourage you to not do anything that requires balance, judgment or coordination.    MILD FLU-LIKE SYMPTOMS ARE NORMAL. YOU MAY EXPERIENCE GENERALIZED MUSCLE ACHES, THROAT IRRITATION, HEADACHE AND/OR SOME NAUSEA.    FOR 24 HOURS DO NOT:  Drive, operate machinery or run household appliances.  Drink beer or alcoholic beverages.   Make important decisions or sign legal documents.    DIET: To avoid nausea, slowly advance diet as tolerated, avoiding spicy or greasy foods for the first day.  Add more substantial food to your diet according to your physician's instructions. INCREASE FLUIDS AND FIBER TO AVOID CONSTIPATION.    FOLLOW-UP APPOINTMENT:  A follow-up appointment should be arranged with your doctor; call to schedule.    You should CALL YOUR PHYSICIAN if you develop:  Fever greater than 101 degrees F.  Pain not relieved by medication, or persistent nausea or vomiting.  Excessive bleeding (blood soaking through dressing) or unexpected drainage from the wound.  Extreme redness or swelling around the incision site, drainage of pus or foul smelling drainage.  Inability to urinate or empty your bladder within 8 hours.  Problems with breathing or chest pain.    You should call 911 if you develop problems with breathing or chest pain.  If you are unable to contact your doctor or surgical center, you should go to the nearest emergency room or urgent care center.  Physician's telephone #: [phone]    If any questions arise, call your doctor.  If your doctor is not available, please feel free to call the Surgical Center at [phone].     A registered nurse may call you a few days after your surgery to see how you are doing after your procedure.    MEDICATIONS: Resume taking daily medication.  Take prescribed pain medication with food.  If no medication  "is prescribed, you may take non-aspirin pain medication if needed.  PAIN MEDICATION CAN BE VERY CONSTIPATING.  Take a stool softener or laxative such as senokot, pericolace, or milk of magnesia if needed.    Last pain medication given- 5mg oxycodone given at 1:24pm.    If your physician has prescribed pain medication that includes Acetaminophen (Tylenol), do not take additional Acetaminophen (Tylenol) while taking the prescribed medication.    Peripheral Nerve Block Discharge Instructions from Same Day Surgery and Inpatient :    What to Expect - Upper Extremity  You may experience numbness and weakness in shoulder, arm, and hand  on the same side as your surgery  This is normal. For some people, this may be an unpleasant sensation. Be very careful with your numb limb  Ask for help when you need it  Shoulder Surgery Side Effects  In addition to numbness and weakness you may experience other symptoms  Other nerves that are close to those nerves injected can also be affected by local anesthesia  You may experience a hoarseness in your voice  Your breathing may feel different  You may also notice drooping of your eyelid, pupil constriction, and decreased sweating, on the side of your surgery  All of these side effects are normal and will resolve when the local anesthetic wears off   Prevent Injury  Protect the limb like a baby  Beware of exposing your limb to extreme heat or cold or trauma  The limb may be injured without you noticing because it is numb  Keep the limb elevated whenever possible  Do not sleep on the limb  Change the position of the limb regularly  Avoid putting pressure on your surgical limb  Pain Control  The initial block on the day of surgery will make your extremity feel \"numb\"  Any consecutive injection including prior to discharge from the hospital will make your extremity feel \"numb\"  You may feel an aching or burning when the local anesthesia starts to wear off  Take pain pills as prescribed by " your surgeon  Call your surgeon or anesthesiologist if you do not have adequate pain control    IMPORTANT NOTE IF YOU RECEIVED EXPAREL:  The liposomal bupivacaine (Exparel) teal arm band is used as a visual queue to alert healthcare professionals that you received a long-acting form of bupivacaine during your recent surgery. You should not receive additional local anesthetics (i.e. bupivacaine, lidocaine) for 96 hours after surgery. There may be situations where you are unable to communicate this information, so it is important for your safety to keep the teal arm band on for 96 hours (4 days) after surgery.

## 2024-08-01 NOTE — ANESTHESIA POSTPROCEDURE EVALUATION
Patient: Debby Desai    Procedure Summary       Date: 08/01/24 Room / Location:  OR  / SURGERY [facility]    Anesthesia Start: 1101 Anesthesia Stop: 1253    Procedure: LEFT TOTAL SHOULDER REPLACEMENT, POSSIBLE REVERSE (Left: Shoulder) Diagnosis: (OSTEOARTHRITIS OF LEFT GLENOHUMERAL JOINT)    Surgeons: Scott Lorenzo M.D. Responsible Provider: Loy Mcgarry M.D.    Anesthesia Type: general, peripheral nerve block ASA Status: 2            Final Anesthesia Type: general, peripheral nerve block  Last vitals  BP   Blood Pressure : 99/52    Temp   36.5 °C (97.7 °F)    Pulse   81   Resp   16    SpO2   93 %      Anesthesia Post Evaluation    Patient location during evaluation: PACU  Patient participation: complete - patient participated  Level of consciousness: awake and alert    Airway patency: patent  Anesthetic complications: no  Cardiovascular status: hemodynamically stable  Respiratory status: acceptable  Hydration status: euvolemic    PONV: none          No notable events documented.     Nurse Pain Score: 0 (NPRS)

## 2024-08-01 NOTE — ANESTHESIA PREPROCEDURE EVALUATION
Case: [id number] Date/Time: 08/01/24 1145    Procedure: LEFT TOTAL SHOULDER REPLACEMENT, POSSIBLE REVERSE    Pre-op diagnosis: OSTEOARTHRITIS OF LEFT GLENOHUMERAL JOINT    Location:  OR 05 / SURGERY [facility]    Surgeons: Scott Lorenzo M.D.            Relevant Problems   CARDIAC   (positive) Atherosclerosis of aorta (HCC)   (positive) Primary hypertension      ENDO   (positive) Acquired hypothyroidism      Other   (positive) Chronic pain   (positive) Dyslipidemia   (positive) TIM (generalized anxiety disorder)   (positive) Opioid dependence, uncomplicated (HCC)   (positive) Rotator cuff arthropathy of left shoulder       Physical Exam    Airway   Mallampati: II  TM distance: >3 FB  Neck ROM: full       Cardiovascular - normal exam  Rhythm: regular  Rate: normal  (-) murmur     Dental - normal exam  Comments: Multiple missing upper and lower teeth.     Very poor dentition     Pulmonary - normal exam  Breath sounds clear to auscultation     Abdominal    Neurological - normal exam                   Anesthesia Plan    ASA 2       Plan - general and peripheral nerve block     Peripheral nerve block will be post-op pain control  Airway plan will be ETT          Induction: intravenous    Postoperative Plan: Postoperative administration of opioids is intended.    Pertinent diagnostic labs and testing reviewed    Informed Consent:    Anesthetic plan and risks discussed with patient.    Use of blood products discussed with: patient whom consented to blood products.

## 2024-08-01 NOTE — ANESTHESIA PROCEDURE NOTES
Peripheral Block    Date/Time: 8/1/2024 10:47 AM    Performed by: Loy Mcgarry M.D.  Authorized by: Loy Mcgarry M.D.    Patient Location:  Pre-op  Start Time:  8/1/2024 10:47 AM  End Time:  8/1/2024 10:51 AM  Reason for Block: at surgeon's request and post-op pain management ONLY    patient identified, IV checked, site marked, risks and benefits discussed, surgical consent, monitors and equipment checked, pre-op evaluation and timeout performed    Patient Position:  Supine  Prep: ChloraPrep    Monitoring:  Heart rate and continuous pulse ox  Block Region:  Upper Extremity  Upper Extremity - Block Type:  BRACHIAL PLEXUS block, Interscalene approach    Laterality:  Left  Procedures: ultrasound guided  Image captured, interpreted and electronically stored.  Block Type:  Single-shot  Needle Length:  100mm  Needle Gauge:  21 G  Needle Localization:  Ultrasound guidance  Ultrasound picture in chart  Injection Assessment:  Negative aspiration for heme, no paresthesia on injection, incremental injection and local visualized surrounding nerve on ultrasound  Evidence of intravascular injection: No     US Guided Interscalene Brachial Plexus Block   US transducer placed on the neck in oblique plane approximately at the level of C6.  Anterior and Middle Scalene (MSM) muscles identified with nerve trunks identified between the muscles.  Needle inserted lateral to probe and advanced under direct visualization through the MSM into a perineural position.  After negative aspiration, LA injected with ease and visualized surrounding the nerve trunks.

## 2024-08-01 NOTE — OR NURSING
1355- Patient arrived to phase 2 via ambulation. Patient changed out of surgical gown into clothes. Patient is A&Ox4. Patient reports no pain and no nausea. Vital signs taken and patient assessed. The L shoulder dressing is CDI. Immobilizer in place. +Nerve block.    1403- Left a voicemail for the patients family    1450- OT working with the patient. The patient was assisted to the bathroom to void.    1542- OT done working with the patient.    1545- Discharge instructions read. All questions answered.     1557- IV removed. I transported the patient to her ride via wheelchair. Discharged to care of responsible adult. All belongings with the patient.

## 2024-08-01 NOTE — ANESTHESIA TIME REPORT
Anesthesia Start and Stop Event Times       Date Time Event    8/1/2024 1040 Ready for Procedure     1101 Anesthesia Start     1253 Anesthesia Stop          Responsible Staff  08/01/24      Name Role Begin End    Loy Mcgarry M.D. Anesth 1101 1253          Overtime Reason:  no overtime (within assigned shift)    Comments:

## 2024-08-05 PROCEDURE — RXMED WILLOW AMBULATORY MEDICATION CHARGE: Performed by: PSYCHIATRY & NEUROLOGY

## 2024-08-06 ENCOUNTER — APPOINTMENT (OUTPATIENT)
Dept: BEHAVIORAL HEALTH | Facility: CLINIC | Age: 64
End: 2024-08-06
Payer: MEDICARE

## 2024-08-07 NOTE — PROGRESS NOTES
"Assessment    Monthly PCM outreach call.  Discussion Points:  HTN. Patient states BP has been \"good\". Most recent /62.   Balance issue/dizziness. Debby does indicate she has some dizziness and a feeling of being off balance since her surgery-she attributes it to being dehydrated. Encouraged increasing fluid intake. Patient has started monitoring her fluid intake to ensure she is getting proper hydration.  Left Shoulder-Reverse Total Shoulder Arthroplasty. Surgery was 08/01/2024. Patient is recovering well at home. Pain medication is taking the edge off of the pain, but pain is still significant. Denies side effects from medication.  Physical Therapy. PT for shoulder replacement will start August 15. Encouraged patient to pre-medicate with her pain medication to provide the best PT effectiveness.  Sleep medicine referral. [facility] has changed the sleep medicine referral to Shari on 06/21/2024. Patient will contact [facility] to schedule appointment.      Education    Call the [facility] phone line ([phone]) for any additional resource needs.   Increase fluid intake to at least 72-84 ounces a day    Plan of Care and Goals    Continue current plan of care    Barriers:    Disease processes; pain from recent surgery    Progress:    Stable    Next outreach:  September 2024                          "

## 2024-08-13 ENCOUNTER — PHARMACY VISIT (OUTPATIENT)
Dept: PHARMACY | Facility: MEDICAL CENTER | Age: 64
End: 2024-08-13
Payer: COMMERCIAL

## 2024-08-13 PROCEDURE — RXMED WILLOW AMBULATORY MEDICATION CHARGE: Performed by: PSYCHIATRY & NEUROLOGY

## 2024-08-15 PROCEDURE — RXMED WILLOW AMBULATORY MEDICATION CHARGE: Performed by: STUDENT IN AN ORGANIZED HEALTH CARE EDUCATION/TRAINING PROGRAM

## 2024-08-16 ENCOUNTER — PHARMACY VISIT (OUTPATIENT)
Dept: PHARMACY | Facility: MEDICAL CENTER | Age: 64
End: 2024-08-16
Payer: COMMERCIAL

## 2024-08-16 ENCOUNTER — OFFICE VISIT (OUTPATIENT)
Dept: BEHAVIORAL HEALTH | Facility: CLINIC | Age: 64
End: 2024-08-16
Payer: MEDICARE

## 2024-08-16 DIAGNOSIS — F33.1 MODERATE EPISODE OF RECURRENT MAJOR DEPRESSIVE DISORDER (HCC): ICD-10-CM

## 2024-08-16 DIAGNOSIS — F41.1 GAD (GENERALIZED ANXIETY DISORDER): ICD-10-CM

## 2024-08-16 PROCEDURE — 90837 PSYTX W PT 60 MINUTES: CPT | Performed by: MARRIAGE & FAMILY THERAPIST

## 2024-08-16 PROCEDURE — 1170F FXNL STATUS ASSESSED: CPT | Performed by: MARRIAGE & FAMILY THERAPIST

## 2024-08-16 NOTE — PROGRESS NOTES
[facility]   Therapy Progress Note        Name: Debby Desai  MRN: [id number]  : 1960  Age: 64 y.o.  Date of assessment: 2024  PCP: ARELIS Chris  Persons in attendance: Patient  Total session time: 56 minutes      Topics addressed in psychotherapy include: Met with the patient in person for an individual counseling session.      Content of Therapy:   The patient reported that she is very frustrated with her will surgeons aftercare office.  She went for an aftercare appointment and has waited a five days for a pain medication refill after shoulder surgery.  Patient discussed family members helping with meals however dropping the meals off to her sister's house rather than directly to her.  The patient discuss her childhood views of family systems and finances.  Patient discuss she is always try to be independent in her life.      Therapeutic Intervention:   This session, the therapeutic focus was on validating the patient's frustration with her doctor's office.  Worked with the patient on discussing her family system and how it has affected her later in life.  Discuss boundaries has a patient appears to be appropriately setting them.  Provided supportive psychotherapy.    This dictation has been created using voice recognition software and/or scribes. The accuracy of the dictation is limited by the abilities of the software and the expertise of the scribes. I expect there may be some errors of grammar and possibly content. I made every attempt to manually correct the errors within my dictation. However, errors related to voice recognition software and/or scribes may still exist and should be interpreted within the appropriate context.    Objective Observations:   Participation:Active verbal participation, Attentive, Engaged, and Open to feedback   Grooming:Casual   Cognition:Alert and Fully Oriented   Eye Contact:Good   Mood:Euthymic   Affect:Flexible and Full range   Thought  Process:Logical and Goal-directed   Speech:Rate within normal limits and Volume within normal limits    Current Risk:   Suicide: low   Homicide: low   Self-Harm: low   Relapse: low   Safety Plan Reviewed: not applicable    Care Plan Updated: No    Does patient express agreement with the above plan? Yes     Diagnosis:  1. Moderate episode of recurrent major depressive disorder (HCC)    2. TIM (generalized anxiety disorder)        Referral appointment(s) scheduled? No       KATT Edward

## 2024-08-19 PROCEDURE — RXMED WILLOW AMBULATORY MEDICATION CHARGE: Performed by: PSYCHIATRY & NEUROLOGY

## 2024-08-26 DIAGNOSIS — E03.9 ACQUIRED HYPOTHYROIDISM: ICD-10-CM

## 2024-08-26 PROCEDURE — RXMED WILLOW AMBULATORY MEDICATION CHARGE: Performed by: NURSE PRACTITIONER

## 2024-08-26 RX ORDER — LEVOTHYROXINE SODIUM 50 UG/1
50 TABLET ORAL
Qty: 90 TABLET | Refills: 1 | Status: SHIPPED | OUTPATIENT
Start: 2024-08-26

## 2024-08-26 NOTE — TELEPHONE ENCOUNTER
Requested Prescriptions     Pending Prescriptions Disp Refills    levothyroxine (SYNTHROID) 50 MCG Tab 90 Tablet 3     Sig: Take 1 tablet by mouth every morning on an empty stomach.      Last office visit: 6/21/24  Last lab: 1/22/24

## 2024-08-27 ENCOUNTER — PHARMACY VISIT (OUTPATIENT)
Dept: PHARMACY | Facility: MEDICAL CENTER | Age: 64
End: 2024-08-27
Payer: COMMERCIAL

## 2024-08-29 ENCOUNTER — APPOINTMENT (OUTPATIENT)
Dept: BEHAVIORAL HEALTH | Facility: CLINIC | Age: 64
End: 2024-08-29
Payer: MEDICARE

## 2024-09-04 ENCOUNTER — PATIENT OUTREACH (OUTPATIENT)
Dept: HEALTH INFORMATION MANAGEMENT | Facility: OTHER | Age: 64
End: 2024-09-04
Payer: MEDICARE

## 2024-09-04 DIAGNOSIS — E78.5 DYSLIPIDEMIA: ICD-10-CM

## 2024-09-04 DIAGNOSIS — I10 PRIMARY HYPERTENSION: ICD-10-CM

## 2024-09-04 NOTE — PROGRESS NOTES
"Assessment    Monthly Personal Care Management Program outreach call.  Patient reports doing pretty good after shoulder surgery .  Discussion Points:  Left shoulder surgery..   Recovery \"has been tough\". Patient states PT told her she was a few weeks behind recovery schedule due to the poor condition the joint was in prior to the surgery.  PT twice a week, Home exercises twice a day  Sling. Currently removing sling while at home. Will be Ok'ed to go without sling all the time after tomorrow.  Pain. Patient has been trying to avoid oxycodone, and has not had any for nine days. She has been taking ibuprofen and tramadol, but with limited pain relief. She states 90% of the time the pain is sometimes tolerable, but 10% of the time it is really bad. Encouraged patient to call her orthopedic surgeon to request a refill of the oxycodone. We discussed the relationship between pain relief, ability to complete exercises and stretches, and recovery time   Sleep. Referral has been changed to [facility]. Patient will be making an appointment with that provider once she is on the mend with her shoulder.  Counseling. Patient continues talk therapy. She states she has done well with her depression through the surgery and recovery. Patient uses a combination of breathing techniques to help manage stress, frustration and anxiety.      Education and Self Management    Call the PCM phone line ([phone]) for any additional questions, concerns, or resource needs. Patient verbalizes understanding.  RN reviewed all medications, allergies and upcoming appointments with patient.  Call orthopedic surgeon's office for oxycodone refill.    Plan of Care and Goals    Continue current plan of care    Barriers:    Disease Processes; Advanced Age;    Progress:     Progressing    Next outreach:  October 2024                                   "

## 2024-09-06 ENCOUNTER — PHARMACY VISIT (OUTPATIENT)
Dept: PHARMACY | Facility: MEDICAL CENTER | Age: 64
End: 2024-09-06
Payer: COMMERCIAL

## 2024-09-09 PROCEDURE — RXMED WILLOW AMBULATORY MEDICATION CHARGE: Performed by: STUDENT IN AN ORGANIZED HEALTH CARE EDUCATION/TRAINING PROGRAM

## 2024-09-11 ENCOUNTER — PHARMACY VISIT (OUTPATIENT)
Dept: PHARMACY | Facility: MEDICAL CENTER | Age: 64
End: 2024-09-11
Payer: COMMERCIAL

## 2024-09-11 PROCEDURE — RXMED WILLOW AMBULATORY MEDICATION CHARGE: Performed by: PSYCHIATRY & NEUROLOGY

## 2024-09-12 ENCOUNTER — OFFICE VISIT (OUTPATIENT)
Dept: BEHAVIORAL HEALTH | Facility: CLINIC | Age: 64
End: 2024-09-12
Payer: MEDICARE

## 2024-09-12 ENCOUNTER — PHARMACY VISIT (OUTPATIENT)
Dept: PHARMACY | Facility: MEDICAL CENTER | Age: 64
End: 2024-09-12
Payer: COMMERCIAL

## 2024-09-12 DIAGNOSIS — F33.1 MODERATE EPISODE OF RECURRENT MAJOR DEPRESSIVE DISORDER (HCC): ICD-10-CM

## 2024-09-12 DIAGNOSIS — F41.1 GAD (GENERALIZED ANXIETY DISORDER): ICD-10-CM

## 2024-09-12 PROCEDURE — 1170F FXNL STATUS ASSESSED: CPT | Performed by: MARRIAGE & FAMILY THERAPIST

## 2024-09-12 PROCEDURE — 90837 PSYTX W PT 60 MINUTES: CPT | Performed by: MARRIAGE & FAMILY THERAPIST

## 2024-09-13 NOTE — PROGRESS NOTES
[facility]   Therapy Progress Note        Name: Debby Desai  MRN: [id number]  : 1960  Age: 64 y.o.  Date of assessment: 2024  PCP: ARELIS Chris  Persons in attendance: Patient  Total session time: 58 minutes      Topics addressed in psychotherapy include: Met with the patient in person for an individual counseling session.      Content of Therapy:   The patient discussed that she is considering stopping therapy as she does not feel that we are accomplishing much and it is difficult to have a consistent schedule with this clinician.  The patient questioned our goals for therapy.  Patient discussed several areas of life where she is become frustrated and more irritable.  Patient discussed multiple times where she has done for others rather than for herself.  Therapeutic Intervention:   This session, the therapeutic focus was on validating the patient's feelings and assessing her needs.  Discussed homework and increased effort in scheduling.  Patient disclosed family conflicts.  Addressed with the patient her feelings of not being heard.  Worked with the patient on [place] to be acknowledged and spending all the time.  Discussed with the patient her making excuses for everyone else.  Discussed with the patient the possibility of her getting “snarky” and not becoming apologetic.    This dictation has been created using voice recognition software and/or scribes. The accuracy of the dictation is limited by the abilities of the software and the expertise of the scribes. I expect there may be some errors of grammar and possibly content. I made every attempt to manually correct the errors within my dictation. However, errors related to voice recognition software and/or scribes may still exist and should be interpreted within the appropriate context.    Objective Observations:   Participation:Active verbal participation, Attentive, and Engaged   Grooming:Casual   Cognition:Alert and  Fully Oriented   Eye Contact:Good   Mood:Euthymic and Anxious   Affect:Flexible, Full range, and Congruent with content   Thought Process:Logical and Goal-directed   Speech:Rate within normal limits and Volume within normal limits    Current Risk:   Suicide: low   Homicide: low   Self-Harm: low   Relapse: low   Safety Plan Reviewed: not applicable    Care Plan Updated: No    Does patient express agreement with the above plan? Yes     Diagnosis:  1. Moderate episode of recurrent major depressive disorder (HCC)    2. TIM (generalized anxiety disorder)        Referral appointment(s) scheduled? No       KATT Edward

## 2024-09-18 PROCEDURE — RXMED WILLOW AMBULATORY MEDICATION CHARGE: Performed by: ORTHOPAEDIC SURGERY

## 2024-09-20 ENCOUNTER — PHARMACY VISIT (OUTPATIENT)
Dept: PHARMACY | Facility: MEDICAL CENTER | Age: 64
End: 2024-09-20
Payer: COMMERCIAL

## 2024-09-30 ENCOUNTER — OFFICE VISIT (OUTPATIENT)
Dept: BEHAVIORAL HEALTH | Facility: CLINIC | Age: 64
End: 2024-09-30
Payer: MEDICARE

## 2024-09-30 DIAGNOSIS — F33.1 MODERATE EPISODE OF RECURRENT MAJOR DEPRESSIVE DISORDER (HCC): ICD-10-CM

## 2024-09-30 DIAGNOSIS — F41.1 GAD (GENERALIZED ANXIETY DISORDER): ICD-10-CM

## 2024-09-30 NOTE — PROGRESS NOTES
[facility]   Therapy Progress Note        Name: Debby Desai  MRN: [id number]  : 1960  Age: 64 y.o.  Date of assessment: 2024  PCP: ARELIS Chris  Persons in attendance: Patient  Total session time: 57 minutes      Topics addressed in psychotherapy include: Met with the patient in person for an individual counseling session.     Content of Therapy:   The patient discussed that she generally does not speak up for herself.  Patient discussed that she picks are battles however she often retreats into the freeze response of fight/flight/freeze.  Patient discussed that she feels unloved if she is not being acknowledged and does not get a response.  Patient discussed her history of multiple unexpected deaths in her life.  Therapeutic Intervention:   This session, the therapeutic focus was on working with the patient to move beyond her feelings of being wrong.  Discussed with the patient the madelyn response as it relates to fight/flight/freezes/madelyn.  The patient was unaware of that response.  Worked with the patient through cognitive behavioral therapy to identify where she has stood up for herself.  Dismissed the thought that she does not do it.  Worked with the patient and assigned her to take small steps in that direction.    This dictation has been created using voice recognition software and/or scribes. The accuracy of the dictation is limited by the abilities of the software and the expertise of the scribes. I expect there may be some errors of grammar and possibly content. I made every attempt to manually correct the errors within my dictation. However, errors related to voice recognition software and/or scribes may still exist and should be interpreted within the appropriate context.    Objective Observations:   Participation:Active verbal participation, Attentive, and Engaged   Grooming:Casual   Cognition:Alert and Fully Oriented   Eye Contact:Good   Mood:Euthymic and  Anxious   Affect:Flexible, Full range, Congruent with content, and Anxious   Thought Process:Logical and Goal-directed   Speech:Rate within normal limits and Volume within normal limits    Current Risk:   Suicide: low   Homicide: low   Self-Harm: low   Relapse: low   Safety Plan Reviewed: not applicable    Care Plan Updated: No    Does patient express agreement with the above plan? Yes     Diagnosis:  1. TIM (generalized anxiety disorder)    2. Moderate episode of recurrent major depressive disorder (HCC)        Referral appointment(s) scheduled? No       KATT Edward

## 2024-10-03 ENCOUNTER — PATIENT MESSAGE (OUTPATIENT)
Dept: HEALTH INFORMATION MANAGEMENT | Facility: OTHER | Age: 64
End: 2024-10-03

## 2024-10-08 ENCOUNTER — PATIENT OUTREACH (OUTPATIENT)
Dept: HEALTH INFORMATION MANAGEMENT | Facility: OTHER | Age: 64
End: 2024-10-08
Payer: MEDICARE

## 2024-10-08 DIAGNOSIS — I10 PRIMARY HYPERTENSION: ICD-10-CM

## 2024-10-08 DIAGNOSIS — G89.4 CHRONIC PAIN SYNDROME: ICD-10-CM

## 2024-10-10 ENCOUNTER — PATIENT MESSAGE (OUTPATIENT)
Dept: HEALTH INFORMATION MANAGEMENT | Facility: OTHER | Age: 64
End: 2024-10-10

## 2024-10-11 ENCOUNTER — TELEMEDICINE (OUTPATIENT)
Dept: BEHAVIORAL HEALTH | Facility: CLINIC | Age: 64
End: 2024-10-11
Payer: MEDICARE

## 2024-10-11 DIAGNOSIS — F51.04 PSYCHOPHYSIOLOGICAL INSOMNIA: ICD-10-CM

## 2024-10-11 DIAGNOSIS — F41.1 GAD (GENERALIZED ANXIETY DISORDER): ICD-10-CM

## 2024-10-11 DIAGNOSIS — F33.1 MODERATE EPISODE OF RECURRENT MAJOR DEPRESSIVE DISORDER (HCC): ICD-10-CM

## 2024-10-11 PROCEDURE — 90833 PSYTX W PT W E/M 30 MIN: CPT | Mod: 95 | Performed by: PSYCHIATRY & NEUROLOGY

## 2024-10-11 PROCEDURE — 99214 OFFICE O/P EST MOD 30 MIN: CPT | Mod: 95 | Performed by: PSYCHIATRY & NEUROLOGY

## 2024-10-17 PROCEDURE — RXMED WILLOW AMBULATORY MEDICATION CHARGE: Performed by: NURSE PRACTITIONER

## 2024-10-18 ENCOUNTER — TELEPHONE (OUTPATIENT)
Dept: PHYSICAL THERAPY | Facility: REHABILITATION | Age: 64
End: 2024-10-18
Payer: MEDICARE

## 2024-10-22 ENCOUNTER — APPOINTMENT (OUTPATIENT)
Dept: BEHAVIORAL HEALTH | Facility: CLINIC | Age: 64
End: 2024-10-22
Payer: MEDICARE

## 2024-10-23 PROCEDURE — RXMED WILLOW AMBULATORY MEDICATION CHARGE: Performed by: PSYCHIATRY & NEUROLOGY

## 2024-10-24 ENCOUNTER — PHARMACY VISIT (OUTPATIENT)
Dept: PHARMACY | Facility: MEDICAL CENTER | Age: 64
End: 2024-10-24
Payer: COMMERCIAL

## 2024-11-03 ASSESSMENT — ENCOUNTER SYMPTOMS: SLEEP DISTURBANCE: 0

## 2024-11-04 ENCOUNTER — OFFICE VISIT (OUTPATIENT)
Dept: SLEEP MEDICINE | Facility: MEDICAL CENTER | Age: 64
End: 2024-11-04
Attending: NURSE PRACTITIONER
Payer: MEDICARE

## 2024-11-04 VITALS
DIASTOLIC BLOOD PRESSURE: 60 MMHG | WEIGHT: 148.7 LBS | BODY MASS INDEX: 23.34 KG/M2 | OXYGEN SATURATION: 95 % | SYSTOLIC BLOOD PRESSURE: 108 MMHG | HEIGHT: 67 IN | HEART RATE: 82 BPM | RESPIRATION RATE: 16 BRPM

## 2024-11-04 DIAGNOSIS — F51.04 CHRONIC INSOMNIA: ICD-10-CM

## 2024-11-04 DIAGNOSIS — G47.33 OSA (OBSTRUCTIVE SLEEP APNEA): Primary | ICD-10-CM

## 2024-11-04 DIAGNOSIS — Z23 NEED FOR VACCINATION: ICD-10-CM

## 2024-11-04 PROCEDURE — 90471 IMMUNIZATION ADMIN: CPT | Performed by: STUDENT IN AN ORGANIZED HEALTH CARE EDUCATION/TRAINING PROGRAM

## 2024-11-04 PROCEDURE — 3078F DIAST BP <80 MM HG: CPT | Performed by: STUDENT IN AN ORGANIZED HEALTH CARE EDUCATION/TRAINING PROGRAM

## 2024-11-04 PROCEDURE — 3074F SYST BP LT 130 MM HG: CPT | Performed by: STUDENT IN AN ORGANIZED HEALTH CARE EDUCATION/TRAINING PROGRAM

## 2024-11-04 PROCEDURE — 1170F FXNL STATUS ASSESSED: CPT | Performed by: STUDENT IN AN ORGANIZED HEALTH CARE EDUCATION/TRAINING PROGRAM

## 2024-11-04 PROCEDURE — 99213 OFFICE O/P EST LOW 20 MIN: CPT | Performed by: STUDENT IN AN ORGANIZED HEALTH CARE EDUCATION/TRAINING PROGRAM

## 2024-11-04 PROCEDURE — 99204 OFFICE O/P NEW MOD 45 MIN: CPT | Performed by: STUDENT IN AN ORGANIZED HEALTH CARE EDUCATION/TRAINING PROGRAM

## 2024-11-04 PROCEDURE — 90471 IMMUNIZATION ADMIN: CPT

## 2024-11-04 ASSESSMENT — FIBROSIS 4 INDEX: FIB4 SCORE: 1.01

## 2024-11-04 NOTE — PATIENT INSTRUCTIONS
"It was a pleasure meeting you today. Here are a list of resources for self-guided CTBI.    CBT-I Books  Rojelio Mind: Turn Off Your Noisy Thoughts and Get a Good Night's Sleep - Payton Figueredo,  and Toshia Beckwith Phd  Accessible, enjoyable, and grounded in evidence-based cognitive behavioral therapy (CBT), Rojelio Mind directly addresses the effects of rumination?or having an overactive brain?on your ability to sleep well. Written by two psychologists who specialize in sleep disorders, the book contains helpful exercises and insights into how you can better manage your thoughts at bedtime, and finally get some sleep.    Insomnia Solved: A Self-Directed Cognitive Behavioral Therapy for Insomnia (CBTI) Program - Rhett Jamison MD  Cognitive behavioral therapy for insomnia (CBTI) is often structured as a 6-week treatment program that can help people who have difficulty falling asleep, staying asleep, or find that sleep is unrefreshing. CBTI is scientifically proven, highly effective, and does not rely on medications. CBTI has life-long benefits and most participants report improved sleep satisfaction. Insomnia Solved is based on the core features of this treatment.    Quiet Your Mind and Get to Sleep: Solutions to Insomnia for Those with Depression, Anxiety or Chronic Pain - Toshia Beckwith, PhD  This workbook uses cognitive behavior therapy, which has been shown to work as well as sleep medications and produce longer-lasting effects. Research shows that it also works well for those whose insomnia is experienced in the context of anxiety, depression, and chronic pain. The complete program in this book goes to the root of your insomnia and offers the same techniques used by experienced sleep specialists.    \"Say Rojelio to Insomnia\" by Huang Brown     \"No More Sleepless Nights\" by David Garcia    CBT-I Online Resources  Path to Better Sleep (free) - https://www.veterantraining.va.gov/insomnia/index.asp   This " "free online Cognitive Behavioral Therapy for Insomnia course, which was developed for veterans, takes you through a sleep \"check-in\" and the various components of CBT-I, including modifying unhelpful behaviors and unhelpful thoughts around sleep.    Insomnia Solved - Rhett Jamison MD ($89) - http://www.[employer]apMe/fix-my-insomnia/   Insomnia Solved is a self-guided CBTI program created by Rhett Jamison M.D., a board-certified medical doctor, that is priced inexpensively at $89. The complete program includes full access to exclusive multimedia content, including the 154-page eBook and online modules and audiofiles.    Apps  Insomnia  (free)   Offers a self-guided 5-week training plan designed to change sleep habits.  https://[employer].va.gov/chase/insomnia-   "

## 2024-11-04 NOTE — PROGRESS NOTES
[facility] Consult Note     Date: 11/3/2024 / Time: 9:01 PM      Thank you for requesting a sleep medicine consultation on Debby Desai at the sleep center. Presents today with the sleep maintenance insomnia    She is referred by Lara Garcia, ALEXANDRARMaxwellN.  [address]  [address]  [place] [id number] for evaluation and treatment of sleep disorder breathing.     HISTORY OF PRESENT ILLNESS:     Debby Desai is a 64 y.o. female with PTSD, BPD, ADD, insomnia, severe TU, insomnia who presents to Sleep Clinic for eval and management of TU and sleep maintenance insomnia.     Her biggest concern is she is always tired, no matter how much she sleeps.    Sleep study 2024 - AHI 43, donita spO2 63%, 9 min <88%.  Frequent nocturnal awakenings.  Does not feel well rested upon awakening in the morning.  Reports very limited energy during the day.  Daytime sleepiness, morning headaches    Sleep aids: trazodone 100 mg but also for her depression.    Her father had obstructive sleep and was on CPAP    She was depressed for a while. Her sister  last year.  Previously was having falls. Has some baseline tremor.     As per supplemental questionnaire to be scanned or imported into chart:    [place] Sleepiness Score: 16    Sleep Schedule  Bedtime: 9 to 11 PM  Wake time: 4 to 7 AM  Sleep-onset latency: 10 minutes  Awakenings from sleep: 3 x 5  Difficulty falling back asleep: no  Bedroom partner: no  Naps: Yes - early afternoon. No has to nap every day.   She reads, exercising, nephew lives with her.    DAYTIME SYMPTOMS:   Excessive daytime sleepiness: Yes  Daytime fatigue: Yes  Difficulty concentrating: Yes  Memory problems: Yes  Irritability:Yes  Work/school performance issues: No   Sleepiness with driving: Yes - pulls over or has someone else drive  Caffeine/stimulant use: Yes, 1 cup of coffee in am. Diluted ice coffee in early afternoon. One soda as well. 7pm is the last. Iced tea after  "that  Alcohol use:No     SLEEP RELATED SYMPTOMS  Snoring: No   Witnessed apnea or gasping/choking: No   Dry mouth or mouth breathing: Yes  Sweating: No   Teeth grinding/biting: Yes  Morning headaches: Yes  Refreshed Upon Awakening: No      SLEEP RELATED BEHAVIORS:  Parasomnias (walking, talking, eating, violence): Yes - only sleep talks. Did sleep walk but over 20 years ago.   Leg kicking: No   Restless legs - \"urge to move\": No   Nightmares: No  Recurrent: No   Dream enactment: No      NARCOLEPSY:  Cataplexy: No   Sleep paralysis: No   Sleep attacks: No   Hypnagogic/hypnopompic hallucinations: Yes    MEDICAL HISTORY  Past Medical History:   Diagnosis Date    Anemia     in past    Anginal syndrome (HCC)     had work up and feet r/t anxiety    Anxiety     Arthritis     OA knees    Chronic pain 06/02/2021    High cholesterol     HTN (hypertension), benign 03/19/2012    Hypothyroid 03/19/2012    Major depression 03/19/2012    Obesity (BMI 30-39.9) 07/09/2018    Orbital floor (blow-out) closed fracture (HCC)     left    Pain     thoracic spine, Right knee, myofacial pain, and Right shoulder    Pain     recently fell and has bruise left forehead    Pain 02/2018    chronic back pain, right shoulder    Pre-diabetes     Psychiatric problem     depression, anxiety    Sleep apnea     No Device, will see sleep DrMaxwell 11/2024    Unspecified disorder of thyroid     Urinary bladder disorder     stress incont.    Urinary incontinence     Occasional        SURGICAL HISTORY  Past Surgical History:   Procedure Laterality Date    PB RECONSTR TOTAL SHOULDER IMPLANT Left 8/1/2024    Procedure: LEFT TOTAL SHOULDER REPLACEMENT, REVERSE;  Surgeon: Scott Lorenzo M.D.;  Location: SURGERY [facility];  Service: Orthopedics    NV NEUROLYTIC DEST GENICULAR NERVE Right 7/28/2023    Procedure: RIGHT genicular nerve radiofrequency ablation;  Surgeon: Volodymyr Herring M.D.;  [facility];  Service: Pain Management    NV " FLUOROSCOPIC GUIDANCE NEEDLE PLACEMENT Right 7/7/2023    Procedure: RIGHT genicular nerve diagnostic blocks;  Surgeon: Volodymyr Herring M.D.;  Location: [facility];  Service: Pain Management    INJ,EPI ANES/STER LUM/SAC ADDL Right 4/7/2021    Procedure: RIGHT lumbar five and sacral one transforaminal epidural;  Surgeon: Volodymyr Herring M.D.;  Location: [facility];  Service: Pain Management    PB TOTAL KNEE ARTHROPLASTY Right 9/16/2019    Procedure: RIGHT ARTHROPLASTY, KNEE, TOTAL;  Surgeon: Rufus Saba M.D.;  Location: [facility];  Service: Orthopedics    KNEE ARTHROSCOPY Right 2/9/2018    Procedure: KNEE ARTHROSCOPY;  Surgeon: Harsh Baltazar M.D.;  Location: [facility];  Service: Orthopedics    MENISCECTOMY, KNEE, MEDIAL Right 2/9/2018    Procedure: MEDIAL AND LATERAL MENISCECTOMY- PARTIAL;  Surgeon: Harsh Baltazar M.D.;  Location: [facility];  Service: Orthopedics    KNEE ARTHROSCOPY Right 7/12/2017    Procedure: KNEE ARTHROSCOPY- CESPEDES;  Surgeon: Harsh Baltazar M.D.;  Location: [facility];  Service:     MENISCECTOMY, KNEE, MEDIAL Right 7/12/2017    Procedure: PARTIAL MEDIAL AND LATERAL MENISCECTOMY;  Surgeon: Harsh Baltazar M.D.;  Location: [facility];  Service:     SYNOVECTOMY Right 7/12/2017    Procedure: SYNOVECTOMY;  Surgeon: Harsh Baltazar M.D.;  Location: [facility];  Service:     KNEE ARTHROSCOPY  4/21/2015    Performed by Rufus Saba M.D. at [facility]    MENISCECTOMY, KNEE, MEDIAL  4/21/2015    Performed by Rufus Saba M.D. at [facility]    PUMP REVISION  12/10/2012    Performed by Allison Guerra M.D. at [facility]    SPINAL CORD STIMULATOR  5/30/2012    Performed by ALLISON GUERRA at [facility]    BIOPSY GENERAL  5/1/12    endometrial    SPINAL CORD STIMULATOR  7/11/2011     Performed by ALLISON CAM at SURGERY [facility]    BLOCK EPIDURAL STEROID INJECTION  2008    x 3-4    LYMPH NODE EXCISION Left 2007    cervicle    OTHER Right 2001    thoracic discectomy      ARTHROSCOPY, KNEE Left 1999    RIB RESECTION Right 1980    LUMPECTOMY          FAMILY HISTORY  Family History   Problem Relation Age of Onset    Hypertension Father     Diabetes Father     Heart Disease Father     Alcohol abuse Father     Anesthesia Sister         slow to come out (as a child)    Diabetes Other     Heart Disease Other     Cancer Other     Hypertension Other     Stroke Other     Lung Disease Other        SOCIAL HISTORY  Social History     Socioeconomic History    Marital status: Single    Highest education level: Associate degree: academic program   Tobacco Use    Smoking status: Never    Smokeless tobacco: Never   Vaping Use    Vaping status: Never Used   Substance and Sexual Activity    Alcohol use: Not Currently    Drug use: No    Sexual activity: Never     Partners: Male   Other Topics Concern     Service No    Blood Transfusions No    Caffeine Concern No    Occupational Exposure No    Hobby Hazards No    Sleep Concern Yes    Stress Concern Yes    Weight Concern Yes    Special Diet No    Back Care No    Exercise No    Bike Helmet No    Seat Belt Yes    Self-Exams No     Social Drivers of Health     Financial Resource Strain: Low Risk  (6/3/2024)    Overall Financial Resource Strain (CARDIA)     Difficulty of Paying Living Expenses: Not very hard   Food Insecurity: No Food Insecurity (6/3/2024)    Hunger Vital Sign     Worried About Running Out of Food in the Last Year: Never true     Ran Out of Food in the Last Year: Never true   Transportation Needs: No Transportation Needs (6/3/2024)    PRAPARE - Transportation     Lack of Transportation (Medical): No     Lack of Transportation (Non-Medical): No   Physical Activity: Insufficiently Active (5/7/2024)    Exercise Vital Sign     Days of  Exercise per Week: 3 days     Minutes of Exercise per Session: 10 min   Stress: Stress Concern Present (5/7/2024)    [place] [employer] - Occupational Stress Questionnaire     Feeling of Stress : Very much   Social Connections: Socially Isolated (5/7/2024)    Social Connection and Isolation Panel [NHANES]     Frequency of Communication with Friends and Family: More than three times a week     Frequency of Social Gatherings with Friends and Family: Once a week     Attends Mosque Services: Never     Active Member of Clubs or Organizations: No     Attends Club or Organization Meetings: Never     Marital Status: Never    Intimate Partner Violence: Not At Risk (5/7/2024)    Humiliation, Afraid, Rape, and Kick questionnaire     Fear of Current or Ex-Partner: No     Emotionally Abused: No     Physically Abused: No     Sexually Abused: No   Housing Stability: Low Risk  (6/3/2024)    Housing Stability Vital Sign     Unable to Pay for Housing in the Last Year: No     Number of Places Lived in the Last Year: 1     Unstable Housing in the Last Year: No        Occupation: retired RN    CURRENT MEDICATIONS  Current Outpatient Medications   Medication Sig    oxyCODONE-acetaminophen (PERCOCET-10)  MG Tab Take 1 tablet by mouth every 6 hours  as needed for 3 days, for post op pain. (Patient not taking: Reported on 10/8/2024)    levothyroxine (SYNTHROID) 50 MCG Tab Take 1 tablet by mouth every morning on an empty stomach.    oxyCODONE-acetaminophen (PERCOCET-10)  MG Tab Take 1 tablet every 4-6 hours by mouth as needed for 5 days. (Patient not taking: Reported on 9/4/2024)    amLODIPine (NORVASC) 10 MG Tab Take 1 tablet by mouth every day.    buPROPion (WELLBUTRIN SR) 200 MG SR tablet Take 1 tablet by mouth 2 times a day.    busPIRone (BUSPAR) 10 MG Tab tablet Take 1 Tablet by mouth 3 times a day.    venlafaxine (EFFEXOR-XR) 150 MG extended-release capsule Take 1 Capsule by mouth every day.  (venlafaxine xr 150 mg + 75 mg = 225 mg daily)    venlafaxine XR (EFFEXOR XR) 75 MG CAPSULE SR 24 HR Take 1 capsule by mouth every day. (venlafaxine xr 150 mg + 75 mg = 225 mg daily)    traZODone (DESYREL) 50 MG Tab Take 2 Tablets by mouth at bedtime as needed for Sleep (as needed for sleep).    multivitamin Tab Take 1 Tablet by mouth every day. MEDICATION INSTRUCTIONS FOR SURGERY/PROCEDURE SCHEDULED FOR 8/1/24   DO NOT TAKE 7 DAYS PRIOR TO SURGERY    lisinopril (PRINIVIL) 40 MG tablet Take 1 Tablet by mouth every day.    atorvastatin (LIPITOR) 20 MG Tab TAKE ONE TABLET BY MOUTH EVERY DAY    metFORMIN ER (GLUCOPHAGE XR) 500 MG TABLET SR 24 HR Take 1 Tablet by mouth 2 times a day.    Ferrous Sulfate (IRON) 325 (65 Fe) MG Tab Take 65 mg by mouth every day at 6 PM. MEDICATION INSTRUCTIONS FOR SURGERY/PROCEDURE SCHEDULED FOR 8/1/24   DO NOT TAKE 7 DAYS PRIOR TO SURGERY       REVIEW OF SYSTEMS  Constitutional: Denies fevers, Denies weight changes  Ears/Nose/Throat/Mouth: Denies nasal congestion or sore throat   Cardiovascular: Denies chest pain  Respiratory: Denies shortness of breath, Denies cough  Gastrointestinal/Hepatic: Denies nausea, vomiting  Sleep: see HPI    Physical Examination:  Vitals/ General Appearance:   Weight/BMI: There is no height or weight on file to calculate BMI.  There were no vitals filed for this visit.    Pt. is alert and oriented to time, place and person. Cooperative and in no apparent distress.     Constitutional: Alert, no distress, well-groomed.  Skin: No rashes in visible areas.  Eye: Round. Conjunctiva clear, lids normal. No icterus.   ENT EXAM  Hard palate narrow: Yes  Hard palate high: Yes  Soft palate/uvula (Mallampati score): 4  Tongue Scalloping: Yes  Retrognathia: Yes  Micrognathia: No   Cardiovascular:regular rate and rhythm  Pulmonary:Normal breath sounds  Neurologic:Awake, alert and oriented x 3  Extremities: No clubbing, cyanosis, or edema     Bicarb:   Lab Results   Component  Value Date/Time    CO2 28 07/25/2024 0933    CO2 24 01/22/2024 1243    CO2 27 05/24/2023 0049     TSH:   Lab Results   Component Value Date/Time    TSHULTRASEN 2.040 01/22/2024 1243     CREATININE:   Lab Results   Component Value Date/Time    CREATININE 0.76 07/25/2024 0933     VIT D:   Lab Results   Component Value Date/Time    25HYDROXY 31 01/22/2024 1243     H/H:  Lab Results   Component Value Date/Time    HEMOGLOBIN 12.0 07/25/2024 09:33 AM       ASSESSMENT AND PLAN   1. 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   The pathophysiology of TU and the increased risk of cardiovascular morbidity from untreated TU is discussed in detail with the patient. She also has HTN, anxiety, depression, chronic pain which can be worsened by TU.  Based on severity of her TU we discussed first-line therapy of auto CPAP for which I ordered.     She is cautioned against drowsy driving. If She feels sleepy while driving, advised must pull over for a break/nap, rather than persist on the road, in the interest of Pt's own safety and that of others on the road.    Plan  -Auto CPAP 6-14 cmH2O ordered  -Follow-up in 3 months for CPAP adherence and therapy review  -Advised to reach out via MyChart or by phone with any questions or concerns.     2.  Sleep maintenance insomnia -suspect exacerbated by underlying untreated TU.  Discussed improving sleep habits: Decrease stimulus prior to bed, decrease caffeine intake, eliminate or decrease nap time.  CBT-I resources given.    3.  Regarding treatment of other past medical problems and general health maintenance,  Pt is urged to follow up with PCP.      Please note portions of this record was created using voice recognition software. I have made every reasonable attempt to correct obvious errors, but I expect that there are errors of grammar and  possibly content I did not discover before finalizing the note.

## 2024-11-13 PROCEDURE — RXMED WILLOW AMBULATORY MEDICATION CHARGE: Performed by: NURSE PRACTITIONER

## 2024-11-14 ENCOUNTER — PATIENT OUTREACH (OUTPATIENT)
Dept: HEALTH INFORMATION MANAGEMENT | Facility: OTHER | Age: 64
End: 2024-11-14
Payer: MEDICARE

## 2024-11-14 DIAGNOSIS — G89.4 CHRONIC PAIN SYNDROME: ICD-10-CM

## 2024-11-14 DIAGNOSIS — I10 PRIMARY HYPERTENSION: ICD-10-CM

## 2024-11-14 NOTE — PROGRESS NOTES
Assessment    Monthly Personal Care Management Program outreach call.  Patient requests to withdraw from PCM program. This RN closed out program.

## 2024-11-15 ENCOUNTER — PHARMACY VISIT (OUTPATIENT)
Dept: PHARMACY | Facility: MEDICAL CENTER | Age: 64
End: 2024-11-15
Payer: COMMERCIAL

## 2024-12-02 PROCEDURE — RXMED WILLOW AMBULATORY MEDICATION CHARGE: Performed by: NURSE PRACTITIONER

## 2024-12-02 PROCEDURE — RXMED WILLOW AMBULATORY MEDICATION CHARGE: Performed by: PSYCHIATRY & NEUROLOGY

## 2024-12-03 ENCOUNTER — PHARMACY VISIT (OUTPATIENT)
Dept: PHARMACY | Facility: MEDICAL CENTER | Age: 64
End: 2024-12-03
Payer: COMMERCIAL

## 2024-12-10 DIAGNOSIS — F41.1 GAD (GENERALIZED ANXIETY DISORDER): ICD-10-CM

## 2024-12-10 PROCEDURE — RXMED WILLOW AMBULATORY MEDICATION CHARGE: Performed by: PSYCHIATRY & NEUROLOGY

## 2024-12-10 RX ORDER — BUSPIRONE HYDROCHLORIDE 10 MG/1
TABLET ORAL
Qty: 90 TABLET | Refills: 0 | Status: SHIPPED | OUTPATIENT
Start: 2024-12-10

## 2024-12-12 ENCOUNTER — PHARMACY VISIT (OUTPATIENT)
Dept: PHARMACY | Facility: MEDICAL CENTER | Age: 64
End: 2024-12-12
Payer: COMMERCIAL

## 2024-12-13 ENCOUNTER — PHARMACY VISIT (OUTPATIENT)
Dept: PHARMACY | Facility: MEDICAL CENTER | Age: 64
End: 2024-12-13
Payer: COMMERCIAL

## 2024-12-13 PROCEDURE — RXMED WILLOW AMBULATORY MEDICATION CHARGE: Performed by: NURSE PRACTITIONER

## 2025-01-02 DIAGNOSIS — R73.02 IGT (IMPAIRED GLUCOSE TOLERANCE): ICD-10-CM

## 2025-01-02 PROCEDURE — RXMED WILLOW AMBULATORY MEDICATION CHARGE: Performed by: PSYCHIATRY & NEUROLOGY

## 2025-01-02 PROCEDURE — RXMED WILLOW AMBULATORY MEDICATION CHARGE: Performed by: NURSE PRACTITIONER

## 2025-01-02 RX ORDER — METFORMIN HYDROCHLORIDE 500 MG/1
500 TABLET, EXTENDED RELEASE ORAL 2 TIMES DAILY
Qty: 200 TABLET | Refills: 1 | Status: SHIPPED | OUTPATIENT
Start: 2025-01-02

## 2025-01-02 NOTE — TELEPHONE ENCOUNTER
Received request via: Pharmacy    Was the patient seen in the last year in this department? Yes    Does the patient have an active prescription (recently filled or refills available) for medication(s) requested? No    Pharmacy Name: [facility] [place]     Does the patient have [facility] and need 100-day supply? (This applies to ALL medications) Yes, quantity updated to 100 days

## 2025-01-03 ENCOUNTER — PHARMACY VISIT (OUTPATIENT)
Dept: PHARMACY | Facility: MEDICAL CENTER | Age: 65
End: 2025-01-03
Payer: COMMERCIAL

## 2025-01-10 ENCOUNTER — TELEMEDICINE (OUTPATIENT)
Dept: BEHAVIORAL HEALTH | Facility: CLINIC | Age: 65
End: 2025-01-10
Payer: MEDICARE

## 2025-01-10 DIAGNOSIS — F33.1 MODERATE EPISODE OF RECURRENT MAJOR DEPRESSIVE DISORDER (HCC): ICD-10-CM

## 2025-01-10 DIAGNOSIS — F51.04 PSYCHOPHYSIOLOGICAL INSOMNIA: ICD-10-CM

## 2025-01-10 DIAGNOSIS — F41.1 GAD (GENERALIZED ANXIETY DISORDER): ICD-10-CM

## 2025-01-10 PROCEDURE — 90833 PSYTX W PT W E/M 30 MIN: CPT | Mod: 95 | Performed by: PSYCHIATRY & NEUROLOGY

## 2025-01-10 PROCEDURE — 99214 OFFICE O/P EST MOD 30 MIN: CPT | Mod: 95 | Performed by: PSYCHIATRY & NEUROLOGY

## 2025-01-10 NOTE — PROGRESS NOTES
This evaluation was conducted via Teams using secure and encrypted videoconferencing technology. The patient was in their home in the [facility].    The patient's identity was confirmed and verbal consent was obtained for this virtual visit.      PSYCHIATRY FOLLOW-UP NOTE    Name: Debby Dseai  MRN: [id number]  : 1960  Age: 64 y.o.  Date of assessment: 1/10/2025  PCP: ARELIS Chris  Persons in attendance: Patient      REASON FOR VISIT/CHIEF COMPLAINT (as stated by Patient):  Debby Desai is a 64 y.o., White female, attending follow-up appointment for mood and anxiety management.      HISTORY OF PRESENT ILLNESS:  Debby Desai is a 64 y.o. old female with MDD, TIM and insomnia comes in today for follow up. Patient was last seen 3 months ago, and following treatment planning recommendations were done:  Continue Effexor  mg daily for mood, anxiety and comorbid pain management.   Continue Wellbutrin  mg BID daily for depression augmentation.    Continue Buspar 10 mg BID + 10 mg PRN for anxiety.  Continue Trazodone 50- mg HS PRN for insomnia.    Monitor for serotonin syndrome.  Continue psychotherapy for mood and anxiety management.    History of Present Illness    She reports a decrease in depressive symptoms but continues to experience feelings of sadness and frustration, which she attributes to her ongoing recovery from shoulder surgery.   She is currently seeking a therapist and has been documenting her thoughts and memories.   She attempted to reduce her trazodone dosage to one tablet nightly but found it ineffective.   She has been actively working on emotional identification and has been engaging in reading as a therapeutic activity. She has also been practicing chest tapping, a technique she learned during intensive outpatient therapy, which she finds beneficial when performed by others.   She expresses pride in her mental and physical progress and has been  gradually increasing the use of her shoulder. She has been engaging in more baking and cooking activities. She has been making efforts to identify her emotions and has been reading more.   She has been managing overstimulation by removing herself from noisy environments.     She has initiated CPAP therapy for her sleep apnea. Initially, she struggled with a full mask due to leakage issues, which disrupted her sleep. After approximately one month, she switched to a nasal mask provided by her medical equipment provider, which she finds more comfortable.     Patient is compliant with medication and feeling stable from mood and anxiety standpoint and agreed with not titrating medications further.      PSYCHOTHERAPY ASPECT OF SESSION (16 MIN):  Session was dedicated to letting patient express her feelings related to recent changes.  Validation was provided for appropriate emotional responses and normalization was done.  Educated patient to work on an hourly diaphragmatic breathing with implementing gratitude.  Importance of not discounting the positive was emphasized.  We discussed the improvement she has seen and motivated patient to consider psychotherapy for additional help as well.  Later part of the session was dedicated to active listening and implementing supportive therapy.      CURRENT MEDICATIONS:  Current Outpatient Medications   Medication Sig Dispense Refill    metFORMIN ER (GLUCOPHAGE XR) 500 MG TABLET SR 24 HR Take 1 tablet by mouth 2 times a day. 200 Tablet 1    busPIRone (BUSPAR) 10 MG Tab tablet Take 1 tablet by mouth 2 times a day. May also take 1 tablet 1 time a day as needed (anxiety). 90 Tablet 0    oxyCODONE-acetaminophen (PERCOCET) 5-325 MG Tab Take 1 Tablet by mouth 1 time a day as needed for 30 days 30 Tablet 0    oxyCODONE-acetaminophen (PERCOCET-10)  MG Tab Take 1 tablet by mouth every 6 hours  as needed for 3 days, for post op pain. (Patient not taking: Reported on 11/4/2024) 12 Tablet  0    levothyroxine (SYNTHROID) 50 MCG Tab Take 1 tablet by mouth every morning on an empty stomach. 90 Tablet 1    oxyCODONE-acetaminophen (PERCOCET-10)  MG Tab Take 1 tablet every 4-6 hours by mouth as needed for 5 days. (Patient not taking: Reported on 11/4/2024) 30 Tablet 0    amLODIPine (NORVASC) 10 MG Tab Take 1 tablet by mouth every day. 90 Tablet 3    buPROPion (WELLBUTRIN SR) 200 MG SR tablet Take 1 tablet by mouth 2 times a day. 180 Tablet 2    venlafaxine (EFFEXOR-XR) 150 MG extended-release capsule Take 1 Capsule by mouth every day. (venlafaxine xr 150 mg + 75 mg = 225 mg daily) 90 Capsule 2    venlafaxine XR (EFFEXOR XR) 75 MG CAPSULE SR 24 HR Take 1 capsule by mouth every day. (venlafaxine xr 150 mg + 75 mg = 225 mg daily) 90 Capsule 2    traZODone (DESYREL) 50 MG Tab Take 2 Tablets by mouth at bedtime as needed for Sleep (as needed for sleep). 180 Tablet 3    multivitamin Tab Take 1 Tablet by mouth every day. MEDICATION INSTRUCTIONS FOR SURGERY/PROCEDURE SCHEDULED FOR 8/1/24   DO NOT TAKE 7 DAYS PRIOR TO SURGERY      lisinopril (PRINIVIL) 40 MG tablet Take 1 Tablet by mouth every day. 100 Tablet 3    atorvastatin (LIPITOR) 20 MG Tab TAKE ONE TABLET BY MOUTH EVERY  Tablet 2    Ferrous Sulfate (IRON) 325 (65 Fe) MG Tab Take 65 mg by mouth every day at 6 PM. MEDICATION INSTRUCTIONS FOR SURGERY/PROCEDURE SCHEDULED FOR 8/1/24   DO NOT TAKE 7 DAYS PRIOR TO SURGERY       No current facility-administered medications for this visit.       MEDICAL HISTORY  Past Medical History:   Diagnosis Date    Anemia     in past    Anginal syndrome (HCC)     had work up and feet r/t anxiety    Anxiety     Arthritis     OA knees    Back pain     Back pain     Chest tightness     Chickenpox     Chronic pain 06/02/2021    Daytime sleepiness     Fatigue     Frequent headaches     Frequent urination     [place] measles     High cholesterol     HTN (hypertension), benign 03/19/2012    Hypertension     Hypothyroid  03/19/2012    Hypothyroidism     Influenza     Insomnia     Major depression 03/19/2012    Morning headache     Mumps     Obesity (BMI 30-39.9) 07/09/2018    Orbital floor (blow-out) closed fracture (HCC)     left    Pain     thoracic spine, Right knee, myofacial pain, and Right shoulder    Pain     recently fell and has bruise left forehead    Pain 02/2018    chronic back pain, right shoulder    Painful joint     Peptic ulcer     Pre-diabetes     Psychiatric problem     depression, anxiety    Ringing in ears     Sleep apnea     No Device, will see sleep DrMaxwell 11/2024    Sore muscles     Toothache     Unspecified disorder of thyroid     Urinary bladder disorder     stress incont.    Urinary incontinence     Occasional    Wears glasses      Past Surgical History:   Procedure Laterality Date    PB RECONSTR TOTAL SHOULDER IMPLANT Left 08/01/2024    Procedure: LEFT TOTAL SHOULDER REPLACEMENT, REVERSE;  Surgeon: Scott Lorenzo M.D.;  Location: SURGERY [facility];  Service: Orthopedics    ID NEUROLYTIC DEST GENICULAR NERVE Right 07/28/2023    Procedure: RIGHT genicular nerve radiofrequency ablation;  Surgeon: Volodymyr Herring M.D.;  [facility];  Service: Pain Management    ID FLUOROSCOPIC GUIDANCE NEEDLE PLACEMENT Right 07/07/2023    Procedure: RIGHT genicular nerve diagnostic blocks;  Surgeon: Volodymyr Herring M.D.;  Location: [facility];  Service: Pain Management    INJ,EPI ANES/STER LUM/SAC ADDL Right 04/07/2021    Procedure: RIGHT lumbar five and sacral one transforaminal epidural;  Surgeon: Volodymyr Herring M.D.;  Location: [facility];  Service: Pain Management    PB TOTAL KNEE ARTHROPLASTY Right 09/16/2019    Procedure: RIGHT ARTHROPLASTY, KNEE, TOTAL;  Surgeon: Rufus Saba M.D.;  Location: SURGERY [facility];  Service: Orthopedics    KNEE ARTHROSCOPY Right 02/09/2018    Procedure: KNEE ARTHROSCOPY;  Surgeon: Harsh Baltazar M.D.;   Location: [facility];  Service: Orthopedics    MENISCECTOMY, KNEE, MEDIAL Right 02/09/2018    Procedure: MEDIAL AND LATERAL MENISCECTOMY- PARTIAL;  Surgeon: Harsh Baltazar M.D.;  Location: [facility];  Service: Orthopedics    KNEE ARTHROSCOPY Right 07/12/2017    Procedure: KNEE ARTHROSCOPY- CESPEDES;  Surgeon: Harsh Baltazar M.D.;  Location: [facility];  Service:     MENISCECTOMY, KNEE, MEDIAL Right 07/12/2017    Procedure: PARTIAL MEDIAL AND LATERAL MENISCECTOMY;  Surgeon: Harsh Baltazar M.D.;  Location: [facility];  Service:     SYNOVECTOMY Right 07/12/2017    Procedure: SYNOVECTOMY;  Surgeon: Harsh Baltazar M.D.;  Location: [facility];  Service:     KNEE ARTHROSCOPY  04/21/2015    Performed by Rufus Saba M.D. at SURGERY [facility]    MENISCECTOMY, KNEE, MEDIAL  04/21/2015    Performed by Rufus Saba M.D. at [facility]    PUMP REVISION  12/10/2012    Performed by Allison Guerra M.D. at [facility]    SPINAL CORD STIMULATOR  05/30/2012    Performed by ALLISON GUERRA at [facility]    BIOPSY GENERAL  05/01/2012    endometrial    SPINAL CORD STIMULATOR  07/11/2011    Performed by ALLISON GUERRA at [facility]    BLOCK EPIDURAL STEROID INJECTION  2008    x 3-4    LYMPH NODE EXCISION Left 2007    cervicle    OTHER Right 2001    thoracic discectomy      ARTHROSCOPY, KNEE Left 1999    RIB RESECTION Right 1980    LAMINOTOMY      LUMPECTOMY         PAST PSYCHIATRIC MEDICATIONS  Fluoxetine, Paroxetine, Sertraline, Citalopram  Venlafaxine, Duloxetine  Wellbutrin  Mirtazapine  Amitriptyline (switched to Mirtazapine during admission for dehydration related syncope)   Ativan, Propranolol    REVIEW OF SYSTEMS:        Constitutional negative   Eyes negative   Ears/Nose/Mouth/Throat negative   Cardiovascular negative   Respiratory negative   Gastrointestinal  negative   Genitourinary negative   Muscular negative   Integumentary negative   Neurological negative   Endocrine negative   Hematologic/Lymphatic negative     PHYSICAL EXAMINAION:  Vital signs: LMP 2012   Musculoskeletal: Normal gait.   Abnormal movements: none      MENTAL STATUS EXAMINATION      General:   - Grooming and hygiene: Casual,   - Apparent distress: none,   - Behavior: Calm  - Eye Contact:  Good,   - no psychomotor agitation or retardation    - Participation: Active verbal participation  Orientation: Alert and Fully Oriented to person, place and time  Mood: Euthymic  Affect: Flexible and Full range,  Thought Process: Logical and Goal-directed  Thought Content: Denies suicidal or homicidal ideations, intent or plan   Perception: Denies auditory or visual hallucinations. No delusions noted   Attention span and concentration: Intact   Speech:Rate within normal limits and Volume within normal limits  Language: Appropriate   Insight: Good  Judgment: Good  Recent and remote memory: No gross evidence of memory deficits        DEPRESSION SCREENIN/7/2024     1:31 PM 6/3/2024    10:00 AM 2024    11:30 AM   Depression Screen (PHQ-2/PHQ-9)   PHQ-2 Total Score 6 3    PHQ-9 Total Score 17 13 16       Interpretation of PHQ-9 Total Score   Score Severity   1-4 No Depression   5-9 Mild Depression   10-14 Moderate Depression   15-19 Moderately Severe Depression   20-27 Severe Depression    CURRENT RISK:       Suicidal: Low       Homicidal: Low       Self-Harm: Low       Relapse: Low       Crisis Safety Plan Reviewed Not Indicated       If evidence of imminent risk is present, intervention/plan:      MEDICAL RECORDS/LABS/DIAGNOSTIC TESTS REVIEWED:  No new lab since last visit     [place]  records -   Reviewed     PLAN:  (1) MDD; (2) TIM; (3) Insomnia  Improving (pain is limiting factors)  Continue Effexor  mg daily for mood, anxiety and comorbid pain management.   Continue Wellbutrin  mg BID  daily for depression augmentation.    Continue Buspar 10 mg BID + 10 mg PRN for anxiety.  Continue Trazodone 50- mg HS PRN for insomnia.    Monitor for serotonin syndrome.  Continue psychotherapy for mood and anxiety management.  Medication options, alternatives (including no medications) and medication risks/benefits/side effects were discussed in detail.  Explained importance of contraceptive measures while on psychotropic medications, educated to let provider know if ever pregnant or wanting to become pregnant. Verbalized understanding.  The patient was advised to call, message provider on [employer], or come in to the clinic if symptoms worsen or if any future questions/issues regarding their medications arise; the patient verbalized understanding and agreement.    The patient was educated to call 911, call the suicide hotline, or go to local ER if having thoughts of suicide or homicide; verbalized understanding.      Billing Coding based on:  14300 based on [facility]  [id number]: based on psychotherapy timing    Return to clinic in 3 months or sooner if symptoms worsen.  Next Appointment: instruction provided on how to make the next appointment.     The proposed treatment plan was discussed with the patient who was provided the opportunity to ask questions and make suggestions regarding alternative treatment. Patient verbalized understanding and expressed agreement with the plan.       Genaro Stevenson M.D.  01/10/25    This note was created using voice recognition software (Dragon). The accuracy of the dictation is limited by the abilities of the software. I have reviewed the note prior to signing, however some errors in grammar and context are still possible. If you have any questions related to this note please do not hesitate to contact our office.

## 2025-01-15 PROCEDURE — RXMED WILLOW AMBULATORY MEDICATION CHARGE: Performed by: NURSE PRACTITIONER

## 2025-01-16 ENCOUNTER — PHARMACY VISIT (OUTPATIENT)
Dept: PHARMACY | Facility: MEDICAL CENTER | Age: 65
End: 2025-01-16
Payer: COMMERCIAL

## 2025-01-25 ENCOUNTER — OFFICE VISIT (OUTPATIENT)
Dept: URGENT CARE | Facility: PHYSICIAN GROUP | Age: 65
End: 2025-01-25
Payer: MEDICARE

## 2025-01-25 ENCOUNTER — HOSPITAL ENCOUNTER (OUTPATIENT)
Dept: RADIOLOGY | Facility: MEDICAL CENTER | Age: 65
End: 2025-01-25
Attending: NURSE PRACTITIONER
Payer: MEDICARE

## 2025-01-25 VITALS
DIASTOLIC BLOOD PRESSURE: 60 MMHG | RESPIRATION RATE: 18 BRPM | SYSTOLIC BLOOD PRESSURE: 118 MMHG | WEIGHT: 149 LBS | BODY MASS INDEX: 23.39 KG/M2 | TEMPERATURE: 98.4 F | HEIGHT: 67 IN | OXYGEN SATURATION: 99 % | HEART RATE: 57 BPM

## 2025-01-25 DIAGNOSIS — S99.921A RIGHT FOOT INJURY, INITIAL ENCOUNTER: ICD-10-CM

## 2025-01-25 DIAGNOSIS — S99.911A RIGHT ANKLE INJURY, INITIAL ENCOUNTER: ICD-10-CM

## 2025-01-25 DIAGNOSIS — S92.351A CLOSED FRACTURE OF BASE OF FIFTH METATARSAL BONE OF RIGHT FOOT, INITIAL ENCOUNTER: ICD-10-CM

## 2025-01-25 PROCEDURE — 1170F FXNL STATUS ASSESSED: CPT | Performed by: NURSE PRACTITIONER

## 2025-01-25 PROCEDURE — 73630 X-RAY EXAM OF FOOT: CPT | Mod: RT

## 2025-01-25 PROCEDURE — 73610 X-RAY EXAM OF ANKLE: CPT | Mod: RT

## 2025-01-25 PROCEDURE — 99214 OFFICE O/P EST MOD 30 MIN: CPT | Performed by: NURSE PRACTITIONER

## 2025-01-25 PROCEDURE — 3078F DIAST BP <80 MM HG: CPT | Performed by: NURSE PRACTITIONER

## 2025-01-25 PROCEDURE — 3074F SYST BP LT 130 MM HG: CPT | Performed by: NURSE PRACTITIONER

## 2025-01-25 ASSESSMENT — ENCOUNTER SYMPTOMS
CHILLS: 0
TINGLING: 0
SENSORY CHANGE: 0
FOCAL WEAKNESS: 0
MYALGIAS: 1
FEVER: 0

## 2025-01-25 ASSESSMENT — FIBROSIS 4 INDEX: FIB4 SCORE: 1.01

## 2025-01-25 NOTE — PROGRESS NOTES
Subjective     Debby Desai is a 64 y.o. female who presents with Ankle Injury (Missed step from stair hot tub, hit right foot, ankle, bruising, swollen, happened last night.)            HPI  New problem.  Patient is a very pleasant 64-year-old female who presents with right foot and ankle injury after [place]  She states she twisted it and since that time she has been unable to bear weight without significant pain.  She has significant swelling and bruising to this area.  She is taken 1 Percocet that she has through her pain management doctor for her symptoms at approximately 3 AM this morning.  She denies any distal paresthesia or focal weakness of this foot.    Sulfamethoxazole-trimethoprim  Current Outpatient Medications on File Prior to Visit   Medication Sig Dispense Refill    gabapentin (NEURONTIN) 300 MG Cap Take 1 capsule by mouth as needed at bedtime for 30 days. 30 Capsule 2    oxyCODONE-acetaminophen (PERCOCET) 5-325 MG Tab Take 1 tablet by mouth twice a day as needed for 30 days. 45 Tablet 0    [START ON 2/14/2025] oxyCODONE-acetaminophen (PERCOCET) 5-325 MG Tab Take 1 tablet by mouth twice a day as needed for 30 days. DNFU 2/14 45 Tablet 0    metFORMIN ER (GLUCOPHAGE XR) 500 MG TABLET SR 24 HR Take 1 tablet by mouth 2 times a day. 200 Tablet 1    busPIRone (BUSPAR) 10 MG Tab tablet Take 1 tablet by mouth 2 times a day. May also take 1 tablet 1 time a day as needed (anxiety). 90 Tablet 0    oxyCODONE-acetaminophen (PERCOCET) 5-325 MG Tab Take 1 Tablet by mouth 1 time a day as needed for 30 days 30 Tablet 0    oxyCODONE-acetaminophen (PERCOCET-10)  MG Tab Take 1 tablet by mouth every 6 hours  as needed for 3 days, for post op pain. 12 Tablet 0    levothyroxine (SYNTHROID) 50 MCG Tab Take 1 tablet by mouth every morning on an empty stomach. 90 Tablet 1    oxyCODONE-acetaminophen (PERCOCET-10)  MG Tab Take 1 tablet every 4-6 hours by mouth as  needed for 5 days. 30 Tablet 0    amLODIPine (NORVASC) 10 MG Tab Take 1 tablet by mouth every day. 90 Tablet 3    buPROPion (WELLBUTRIN SR) 200 MG SR tablet Take 1 tablet by mouth 2 times a day. 180 Tablet 2    venlafaxine (EFFEXOR-XR) 150 MG extended-release capsule Take 1 Capsule by mouth every day. (venlafaxine xr 150 mg + 75 mg = 225 mg daily) 90 Capsule 2    venlafaxine XR (EFFEXOR XR) 75 MG CAPSULE SR 24 HR Take 1 capsule by mouth every day. (venlafaxine xr 150 mg + 75 mg = 225 mg daily) 90 Capsule 2    traZODone (DESYREL) 50 MG Tab Take 2 Tablets by mouth at bedtime as needed for Sleep (as needed for sleep). 180 Tablet 3    multivitamin Tab Take 1 Tablet by mouth every day. MEDICATION INSTRUCTIONS FOR SURGERY/PROCEDURE SCHEDULED FOR 8/1/24   DO NOT TAKE 7 DAYS PRIOR TO SURGERY      lisinopril (PRINIVIL) 40 MG tablet Take 1 Tablet by mouth every day. 100 Tablet 3    atorvastatin (LIPITOR) 20 MG Tab TAKE ONE TABLET BY MOUTH EVERY  Tablet 2    Ferrous Sulfate (IRON) 325 (65 Fe) MG Tab Take 65 mg by mouth every day at 6 PM. MEDICATION INSTRUCTIONS FOR SURGERY/PROCEDURE SCHEDULED FOR 8/1/24   DO NOT TAKE 7 DAYS PRIOR TO SURGERY       No current facility-administered medications on file prior to visit.     Social History     Socioeconomic History    Marital status: Single     Spouse name: Not on file    Number of children: Not on file    Years of education: Not on file    Highest education level: Associate degree: academic program   Occupational History    Not on file   Tobacco Use    Smoking status: Never     Passive exposure: Never    Smokeless tobacco: Never   Vaping Use    Vaping status: Never Used   Substance and Sexual Activity    Alcohol use: Not Currently    Drug use: Never    Sexual activity: Never     Partners: Male   Other Topics Concern     Service No    Blood Transfusions No    Caffeine Concern No    Occupational Exposure No    Hobby Hazards No    Sleep Concern Yes    Stress Concern Yes     Weight Concern Yes    Special Diet No    Back Care No    Exercise No    Bike Helmet No    Seat Belt Yes    Self-Exams No   Social History Narrative    Not on file     Social Drivers of Health     Financial Resource Strain: Low Risk  (6/3/2024)    Overall Financial Resource Strain (CARDIA)     Difficulty of Paying Living Expenses: Not very hard   Food Insecurity: No Food Insecurity (6/3/2024)    Hunger Vital Sign     Worried About Running Out of Food in the Last Year: Never true     Ran Out of Food in the Last Year: Never true   Transportation Needs: No Transportation Needs (6/3/2024)    PRAPARE - Transportation     Lack of Transportation (Medical): No     Lack of Transportation (Non-Medical): No   Physical Activity: Insufficiently Active (5/7/2024)    Exercise Vital Sign     Days of Exercise per Week: 3 days     Minutes of Exercise per Session: 10 min   Stress: Stress Concern Present (5/7/2024)    [employer] - Occupational Stress Questionnaire     Feeling of Stress : Very much   Social Connections: Socially Isolated (5/7/2024)    Social Connection and Isolation Panel [NHANES]     Frequency of Communication with Friends and Family: More than three times a week     Frequency of Social Gatherings with Friends and Family: Once a week     Attends Synagogue Services: Never     Active Member of Clubs or Organizations: No     Attends Club or Organization Meetings: Never     Marital Status: Never    Intimate Partner Violence: Not At Risk (5/7/2024)    Humiliation, Afraid, Rape, and Kick questionnaire     Fear of Current or Ex-Partner: No     Emotionally Abused: No     Physically Abused: No     Sexually Abused: No   Housing Stability: Low Risk  (6/3/2024)    Housing Stability Vital Sign     Unable to Pay for Housing in the Last Year: No     Number of Places Lived in the Last Year: 1     Unstable Housing in the Last Year: No     Breast Cancer-related family history is not on  "file.      Review of Systems   Constitutional:  Negative for chills, fever and malaise/fatigue.   Musculoskeletal:  Positive for joint pain and myalgias.   Skin:         Bruising about the lateral aspect of right foot.   Neurological:  Negative for tingling, sensory change and focal weakness.   All other systems reviewed and are negative.             Objective     /60 (BP Location: Right arm, Patient Position: Sitting, BP Cuff Size: Adult)   Pulse (!) 57   Temp 36.9 °C (98.4 °F)   Resp 18   Ht 1.702 m (5' 7\")   Wt 67.6 kg (149 lb)   LMP 02/26/2012   SpO2 99%   BMI 23.34 kg/m²      Physical Exam  Vitals and nursing note reviewed.   Constitutional:       Appearance: Normal appearance.   Cardiovascular:      Rate and Rhythm: Normal rate and regular rhythm.      Heart sounds: No murmur heard.  Pulmonary:      Effort: Pulmonary effort is normal.      Breath sounds: Normal breath sounds.   Musculoskeletal:      Right foot: Decreased range of motion. Normal capillary refill. Swelling, tenderness and bony tenderness present.   Skin:     General: Skin is warm and dry.      Findings: Bruising present.   Neurological:      General: No focal deficit present.      Mental Status: She is alert and oriented to person, place, and time.      Gait: Gait abnormal.                             Assessment & Plan        Assessment & Plan  Closed fracture of base of fifth metatarsal bone of right foot, initial encounter    Orders:    Referral to Orthopedics    Right ankle injury, initial encounter    Orders:    DX-ANKLE 3+ VIEWS RIGHT; Future    Right foot injury, initial encounter    Orders:    DX-FOOT-COMPLETE 3+ RIGHT; Future       Patient with linear fracture of the base of the fifth metatarsal.  She is placed in a short boot and is nonweightbearing.  She cannot use crutches due to a recent shoulder replacement however she does have a wheelchair at home.  She is advised that she may continue her Percocet via her pain " management doctor and add some ibuprofen.  She may remove the boot to ice the area.  Orthopedics referral placed to [facility] as that is where she is receiving care at this time.  She is discharged stable and all her questions have been answered.  She is advised to reach out if she has any further questions as I am in clinic till 5 today and as well tomorrow.

## 2025-01-27 PROCEDURE — RXMED WILLOW AMBULATORY MEDICATION CHARGE: Performed by: NURSE PRACTITIONER

## 2025-01-29 PROCEDURE — RXMED WILLOW AMBULATORY MEDICATION CHARGE: Performed by: NURSE PRACTITIONER

## 2025-01-31 ENCOUNTER — PHARMACY VISIT (OUTPATIENT)
Dept: PHARMACY | Facility: MEDICAL CENTER | Age: 65
End: 2025-01-31
Payer: COMMERCIAL

## 2025-02-04 ENCOUNTER — OFFICE VISIT (OUTPATIENT)
Dept: SLEEP MEDICINE | Facility: MEDICAL CENTER | Age: 65
End: 2025-02-04
Attending: NURSE PRACTITIONER
Payer: MEDICARE

## 2025-02-04 VITALS
HEART RATE: 88 BPM | RESPIRATION RATE: 14 BRPM | SYSTOLIC BLOOD PRESSURE: 120 MMHG | OXYGEN SATURATION: 98 % | BODY MASS INDEX: 23.39 KG/M2 | HEIGHT: 67 IN | WEIGHT: 149 LBS | DIASTOLIC BLOOD PRESSURE: 60 MMHG

## 2025-02-04 DIAGNOSIS — G47.33 OSA (OBSTRUCTIVE SLEEP APNEA): ICD-10-CM

## 2025-02-04 PROCEDURE — 3074F SYST BP LT 130 MM HG: CPT | Performed by: NURSE PRACTITIONER

## 2025-02-04 PROCEDURE — 99213 OFFICE O/P EST LOW 20 MIN: CPT | Performed by: NURSE PRACTITIONER

## 2025-02-04 PROCEDURE — 3078F DIAST BP <80 MM HG: CPT | Performed by: NURSE PRACTITIONER

## 2025-02-04 PROCEDURE — 99214 OFFICE O/P EST MOD 30 MIN: CPT | Performed by: NURSE PRACTITIONER

## 2025-02-04 PROCEDURE — 1170F FXNL STATUS ASSESSED: CPT | Performed by: NURSE PRACTITIONER

## 2025-02-04 ASSESSMENT — FIBROSIS 4 INDEX: FIB4 SCORE: 1.01

## 2025-02-04 ASSESSMENT — PATIENT HEALTH QUESTIONNAIRE - PHQ9: CLINICAL INTERPRETATION OF PHQ2 SCORE: 0

## 2025-02-04 NOTE — PROGRESS NOTES
Chief Complaint   Patient presents with    Follow-Up     SLEEP - ESTABLISHED PT CHART PREP COMPLETED ON 01/30/2025     Last Office Visit 11/04/2024 with Dr. Stevenson    Setup date: 11/20/2024    1st Compliance: Yes    DME: DME:  [employer] / ph [phone] / fx [phone]      PAP/O2/OAT: AirSense 11 AutoSet   Min Pressure (cmH2O)  6.0  Max Pressure (cmH2O)  14.0  EPR  Ramp Only  EPR level (cmH2O)  1  Ramp enable  Auto  Ramp time (min)  20  Start pressure (cmH2O)  4.0  Response  Standard     Wireless Data? YES ResMed        HPI:  Debby Desai is a 64 y.o. year old female here today for follow-up on TU with first compliance on CPAP.  Last seen 11/4/2024.  Patient was set up on 11/20/2024 auto CPAP 6 to 14 cm/H2O.  Past medical history includes PTSD, BPD, ADD, insomnia, severe TU, insomnia.  Sleep study 2/2024 - AHI 43, donita spO2 63%, 9 min <88%.  Frequent nocturnal awakenings.  Does not feel well rested upon awakening in the morning.  Reports very limited energy during the day.  Daytime sleepiness, morning headaches     Sleep aids: trazodone 100 mg but also for her depression.    Started with a fullface mask but switched to nasal pillows.  Denies any difficulty with mask fit or pressures.  Does have improved sleep quality with CPAP and still has persistent fatigue, has a history of insomnia currently using trazodone but also takes gabapentin for nerve pain.  Can be potentially contributing to her fatigue.  States that the headaches have improved and her sleep quality is certainly improved as well, however suffers from left shoulder pain and pain associated with a right foot fracture.  Currently taking Percocet for pain control.    Previous compliance reviewed with patient shows 97% use with an average time of 6 hours and 36 minutes and a residual AHI of 8.8 with a central apnea index of 5.0.  Occasional mask leak noted.      ROS: As per HPI and otherwise negative if not stated.    Past Medical History:    Diagnosis Date    Anemia     in past    Anginal syndrome (HCC)     had work up and feet r/t anxiety    Anxiety     Arthritis     OA knees    Back pain     Back pain     Chest tightness     Chickenpox     Chronic pain 06/02/2021    Daytime sleepiness     Fatigue     Frequent headaches     Frequent urination     [place] measles     High cholesterol     HTN (hypertension), benign 03/19/2012    Hypertension     Hypothyroid 03/19/2012    Hypothyroidism     Influenza     Insomnia     Major depression 03/19/2012    Morning headache     Mumps     Obesity (BMI 30-39.9) 07/09/2018    Orbital floor (blow-out) closed fracture (HCC)     left    Pain     thoracic spine, Right knee, myofacial pain, and Right shoulder    Pain     recently fell and has bruise left forehead    Pain 02/2018    chronic back pain, right shoulder    Painful joint     Peptic ulcer     Pre-diabetes     Psychiatric problem     depression, anxiety    Ringing in ears     Sleep apnea     No Device, will see sleep DrMaxwell 11/2024    Sore muscles     Toothache     Unspecified disorder of thyroid     Urinary bladder disorder     stress incont.    Urinary incontinence     Occasional    Wears glasses        Past Surgical History:   Procedure Laterality Date    PB RECONSTR TOTAL SHOULDER IMPLANT Left 08/01/2024    Procedure: LEFT TOTAL SHOULDER REPLACEMENT, REVERSE;  Surgeon: Scott Lorenzo M.D.;  Location: SURGERY [facility];  Service: Orthopedics    WA NEUROLYTIC DEST GENICULAR NERVE Right 07/28/2023    Procedure: RIGHT genicular nerve radiofrequency ablation;  Surgeon: Volodymyr Herring M.D.;  [facility];  Service: Pain Management    WA FLUOROSCOPIC GUIDANCE NEEDLE PLACEMENT Right 07/07/2023    Procedure: RIGHT genicular nerve diagnostic blocks;  Surgeon: Volodymyr Herring M.D.;  Location: [facility];  Service: Pain Management    INJ,EPI ANES/STER LUM/SAC ADDL Right 04/07/2021    Procedure: RIGHT lumbar five and sacral  one transforaminal epidural;  Surgeon: Volodymyr Herring M.D.;  Location: [facility];  Service: Pain Management    PB TOTAL KNEE ARTHROPLASTY Right 09/16/2019    Procedure: RIGHT ARTHROPLASTY, KNEE, TOTAL;  Surgeon: Rufus Saba M.D.;  Location: [facility];  Service: Orthopedics    KNEE ARTHROSCOPY Right 02/09/2018    Procedure: KNEE ARTHROSCOPY;  Surgeon: Harsh Baltazar M.D.;  Location: [facility];  Service: Orthopedics    MENISCECTOMY, KNEE, MEDIAL Right 02/09/2018    Procedure: MEDIAL AND LATERAL MENISCECTOMY- PARTIAL;  Surgeon: Harsh Baltazar M.D.;  Location: [facility];  Service: Orthopedics    KNEE ARTHROSCOPY Right 07/12/2017    Procedure: KNEE ARTHROSCOPY- CESPEDES;  Surgeon: Harsh Baltazar M.D.;  Location: [facility];  Service:     MENISCECTOMY, KNEE, MEDIAL Right 07/12/2017    Procedure: PARTIAL MEDIAL AND LATERAL MENISCECTOMY;  Surgeon: Harsh Baltazar M.D.;  Location: [facility];  Service:     SYNOVECTOMY Right 07/12/2017    Procedure: SYNOVECTOMY;  Surgeon: Harsh Baltazar M.D.;  Location: [facility];  Service:     KNEE ARTHROSCOPY  04/21/2015    Performed by Rufus Saba M.D. at [facility]    MENISCECTOMY, KNEE, MEDIAL  04/21/2015    Performed by Rufus Saba M.D. at [facility]    PUMP REVISION  12/10/2012    Performed by Allison Guerra M.D. at [facility]    SPINAL CORD STIMULATOR  05/30/2012    Performed by ALLISON GUERRA at [facility]    BIOPSY GENERAL  05/01/2012    endometrial    SPINAL CORD STIMULATOR  07/11/2011    Performed by ALLISON GUERRA at [facility]    BLOCK EPIDURAL STEROID INJECTION  2008    x 3-4    LYMPH NODE EXCISION Left 2007    cervicle    OTHER Right 2001    thoracic discectomy      ARTHROSCOPY, KNEE Left 1999    RIB RESECTION Right 1980    LAMINOTOMY      LUMPECTOMY    "      Family History   Problem Relation Age of Onset    Hypertension Father     Diabetes Father     Heart Disease Father     Alcohol abuse Father     Sleep Apnea Father     Anesthesia Sister         slow to come out (as a child)    Asthma Sister     Cancer Sister     Diabetes Other     Heart Disease Other     Cancer Other     Hypertension Other     Stroke Other     Lung Disease Other     Cancer Paternal Grandmother     Cancer Paternal Aunt     Cancer Paternal Aunt        Allergies as of 02/04/2025 - Reviewed 02/04/2025   Allergen Reaction Noted    Sulfamethoxazole-trimethoprim Rash and Swelling 05/23/2016        Vitals:  /60 (BP Location: Left arm, Patient Position: Sitting, BP Cuff Size: Adult)   Pulse 88   Resp 14   Ht 1.702 m (5' 7\")   Wt 67.6 kg (149 lb)   SpO2 98%     Current medications as of today   Current Outpatient Medications   Medication Sig Dispense Refill    oxyCODONE immediate-release (ROXICODONE) 5 MG Tab Take 1 tablet by mouth 3 times a day for 5 days. 15 Tablet 0    gabapentin (NEURONTIN) 300 MG Cap Take 1 capsule by mouth as needed at bedtime for 30 days. 30 Capsule 2    oxyCODONE-acetaminophen (PERCOCET) 5-325 MG Tab Take 1 tablet by mouth twice a day as needed for 30 days. 45 Tablet 0    [START ON 2/14/2025] oxyCODONE-acetaminophen (PERCOCET) 5-325 MG Tab Take 1 tablet by mouth twice a day as needed for 30 days. DNFU 2/14 45 Tablet 0    metFORMIN ER (GLUCOPHAGE XR) 500 MG TABLET SR 24 HR Take 1 tablet by mouth 2 times a day. 200 Tablet 1    busPIRone (BUSPAR) 10 MG Tab tablet Take 1 tablet by mouth 2 times a day. May also take 1 tablet 1 time a day as needed (anxiety). 90 Tablet 0    oxyCODONE-acetaminophen (PERCOCET) 5-325 MG Tab Take 1 Tablet by mouth 1 time a day as needed for 30 days 30 Tablet 0    oxyCODONE-acetaminophen (PERCOCET-10)  MG Tab Take 1 tablet by mouth every 6 hours  as needed for 3 days, for post op pain. 12 Tablet 0    levothyroxine (SYNTHROID) 50 MCG Tab " Take 1 tablet by mouth every morning on an empty stomach. 90 Tablet 1    oxyCODONE-acetaminophen (PERCOCET-10)  MG Tab Take 1 tablet every 4-6 hours by mouth as needed for 5 days. 30 Tablet 0    amLODIPine (NORVASC) 10 MG Tab Take 1 tablet by mouth every day. 90 Tablet 3    buPROPion (WELLBUTRIN SR) 200 MG SR tablet Take 1 tablet by mouth 2 times a day. 180 Tablet 2    venlafaxine (EFFEXOR-XR) 150 MG extended-release capsule Take 1 Capsule by mouth every day. (venlafaxine xr 150 mg + 75 mg = 225 mg daily) 90 Capsule 2    venlafaxine XR (EFFEXOR XR) 75 MG CAPSULE SR 24 HR Take 1 capsule by mouth every day. (venlafaxine xr 150 mg + 75 mg = 225 mg daily) 90 Capsule 2    traZODone (DESYREL) 50 MG Tab Take 2 Tablets by mouth at bedtime as needed for Sleep (as needed for sleep). 180 Tablet 3    multivitamin Tab Take 1 Tablet by mouth every day. MEDICATION INSTRUCTIONS FOR SURGERY/PROCEDURE SCHEDULED FOR 8/1/24   DO NOT TAKE 7 DAYS PRIOR TO SURGERY      lisinopril (PRINIVIL) 40 MG tablet Take 1 Tablet by mouth every day. 100 Tablet 3    atorvastatin (LIPITOR) 20 MG Tab TAKE ONE TABLET BY MOUTH EVERY  Tablet 2    Ferrous Sulfate (IRON) 325 (65 Fe) MG Tab Take 65 mg by mouth every day at 6 PM. MEDICATION INSTRUCTIONS FOR SURGERY/PROCEDURE SCHEDULED FOR 8/1/24   DO NOT TAKE 7 DAYS PRIOR TO SURGERY       No current facility-administered medications for this visit.         Physical Exam:   Gen:           Alert and oriented, No apparent distress. Mood and affect appropriate, normal interaction with examiner.  Eyes:          PERRL, EOM intact, sclere white, conjunctive moist.  Ears:          Not examined.   Hearing:     Grossly intact.  Nose:          Normal, no lesions or deformities.  Dentition:    Good dentition.  Oropharynx:   Tongue normal, posterior pharynx without erythema or exudate.  Neck:        Supple, trachea midline, no masses.  Respiratory Effort: No intercostal retractions or use of accessory muscles.    Lung Auscultation:      Clear to auscultation bilaterally; no rales, rhonchi or wheezing.  CV:            Regular rate and rhythm. No murmurs, rubs or gallops.  Abd:           Not examined.   Lymphadenopathy: Not examined.  Gait and Station: Normal.  Digits and Nails: No clubbing, cyanosis, petechiae, or nodes.   Cranial Nerves: II-XII grossly intact.  Skin:        No rashes, lesions or ulcers noted.               Ext:           No cyanosis or edema.      Assessment:  1. TU (obstructive sleep apnea)          Plan:  Patient is compliant with CPAP.  However still experiencing insomnia.  Currently has right foot fracture as well as left shoulder pain using Percocets for pain tolerance.  Compliance shows increased events associated with central apnea events.  Will reassess in 3 months.  Other possibilities are patient not sleeping while wearing CPAP which could mimic central apnea events due to being out of rhythm with CPAP.  Will reassess.    Please note that this dictation was created using voice recognition software. I have made every reasonable attempt to correct obvious errors, but it is possible there are errors of grammar and possibly content that I did not discover before finalizing the note.[email or web address]

## 2025-02-13 DIAGNOSIS — F41.1 GAD (GENERALIZED ANXIETY DISORDER): ICD-10-CM

## 2025-02-13 DIAGNOSIS — E78.5 DYSLIPIDEMIA: ICD-10-CM

## 2025-02-13 PROCEDURE — RXMED WILLOW AMBULATORY MEDICATION CHARGE: Performed by: PSYCHIATRY & NEUROLOGY

## 2025-02-13 PROCEDURE — RXMED WILLOW AMBULATORY MEDICATION CHARGE: Performed by: NURSE PRACTITIONER

## 2025-02-13 RX ORDER — BUSPIRONE HYDROCHLORIDE 10 MG/1
10 TABLET ORAL 3 TIMES DAILY
Qty: 90 TABLET | Refills: 2 | Status: SHIPPED | OUTPATIENT
Start: 2025-02-13

## 2025-02-13 NOTE — TELEPHONE ENCOUNTER
Received request via: Pharmacy    Was the patient seen in the last year in this department? Yes    Does the patient have an active prescription (recently filled or refills available) for medication(s) requested? No    Pharmacy Name: [facility]    Does the patient have retirement Plus and need 100-day supply? (This applies to ALL medications) Yes, quantity updated to 100 days

## 2025-02-14 ENCOUNTER — PHARMACY VISIT (OUTPATIENT)
Dept: PHARMACY | Facility: MEDICAL CENTER | Age: 65
End: 2025-02-14
Payer: COMMERCIAL

## 2025-02-14 ENCOUNTER — TELEPHONE (OUTPATIENT)
Dept: MEDICAL GROUP | Facility: MEDICAL CENTER | Age: 65
End: 2025-02-14

## 2025-02-14 DIAGNOSIS — Z78.0 POSTMENOPAUSAL: ICD-10-CM

## 2025-02-14 DIAGNOSIS — S92.351A CLOSED FRACTURE OF BASE OF FIFTH METATARSAL BONE OF RIGHT FOOT: ICD-10-CM

## 2025-02-14 DIAGNOSIS — E03.9 ACQUIRED HYPOTHYROIDISM: ICD-10-CM

## 2025-02-14 PROCEDURE — RXMED WILLOW AMBULATORY MEDICATION CHARGE: Performed by: NURSE PRACTITIONER

## 2025-02-14 NOTE — TELEPHONE ENCOUNTER
Bone Density for Osteoporosis screening  1. Caller Name: Debby Desai      2. SPECIFIC Action To Be Taken: Orders pending, please sign.     3. Diagnosis/Clinical Reason for Request:   Due to patient fracture on 1/25/2025,  the [place] preventative task force recommends Osteoporosis screening to be completed within 6 months of fracture date.      4. Specialty & Provider Name/Lab/Imaging Location: [facility]     5. Is appointment scheduled for requested order/referral: no

## 2025-02-17 RX ORDER — ATORVASTATIN CALCIUM 20 MG/1
20 TABLET, FILM COATED ORAL
Qty: 100 TABLET | Refills: 0 | Status: SHIPPED | OUTPATIENT
Start: 2025-02-17

## 2025-03-04 DIAGNOSIS — E03.9 ACQUIRED HYPOTHYROIDISM: ICD-10-CM

## 2025-03-04 DIAGNOSIS — E55.9 VITAMIN D DEFICIENCY: ICD-10-CM

## 2025-03-04 DIAGNOSIS — E78.5 DYSLIPIDEMIA: ICD-10-CM

## 2025-03-04 DIAGNOSIS — I10 HTN (HYPERTENSION), BENIGN: ICD-10-CM

## 2025-03-04 DIAGNOSIS — R80.9 MICROALBUMINURIA: ICD-10-CM

## 2025-03-04 DIAGNOSIS — R73.03 PREDIABETES: ICD-10-CM

## 2025-03-04 NOTE — TELEPHONE ENCOUNTER
Received request via: Pharmacy    Was the patient seen in the last year in this department? Yes    Does the patient have an active prescription (recently filled or refills available) for medication(s) requested? No    Pharmacy Name: [facility]     Does the patient have [facility] Plus and need 100-day supply? (This applies to ALL medications) Yes, quantity updated to 100 days

## 2025-03-06 PROCEDURE — RXMED WILLOW AMBULATORY MEDICATION CHARGE: Performed by: PSYCHIATRY & NEUROLOGY

## 2025-03-10 PROCEDURE — RXMED WILLOW AMBULATORY MEDICATION CHARGE: Performed by: NURSE PRACTITIONER

## 2025-03-10 RX ORDER — LEVOTHYROXINE SODIUM 50 UG/1
50 TABLET ORAL
Qty: 90 TABLET | Refills: 0 | Status: SHIPPED | OUTPATIENT
Start: 2025-03-10 | End: 2025-03-10

## 2025-03-10 RX ORDER — LEVOTHYROXINE SODIUM 50 UG/1
50 TABLET ORAL
Qty: 100 TABLET | Refills: 0 | Status: SHIPPED | OUTPATIENT
Start: 2025-03-10

## 2025-03-11 PROCEDURE — RXMED WILLOW AMBULATORY MEDICATION CHARGE: Performed by: NURSE PRACTITIONER

## 2025-03-11 NOTE — TELEPHONE ENCOUNTER
Refill done. Patient is due for annual appointment and fasting labs which are ordered. Please call and schedule patient.  JOSHUA Chris.

## 2025-03-14 ENCOUNTER — PHARMACY VISIT (OUTPATIENT)
Dept: PHARMACY | Facility: MEDICAL CENTER | Age: 65
End: 2025-03-14
Payer: COMMERCIAL

## 2025-03-14 PROCEDURE — RXMED WILLOW AMBULATORY MEDICATION CHARGE: Performed by: PSYCHIATRY & NEUROLOGY

## 2025-03-17 ENCOUNTER — PHARMACY VISIT (OUTPATIENT)
Dept: PHARMACY | Facility: MEDICAL CENTER | Age: 65
End: 2025-03-17
Payer: COMMERCIAL

## 2025-03-17 PROCEDURE — RXMED WILLOW AMBULATORY MEDICATION CHARGE: Performed by: NURSE PRACTITIONER

## 2025-03-24 ENCOUNTER — PATIENT MESSAGE (OUTPATIENT)
Dept: HEALTH INFORMATION MANAGEMENT | Facility: OTHER | Age: 65
End: 2025-03-24

## 2025-03-25 ENCOUNTER — TELEMEDICINE (OUTPATIENT)
Dept: BEHAVIORAL HEALTH | Facility: CLINIC | Age: 65
End: 2025-03-25
Payer: MEDICARE

## 2025-03-25 DIAGNOSIS — F41.1 GAD (GENERALIZED ANXIETY DISORDER): ICD-10-CM

## 2025-03-25 DIAGNOSIS — F33.42 RECURRENT MAJOR DEPRESSIVE DISORDER, IN FULL REMISSION (HCC): ICD-10-CM

## 2025-03-25 DIAGNOSIS — F51.04 PSYCHOPHYSIOLOGICAL INSOMNIA: ICD-10-CM

## 2025-03-25 PROCEDURE — RXMED WILLOW AMBULATORY MEDICATION CHARGE: Performed by: PSYCHIATRY & NEUROLOGY

## 2025-03-25 RX ORDER — VENLAFAXINE HYDROCHLORIDE 150 MG/1
150 CAPSULE, EXTENDED RELEASE ORAL DAILY
Qty: 90 CAPSULE | Refills: 2 | Status: SHIPPED | OUTPATIENT
Start: 2025-03-25

## 2025-03-25 RX ORDER — VENLAFAXINE HYDROCHLORIDE 75 MG/1
75 CAPSULE, EXTENDED RELEASE ORAL DAILY
Qty: 90 CAPSULE | Refills: 2 | Status: SHIPPED | OUTPATIENT
Start: 2025-03-25

## 2025-03-25 RX ORDER — TRAZODONE HYDROCHLORIDE 50 MG/1
100 TABLET ORAL NIGHTLY PRN
Qty: 180 TABLET | Refills: 3 | Status: SHIPPED | OUTPATIENT
Start: 2025-03-25

## 2025-03-25 RX ORDER — BUSPIRONE HYDROCHLORIDE 10 MG/1
10 TABLET ORAL 3 TIMES DAILY
Qty: 90 TABLET | Refills: 2 | Status: SHIPPED | OUTPATIENT
Start: 2025-03-25

## 2025-03-25 RX ORDER — BUPROPION HYDROCHLORIDE 200 MG/1
200 TABLET, EXTENDED RELEASE ORAL 2 TIMES DAILY
Qty: 180 TABLET | Refills: 2 | Status: SHIPPED | OUTPATIENT
Start: 2025-03-25

## 2025-03-25 ASSESSMENT — PATIENT HEALTH QUESTIONNAIRE - PHQ9
2. FEELING DOWN, DEPRESSED, IRRITABLE, OR HOPELESS: 1
5. POOR APPETITE OR OVEREATING: 0
7. TROUBLE CONCENTRATING ON THINGS, SUCH AS READING THE NEWSPAPER OR WATCHING TELEVISION: 2
9. THOUGHTS THAT YOU WOULD BE BETTER OFF DEAD, OR OF HURTING YOURSELF: NOT AT ALL
3. TROUBLE FALLING OR STAYING ASLEEP OR SLEEPING TOO MUCH: 2
5. POOR APPETITE OR OVEREATING: 0 - NOT AT ALL
1. LITTLE INTEREST OR PLEASURE IN DOING THINGS: MORE THAN HALF THE DAYS
SUM OF ALL RESPONSES TO PHQ QUESTIONS 1-9: 10
4. FEELING TIRED OR HAVING LITTLE ENERGY: 2
5. POOR APPETITE OR OVEREATING: NOT AT ALL
3. TROUBLE FALLING OR STAYING ASLEEP OR SLEEPING TOO MUCH: MORE THAN HALF THE DAYS
8. MOVING OR SPEAKING SO SLOWLY THAT OTHER PEOPLE COULD HAVE NOTICED. OR THE OPPOSITE, BEING SO FIGETY OR RESTLESS THAT YOU HAVE BEEN MOVING AROUND A LOT MORE THAN USUAL: NOT AT ALL
7. TROUBLE CONCENTRATING ON THINGS, SUCH AS READING THE NEWSPAPER OR WATCHING TELEVISION: MORE THAN HALF THE DAYS
2. FEELING DOWN, DEPRESSED, IRRITABLE, OR HOPELESS: SEVERAL DAYS
6. FEELING BAD ABOUT YOURSELF - OR THAT YOU ARE A FAILURE OR HAVE LET YOURSELF OR YOUR FAMILY DOWN: 1
1. LITTLE INTEREST OR PLEASURE IN DOING THINGS: 2
9. THOUGHTS THAT YOU WOULD BE BETTER OFF DEAD, OR OF HURTING YOURSELF: 0
10. IF YOU CHECKED OFF ANY PROBLEMS, HOW DIFFICULT HAVE THESE PROBLEMS MADE IT FOR YOU TO DO YOUR WORK, TAKE CARE OF THINGS AT HOME, OR GET ALONG WITH OTHER PEOPLE: SOMEWHAT DIFFICULT
CLINICAL INTERPRETATION OF PHQ2 SCORE: 0
8. MOVING OR SPEAKING SO SLOWLY THAT OTHER PEOPLE COULD HAVE NOTICED. OR THE OPPOSITE, BEING SO FIGETY OR RESTLESS THAT YOU HAVE BEEN MOVING AROUND A LOT MORE THAN USUAL: 0
6. FEELING BAD ABOUT YOURSELF - OR THAT YOU ARE A FAILURE OR HAVE LET YOURSELF OR YOUR FAMILY DOWN: SEVERAL DAYS
4. FEELING TIRED OR HAVING LITTLE ENERGY: MORE THAN HALF THE DAYS

## 2025-03-25 ASSESSMENT — ANXIETY QUESTIONNAIRES
3. WORRYING TOO MUCH ABOUT DIFFERENT THINGS: NOT AT ALL
6. BECOMING EASILY ANNOYED OR IRRITABLE: SEVERAL DAYS
3. WORRYING TOO MUCH ABOUT DIFFERENT THINGS: NOT AT ALL
5. BEING SO RESTLESS THAT IT IS HARD TO SIT STILL: SEVERAL DAYS
4. TROUBLE RELAXING: SEVERAL DAYS
7. FEELING AFRAID AS IF SOMETHING AWFUL MIGHT HAPPEN: NOT AT ALL
IF YOU CHECKED OFF ANY PROBLEMS ON THIS QUESTIONNAIRE, HOW DIFFICULT HAVE THESE PROBLEMS MADE IT FOR YOU TO DO YOUR WORK, TAKE CARE OF THINGS AT HOME, OR GET ALONG WITH OTHER PEOPLE: SOMEWHAT DIFFICULT
2. NOT BEING ABLE TO STOP OR CONTROL WORRYING: NOT AT ALL
5. BEING SO RESTLESS THAT IT IS HARD TO SIT STILL: SEVERAL DAYS
7. FEELING AFRAID AS IF SOMETHING AWFUL MIGHT HAPPEN: NOT AT ALL
GAD7 TOTAL SCORE: 4
6. BECOMING EASILY ANNOYED OR IRRITABLE: SEVERAL DAYS
2. NOT BEING ABLE TO STOP OR CONTROL WORRYING: NOT AT ALL
1. FEELING NERVOUS, ANXIOUS, OR ON EDGE: SEVERAL DAYS
1. FEELING NERVOUS, ANXIOUS, OR ON EDGE: SEVERAL DAYS
IF YOU CHECKED OFF ANY PROBLEMS ON THIS QUESTIONNAIRE, HOW DIFFICULT HAVE THESE PROBLEMS MADE IT FOR YOU TO DO YOUR WORK, TAKE CARE OF THINGS AT HOME, OR GET ALONG WITH OTHER PEOPLE: SOMEWHAT DIFFICULT
4. TROUBLE RELAXING: SEVERAL DAYS

## 2025-03-25 NOTE — PROGRESS NOTES
This evaluation was conducted via Teams using secure and encrypted videoconferencing technology. The patient was in their home in the [facility].    The patient's identity was confirmed and verbal consent was obtained for this virtual visit.      PSYCHIATRY FOLLOW-UP NOTE      Name: Debby Desai  MRN: [id number]  : 1960  Age: 64 y.o.  Date of assessment: 3/25/2025  PCP: ARELIS Chris  Persons in attendance: Patient      REASON FOR VISIT/CHIEF COMPLAINT (as stated by Patient):  Debby Desai is a 64 y.o., White female, attending follow-up appointment for mood and anxiety management.      HISTORY OF PRESENT ILLNESS:  Debby Desai is a 64 y.o. old female with MDD, TIM and insomnia comes in today for follow up. Patient was last seen >2 months ago, and following treatment planning recommendations were done:  Continue Effexor  mg daily for mood, anxiety and comorbid pain management.   Continue Wellbutrin  mg BID daily for depression augmentation.    Continue Buspar 10 mg BID + 10 mg PRN for anxiety.  Continue Trazodone 50- mg HS PRN for insomnia.    Monitor for serotonin syndrome.  Continue psychotherapy for mood and anxiety management.    History of Present Illness    She reports a decrease in depressive symptoms but have anxiety, which she attributes to a foot fracture and ankle sprain sustained 2 months ago.   These injuries resulted in a 7-week period of limited mobility, during which she used a wheelchair for transportation to medical appointments. She had to discontinue shoulder therapy due to non-weightbearing restrictions until the previous week. Despite resuming ambulation, she experiences significant pain and occasional balance disturbances, though without falls. She has been engaging in more physical activity, including walking around her house and visiting [employer] and the pharmacy twice with her sister. She expresses a preference for staying at home, citing  feelings of overwhelm and anxiety in crowded or noisy environments. She has identified abandonment issues from her childhood, which she believes contribute to her current feelings of unimportance when family members do not respond to her within a week. She has been self-reflecting on these issues over the past few months. She has been interviewing potential therapists who specialize in chronic pain, depression, and anxiety management.  She also reports increased insomnia, but she is taking second dose of wellbutrin in evening time. Agreed with changing that to lunch time starting today.  Agreed with plan of not titrating other medications.      PSYCHOTHERAPY ASPECT OF SESSION (16 MIN):  Session was dedicated to letting patient express her feelings related to recent changes primarily due to limitation imposed by pain.  Validation was provided for appropriate emotional responses.  Psychoeducation provided on understanding the difference between focusing on outside factors for validation versus building the insight factors for growth.  Importance of engaging in psychotherapy was discussed and provided resources for Lise Schwarz as she is interested in therapist with expertise in pain management as well.  Later part of the session was dedicated to psychoeducation, active listening and implementing supportive therapy.      CURRENT MEDICATIONS:  Current Outpatient Medications   Medication Sig Dispense Refill    levothyroxine (SYNTHROID) 50 MCG Tab Take 1 tablet by mouth every morning on an empty stomach. 100 Tablet 0    [START ON 4/14/2025] oxyCODONE-acetaminophen (PERCOCET) 5-325 MG Tab Take 1 tablet by mouth twice a day as needed for 30 days. 45 Tablet 0    oxyCODONE-acetaminophen (PERCOCET) 5-325 MG Tab Take 1 tablet by mouth twice a day as needed for 30 days. 45 Tablet 0    gabapentin (NEURONTIN) 300 MG Cap Take 1 capsule by mouth as needed at bedtime for 90 days. 90 Capsule 0    atorvastatin (LIPITOR) 20 MG Tab  TAKE ONE TABLET BY MOUTH EVERY  Tablet 0    busPIRone (BUSPAR) 10 MG Tab tablet Take 1 Tablet by mouth 3 times a day. 90 Tablet 2    oxyCODONE immediate-release (ROXICODONE) 5 MG Tab Take 1 tablet by mouth 3 times a day for 5 days. 15 Tablet 0    gabapentin (NEURONTIN) 300 MG Cap Take 1 capsule by mouth as needed at bedtime for 30 days. 30 Capsule 2    oxyCODONE-acetaminophen (PERCOCET) 5-325 MG Tab Take 1 tablet by mouth twice a day as needed for 30 days. 45 Tablet 0    oxyCODONE-acetaminophen (PERCOCET) 5-325 MG Tab Take 1 tablet by mouth twice a day as needed for 30 days. DNFU 2/14 45 Tablet 0    metFORMIN ER (GLUCOPHAGE XR) 500 MG TABLET SR 24 HR Take 1 tablet by mouth 2 times a day. 200 Tablet 1    oxyCODONE-acetaminophen (PERCOCET) 5-325 MG Tab Take 1 Tablet by mouth 1 time a day as needed for 30 days 30 Tablet 0    oxyCODONE-acetaminophen (PERCOCET-10)  MG Tab Take 1 tablet every 4-6 hours by mouth as needed for 5 days. 30 Tablet 0    amLODIPine (NORVASC) 10 MG Tab Take 1 tablet by mouth every day. 90 Tablet 3    buPROPion (WELLBUTRIN SR) 200 MG SR tablet Take 1 tablet by mouth 2 times a day. 180 Tablet 2    venlafaxine (EFFEXOR-XR) 150 MG extended-release capsule Take 1 Capsule by mouth every day. (venlafaxine xr 150 mg + 75 mg = 225 mg daily) 90 Capsule 2    venlafaxine XR (EFFEXOR XR) 75 MG CAPSULE SR 24 HR Take 1 capsule by mouth every day. (venlafaxine xr 150 mg + 75 mg = 225 mg daily) 90 Capsule 2    traZODone (DESYREL) 50 MG Tab Take 2 Tablets by mouth at bedtime as needed for Sleep (as needed for sleep). 180 Tablet 3    multivitamin Tab Take 1 Tablet by mouth every day. MEDICATION INSTRUCTIONS FOR SURGERY/PROCEDURE SCHEDULED FOR 8/1/24   DO NOT TAKE 7 DAYS PRIOR TO SURGERY      lisinopril (PRINIVIL) 40 MG tablet Take 1 Tablet by mouth every day. 100 Tablet 3    Ferrous Sulfate (IRON) 325 (65 Fe) MG Tab Take 65 mg by mouth every day at 6 PM. MEDICATION INSTRUCTIONS FOR SURGERY/PROCEDURE  SCHEDULED FOR 8/1/24   DO NOT TAKE 7 DAYS PRIOR TO SURGERY       No current facility-administered medications for this visit.       MEDICAL HISTORY  Past Medical History:   Diagnosis Date    Anemia     in past    Anginal syndrome (HCC)     had work up and feet r/t anxiety    Anxiety     Arthritis     OA knees    Back pain     Back pain     Chest tightness     Chickenpox     Chronic pain 06/02/2021    Daytime sleepiness     Fatigue     Frequent headaches     Frequent urination     Chinese measles     High cholesterol     HTN (hypertension), benign 03/19/2012    Hypertension     Hypothyroid 03/19/2012    Hypothyroidism     Influenza     Insomnia     Major depression 03/19/2012    Morning headache     Mumps     Obesity (BMI 30-39.9) 07/09/2018    Orbital floor (blow-out) closed fracture (HCC)     left    Pain     thoracic spine, Right knee, myofacial pain, and Right shoulder    Pain     recently fell and has bruise left forehead    Pain 02/2018    chronic back pain, right shoulder    Painful joint     Peptic ulcer     Pre-diabetes     Psychiatric problem     depression, anxiety    Ringing in ears     Sleep apnea     No Device, will see sleep DrMaxwell 11/2024    Sore muscles     Toothache     Unspecified disorder of thyroid     Urinary bladder disorder     stress incont.    Urinary incontinence     Occasional    Wears glasses      Past Surgical History:   Procedure Laterality Date    PB RECONSTR TOTAL SHOULDER IMPLANT Left 08/01/2024    Procedure: LEFT TOTAL SHOULDER REPLACEMENT, REVERSE;  Surgeon: Scott Lorenzo M.D.;  Location: SURGERY [facility];  Service: Orthopedics    FL NEUROLYTIC DEST GENICULAR NERVE Right 07/28/2023    Procedure: RIGHT genicular nerve radiofrequency ablation;  Surgeon: Volodymyr Herring M.D.;  [facility];  Service: Pain Management    FL FLUOROSCOPIC GUIDANCE NEEDLE PLACEMENT Right 07/07/2023    Procedure: RIGHT genicular nerve diagnostic blocks;  Surgeon: Volodymyr Herring  M.D.;  Location: [facility];  Service: Pain Management    INJ,EPI ANES/STER LUM/SAC ADDL Right 04/07/2021    Procedure: RIGHT lumbar five and sacral one transforaminal epidural;  Surgeon: Volodymyr Herring M.D.;  Location: [facility];  Service: Pain Management    PB TOTAL KNEE ARTHROPLASTY Right 09/16/2019    Procedure: RIGHT ARTHROPLASTY, KNEE, TOTAL;  Surgeon: Rufus Saba M.D.;  Location: SURGERY [facility];  Service: Orthopedics    KNEE ARTHROSCOPY Right 02/09/2018    Procedure: KNEE ARTHROSCOPY;  Surgeon: Harsh Baltazar M.D.;  Location: [facility];  Service: Orthopedics    MENISCECTOMY, KNEE, MEDIAL Right 02/09/2018    Procedure: MEDIAL AND LATERAL MENISCECTOMY- PARTIAL;  Surgeon: Harsh Baltazar M.D.;  Location: [facility];  Service: Orthopedics    KNEE ARTHROSCOPY Right 07/12/2017    Procedure: KNEE ARTHROSCOPY- CESPEDES;  Surgeon: Harsh Baltazar M.D.;  Location: [facility];  Service:     MENISCECTOMY, KNEE, MEDIAL Right 07/12/2017    Procedure: PARTIAL MEDIAL AND LATERAL MENISCECTOMY;  Surgeon: Harsh Baltazar M.D.;  Location: [facility];  Service:     SYNOVECTOMY Right 07/12/2017    Procedure: SYNOVECTOMY;  Surgeon: Harsh Baltazar M.D.;  Location: [facility];  Service:     KNEE ARTHROSCOPY  04/21/2015    Performed by Rufus Saba M.D. at [facility]    MENISCECTOMY, KNEE, MEDIAL  04/21/2015    Performed by Rufus Saba M.D. at [facility]    PUMP REVISION  12/10/2012    Performed by Allison Guerra M.D. at [facility]    SPINAL CORD STIMULATOR  05/30/2012    Performed by ALLISON GUERRA at [facility]    BIOPSY GENERAL  05/01/2012    endometrial    SPINAL CORD STIMULATOR  07/11/2011    Performed by ALLISON GUERRA at [facility]    BLOCK EPIDURAL STEROID INJECTION  2008    x 3-4    LYMPH NODE  EXCISION Left 2007    cervicle    OTHER Right     thoracic discectomy      ARTHROSCOPY, KNEE Left     RIB RESECTION Right 1980    LAMINOTOMY      LUMPECTOMY         PAST PSYCHIATRIC MEDICATIONS  Fluoxetine, Paroxetine, Sertraline, Citalopram  Venlafaxine, Duloxetine  Wellbutrin  Mirtazapine  Amitriptyline (switched to Mirtazapine during admission for dehydration related syncope)   Ativan, Propranolol    REVIEW OF SYSTEMS:        Constitutional negative   Eyes negative   Ears/Nose/Mouth/Throat negative   Cardiovascular negative   Respiratory negative   Gastrointestinal negative   Genitourinary negative   Muscular negative   Integumentary negative   Neurological negative   Endocrine negative   Hematologic/Lymphatic negative     PHYSICAL EXAMINAION:  Vital signs: LMP 2012   Musculoskeletal: Normal gait.   Abnormal movements: none      MENTAL STATUS EXAMINATION      General:   - Grooming and hygiene: Casual,   - Apparent distress: calm,   - Behavior: Calm  - Eye Contact:  Good,   - no psychomotor agitation or retardation    - Participation: Active verbal participation  Orientation: Alert and Fully Oriented to person, place and time  Mood: Anxious  Affect: Flexible and Full range,  Thought Process: Logical and Goal-directed  Thought Content: Denies suicidal or homicidal ideations, intent or plan   Perception: Denies auditory or visual hallucinations. No delusions noted   Attention span and concentration: Intact   Speech:Rate within normal limits and Volume within normal limits  Language: Appropriate   Insight: Good  Judgment: Good  Recent and remote memory: No gross evidence of memory deficits        DEPRESSION SCREENIN/11/2024    11:30 AM 2025     9:30 AM 3/25/2025    10:00 AM   Depression Screen (PHQ-2/PHQ-9)   PHQ-2 Total Score  0    PHQ-9 Total Score 16  10       Interpretation of PHQ-9 Total Score   Score Severity   1-4 No Depression   5-9 Mild Depression   10-14 Moderate Depression    15-19 Moderately Severe Depression   20-27 Severe Depression    CURRENT RISK:       Suicidal: Low       Homicidal: Low       Self-Harm: Low       Relapse: Low       Crisis Safety Plan Reviewed Not Indicated       If evidence of imminent risk is present, intervention/plan:      MEDICAL RECORDS/LABS/DIAGNOSTIC TESTS REVIEWED:  No new lab since last visit     [place]  records -   Reviewed     PLAN:  (1) MDD; (2) TIM; (3) Insomnia  Improving (pain is limiting factors).  PHQ9: 10, GAD7: 4  Continue Effexor  mg daily for mood, anxiety and comorbid pain management.   Continue Wellbutrin  mg BID for depression augmentation.    Continue Buspar 10 mg TID for anxiety.  Continue Trazodone 50- mg HS PRN for insomnia.    Monitor for serotonin syndrome.  Continue psychotherapy for mood and anxiety management.  Medication options, alternatives (including no medications) and medication risks/benefits/side effects were discussed in detail.  Explained importance of contraceptive measures while on psychotropic medications, educated to let provider know if ever pregnant or wanting to become pregnant. Verbalized understanding.  The patient was advised to call, message provider on [email or web address], or come in to the clinic if symptoms worsen or if any future questions/issues regarding their medications arise; the patient verbalized understanding and agreement.   The patient was educated to call 911, call the suicide hotline, or go to local ER if having thoughts of suicide or homicide; verbalized understanding.      Billing Coding based on:  31998 based on [facility]  19716: based on psychotherapy timing    Return to clinic in 3 months or sooner if symptoms worsen.  Next Appointment: instruction provided on how to make the next appointment.     The proposed treatment plan was discussed with the patient who was provided the opportunity to ask questions and make suggestions regarding alternative treatment. Patient verbalized understanding  and expressed agreement with the plan.       Genaro Stevenson M.D.  03/25/25    This note was created using voice recognition software (Dragon). The accuracy of the dictation is limited by the abilities of the software. I have reviewed the note prior to signing, however some errors in grammar and context are still possible. If you have any questions related to this note please do not hesitate to contact our office.

## 2025-03-26 NOTE — TELEPHONE ENCOUNTER
Patient's last DEXA was less thank 2 years ago and was normal. She will be due again after July 19, 2025. I cannot find anything on [facility] that says DEXA needs to be done sooner than 2 years in instance of non vertebral fracture.     I'll order it for her to do in July for sure, but if I'm wrong please let me know!     JOSHUA Chris.

## 2025-03-27 ENCOUNTER — TELEPHONE (OUTPATIENT)
Dept: HEALTH INFORMATION MANAGEMENT | Facility: OTHER | Age: 65
End: 2025-03-27
Payer: MEDICARE

## 2025-03-28 PROCEDURE — RXMED WILLOW AMBULATORY MEDICATION CHARGE: Performed by: NURSE PRACTITIONER

## 2025-04-02 ENCOUNTER — PHARMACY VISIT (OUTPATIENT)
Dept: PHARMACY | Facility: MEDICAL CENTER | Age: 65
End: 2025-04-02
Payer: COMMERCIAL

## 2025-04-10 ENCOUNTER — APPOINTMENT (OUTPATIENT)
Dept: FAMILY PLANNING/WOMEN'S HEALTH CLINIC | Facility: PHYSICIAN GROUP | Age: 65
End: 2025-04-10
Payer: MEDICARE

## 2025-04-10 DIAGNOSIS — I10 PRIMARY HYPERTENSION: ICD-10-CM

## 2025-04-10 DIAGNOSIS — F11.20 OPIOID DEPENDENCE, UNCOMPLICATED (HCC): ICD-10-CM

## 2025-04-10 DIAGNOSIS — E03.9 ACQUIRED HYPOTHYROIDISM: ICD-10-CM

## 2025-04-10 DIAGNOSIS — F41.1 GAD (GENERALIZED ANXIETY DISORDER): ICD-10-CM

## 2025-04-10 DIAGNOSIS — E78.5 DYSLIPIDEMIA: ICD-10-CM

## 2025-04-10 DIAGNOSIS — Z91.81 RISK FOR FALLS: ICD-10-CM

## 2025-04-10 DIAGNOSIS — F33.1 MODERATE EPISODE OF RECURRENT MAJOR DEPRESSIVE DISORDER (HCC): ICD-10-CM

## 2025-04-14 ENCOUNTER — PHARMACY VISIT (OUTPATIENT)
Dept: PHARMACY | Facility: MEDICAL CENTER | Age: 65
End: 2025-04-14
Payer: COMMERCIAL

## 2025-04-14 PROCEDURE — RXMED WILLOW AMBULATORY MEDICATION CHARGE: Performed by: NURSE PRACTITIONER

## 2025-04-22 PROCEDURE — RXMED WILLOW AMBULATORY MEDICATION CHARGE: Performed by: NURSE PRACTITIONER

## 2025-04-30 PROCEDURE — RXMED WILLOW AMBULATORY MEDICATION CHARGE: Performed by: NURSE PRACTITIONER

## 2025-05-01 NOTE — ANESTHESIA PROCEDURE NOTES
Airway    Date/Time: 8/1/2024 11:10 AM    Performed by: Loy Mcgarry M.D.  Authorized by: Loy Mcgarry M.D.    Location:  OR  Urgency:  Elective  Difficult Airway: No    Indications for Airway Management:  Anesthesia      Spontaneous Ventilation: absent    Sedation Level:  Deep  Preoxygenated: Yes    Patient Position:  Sniffing  Mask Difficulty Assessment:  2 - vent by mask + OA or adjuvant +/- NMBA  Final Airway Type:  Endotracheal airway  Final Endotracheal Airway:  ETT  Cuffed: Yes    Technique Used for Successful ETT Placement:  Direct laryngoscopy    Insertion Site:  Oral  Blade Type:  Noreen  Laryngoscope Blade/Videolaryngoscope Blade Size:  3  ETT Size (mm):  7.0  Measured from:  Teeth  ETT to Teeth (cm):  21  Placement Verified by: auscultation and capnometry    Cormack-Lehane Classification:  Grade I - full view of glottis  Number of Attempts at Approach:  1   Atraumatic DLx1         No

## 2025-05-07 ENCOUNTER — OFFICE VISIT (OUTPATIENT)
Dept: SLEEP MEDICINE | Facility: MEDICAL CENTER | Age: 65
End: 2025-05-07
Attending: NURSE PRACTITIONER
Payer: MEDICARE

## 2025-05-07 ENCOUNTER — PHARMACY VISIT (OUTPATIENT)
Dept: PHARMACY | Facility: MEDICAL CENTER | Age: 65
End: 2025-05-07
Payer: COMMERCIAL

## 2025-05-07 VITALS
SYSTOLIC BLOOD PRESSURE: 90 MMHG | HEART RATE: 95 BPM | HEIGHT: 67 IN | DIASTOLIC BLOOD PRESSURE: 60 MMHG | WEIGHT: 150 LBS | RESPIRATION RATE: 16 BRPM | OXYGEN SATURATION: 98 % | BODY MASS INDEX: 23.54 KG/M2

## 2025-05-07 DIAGNOSIS — G47.33 OSA (OBSTRUCTIVE SLEEP APNEA): ICD-10-CM

## 2025-05-07 DIAGNOSIS — F51.04 CHRONIC INSOMNIA: ICD-10-CM

## 2025-05-07 PROCEDURE — 1170F FXNL STATUS ASSESSED: CPT | Performed by: NURSE PRACTITIONER

## 2025-05-07 PROCEDURE — 3078F DIAST BP <80 MM HG: CPT | Performed by: NURSE PRACTITIONER

## 2025-05-07 PROCEDURE — 3074F SYST BP LT 130 MM HG: CPT | Performed by: NURSE PRACTITIONER

## 2025-05-07 PROCEDURE — 99214 OFFICE O/P EST MOD 30 MIN: CPT | Performed by: NURSE PRACTITIONER

## 2025-05-07 ASSESSMENT — FIBROSIS 4 INDEX: FIB4 SCORE: 1.03

## 2025-05-07 NOTE — PROGRESS NOTES
Chief Complaint   Patient presents with    Follow-Up     SLEEP - ESTABLISHED PT CHART PREP COMPLETED ON 04/28/2025    Last Office Visit 02/04/2025 with Jonathan Lowry    Setup date: 11/20/2024    1st Compliance: no    DME: DME:  [employer] / ph [phone] / fx [phone]      PAP/O2/OAT: AirSense 11 AutoSet   Min Pressure (cmH2O)  6.0  Max Pressure (cmH2O)  14.0  EPR  Ramp Only  EPR level (cmH2O)  1  Ramp enable  Auto  Ramp time (min)  20  Start pressure (cmH2O)  4.0  Response  Standard     Wireless Data? YES ResMed        HPI:  Debby Desai is a 65 y.o. year old female here today for follow-up on TU on CPAP.  Last seen 2/4/2025 for first compliance visit. Patient was set up on 11/20/2024 auto CPAP 6 to 14 cm/H2O.  Past medical history includes PTSD, BPD, ADD, insomnia, severe TU, insomnia.  Sleep study 2/2024 - AHI 43, donita spO2 63%, 9 min <88%.  Frequent nocturnal awakenings.  Does not feel well rested upon awakening in the morning.  Reports very limited energy during the day.  Daytime sleepiness, morning headaches.     Sleep aids: trazodone 100 mg but also for her depression.     Started with a fullface mask but switched to nasal pillows.  Denies any difficulty with mask fit or pressures.  Does have improved sleep quality with CPAP.  Patient states that over time her daytime fatigue and napping have improved significantly.  She notes improvement in sleep quality and denies any excessive daytime sleepiness, morning headaches, palpitations, concentration or memory problems.  Does occasionally wake up in the middle of the night having trouble going back to sleep.  Also suffers from left shoulder pain.    30-day compliance shows 97% use with an average time of 6 hours and 20 minutes and a residual AHI of 7.8 which is improved compared to previous.  No evidence of mask leak.    ROS: As per HPI and otherwise negative if not stated.    Past Medical History:   Diagnosis Date    Anemia     in past    Anginal  syndrome (HCC)     had work up and feet r/t anxiety    Anxiety     Arthritis     OA knees    Back pain     Back pain     Chest tightness     Chickenpox     Chronic pain 06/02/2021    Daytime sleepiness     Fatigue     Frequent headaches     Frequent urination     Wolof measles     High cholesterol     HTN (hypertension), benign 03/19/2012    Hypertension     Hypothyroid 03/19/2012    Hypothyroidism     Influenza     Insomnia     Major depression 03/19/2012    Morning headache     Mumps     Obesity (BMI 30-39.9) 07/09/2018    Orbital floor (blow-out) closed fracture (HCC)     left    Pain     thoracic spine, Right knee, myofacial pain, and Right shoulder    Pain     recently fell and has bruise left forehead    Pain 02/2018    chronic back pain, right shoulder    Painful joint     Peptic ulcer     Pre-diabetes     Psychiatric problem     depression, anxiety    Ringing in ears     Sleep apnea     No Device, will see sleep DrMaxwell 11/2024    Sore muscles     Toothache     Unspecified disorder of thyroid     Urinary bladder disorder     stress incont.    Urinary incontinence     Occasional    Wears glasses        Past Surgical History:   Procedure Laterality Date    PB RECONSTR TOTAL SHOULDER IMPLANT Left 08/01/2024    Procedure: LEFT TOTAL SHOULDER REPLACEMENT, REVERSE;  Surgeon: Scott Lorenzo M.D.;  Location: SURGERY [facility];  Service: Orthopedics    VT NEUROLYTIC DEST GENICULAR NERVE Right 07/28/2023    Procedure: RIGHT genicular nerve radiofrequency ablation;  Surgeon: Volodymyr Herring M.D.;  [facility];  Service: Pain Management    VT FLUOROSCOPIC GUIDANCE NEEDLE PLACEMENT Right 07/07/2023    Procedure: RIGHT genicular nerve diagnostic blocks;  Surgeon: Volodymyr Herring M.D.;  Location: [facility];  Service: Pain Management    INJ,EPI ANES/STER LUM/SAC ADDL Right 04/07/2021    Procedure: RIGHT lumbar five and sacral one transforaminal epidural;  Surgeon: Volodymyr GUEVARA  MACARENA Herring;  Location: [facility];  Service: Pain Management    PB TOTAL KNEE ARTHROPLASTY Right 09/16/2019    Procedure: RIGHT ARTHROPLASTY, KNEE, TOTAL;  Surgeon: Rufus Saba M.D.;  Location: [facility];  Service: Orthopedics    KNEE ARTHROSCOPY Right 02/09/2018    Procedure: KNEE ARTHROSCOPY;  Surgeon: Harsh Baltazar M.D.;  Location: [facility];  Service: Orthopedics    MENISCECTOMY, KNEE, MEDIAL Right 02/09/2018    Procedure: MEDIAL AND LATERAL MENISCECTOMY- PARTIAL;  Surgeon: Harsh Baltazar M.D.;  Location: [facility];  Service: Orthopedics    KNEE ARTHROSCOPY Right 07/12/2017    Procedure: KNEE ARTHROSCOPY- CESPEDES;  Surgeon: Harsh Baltazar M.D.;  Location: [facility];  Service:     MENISCECTOMY, KNEE, MEDIAL Right 07/12/2017    Procedure: PARTIAL MEDIAL AND LATERAL MENISCECTOMY;  Surgeon: Harsh Baltazar M.D.;  Location: [facility];  Service:     SYNOVECTOMY Right 07/12/2017    Procedure: SYNOVECTOMY;  Surgeon: Harsh Baltazar M.D.;  Location: [facility];  Service:     KNEE ARTHROSCOPY  04/21/2015    Performed by Rufus Saba M.D. at [facility]    MENISCECTOMY, KNEE, MEDIAL  04/21/2015    Performed by Rufus Saba M.D. at [facility]    PUMP REVISION  12/10/2012    Performed by Allison Guerra M.D. at [facility]    SPINAL CORD STIMULATOR  05/30/2012    Performed by ALLISON GUERRA at [facility]    BIOPSY GENERAL  05/01/2012    endometrial    SPINAL CORD STIMULATOR  07/11/2011    Performed by ALLISON GUERRA at [facility]    BLOCK EPIDURAL STEROID INJECTION  2008    x 3-4    LYMPH NODE EXCISION Left 2007    cervicle    OTHER Right 2001    thoracic discectomy      ARTHROSCOPY, KNEE Left 1999    RIB RESECTION Right 1980    LAMINOTOMY      LUMPECTOMY         Family History   Problem Relation Age of  "Onset    Hypertension Father     Diabetes Father     Heart Disease Father     Alcohol abuse Father     Sleep Apnea Father     Anesthesia Sister         slow to come out (as a child)    Asthma Sister     Cancer Sister     Diabetes Other     Heart Disease Other     Cancer Other     Hypertension Other     Stroke Other     Lung Disease Other     Cancer Paternal Grandmother     Cancer Paternal Aunt     Cancer Paternal Aunt        Allergies as of 05/07/2025 - Reviewed 05/07/2025   Allergen Reaction Noted    Sulfamethoxazole-trimethoprim Rash and Swelling 05/23/2016        Vitals:  BP 90/60 (BP Location: Left arm, Patient Position: Sitting, BP Cuff Size: Adult)   Pulse 95   Resp 16   Ht 1.702 m (5' 7\")   Wt 68 kg (150 lb)   SpO2 98%     Current medications as of today   Current Outpatient Medications   Medication Sig Dispense Refill    venlafaxine (EFFEXOR-XR) 150 MG extended-release capsule Take 1 Capsule by mouth every day. (venlafaxine xr 150 mg + 75 mg = 225 mg daily) 90 Capsule 2    venlafaxine XR (EFFEXOR XR) 75 MG CAPSULE SR 24 HR Take 1 capsule by mouth every day. (venlafaxine xr 150 mg + 75 mg = 225 mg daily) 90 Capsule 2    traZODone (DESYREL) 50 MG Tab Take 2 Tablets by mouth at bedtime as needed for Sleep (as needed for sleep). 180 Tablet 3    busPIRone (BUSPAR) 10 MG Tab tablet Take 1 Tablet by mouth 3 times a day. 90 Tablet 2    buPROPion (WELLBUTRIN SR) 200 MG SR tablet Take 1 tablet by mouth 2 times a day. 180 Tablet 2    levothyroxine (SYNTHROID) 50 MCG Tab Take 1 tablet by mouth every morning on an empty stomach. 100 Tablet 0    oxyCODONE-acetaminophen (PERCOCET) 5-325 MG Tab Take 1 tablet by mouth twice a day as needed for 30 days. 45 Tablet 0    oxyCODONE-acetaminophen (PERCOCET) 5-325 MG Tab Take 1 tablet by mouth twice a day as needed for 30 days. 45 Tablet 0    gabapentin (NEURONTIN) 300 MG Cap Take 1 capsule by mouth as needed at bedtime for 90 days. 90 Capsule 0    atorvastatin (LIPITOR) 20 MG " Tab TAKE ONE TABLET BY MOUTH EVERY  Tablet 0    oxyCODONE immediate-release (ROXICODONE) 5 MG Tab Take 1 tablet by mouth 3 times a day for 5 days. 15 Tablet 0    gabapentin (NEURONTIN) 300 MG Cap Take 1 capsule by mouth as needed at bedtime for 30 days. 30 Capsule 2    oxyCODONE-acetaminophen (PERCOCET) 5-325 MG Tab Take 1 tablet by mouth twice a day as needed for 30 days. 45 Tablet 0    oxyCODONE-acetaminophen (PERCOCET) 5-325 MG Tab Take 1 tablet by mouth twice a day as needed for 30 days. DNFU 2/14 45 Tablet 0    metFORMIN ER (GLUCOPHAGE XR) 500 MG TABLET SR 24 HR Take 1 tablet by mouth 2 times a day. 200 Tablet 1    oxyCODONE-acetaminophen (PERCOCET) 5-325 MG Tab Take 1 Tablet by mouth 1 time a day as needed for 30 days 30 Tablet 0    oxyCODONE-acetaminophen (PERCOCET-10)  MG Tab Take 1 tablet every 4-6 hours by mouth as needed for 5 days. 30 Tablet 0    amLODIPine (NORVASC) 10 MG Tab Take 1 tablet by mouth every day. 90 Tablet 3    multivitamin Tab Take 1 Tablet by mouth every day. MEDICATION INSTRUCTIONS FOR SURGERY/PROCEDURE SCHEDULED FOR 8/1/24   DO NOT TAKE 7 DAYS PRIOR TO SURGERY      lisinopril (PRINIVIL) 40 MG tablet Take 1 Tablet by mouth every day. 100 Tablet 3    Ferrous Sulfate (IRON) 325 (65 Fe) MG Tab Take 65 mg by mouth every day at 6 PM. MEDICATION INSTRUCTIONS FOR SURGERY/PROCEDURE SCHEDULED FOR 8/1/24   DO NOT TAKE 7 DAYS PRIOR TO SURGERY       No current facility-administered medications for this visit.         Physical Exam:   Gen:           Alert and oriented, No apparent distress. Mood and affect appropriate, normal interaction with examiner.  Eyes:          PERRL, EOM intact, sclere white, conjunctive moist.  Ears:          Not examined.   Hearing:     Grossly intact.  Nose:          Normal, no lesions or deformities.  Dentition:    Good dentition.  Oropharynx:   Tongue normal, posterior pharynx without erythema or exudate.  Neck:        Supple, trachea midline, no  masses.  Respiratory Effort: No intercostal retractions or use of accessory muscles.   Lung Auscultation:      Clear to auscultation bilaterally; no rales, rhonchi or wheezing.  CV:            Regular rate and rhythm. No murmurs, rubs or gallops.  Abd:           Not examined.   Lymphadenopathy: Not examined.  Gait and Station: Normal.  Digits and Nails: No clubbing, cyanosis, petechiae, or nodes.   Cranial Nerves: II-XII grossly intact.  Skin:        No rashes, lesions or ulcers noted.               Ext:           No cyanosis or edema.      Assessment:  1. TU (obstructive sleep apnea)        2. Chronic insomnia            Plan:  Using and benefiting from CPAP therapy.  Patient is noncompliant with therapy.  Continues to have difficulty maintaining sleep.  Currently on trazodone for onset insomnia.  Recommended supplementing with melatonin or OTC sleep aid.  Patient to follow-up in 6 months, but can be seen sooner if needed.    Please note that this dictation was created using voice recognition software. I have made every reasonable attempt to correct obvious errors, but it is possible there are errors of grammar and possibly content that I did not discover before finalizing the note.

## 2025-05-13 PROCEDURE — RXMED WILLOW AMBULATORY MEDICATION CHARGE: Performed by: PSYCHIATRY & NEUROLOGY

## 2025-05-14 ENCOUNTER — PHARMACY VISIT (OUTPATIENT)
Dept: PHARMACY | Facility: MEDICAL CENTER | Age: 65
End: 2025-05-14
Payer: COMMERCIAL

## 2025-05-14 PROCEDURE — RXMED WILLOW AMBULATORY MEDICATION CHARGE: Performed by: NURSE PRACTITIONER

## 2025-05-14 RX ORDER — OXYCODONE AND ACETAMINOPHEN 5; 325 MG/1; MG/1
TABLET ORAL
Qty: 45 TABLET | Refills: 0 | OUTPATIENT
Start: 2025-06-13

## 2025-05-14 RX ORDER — GABAPENTIN 300 MG/1
CAPSULE ORAL
Qty: 90 CAPSULE | Refills: 0 | OUTPATIENT
Start: 2025-05-14

## 2025-05-14 RX ORDER — OXYCODONE AND ACETAMINOPHEN 5; 325 MG/1; MG/1
TABLET ORAL
Qty: 45 TABLET | Refills: 0 | OUTPATIENT
Start: 2025-05-14

## 2025-05-19 PROCEDURE — RXMED WILLOW AMBULATORY MEDICATION CHARGE: Performed by: NURSE PRACTITIONER

## 2025-05-21 PROCEDURE — RXMED WILLOW AMBULATORY MEDICATION CHARGE: Performed by: PSYCHIATRY & NEUROLOGY

## 2025-05-27 ENCOUNTER — TELEMEDICINE (OUTPATIENT)
Dept: BEHAVIORAL HEALTH | Facility: CLINIC | Age: 65
End: 2025-05-27
Payer: MEDICARE

## 2025-05-27 DIAGNOSIS — F33.42 RECURRENT MAJOR DEPRESSIVE DISORDER, IN FULL REMISSION (HCC): ICD-10-CM

## 2025-05-27 DIAGNOSIS — F33.1 MODERATE EPISODE OF RECURRENT MAJOR DEPRESSIVE DISORDER (HCC): ICD-10-CM

## 2025-05-27 DIAGNOSIS — F51.04 PSYCHOPHYSIOLOGICAL INSOMNIA: Primary | ICD-10-CM

## 2025-05-27 DIAGNOSIS — F41.1 GAD (GENERALIZED ANXIETY DISORDER): ICD-10-CM

## 2025-05-27 PROCEDURE — RXMED WILLOW AMBULATORY MEDICATION CHARGE: Performed by: PSYCHIATRY & NEUROLOGY

## 2025-05-27 PROCEDURE — 90833 PSYTX W PT W E/M 30 MIN: CPT | Mod: 95 | Performed by: PSYCHIATRY & NEUROLOGY

## 2025-05-27 PROCEDURE — 99214 OFFICE O/P EST MOD 30 MIN: CPT | Mod: 95 | Performed by: PSYCHIATRY & NEUROLOGY

## 2025-05-27 RX ORDER — TRAZODONE HYDROCHLORIDE 50 MG/1
100 TABLET ORAL NIGHTLY PRN
Qty: 180 TABLET | Refills: 3 | Status: SHIPPED | OUTPATIENT
Start: 2025-05-27

## 2025-05-27 RX ORDER — VENLAFAXINE HYDROCHLORIDE 75 MG/1
75 CAPSULE, EXTENDED RELEASE ORAL DAILY
Qty: 90 CAPSULE | Refills: 2 | Status: SHIPPED | OUTPATIENT
Start: 2025-05-27

## 2025-05-27 RX ORDER — BUPROPION HYDROCHLORIDE 200 MG/1
200 TABLET, EXTENDED RELEASE ORAL 2 TIMES DAILY
Qty: 180 TABLET | Refills: 2 | Status: SHIPPED | OUTPATIENT
Start: 2025-05-27

## 2025-05-27 RX ORDER — BUSPIRONE HYDROCHLORIDE 10 MG/1
10 TABLET ORAL 3 TIMES DAILY
Qty: 90 TABLET | Refills: 2 | Status: SHIPPED | OUTPATIENT
Start: 2025-05-27

## 2025-05-27 RX ORDER — VENLAFAXINE HYDROCHLORIDE 150 MG/1
150 CAPSULE, EXTENDED RELEASE ORAL DAILY
Qty: 90 CAPSULE | Refills: 2 | Status: SHIPPED | OUTPATIENT
Start: 2025-05-27

## 2025-05-27 NOTE — PROGRESS NOTES
This evaluation was conducted via Teams using secure and encrypted videoconferencing technology. The patient was in their home in the [facility].    The patient's identity was confirmed and verbal consent was obtained for this virtual visit.      PSYCHIATRY FOLLOW-UP NOTE      Name: Debby Desai  MRN: [id number]  : 1960  Age: 65 y.o.  Date of assessment: 2025  PCP: ARELIS Chris  Persons in attendance: Patient      REASON FOR VISIT/CHIEF COMPLAINT (as stated by Patient):  Debby Desai is a 65 y.o., White female, attending follow-up appointment for mood and anxiety management.      HISTORY OF PRESENT ILLNESS:  Debby Desai is a 65 y.o. old female with MDD and TIM comes in today for follow up. Patient was last seen 2 months ago, and following treatment planning recommendations were done:  Continue Effexor  mg daily for mood, anxiety and comorbid pain management.   Continue Wellbutrin  mg BID for depression augmentation.    Continue Buspar 10 mg TID for anxiety.  Continue Trazodone 50- mg HS PRN for insomnia.    Monitor for serotonin syndrome.  Continue psychotherapy for mood and anxiety management.      Patient is compliant with medications with no side effects and denies any serotonin syndrome.  She continues to struggle with pain but despite that feels overall improved mood and anxiety symptoms.  After reviewing risk and benefit agreed with plan of not titrating medications further.  She will start psychotherapy next week for a therapist that specializes in both mood and pain management.    PSYCHOTHERAPY ASPECT OF SESSION (16 MIN):  Session was dedicated to letting patient express her feelings related to recent stressors primarily from pain standpoint.  Validation was provided for appropriate emotional responses.  Patient talked in length about the positive changes and improvement she has seen in terms of not reacting but able to choose her responses and  feeling empowered as a result.  Positive encouragement given on taking persistent action and importance of not discounting the positives was emphasized.  Emphasis given on understanding what she has realistic control over and to take action in those directions only.  Later part of the session was dedicated to active listening.      CURRENT MEDICATIONS:  Current Medications[1]    MEDICAL HISTORY  Past Medical History[2]  Past Surgical History[3]      PAST PSYCHIATRIC MEDICATIONS  Fluoxetine, Paroxetine, Sertraline, Citalopram  Venlafaxine, Duloxetine  Wellbutrin  Mirtazapine  Amitriptyline (switched to Mirtazapine during admission for dehydration related syncope)   Ativan, Propranolol    REVIEW OF SYSTEMS:        Constitutional negative   Eyes negative   Ears/Nose/Mouth/Throat negative   Cardiovascular negative   Respiratory negative   Gastrointestinal negative   Genitourinary negative   Muscular negative   Integumentary negative   Neurological negative   Endocrine negative   Hematologic/Lymphatic negative     PHYSICAL EXAMINAION:  Vital signs: [facility] 02/26/2012   Musculoskeletal: Normal gait.   Abnormal movements: none      MENTAL STATUS EXAMINATION      General:   - Grooming and hygiene: Casual,   - Apparent distress: tense,   - Behavior: Tense  - Eye Contact:  Good,   - no psychomotor agitation or retardation    - Participation: Active verbal participation  Orientation: Alert and Fully Oriented to person, place and time  Mood: Anxious  Affect: Flexible and Full range,  Thought Process: Logical and Goal-directed  Thought Content: Denies suicidal or homicidal ideations, intent or plan   Perception: Denies auditory or visual hallucinations. No delusions noted   Attention span and concentration: Intact   Speech:Rate within normal limits and Volume within normal limits  Language: Appropriate   Insight: Good  Judgment: Good  Recent and remote memory: No gross evidence of memory deficits        DEPRESSION SCREENING:       6/11/2024    11:30 AM 2/4/2025     9:30 AM 3/25/2025    10:00 AM   Depression Screen (PHQ-2/PHQ-9)   PHQ-2 Total Score  0 0   PHQ-9 Total Score 16         Interpretation of PHQ-9 Total Score   Score Severity   1-4 No Depression   5-9 Mild Depression   10-14 Moderate Depression   15-19 Moderately Severe Depression   20-27 Severe Depression    CURRENT RISK:       Suicidal: Low       Homicidal: Low       Self-Harm: Low       Relapse: Low       Crisis Safety Plan Reviewed Not Indicated       If evidence of imminent risk is present, intervention/plan:      MEDICAL RECORDS/LABS/DIAGNOSTIC TESTS REVIEWED:  No new lab since last visit     [place]  records -   Reviewed     PLAN:  (1) MDD; (2) TIM; (3) Insomnia  Improving (pain is limiting factors).  Continue Effexor  mg daily for mood, anxiety and comorbid pain management.   Continue Wellbutrin  mg BID for depression augmentation.    Continue Buspar 10 mg TID for anxiety.  Continue Trazodone 50- mg HS PRN for insomnia.    Monitor for serotonin syndrome.  Continue psychotherapy for mood and anxiety management.  Medication options, alternatives (including no medications) and medication risks/benefits/side effects were discussed in detail.  Explained importance of contraceptive measures while on psychotropic medications, educated to let provider know if ever pregnant or wanting to become pregnant. Verbalized understanding.  The patient was advised to call, message provider on [employer], or come in to the clinic if symptoms worsen or if any future questions/issues regarding their medications arise; the patient verbalized understanding and agreement.   The patient was educated to call 911, call the suicide hotline, or go to local ER if having thoughts of suicide or homicide; verbalized understanding.      Billing Coding based on:  24212 based on [facility]  [id number]: based on psychotherapy timing    Return to clinic in 2 months or sooner if symptoms worsen.  Next Appointment:  instruction provided on how to make the next appointment.     The proposed treatment plan was discussed with the patient who was provided the opportunity to ask questions and make suggestions regarding alternative treatment. Patient verbalized understanding and expressed agreement with the plan.       Genaro Stevenson M.D.  05/27/25    This note was created using voice recognition software (Dragon). The accuracy of the dictation is limited by the abilities of the software. I have reviewed the note prior to signing, however some errors in grammar and context are still possible. If you have any questions related to this note please do not hesitate to contact our office.            [1]   Current Outpatient Medications   Medication Sig Dispense Refill    gabapentin (NEURONTIN) 300 MG Cap Take 1 capsule by mouth as needed at bedtime for 90 days. 90 Capsule 0    [START ON 6/13/2025] oxyCODONE-acetaminophen (PERCOCET) 5-325 MG Tab Take 1 tablet by mouth twice a day as needed for 30 days. - DNFU 6/13/25 45 Tablet 0    oxyCODONE-acetaminophen (PERCOCET) 5-325 MG Tab Take 1 tablet twice a day by mouth as needed for 30 days. 45 Tablet 0    venlafaxine (EFFEXOR-XR) 150 MG extended-release capsule Take 1 Capsule by mouth every day. (venlafaxine xr 150 mg + 75 mg = 225 mg daily) 90 Capsule 2    venlafaxine XR (EFFEXOR XR) 75 MG CAPSULE SR 24 HR Take 1 capsule by mouth every day. (venlafaxine xr 150 mg + 75 mg = 225 mg daily) 90 Capsule 2    traZODone (DESYREL) 50 MG Tab Take 2 Tablets by mouth at bedtime as needed for Sleep (as needed for sleep). 180 Tablet 3    busPIRone (BUSPAR) 10 MG Tab tablet Take 1 Tablet by mouth 3 times a day. 90 Tablet 2    buPROPion (WELLBUTRIN SR) 200 MG SR tablet Take 1 tablet by mouth 2 times a day. 180 Tablet 2    levothyroxine (SYNTHROID) 50 MCG Tab Take 1 tablet by mouth every morning on an empty stomach. 100 Tablet 0    oxyCODONE-acetaminophen (PERCOCET) 5-325 MG Tab Take 1 tablet by mouth twice  a day as needed for 30 days. 45 Tablet 0    gabapentin (NEURONTIN) 300 MG Cap Take 1 capsule by mouth as needed at bedtime for 90 days. 90 Capsule 0    atorvastatin (LIPITOR) 20 MG Tab TAKE ONE TABLET BY MOUTH EVERY  Tablet 0    gabapentin (NEURONTIN) 300 MG Cap Take 1 capsule by mouth as needed at bedtime for 30 days. 30 Capsule 2    oxyCODONE-acetaminophen (PERCOCET) 5-325 MG Tab Take 1 tablet by mouth twice a day as needed for 30 days. 45 Tablet 0    oxyCODONE-acetaminophen (PERCOCET) 5-325 MG Tab Take 1 tablet by mouth twice a day as needed for 30 days. DNFU 2/14 45 Tablet 0    metFORMIN ER (GLUCOPHAGE XR) 500 MG TABLET SR 24 HR Take 1 tablet by mouth 2 times a day. 200 Tablet 1    oxyCODONE-acetaminophen (PERCOCET) 5-325 MG Tab Take 1 Tablet by mouth 1 time a day as needed for 30 days 30 Tablet 0    oxyCODONE-acetaminophen (PERCOCET-10)  MG Tab Take 1 tablet every 4-6 hours by mouth as needed for 5 days. 30 Tablet 0    amLODIPine (NORVASC) 10 MG Tab Take 1 tablet by mouth every day. 90 Tablet 3    multivitamin Tab Take 1 Tablet by mouth every day. MEDICATION INSTRUCTIONS FOR SURGERY/PROCEDURE SCHEDULED FOR 8/1/24   DO NOT TAKE 7 DAYS PRIOR TO SURGERY      lisinopril (PRINIVIL) 40 MG tablet Take 1 Tablet by mouth every day. 100 Tablet 3    Ferrous Sulfate (IRON) 325 (65 Fe) MG Tab Take 65 mg by mouth every day at 6 PM. MEDICATION INSTRUCTIONS FOR SURGERY/PROCEDURE SCHEDULED FOR 8/1/24   DO NOT TAKE 7 DAYS PRIOR TO SURGERY       No current facility-administered medications for this visit.   [2]   Past Medical History:  Diagnosis Date    Anemia     in past    Anginal syndrome (HCC)     had work up and feet r/t anxiety    Anxiety     Arthritis     OA knees    Back pain     Back pain     Chest tightness     Chickenpox     Chronic pain 06/02/2021    Daytime sleepiness     Fatigue     Frequent headaches     Frequent urination     Welsh measles     High cholesterol     HTN (hypertension), benign  03/19/2012    Hypertension     Hypothyroid 03/19/2012    Hypothyroidism     Influenza     Insomnia     Major depression 03/19/2012    Morning headache     Mumps     Obesity (BMI 30-39.9) 07/09/2018    Orbital floor (blow-out) closed fracture (HCC)     left    Pain     thoracic spine, Right knee, myofacial pain, and Right shoulder    Pain     recently fell and has bruise left forehead    Pain 02/2018    chronic back pain, right shoulder    Painful joint     Peptic ulcer     Pre-diabetes     Psychiatric problem     depression, anxiety    Ringing in ears     Sleep apnea     No Device, will see sleep DrMaxwell 11/2024    Sore muscles     Toothache     Unspecified disorder of thyroid     Urinary bladder disorder     stress incont.    Urinary incontinence     Occasional    Wears glasses    [3]   Past Surgical History:  Procedure Laterality Date    PB RECONSTR TOTAL SHOULDER IMPLANT Left 08/01/2024    Procedure: LEFT TOTAL SHOULDER REPLACEMENT, REVERSE;  Surgeon: Scott Lorenzo M.D.;  Location: SURGERY [facility];  Service: Orthopedics    WV NEUROLYTIC DEST GENICULAR NERVE Right 07/28/2023    Procedure: RIGHT genicular nerve radiofrequency ablation;  Surgeon: Volodymyr Herring M.D.;  [facility];  Service: Pain Management    [place] FLUOROSCOPIC GUIDANCE NEEDLE PLACEMENT Right 07/07/2023    Procedure: RIGHT genicular nerve diagnostic blocks;  Surgeon: Volodymyr Herring M.D.;  Location: [facility];  Service: Pain Management    INJ,EPI ANES/STER LUM/SAC ADDL Right 04/07/2021    Procedure: RIGHT lumbar five and sacral one transforaminal epidural;  Surgeon: Volodymyr Herring M.D.;  Location: [facility];  Service: Pain Management    PB TOTAL KNEE ARTHROPLASTY Right 09/16/2019    Procedure: RIGHT ARTHROPLASTY, KNEE, TOTAL;  Surgeon: Rufus Saba M.D.;  Location: SURGERY [facility];  Service: Orthopedics    KNEE ARTHROSCOPY Right 02/09/2018    Procedure: KNEE ARTHROSCOPY;   Surgeon: Harsh Baltazar M.D.;  Location: [facility];  Service: Orthopedics    MENISCECTOMY, KNEE, MEDIAL Right 02/09/2018    Procedure: MEDIAL AND LATERAL MENISCECTOMY- PARTIAL;  Surgeon: Harsh Baltazar M.D.;  Location: [facility];  Service: Orthopedics    KNEE ARTHROSCOPY Right 07/12/2017    Procedure: KNEE ARTHROSCOPY- CESPEDES;  Surgeon: Harsh Baltazar M.D.;  Location: [facility];  Service:     MENISCECTOMY, KNEE, MEDIAL Right 07/12/2017    Procedure: PARTIAL MEDIAL AND LATERAL MENISCECTOMY;  Surgeon: Harsh Baltazar M.D.;  Location: [facility];  Service:     SYNOVECTOMY Right 07/12/2017    Procedure: SYNOVECTOMY;  Surgeon: Harsh Baltazar M.D.;  Location: [facility];  Service:     KNEE ARTHROSCOPY  04/21/2015    Performed by Rufus Saba M.D. at SURGERY [facility]    MENISCECTOMY, KNEE, MEDIAL  04/21/2015    Performed by Rufus Saba M.D. at SURGERY [facility]    PUMP REVISION  12/10/2012    Performed by Allison Guerra M.D. at [facility]    SPINAL CORD STIMULATOR  05/30/2012    Performed by ALLISON GUERRA at [facility]    BIOPSY GENERAL  05/01/2012    endometrial    SPINAL CORD STIMULATOR  07/11/2011    Performed by ALLISON GUERRA at [facility]    BLOCK EPIDURAL STEROID INJECTION  2008    x 3-4    LYMPH NODE EXCISION Left 2007    cervicle    OTHER Right 2001    thoracic discectomy      ARTHROSCOPY, KNEE Left 1999    RIB RESECTION Right 1980    LAMINOTOMY      LUMPECTOMY

## 2025-06-02 ENCOUNTER — PHARMACY VISIT (OUTPATIENT)
Dept: PHARMACY | Facility: MEDICAL CENTER | Age: 65
End: 2025-06-02
Payer: COMMERCIAL

## 2025-06-13 ENCOUNTER — PHARMACY VISIT (OUTPATIENT)
Dept: PHARMACY | Facility: MEDICAL CENTER | Age: 65
End: 2025-06-13
Payer: COMMERCIAL

## 2025-06-13 PROCEDURE — RXMED WILLOW AMBULATORY MEDICATION CHARGE: Performed by: NURSE PRACTITIONER

## 2025-06-25 DIAGNOSIS — E03.9 ACQUIRED HYPOTHYROIDISM: ICD-10-CM

## 2025-07-01 PROCEDURE — RXMED WILLOW AMBULATORY MEDICATION CHARGE: Performed by: INTERNAL MEDICINE

## 2025-07-01 RX ORDER — LEVOTHYROXINE SODIUM 50 UG/1
50 TABLET ORAL
Qty: 100 TABLET | Refills: 0 | Status: SHIPPED | OUTPATIENT
Start: 2025-07-01

## 2025-07-11 DIAGNOSIS — I10 HTN (HYPERTENSION), BENIGN: Chronic | ICD-10-CM

## 2025-07-11 DIAGNOSIS — E78.5 DYSLIPIDEMIA: ICD-10-CM

## 2025-07-11 PROCEDURE — RXMED WILLOW AMBULATORY MEDICATION CHARGE: Performed by: PSYCHIATRY & NEUROLOGY

## 2025-07-14 ENCOUNTER — PHARMACY VISIT (OUTPATIENT)
Dept: PHARMACY | Facility: MEDICAL CENTER | Age: 65
End: 2025-07-14
Payer: COMMERCIAL

## 2025-07-14 PROCEDURE — RXMED WILLOW AMBULATORY MEDICATION CHARGE: Performed by: NURSE PRACTITIONER

## 2025-07-14 RX ORDER — LISINOPRIL 40 MG/1
40 TABLET ORAL DAILY
Qty: 100 TABLET | Refills: 0 | Status: SHIPPED | OUTPATIENT
Start: 2025-07-14

## 2025-07-14 RX ORDER — ATORVASTATIN CALCIUM 20 MG/1
20 TABLET, FILM COATED ORAL
Qty: 100 TABLET | Refills: 0 | Status: SHIPPED | OUTPATIENT
Start: 2025-07-14

## 2025-07-18 ENCOUNTER — PHARMACY VISIT (OUTPATIENT)
Dept: PHARMACY | Facility: MEDICAL CENTER | Age: 65
End: 2025-07-18
Payer: COMMERCIAL

## 2025-07-21 ENCOUNTER — HOSPITAL ENCOUNTER (OUTPATIENT)
Dept: RADIOLOGY | Facility: MEDICAL CENTER | Age: 65
End: 2025-07-21
Payer: MEDICARE

## 2025-07-21 ENCOUNTER — HOSPITAL ENCOUNTER (OUTPATIENT)
Dept: RADIOLOGY | Facility: MEDICAL CENTER | Age: 65
End: 2025-07-21
Attending: NURSE PRACTITIONER
Payer: MEDICARE

## 2025-07-21 DIAGNOSIS — Z78.0 POSTMENOPAUSAL: ICD-10-CM

## 2025-07-21 DIAGNOSIS — E03.9 ACQUIRED HYPOTHYROIDISM: ICD-10-CM

## 2025-07-21 DIAGNOSIS — Z12.31 SCREENING MAMMOGRAM, ENCOUNTER FOR: ICD-10-CM

## 2025-07-21 DIAGNOSIS — S92.351A CLOSED FRACTURE OF BASE OF FIFTH METATARSAL BONE OF RIGHT FOOT: ICD-10-CM

## 2025-07-21 PROCEDURE — 77080 DXA BONE DENSITY AXIAL: CPT

## 2025-07-21 PROCEDURE — 77063 BREAST TOMOSYNTHESIS BI: CPT

## 2025-07-22 ENCOUNTER — RESULTS FOLLOW-UP (OUTPATIENT)
Dept: MEDICAL GROUP | Facility: IMAGING CENTER | Age: 65
End: 2025-07-22
Payer: MEDICARE

## 2025-07-28 ASSESSMENT — PATIENT HEALTH QUESTIONNAIRE - PHQ9
6. FEELING BAD ABOUT YOURSELF - OR THAT YOU ARE A FAILURE OR HAVE LET YOURSELF OR YOUR FAMILY DOWN: 1
2. FEELING DOWN, DEPRESSED, IRRITABLE, OR HOPELESS: MORE THAN HALF THE DAYS
7. TROUBLE CONCENTRATING ON THINGS, SUCH AS READING THE NEWSPAPER OR WATCHING TELEVISION: SEVERAL DAYS
8. MOVING OR SPEAKING SO SLOWLY THAT OTHER PEOPLE COULD HAVE NOTICED. OR THE OPPOSITE, BEING SO FIGETY OR RESTLESS THAT YOU HAVE BEEN MOVING AROUND A LOT MORE THAN USUAL: NOT AT ALL
SUM OF ALL RESPONSES TO PHQ QUESTIONS 1-9: 9
8. MOVING OR SPEAKING SO SLOWLY THAT OTHER PEOPLE COULD HAVE NOTICED. OR THE OPPOSITE, BEING SO FIGETY OR RESTLESS THAT YOU HAVE BEEN MOVING AROUND A LOT MORE THAN USUAL: 0
6. FEELING BAD ABOUT YOURSELF - OR THAT YOU ARE A FAILURE OR HAVE LET YOURSELF OR YOUR FAMILY DOWN: SEVERAL DAYS
2. FEELING DOWN, DEPRESSED, IRRITABLE, OR HOPELESS: 2
7. TROUBLE CONCENTRATING ON THINGS, SUCH AS READING THE NEWSPAPER OR WATCHING TELEVISION: 1
5. POOR APPETITE OR OVEREATING: 0
3. TROUBLE FALLING OR STAYING ASLEEP OR SLEEPING TOO MUCH: MORE THAN HALF THE DAYS
1. LITTLE INTEREST OR PLEASURE IN DOING THINGS: 1
4. FEELING TIRED OR HAVING LITTLE ENERGY: 2
1. LITTLE INTEREST OR PLEASURE IN DOING THINGS: SEVERAL DAYS
4. FEELING TIRED OR HAVING LITTLE ENERGY: MORE THAN HALF THE DAYS
9. THOUGHTS THAT YOU WOULD BE BETTER OFF DEAD, OR OF HURTING YOURSELF: NOT AT ALL
5. POOR APPETITE OR OVEREATING: NOT AT ALL
3. TROUBLE FALLING OR STAYING ASLEEP OR SLEEPING TOO MUCH: 2
5. POOR APPETITE OR OVEREATING: 0 - NOT AT ALL
9. THOUGHTS THAT YOU WOULD BE BETTER OFF DEAD, OR OF HURTING YOURSELF: 0
10. IF YOU CHECKED OFF ANY PROBLEMS, HOW DIFFICULT HAVE THESE PROBLEMS MADE IT FOR YOU TO DO YOUR WORK, TAKE CARE OF THINGS AT HOME, OR GET ALONG WITH OTHER PEOPLE: SOMEWHAT DIFFICULT

## 2025-07-28 ASSESSMENT — ANXIETY QUESTIONNAIRES
5. BEING SO RESTLESS THAT IT IS HARD TO SIT STILL: SEVERAL DAYS
7. FEELING AFRAID AS IF SOMETHING AWFUL MIGHT HAPPEN: NOT AT ALL
6. BECOMING EASILY ANNOYED OR IRRITABLE: NEARLY EVERY DAY
2. NOT BEING ABLE TO STOP OR CONTROL WORRYING: SEVERAL DAYS
4. TROUBLE RELAXING: MORE THAN HALF THE DAYS
1. FEELING NERVOUS, ANXIOUS, OR ON EDGE: MORE THAN HALF THE DAYS
7. FEELING AFRAID AS IF SOMETHING AWFUL MIGHT HAPPEN: NOT AT ALL
1. FEELING NERVOUS, ANXIOUS, OR ON EDGE: MORE THAN HALF THE DAYS
6. BECOMING EASILY ANNOYED OR IRRITABLE: NEARLY EVERY DAY
GAD7 TOTAL SCORE: 10
2. NOT BEING ABLE TO STOP OR CONTROL WORRYING: SEVERAL DAYS
IF YOU CHECKED OFF ANY PROBLEMS ON THIS QUESTIONNAIRE, HOW DIFFICULT HAVE THESE PROBLEMS MADE IT FOR YOU TO DO YOUR WORK, TAKE CARE OF THINGS AT HOME, OR GET ALONG WITH OTHER PEOPLE: SOMEWHAT DIFFICULT
5. BEING SO RESTLESS THAT IT IS HARD TO SIT STILL: SEVERAL DAYS
IF YOU CHECKED OFF ANY PROBLEMS ON THIS QUESTIONNAIRE, HOW DIFFICULT HAVE THESE PROBLEMS MADE IT FOR YOU TO DO YOUR WORK, TAKE CARE OF THINGS AT HOME, OR GET ALONG WITH OTHER PEOPLE: SOMEWHAT DIFFICULT
4. TROUBLE RELAXING: MORE THAN HALF THE DAYS
3. WORRYING TOO MUCH ABOUT DIFFERENT THINGS: SEVERAL DAYS
3. WORRYING TOO MUCH ABOUT DIFFERENT THINGS: SEVERAL DAYS

## 2025-07-29 ENCOUNTER — TELEMEDICINE (OUTPATIENT)
Dept: BEHAVIORAL HEALTH | Facility: CLINIC | Age: 65
End: 2025-07-29
Payer: MEDICARE

## 2025-07-29 DIAGNOSIS — F51.04 PSYCHOPHYSIOLOGICAL INSOMNIA: ICD-10-CM

## 2025-07-29 DIAGNOSIS — F41.1 GAD (GENERALIZED ANXIETY DISORDER): ICD-10-CM

## 2025-07-29 DIAGNOSIS — F33.1 MODERATE EPISODE OF RECURRENT MAJOR DEPRESSIVE DISORDER (HCC): Primary | ICD-10-CM

## 2025-07-29 RX ORDER — TRAZODONE HYDROCHLORIDE 50 MG/1
150 TABLET ORAL NIGHTLY PRN
Qty: 270 TABLET | Refills: 3 | Status: SHIPPED | OUTPATIENT
Start: 2025-07-29

## 2025-07-29 ASSESSMENT — PATIENT HEALTH QUESTIONNAIRE - PHQ9: CLINICAL INTERPRETATION OF PHQ2 SCORE: 0

## 2025-07-29 NOTE — PROGRESS NOTES
This evaluation was conducted via Teams using secure and encrypted videoconferencing technology. The patient was in their home in the [facility].    The patient's identity was confirmed and verbal consent was obtained for this virtual visit.      PSYCHIATRY FOLLOW-UP NOTE      Name: Debby Desai  MRN: [id number]  : 1960  Age: 65 y.o.  Date of assessment: 2025  PCP: ARELIS Chris  Persons in attendance: Patient      REASON FOR VISIT/CHIEF COMPLAINT (as stated by Patient):  Debby Desai is a 65 y.o., White female, attending follow-up appointment for mood and anxiety management.      HISTORY OF PRESENT ILLNESS:  Debby Desai is a 65 y.o. old female with MDD, TIM and insomnia comes in today for follow up. Patient was last seen 2 months ago, and following treatment planning recommendations were done:  Continue Effexor  mg daily for mood, anxiety and comorbid pain management.   Continue Wellbutrin  mg BID for depression augmentation.    Continue Buspar 10 mg TID for anxiety.  Continue Trazodone 50- mg HS PRN for insomnia.    Monitor for serotonin syndrome.  Continue psychotherapy for mood and anxiety management.      Patient is compliant with medications with no side effect but over the last 1 and 2 weeks time she has noticed sleep disruption with mind racing.  On further exploration she noticed more pain in the body contributing to these symptoms.  She is working with her physician and have an upcoming appointment with Dr. Nona strong for psychotherapy for pain and sleep management.  Patient agreed with increasing trazodone to total 150 mg for the next 2 or 3 nights to stabilize her sleep and then she will revert back to 50 to 100 mg dosage as needed for sleep.  Patient agreed with not titrating other medications at this time.    PSYCHOTHERAPY ASPECT OF SESSION (16 MIN):  Session dedicated to letting patient express her feelings related to recent changes primarily  from pain standpoint and its impact on her emotions.  Validation was provided for appropriate emotional responses and normalization was done.  Importance of understanding what she has control over and taking action in those areas was emphasized.  Importance of not discounting the positive was discussed.  Importance of using her senses for self-soothing ranging from hearing and smell was discussed as well.  Later part of the session was dedicated to psychoeducation and active listening.      CURRENT MEDICATIONS:  Current Medications[1]    MEDICAL HISTORY  Past Medical History[2]  Past Surgical History[3]    PAST PSYCHIATRIC MEDICATIONS  Fluoxetine, Paroxetine, Sertraline, Citalopram  Venlafaxine, Duloxetine  Wellbutrin  Mirtazapine  Amitriptyline (switched to Mirtazapine during admission for dehydration related syncope)   Ativan, Propranolol    REVIEW OF SYSTEMS:        Constitutional negative   Eyes negative   Ears/Nose/Mouth/Throat negative   Cardiovascular negative   Respiratory negative   Gastrointestinal negative   Genitourinary negative   Muscular  Osteoarthritis   Integumentary negative   Neurological negative   Endocrine negative   Hematologic/Lymphatic negative     PHYSICAL EXAMINAION:  Vital signs: LMP 02/26/2012   Musculoskeletal: Normal gait.   Abnormal movements: none      MENTAL STATUS EXAMINATION      General:   - Grooming and hygiene: Casual,   - Apparent distress: none,   - Behavior: Tense  - Eye Contact:  Good,   - no psychomotor agitation or retardation    - Participation: Active verbal participation  Orientation: Alert and Fully Oriented to person, place and time  Mood: Anxious  Affect: Flexible and Full range,  Thought Process: Logical and Goal-directed  Thought Content: Denies suicidal or homicidal ideations, intent or plan   Perception: Denies auditory or visual hallucinations. No delusions noted   Attention span and concentration: Intact   Speech:Rate within normal limits and Volume within  normal limits  Language: Appropriate   Insight: Good  Judgment: Good  Recent and remote memory: No gross evidence of memory deficits        DEPRESSION SCREENIN/4/2025     9:30 AM 3/25/2025    10:00 AM 2025    10:00 AM   Depression Screen (PHQ-2/PHQ-9)   PHQ-2 Total Score 0 0    PHQ-9 Total Score   9       Interpretation of PHQ-9 Total Score   Score Severity   1-4 No Depression   5-9 Mild Depression   10-14 Moderate Depression   15-19 Moderately Severe Depression   20-27 Severe Depression    CURRENT RISK:       Suicidal: Low       Homicidal: Low       Self-Harm: Low       Relapse: Low       Crisis Safety Plan Reviewed Not Indicated       If evidence of imminent risk is present, intervention/plan:      MEDICAL RECORDS/LABS/DIAGNOSTIC TESTS REVIEWED:  No new lab since last visit     [place]  records -   Reviewed     PLAN:  (1) MDD; (2) TIM; (3) Insomnia  Improving (pain is limiting factors).  Continue Effexor  mg daily for mood, anxiety and comorbid pain management.   Continue Wellbutrin  mg BID for depression augmentation.    Continue Buspar 10 mg TID for anxiety.  Continue Trazodone -150 mg HS PRN for insomnia.    Monitor for serotonin syndrome.  Continue psychotherapy for mood and anxiety management.  Medication options, alternatives (including no medications) and medication risks/benefits/side effects were discussed in detail.  Explained importance of contraceptive measures while on psychotropic medications, educated to let provider know if ever pregnant or wanting to become pregnant. Verbalized understanding.  The patient was advised to call, message provider on [employer], or come in to the clinic if symptoms worsen or if any future questions/issues regarding their medications arise; the patient verbalized understanding and agreement.   The patient was educated to call 911, call the suicide hotline, or go to local ER if having thoughts of suicide or homicide; verbalized  understanding.      Billing Coding based on:  92098 based on [facility]  56478: based on psychotherapy timing    Return to clinic in 1 month or sooner if symptoms worsen.  Next Appointment: instruction provided on how to make the next appointment.     The proposed treatment plan was discussed with the patient who was provided the opportunity to ask questions and make suggestions regarding alternative treatment. Patient verbalized understanding and expressed agreement with the plan.       Genaro Stevenson M.D.  07/29/25    This note was created using voice recognition software (Dragon). The accuracy of the dictation is limited by the abilities of the software. I have reviewed the note prior to signing, however some errors in grammar and context are still possible. If you have any questions related to this note please do not hesitate to contact our office.            [1]   Current Outpatient Medications   Medication Sig Dispense Refill    lisinopril (PRINIVIL) 40 MG tablet Take 1 Tablet by mouth every day. 100 Tablet 0    atorvastatin (LIPITOR) 20 MG Tab TAKE ONE TABLET BY MOUTH EVERY  Tablet 0    gabapentin (NEURONTIN) 300 MG Cap Take 1 capsule by mouth as needed at bedtime for 90 days. 90 Capsule 0    oxyCODONE-acetaminophen (PERCOCET) 5-325 MG Tab Take 1 tablet by mouth twice a day as needed for 30 days. DNFU 7/13 45 Tablet 0    [START ON 8/12/2025] oxyCODONE-acetaminophen (PERCOCET) 5-325 MG Tab Take 1 tablet by mouth twice a day as needed for 30 days. DNFU 8/12 45 Tablet 0    levothyroxine (SYNTHROID) 50 MCG Tab Take 1 tablet by mouth every morning on an empty stomach. 100 Tablet 0    busPIRone (BUSPAR) 10 MG Tab tablet Take 1 Tablet by mouth 3 times a day. 90 Tablet 2    buPROPion (WELLBUTRIN SR) 200 MG SR tablet Take 1 tablet by mouth 2 times a day. 180 Tablet 2    traZODone (DESYREL) 50 MG Tab Take 2 Tablets by mouth at bedtime as needed for Sleep (as needed for sleep). 180 Tablet 3    venlafaxine (EFFEXOR-XR)  150 MG extended-release capsule Take 1 Capsule by mouth every day. (venlafaxine xr 150 mg + 75 mg = 225 mg daily) 90 Capsule 2    venlafaxine XR (EFFEXOR XR) 75 MG CAPSULE SR 24 HR Take 1 capsule by mouth every day. (venlafaxine xr 150 mg + 75 mg = 225 mg daily) 90 Capsule 2    gabapentin (NEURONTIN) 300 MG Cap Take 1 capsule by mouth as needed at bedtime for 90 days. 90 Capsule 0    oxyCODONE-acetaminophen (PERCOCET) 5-325 MG Tab Take 1 tablet by mouth twice a day as needed for 30 days. - DNFU 6/13/25 45 Tablet 0    oxyCODONE-acetaminophen (PERCOCET) 5-325 MG Tab Take 1 tablet twice a day by mouth as needed for 30 days. 45 Tablet 0    oxyCODONE-acetaminophen (PERCOCET) 5-325 MG Tab Take 1 tablet by mouth twice a day as needed for 30 days. 45 Tablet 0    gabapentin (NEURONTIN) 300 MG Cap Take 1 capsule by mouth as needed at bedtime for 90 days. 90 Capsule 0    gabapentin (NEURONTIN) 300 MG Cap Take 1 capsule by mouth as needed at bedtime for 30 days. 30 Capsule 2    oxyCODONE-acetaminophen (PERCOCET) 5-325 MG Tab Take 1 tablet by mouth twice a day as needed for 30 days. 45 Tablet 0    oxyCODONE-acetaminophen (PERCOCET) 5-325 MG Tab Take 1 tablet by mouth twice a day as needed for 30 days. DNFU 2/14 45 Tablet 0    metFORMIN ER (GLUCOPHAGE XR) 500 MG TABLET SR 24 HR Take 1 tablet by mouth 2 times a day. 200 Tablet 1    oxyCODONE-acetaminophen (PERCOCET) 5-325 MG Tab Take 1 Tablet by mouth 1 time a day as needed for 30 days 30 Tablet 0    oxyCODONE-acetaminophen (PERCOCET-10)  MG Tab Take 1 tablet every 4-6 hours by mouth as needed for 5 days. 30 Tablet 0    amLODIPine (NORVASC) 10 MG Tab Take 1 tablet by mouth every day. 90 Tablet 3    multivitamin Tab Take 1 Tablet by mouth every day. MEDICATION INSTRUCTIONS FOR SURGERY/PROCEDURE SCHEDULED FOR 8/1/24   DO NOT TAKE 7 DAYS PRIOR TO SURGERY      Ferrous Sulfate (IRON) 325 (65 Fe) MG Tab Take 65 mg by mouth every day at 6 PM. MEDICATION INSTRUCTIONS FOR  SURGERY/PROCEDURE SCHEDULED FOR 8/1/24   DO NOT TAKE 7 DAYS PRIOR TO SURGERY       No current facility-administered medications for this visit.   [2]   Past Medical History:  Diagnosis Date    Anemia     in past    Anginal syndrome (HCC)     had work up and feet r/t anxiety    Anxiety     Arthritis     OA knees    Back pain     Back pain     Chest tightness     Chickenpox     Chronic pain 06/02/2021    Daytime sleepiness     Fatigue     Frequent headaches     Frequent urination     [place] measles     High cholesterol     HTN (hypertension), benign 03/19/2012    Hypertension     Hypothyroid 03/19/2012    Hypothyroidism     Influenza     Insomnia     Major depression 03/19/2012    Morning headache     Mumps     Obesity (BMI 30-39.9) 07/09/2018    Orbital floor (blow-out) closed fracture (HCC)     left    Pain     thoracic spine, Right knee, myofacial pain, and Right shoulder    Pain     recently fell and has bruise left forehead    Pain 02/2018    chronic back pain, right shoulder    Painful joint     Peptic ulcer     Pre-diabetes     Psychiatric problem     depression, anxiety    Ringing in ears     Sleep apnea     No Device, will see sleep DrMaxwell 11/2024    Sore muscles     Toothache     Unspecified disorder of thyroid     Urinary bladder disorder     stress incont.    Urinary incontinence     Occasional    Wears glasses    [3]   Past Surgical History:  Procedure Laterality Date    PB RECONSTR TOTAL SHOULDER IMPLANT Left 08/01/2024    Procedure: LEFT TOTAL SHOULDER REPLACEMENT, REVERSE;  Surgeon: Scott Lorenzo M.D.;  Location: SURGERY [facility];  Service: Orthopedics    IL NEUROLYTIC DEST GENICULAR NERVE Right 07/28/2023    Procedure: RIGHT genicular nerve radiofrequency ablation;  Surgeon: Volodymyr Herring M.D.;  Location[facility];  Service: Pain Management    IL FLUOROSCOPIC GUIDANCE NEEDLE PLACEMENT Right 07/07/2023    Procedure: RIGHT genicular nerve diagnostic blocks;  Surgeon: Volodymyr GUEVARA  MACARENA Herring;  Location: [facility];  Service: Pain Management    INJ,EPI ANES/STER LUM/SAC ADDL Right 04/07/2021    Procedure: RIGHT lumbar five and sacral one transforaminal epidural;  Surgeon: Volodymyr Herring M.D.;  Location: [facility];  Service: Pain Management    PB TOTAL KNEE ARTHROPLASTY Right 09/16/2019    Procedure: RIGHT ARTHROPLASTY, KNEE, TOTAL;  Surgeon: Rufus Saba M.D.;  Location: [facility];  Service: Orthopedics    KNEE ARTHROSCOPY Right 02/09/2018    Procedure: KNEE ARTHROSCOPY;  Surgeon: Harsh Baltazar M.D.;  Location: [facility];  Service: Orthopedics    MENISCECTOMY, KNEE, MEDIAL Right 02/09/2018    Procedure: MEDIAL AND LATERAL MENISCECTOMY- PARTIAL;  Surgeon: Harsh Baltazar M.D.;  Location: [facility];  Service: Orthopedics    KNEE ARTHROSCOPY Right 07/12/2017    Procedure: KNEE ARTHROSCOPY- CESPEDES;  Surgeon: Harsh Baltazar M.D.;  Location: [facility];  Service:     MENISCECTOMY, KNEE, MEDIAL Right 07/12/2017    Procedure: PARTIAL MEDIAL AND LATERAL MENISCECTOMY;  Surgeon: Harsh Baltazar M.D.;  Location: [facility];  Service:     SYNOVECTOMY Right 07/12/2017    Procedure: SYNOVECTOMY;  Surgeon: Harsh Baltazar M.D.;  Location: [facility];  Service:     KNEE ARTHROSCOPY  04/21/2015    Performed by Rufus Saba M.D. at [facility]    MENISCECTOMY, KNEE, MEDIAL  04/21/2015    Performed by Rufus Saba M.D. at [facility]    PUMP REVISION  12/10/2012    Performed by Allison Guerra M.D. at [facility]    SPINAL CORD STIMULATOR  05/30/2012    Performed by ALLISON GUERRA at [facility]    BIOPSY GENERAL  05/01/2012    endometrial    SPINAL CORD STIMULATOR  07/11/2011    Performed by ALLISON GUERRA at [facility]    BLOCK EPIDURAL STEROID INJECTION  2008    x 3-4     LYMPH NODE EXCISION Left 2007    cervicle    OTHER Right 2001    thoracic discectomy      ARTHROSCOPY, KNEE Left 1999    RIB RESECTION Right 1980    LAMINOTOMY      LUMPECTOMY

## 2025-08-08 DIAGNOSIS — I10 HTN (HYPERTENSION), BENIGN: Chronic | ICD-10-CM

## 2025-08-12 PROCEDURE — RXMED WILLOW AMBULATORY MEDICATION CHARGE: Performed by: NURSE PRACTITIONER

## 2025-08-12 RX ORDER — AMLODIPINE BESYLATE 10 MG/1
10 TABLET ORAL DAILY
Qty: 90 TABLET | Refills: 1 | Status: SHIPPED | OUTPATIENT
Start: 2025-08-12

## 2025-08-13 ENCOUNTER — PHARMACY VISIT (OUTPATIENT)
Dept: PHARMACY | Facility: MEDICAL CENTER | Age: 65
End: 2025-08-13
Payer: COMMERCIAL

## 2025-08-13 PROCEDURE — RXMED WILLOW AMBULATORY MEDICATION CHARGE: Performed by: PSYCHIATRY & NEUROLOGY

## (undated) DEVICE — DRAPE LOWER EXTREMETY - (6/CA)

## (undated) DEVICE — KIT ANESTHESIA W/CIRCUIT & 3/LT BAG W/FILTER (20EA/CA)

## (undated) DEVICE — GOWN WARMING STANDARD FLEX - (30/CA)

## (undated) DEVICE — DRESSING POST OP BORDER 4 X 10 (5EA/BX)

## (undated) DEVICE — CHLORAPREP 26 ML APPLICATOR - ORANGE TINT(25/CA)

## (undated) DEVICE — SUCTION INSTRUMENT YANKAUER BULBOUS TIP W/O VENT (50EA/CA)

## (undated) DEVICE — WIRE GUIDE AEQUALIS PERFORM 2.5MM X 220MM

## (undated) DEVICE — STOCKINET TUBULAR 6IN STERILE - 6 X 48YDS (25/CA)

## (undated) DEVICE — STOCKINETTE IMPERVIOUS 12X48 - STERILELF (10/CA)"

## (undated) DEVICE — COVER LIGHT HANDLE FLEXIBLE - SOFT (2EA/PK 80PK/CA)

## (undated) DEVICE — SET LEADWIRE 5 LEAD BEDSIDE DISPOSABLE ECG (1SET OF 5/EA)

## (undated) DEVICE — TUBING PUMP WITH CONNECTOR REDEUCE (1EA)

## (undated) DEVICE — GLOVE BIOGEL ECLIPSE PF LATEX SIZE 8.0  (50PR/BX)

## (undated) DEVICE — CLOSURE SKIN STRIP 1/2 X 4 IN - (STERI STRIP) (50/BX 4BX/CA)

## (undated) DEVICE — GLOVE BIOGEL PI ULTRATOUCH SZ 7.0 SURGICAL PF LF- POWDER FREE (50/BX 4BX/CA)

## (undated) DEVICE — ELECTRODE DUAL RETURN W/ CORD - (50/PK)

## (undated) DEVICE — GOWN SURGEONS X-LARGE - DISP. (30/CA)

## (undated) DEVICE — MASK AIRWAY SZ 2.5 UNIQUE SILICON (10EA/BX)

## (undated) DEVICE — GLOVE BIOGEL PI INDICATOR SZ 7.0 SURGICAL PF LF - (50/BX 4BX/CA)

## (undated) DEVICE — NEPTUNE 4 PORT MANIFOLD - (20/PK)

## (undated) DEVICE — GLOVE BIOGEL PI ORTHO SZ 7.5 PF LF (40PR/BX)

## (undated) DEVICE — BANDAGE ELASTIC 6 HONEYCOMB - 6X5YD LF (20/CA)"

## (undated) DEVICE — BLADE AGGRESSIVE PLUS ANGLES 4.0MM (5EA/BX)

## (undated) DEVICE — PROTECTOR ULNA NERVE - (36PR/CA)

## (undated) DEVICE — CANISTER SUCTION RIGID RED 1500CC (40EA/CA)

## (undated) DEVICE — DRAPE LARGE 3 QUARTER - (20/CA)

## (undated) DEVICE — BLADE SHAVER AGGRESSIVE PLUS 4.0MM ANGLED (5EA/BX)

## (undated) DEVICE — CUFF TOURNIQUET 44 X 4 ONE PORT DISP - STERILE (10/CA)

## (undated) DEVICE — TIP INTPLS HFLO ML ORFC BTRY - (12/CS) FOR SURGILAV

## (undated) DEVICE — SENSOR OXIMETER ADULT SPO2 RD SET (20EA/BX)

## (undated) DEVICE — LACTATED RINGERS INJ 1000 ML - (14EA/CA 60CA/PF)

## (undated) DEVICE — BENZOIN TINCTURE AMPULE

## (undated) DEVICE — ELECTRODE 850 FOAM ADHESIVE - HYDROGEL RADIOTRNSPRNT (50/PK)

## (undated) DEVICE — TUBING, SPIROMETRY KIT

## (undated) DEVICE — SUTURE 0 VICRYL PLUS CT-1 - 8 X 18 INCH (12/BX)

## (undated) DEVICE — MASK ANESTHESIA ADULT  - (100/CA)

## (undated) DEVICE — SENSOR SPO2 NEO LNCS ADHESIVE (20/BX) SEE USER NOTES

## (undated) DEVICE — Device

## (undated) DEVICE — GLOVE BIOGEL INDICATOR SZ 8 SURGICAL PF LTX - (50/BX 4BX/CA)

## (undated) DEVICE — SUTURE GENERAL

## (undated) DEVICE — SET EXTENSION WITH 2 PORTS (48EA/CA) ***PART #2C8610 IS A SUBSTITUTE*****

## (undated) DEVICE — KIT ROOM DECONTAMINATION

## (undated) DEVICE — PACK TOTAL HIP - (1/CA)

## (undated) DEVICE — GLOVE, LITE (PAIR)

## (undated) DEVICE — STERI STRIP COMPOUND BENZOIN - TINCTURE 0.6ML WITH APPLICATOR (40EA/BX)

## (undated) DEVICE — DRAPE SURGICAL U 77X120 - (10/CA)

## (undated) DEVICE — GLOVE BIOGEL SZ 8 SURGICAL PF LTX - (50PR/BX 4BX/CA)

## (undated) DEVICE — LENS/HOOD FOR SPACESUIT - (32/PK) PEEL AWAY FACE

## (undated) DEVICE — PACK KNEE ARTHROSCOPY SM OR - (2EA/CA)

## (undated) DEVICE — MIXER BONE CEMENT REVOLUTION - W/FEMORAL PRESSURIZER (6/CA)

## (undated) DEVICE — BANDAGE ELASTIC 6 IN X 5 YDS - LATEX FREE (10/BX)

## (undated) DEVICE — DRAPETIBURON SHOULDER W/POUCH - (5EA/CA)

## (undated) DEVICE — FIBERWIRE 2.0 (12EA/BX)

## (undated) DEVICE — GLOVE BIOGEL INDICATOR SZ 8.5 SURGICAL PF LTX - (50/BX 4BX/CA)

## (undated) DEVICE — PACK TOTAL KNEE  (1/CA)

## (undated) DEVICE — HEAD HOLDER JUNIOR/ADULT

## (undated) DEVICE — SPONGE GAUZE STER 4X4 8-PL - (2/PK 50PK/BX 12BX/CS)

## (undated) DEVICE — SODIUM CHL. IRRIGATION 0.9% 3000ML (4EA/CA 65CA/PF)

## (undated) DEVICE — DRESSING XEROFORM 1X8 - (50/BX 4BX/CA)

## (undated) DEVICE — TIP INTPLS HFLO ML ORFC BTRY - (12/CS)  FOR SURGILAV

## (undated) DEVICE — PAD PREP 24 X 48 CUFFED - (100/CA)

## (undated) DEVICE — TUBING CLEARLINK DUO-VENT - C-FLO (48EA/CA)

## (undated) DEVICE — CANISTER SUCTION 3000ML MECHANICAL FILTER AUTO SHUTOFF MEDI-VAC NONSTERILE LF DISP  (40EA/CA)

## (undated) DEVICE — BLADE SAW 90X25X1.37MM SAGITTAL DUAL CUT

## (undated) DEVICE — SUTURE 2-0 VICRYL PLUS CT-1 - 8 X 18 INCH(12/BX)

## (undated) DEVICE — ARTHROWAND TURBOVAC 3.5/90 SCT

## (undated) DEVICE — SLEEVE, VASO, THIGH, MED

## (undated) DEVICE — HANDPIECE 10FT INTPLS SCT PLS IRRIGATION HAND CONTROL SET (6/PK)

## (undated) DEVICE — SUTURE 3-0 PROLENE PS-1 (12PK/BX)

## (undated) DEVICE — NEEDLE DAVIS TONSIL 1/2 CIR - (2EA/PK20PK/BX)

## (undated) DEVICE — BLADE SAGITTAL SAW 90 X 12.5 X .89MM

## (undated) DEVICE — HUMID-VENT HEAT AND MOISTURE EXCHANGE- (50/BX)

## (undated) DEVICE — GLOVE BIOGEL ECLIPSE  PF LATEX SIZE 6.5 (50PR/BX)

## (undated) DEVICE — PADDING CAST 6 IN STERILE - 6 X 4 YDS (24/CA)

## (undated) DEVICE — SUTURE 1 ETHIBOND OS-4 30 (36PK/BX)"

## (undated) DEVICE — AQUACEL 4X4

## (undated) DEVICE — TUBING PATIENT W/CONNECTOR REDEUCE (1EA)

## (undated) DEVICE — SODIUM CHL IRRIGATION 0.9% 1000ML (12EA/CA)

## (undated) DEVICE — GLOVE BIOGEL SZ 7.5 SURGICAL PF LTX - (50PR/BX 4BX/CA)

## (undated) DEVICE — BLADE SAGITTAL SYSTEM 18MM

## (undated) DEVICE — GLOVE SZ 7.5 LF PROTEXIS (50PR/BX)

## (undated) DEVICE — TUBING CASSETTE CROSSFLOW INTEGRATED (10EA/CA)

## (undated) DEVICE — TUBING DAY USE W/CARTRIDGE (10EA/BX)

## (undated) DEVICE — TOWEL STOP TIMEOUT SAFETY FLAG (40EA/CA)

## (undated) DEVICE — SUTURE RETRIEVER HEWSON LIGA - 6/BX

## (undated) DEVICE — SUTURE 3-0 MONOCRYL PLUS PS-1 - 27 INCH (36/BX)

## (undated) DEVICE — PAD LAP STERILE 18 X 18 - (5/PK 40PK/CA)

## (undated) DEVICE — PAD UNIVERSAL MULTI USE (1/EA)

## (undated) DEVICE — BLOCK

## (undated) DEVICE — DRILL BIT AEQUALIS PERFORM 3.2MM